# Patient Record
Sex: MALE | Race: WHITE | Employment: OTHER | URBAN - METROPOLITAN AREA
[De-identification: names, ages, dates, MRNs, and addresses within clinical notes are randomized per-mention and may not be internally consistent; named-entity substitution may affect disease eponyms.]

---

## 2019-08-14 ENCOUNTER — APPOINTMENT (OUTPATIENT)
Dept: CT IMAGING | Age: 69
End: 2019-08-14
Payer: MEDICARE

## 2019-08-14 ENCOUNTER — APPOINTMENT (OUTPATIENT)
Dept: GENERAL RADIOLOGY | Age: 69
End: 2019-08-14
Payer: MEDICARE

## 2019-08-14 ENCOUNTER — HOSPITAL ENCOUNTER (EMERGENCY)
Age: 69
Discharge: HOME | End: 2019-08-14
Attending: EMERGENCY MEDICINE
Payer: MEDICARE

## 2019-08-14 VITALS
RESPIRATION RATE: 18 BRPM | TEMPERATURE: 98.1 F | SYSTOLIC BLOOD PRESSURE: 110 MMHG | BODY MASS INDEX: 24.93 KG/M2 | HEIGHT: 68 IN | HEART RATE: 71 BPM | WEIGHT: 164.46 LBS | DIASTOLIC BLOOD PRESSURE: 85 MMHG | OXYGEN SATURATION: 97 %

## 2019-08-14 DIAGNOSIS — C44.319 BASAL CELL CARCINOMA (BCC) OF SKIN OF OTHER PART OF FACE: ICD-10-CM

## 2019-08-14 DIAGNOSIS — R79.89 ELEVATED D-DIMER: ICD-10-CM

## 2019-08-14 DIAGNOSIS — R07.9 ACUTE CHEST PAIN: Primary | ICD-10-CM

## 2019-08-14 DIAGNOSIS — S82.002D FRACTURE OF LEFT PATELLA WITH ROUTINE HEALING: ICD-10-CM

## 2019-08-14 LAB
A/G RATIO: 1.7 (ref 1.1–2.2)
ALBUMIN SERPL-MCNC: 4.7 G/DL (ref 3.4–5)
ALP BLD-CCNC: 92 U/L (ref 40–129)
ALT SERPL-CCNC: 10 U/L (ref 10–40)
ANION GAP SERPL CALCULATED.3IONS-SCNC: 14 MMOL/L (ref 3–16)
AST SERPL-CCNC: 12 U/L (ref 15–37)
BASOPHILS ABSOLUTE: 0.1 K/UL (ref 0–0.2)
BASOPHILS RELATIVE PERCENT: 0.8 %
BILIRUB SERPL-MCNC: 0.3 MG/DL (ref 0–1)
BUN BLDV-MCNC: 8 MG/DL (ref 7–20)
CALCIUM SERPL-MCNC: 9.4 MG/DL (ref 8.3–10.6)
CHLORIDE BLD-SCNC: 98 MMOL/L (ref 99–110)
CO2: 26 MMOL/L (ref 21–32)
CREAT SERPL-MCNC: 0.8 MG/DL (ref 0.8–1.3)
D DIMER: 257 NG/ML DDU (ref 0–229)
EOSINOPHILS ABSOLUTE: 0.1 K/UL (ref 0–0.6)
EOSINOPHILS RELATIVE PERCENT: 1.2 %
GFR AFRICAN AMERICAN: >60
GFR NON-AFRICAN AMERICAN: >60
GLOBULIN: 2.7 G/DL
GLUCOSE BLD-MCNC: 111 MG/DL (ref 70–99)
HCT VFR BLD CALC: 41.1 % (ref 40.5–52.5)
HEMOGLOBIN: 13.9 G/DL (ref 13.5–17.5)
LIPASE: 25 U/L (ref 13–60)
LYMPHOCYTES ABSOLUTE: 2.3 K/UL (ref 1–5.1)
LYMPHOCYTES RELATIVE PERCENT: 23 %
MCH RBC QN AUTO: 31.4 PG (ref 26–34)
MCHC RBC AUTO-ENTMCNC: 33.8 G/DL (ref 31–36)
MCV RBC AUTO: 93 FL (ref 80–100)
MONOCYTES ABSOLUTE: 0.6 K/UL (ref 0–1.3)
MONOCYTES RELATIVE PERCENT: 6.4 %
NEUTROPHILS ABSOLUTE: 6.9 K/UL (ref 1.7–7.7)
NEUTROPHILS RELATIVE PERCENT: 68.6 %
PDW BLD-RTO: 15.2 % (ref 12.4–15.4)
PLATELET # BLD: 390 K/UL (ref 135–450)
PMV BLD AUTO: 6.5 FL (ref 5–10.5)
POTASSIUM REFLEX MAGNESIUM: 4.1 MMOL/L (ref 3.5–5.1)
RBC # BLD: 4.42 M/UL (ref 4.2–5.9)
SODIUM BLD-SCNC: 138 MMOL/L (ref 136–145)
TOTAL PROTEIN: 7.4 G/DL (ref 6.4–8.2)
TROPONIN: <0.01 NG/ML
WBC # BLD: 10 K/UL (ref 4–11)

## 2019-08-14 PROCEDURE — 93005 ELECTROCARDIOGRAM TRACING: CPT | Performed by: EMERGENCY MEDICINE

## 2019-08-14 PROCEDURE — 71046 X-RAY EXAM CHEST 2 VIEWS: CPT

## 2019-08-14 PROCEDURE — 99285 EMERGENCY DEPT VISIT HI MDM: CPT

## 2019-08-14 PROCEDURE — 83690 ASSAY OF LIPASE: CPT

## 2019-08-14 PROCEDURE — 85025 COMPLETE CBC W/AUTO DIFF WBC: CPT

## 2019-08-14 PROCEDURE — 85379 FIBRIN DEGRADATION QUANT: CPT

## 2019-08-14 PROCEDURE — 80053 COMPREHEN METABOLIC PANEL: CPT

## 2019-08-14 PROCEDURE — 73560 X-RAY EXAM OF KNEE 1 OR 2: CPT

## 2019-08-14 PROCEDURE — 73700 CT LOWER EXTREMITY W/O DYE: CPT

## 2019-08-14 PROCEDURE — 84484 ASSAY OF TROPONIN QUANT: CPT

## 2019-08-14 RX ORDER — IBUPROFEN 800 MG/1
800 TABLET ORAL 3 TIMES DAILY PRN
Qty: 15 TABLET | Refills: 0 | Status: ON HOLD | OUTPATIENT
Start: 2019-08-14 | End: 2022-04-24

## 2019-08-14 ASSESSMENT — PAIN DESCRIPTION - PAIN TYPE: TYPE: ACUTE PAIN

## 2019-08-14 ASSESSMENT — PAIN DESCRIPTION - LOCATION: LOCATION: CHEST

## 2019-08-14 ASSESSMENT — PAIN SCALES - GENERAL: PAINLEVEL_OUTOF10: 1

## 2019-08-15 LAB
EKG ATRIAL RATE: 77 BPM
EKG DIAGNOSIS: NORMAL
EKG P AXIS: 70 DEGREES
EKG P-R INTERVAL: 122 MS
EKG Q-T INTERVAL: 366 MS
EKG QRS DURATION: 76 MS
EKG QTC CALCULATION (BAZETT): 414 MS
EKG R AXIS: 80 DEGREES
EKG T AXIS: 51 DEGREES
EKG VENTRICULAR RATE: 77 BPM

## 2019-08-15 PROCEDURE — 93010 ELECTROCARDIOGRAM REPORT: CPT | Performed by: INTERNAL MEDICINE

## 2019-08-15 NOTE — ED PROVIDER NOTES
[address] [facility]      Pt Name: Vannessa Davila  MRN: [id number]  [place] 1950  Date of evaluation: 8/14/2019  Provider[facility] Ruchi Thurston, [facility]  Chief Complaint   Patient presents with    Chest Pain     For 45 minutes earlier today, states the pain is now gone, Pain is on the L side of the chest    Shortness of Breath       HPI  Vannessa Davila is a 71 y.o. male who presents with chest pain with which he woke up at 7:30 AM today. Lasted a few minutes and then went went away. He broke out in a cold sweat. He felt very weak. Has had a decreased appetite today. He has been nauseated. He states he thinks it might be secondary to a cheeseburger he ate yesterday that was left over for several days. However he is done this frequently in the past without problems. He denies any fevers but has had chills. He denies any diarrhea. He denies any previous history. He smokes 1 pack of cigarettes per day. He denies any gastrointestinal or genitourinary bleeding. He denies any family history of medical problems. He had a cough 2 weeks ago but that resolved spontaneously without treatment. He is also complaining of left knee pain. He states that started when he fell couple months ago. The pain is increased. He has problems bearing weight. He states he fell from a standing position onto concrete. He has had increasing pain with weightbearing. He has not seen a doctor for this problem. He denies any paresthesias or weakness. He denies any previous fractures. ? REVIEW OF SYSTEMS    All systems negative except as noted in the HPI. Reviewed Nurses' notes and concur. No LMP for male patient. PAST MEDICAL HISTORY  No past medical history on file. FAMILY HISTORY  No family history on file. SOCIAL HISTORY   reports that he has been smoking. He has been smoking about 1.00 pack per day.  He does not have any Laboratory  [address]  [place] [facility] [address]   Phone [phone]   D-DIMER, QUANTITATIVE - Abnormal; Notable for the following components:    D-Dimer, Quant 257 (*)     All other components within normal limits    Narrative:     Performed at:  [facility]  [address] [place] [address]   Phone [phone]   TROPONIN    Narrative:     Performed at:  [facility]  [address] [facility] [place] [facility] [address]   Phone [phone]   CBC WITH AUTO DIFFERENTIAL    Narrative:     Performed at:  [facility]  [address] [facility] [place] [facility] [facility] [address]   Phone [phone]   LIPASE    Narrative:     Performed at:  [facility]  [address] [facility] [facility] [address]   Phone [phone]       ? EKG  EKG Interpretation    Interpreted by emergency department physician  Time read: 2012    Rhythm: Sinus  Ventricular Rate: 77  QRS Axis: 80  Ectopy: None noted  Conduction: Normal sinus rhythm and I disagree with the computer interpretation of digitalis effect. ST Segments: Consistent with early repolarization  T Waves: Consistent with early repolarization  Q Waves: None noted    Other findings: None    Compared to EKG on: None to compare    Clinical Impression: Normal sinus rhythm, normal EKG, I disagree with computer interpretation of digitalis effect. This is probably early repolarization. Kashif Campos      RADIOLOGY/PROCEDURES  I personally reviewed the images for this case. CT KNEE LEFT WO CONTRAST   Final Result   Old healed fracture through the mid patella. No acute fractures of the left   knee. XR CHEST STANDARD (2 VW)   Final Result   No active cardiopulmonary disease         XR KNEE LEFT (1-2 VIEWS)   Final Result   No definite fracture or joint effusion.   Appearance of the patella suspected   to be due to a bipartite patella or remote trauma. If patient has pain   referable to the patella, then CT would be recommended             [facility]  Pertinent Labs, EKG, & Imaging studies reviewed. (See chart for details)    Vitals:    08/14/19 2010 08/14/19 2025 08/14/19 2041 08/14/19 2043   BP: (!) 131/94 (!) 146/78 127/89    Pulse:  81 87    Resp:  13 20    Temp:       TempSrc:       SpO2:  99%  97%   Weight:       Height:           Medications - No data to display    New Prescriptions    IBUPROFEN (ADVIL;MOTRIN) 800 MG TABLET    Take 1 tablet by mouth 3 times daily as needed for Pain     Patient remained stable in the ED. his laboratory work was returned is normal.  His troponin was normal.  His EKG was normal.  Patient was pain-free in the emergency department. 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 His d-dimer was mildly elevated at 269. I offered the patient CT scanning of his chest to rule out pulmonary embolus and he stated that he was not having any chest pain at this time. He was refusing to have a CT of the chest.  He states my knee is hurting the worst and I do not want to have a CT of the chest.  Therefore, patient was instructed to follow-up with his doctor in 3 to 5 days and return if any problems. He was given a knee immobilizer and prescribed ibuprofen. He was instructed to take Tylenol for pain. At discharge he mentioned a basal cell carcinoma of his face and wanted referral.  He was given referral to plastic surgery. He was also referred to Dr. Luba Negrete for orthopedics. He was instructed to return if any problems. He is instructed to wear his knee immobilizer while he is ambulating. He does not need to wear to bed. He was also instructed to follow-up with Dr. Paola Flood for cardiology for his chest pain. He was instructed to return if he got worse.     The

## 2019-08-30 ENCOUNTER — TELEPHONE (OUTPATIENT)
Dept: ORTHOPEDIC SURGERY | Age: 69
End: 2019-08-30

## 2019-08-30 ENCOUNTER — OFFICE VISIT (OUTPATIENT)
Dept: ORTHOPEDIC SURGERY | Age: 69
End: 2019-08-30
Payer: MEDICARE

## 2019-08-30 VITALS
SYSTOLIC BLOOD PRESSURE: 129 MMHG | WEIGHT: 164 LBS | DIASTOLIC BLOOD PRESSURE: 70 MMHG | RESPIRATION RATE: 16 BRPM | HEART RATE: 67 BPM | BODY MASS INDEX: 24.86 KG/M2 | HEIGHT: 68 IN

## 2019-08-30 DIAGNOSIS — M17.12 ARTHRITIS OF LEFT KNEE: Primary | ICD-10-CM

## 2019-08-30 PROCEDURE — 1123F ACP DISCUSS/DSCN MKR DOCD: CPT | Performed by: ORTHOPAEDIC SURGERY

## 2019-08-30 PROCEDURE — 20610 DRAIN/INJ JOINT/BURSA W/O US: CPT | Performed by: ORTHOPAEDIC SURGERY

## 2019-08-30 PROCEDURE — G8420 CALC BMI NORM PARAMETERS: HCPCS | Performed by: ORTHOPAEDIC SURGERY

## 2019-08-30 PROCEDURE — G8427 DOCREV CUR MEDS BY ELIG CLIN: HCPCS | Performed by: ORTHOPAEDIC SURGERY

## 2019-08-30 PROCEDURE — 3017F COLORECTAL CA SCREEN DOC REV: CPT | Performed by: ORTHOPAEDIC SURGERY

## 2019-08-30 PROCEDURE — 99203 OFFICE O/P NEW LOW 30 MIN: CPT | Performed by: ORTHOPAEDIC SURGERY

## 2019-08-30 PROCEDURE — 4004F PT TOBACCO SCREEN RCVD TLK: CPT | Performed by: ORTHOPAEDIC SURGERY

## 2019-08-30 PROCEDURE — 4040F PNEUMOC VAC/ADMIN/RCVD: CPT | Performed by: ORTHOPAEDIC SURGERY

## 2019-08-31 PROBLEM — M17.12 ARTHRITIS OF LEFT KNEE: Status: ACTIVE | Noted: 2019-08-31

## 2020-07-17 ENCOUNTER — OFFICE VISIT (OUTPATIENT)
Dept: ORTHOPEDIC SURGERY | Age: 70
End: 2020-07-17
Payer: MEDICARE

## 2020-07-17 VITALS — HEIGHT: 68 IN | BODY MASS INDEX: 24.94 KG/M2 | RESPIRATION RATE: 16 BRPM | TEMPERATURE: 97.2 F

## 2020-07-17 PROBLEM — C44.311 BASAL CELL CARCINOMA (BCC) OF SKIN OF NOSE: Status: ACTIVE | Noted: 2020-07-17

## 2020-07-17 PROCEDURE — 4040F PNEUMOC VAC/ADMIN/RCVD: CPT | Performed by: ORTHOPAEDIC SURGERY

## 2020-07-17 PROCEDURE — 20610 DRAIN/INJ JOINT/BURSA W/O US: CPT | Performed by: ORTHOPAEDIC SURGERY

## 2020-07-17 PROCEDURE — G8420 CALC BMI NORM PARAMETERS: HCPCS | Performed by: ORTHOPAEDIC SURGERY

## 2020-07-17 PROCEDURE — 3017F COLORECTAL CA SCREEN DOC REV: CPT | Performed by: ORTHOPAEDIC SURGERY

## 2020-07-17 PROCEDURE — G8427 DOCREV CUR MEDS BY ELIG CLIN: HCPCS | Performed by: ORTHOPAEDIC SURGERY

## 2020-07-17 PROCEDURE — 99214 OFFICE O/P EST MOD 30 MIN: CPT | Performed by: ORTHOPAEDIC SURGERY

## 2020-07-17 PROCEDURE — 4004F PT TOBACCO SCREEN RCVD TLK: CPT | Performed by: ORTHOPAEDIC SURGERY

## 2020-07-17 PROCEDURE — 1123F ACP DISCUSS/DSCN MKR DOCD: CPT | Performed by: ORTHOPAEDIC SURGERY

## 2020-07-17 RX ORDER — LIDOCAINE HYDROCHLORIDE 10 MG/ML
40 INJECTION, SOLUTION INFILTRATION; PERINEURAL ONCE
Status: COMPLETED | OUTPATIENT
Start: 2020-07-17 | End: 2020-07-17

## 2020-07-17 RX ORDER — TRIAMCINOLONE ACETONIDE 40 MG/ML
40 INJECTION, SUSPENSION INTRA-ARTICULAR; INTRAMUSCULAR ONCE
Status: COMPLETED | OUTPATIENT
Start: 2020-07-17 | End: 2020-07-17

## 2020-07-17 RX ADMIN — LIDOCAINE HYDROCHLORIDE 40 MG: 10 INJECTION, SOLUTION INFILTRATION; PERINEURAL at 09:52

## 2020-07-17 RX ADMIN — TRIAMCINOLONE ACETONIDE 40 MG: 40 INJECTION, SUSPENSION INTRA-ARTICULAR; INTRAMUSCULAR at 09:52

## 2020-07-18 NOTE — PROGRESS NOTES
CHIEF COMPLAINT:   1-Left knee pain/osteoarthritis. 2-Left knee injury/ patella fracture-1/2019  3-Skin lesion left side nose/ Basal cell carcinoma of skin of nose. HISTORY:  Mr. Ariana Eli 71 y.o.  male presents today for f/u evaluation of left knee pain which started after he fell and landed on his left knee cap spring 2019. He had left knee cortisone injection on 8/30/2019 with good improvement. His pain is affecting him walking and going up and down steps.  He is complaining of achy pain. Pain is increase with standing and walking and decrease with rest. Pain is sharp early in the morning with first few steps, dull achy pain by the end of the day. Alleviating factors: rest. No radiation and no numbness and tingling sensation. No other complaint. He initially presented [facility]2019 and had x-rays followed by a CT scan which showed a healing patella fracture with patellofemoral arthritis. He also c/o nasal skin lesion that is getting bigger, and started over 10 years ago. History reviewed. No pertinent past medical history. History reviewed. No pertinent surgical history. Social History     Socioeconomic History    Marital status:       Spouse name: Not on file    Number of children: Not on file    Years of education: Not on file    Highest education level: Not on file   Occupational History    Not on file   Social Needs    Financial resource strain: Not on file    Food insecurity     Worry: Not on file     Inability: Not on file    Transportation needs     Medical: Not on file     Non-medical: Not on file   Tobacco Use    Smoking status: Current Every Day Smoker     Packs/day: 1.00    Smokeless tobacco: Never Used   Substance and Sexual Activity    Alcohol use: No    Drug use: Not on file    Sexual activity: Not on file   Lifestyle    Physical activity     Days per week: Not on file     Minutes per session: Not on file    Stress: Not on file Relationships    Social connections     Talks on phone: Not on file     Gets together: Not on file     Attends Hoahaoism service: Not on file     Active member of club or organization: Not on file     Attends meetings of clubs or organizations: Not on file     Relationship status: Not on file    Intimate partner violence     Fear of current or ex partner: Not on file     Emotionally abused: Not on file     Physically abused: Not on file     Forced sexual activity: Not on file   Other Topics Concern    Not on file   Social History Narrative    Not on file       History reviewed. No pertinent family history. Current Outpatient Medications on File Prior to Visit   Medication Sig Dispense Refill    ibuprofen (ADVIL;MOTRIN) 800 MG tablet Take 1 tablet by mouth 3 times daily as needed for Pain 15 tablet 0     No current facility-administered medications on file prior to visit. Pertinent items are noted in HPI  Review of systems reviewed from Patient History Form dated on 8/30/2019 and available in the patient's chart under the Media tab. No change. PHYSICAL EXAMINATION:  Mr. Ariana Eli is a very pleasant 71 y.o.  male who presents today in no acute distress, awake, alert, and oriented. He is well dressed, nourished and  groomed. Patient with normal affect. Height is  5' 8\" (1.727 m), weight is  , Body mass index is 24.94 kg/m². Resting respiratory rate is 16. Examination of the gait, showed that the patient walks heel-toe with a non-antalgic gait and mild limp.  Examination of both knees showing full ROM, left mild crepitus, tenderness on medial joint line, stable to varus and valgus stress. He has intact sensation and good pedal pulses. He has good strength in 2 planes, and has mild tenderness on deep palpation over the medial joint line. Knee reflex 1+ bilaterally. There is a skin lesion left side nose c/w Basal cell carcinoma of skin of nose.               IMAGING:  Xray 3 views of the left knee was obtained 8/14/2019 in the ED and reviewed. These demonstrate moderate degenerative changes with narrowing of the joint space in the medial and patellofemoral joint space compartment, subchondral sclerosis, and marginal osteophytosis. CT scan dated 8/14/2019 from [facility] was reviewed and showed a healing patella fracture with the patellofemoral arthritis. IMPRESSION: Left knee DJD and healing patella fracture. 1-Left knee pain/osteoarthritis. 2-Left knee injury/ patella fracture-1/2019  3-Skin lesion left side nose/ Basal cell carcinoma of skin of nose. PLAN: I discussed with the patient the treatment options including both surgical and non-surgical treatment and discussed with him that he had an old patella fracture that is now healing. We recommended Quad exercises and stretching of the calf and hamstrings which was taught to the patient today. He will take NSAIDS as needed. I discussed with the patient that I think that he would really benefit from a course of physical therapy for further strengthening and stretching. He would like to do it on his own. I believe he will benefit from cortisone injection left knee, and he agreed to have. PROCEDURE:    With the patient's permission, and under sterile condition, the left knee was prepared and draped with alcohol and injected with a mixture of 1 mL Kenalog 40mg and 4 mL of 1% lidocaine through the medial parapatellar port area. The patient tolerated the procedure well. A band-aid was applied and the patient was advised to ice the knee for 15-20 minutes to relieve any injection site related pain. He reported a good improvement immediatly after the injection. F/u in 3 months. He understands that this may need surgery if the pain did not to resolve. Referral to Dermatology for his skin lesion left side nose/ Basal cell carcinoma of skin of nose.       Farhan Bonner MD

## 2021-09-09 ENCOUNTER — HOSPITAL ENCOUNTER (OUTPATIENT)
Dept: CT IMAGING | Age: 71
Discharge: HOME | End: 2021-09-09
Payer: MEDICARE

## 2021-09-09 DIAGNOSIS — F17.210 SMOKING GREATER THAN 10 PACK YEARS: ICD-10-CM

## 2021-09-09 PROCEDURE — 71271 CT THORAX LUNG CANCER SCR C-: CPT

## 2022-02-16 ENCOUNTER — HOSPITAL ENCOUNTER (OUTPATIENT)
Dept: VASCULAR LAB | Age: 72
Discharge: HOME | End: 2022-02-16
Payer: MEDICARE

## 2022-02-16 DIAGNOSIS — I73.9 PVD (PERIPHERAL VASCULAR DISEASE) (HCC): ICD-10-CM

## 2022-02-16 PROCEDURE — 93925 LOWER EXTREMITY STUDY: CPT

## 2022-02-24 ENCOUNTER — INITIAL CONSULT (OUTPATIENT)
Dept: VASCULAR SURGERY | Age: 72
End: 2022-02-24
Payer: MEDICARE

## 2022-02-24 VITALS
HEART RATE: 71 BPM | BODY MASS INDEX: 25.01 KG/M2 | HEIGHT: 68 IN | DIASTOLIC BLOOD PRESSURE: 80 MMHG | SYSTOLIC BLOOD PRESSURE: 129 MMHG | WEIGHT: 165 LBS

## 2022-02-24 DIAGNOSIS — I70.90 ATHEROSCLEROSIS: ICD-10-CM

## 2022-02-24 DIAGNOSIS — I73.9 PAD (PERIPHERAL ARTERY DISEASE) (HCC): ICD-10-CM

## 2022-02-24 DIAGNOSIS — I70.211 ATHEROSCLEROSIS OF NATIVE ARTERIES OF EXTREMITIES WITH INTERMITTENT CLAUDICATION, RIGHT LEG (HCC): ICD-10-CM

## 2022-02-24 DIAGNOSIS — I73.9 PAD (PERIPHERAL ARTERY DISEASE) (HCC): Primary | ICD-10-CM

## 2022-02-24 LAB
ANION GAP SERPL CALCULATED.3IONS-SCNC: 12 MMOL/L (ref 3–16)
BUN BLDV-MCNC: 15 MG/DL (ref 7–20)
CALCIUM SERPL-MCNC: 9 MG/DL (ref 8.3–10.6)
CHLORIDE BLD-SCNC: 102 MMOL/L (ref 99–110)
CO2: 24 MMOL/L (ref 21–32)
CREAT SERPL-MCNC: 1 MG/DL (ref 0.8–1.3)
GFR AFRICAN AMERICAN: >60
GFR NON-AFRICAN AMERICAN: >60
GLUCOSE BLD-MCNC: 102 MG/DL (ref 70–99)
POTASSIUM SERPL-SCNC: 4.3 MMOL/L (ref 3.5–5.1)
SODIUM BLD-SCNC: 138 MMOL/L (ref 136–145)

## 2022-02-24 PROCEDURE — 99203 OFFICE O/P NEW LOW 30 MIN: CPT | Performed by: STUDENT IN AN ORGANIZED HEALTH CARE EDUCATION/TRAINING PROGRAM

## 2022-02-24 PROCEDURE — G8427 DOCREV CUR MEDS BY ELIG CLIN: HCPCS | Performed by: STUDENT IN AN ORGANIZED HEALTH CARE EDUCATION/TRAINING PROGRAM

## 2022-02-24 PROCEDURE — 1123F ACP DISCUSS/DSCN MKR DOCD: CPT | Performed by: STUDENT IN AN ORGANIZED HEALTH CARE EDUCATION/TRAINING PROGRAM

## 2022-02-24 PROCEDURE — 3017F COLORECTAL CA SCREEN DOC REV: CPT | Performed by: STUDENT IN AN ORGANIZED HEALTH CARE EDUCATION/TRAINING PROGRAM

## 2022-02-24 PROCEDURE — G8417 CALC BMI ABV UP PARAM F/U: HCPCS | Performed by: STUDENT IN AN ORGANIZED HEALTH CARE EDUCATION/TRAINING PROGRAM

## 2022-02-24 PROCEDURE — 4004F PT TOBACCO SCREEN RCVD TLK: CPT | Performed by: STUDENT IN AN ORGANIZED HEALTH CARE EDUCATION/TRAINING PROGRAM

## 2022-02-24 PROCEDURE — 4040F PNEUMOC VAC/ADMIN/RCVD: CPT | Performed by: STUDENT IN AN ORGANIZED HEALTH CARE EDUCATION/TRAINING PROGRAM

## 2022-02-24 PROCEDURE — G8484 FLU IMMUNIZE NO ADMIN: HCPCS | Performed by: STUDENT IN AN ORGANIZED HEALTH CARE EDUCATION/TRAINING PROGRAM

## 2022-02-24 ASSESSMENT — ENCOUNTER SYMPTOMS
SHORTNESS OF BREATH: 0
COLOR CHANGE: 1
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
## 2022-03-02 ENCOUNTER — TELEPHONE (OUTPATIENT)
Dept: SURGERY | Age: 72
End: 2022-03-02

## 2022-03-02 NOTE — TELEPHONE ENCOUNTER
PT would like to have a return call regarding the test he is to have done for Dr Gila Crowley. Plus he would like to have some pain medication. Call and advise.

## 2022-03-03 ENCOUNTER — TELEPHONE (OUTPATIENT)
Dept: SURGERY | Age: 72
End: 2022-03-03

## 2022-03-03 ENCOUNTER — TELEPHONE (OUTPATIENT)
Dept: VASCULAR SURGERY | Age: 72
End: 2022-03-03

## 2022-03-03 DIAGNOSIS — I99.8 ISCHEMIC FOOT PAIN AT REST: Primary | ICD-10-CM

## 2022-03-03 DIAGNOSIS — M79.673 ISCHEMIC FOOT PAIN AT REST: Primary | ICD-10-CM

## 2022-03-03 RX ORDER — TRAMADOL HYDROCHLORIDE 50 MG/1
50 TABLET ORAL EVERY 4 HOURS PRN
Qty: 30 TABLET | Refills: 0 | Status: SHIPPED | OUTPATIENT
Start: 2022-03-03 | End: 2022-03-08

## 2022-03-03 NOTE — TELEPHONE ENCOUNTER
Pt is scheduled for CTA on 3-21-22. I recommended he call and try to move the appointment to an earlier date in order to move forward with treatment for his leg pain.

## 2022-03-03 NOTE — TELEPHONE ENCOUNTER
Patient called back says his iPhone 10 screens his call, he is calling about terrific rest pain he has moved his CAT scan up to the Saturday. Told to call tomorrow morning talk to Dr. Aurora Woods  and see if he will leave him a prescription to get through the weekend. I think it would be reasonable.   He is also dealing with a cancer of the nose and the specific CAT scan of his head over at [facility] next week sometime

## 2022-03-03 NOTE — TELEPHONE ENCOUNTER
Please see previous phone encounter for reference. Pt is now scheduled for CTA on 3-5-22. Will you prescribe pain medication?

## 2022-03-03 NOTE — TELEPHONE ENCOUNTER
Apparently he wants pain medicine for his increasing rest pain I agree with the messages in the chart he needs a CTA and he should not wait till the end of March to have this done

## 2022-03-04 NOTE — PROGRESS NOTES
Okay for tramadol for rest pain for now. Imperative that pre-op testing completed for possible surgery to relieve the pain itself.

## 2022-03-07 ENCOUNTER — TELEPHONE (OUTPATIENT)
Dept: SURGERY | Age: 72
End: 2022-03-07

## 2022-03-07 NOTE — TELEPHONE ENCOUNTER
PT would like to have a return call regarding CTA scan on 3/21. PT states that he was told to come in earlier then that. Call and advise on a new date and time.

## 2022-03-10 ENCOUNTER — TELEPHONE (OUTPATIENT)
Dept: VASCULAR SURGERY | Age: 72
End: 2022-03-10

## 2022-03-10 ENCOUNTER — HOSPITAL ENCOUNTER (OUTPATIENT)
Dept: CT IMAGING | Age: 72
Discharge: HOME | End: 2022-03-10
Payer: MEDICARE

## 2022-03-10 DIAGNOSIS — I70.211 ATHEROSCLEROSIS OF NATIVE ARTERIES OF EXTREMITIES WITH INTERMITTENT CLAUDICATION, RIGHT LEG (HCC): ICD-10-CM

## 2022-03-10 DIAGNOSIS — I73.9 PAD (PERIPHERAL ARTERY DISEASE) (HCC): ICD-10-CM

## 2022-03-10 PROCEDURE — 75635 CT ANGIO ABDOMINAL ARTERIES: CPT

## 2022-03-10 PROCEDURE — 6360000004 HC RX CONTRAST MEDICATION: Performed by: SURGERY

## 2022-03-10 RX ADMIN — IOPAMIDOL 75 ML: 755 INJECTION, SOLUTION INTRAVENOUS at 14:30

## 2022-03-10 NOTE — TELEPHONE ENCOUNTER
Dr Fletcher Resides reviewed the patient's CTA and recommends proceeding with a RLE angiogram.      Discussed with patient. Scheduling sheet forwarded [email or web address].

## 2022-03-10 NOTE — LETTER
[facility]  Cardiac Cath Lab    Phone [phone] Fax: [phone]          Patient Scheduling Form:     Date: 2022   Time: 09:30   (Arrival:  8:00)    Procedure: Angiogram of the Aorta and Right Lower Extremity, Possible Revascularization    Anesthesia Required:  LOC/SED    Physician: Sharmila Levy MD  -------------------------------------------------------------------------------------------------------------------  Patients Latanya Montero    YOB: 1950 Sex: Male    Patient Social Security #:     Mailing Address:  [address] [place] [address]      Home Phone # [phone]   Alternate #     Primary Care Physicain: Drea Frost    ICD-10 Code / Diagnosis:  I73.9 Peripheral Artery Disease  ------------------------------------------------------------------------------------------------------------------  INSURANCE INFORMAITON:  Payor/Plan Subscr  Sex Relation Sub. Ins. ID Effective Group Num   1.  [address] [facility] W 1950 Male Self [id number] 7/1/15                                    [address]        Form Completed By:  Lisbeth Dan [address] [place] [address] [place] 3-10-22 15:58             [phone]

## 2022-03-11 ENCOUNTER — TELEPHONE (OUTPATIENT)
Dept: VASCULAR SURGERY | Age: 72
End: 2022-03-11

## 2022-03-11 DIAGNOSIS — I99.8 ISCHEMIC FOOT PAIN AT REST: Primary | ICD-10-CM

## 2022-03-11 DIAGNOSIS — M79.673 ISCHEMIC FOOT PAIN AT REST: Primary | ICD-10-CM

## 2022-03-11 RX ORDER — OXYCODONE HYDROCHLORIDE AND ACETAMINOPHEN 5; 325 MG/1; MG/1
1 TABLET ORAL EVERY 6 HOURS PRN
Qty: 20 TABLET | Refills: 0 | Status: SHIPPED | OUTPATIENT
Start: 2022-03-11 | End: 2022-03-14 | Stop reason: SDUPTHER

## 2022-03-11 NOTE — TELEPHONE ENCOUNTER
Olvin Iyer, DO  You 5 minutes ago (3:09 PM)     Cindy Frias will send.     Message text      Pt informed

## 2022-03-11 NOTE — TELEPHONE ENCOUNTER
Pt was calling about his leg pain. He does not have a headache. The Tramadol does not help with his leg pain and is requesting something stronger.

## 2022-03-11 NOTE — TELEPHONE ENCOUNTER
Pt said his leg pain is severe and he has not been able to sleep for several days. He said that he \"needs something stronger than Tramadol because it isn't good enough to take care of a headache. \" He has not used all of the Tramadol previously prescribed. Pt is scheduled for RLE angiogram on 3-23-22.

## 2022-03-14 ENCOUNTER — TELEPHONE (OUTPATIENT)
Dept: SURGERY | Age: 72
End: 2022-03-14

## 2022-03-14 DIAGNOSIS — I99.8 ISCHEMIC FOOT PAIN AT REST: ICD-10-CM

## 2022-03-14 DIAGNOSIS — M79.673 ISCHEMIC FOOT PAIN AT REST: ICD-10-CM

## 2022-03-14 RX ORDER — OXYCODONE HYDROCHLORIDE AND ACETAMINOPHEN 5; 325 MG/1; MG/1
1 TABLET ORAL EVERY 6 HOURS PRN
Qty: 20 TABLET | Refills: 0 | Status: SHIPPED | OUTPATIENT
Start: 2022-03-14 | End: 2022-03-15 | Stop reason: SDUPTHER

## 2022-03-14 NOTE — TELEPHONE ENCOUNTER
Confirmed with pharmacy they did not receive the repeat Rx sent today. Written prescription ready for . Pt unsure if he can make it. He will call the pharmacy and call back.

## 2022-03-14 NOTE — TELEPHONE ENCOUNTER
Pt will see if his son will be able to  the Rx tomorrow or if he needs it sent to a different pharmacy.

## 2022-03-14 NOTE — TELEPHONE ENCOUNTER
Patient just called stating that he just checked the pharmacy and they did not have his Rx for Percocet from last week. He is also requesting a new Rx for Tramadol. He uses [facility].

## 2022-03-14 NOTE — TELEPHONE ENCOUNTER
PT needs to have a phone call placed to his pharmacy [facility] for his pain medication.

## 2022-03-15 ENCOUNTER — TELEPHONE (OUTPATIENT)
Dept: SURGERY | Age: 72
End: 2022-03-15

## 2022-03-15 DIAGNOSIS — I99.8 ISCHEMIC FOOT PAIN AT REST: ICD-10-CM

## 2022-03-15 DIAGNOSIS — M79.673 ISCHEMIC FOOT PAIN AT REST: ICD-10-CM

## 2022-03-15 RX ORDER — OXYCODONE HYDROCHLORIDE AND ACETAMINOPHEN 5; 325 MG/1; MG/1
1 TABLET ORAL EVERY 6 HOURS PRN
Qty: 20 TABLET | Refills: 0 | Status: SHIPPED | OUTPATIENT
Start: 2022-03-15 | End: 2022-03-20

## 2022-03-15 NOTE — TELEPHONE ENCOUNTER
Spoke to patient. He would like to try to send the Rx to [facility] on [address] Pharmacy updated.

## 2022-03-19 ENCOUNTER — TELEPHONE (OUTPATIENT)
Dept: VASCULAR SURGERY | Age: 72
End: 2022-03-19

## 2022-03-19 NOTE — TELEPHONE ENCOUNTER
Tried to call patient twice--he called Service stating he had foot pain. No answer. Left message x 2.

## 2022-03-19 NOTE — TELEPHONE ENCOUNTER
Pt called [facility] again--again, tried to contact patient and service also tried to contact him while I was holding. No answer and Left message on VM.

## 2022-03-19 NOTE — TELEPHONE ENCOUNTER
Pt called back a 3rd time and AS kept him on hold so that I could speak with him. Reports rest pain in right foot. He was upset about issues with pain Rx. Reports he is almost out of pain meds. I advised him that I would not refill Rx on weekend, and that if pain worsens, he should proceed to ED. If stable, can call and speak with Dr. Kavya Gordon on Monday. Advised him to have a consistent way for us to contact him back if needed.

## 2022-03-20 ENCOUNTER — TELEPHONE (OUTPATIENT)
Dept: VASCULAR SURGERY | Age: 72
End: 2022-03-20

## 2022-03-20 NOTE — TELEPHONE ENCOUNTER
Patient called again to request pain medications. I returned call and no answer--left message that if he is having significant pain, should proceed to the ED.

## 2022-03-21 ENCOUNTER — TELEPHONE (OUTPATIENT)
Dept: VASCULAR SURGERY | Age: 72
End: 2022-03-21

## 2022-03-21 DIAGNOSIS — I99.8 ISCHEMIC FOOT PAIN AT REST: Primary | ICD-10-CM

## 2022-03-21 DIAGNOSIS — M79.673 ISCHEMIC FOOT PAIN AT REST: Primary | ICD-10-CM

## 2022-03-21 RX ORDER — OXYCODONE HYDROCHLORIDE AND ACETAMINOPHEN 5; 325 MG/1; MG/1
1 TABLET ORAL EVERY 6 HOURS PRN
Qty: 12 TABLET | Refills: 0 | Status: SHIPPED | OUTPATIENT
Start: 2022-03-21 | End: 2022-03-21

## 2022-03-21 NOTE — TELEPHONE ENCOUNTER
Pt called back to check on the status of his request.  Informed Dr Tarun Tobias will be in the office this afternoon

## 2022-03-21 NOTE — PROGRESS NOTES
Patient's procedure not until Wednesday  5 day prescription up today  Will provide 3 more days worth until procedure can be completed.

## 2022-03-23 ENCOUNTER — HOSPITAL ENCOUNTER (OUTPATIENT)
Dept: CARDIAC CATH/INVASIVE PROCEDURES | Age: 72
Discharge: HOME | End: 2022-03-23
Payer: MEDICARE

## 2022-03-23 ENCOUNTER — TELEPHONE (OUTPATIENT)
Dept: VASCULAR SURGERY | Age: 72
End: 2022-03-23

## 2022-03-23 VITALS
RESPIRATION RATE: 16 BRPM | HEART RATE: 68 BPM | OXYGEN SATURATION: 97 % | TEMPERATURE: 98 F | SYSTOLIC BLOOD PRESSURE: 103 MMHG | DIASTOLIC BLOOD PRESSURE: 75 MMHG | BODY MASS INDEX: 24.1 KG/M2 | HEIGHT: 68 IN | WEIGHT: 159 LBS

## 2022-03-23 LAB
ANION GAP SERPL CALCULATED.3IONS-SCNC: 11 MMOL/L (ref 3–16)
BUN BLDV-MCNC: 12 MG/DL (ref 7–20)
CALCIUM SERPL-MCNC: 9.2 MG/DL (ref 8.3–10.6)
CHLORIDE BLD-SCNC: 102 MMOL/L (ref 99–110)
CO2: 25 MMOL/L (ref 21–32)
CREAT SERPL-MCNC: 0.9 MG/DL (ref 0.8–1.3)
GFR AFRICAN AMERICAN: >60
GFR NON-AFRICAN AMERICAN: >60
GLUCOSE BLD-MCNC: 101 MG/DL (ref 70–99)
HCT VFR BLD CALC: 40.4 % (ref 40.5–52.5)
HEMOGLOBIN: 13.4 G/DL (ref 13.5–17.5)
MCH RBC QN AUTO: 29.7 PG (ref 26–34)
MCHC RBC AUTO-ENTMCNC: 33.2 G/DL (ref 31–36)
MCV RBC AUTO: 89.5 FL (ref 80–100)
PDW BLD-RTO: 14.4 % (ref 12.4–15.4)
PLATELET # BLD: 381 K/UL (ref 135–450)
PMV BLD AUTO: 7.2 FL (ref 5–10.5)
POTASSIUM SERPL-SCNC: 4.1 MMOL/L (ref 3.5–5.1)
RBC # BLD: 4.52 M/UL (ref 4.2–5.9)
SODIUM BLD-SCNC: 138 MMOL/L (ref 136–145)
WBC # BLD: 7.4 K/UL (ref 4–11)

## 2022-03-23 PROCEDURE — 99152 MOD SED SAME PHYS/QHP 5/>YRS: CPT

## 2022-03-23 PROCEDURE — 75774 ARTERY X-RAY EACH VESSEL: CPT

## 2022-03-23 PROCEDURE — 75716 ARTERY X-RAYS ARMS/LEGS: CPT

## 2022-03-23 PROCEDURE — C1760 CLOSURE DEV, VASC: HCPCS

## 2022-03-23 PROCEDURE — 6360000004 HC RX CONTRAST MEDICATION: Performed by: STUDENT IN AN ORGANIZED HEALTH CARE EDUCATION/TRAINING PROGRAM

## 2022-03-23 PROCEDURE — 37224 PR REVSC OPN/PRG FEM/POP W/ANGIOPLASTY UNI: CPT | Performed by: STUDENT IN AN ORGANIZED HEALTH CARE EDUCATION/TRAINING PROGRAM

## 2022-03-23 PROCEDURE — C1887 CATHETER, GUIDING: HCPCS

## 2022-03-23 PROCEDURE — 6360000002 HC RX W HCPCS

## 2022-03-23 PROCEDURE — 75625 CONTRAST EXAM ABDOMINL AORTA: CPT

## 2022-03-23 PROCEDURE — 99152 MOD SED SAME PHYS/QHP 5/>YRS: CPT | Performed by: STUDENT IN AN ORGANIZED HEALTH CARE EDUCATION/TRAINING PROGRAM

## 2022-03-23 PROCEDURE — 99153 MOD SED SAME PHYS/QHP EA: CPT

## 2022-03-23 PROCEDURE — 85027 COMPLETE CBC AUTOMATED: CPT

## 2022-03-23 PROCEDURE — 80048 BASIC METABOLIC PNL TOTAL CA: CPT

## 2022-03-23 PROCEDURE — C2623 CATH, TRANSLUMIN, DRUG-COAT: HCPCS

## 2022-03-23 PROCEDURE — 2709999900 HC NON-CHARGEABLE SUPPLY

## 2022-03-23 PROCEDURE — 37224 HC FEM POP TERRITORY PLASTY: CPT

## 2022-03-23 PROCEDURE — 6370000000 HC RX 637 (ALT 250 FOR IP)

## 2022-03-23 PROCEDURE — C1894 INTRO/SHEATH, NON-LASER: HCPCS

## 2022-03-23 PROCEDURE — 2500000003 HC RX 250 WO HCPCS

## 2022-03-23 PROCEDURE — C1769 GUIDE WIRE: HCPCS

## 2022-03-23 PROCEDURE — C1725 CATH, TRANSLUMIN NON-LASER: HCPCS

## 2022-03-23 RX ORDER — CLOPIDOGREL BISULFATE 75 MG/1
75 TABLET ORAL DAILY
Qty: 30 TABLET | Refills: 3 | Status: SHIPPED | OUTPATIENT
Start: 2022-03-23 | End: 2022-06-13 | Stop reason: SDUPTHER

## 2022-03-23 RX ORDER — IODIXANOL 320 MG/ML
110 INJECTION, SOLUTION INTRAVASCULAR
Status: COMPLETED | OUTPATIENT
Start: 2022-03-23 | End: 2022-03-23

## 2022-03-23 RX ORDER — SODIUM CHLORIDE 0.9 % (FLUSH) 0.9 %
5-40 SYRINGE (ML) INJECTION PRN
Status: DISCONTINUED | OUTPATIENT
Start: 2022-03-23 | End: 2022-03-24 | Stop reason: HOSPADM

## 2022-03-23 RX ORDER — CLOPIDOGREL BISULFATE 75 MG/1
150 TABLET ORAL ONCE
Status: DISCONTINUED | OUTPATIENT
Start: 2022-03-23 | End: 2022-03-24 | Stop reason: HOSPADM

## 2022-03-23 RX ORDER — SODIUM CHLORIDE 0.9 % (FLUSH) 0.9 %
5-40 SYRINGE (ML) INJECTION EVERY 12 HOURS SCHEDULED
Status: DISCONTINUED | OUTPATIENT
Start: 2022-03-23 | End: 2022-03-24 | Stop reason: HOSPADM

## 2022-03-23 RX ORDER — SODIUM CHLORIDE 9 MG/ML
25 INJECTION, SOLUTION INTRAVENOUS PRN
Status: DISCONTINUED | OUTPATIENT
Start: 2022-03-23 | End: 2022-03-24 | Stop reason: HOSPADM

## 2022-03-23 RX ORDER — SODIUM CHLORIDE 9 MG/ML
INJECTION, SOLUTION INTRAVENOUS CONTINUOUS
Status: DISCONTINUED | OUTPATIENT
Start: 2022-03-23 | End: 2022-03-24 | Stop reason: HOSPADM

## 2022-03-23 RX ORDER — ACETAMINOPHEN 325 MG/1
650 TABLET ORAL EVERY 4 HOURS PRN
Status: DISCONTINUED | OUTPATIENT
Start: 2022-03-23 | End: 2022-03-24 | Stop reason: HOSPADM

## 2022-03-23 RX ADMIN — IODIXANOL 110 ML: 320 INJECTION, SOLUTION INTRAVASCULAR at 10:44

## 2022-03-23 ASSESSMENT — PAIN DESCRIPTION - LOCATION: LOCATION: LEG

## 2022-03-23 ASSESSMENT — PAIN SCALES - GENERAL: PAINLEVEL_OUTOF10: 4

## 2022-03-23 ASSESSMENT — PAIN DESCRIPTION - PAIN TYPE: TYPE: ACUTE PAIN

## 2022-03-23 ASSESSMENT — PAIN DESCRIPTION - ORIENTATION: ORIENTATION: RIGHT

## 2022-03-23 NOTE — TELEPHONE ENCOUNTER
Cath lab called regarding the patients Rx for Plavix. Medication was sent by Dr Isabella Whalen today. I called the pharmacy and they had not yet gotten the transmission.       Verbal order accepted by the pharmacist.

## 2022-03-23 NOTE — H&P
H&P Update     I have reviewed the history and physical and examined the patient and find no relevant changes. I have reviewed with the patient and/or family the risks, benefits, and alternatives to the procedure. I HAVE PRESENTED REASONABLE ALTERNATIVES TO THE PATIENT'S PROPOSED CARE, TREATMENT, AND SERVICES. THE DISCUSSION I HAVE DONE ENCOMPASSED RISKS, BENEFITS, AND SIDE EFFECTS RELATED TO THE ALTERNATIVES AND THE RISKS RELATED TO NOT RECEIVING THE PROPOSED CARE, TREATMENT, SERVICES. Pre-sedation Assessment    Patient:  [place]   :   1950  Intended Procedure: RLE angiogram    Vitals:    22 0845   BP: 103/75   Pulse: 68   Resp: 16   Temp: 98 °F (36.7 °C)   SpO2: 97%       Nursing notes reviewed and agreed. Medications reviewed  Allergies: No Known Allergies      Pre-Procedure Assessment/Plan:  ASA 2 - Patient with mild systemic disease with no functional limitations    Mallampati Airway Assessment:  Mallampati Class II - (soft palate, fauces & uvula are visible)    Level of Sedation Plan: Moderate sedation    Post Procedure plan: Return to same level of care    Adrian Reid DO, [address] [facility] Vascular and Endovascular Surgery

## 2022-03-23 NOTE — OP NOTE
[facility]           [address]                                OPERATIVE REPORT    PATIENT NAME: Gerrianne Carrel                  :        1950  MED REC NO:   [id number]                          ROOM:  ACCOUNT NO:   [de-identified]                           ADMIT DATE: 2022  PROVIDER:     Alexy Lunsford DO    DATE OF PROCEDURE:  2022    PREOPERATIVE DIAGNOSIS:  Peripheral arterial disease with rest pain. POSTOPERATIVE DIAGNOSIS:  Peripheral arterial disease with rest pain. OPERATIONS PERFORMED:  1. Ultrasound-guided access to the left common femoral artery. 2.  Abdominal aortogram.  3.  Right lower extremity angiogram.  4.  Recanalization of occluded mid popliteal artery using a 4 x 80 mm  balloon, followed by 4 x 150 mm drug balloon, and followed by 5 x 120 mm  balloon. SURGEON:  Alexy Lunsford DO    ASSISTANTS:  None. ANESTHESIA:  Local with sedation. ESTIMATED BLOOD LOSS:  Less than 50 mL. COMPLICATIONS:  None apparent. ASA CLASSIFICATION:  II.    MALLAMPATI SCORE:  II.    INDICATIONS:  This is a 55-year-old male patient who presented to my  office with some indolent discomfort. This discomfort has been going on  for a while now and he has endorsed it mainly as rest pain. However,  given his poor overall history, I had some difficulty ascertaining as to  whether or not this was more of an acute or chronic process; however, we  ultimately decided that he might need the surgery. Therefore, I  underwent a CT angiogram.  CT angiogram demonstrated that he essentially  has axial flow; however, he has a mid popliteal artery occlusion. I was  a little bit concerned that this would be the only reason why he would  be having so much discomfort. However, we opted for an angiography  versus open surgical treatment. The risks include pain, infection,  bleeding, embolization, and thrombosis.   We were able to clearly dictate  it to the patient. He gave written informed consent. OPERATIVE PROCEDURE:  The patient was brought into the cath lab and  placed supine on the table. Appropriate monitoring lines including  continuous blood pressure, EKG, and pulse oximetry were placed on the  patient. A qualified nurse observer was in the room to administer  sedation. We used a combination of Versed and fentanyl. I directly  observed this. We used 8 mg of Versed and 300 mcg of fentanyl. Total  sedation time was from 9:56 a.m. to 10:45 a.m. Total time was 49  minutes. I directly observed the sedation during this entire time. Bilateral groins were prepped and draped in the usual sterile fashion. A time-out was called for indication, patient, and laterality. The left common femoral artery was visualized under ultrasound. It was  patent and pulsatile. I then accessed using a micropuncture technique. I directly visualized the needle entering the vessel and upsized over a  DiJiPOPson wire to a 5-Belarusian sheath. Abdominal aortogram demonstrated  that the abdominal aorta was patent. The bilateral renal arteries were  patent. The iliac arteries were patent. The common iliac arteries were  patent. The right hypogastric was severely diseased and stenotic. The  right external iliac artery was patent. I selectively catheterized the right lower extremity and placed the  catheter into the common femoral artery on the right side. The right  common femoral was patent. The profunda was patent. The SFA was small  and somewhat diseased, but still patent. It was patent down to the  level of the adductor hiatus where it turns to the popliteal artery. At  this point in time, the most proximal segment of the popliteal artery  appeared to be patent; however, it immediately occluded. There was  severe opacification of the P2 segment of the popliteal artery behind  the knee.   There were large intervening collaterals that had some  stenosis and even potentially contributing may be to his worsening  symptoms. The popliteal artery bifurcation was via anterior tibial and  peroneal arteries. The posterior tibial artery was completely occluded  at its origin. The dominant runoff was via the AT down to the dorsalis  pedis. However, the peroneal arteries were not far behind. I gave the patient 3000 units of heparin. I brought up and over a 6/45  Terumo sheath. I parked it in the common femoral artery. I then  proceeded to navigate a 0.035 catheter over the wire. I then proceeded  to use a Glidewire Advantage as well as a TrailBlazer catheter and  navigated into the occlusion pretty easily. I shot an arteriogram  confirming that I was luminal.  After this, I then predilated the lesion  using a 4-mm balloon. I was dissatisfied with the result. There seemed  to be a lot of plaque that was disrupted. There was no evidence of  thrombosis, however. I decided that a prolonged inflation was probably  better. He has selected a drug-coated balloon. I selected a 4 x 150 mm  balloon to traverse this entire lesion as well as the proximal segment  of the popliteal artery into the distal SFA. This was a Lutonix  balloon; therefore, I inflated it for 15 minutes. After this, I still had the same evidence of what appeared to be spasm  and reduced flow limiting. Once again, it was behind the knee and it  was not a great place to stent. Therefore, I tried to increase the size  of my balloon to a 5-mm balloon. This was a 5 x 120 mm balloon. Unfortunately, there was still residual  spasm. There was also what appeared to be some plaque in the outer wall  of the artery. It was not flow limiting. I had improved axial flow  down to the foot. I gave the patient 400 mcg of nitroglycerin. I  decided to just go ahead and leave it for now.   We would need to surveil  those and if for some reason it does occlude again, then may be either  consider for above-knee, below-knee popliteal artery bypass or we will  have to consider a stent behind the knee. I exchanged the sheath for a  short 6-Welsh sheath. I used a Mynx closure device with good  hemostatic effect. There were no immediate complications. I was present and performed all  critical aspects of this procedure.         Olivia Vaughn DO    D: 03/23/2022 10:57:42       T: 03/23/2022 12:56:38     [email or web address]  Job#: [id number]     Doc#: [id number]    CC:

## 2022-03-28 ENCOUNTER — TELEPHONE (OUTPATIENT)
Dept: SURGERY | Age: 72
End: 2022-03-28

## 2022-03-31 ENCOUNTER — OFFICE VISIT (OUTPATIENT)
Dept: VASCULAR SURGERY | Age: 72
End: 2022-03-31
Payer: MEDICARE

## 2022-03-31 VITALS
WEIGHT: 158 LBS | HEIGHT: 68 IN | SYSTOLIC BLOOD PRESSURE: 140 MMHG | BODY MASS INDEX: 23.95 KG/M2 | DIASTOLIC BLOOD PRESSURE: 84 MMHG

## 2022-03-31 DIAGNOSIS — I99.8 ISCHEMIC FOOT PAIN AT REST: Primary | ICD-10-CM

## 2022-03-31 DIAGNOSIS — M79.673 ISCHEMIC FOOT PAIN AT REST: Primary | ICD-10-CM

## 2022-03-31 PROCEDURE — 3017F COLORECTAL CA SCREEN DOC REV: CPT | Performed by: STUDENT IN AN ORGANIZED HEALTH CARE EDUCATION/TRAINING PROGRAM

## 2022-03-31 PROCEDURE — G8484 FLU IMMUNIZE NO ADMIN: HCPCS | Performed by: STUDENT IN AN ORGANIZED HEALTH CARE EDUCATION/TRAINING PROGRAM

## 2022-03-31 PROCEDURE — G8427 DOCREV CUR MEDS BY ELIG CLIN: HCPCS | Performed by: STUDENT IN AN ORGANIZED HEALTH CARE EDUCATION/TRAINING PROGRAM

## 2022-03-31 PROCEDURE — 99213 OFFICE O/P EST LOW 20 MIN: CPT | Performed by: STUDENT IN AN ORGANIZED HEALTH CARE EDUCATION/TRAINING PROGRAM

## 2022-03-31 PROCEDURE — 1123F ACP DISCUSS/DSCN MKR DOCD: CPT | Performed by: STUDENT IN AN ORGANIZED HEALTH CARE EDUCATION/TRAINING PROGRAM

## 2022-03-31 PROCEDURE — G8420 CALC BMI NORM PARAMETERS: HCPCS | Performed by: STUDENT IN AN ORGANIZED HEALTH CARE EDUCATION/TRAINING PROGRAM

## 2022-03-31 PROCEDURE — 4004F PT TOBACCO SCREEN RCVD TLK: CPT | Performed by: STUDENT IN AN ORGANIZED HEALTH CARE EDUCATION/TRAINING PROGRAM

## 2022-03-31 PROCEDURE — 4040F PNEUMOC VAC/ADMIN/RCVD: CPT | Performed by: STUDENT IN AN ORGANIZED HEALTH CARE EDUCATION/TRAINING PROGRAM

## 2022-03-31 RX ORDER — HYDROXYZINE PAMOATE 25 MG/1
25 CAPSULE ORAL NIGHTLY PRN
Status: ON HOLD | COMMUNITY
Start: 2022-03-02 | End: 2022-07-12 | Stop reason: HOSPADM

## 2022-03-31 RX ORDER — TRAMADOL HYDROCHLORIDE 50 MG/1
TABLET ORAL
Status: ON HOLD | COMMUNITY
Start: 2022-03-03 | End: 2022-04-24

## 2022-03-31 RX ORDER — OXYCODONE HYDROCHLORIDE AND ACETAMINOPHEN 5; 325 MG/1; MG/1
1 TABLET ORAL EVERY 6 HOURS PRN
Qty: 28 TABLET | Refills: 0 | Status: SHIPPED | OUTPATIENT
Start: 2022-03-31 | End: 2022-04-07

## 2022-03-31 RX ORDER — DIAZEPAM 2 MG/1
2 TABLET ORAL EVERY 6 HOURS PRN
Status: ON HOLD | COMMUNITY
End: 2022-04-24

## 2022-03-31 ASSESSMENT — ENCOUNTER SYMPTOMS
SHORTNESS OF BREATH: 0
COLOR CHANGE: 1

## 2022-03-31 NOTE — PROGRESS NOTES
Kayley Mancilla (:  1950) is a 70 y.o. male,Established patient, here for evaluation of the following chief complaint(s):  Follow-up         ASSESSMENT/PLAN:  1. Ischemic foot pain at rest  -     oxyCODONE-acetaminophen (PERCOCET) 5-325 MG per tablet; Take 1 tablet by mouth every 6 hours as needed for Pain for up to 7 days. Intended supply: 5 days. Take lowest dose possible to manage pain, Disp-28 tablet, R-0Print       This is a 77-year-old male patient presents the office today for foot pain. He has essentially single-vessel occlusive disease with the mid popliteal artery being the main problem. He has strong dopplerable signals. I did a bedside TOMY and compared to his upper extremity blood pressure his TOMY on the right is probably between 0.5 and 0.6. However I would like a formal test of his right lower extremity arterial perfusion status post popliteal artery angioplasty. He wants pain medicine which will prescribe him 1 more prescription. After that he will need to go to pain management. Once again single-level occlusive disease normally does not manifest in such severe pain. Anticipate there is something else going on here. 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 If this is causing any sort of compressive effects and is resulting in severe sensory deficits and potentially even some increased sympathetic output to his right lower extremity that could very much cause some of his symptoms to be worsened. He has really good sounding signals in his feet. Therefore anticipate his circulation to be adequate enough that he should not have such severe discomfort particularly when he is lying down. Therefore I think it is a little bit bizarre at this point time.   I am happy to entertain a femoral-popliteal artery bypass as it appears he has decent vein in his leg but I would hate to do that and his pain still be there in the absence of really identifying what this tumor or mass is in the presacral area. We will refer him to hematology and oncology. Once again I provide him with 1 more pain prescription after reviewing the PDMP. If that after that he still needs more pain medicine these can have to go to a more prolonged situation with pain management. If for some reason the tumor is drinking benign and nonresectable and none likely contributing to his symptoms then we will have to consider for femoropopliteal bypass. I discussed this with him with clear detail. All questions were answered. Subjective   SUBJECTIVE/OBJECTIVE:  This is a 31-year-old male patient presents the office today to once again discuss his right foot pain. He underwent angiography with demonstration of occluded mid popliteal artery that was opened with angioplasty. However it did not respond perfectly. There is still narrowed flow lumen. However is a bad place to put a stent because of the risk of fracture and occlusion. He states that he never got any better. He states that maybe his pain is little worse. He endorses pain only when he is horizontal.  He occasionally has pain also when he walks up steps. Of note his CTA was significant for a presacral mass that is about 6 cm in length. It is described as possibly nerve sheath tumor or teratoma. He wants more pain medicine today. He denies any fever chills. Denies any nonhealing ulcerations. He states that his right foot is more red than his left. Review of Systems   Constitutional: Negative for chills and fever. Respiratory: Negative for shortness of breath. Cardiovascular: Negative for chest pain and leg swelling. Musculoskeletal: Positive for gait problem and myalgias. Right foot and calf pain   Skin: Positive for color change. Neurological: Positive for numbness. Negative for weakness. Hematological: Does not bruise/bleed easily.    Psychiatric/Behavioral: Negative

## 2022-04-01 ENCOUNTER — TELEPHONE (OUTPATIENT)
Dept: VASCULAR SURGERY | Age: 72
End: 2022-04-01

## 2022-04-01 NOTE — TELEPHONE ENCOUNTER
Called pt to confirm he should keep the appointment for his vascular scan on 4-5-22. Confirmed with [facility] the patient has a new pt appointment. Scheduled on 4-14-22.

## 2022-04-05 ENCOUNTER — PROCEDURE VISIT (OUTPATIENT)
Dept: SURGERY | Age: 72
End: 2022-04-05
Payer: MEDICARE

## 2022-04-05 DIAGNOSIS — I73.9 PAD (PERIPHERAL ARTERY DISEASE) (HCC): Primary | ICD-10-CM

## 2022-04-05 PROCEDURE — 93926 LOWER EXTREMITY STUDY: CPT | Performed by: SURGERY

## 2022-04-08 ENCOUNTER — TELEPHONE (OUTPATIENT)
Dept: VASCULAR SURGERY | Age: 72
End: 2022-04-08

## 2022-04-08 DIAGNOSIS — I73.9 PERIPHERAL ARTERIAL DISEASE (HCC): Primary | ICD-10-CM

## 2022-04-08 DIAGNOSIS — Z01.810 ENCOUNTER FOR PREPROCEDURAL CARDIOVASCULAR EXAMINATION: ICD-10-CM

## 2022-04-08 NOTE — TELEPHONE ENCOUNTER
DO Krystle Pena Corporal  Will need to be set up for right femoral-popliteal bypass. Will need stress test prior since he doesn't have PCP. Patient informed of plan for test prior to scheduling surgery. Order placed. Per patient request, he only wants to have appointments once a week due to transportation limitations and cost. (uses Gena Hurtado)  He has an appointment on 4-14-22 with [facility] and requested to have the stress test the week of 4-18-22. I explained to the patient this would delay treatment and if any further medications for pain were requested, Dr Ag Huber would place a referral to pain management. Pt verbalized understanding. [facility] scheduled on 4-22-22. Arrival at 10:45 for 11:30 and 12:30 test.  No solid foods for 2-3 hours before arrival.  It is okay to take your medications. You should not have any food or drink containing caffeine for 12 hours before testing.

## 2022-04-23 ENCOUNTER — HOSPITAL ENCOUNTER (INPATIENT)
Age: 72
LOS: 4 days | Discharge: NURSING, REHAB OR LONG-TERM CARE | DRG: 481 | End: 2022-04-28
Attending: EMERGENCY MEDICINE | Admitting: STUDENT IN AN ORGANIZED HEALTH CARE EDUCATION/TRAINING PROGRAM
Payer: MEDICARE

## 2022-04-23 ENCOUNTER — APPOINTMENT (OUTPATIENT)
Dept: GENERAL RADIOLOGY | Age: 72
DRG: 481 | End: 2022-04-23
Payer: MEDICARE
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
## 2022-04-23 DIAGNOSIS — S72.001A HIP FRACTURE, RIGHT, CLOSED, INITIAL ENCOUNTER (HCC): ICD-10-CM

## 2022-04-23 DIAGNOSIS — S72.011A CLOSED SUBCAPITAL FRACTURE OF FEMUR, RIGHT, INITIAL ENCOUNTER (HCC): Primary | ICD-10-CM

## 2022-04-23 PROBLEM — R20.0 NUMBNESS OF RIGHT FOOT: Status: ACTIVE | Noted: 2022-04-23

## 2022-04-23 LAB
ANION GAP SERPL CALCULATED.3IONS-SCNC: 13 MMOL/L (ref 3–16)
BASOPHILS ABSOLUTE: 0.1 K/UL (ref 0–0.2)
BASOPHILS RELATIVE PERCENT: 0.6 %
BILIRUBIN URINE: NEGATIVE
BLOOD, URINE: NEGATIVE
BUN BLDV-MCNC: 15 MG/DL (ref 7–20)
CALCIUM SERPL-MCNC: 9.4 MG/DL (ref 8.3–10.6)
CHLORIDE BLD-SCNC: 100 MMOL/L (ref 99–110)
CLARITY: CLEAR
CO2: 23 MMOL/L (ref 21–32)
COLOR: YELLOW
CREAT SERPL-MCNC: 1 MG/DL (ref 0.8–1.3)
EOSINOPHILS ABSOLUTE: 0.1 K/UL (ref 0–0.6)
EOSINOPHILS RELATIVE PERCENT: 1.1 %
GFR AFRICAN AMERICAN: >60
GFR NON-AFRICAN AMERICAN: >60
GLUCOSE BLD-MCNC: 132 MG/DL (ref 70–99)
GLUCOSE URINE: NEGATIVE MG/DL
HCT VFR BLD CALC: 40.4 % (ref 40.5–52.5)
HEMOGLOBIN: 13.2 G/DL (ref 13.5–17.5)
INR BLD: 1.01 (ref 0.88–1.12)
KETONES, URINE: ABNORMAL MG/DL
LEUKOCYTE ESTERASE, URINE: NEGATIVE
LYMPHOCYTES ABSOLUTE: 2 K/UL (ref 1–5.1)
LYMPHOCYTES RELATIVE PERCENT: 16.4 %
MCH RBC QN AUTO: 29 PG (ref 26–34)
MCHC RBC AUTO-ENTMCNC: 32.8 G/DL (ref 31–36)
MCV RBC AUTO: 88.4 FL (ref 80–100)
MICROSCOPIC EXAMINATION: ABNORMAL
MONOCYTES ABSOLUTE: 0.6 K/UL (ref 0–1.3)
MONOCYTES RELATIVE PERCENT: 4.9 %
NEUTROPHILS ABSOLUTE: 9.2 K/UL (ref 1.7–7.7)
NEUTROPHILS RELATIVE PERCENT: 77 %
NITRITE, URINE: NEGATIVE
PDW BLD-RTO: 14 % (ref 12.4–15.4)
PH UA: 7 (ref 5–8)
PLATELET # BLD: 394 K/UL (ref 135–450)
PMV BLD AUTO: 6.7 FL (ref 5–10.5)
POTASSIUM SERPL-SCNC: 3.8 MMOL/L (ref 3.5–5.1)
PROTEIN UA: NEGATIVE MG/DL
PROTHROMBIN TIME: 11.4 SEC (ref 9.9–12.7)
RBC # BLD: 4.56 M/UL (ref 4.2–5.9)
SODIUM BLD-SCNC: 136 MMOL/L (ref 136–145)
SPECIFIC GRAVITY UA: 1.01 (ref 1–1.03)
URINE REFLEX TO CULTURE: ABNORMAL
URINE TYPE: ABNORMAL
UROBILINOGEN, URINE: 1 E.U./DL
WBC # BLD: 12 K/UL (ref 4–11)

## 2022-04-23 PROCEDURE — 99285 EMERGENCY DEPT VISIT HI MDM: CPT

## 2022-04-23 PROCEDURE — 71045 X-RAY EXAM CHEST 1 VIEW: CPT

## 2022-04-23 PROCEDURE — 6360000002 HC RX W HCPCS: Performed by: EMERGENCY MEDICINE

## 2022-04-23 PROCEDURE — 96374 THER/PROPH/DIAG INJ IV PUSH: CPT

## 2022-04-23 PROCEDURE — 85610 PROTHROMBIN TIME: CPT

## 2022-04-23 PROCEDURE — 93005 ELECTROCARDIOGRAM TRACING: CPT | Performed by: EMERGENCY MEDICINE

## 2022-04-23 PROCEDURE — 81003 URINALYSIS AUTO W/O SCOPE: CPT

## 2022-04-23 PROCEDURE — 80048 BASIC METABOLIC PNL TOTAL CA: CPT

## 2022-04-23 PROCEDURE — 73502 X-RAY EXAM HIP UNI 2-3 VIEWS: CPT

## 2022-04-23 PROCEDURE — 96375 TX/PRO/DX INJ NEW DRUG ADDON: CPT

## 2022-04-23 PROCEDURE — 85025 COMPLETE CBC W/AUTO DIFF WBC: CPT

## 2022-04-23 RX ORDER — ONDANSETRON 2 MG/ML
4 INJECTION INTRAMUSCULAR; INTRAVENOUS ONCE
Status: COMPLETED | OUTPATIENT
Start: 2022-04-23 | End: 2022-04-23

## 2022-04-23 RX ORDER — MORPHINE SULFATE 4 MG/ML
4 INJECTION, SOLUTION INTRAMUSCULAR; INTRAVENOUS ONCE
Status: COMPLETED | OUTPATIENT
Start: 2022-04-23 | End: 2022-04-23

## 2022-04-23 RX ADMIN — HYDROMORPHONE HYDROCHLORIDE 0.5 MG: 1 INJECTION, SOLUTION INTRAMUSCULAR; INTRAVENOUS; SUBCUTANEOUS at 22:06

## 2022-04-23 RX ADMIN — MORPHINE SULFATE 4 MG: 4 INJECTION, SOLUTION INTRAMUSCULAR; INTRAVENOUS at 20:27

## 2022-04-23 RX ADMIN — ONDANSETRON 4 MG: 2 INJECTION INTRAMUSCULAR; INTRAVENOUS at 20:26

## 2022-04-23 ASSESSMENT — PAIN DESCRIPTION - DESCRIPTORS: DESCRIPTORS: ACHING;SHOOTING

## 2022-04-23 ASSESSMENT — PAIN DESCRIPTION - ORIENTATION: ORIENTATION: RIGHT

## 2022-04-23 ASSESSMENT — PAIN SCALES - GENERAL
PAINLEVEL_OUTOF10: 10

## 2022-04-23 ASSESSMENT — PAIN DESCRIPTION - ONSET: ONSET: ON-GOING

## 2022-04-23 ASSESSMENT — PAIN - FUNCTIONAL ASSESSMENT: PAIN_FUNCTIONAL_ASSESSMENT: INTOLERABLE, UNABLE TO DO ANY ACTIVE OR PASSIVE ACTIVITIES

## 2022-04-23 ASSESSMENT — PAIN DESCRIPTION - PAIN TYPE: TYPE: ACUTE PAIN

## 2022-04-23 ASSESSMENT — PAIN DESCRIPTION - FREQUENCY: FREQUENCY: CONTINUOUS

## 2022-04-23 ASSESSMENT — PAIN DESCRIPTION - LOCATION: LOCATION: HIP

## 2022-04-23 NOTE — ED TRIAGE NOTES
Pt arrived to ED via EMS with c/o R hip pain after a fall. Pt states he had some numbness in his R foot due to his PAD, he attempted to stand and fell directly on his R hip. Pt denies hitting head or LOC. No deformities noted. Rates pain 10/10.  Takes plavix

## 2022-04-24 ENCOUNTER — ANESTHESIA (OUTPATIENT)
Dept: OPERATING ROOM | Age: 72
DRG: 481 | End: 2022-04-24
Payer: MEDICARE

## 2022-04-24 ENCOUNTER — APPOINTMENT (OUTPATIENT)
Dept: GENERAL RADIOLOGY | Age: 72
DRG: 481 | End: 2022-04-24
Payer: MEDICARE

## 2022-04-24 ENCOUNTER — ANESTHESIA EVENT (OUTPATIENT)
Dept: OPERATING ROOM | Age: 72
DRG: 481 | End: 2022-04-24
Payer: MEDICARE

## 2022-04-24 VITALS
RESPIRATION RATE: 32 BRPM | DIASTOLIC BLOOD PRESSURE: 79 MMHG | TEMPERATURE: 98.1 F | OXYGEN SATURATION: 100 % | SYSTOLIC BLOOD PRESSURE: 148 MMHG

## 2022-04-24 PROBLEM — S72.001A HIP FRACTURE, RIGHT, CLOSED, INITIAL ENCOUNTER (HCC): Status: ACTIVE | Noted: 2022-04-24

## 2022-04-24 LAB
ABO/RH: NORMAL
ANION GAP SERPL CALCULATED.3IONS-SCNC: 14 MMOL/L (ref 3–16)
ANTIBODY SCREEN: NORMAL
APTT: 32.2 SEC (ref 26.2–38.6)
BASOPHILS ABSOLUTE: 0 K/UL (ref 0–0.2)
BASOPHILS RELATIVE PERCENT: 0.4 %
BUN BLDV-MCNC: 12 MG/DL (ref 7–20)
CALCIUM SERPL-MCNC: 8.8 MG/DL (ref 8.3–10.6)
CHLORIDE BLD-SCNC: 103 MMOL/L (ref 99–110)
CHOLESTEROL, TOTAL: 197 MG/DL (ref 0–199)
CO2: 22 MMOL/L (ref 21–32)
CREAT SERPL-MCNC: 0.9 MG/DL (ref 0.8–1.3)
EKG ATRIAL RATE: 74 BPM
EKG DIAGNOSIS: NORMAL
EKG P AXIS: 64 DEGREES
EKG P-R INTERVAL: 130 MS
EKG Q-T INTERVAL: 368 MS
EKG QRS DURATION: 76 MS
EKG QTC CALCULATION (BAZETT): 408 MS
EKG R AXIS: 76 DEGREES
EKG T AXIS: 64 DEGREES
EKG VENTRICULAR RATE: 74 BPM
EOSINOPHILS ABSOLUTE: 0 K/UL (ref 0–0.6)
EOSINOPHILS RELATIVE PERCENT: 0.2 %
ESTIMATED AVERAGE GLUCOSE: 119.8 MG/DL
FOLATE: 5.64 NG/ML (ref 4.78–24.2)
GFR AFRICAN AMERICAN: >60
GFR NON-AFRICAN AMERICAN: >60
GLUCOSE BLD-MCNC: 121 MG/DL (ref 70–99)
HBA1C MFR BLD: 5.8 %
HCT VFR BLD CALC: 38.6 % (ref 40.5–52.5)
HDLC SERPL-MCNC: 40 MG/DL (ref 40–60)
HEMOGLOBIN: 12.8 G/DL (ref 13.5–17.5)
LDL CHOLESTEROL CALCULATED: 132 MG/DL
LYMPHOCYTES ABSOLUTE: 1.7 K/UL (ref 1–5.1)
LYMPHOCYTES RELATIVE PERCENT: 19.7 %
MAGNESIUM: 2.2 MG/DL (ref 1.8–2.4)
MCH RBC QN AUTO: 29.3 PG (ref 26–34)
MCHC RBC AUTO-ENTMCNC: 33.2 G/DL (ref 31–36)
MCV RBC AUTO: 88.1 FL (ref 80–100)
MONOCYTES ABSOLUTE: 0.5 K/UL (ref 0–1.3)
MONOCYTES RELATIVE PERCENT: 5.6 %
NEUTROPHILS ABSOLUTE: 6.5 K/UL (ref 1.7–7.7)
NEUTROPHILS RELATIVE PERCENT: 74.1 %
PDW BLD-RTO: 13.9 % (ref 12.4–15.4)
PLATELET # BLD: 327 K/UL (ref 135–450)
PMV BLD AUTO: 7.2 FL (ref 5–10.5)
POTASSIUM SERPL-SCNC: 4.2 MMOL/L (ref 3.5–5.1)
RBC # BLD: 4.39 M/UL (ref 4.2–5.9)
SODIUM BLD-SCNC: 139 MMOL/L (ref 136–145)
TRIGL SERPL-MCNC: 123 MG/DL (ref 0–150)
VITAMIN B-12: 437 PG/ML (ref 211–911)
VLDLC SERPL CALC-MCNC: 25 MG/DL
WBC # BLD: 8.8 K/UL (ref 4–11)

## 2022-04-24 PROCEDURE — 94150 VITAL CAPACITY TEST: CPT

## 2022-04-24 PROCEDURE — 96376 TX/PRO/DX INJ SAME DRUG ADON: CPT

## 2022-04-24 PROCEDURE — 3600000004 HC SURGERY LEVEL 4 BASE: Performed by: ORTHOPAEDIC SURGERY

## 2022-04-24 PROCEDURE — 2060000000 HC ICU INTERMEDIATE R&B

## 2022-04-24 PROCEDURE — 2500000003 HC RX 250 WO HCPCS: Performed by: ORTHOPAEDIC SURGERY

## 2022-04-24 PROCEDURE — 2580000003 HC RX 258: Performed by: ORTHOPAEDIC SURGERY

## 2022-04-24 PROCEDURE — 86901 BLOOD TYPING SEROLOGIC RH(D): CPT

## 2022-04-24 PROCEDURE — 99222 1ST HOSP IP/OBS MODERATE 55: CPT | Performed by: ORTHOPAEDIC SURGERY

## 2022-04-24 PROCEDURE — 82746 ASSAY OF FOLIC ACID SERUM: CPT

## 2022-04-24 PROCEDURE — 83735 ASSAY OF MAGNESIUM: CPT

## 2022-04-24 PROCEDURE — A4217 STERILE WATER/SALINE, 500 ML: HCPCS | Performed by: ORTHOPAEDIC SURGERY

## 2022-04-24 PROCEDURE — 93010 ELECTROCARDIOGRAM REPORT: CPT | Performed by: INTERNAL MEDICINE

## 2022-04-24 PROCEDURE — 2720000010 HC SURG SUPPLY STERILE: Performed by: ORTHOPAEDIC SURGERY

## 2022-04-24 PROCEDURE — 0QS636Z REPOSITION RIGHT UPPER FEMUR WITH INTRAMEDULLARY INTERNAL FIXATION DEVICE, PERCUTANEOUS APPROACH: ICD-10-PCS | Performed by: ORTHOPAEDIC SURGERY

## 2022-04-24 PROCEDURE — 94760 N-INVAS EAR/PLS OXIMETRY 1: CPT

## 2022-04-24 PROCEDURE — 85730 THROMBOPLASTIN TIME PARTIAL: CPT

## 2022-04-24 PROCEDURE — 2700000000 HC OXYGEN THERAPY PER DAY

## 2022-04-24 PROCEDURE — 6360000002 HC RX W HCPCS: Performed by: STUDENT IN AN ORGANIZED HEALTH CARE EDUCATION/TRAINING PROGRAM

## 2022-04-24 PROCEDURE — C1769 GUIDE WIRE: HCPCS | Performed by: ORTHOPAEDIC SURGERY

## 2022-04-24 PROCEDURE — 73502 X-RAY EXAM HIP UNI 2-3 VIEWS: CPT

## 2022-04-24 PROCEDURE — 27235 TREAT THIGH FRACTURE: CPT | Performed by: ORTHOPAEDIC SURGERY

## 2022-04-24 PROCEDURE — 80061 LIPID PANEL: CPT

## 2022-04-24 PROCEDURE — 3209999900 FLUORO FOR SURGICAL PROCEDURES

## 2022-04-24 PROCEDURE — 2500000003 HC RX 250 WO HCPCS: Performed by: ANESTHESIOLOGY

## 2022-04-24 PROCEDURE — 6360000002 HC RX W HCPCS: Performed by: ANESTHESIOLOGY

## 2022-04-24 PROCEDURE — 83036 HEMOGLOBIN GLYCOSYLATED A1C: CPT

## 2022-04-24 PROCEDURE — 80048 BASIC METABOLIC PNL TOTAL CA: CPT

## 2022-04-24 PROCEDURE — 85025 COMPLETE CBC W/AUTO DIFF WBC: CPT

## 2022-04-24 PROCEDURE — 6360000002 HC RX W HCPCS: Performed by: ORTHOPAEDIC SURGERY

## 2022-04-24 PROCEDURE — 2580000003 HC RX 258: Performed by: STUDENT IN AN ORGANIZED HEALTH CARE EDUCATION/TRAINING PROGRAM

## 2022-04-24 PROCEDURE — 86850 RBC ANTIBODY SCREEN: CPT

## 2022-04-24 PROCEDURE — 3700000001 HC ADD 15 MINUTES (ANESTHESIA): Performed by: ORTHOPAEDIC SURGERY

## 2022-04-24 PROCEDURE — C1713 ANCHOR/SCREW BN/BN,TIS/BN: HCPCS | Performed by: ORTHOPAEDIC SURGERY

## 2022-04-24 PROCEDURE — 7100000000 HC PACU RECOVERY - FIRST 15 MIN: Performed by: ORTHOPAEDIC SURGERY

## 2022-04-24 PROCEDURE — 82607 VITAMIN B-12: CPT

## 2022-04-24 PROCEDURE — 3600000014 HC SURGERY LEVEL 4 ADDTL 15MIN: Performed by: ORTHOPAEDIC SURGERY

## 2022-04-24 PROCEDURE — 6370000000 HC RX 637 (ALT 250 FOR IP): Performed by: ORTHOPAEDIC SURGERY

## 2022-04-24 PROCEDURE — 86900 BLOOD TYPING SEROLOGIC ABO: CPT

## 2022-04-24 PROCEDURE — 2709999900 HC NON-CHARGEABLE SUPPLY: Performed by: ORTHOPAEDIC SURGERY

## 2022-04-24 PROCEDURE — 36415 COLL VENOUS BLD VENIPUNCTURE: CPT

## 2022-04-24 PROCEDURE — 7100000001 HC PACU RECOVERY - ADDTL 15 MIN: Performed by: ORTHOPAEDIC SURGERY

## 2022-04-24 PROCEDURE — 99231 SBSQ HOSP IP/OBS SF/LOW 25: CPT | Performed by: STUDENT IN AN ORGANIZED HEALTH CARE EDUCATION/TRAINING PROGRAM

## 2022-04-24 PROCEDURE — 3700000000 HC ANESTHESIA ATTENDED CARE: Performed by: ORTHOPAEDIC SURGERY

## 2022-04-24 DEVICE — CANNULATED SCREW
Type: IMPLANTABLE DEVICE | Site: HIP | Status: FUNCTIONAL
Brand: ASNIS

## 2022-04-24 RX ORDER — ALBUTEROL SULFATE 90 UG/1
1-2 AEROSOL, METERED RESPIRATORY (INHALATION) EVERY 6 HOURS PRN
COMMUNITY
Start: 2022-02-09 | End: 2022-07-01 | Stop reason: ALTCHOICE

## 2022-04-24 RX ORDER — FENTANYL CITRATE 50 UG/ML
50 INJECTION, SOLUTION INTRAMUSCULAR; INTRAVENOUS EVERY 5 MIN PRN
Status: DISCONTINUED | OUTPATIENT
Start: 2022-04-24 | End: 2022-04-24 | Stop reason: HOSPADM

## 2022-04-24 RX ORDER — SODIUM CHLORIDE 0.9 % (FLUSH) 0.9 %
5-40 SYRINGE (ML) INJECTION PRN
Status: DISCONTINUED | OUTPATIENT
Start: 2022-04-24 | End: 2022-04-24

## 2022-04-24 RX ORDER — SODIUM CHLORIDE 0.9 % (FLUSH) 0.9 %
5-40 SYRINGE (ML) INJECTION PRN
Status: DISCONTINUED | OUTPATIENT
Start: 2022-04-24 | End: 2022-04-24 | Stop reason: HOSPADM

## 2022-04-24 RX ORDER — FENTANYL CITRATE 50 UG/ML
25 INJECTION, SOLUTION INTRAMUSCULAR; INTRAVENOUS EVERY 5 MIN PRN
Status: DISCONTINUED | OUTPATIENT
Start: 2022-04-24 | End: 2022-04-24 | Stop reason: HOSPADM

## 2022-04-24 RX ORDER — CLOPIDOGREL BISULFATE 75 MG/1
75 TABLET ORAL DAILY
Status: DISCONTINUED | OUTPATIENT
Start: 2022-04-25 | End: 2022-04-28 | Stop reason: HOSPADM

## 2022-04-24 RX ORDER — ACETAMINOPHEN 325 MG/1
650 TABLET ORAL EVERY 6 HOURS PRN
Status: DISCONTINUED | OUTPATIENT
Start: 2022-04-24 | End: 2022-04-28 | Stop reason: HOSPADM

## 2022-04-24 RX ORDER — SODIUM CHLORIDE 9 MG/ML
INJECTION, SOLUTION INTRAVENOUS PRN
Status: DISCONTINUED | OUTPATIENT
Start: 2022-04-24 | End: 2022-04-28 | Stop reason: HOSPADM

## 2022-04-24 RX ORDER — HEPARIN SODIUM 5000 [USP'U]/ML
5000 INJECTION, SOLUTION INTRAVENOUS; SUBCUTANEOUS EVERY 8 HOURS SCHEDULED
Status: DISCONTINUED | OUTPATIENT
Start: 2022-04-24 | End: 2022-04-28

## 2022-04-24 RX ORDER — SODIUM CHLORIDE 0.9 % (FLUSH) 0.9 %
5-40 SYRINGE (ML) INJECTION PRN
Status: DISCONTINUED | OUTPATIENT
Start: 2022-04-24 | End: 2022-04-28 | Stop reason: HOSPADM

## 2022-04-24 RX ORDER — ACETAMINOPHEN 325 MG/1
650 TABLET ORAL EVERY 4 HOURS PRN
Status: DISCONTINUED | OUTPATIENT
Start: 2022-04-24 | End: 2022-04-28 | Stop reason: HOSPADM

## 2022-04-24 RX ORDER — SODIUM CHLORIDE 9 MG/ML
INJECTION, SOLUTION INTRAVENOUS CONTINUOUS
Status: ACTIVE | OUTPATIENT
Start: 2022-04-24 | End: 2022-04-24

## 2022-04-24 RX ORDER — OXYCODONE HYDROCHLORIDE 5 MG/1
5 TABLET ORAL EVERY 4 HOURS PRN
Status: DISCONTINUED | OUTPATIENT
Start: 2022-04-24 | End: 2022-04-28

## 2022-04-24 RX ORDER — ACETAMINOPHEN 650 MG/1
650 SUPPOSITORY RECTAL EVERY 6 HOURS PRN
Status: DISCONTINUED | OUTPATIENT
Start: 2022-04-24 | End: 2022-04-28 | Stop reason: HOSPADM

## 2022-04-24 RX ORDER — PROPOFOL 10 MG/ML
INJECTION, EMULSION INTRAVENOUS PRN
Status: DISCONTINUED | OUTPATIENT
Start: 2022-04-24 | End: 2022-04-24 | Stop reason: SDUPTHER

## 2022-04-24 RX ORDER — CEFAZOLIN SODIUM 1 G/3ML
INJECTION, POWDER, FOR SOLUTION INTRAMUSCULAR; INTRAVENOUS PRN
Status: DISCONTINUED | OUTPATIENT
Start: 2022-04-24 | End: 2022-04-24 | Stop reason: SDUPTHER

## 2022-04-24 RX ORDER — SODIUM CHLORIDE 0.9 % (FLUSH) 0.9 %
5-40 SYRINGE (ML) INJECTION EVERY 12 HOURS SCHEDULED
Status: DISCONTINUED | OUTPATIENT
Start: 2022-04-24 | End: 2022-04-24 | Stop reason: HOSPADM

## 2022-04-24 RX ORDER — CLOPIDOGREL BISULFATE 75 MG/1
75 TABLET ORAL DAILY
Status: DISCONTINUED | OUTPATIENT
Start: 2022-04-24 | End: 2022-04-24

## 2022-04-24 RX ORDER — MAGNESIUM HYDROXIDE 1200 MG/15ML
LIQUID ORAL CONTINUOUS PRN
Status: DISCONTINUED | OUTPATIENT
Start: 2022-04-24 | End: 2022-04-24 | Stop reason: HOSPADM

## 2022-04-24 RX ORDER — ROCURONIUM BROMIDE 10 MG/ML
INJECTION, SOLUTION INTRAVENOUS PRN
Status: DISCONTINUED | OUTPATIENT
Start: 2022-04-24 | End: 2022-04-24 | Stop reason: SDUPTHER

## 2022-04-24 RX ORDER — OXYCODONE HYDROCHLORIDE 10 MG/1
10 TABLET ORAL EVERY 4 HOURS PRN
Status: DISCONTINUED | OUTPATIENT
Start: 2022-04-24 | End: 2022-04-28

## 2022-04-24 RX ORDER — ONDANSETRON 2 MG/ML
4 INJECTION INTRAMUSCULAR; INTRAVENOUS EVERY 6 HOURS PRN
Status: DISCONTINUED | OUTPATIENT
Start: 2022-04-24 | End: 2022-04-28 | Stop reason: HOSPADM

## 2022-04-24 RX ORDER — FENTANYL CITRATE 50 UG/ML
INJECTION, SOLUTION INTRAMUSCULAR; INTRAVENOUS PRN
Status: DISCONTINUED | OUTPATIENT
Start: 2022-04-24 | End: 2022-04-24 | Stop reason: SDUPTHER

## 2022-04-24 RX ORDER — MEPERIDINE HYDROCHLORIDE 25 MG/ML
12.5 INJECTION INTRAMUSCULAR; INTRAVENOUS; SUBCUTANEOUS
Status: COMPLETED | OUTPATIENT
Start: 2022-04-24 | End: 2022-04-24

## 2022-04-24 RX ORDER — SODIUM CHLORIDE 9 MG/ML
INJECTION, SOLUTION INTRAVENOUS PRN
Status: DISCONTINUED | OUTPATIENT
Start: 2022-04-24 | End: 2022-04-24 | Stop reason: HOSPADM

## 2022-04-24 RX ORDER — POLYETHYLENE GLYCOL 3350 17 G
2 POWDER IN PACKET (EA) ORAL
Status: DISCONTINUED | OUTPATIENT
Start: 2022-04-24 | End: 2022-04-28 | Stop reason: HOSPADM

## 2022-04-24 RX ORDER — SODIUM CHLORIDE 0.9 % (FLUSH) 0.9 %
5-40 SYRINGE (ML) INJECTION EVERY 12 HOURS SCHEDULED
Status: DISCONTINUED | OUTPATIENT
Start: 2022-04-24 | End: 2022-04-28 | Stop reason: HOSPADM

## 2022-04-24 RX ORDER — ONDANSETRON 2 MG/ML
4 INJECTION INTRAMUSCULAR; INTRAVENOUS EVERY 6 HOURS PRN
Status: DISCONTINUED | OUTPATIENT
Start: 2022-04-24 | End: 2022-04-24

## 2022-04-24 RX ORDER — ONDANSETRON 4 MG/1
4 TABLET, ORALLY DISINTEGRATING ORAL EVERY 8 HOURS PRN
Status: DISCONTINUED | OUTPATIENT
Start: 2022-04-24 | End: 2022-04-28 | Stop reason: HOSPADM

## 2022-04-24 RX ORDER — ONDANSETRON 2 MG/ML
4 INJECTION INTRAMUSCULAR; INTRAVENOUS
Status: DISCONTINUED | OUTPATIENT
Start: 2022-04-24 | End: 2022-04-24 | Stop reason: HOSPADM

## 2022-04-24 RX ORDER — ASPIRIN 81 MG/1
81 TABLET ORAL 2 TIMES DAILY
Qty: 60 TABLET | Refills: 0 | Status: ON HOLD | OUTPATIENT
Start: 2022-04-24 | End: 2022-07-12

## 2022-04-24 RX ORDER — OXYCODONE HYDROCHLORIDE 5 MG/1
5-10 TABLET ORAL EVERY 6 HOURS PRN
Qty: 40 TABLET | Refills: 0 | Status: SHIPPED | OUTPATIENT
Start: 2022-04-24 | End: 2022-04-29

## 2022-04-24 RX ORDER — NICOTINE 21 MG/24HR
1 PATCH, TRANSDERMAL 24 HOURS TRANSDERMAL DAILY
Status: DISCONTINUED | OUTPATIENT
Start: 2022-04-24 | End: 2022-04-28 | Stop reason: HOSPADM

## 2022-04-24 RX ORDER — SODIUM CHLORIDE 9 MG/ML
INJECTION, SOLUTION INTRAVENOUS CONTINUOUS
Status: DISCONTINUED | OUTPATIENT
Start: 2022-04-24 | End: 2022-04-24

## 2022-04-24 RX ORDER — HEPARIN SODIUM 5000 [USP'U]/ML
5000 INJECTION, SOLUTION INTRAVENOUS; SUBCUTANEOUS EVERY 8 HOURS SCHEDULED
Status: DISCONTINUED | OUTPATIENT
Start: 2022-04-24 | End: 2022-04-24

## 2022-04-24 RX ORDER — BUPIVACAINE HYDROCHLORIDE 5 MG/ML
INJECTION, SOLUTION EPIDURAL; INTRACAUDAL
Status: COMPLETED | OUTPATIENT
Start: 2022-04-24 | End: 2022-04-24

## 2022-04-24 RX ORDER — ENOXAPARIN SODIUM 100 MG/ML
40 INJECTION SUBCUTANEOUS DAILY
Status: DISCONTINUED | OUTPATIENT
Start: 2022-04-24 | End: 2022-04-24

## 2022-04-24 RX ADMIN — HYDROMORPHONE HYDROCHLORIDE 0.5 MG: 1 INJECTION, SOLUTION INTRAMUSCULAR; INTRAVENOUS; SUBCUTANEOUS at 21:03

## 2022-04-24 RX ADMIN — SODIUM CHLORIDE: 9 INJECTION, SOLUTION INTRAVENOUS at 02:13

## 2022-04-24 RX ADMIN — HYDROMORPHONE HYDROCHLORIDE 1 MG: 1 INJECTION, SOLUTION INTRAMUSCULAR; INTRAVENOUS; SUBCUTANEOUS at 06:22

## 2022-04-24 RX ADMIN — ROCURONIUM BROMIDE 40 MG: 10 SOLUTION INTRAVENOUS at 09:11

## 2022-04-24 RX ADMIN — PROPOFOL 180 MG: 10 INJECTION, EMULSION INTRAVENOUS at 09:11

## 2022-04-24 RX ADMIN — HYDROMORPHONE HYDROCHLORIDE 1 MG: 1 INJECTION, SOLUTION INTRAMUSCULAR; INTRAVENOUS; SUBCUTANEOUS at 02:22

## 2022-04-24 RX ADMIN — HYDROMORPHONE HYDROCHLORIDE 0.5 MG: 1 INJECTION, SOLUTION INTRAMUSCULAR; INTRAVENOUS; SUBCUTANEOUS at 00:42

## 2022-04-24 RX ADMIN — OXYCODONE HYDROCHLORIDE 10 MG: 10 TABLET ORAL at 12:38

## 2022-04-24 RX ADMIN — OXYCODONE HYDROCHLORIDE 10 MG: 10 TABLET ORAL at 23:51

## 2022-04-24 RX ADMIN — FENTANYL CITRATE 100 MCG: 50 INJECTION INTRAMUSCULAR; INTRAVENOUS at 09:11

## 2022-04-24 RX ADMIN — FENTANYL CITRATE 50 MCG: 50 INJECTION, SOLUTION INTRAMUSCULAR; INTRAVENOUS at 10:18

## 2022-04-24 RX ADMIN — SODIUM CHLORIDE, PRESERVATIVE FREE 10 ML: 5 INJECTION INTRAVENOUS at 11:31

## 2022-04-24 RX ADMIN — MEPERIDINE HYDROCHLORIDE 12.5 MG: 25 INJECTION INTRAMUSCULAR; INTRAVENOUS; SUBCUTANEOUS at 10:08

## 2022-04-24 RX ADMIN — SUGAMMADEX 200 MG: 100 INJECTION, SOLUTION INTRAVENOUS at 09:46

## 2022-04-24 RX ADMIN — HYDROMORPHONE HYDROCHLORIDE 0.5 MG: 1 INJECTION, SOLUTION INTRAMUSCULAR; INTRAVENOUS; SUBCUTANEOUS at 15:41

## 2022-04-24 RX ADMIN — HYDROMORPHONE HYDROCHLORIDE 0.5 MG: 1 INJECTION, SOLUTION INTRAMUSCULAR; INTRAVENOUS; SUBCUTANEOUS at 11:27

## 2022-04-24 RX ADMIN — OXYCODONE HYDROCHLORIDE 10 MG: 10 TABLET ORAL at 18:29

## 2022-04-24 RX ADMIN — HEPARIN SODIUM 5000 UNITS: 5000 INJECTION INTRAVENOUS; SUBCUTANEOUS at 21:04

## 2022-04-24 RX ADMIN — CEFAZOLIN SODIUM 2000 MG: 1 INJECTION, POWDER, FOR SOLUTION INTRAMUSCULAR; INTRAVENOUS at 09:17

## 2022-04-24 RX ADMIN — FENTANYL CITRATE 50 MCG: 50 INJECTION, SOLUTION INTRAMUSCULAR; INTRAVENOUS at 10:03

## 2022-04-24 RX ADMIN — CEFAZOLIN 2000 MG: 10 INJECTION, POWDER, FOR SOLUTION INTRAVENOUS at 18:15

## 2022-04-24 ASSESSMENT — PULMONARY FUNCTION TESTS
PIF_VALUE: 1
PIF_VALUE: 10
PIF_VALUE: 1
PIF_VALUE: 9
PIF_VALUE: 16
PIF_VALUE: 14
PIF_VALUE: 15
PIF_VALUE: 15
PIF_VALUE: 5
PIF_VALUE: 14
PIF_VALUE: 4
PIF_VALUE: 14
PIF_VALUE: 1
PIF_VALUE: 14
PIF_VALUE: 14
PIF_VALUE: 13
PIF_VALUE: 1
PIF_VALUE: 14
PIF_VALUE: 0
PIF_VALUE: 18
PIF_VALUE: 15
PIF_VALUE: 1
PIF_VALUE: 14
PIF_VALUE: 16
PIF_VALUE: 15
PIF_VALUE: 14
PIF_VALUE: 1
PIF_VALUE: 14
PIF_VALUE: 2
PIF_VALUE: 14

## 2022-04-24 ASSESSMENT — PAIN DESCRIPTION - ONSET
ONSET: ON-GOING

## 2022-04-24 ASSESSMENT — PAIN - FUNCTIONAL ASSESSMENT
PAIN_FUNCTIONAL_ASSESSMENT: PREVENTS OR INTERFERES SOME ACTIVE ACTIVITIES AND ADLS
PAIN_FUNCTIONAL_ASSESSMENT: PREVENTS OR INTERFERES SOME ACTIVE ACTIVITIES AND ADLS
PAIN_FUNCTIONAL_ASSESSMENT: 0-10
PAIN_FUNCTIONAL_ASSESSMENT: PREVENTS OR INTERFERES WITH MANY ACTIVE NOT PASSIVE ACTIVITIES
PAIN_FUNCTIONAL_ASSESSMENT: PREVENTS OR INTERFERES SOME ACTIVE ACTIVITIES AND ADLS
PAIN_FUNCTIONAL_ASSESSMENT: PREVENTS OR INTERFERES WITH MANY ACTIVE NOT PASSIVE ACTIVITIES
PAIN_FUNCTIONAL_ASSESSMENT: PREVENTS OR INTERFERES WITH MANY ACTIVE NOT PASSIVE ACTIVITIES

## 2022-04-24 ASSESSMENT — PAIN DESCRIPTION - DESCRIPTORS
DESCRIPTORS: ACHING
DESCRIPTORS: SHOOTING;ACHING

## 2022-04-24 ASSESSMENT — PAIN DESCRIPTION - FREQUENCY
FREQUENCY: INTERMITTENT

## 2022-04-24 ASSESSMENT — ENCOUNTER SYMPTOMS
SHORTNESS OF BREATH: 0
COLOR CHANGE: 1
BACK PAIN: 1
ABDOMINAL PAIN: 0

## 2022-04-24 ASSESSMENT — PAIN DESCRIPTION - PAIN TYPE
TYPE: ACUTE PAIN
TYPE: ACUTE PAIN
TYPE: SURGICAL PAIN;ACUTE PAIN
TYPE: ACUTE PAIN;SURGICAL PAIN

## 2022-04-24 ASSESSMENT — PAIN SCALES - GENERAL
PAINLEVEL_OUTOF10: 10
PAINLEVEL_OUTOF10: 7
PAINLEVEL_OUTOF10: 9
PAINLEVEL_OUTOF10: 10
PAINLEVEL_OUTOF10: 7
PAINLEVEL_OUTOF10: 9
PAINLEVEL_OUTOF10: 8
PAINLEVEL_OUTOF10: 8
PAINLEVEL_OUTOF10: 10
PAINLEVEL_OUTOF10: 10
PAINLEVEL_OUTOF10: 7
PAINLEVEL_OUTOF10: 5
PAINLEVEL_OUTOF10: 10

## 2022-04-24 ASSESSMENT — LIFESTYLE VARIABLES: SMOKING_STATUS: 0

## 2022-04-24 ASSESSMENT — PAIN DESCRIPTION - ORIENTATION
ORIENTATION: RIGHT

## 2022-04-24 ASSESSMENT — PAIN DESCRIPTION - LOCATION
LOCATION: HIP
LOCATION: BACK;HIP
LOCATION: HIP
LOCATION: HIP
LOCATION: LEG;HIP

## 2022-04-24 NOTE — CONSULTS
[facility]  Consultation Note    Chief Complaint / Reason for Consultation  PAD with rest pain RLE    History of Present Illness  Patient is a 70 y.o. male presenting with right hip fracture. He states he had been doing well until he fell and experienced the above. His rest pain in his right lower extremity is stable. He underwent attempted endovascular treatment of popliteal occlusion which due to its location re-occurred. He was therefore being worked up for right femoral-BK pop bypass as outpatient when this occurred. No wounds on feet. Pain is being managed conservatively at home. He uses a walker to get around. Review of Systems  Review of Systems   Constitutional: Negative for chills and fever. Eyes: Negative for visual disturbance. Respiratory: Negative for shortness of breath. Cardiovascular: Negative for chest pain and leg swelling. Gastrointestinal: Negative for abdominal pain. Musculoskeletal: Positive for back pain, gait problem and myalgias. Skin: Positive for color change. Negative for wound. Neurological: Positive for weakness. Hematological: Does not bruise/bleed easily. Psychiatric/Behavioral: Negative for agitation and confusion. Past Medical History:   Diagnosis Date    Basal cell carcinoma     L side of nose    Peripheral artery disease (HCC)        PSH: right hip fracture repair    No Known Allergies    Social History     Socioeconomic History    Marital status:       Spouse name: Not on file    Number of children: Not on file    Years of education: Not on file    Highest education level: Not on file   Occupational History    Not on file   Tobacco Use    Smoking status: Current Every Day Smoker     Packs/day: 1.00    Smokeless tobacco: Never Used   Substance and Sexual Activity    Alcohol use: No    Drug use: Not Currently    Sexual activity: Not Currently     Partners: Female   Other Topics Concern    Not on file Social History Narrative    Not on file     Social Determinants of Health     Financial Resource Strain:     Difficulty of Paying Living Expenses: Not on file   Food Insecurity:     Worried About Running Out of Food in the Last Year: Not on file    [place] of Food in the Last Year: Not on file   Transportation Needs:     Lack of Transportation (Medical): Not on file    Lack of Transportation (Non-Medical): Not on file   Physical Activity:     Days of Exercise per Week: Not on file    Minutes of Exercise per Session: Not on file   Stress:     Feeling of Stress : Not on file   Social Connections:     Frequency of Communication with Friends and Family: Not on file    Frequency of Social Gatherings with Friends and Family: Not on file    Attends Yazidi Services: Not on file    Active Member of [address] [place] [address] [employer] or Organizations: Not on file    Attends Club or Organization Meetings: Not on file    Marital Status: Not on file   Intimate Partner Violence:     Fear of Current or Ex-Partner: Not on file    Emotionally Abused: Not on file    Physically Abused: Not on file    Sexually Abused: Not on file   Housing Stability:     Unable to Pay for Housing in the Last Year: Not on file    Number of [place] in the Last Year: Not on file    Unstable Housing in the Last Year: Not on file       History reviewed.  No pertinent family history.  - No history of bleeding or clotting disorders    Vital Signs  Vitals:    04/24/22 0559 04/24/22 0835 04/24/22 0952 04/24/22 1000   BP: (!) 142/85 (!) 154/83 139/87 (!) 155/124   Pulse: 78 78 79 85   Resp: 11 18 15 16   Temp: 98.2 °F (36.8 °C) 98.4 °F (36.9 °C) 97.8 °F (36.6 °C)    TempSrc: Oral Temporal Temporal    SpO2:  95% 98% 98%   Weight:           Physical Examination  General: No acute distress  Psychiatric: affect appropriate  Head/Eyes/Ears/Nose/Throat:  Atraumatic, vision and hearing intact, face symmetric  Neck:  supple  Chest/Lungs: no tachypnea, retractions or cyanosis noted  Cardiac:  Regular rate and rhythm  Abdomen: soft, nontender  Extremities: right leg internally rotated, right foot with good cap refill  - bilateral upper extremity motorsensory intact  - bilateral lower extremity motorsensory intact  Vascular exam:  - R femoral: palp  - L femoral: palp  - R DP: dopp  - L DP: palp  - R PT: dopp  - L PT: palp  - Radial: palp  Skin: no wounds    Labs  Lab Results   Component Value Date    WBC 8.8 04/24/2022    HGB 12.8 04/24/2022    HCT 38.6 04/24/2022    MCV 88.1 04/24/2022     04/24/2022     Lab Results   Component Value Date     04/24/2022    K 4.2 04/24/2022    K 4.1 08/14/2019     04/24/2022    CO2 22 04/24/2022    BUN 12 04/24/2022    CREATININE 0.9 04/24/2022      No results found for: GLU    Imaging: none pertinent    Assessment:   Right leg rest pain      Plan:  Patient is a 70 y.o. male presenting with rest pain with severely reduced TOMY in RLE. Will still be planning for right femoral-BK pop bypass graft placement. Would like to separate this surgery 4-6 weeks to allow recovery before proceeding. Should he develop a nonhealing ulceration in the interim, then that would mean he would need surgery sooner. But for now okay to continue with our outpatient arrangement. He had been planning to undergo stress test perhaps this could be completed prior to discharge given patient's inability to drive. Please call with any questions or concerns. Thank you for the consultation.     Kirk Bryson DO, [facility]  4/24/2022  10:20 AM

## 2022-04-24 NOTE — DISCHARGE INSTR - COC
[facility] ([facility]) Continuity of Care Form    Patient Name:  Adrianna Galicia  : 1950    MRN:  [id number]    Admit date:  2022  Discharge date:  22    Code Status Order: Full Code  Advance Directives: No    Admitting Physician: Trini Mena DO  PCP: Drea Frost    Discharging Nurse: [address] Unit/Room#: E8X-7169/3939-21  Discharging Unit Phone Number: [phone]    Emergency Contact:        Past Surgical History:  History reviewed. No pertinent surgical history. Immunization History:   Immunization History   Administered Date(s) Administered    COVID-19, Pfizer Purple top, DILUTE for use, 12+ yrs, 30mcg/0.3mL dose 2022       Active Problems:  Principal Problem:    Hip fracture, right, closed, initial encounter [facility])  Active Problems:    Closed right hip fracture ([facility] [address]    Fall    Numbness of right foot    Peripheral arterial disease ([facility] [address]  Resolved Problems:    * No resolved hospital problems. *      Isolation/Infection:       Nurse Assessment:  Last Vital Signs:BP (!) 153/85   Pulse 76   Temp 97.9 °F (36.6 °C) (Oral)   Resp 15   Wt 150 lb 5.7 oz (68.2 kg)   SpO2 96%   BMI 22.86 kg/m²   Last documented pain score (0-10 scale): Pain Level: 9  Last Weight:   Wt Readings from Last 1 Encounters:   22 150 lb 5.7 oz (68.2 kg)     Mental Status:  oriented and alert     IV Access:  - None    Nursing Mobility/ADLs:  Walking   Assisted  Transfer  Assisted  Bathing  Assisted  Dressing  Assisted  Toileting  Assisted  Feeding  Independent  Med Admin  Assisted  Med Delivery   whole    Wound Care Documentation and Therapy:  Keep tan Mepilex dressing clean and intact for 7 days after surgery then remove and leave wound to air. Mepilex is waterproof for showering. Elimination:  Urinary Catheter: None   Colostomy/Ileostomy: No  Continence:    Bowel: Yes  Bladder: Yes  Date of Last BM: 22    Intake/Output Summary (Last 24 hours)     Intake/Output Summary (Last 24 hours) at 4/24/2022 1313  Last data filed at 4/24/2022 4969  Gross per 24 hour   Intake 424.8 ml   Output --   Net 424.8 ml     Safety Concerns:     History of Falls (last 30 days)    Impairments/Disabilities:      None    Nutrition Therapy:  Current Nutrition Therapy: ADULT ORAL NUTRITION SUPPLEMENT; AM Snack, PM Snack; Standard High Calorie/High Protein Oral Supplement  ADULT DIET; Regular  Routes of Feeding: Oral  Liquids: Thin Liquids  Daily Fluid Restriction: no  Last Modified Barium Swallow with Video (Video Swallowing Test): not done    Treatments at the Time of Hospital Discharge:     Lab orders for discharge:        Rehab Therapies: Physical Therapy, Occupational Therapy and nursing care  Weight Bearing Status/Restrictions: Touchdown weight bearing (10-25 lbs) only on right leg  Other Medical Equipment (for information only, NOT a DME order): rolling walker   Other Treatments: ASA 81mg twice at day for 30 days for DVT prophylaxis     Patient's personal belongings (please select all that are sent with patient):  Glasses, cell phone, keys, clothes    RN SIGNATURE:  Electronically signed by Paramjit Mcdaniel RN on 4/28/22 at 3:10 PM EDT    PHYSICIAN SECTION    Prognosis: Good    Condition at Discharge: Stable    Rehab Potential (if transferring to Rehab): Good    Physician Certification: I certify the above orders, information, and transfer of Keegan De La O is necessary for the continuing treatment of the diagnosis listed and that he requires [facility] for less 30 days.      Update Admission H&P: No change in H&P    PHYSICIAN SIGNATURE:  Electronically signed by TALITA Mueller CNP on 4/24/22 at 1:13 PM EDT/ Dr Mana Casanova Status Date: 04/24/2022    [place] Readmission Risk Assessment Score:    Discharging to Facility/ Agency   Name: [facility]  Address: [place] [address] [address]  Phone: [phone]  Fax: [phone]    / signature: Electronically signed by Iman Perry RN on 4/28/22 at 11:19 AM EDT          Followup Dr Beck Marcelo in 2 weeks   [place] [address] and Sports Medicine, [address] [place] [address],   [phone]

## 2022-04-24 NOTE — OP NOTE
DATE OF SURGERY:  4/24/22    PREOPERATIVE DIAGNOSIS: Right nondisplaced subcapital femoral neck fracture. POSTOPERATIVE DIAGNOSIS: Right nondisplaced subcapital femoral neck  fracture. PROCEDURE: Right hip pinning. ASSISTANT: Serena Martin    ANESTHESIA: General.    ESTIMATED BLOOD LOSS: <50cc. COMPLICATIONS: None. DISPOSITION: To PACU in good condition. INDICATION FOR PROCEDURE: The patient is 70 y.o. male who sustained a fall onto the right hip. The patient was diagnosed with a nondisplaced subcapital femoral neck fracture. The patient was seen in orthopedic consultation and recommended operative treatment of the hip fracture in the form of hip pinning. The patient and/or family was explained the risks, benefits, complications and alternatives to the procedure. The patient and/or family subsequently provided written informed consent for the procedures. PROCEDURE: The patient was seen in the preoperative holding area. The right hip was marked. The patient was seen by the anesthesia  service. The patient was then brought to the operating room. The patient was then  induced under general anesthesia. The patient was given prophylactic preoperative IV  antibiotics. DVT prophylaxis as administered with sequential compression device on  the nonoperative lower extremity. The patient was then transferred onto the fracture  table in the supine position and the peroneal post was placed. Care to taken  to so that all bony prominences were well padded. The operative leg was placed  in the traction boot, however, no traction was applied. The nonoperative leg was  placed in the well-leg urban. Fluoroscopy was then brought in, which  demonstrated no interval displacement of the subcapital femoral neck  fracture. We then sterilely prepped and draped the righthip in the usual fashion. A time-out was taken where the patient, the operative extremity and  operative procedure were once again verified.  We then created an  approximately 3 to 4 cm incision along the lateral aspect of the thigh. Blunt dissection was carried down to the IT band, the IT band was then  sharply incised. We were then able to palpate down to the lateral aspect of the  femur. We then placed a guide pin along the lateral aspect of the femur and  this was inserted into the femoral neck and head in the inferior aspect of  the neck in the center position. We then placed 2 superior pins anterior  and posterior to create an inverted triangle configuration. We then measured  the length of the screws, which was 90mm for the inferior and 95mm for the  two superior screws. We then drilled the lateral cortex. We then inserted the  screws and confirmed position via fluoroscopy. The wound was then copiously  irrigated with normal saline solution, 0 Vicryl was then used to close the IT  band, 2-0 Vicryl was then used to close subcutaneously, the skin was then  closed with staples. Xeroform gauze, Mepilex were then  applied. The patient was then awoken from anesthesia, transferred to the hospital bed, and transported to PACU in stable condition. The patient tolerated the procedure well and without any complications. PLAN: The patient will be readmitted to the floor. The patient will be touch down weightbearing on the right  lower extremity. The patient will receive DVT prophylaxis and  24 hours of prophylactic IV antibiotics. The patient will have physical  therapy and occupational therapy consults.          Electronically signed by Mina Law MD on 4/24/2022 at 9:46 AM

## 2022-04-24 NOTE — ANESTHESIA POSTPROCEDURE EVALUATION
Department of Anesthesiology  Postprocedure Note    Patient: Anjana Starks  MRN: [id number]  YOB: 1950  Date of evaluation: 4/24/2022  Time:  9:55 AM     Procedure Summary     Date: 04/24/22 Room / Location: [address]    Anesthesia Start: 4916 Anesthesia Stop:     Procedure: HIP PINNING (Right Hip) Diagnosis: (hip fracture)    Surgeons: Pramod Baig MD Responsible Provider: Kelly Merida MD    Anesthesia Type: general ASA Status: 3          Anesthesia Type: No value filed. Griffin Phase I: Griffin Score: 10    Griffni Phase II:      Last vitals: Reviewed and per EMR flowsheets.        Anesthesia Post Evaluation    Patient location during evaluation: PACU  Patient participation: complete - patient participated  Level of consciousness: awake and alert  Pain score: 3  Airway patency: patent  Nausea & Vomiting: no nausea and no vomiting  Complications: no  Cardiovascular status: blood pressure returned to baseline  Respiratory status: acceptable  Hydration status: euvolemic

## 2022-04-24 NOTE — ED PROVIDER NOTES
[facility]  eMERGENCY dEPARTMENT eNCOUnter      Pt Name: Kirti Dhillon  MRN: [id number]  [place] 1950  Date of evaluation: 4/23/2022  Provider: Mandie Flores MD    [address] [place] [address]       Chief Complaint   Patient presents with    Fall     pt states he has some numbness in his R foot due to his PAD; went to go stand and fell on R hip; denies LOC or hitting head; takes plavix         CRITICAL CARE TIME   Total Critical Care time was 0 minutes, excluding separately reportable procedures. There was a high probability of clinically significant/life threatening deterioration in the patient's condition which required my urgent intervention. HISTORY OF PRESENT ILLNESS  (Location/Symptom, Timing/Onset, Context/Setting, Quality, Duration, Modifying Factors, Severity.)   Kirti Dhillon is a 70 y.o. male who presents to the emergency department complaining of right hip pain after a fall at home. This patient's been having some numbness in his right foot and some pain in his right calf for about 2 months. He has been worked up and has been evaluated and treated for peripheral vascular disease. He has been seen evaluated and treated by vascular surgery, Dr. Eduardo Hammond. He states that they are now talking about doing some type of a bypass procedure. Because of the numbness when he stood up he fell and landed on his right hip. He did not hit his head. He has no neck or back pain. He has no other injuries from the fall. Nursing Notes were reviewed and I agree. REVIEW OF SYSTEMS    (2-9 systems for level 4, 10 or more for level 5)     HEENT: No head injury. No facial injury. Cardiovascular: No chest or rib pain. Pulmonary: No shortness of breath. GI: No abdominal pain. Musculoskeletal: Right hip pain. Right calf soreness. Neuro: Numbness in his right foot which has been ongoing for a month and 1/2 to 2 months.     Except as noted above the remainder of the review of systems was reviewed and negative. PAST MEDICAL HISTORY     Past Medical History:   Diagnosis Date    Basal cell carcinoma     L side of nose    Peripheral artery disease (HCC)          SURGICAL HISTORY     History reviewed. No pertinent surgical history. CURRENT MEDICATIONS       Previous Medications    CLOPIDOGREL (PLAVIX) 75 MG TABLET    Take 1 tablet by mouth daily    DIAZEPAM (VALIUM) 2 MG TABLET    Take 2 mg by mouth every 6 hours as needed. HYDROXYZINE (VISTARIL) 25 MG CAPSULE        IBUPROFEN (ADVIL;MOTRIN) 800 MG TABLET    Take 1 tablet by mouth 3 times daily as needed for Pain    TRAMADOL (ULTRAM) 50 MG TABLET    TAKE 1 TABLET BY MOUTH EVERY 4 HOURS FOR UP TO 5 DAYS AS NEEDED FOR PAIN. TAKE LOWEST DOSE POSSIBLE TO MANAGE PAIN       ALLERGIES     Patient has no known allergies. FAMILY HISTORY     History reviewed. No pertinent family history. SOCIAL HISTORY       Social History     Socioeconomic History    Marital status:      Spouse name: None    Number of children: None    Years of education: None    Highest education level: None   Occupational History    None   Tobacco Use    Smoking status: Current Every Day Smoker     Packs/day: 1.00    Smokeless tobacco: Never Used   Substance and Sexual Activity    Alcohol use: No    Drug use: Not Currently    Sexual activity: Not Currently     Partners: Female   Other Topics Concern    None   Social History Narrative    None     Social Determinants of Health     Financial Resource Strain:     Difficulty of Paying Living Expenses: Not on file   Food Insecurity:     Worried About Running Out of Food in the Last Year: Not on file    [place] of Food in the Last Year: Not on file   Transportation Needs:     Lack of Transportation (Medical): Not on file    Lack of Transportation (Non-Medical):  Not on file   Physical Activity:     Days of Exercise per Week: Not on file    Minutes of Exercise per Session: Not on file   Stress:     Feeling of Stress : Not on file   Social Connections:     Frequency of Communication with Friends and Family: Not on file    Frequency of Social Gatherings with Friends and Family: Not on file    Attends Protestant Services: Not on file    Active Member of [address] [place] [address] or Organizations: Not on file    Attends Club or Organization Meetings: Not on file    Marital Status: Not on file   Intimate Partner Violence:     Fear of Current or Ex-Partner: Not on file    Emotionally Abused: Not on file    Physically Abused: Not on file    Sexually Abused: Not on file   Housing Stability:     Unable to Pay for Housing in the Last Year: Not on file    Number of [place] in the Last Year: Not on file    Unstable Housing in the Last Year: Not on file         PHYSICAL EXAM    (up to 7 for level 4, 8 or more for level 5)     ED Triage Vitals [04/23/22 1915]   BP Temp Temp Source Pulse Resp SpO2 Height Weight   (!) 164/78 98.1 °F (36.7 °C) Oral 89 20 95 % -- 158 lb 4.6 oz (71.8 kg)       General: Alert white male no acute distress. Head: Atraumatic and normocephalic. Eyes: No conjunctival injection. Pupils equal round reactive. ENT: Basal cell carcinoma left cheek and left nose. No facial trauma. Oropharynx is negative. Neck: Supple, nontender, no adenopathy. Heart: Regular rate and rhythm. No murmurs or gallops noted. Lungs: Breath sounds equal bilaterally and clear. Abdomen: Soft, nondistended, nontender. Musculoskeletal: Tenderness over the right lateral hip and buttock area. Pelvis is stable on rocking with pain in the right hip on rocking. No shortening or rotation of the right lower extremity. He is able to move his knee, ankle, and foot including his toes normally. Lower extremities are symmetrically warm. They have symmetrical capillary refill. I can palpate a dorsalis pedis and posterior tibial pulse on the left.   I cannot palpate a dorsalis pedis or posterior tibial pulse on the right. I cannot hear Doppler pulses. Neuro: Awake, alert, oriented. Symmetrical reactive pupils. Intact extraocular movements. Symmetrical motor function with the exception of range of motion of the right hip which was not tested due to pain. Symmetrical movement of his ankles and toes. He has decreased sensation to touch in the right foot. DIFFERENTIAL DIAGNOSIS   Differential includes but is not limited to hip contusion, hip fracture, pelvic fracture, other. DIAGNOSTIC RESULTS     EKG: All EKG's are interpreted by Mindy Randall MD in the absence of a cardiologist.    Normal sinus rhythm, rate of 74, nonspecific ST changes. Rhythm strip shows sinus rhythm with a rate of 74, OH interval 130 ms, QRS 76 ms with no other ectopy as interpreted by me. This compared to an EKG dated 8/14/2019, no significant changes noted. RADIOLOGY:   Non-plain film images such as CT, Ultrasound and MRI are read by the radiologist. Plain radiographic images are visualized and preliminarily interpreted Mindy Randall MD with the below findings:      Interpretation per the Radiologist below, if available at the time of this note:    XR CHEST PORTABLE   Final Result   No cardiomegaly, pneumonia or interstitial edema. No significant change has   occurred from 08/14/2019. XR HIP RIGHT (2-3 VIEWS)   Final Result   Acute nondisplaced fracture subcapital region right femoral neck with mild   foreshortening.                ED BEDSIDE ULTRASOUND:   Performed by ED Physician - none    LABS:  Labs Reviewed   CBC WITH AUTO DIFFERENTIAL - Abnormal; Notable for the following components:       Result Value    WBC 12.0 (*)     Hemoglobin 13.2 (*)     Hematocrit 40.4 (*)     Neutrophils Absolute 9.2 (*)     All other components within normal limits   BASIC METABOLIC PANEL - Abnormal; Notable for the following components:    Glucose 132 (*)     All other components within normal limits URINALYSIS WITH REFLEX TO CULTURE - Abnormal; Notable for the following components:    Ketones, Urine TRACE (*)     All other components within normal limits   PROTIME-INR       All other labs were within normal range or not returned as of this dictation. EMERGENCY DEPARTMENT COURSE and DIFFERENTIAL DIAGNOSIS/MDM:   Vitals:    Vitals:    04/23/22 2030 04/23/22 2100 04/23/22 2130 04/23/22 2200   BP: (!) 169/76  133/70 (!) 152/79   Pulse:  74 76 85   Resp:  13 12 8   Temp:       TempSrc:       SpO2: 98% 94% 93% 92%   Weight: This patient's been having some ongoing numbness in his right foot for about 2 months. He has been worked up. He has been seen by vascular. He has total occlusion of the right distal superficial femoral artery and proximal popliteal artery with reconstitution of flow at the mid popliteal artery. Total occlusion of the right posterior tibial artery. He had vascular intervention by Dr. Carlos Enrique Dee. He states his symptoms did not get any better. There is some concern about a presacral mass. He saw Dr. Hugh Bustamante for this. He is currently in process of being worked up. This because of this numbness in his foot that he fell today. He landed on his right hip. He was unable to weight-bear. He denies any other injuries. He has a subcapital fracture of his right hip on x-ray. His H&H is stable. His renal function is normal.  He is in a normal sinus rhythm on his EKG. His right leg does not appear ischemic. He can move his ankle and foot well. He has normal capillary refill. It symmetrically warm. He has no edema. He has some mild calf tenderness. His symptoms in his foot and calf are unchanged since they started about 6 to 8 weeks ago. He will need to be admitted for his hip fracture. I consulted orthopedics and notify Dr. Noe Higginbotham. I consulted the hospitalist to admit. I spoke with Dr. Joaquim Benavides. He will admit.     Test results, diagnosis, and treatment plan were discussed with the patient with his permission his son. They understand the treatment plan and need for admission and are agreeable. He was medicated with morphine and Dilaudid to control his pain. CONSULTS:  IP CONSULT TO HOSPITALIST  IP CONSULT TO ORTHOPEDIC SURGERY    PROCEDURES:  None    FINAL IMPRESSION      1. Closed subcapital fracture of femur, right, initial encounter ([facility] [address]    2. Fall, initial encounter          DISPOSITION/PLAN   DISPOSITION Decision To Admit 04/23/2022 11:02:38 PM      PATIENT REFERRED TO:  No follow-up provider specified.     DISCHARGE MEDICATIONS:  New Prescriptions    No medications on file       (Please note that portions of this note were completed with a voice recognition program.  Efforts were made to edit the dictations but occasionally words are mis-transcribed.)    Hyacinth Mae MD  Attending Emergency Physician        Oriana Everett MD  04/23/22 [id number]

## 2022-04-24 NOTE — ANESTHESIA PRE PROCEDURE
[facility] Department of Anesthesiology  Pre-Anesthesia Evaluation/Consultation       Name:  Jada Chanel  : 1950  Age:  70 y.o. MRN:  [id number]  Date: 2022           Surgeon: Surgeon(s):  Cristal Spring MD    Procedure: Procedure(s):  HIP PINNING     No Known Allergies  Patient Active Problem List   Diagnosis    Arthritis of left knee    Basal cell carcinoma (BCC) of skin of nose    Peripheral arterial disease (HCC)    Closed right hip fracture (HCC)    Fall    Numbness of right foot    Hip fracture, right, closed, initial encounter [facility])     Past Medical History:   Diagnosis Date    Basal cell carcinoma     L side of nose    Peripheral artery disease ([facility] [address]      History reviewed. No pertinent surgical history. Social History     Tobacco Use    Smoking status: Current Every Day Smoker     Packs/day: 1.00    Smokeless tobacco: Never Used   Substance Use Topics    Alcohol use: No    Drug use: Not Currently     Medications  No current facility-administered medications on file prior to encounter. Current Outpatient Medications on File Prior to Encounter   Medication Sig Dispense Refill    diazePAM (VALIUM) 2 MG tablet Take 2 mg by mouth every 6 hours as needed.  hydrOXYzine (VISTARIL) 25 MG capsule       traMADol (ULTRAM) 50 MG tablet TAKE 1 TABLET BY MOUTH EVERY 4 HOURS FOR UP TO 5 DAYS AS NEEDED FOR PAIN.  TAKE LOWEST DOSE POSSIBLE TO MANAGE PAIN (Patient not taking: Reported on 3/31/2022)      clopidogrel (PLAVIX) 75 MG tablet Take 1 tablet by mouth daily 30 tablet 3    ibuprofen (ADVIL;MOTRIN) 800 MG tablet Take 1 tablet by mouth 3 times daily as needed for Pain 15 tablet 0     Current Facility-Administered Medications   Medication Dose Route Frequency Provider Last Rate Last Admin    [Held by provider] clopidogrel (PLAVIX) tablet 75 mg  75 mg Oral Daily Avel Heredia DO        sodium chloride flush 0.9 % injection 5-40 mL  5-40 mL IntraVENous 2 times per day [facility], [facility]        sodium chloride flush 0.9 % injection 5-40 mL  5-40 mL IntraVENous PRN [facility], [facility]        0.9 % sodium chloride infusion   IntraVENous PRN Avel [facility], DO        acetaminophen (TYLENOL) tablet 650 mg  650 mg Oral Q6H PRN Avel [facility], DO        Or    acetaminophen (TYLENOL) suppository 650 mg  650 mg Rectal Q6H PRN Avel [facility], DO        ondansetron (ZOFRAN) injection 4 mg  4 mg IntraVENous Q6H PRN [facility], DO        HYDROmorphone (DILAUDID) injection 0.5 mg  0.5 mg IntraVENous Q4H PRN [facility], DO        Or    HYDROmorphone (DILAUDID) injection 1 mg  1 mg IntraVENous Q4H PRN [facility], DO   1 mg at 22 0622    0.9 % sodium chloride infusion   IntraVENous Continuous [facility],  mL/hr at 22 0213 New Bag at 22 0213    nicotine (NICODERM CQ) 21 MG/24HR 1 patch  1 patch TransDERmal Daily Avel [facility], DO        nicotine polacrilex (COMMIT) lozenge 2 mg  2 mg Oral Q2H PRN Avel [facility], DO         Vital Signs (Current)   Vitals:    22 0130 22 0227 22 0315 22 0559   BP: (!) 133/98  (!) 169/83 (!) 142/85   Pulse: 87  77 78   Resp: 13  18 11   Temp:   98.6 °F (37 °C) 98.2 °F (36.8 °C)   TempSrc:   Oral Oral   SpO2: 93%  94%    Weight:  150 lb 5.7 oz (68.2 kg)                                              Vital Signs Statistics (for past 48 hrs)     Temp  Av.3 °F (36.8 °C)  Min: 98.1 °F (36.7 °C)   Min taken time: 22  Max: 98.6 °F (37 °C)   Max taken time: 22  Pulse  Av.7  Min: 76   Min taken time: 22 2100  Max: 80   Max taken time: 22  Resp  Av.1  Min: 8   Min taken time: 22  Max: 20   Max taken time: 22  BP  Min: 133/98   Min taken time: 220  Max: 173/86   Max taken time: 22  MAP (mmHg)  Av  Min: 112   Min taken time: 22  Max: 112   Max taken time: 22 0315  SpO2  Av.6 %  Min: 90 %   Min taken time: 22 0030  Max: 99 %   Max taken time: 22 1930  BP Readings from Last 3 Encounters:   22 (!) 142/85   22 (!) 140/84   22 103/75       BMI  Body mass index is 22.86 kg/m². Estimated body mass index is 22.86 kg/m² as calculated from the following:    Height as of 3/31/22: 5' 8\" (1.727 m). Weight as of this encounter: 150 lb 5.7 oz (68.2 kg).     CBC   Lab Results   Component Value Date    WBC 8.8 2022    RBC 4.39 2022    HGB 12.8 2022    HCT 38.6 2022    MCV 88.1 2022    RDW 13.9 2022     2022     CMP    Lab Results   Component Value Date     2022    K 4.2 2022    K 4.1 2019     2022    CO2 22 2022    BUN 12 2022    CREATININE 0.9 2022    GFRAA >60 2022    AGRATIO 1.7 2019    LABGLOM >60 2022    GLUCOSE 121 2022    PROT 7.4 2019    CALCIUM 8.8 2022    BILITOT 0.3 2019    ALKPHOS 92 2019    AST 12 2019    ALT 10 2019     BMP    Lab Results   Component Value Date     2022    K 4.2 2022    K 4.1 2019     2022    CO2 22 2022    BUN 12 2022    CREATININE 0.9 2022    CALCIUM 8.8 2022    GFRAA >60 2022    LABGLOM >60 2022    GLUCOSE 121 2022     POCGlucose  Recent Labs     22  0531   GLUCOSE 132* 121*      Coags    Lab Results   Component Value Date    PROTIME 11.4 2022    INR 1.01 2022    APTT 32.2      HCG (If Applicable) No results found for: PREGTESTUR, PREGSERUM, HCG, HCGQUANT   ABGs No results found for: PHART, PO2ART, PDL3TPI, YFQ6TDR, BEART, J6RFFSEU   Type & Screen (If Applicable)  No results found for: LABABO, LABRH                         BMI: Wt Readings from Last 3 Encounters:       NPO Status:  >8h Anesthesia Evaluation  Patient summary reviewed no history of anesthetic complications:   Airway: Mallampati: II  TM distance: >3 FB   Neck ROM: full  Mouth opening: > = 3 FB Dental: normal exam         Pulmonary: breath sounds clear to auscultation      (-) COPD, asthma, sleep apnea and not a current smoker                           Cardiovascular:        (-) hypertension, past MI, CABG/stent,  angina and no hyperlipidemia        Rate: normal                    Neuro/Psych:      (-) seizures, TIA and CVA           GI/Hepatic/Renal:        (-) GERD, liver disease and no renal disease       Endo/Other:    (+) : arthritis:., .    (-) diabetes mellitus               Abdominal:             Vascular:   + PVD, aortic or cerebral, . Other Findings:             Anesthesia Plan      general     ASA 3       Induction: intravenous. MIPS: Postoperative opioids intended and Prophylactic antiemetics administered. Anesthetic plan and risks discussed with patient. Plan discussed with CRNA. This pre-anesthesia assessment may be used as a history and physical.    DOS STAFF ADDENDUM:    Pt seen and examined, chart reviewed (including anesthesia, drug and allergy history). No interval changes to history and physical examination. Anesthetic plan, risks, benefits, alternatives, and personnel involved discussed with patient. Patient verbalized an understanding and agrees to proceed.       Josefina Barnes MD  April 24, 2022  7:51 AM

## 2022-04-24 NOTE — H&P
Hospital Medicine History & Physical      PCP: Drea Frost    Date of Admission: 4/23/2022    Date of Service: Pt seen/examined on 4/24/2022 and admitted to inpatient    Chief Complaint: Fall, right hip pain, right foot numbness      History Of Present Illness: The patient is a 70 y.o. male with past medical history of basal cell carcinoma, continued smoking abuse, peripheral artery disease for which she just had ballooning with Dr. Radha Adams of vascular surgery in the last month but has continued to have right foot numbness and even some left foot numbness from claudication symptoms who presents to [facility] with concern that while he was at home today he apparently was sitting on chair and talking with a friend and unfortunately as he was getting up and trying to walk with a friend in the house he apparently missed the landing while getting up and lift his leg forward from what can be understood and unfortunately fell onto the ground because he could not feel the floor properly due to his numbness and then landed onto his right hip. He felt immediately a lot of pain and felt as though his hip was broken when he told his friend. His friend attempted to call EMS and get him to the ED. He is still currently in pain. He notes continued right foot numbness and possibly some left foot numbness which might be all related to his claudication symptoms from peripheral vascular disease. He does continue to smoke but has been attempting to reduce his smoking addiction. 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 He is wearing nicotine patches at this time to attempt to quit smoking but is difficult.   He has seen vascular surgery for involvement of claudication symptoms causing the right foot numbness and recently had ballooning. He had also recently seen oncology the previous day for a noted sacral mass of uncertain significance noted on his CT aorta with contrast from a month ago. Oncology does not have any plans for the mass at this time but potentially this could be contributing to his right foot numbness. He is currently in a lot of pain to his right hip in addition to the foot numbness but otherwise denies any other recent symptoms of fever, chills, dizziness, syncope, chest pain, shortness of breath, dysuria, blood in urine/stool/sputum, nausea/vomiting/diarrhea/abdominal pain, poor appetite, rashes. Past Medical History:        Diagnosis Date    Basal cell carcinoma     L side of nose    Peripheral artery disease (HCC)        Past Surgical History:    History reviewed. No pertinent surgical history. Medications Prior to Admission:    Prior to Admission medications    Medication Sig Start Date End Date Taking? Authorizing Provider   albuterol sulfate  (90 Base) MCG/ACT inhaler Inhale 1-2 puffs into the lungs every 6 hours as needed for Shortness of Breath 2/9/22  Yes Historical Provider, MD   hydrOXYzine (VISTARIL) 25 MG capsule  3/2/22   Historical Provider, MD   clopidogrel (PLAVIX) 75 MG tablet Take 1 tablet by mouth daily 3/23/22   Loli Boots, DO       Allergies:  Patient has no known allergies. Social History:  The patient currently lives home    TOBACCO:   reports that he has been smoking. He has been smoking about 1.00 pack per day. He has never used smokeless tobacco.  ETOH:   reports no history of alcohol use. Family History:  Reviewed in detail and negative for DM, Early CAD, Cancer, CVA. Positive as follows:    History reviewed. No pertinent family history. REVIEW OF SYSTEMS:   as noted in the HPI. All other systems reviewed and negative.     PHYSICAL EXAM:    BP (!) 142/85   Pulse 78   Temp 98.2 °F (36.8 °C) (Oral)   Resp 11   Wt 150 lb 5.7 oz (68.2 kg)   SpO2 94%   BMI 22.86 kg/m²     General appearance: Still in a large amount of pain to his right hip, still having numbness to his right foot which he notes has been there even since previous vascular ballooning, alert and oriented x4, no acute respiratory distress however  HEENT normocephalic, noted to the left face there is a surgical eddie from a previously removed basal cell carcinoma specimen. The area is healing to the skin. Pupils equal, round, and reactive to light. Extra ocular muscles intact. Conjunctivae/corneas clear. Mildly dry mucous membranes. Neck: Supple, no JVD  Lungs: Clear to auscultation, bilaterally without Rales/Wheezes/Rhonchi with good respiratory effort. Heart: Regular rate and rhythm with Normal S1/S2 without murmurs, rubs or gallops, point of maximum impulse non-displaced  Abdomen: Soft, non-tender or non-distended without rigidity or guarding and positive bowel sounds all four quadrants. Extremities: No edema bilaterally to lower extremities  Musculoskeletal: Patient has right leg and knee bends, noted the patient is a lot of pain to the right hip, there might be deformity but difficult to evaluate at this time due to patient having a lot of pain, left leg does not have any deformity or injury. There is no bleeding of note anywhere to the joints and no effusions noted although difficult to to say due to patient's pain  Skin: No rashes  Neurologic: Grossly intact neurologically moves all extremities appropriately, notes continued numbness to his right foot but still has sensation to bilateral lower extremities and upper extremities. Mental status: Alert, oriented, thought content appropriate.   Capillary Refill: Acceptable  < 3 seconds  Peripheral Pulses: Right foot does have some diminished pulses but able to feel the posterior tibial pulse on the right and not as much on the dorsalis pedis on the right, the rest of the pulses to the left lower extremity and upper extremities are normal and 2+          CBC   Recent Labs     04/23/22 2021 04/24/22  0531   WBC 12.0* 8.8   HGB 13.2* 12.8*   HCT 40.4* 38.6*    327      RENAL  Recent Labs     04/23/22 2021 04/24/22  0531    139   K 3.8 4.2    103   CO2 23 22   BUN 15 12   CREATININE 1.0 0.9     LFT'S  No results for input(s): AST, ALT, ALB, BILIDIR, BILITOT, ALKPHOS in the last 72 hours. COAG  Recent Labs     04/23/22 2021   INR 1.01     CARDIAC ENZYMES  No results for input(s): CKTOTAL, CKMB, CKMBINDEX, TROPONINI in the last 72 hours. U/A:    Lab Results   Component Value Date    COLORU Yellow 04/23/2022    CLARITYU Clear 04/23/2022    SPECGRAV 1.015 04/23/2022    LEUKOCYTESUR Negative 04/23/2022    BLOODU Negative 04/23/2022    GLUCOSEU Negative 04/23/2022       ABG  No results found for: QIJ1CHC, BEART, F5SQPTJS, PHART, THGBART, PDR6BSA, PO2ART, IIN8GWE        Active Hospital Problems    Diagnosis Date Noted    Hip fracture, right, closed, initial encounter ([facility] [address] Pio Hogan 04/24/2022     Priority: Medium    Closed right hip fracture ([facility] [address] [S72.001A] 04/23/2022     Priority: Medium    Fall [W19. XXXA] 04/23/2022     Priority: Medium    Numbness of right foot [R20.0] 04/23/2022     Priority: Medium    Peripheral arterial disease ([facility] [address] [I73.9]    · Sacral mass  · Smoking abuse      PHYSICIANS CERTIFICATION:    I certify that Cony Muñoz is expected to be hospitalized for greater than 2 midnights based on the following assessment and plan:      ASSESSMENT/PLAN:  · Closed right hip fracture  · Fall  · Peripheral artery disease  · Numbness of right foot  · Sacral mass  · Smoking abuse    Plan:   · Patient with new hip fracture, has still persistent numbness to right foot from PAD, possibly some numbness contibuted by sacral mass noted on recent CTA abd aorta 3/10/2022 which he is seeing oncology for but no plan yet for  · Leukocytosis noted, likely stress reaction at this time, monitor for any signs of infection with repeat labs and evaluation  · Holding home plavix  · Neurovascular checks q4  · Dilaudid q4 prn on pain scale  · Orthopedic surgery consult for hip fracture repair  · Vascular surgery consult for PAD  · May need oncology consult later  · Nicotine replacement therapy ordered  · /hr x 20 hrs for IV fluid hydration  · Repeat labs daily    DVT Prophylaxis: heparin  Diet: Diet NPO  Code Status: Full Code  PT/OT Eval Status: ambulatory    Dispo - pending clinical course       Avel Heredia DO    Thank you Drea Frost for the opportunity to be involved in this patient's care. If you have any questions or concerns please feel free to contact me at [phone].

## 2022-04-24 NOTE — PROGRESS NOTES
Pt admitted to unit. Education provided. Prescribed dilaudid administered. Fluids initiated. assessment complete per protocol. ROSEANNE Peters RN MSN     3531: Patient to be transferred to [address] [place] [address]. Report given to bryant.   Pt updated    0540: transfer complete    ROSEANNE Peters RNMSN

## 2022-04-24 NOTE — PLAN OF CARE
Problem: Discharge Planning  Goal: Discharge to home or other facility with appropriate resources  Outcome: Progressing     Problem: Pain  Goal: Verbalizes/displays adequate comfort level or baseline comfort level  Outcome: Progressing     Problem: Skin/Tissue Integrity  Goal: Absence of new skin breakdown  Description: 1. Monitor for areas of redness and/or skin breakdown  2. Assess vascular access sites hourly  3. Every 4-6 hours minimum:  Change oxygen saturation probe site  4. Every 4-6 hours:  If on nasal continuous positive airway pressure, respiratory therapy assess nares and determine need for appliance change or resting period.   Outcome: Progressing     Problem: Safety - Adult  Goal: Free from fall injury  Outcome: Progressing     Problem: ABCDS Injury Assessment  Goal: Absence of physical injury  Outcome: Progressing

## 2022-04-24 NOTE — ED NOTES
Report given to 3W VERONIKA Deng to assume care. All questions answered, RN verbalized understanding.       Altaf Mcgowan RN  04/24/22 1087

## 2022-04-24 NOTE — BRIEF OP NOTE
Brief Postoperative Note      Patient: Jada Chanel  YOB: 1950  MRN: [id number]    Date of Procedure: 4/24/2022    Pre-Op Diagnosis: Right nondisplaced femoral neck fracture    Post-Op Diagnosis: Same       Procedure(s):  HIP PINNING    Surgeon(s):  Cristal Spring MD    Assistant:  Surgical Assistant: Herve Lunsford    Anesthesia: General    Estimated Blood Loss (mL): less than 50     Complications: None    Specimens:   * No specimens in log *    Implants:  Implant Name Type Inv.  Item Serial No.  Lot No. LRB No. Used Action   SCREW BNE L90MM DIA6.5MM THRD L20MM CANC TI ST SELF DRL - [id number]  SCREW BNE L90MM DIA6.5MM THRD L20MM CANC TI ST SELF DRL  [employer] [facility]  Right 1 Implanted   SCREW BNE L95MM DIA6.5MM THRD L20MM STD CANC TI ST SELF DRL - [id number]  SCREW BNE L95MM DIA6.5MM THRD L20MM STD CANC TI ST SELF DRL  [employer] [facility]  Right 2 Implanted         Drains: * No LDAs found *        Electronically signed by Cristal Spring MD on 4/24/2022 at 9:45 AM

## 2022-04-24 NOTE — PLAN OF CARE
Problem: Discharge Planning  Goal: Discharge to home or other facility with appropriate resources  4/24/2022 1635 by Jack Peralta RN  Outcome: Progressing     Problem: Pain  Goal: Verbalizes/displays adequate comfort level or baseline comfort level  4/24/2022 1635 by Jack Peralta RN  Outcome: Progressing     Problem: Skin/Tissue Integrity    Problem: Skin/Tissue Integrity  Goal: Absence of new skin breakdown  4/24/2022 1635 by Jack Peralta RN  Outcome: Progressing     Outcome: Progressing   Patient c/o back and hip pain post operatively; managed with PRN medications, rest, and repositioning. Patient has voiced no unmet needs.

## 2022-04-24 NOTE — PROGRESS NOTES
Patient arrived to the floor from [address] of 10/10 pain in right leg and right hip. Dilaudid can be given in about 20 minutes.

## 2022-04-24 NOTE — CONSULTS
Orthopaedic Surgery Consult Note      Reason for consult:  Right hip fracture    Requesting physician: Ning Stewart MD    CHIEF COMPLAINT: Right hip pain / fracture. HISTORY:  Mr. Winifred Flannery is a 70 y.o. male with history of peripheral vascular disease, who presented to [facility] ER for evaluation of right hip pain after a fall. He has a history of peripheral vascular disease. He had recanalization of right mid popliteal artery by Dr. Joanie Fuentes on 3/23/22. Dr. Joanie Fuentes has recommended further fem-pop bypass. He states he had numbness in the right foot prior to that procedure and it has continued postop. He states he got up from the couch. Not sure if he didn't lift his leg or it got caught since he cannot feel the foot. He complain of right hip pain. He has some numbness in one toe on the left foot. He ha  Movement makes the pain worse. Narcotic pain medications make the pain better. He is continuously asks about medication dosing. Past Medical History:   Diagnosis Date    Basal cell carcinoma     L side of nose    Peripheral artery disease ([facility] [address]        History reviewed. No pertinent surgical history.     Current Facility-Administered Medications   Medication Dose Route Frequency Provider Last Rate Last Admin    [Held by provider] clopidogrel (PLAVIX) tablet 75 mg  75 mg Oral Daily Avel  Giovanna, DO        sodium chloride flush 0.9 % injection 5-40 mL  5-40 mL IntraVENous 2 times per day Avel M Giovanna, DO        sodium chloride flush 0.9 % injection 5-40 mL  5-40 mL IntraVENous PRN Avel  Giovanna, DO        0.9 % sodium chloride infusion   IntraVENous PRN Avel  Giovanna, DO        acetaminophen (TYLENOL) tablet 650 mg  650 mg Oral Q6H PRN Avel  Giovanna, DO        Or    acetaminophen (TYLENOL) suppository 650 mg  650 mg Rectal Q6H PRN Avel  Giovanna, DO        ondansetron (ZOFRAN) injection 4 mg  4 mg IntraVENous Q6H PRN Avel M Giovanna, DO        HYDROmorphone (DILAUDID) injection 0.5 mg  0.5 mg IntraVENous Q4H PRN Avel M Giovanna, DO        Or    HYDROmorphone (DILAUDID) injection 1 mg  1 mg IntraVENous Q4H PRN Avel HARRIET Giovanna, DO   1 mg at 04/24/22 0622    0.9 % sodium chloride infusion   IntraVENous Continuous Avel HARRIET Giovanna,  mL/hr at 04/24/22 0213 New Bag at 04/24/22 0213    nicotine (NICODERM CQ) 21 MG/24HR 1 patch  1 patch TransDERmal Daily Avel HARRIET Giovanna, DO        nicotine polacrilex (COMMIT) lozenge 2 mg  2 mg Oral Q2H PRN Avel Marene Stalker, DO           No Known Allergies    Social History     Socioeconomic History    Marital status:      Spouse name: Not on file    Number of children: Not on file    Years of education: Not on file    Highest education level: Not on file   Occupational History    Not on file   Tobacco Use    Smoking status: Current Every Day Smoker     Packs/day: 1.00    Smokeless tobacco: Never Used   Substance and Sexual Activity    Alcohol use: No    Drug use: Not Currently    Sexual activity: Not Currently     Partners: Female   Other Topics Concern    Not on file   Social History Narrative    Not on file     Social Determinants of Health     Financial Resource Strain:     Difficulty of Paying Living Expenses: Not on file   Food Insecurity:     Worried About Running Out of Food in the Last Year: Not on file    [place] of Food in the Last Year: Not on file   Transportation Needs:     Lack of Transportation (Medical): Not on file    Lack of Transportation (Non-Medical):  Not on file   Physical Activity:     Days of Exercise per Week: Not on file    Minutes of Exercise per Session: Not on file   Stress:     Feeling of Stress : Not on file   Social Connections:     Frequency of Communication with Friends and Family: Not on file    Frequency of Social Gatherings with Friends and Family: Not on file    Attends Orthodox Services: Not on file  Active Member of Clubs or Organizations: Not on file    Attends Club or Organization Meetings: Not on file    Marital Status: Not on file   Intimate Partner Violence:     Fear of Current or Ex-Partner: Not on file    Emotionally Abused: Not on file    Physically Abused: Not on file    Sexually Abused: Not on file   Housing Stability:     Unable to Pay for Housing in the Last Year: Not on file    Number of [place] in the Last Year: Not on file    Unstable Housing in the Last Year: Not on file       History reviewed. No pertinent family history. Review of Systems:   General: negative  Psychological: negative  Ophthalmic: negative  ENT: negative  Hematological and Lymphatic: negative  Endocrine: negative  Respiratory: negative  Cardiovascular: see HPI  Gastrointestinal: negative  Genito-Urinary: negative  Musculoskeletal: negative. See HPI for pertinent positives. Neurological: see HPI  Dermatological: negative         PHYSICAL EXAM:  Mr. Xavi San is a 70 y.o. male who presents today in no acute distress, awake, alert, and oriented. BP (!) 142/85   Pulse 78   Temp 98.2 °F (36.8 °C) (Oral)   Resp 11   Wt 150 lb 5.7 oz (68.2 kg)   SpO2 94%   BMI 22.86 kg/m²      On evaluation of his right lower extremity, there is no obvious deformity. There is no swelling and is no ecchymosis. He is tender to palpation over the right hip, and otherwise nontender over the remainder of the extremity. Range of motion of right hip not assessed secondary to nondisplaced femoral neck fracture. The skin overlying the right hip is intact without evidence of lesion, laceration or abrasion. Distal pulses are 2+ and symmetric bilaterally. Sensation is grossly intact to light touch, but subjectively decreased. Dorsiflexion / plantarflexion intact. Secondary skeletal survey negative. No tenderness to palpation in bilateral arms, left leg.   There is no pain with passive motion of neck, shoulders, elbows, wrists,  ankles. Right hip Xrays 4/23/22: Nondisplaced impacted subcapital femoral neck fracture      CBC:   Lab Results   Component Value Date    WBC 8.8 04/24/2022    RBC 4.39 04/24/2022    HGB 12.8 04/24/2022    HCT 38.6 04/24/2022    MCV 88.1 04/24/2022    MCH 29.3 04/24/2022    MCHC 33.2 04/24/2022    RDW 13.9 04/24/2022     04/24/2022    MPV 7.2 04/24/2022       PT/INR:    Lab Results   Component Value Date    PROTIME 11.4 04/23/2022    INR 1.01 04/23/2022       Chem Basic:   Lab Results   Component Value Date     04/24/2022    K 4.2 04/24/2022    K 4.1 08/14/2019     04/24/2022    CO2 22 04/24/2022    GLUCOSE 121 04/24/2022    BUN 12 04/24/2022    CREATININE 0.9 04/24/2022            IMPRESSION:    Right nondisplaced femoral neck fracture   Peripheral vascular disease      PLAN:  I discussed the nature of the fracture with the patient, and treatment options including both surgical and non-surgical treatment, and that my recommendation would be operative treatment of the fracture, given factors including the location, alignment, amount of displacement and comminution of the fracture. I had an extensive discussion with Mr. Melissa Hightower and/or family regarding the natural history, etiology, and long term consequences of his condition. I have presented reasonable alternatives to the patient's proposed care, treatment, and services. I have outlined a treatment plan with them and, in my opinion, surgical intervention is indicated at this time. Discussion I have had encompassed risks, benefits, and side effects related to the alternatives and the risks related to not receiving the proposed care, treatment, and services.   I have discussed the potential complications (including, but not limited to injury to nerve or blood vessel, infection, bleeding, DVT or PE, stiffness, incomplete pain relief, need for further surgery, and anesthetic complications), limitations, expectations, alternatives, and risks of the surgical procedure. We also discussed the importance of postoperative rehabilitation. He has had full opportunity to ask his questions. I have answered them all to his satisfaction. I feel that Mr. Jodie Vieyra understands our discussion today and he will provide written informed consent for the procedure. Plan for right hip pinning. There will be no manipulation / twisting of his leg (equal to or any greater than normal daily activities), thus no significant risk to his right leg vasculature. Pain control. NPO. Bedrest.    To OR today. [facility] Lab. Noelle Fortune MD  Orthopaedic Surgery and Sports Medicine     Disclaimer: This note was generated with use of a verbal recognition program and an attempt was made to check for errors. It is possible that there are still dictated errors within this office note. If so, please bring any significant errors to my attention for an addendum. All efforts were made to ensure that this office note is accurate.

## 2022-04-25 LAB
ANION GAP SERPL CALCULATED.3IONS-SCNC: 13 MMOL/L (ref 3–16)
BASOPHILS ABSOLUTE: 0 K/UL (ref 0–0.2)
BASOPHILS RELATIVE PERCENT: 0.2 %
BUN BLDV-MCNC: 12 MG/DL (ref 7–20)
CALCIUM SERPL-MCNC: 8.6 MG/DL (ref 8.3–10.6)
CHLORIDE BLD-SCNC: 103 MMOL/L (ref 99–110)
CO2: 23 MMOL/L (ref 21–32)
CREAT SERPL-MCNC: 0.8 MG/DL (ref 0.8–1.3)
EOSINOPHILS ABSOLUTE: 0 K/UL (ref 0–0.6)
EOSINOPHILS RELATIVE PERCENT: 0.2 %
GFR AFRICAN AMERICAN: >60
GFR NON-AFRICAN AMERICAN: >60
GLUCOSE BLD-MCNC: 108 MG/DL (ref 70–99)
HCT VFR BLD CALC: 33.1 % (ref 40.5–52.5)
HEMOGLOBIN: 11.1 G/DL (ref 13.5–17.5)
LYMPHOCYTES ABSOLUTE: 2.6 K/UL (ref 1–5.1)
LYMPHOCYTES RELATIVE PERCENT: 28.7 %
MAGNESIUM: 2 MG/DL (ref 1.8–2.4)
MCH RBC QN AUTO: 29.4 PG (ref 26–34)
MCHC RBC AUTO-ENTMCNC: 33.6 G/DL (ref 31–36)
MCV RBC AUTO: 87.6 FL (ref 80–100)
MONOCYTES ABSOLUTE: 0.8 K/UL (ref 0–1.3)
MONOCYTES RELATIVE PERCENT: 8.3 %
NEUTROPHILS ABSOLUTE: 5.7 K/UL (ref 1.7–7.7)
NEUTROPHILS RELATIVE PERCENT: 62.6 %
PDW BLD-RTO: 14.1 % (ref 12.4–15.4)
PLATELET # BLD: 301 K/UL (ref 135–450)
PMV BLD AUTO: 6.9 FL (ref 5–10.5)
POTASSIUM SERPL-SCNC: 3.8 MMOL/L (ref 3.5–5.1)
RBC # BLD: 3.78 M/UL (ref 4.2–5.9)
SODIUM BLD-SCNC: 139 MMOL/L (ref 136–145)
TROPONIN: <0.01 NG/ML
WBC # BLD: 9 K/UL (ref 4–11)

## 2022-04-25 PROCEDURE — 97530 THERAPEUTIC ACTIVITIES: CPT

## 2022-04-25 PROCEDURE — 6370000000 HC RX 637 (ALT 250 FOR IP): Performed by: ORTHOPAEDIC SURGERY

## 2022-04-25 PROCEDURE — APPNB30 APP NON BILLABLE TIME 0-30 MINS: Performed by: NURSE PRACTITIONER

## 2022-04-25 PROCEDURE — 94761 N-INVAS EAR/PLS OXIMETRY MLT: CPT

## 2022-04-25 PROCEDURE — APPSS30 APP SPLIT SHARED TIME 16-30 MINUTES: Performed by: NURSE PRACTITIONER

## 2022-04-25 PROCEDURE — 2700000000 HC OXYGEN THERAPY PER DAY

## 2022-04-25 PROCEDURE — 97166 OT EVAL MOD COMPLEX 45 MIN: CPT

## 2022-04-25 PROCEDURE — 97162 PT EVAL MOD COMPLEX 30 MIN: CPT | Performed by: PHYSICAL THERAPIST

## 2022-04-25 PROCEDURE — 93971 EXTREMITY STUDY: CPT

## 2022-04-25 PROCEDURE — 80048 BASIC METABOLIC PNL TOTAL CA: CPT

## 2022-04-25 PROCEDURE — 6360000002 HC RX W HCPCS: Performed by: ORTHOPAEDIC SURGERY

## 2022-04-25 PROCEDURE — 84484 ASSAY OF TROPONIN QUANT: CPT

## 2022-04-25 PROCEDURE — 97530 THERAPEUTIC ACTIVITIES: CPT | Performed by: PHYSICAL THERAPIST

## 2022-04-25 PROCEDURE — 85025 COMPLETE CBC W/AUTO DIFF WBC: CPT

## 2022-04-25 PROCEDURE — 2580000003 HC RX 258: Performed by: ORTHOPAEDIC SURGERY

## 2022-04-25 PROCEDURE — 83735 ASSAY OF MAGNESIUM: CPT

## 2022-04-25 PROCEDURE — 36415 COLL VENOUS BLD VENIPUNCTURE: CPT

## 2022-04-25 PROCEDURE — 2060000000 HC ICU INTERMEDIATE R&B

## 2022-04-25 RX ADMIN — SODIUM CHLORIDE, PRESERVATIVE FREE 10 ML: 5 INJECTION INTRAVENOUS at 08:24

## 2022-04-25 RX ADMIN — HYDROMORPHONE HYDROCHLORIDE 0.5 MG: 1 INJECTION, SOLUTION INTRAMUSCULAR; INTRAVENOUS; SUBCUTANEOUS at 15:11

## 2022-04-25 RX ADMIN — OXYCODONE HYDROCHLORIDE 10 MG: 10 TABLET ORAL at 06:03

## 2022-04-25 RX ADMIN — HYDROMORPHONE HYDROCHLORIDE 0.5 MG: 1 INJECTION, SOLUTION INTRAMUSCULAR; INTRAVENOUS; SUBCUTANEOUS at 18:34

## 2022-04-25 RX ADMIN — CEFAZOLIN 2000 MG: 10 INJECTION, POWDER, FOR SOLUTION INTRAVENOUS at 01:23

## 2022-04-25 RX ADMIN — HEPARIN SODIUM 5000 UNITS: 5000 INJECTION INTRAVENOUS; SUBCUTANEOUS at 15:09

## 2022-04-25 RX ADMIN — CLOPIDOGREL BISULFATE 75 MG: 75 TABLET ORAL at 09:18

## 2022-04-25 RX ADMIN — OXYCODONE HYDROCHLORIDE 10 MG: 10 TABLET ORAL at 16:41

## 2022-04-25 RX ADMIN — HYDROMORPHONE HYDROCHLORIDE 0.5 MG: 1 INJECTION, SOLUTION INTRAMUSCULAR; INTRAVENOUS; SUBCUTANEOUS at 11:57

## 2022-04-25 RX ADMIN — HYDROMORPHONE HYDROCHLORIDE 0.5 MG: 1 INJECTION, SOLUTION INTRAMUSCULAR; INTRAVENOUS; SUBCUTANEOUS at 03:48

## 2022-04-25 RX ADMIN — Medication 10 ML: at 11:31

## 2022-04-25 RX ADMIN — OXYCODONE HYDROCHLORIDE 10 MG: 10 TABLET ORAL at 21:13

## 2022-04-25 RX ADMIN — OXYCODONE HYDROCHLORIDE 10 MG: 10 TABLET ORAL at 10:20

## 2022-04-25 RX ADMIN — MAGNESIUM HYDROXIDE 30 ML: 400 SUSPENSION ORAL at 16:41

## 2022-04-25 RX ADMIN — HEPARIN SODIUM 5000 UNITS: 5000 INJECTION INTRAVENOUS; SUBCUTANEOUS at 05:59

## 2022-04-25 RX ADMIN — HYDROMORPHONE HYDROCHLORIDE 0.5 MG: 1 INJECTION, SOLUTION INTRAMUSCULAR; INTRAVENOUS; SUBCUTANEOUS at 08:23

## 2022-04-25 RX ADMIN — SODIUM CHLORIDE 25 ML: 9 INJECTION, SOLUTION INTRAVENOUS at 01:23

## 2022-04-25 RX ADMIN — HEPARIN SODIUM 5000 UNITS: 5000 INJECTION INTRAVENOUS; SUBCUTANEOUS at 21:12

## 2022-04-25 RX ADMIN — SODIUM CHLORIDE, PRESERVATIVE FREE 10 ML: 5 INJECTION INTRAVENOUS at 21:13

## 2022-04-25 ASSESSMENT — PAIN DESCRIPTION - FREQUENCY
FREQUENCY: INTERMITTENT

## 2022-04-25 ASSESSMENT — PAIN DESCRIPTION - PAIN TYPE
TYPE: ACUTE PAIN;SURGICAL PAIN

## 2022-04-25 ASSESSMENT — PAIN SCALES - GENERAL
PAINLEVEL_OUTOF10: 5
PAINLEVEL_OUTOF10: 4
PAINLEVEL_OUTOF10: 4
PAINLEVEL_OUTOF10: 10
PAINLEVEL_OUTOF10: 8
PAINLEVEL_OUTOF10: 10
PAINLEVEL_OUTOF10: 10
PAINLEVEL_OUTOF10: 5
PAINLEVEL_OUTOF10: 4
PAINLEVEL_OUTOF10: 10
PAINLEVEL_OUTOF10: 9
PAINLEVEL_OUTOF10: 9
PAINLEVEL_OUTOF10: 10
PAINLEVEL_OUTOF10: 10
PAINLEVEL_OUTOF10: 5

## 2022-04-25 ASSESSMENT — PAIN - FUNCTIONAL ASSESSMENT
PAIN_FUNCTIONAL_ASSESSMENT: PREVENTS OR INTERFERES WITH MANY ACTIVE NOT PASSIVE ACTIVITIES
PAIN_FUNCTIONAL_ASSESSMENT: PREVENTS OR INTERFERES WITH ALL ACTIVE AND SOME PASSIVE ACTIVITIES
PAIN_FUNCTIONAL_ASSESSMENT: PREVENTS OR INTERFERES WITH MANY ACTIVE NOT PASSIVE ACTIVITIES

## 2022-04-25 ASSESSMENT — PAIN DESCRIPTION - ONSET
ONSET: ON-GOING

## 2022-04-25 ASSESSMENT — PAIN DESCRIPTION - LOCATION
LOCATION: FOOT;HIP;LEG
LOCATION: FOOT;LEG;BACK
LOCATION: FOOT;LEG;HIP
LOCATION: FOOT;HIP
LOCATION: FOOT;LEG;BACK
LOCATION: FOOT;LEG;HIP

## 2022-04-25 ASSESSMENT — PAIN DESCRIPTION - ORIENTATION
ORIENTATION: RIGHT

## 2022-04-25 ASSESSMENT — PAIN DESCRIPTION - DESCRIPTORS
DESCRIPTORS: ACHING
DESCRIPTORS: ACHING;STABBING

## 2022-04-25 NOTE — PLAN OF CARE
Problem: Discharge Planning  Goal: Discharge to home or other facility with appropriate resources  4/25/2022 1627 by Davida Lyman RN  Outcome: Progressing  Note: Prior to admission pt was living at home alone. He told this nurse that he was told he will need to go to a rehab center prior to going home. Problem: Pain  Goal: Verbalizes/displays adequate comfort level or baseline comfort level  4/25/2022 1627 by Davida Lyman RN  Outcome: Progressing  Flowsheets (Taken 4/25/2022 1627)  Verbalizes/displays adequate comfort level or baseline comfort level:   Encourage patient to monitor pain and request assistance   Assess pain using appropriate pain scale   Administer analgesics based on type and severity of pain and evaluate response   Implement non-pharmacological measures as appropriate and evaluate response  Note: Pt has pain medication and has been receiving pain medication as ordered prn. Pt states that he is not \"getting much relief from medication\" however pt is able to rest at intervals with eyes closed. Problem: Skin/Tissue Integrity  Goal: Absence of new skin breakdown  Description: 1. Monitor for areas of redness and/or skin breakdown  2. Assess vascular access sites hourly  3. Every 4-6 hours minimum:  Change oxygen saturation probe site  4. Every 4-6 hours:  If on nasal continuous positive airway pressure, respiratory therapy assess nares and determine need for appliance change or resting period. 4/25/2022 1627 by Davida Lyman RN  Outcome: Progressing  Note: Skin assessment completed every shift. Pt assessed for incontinence, appropriate barrier cream applied prn. Pt encouraged to turn/rotate every 2 hours. Assistance provided if pt unable to do so themselves. Problem: Safety - Adult  Goal: Free from fall injury  4/25/2022 1627 by Davida Lyman RN  Outcome: Progressing  Note: Patient assessed for fall risk; fall precautions initiated.  Patient and family instructed about safety devices. Environment kept free of clutter and adequate lighting provided. Bed locked and in lowest position. Call light within reach. Will continue to monitor. Problem: ABCDS Injury Assessment  Goal: Absence of physical injury  4/25/2022 1627 by Florencio Evans RN  Outcome: Progressing  Note: No signs of physical injury.

## 2022-04-25 NOTE — PROGRESS NOTES
Physical Therapy  Facility/Department: [address] PROGRESSIVE CARE  Physical Therapy Initial Assessment    Name: Cherry Ramos  : 1950  MRN: [id number]  Date of Service: 2022    Discharge Recommendations:  IP Rehab   PT Equipment Recommendations  Other: will continue to assess    Maggy Lacey scored a  on the AM-PAC short mobility form. Current research shows that an AM-PAC score of 17 or less is typically not associated with a discharge to the patient's home setting. Based on the patient's AM-PAC score and their current functional mobility deficits, it is recommended that the patient have 5-7 sessions per week of Physical Therapy at d/c to increase the patient's independence. At this time, this patient demonstrates the endurance, and/or tolerance for 3 hours of therapy each day, with a treatment frequency of 5-7x/wk. Please see assessment section for further patient specific details. If patient discharges prior to next session this note will serve as a discharge summary. Please see below for the latest assessment towards goals. Patient Diagnosis(es): The primary encounter diagnosis was Closed subcapital fracture of femur, right, initial encounter ([facility] [address] Diagnoses of Fall, initial encounter and Hip fracture, right, closed, initial encounter [facility]) were also pertinent to this visit. Past Medical History:  has a past medical history of Basal cell carcinoma and Peripheral artery disease ([facility] [address] Past Surgical History:  has a past surgical history that includes hip surgery (Right, 2022). Assessment   Body Structures, Functions, Activity Limitations Requiring Skilled Therapeutic Intervention: Decreased functional mobility   Assessment: Pt is a 69 yo male who was adm to hosp with hip pain after a fall at home sustaining R femeral neck fx s/p hip pinning.  Pt at baseline is Ind without an AD; pt currently is a high fall risk and now is TTWB and needing assist of 2 for bed mob and transfers with lalita vargas lift. Pt will benefit from continued therapy 5-7x/wk to address deficits to allow pt to return home when able  Therapy Prognosis: Fair;Good  Decision Making: Medium Complexity  Clinical Presentation: evolving  Barriers to Learning: decreased insight into deficits  Requires PT Follow-Up: Yes  Activity Tolerance  Activity Tolerance: Patient limited by pain     Plan   Plan  Plan: 3-5 times per week  Current Treatment Recommendations: Transfer training,Balance training,Functional mobility training,Endurance training,Strengthening  Safety Devices  Type of Devices:  (pt taken to vascular by transport at end of session)     Restrictions  Restrictions/Precautions  Restrictions/Precautions: Weight Bearing  Lower Extremity Weight Bearing Restrictions  Right Lower Extremity Weight Bearing: Toe Touch Weight Bearing     Subjective   General  Chart Reviewed: Yes  Patient assessed for rehabilitation services?: Yes  Additional Pertinent Hx: Per Dr Stephany Wilson note: \"Mr. Yakov Arias is a 70 y.o. male with history of peripheral vascular disease, who presented to [facility] ER for evaluation of right hip pain after a fall. He has a history of peripheral vascular disease. He had recanalization of right mid popliteal artery by Dr. Ephraim Aranda on 3/23/22. Dr. Ephraim Aranda has recommended further fem-pop bypass. He states he had numbness in the right foot prior to that procedure and it has continued postop. He states he got up from the couch. Not sure if he didn't lift his leg or it got caught since he cannot feel the foot. He complain of right hip pain. He has some numbness in one toe on the left foot. He haMovement makes the pain worse. Narcotic pain medications make the pain better. He is continuously asks about medication dosing.  \" Pt s/p R hip pinning TTWB R  Response To Previous Treatment: Not applicable  Family / Caregiver Present: No  Referring Practitioner: Dr Ronny Barba  Referral Date : 04/24/22  Follows Commands: Within Functional Limits  Subjective  Subjective: pt easily distracted; very anxious about pain; agreeable to working with therapy         Social/Functional History  Social/Functional History  Lives With: Son  Type of Home:  ([facility])  Home Layout: Two level,1/2 bath on main level,Bed/Bath upstairs  Home Access: Stairs to enter without rails  Entrance Stairs - Number of Steps: 2 + 1 ECTOR with no rail; flight of stairs upstairs to bedroom/bathroom with rail  Bathroom Shower/Tub: Tub/Shower unit  Bathroom Toilet: Standard  Bathroom Equipment: Grab bars in shower  Bathroom Accessibility: Accessible  Home Equipment: [employer]  ADL Assistance: Independent  Homemaking Assistance: Independent  Ambulation Assistance: Independent (no AD)  Transfer Assistance: Independent  Active : Yes  Additional Comments: Denies any other falls  Vision/Hearing  Vision Exceptions: Wears glasses for distance  Hearing: Within functional limits    Cognition   Orientation  Overall Orientation Status: Within Functional Limits  Cognition  Overall Cognitive Status: Exceptions  Arousal/Alertness: Appropriate responses to stimuli  Following Commands: Follows one step commands consistently  Attention Span: Difficulty dividing attention  Problem Solving: Assistance required to generate solutions;Assistance required to implement solutions  Insights: Decreased awareness of deficits  Initiation: Requires cues for some  Sequencing: Requires cues for some     Objective               AROM RLE (degrees)  RLE General AROM: AAROM limited due to pain  AROM LLE (degrees)  LLE AROM : WFL  Strength RLE  Comment: limitede due to pain  Strength LLE  Strength LLE: WFL           Bed mobility  Supine to Sit: Maximum assistance;2 Person assistance  Sit to Supine: Maximum assistance;2 Person assistance  Transfers  Sit to Stand:  Moderate Assistance;2 Person Assistance (with stedy and verbal cues to maintain WB status)  Stand to sit: Moderate Assistance;2 Person Assistance (from stedy to recliner chair and to stretcher)  Bed to Chair: Dependent/Total (with stedy)        Balance  Comments: min/CGA for sitting balance (leans to L due to trying to keep wt off his R LE); min A for briefly standing on stedy           OutComes Score                                                  AM-PAC Score  AM-PAC Inpatient Mobility Raw Score : 8 (04/25/22 1409)  AM-PAC Inpatient T-Scale Score : 28.52 (04/25/22 1409)  Mobility Inpatient CMS 0-100% Score: 86.62 (04/25/22 1409)  Mobility Inpatient CMS G-Code Modifier : CM (04/25/22 1409)          Goals  Long Term Goals  Time Frame for Long term goals : by discharge  Long term goal 1: bed mob min A  Long term goal 2: transfers min A  Long term goal 3: progress to hop steps with walker when able  Patient Goals   Patient goals : to go home       Therapy Time   Individual Concurrent Group Co-treatment   Time In 1320         Time Out 1410         Minutes 50                 NOHEMI DUNN PT    Electronically signed by NOHEMI DUNN PT on 4/25/2022 at 2:10 PM

## 2022-04-25 NOTE — PLAN OF CARE
Problem: Discharge Planning  Goal: Discharge to home or other facility with appropriate resources  4/25/2022 0528 by Arelis Chen RN  Outcome: Progressing  4/24/2022 1635 by Malvin Giordano RN  Outcome: Progressing     Problem: Pain  Goal: Verbalizes/displays adequate comfort level or baseline comfort level  4/25/2022 0528 by Arelis Chen RN  Outcome: Progressing  4/24/2022 1635 by Malvin Giordano RN  Outcome: Progressing     Problem: Skin/Tissue Integrity  Goal: Absence of new skin breakdown  Description: 1. Monitor for areas of redness and/or skin breakdown  2. Assess vascular access sites hourly  3. Every 4-6 hours minimum:  Change oxygen saturation probe site  4. Every 4-6 hours:  If on nasal continuous positive airway pressure, respiratory therapy assess nares and determine need for appliance change or resting period.   4/25/2022 0528 by Arelis Chen RN  Outcome: Progressing  4/24/2022 1635 by Malvin Giordano RN  Outcome: Progressing     Problem: Safety - Adult  Goal: Free from fall injury  Outcome: Progressing     Problem: ABCDS Injury Assessment  Goal: Absence of physical injury  Outcome: Progressing

## 2022-04-25 NOTE — CARE COORDINATION
Wound consult for basal cell carcinoma to left nares. Pt tells me he has had this for 50 years and finally went to be seen. Pt has been following with ENT and plastics at . States only procedure he's had done was a biopsy. Plan is for more surgery and reconstruction once pt has ceased smoking. He currently treats scabbed areas with antibiotic ointment to try to keep it from getting too dry. Pt has abrasion to right ankle. Has hx of PAD and was being worked up by vascular for bypass. Also being worked up for mass that showed up on CT scan. He is now S/P right hip pinning 4/24/22. Currently rates pain a 10. RN notified. REC: leave nose SMITHA for now.  Christy Mueller, MSN, RN, [facility] [facility]

## 2022-04-25 NOTE — PROGRESS NOTES
Hospitalist Progress Note      PCP: Drea Frost    Date of Admission: 4/23/2022    Subjective: to OR today for hip pining    Medications:  Reviewed    Infusion Medications    sodium chloride      sodium chloride       Scheduled Medications    sodium chloride flush  5-40 mL IntraVENous 2 times per day    nicotine  1 patch TransDERmal Daily    clopidogrel  75 mg Oral Daily    sodium chloride flush  5-40 mL IntraVENous 2 times per day    ceFAZolin (ANCEF) IVPB  2,000 mg IntraVENous Q8H    heparin (porcine)  5,000 Units SubCUTAneous 3 times per day     PRN Meds: sodium chloride, acetaminophen **OR** acetaminophen, nicotine polacrilex, sodium chloride flush, sodium chloride, ondansetron **OR** ondansetron, HYDROmorphone **OR** HYDROmorphone, oxyCODONE **OR** oxyCODONE, acetaminophen, bisacodyl, magnesium hydroxide      Intake/Output Summary (Last 24 hours) at 4/25/2022 0042  Last data filed at 4/24/2022 2223  Gross per 24 hour   Intake 424.8 ml   Output 900 ml   Net -475.2 ml       Physical Exam Performed:    /85   Pulse 83   Temp 99 °F (37.2 °C) (Oral)   Resp 18   Wt 150 lb 5.7 oz (68.2 kg)   SpO2 99%   BMI 22.86 kg/m²     General appearance: NAD  Lungs: Clear to auscultation, bilaterally without Rales/Wheezes/Rhonchi with good respiratory effort. Heart: Regular rate and rhythm with Normal S1/S2 without murmurs, rubs or gallops, point of maximum impulse non-displaced  Abdomen: Soft, non-tender or non-distended without rigidity or guarding and positive bowel sounds all four quadrants. Extremities: No edema bilaterally to lower extremities  Musculoskeletal: no cyanosis/clubbing  Skin: No rashes  Neurologic: Grossly intact neurologically moves all extremities appropriately, notes continued numbness to his right foot but still has sensation to bilateral lower extremities and upper extremities. Mental status: Alert, oriented, thought content appropriate.   Capillary Refill: Acceptable  < 3 seconds  Peripheral Pulses: Right foot does have some diminished pulses but able to feel the posterior tibial pulse on the right and not as much on the dorsalis pedis on the right, the rest of the pulses to the left lower extremity and upper extremities are normal and 2+       Labs:   Recent Labs     04/23/22 2021 04/24/22  0531   WBC 12.0* 8.8   HGB 13.2* 12.8*   HCT 40.4* 38.6*    327     Recent Labs     04/23/22 2021 04/24/22  0531    139   K 3.8 4.2    103   CO2 23 22   BUN 15 12   CREATININE 1.0 0.9   CALCIUM 9.4 8.8     No results for input(s): AST, ALT, BILIDIR, BILITOT, ALKPHOS in the last 72 hours. Recent Labs     04/23/22 2021   INR 1.01     No results for input(s): Quiana Comment in the last 72 hours. Urinalysis:      Lab Results   Component Value Date    NITRU Negative 04/23/2022    BLOODU Negative 04/23/2022    SPECGRAV 1.015 04/23/2022    GLUCOSEU Negative 04/23/2022       Radiology:  XR HIP RIGHT (2-3 VIEWS)   Final Result      FLUORO FOR SURGICAL PROCEDURES   Final Result      XR CHEST PORTABLE   Final Result   No cardiomegaly, pneumonia or interstitial edema. No significant change has   occurred from 08/14/2019. XR HIP RIGHT (2-3 VIEWS)   Final Result   Acute nondisplaced fracture subcapital region right femoral neck with mild   foreshortening. Assessment/Plan:    Active Hospital Problems    Diagnosis     Hip fracture, right, closed, initial encounter ([facility] [address] [S72.001A]      Priority: Medium    Closed right hip fracture ([facility] [address] [S72.001A]      Priority: Medium    Fall [W19. XXXA]      Priority: Medium    Numbness of right foot [R20.0]      Priority: Medium    Peripheral arterial disease ([facility] [address] [I73.9]            · Closed right hip fracture  · Fall  · Peripheral artery disease  · Numbness of right foot  · Sacral mass  · Smoking abuse     Plan:   · Patient with new hip fracture, has still persistent numbness to right foot from PAD, possibly some numbness contibuted by sacral mass noted on recent CTA abd aorta 3/10/2022 which he is seeing oncology for but no plan yet for  · Leukocytosis noted, likely stress reaction at this time, monitor for any signs of infection with repeat labs and evaluation  · Holding home plavix  · Neurovascular checks q4  · Dilaudid q4 prn on pain scale  · Orthopedic surgery consulted for hip fracture repair - s/p hip pining on 4/24/22  · Vascular surgery consult for PAD  · May need oncology consult later  · Nicotine replacement therapy ordered  · /hr x 20 hrs for IV fluid hydration  · Repeat labs daily       DVT Prophylaxis: heparin  Diet: ADULT ORAL NUTRITION SUPPLEMENT; AM Snack, PM Snack; Standard High Calorie/High Protein Oral Supplement  ADULT DIET;  Regular  Code Status: Full Code    PT/OT Eval Status: ordered    Megan Nolen MD

## 2022-04-25 NOTE — PROGRESS NOTES
Patient is resting in bed. He is extremely talkative. He is complaining of constant pain and states he can't move his right leg because of pain. Pt has been given pain medications as ordered. Pt states he is gong to ask the doctor for an increase of pain medication. Offered pt ice and to elevate foot more pt refused. Pt also refused to allow nurse to reposition in bed because he states it hurts to much. Call light within reach.

## 2022-04-25 NOTE — PRE-PROCEDURE INSTRUCTIONS
Patient to have a stress test tomorrow. Please assure the patient has no caffeine or decaffeinated beverages or chocolate after 8 pm the evening before the test for a nuclear medicine stress test. Please hold all beta blockers the evening and morning of the test as well. Nothing to eat 2 hours prior to arrival to the stress lab. The patient may have water as desired or ordered. The patient must have at least 2 Troponin T resulted prior to arriving for the stress test. If the test is ordered for exercise please have the patient arrive with shoes suitable for walking on a treadmill.

## 2022-04-25 NOTE — PROGRESS NOTES
[facility]  Progress Note    4/25/2022 12:19 PM    Chief Complaint/Reason for Consult: PAD with Rest Pain to RLE    Subjective: Pt seen resting in bed, reports pain to Right Leg from Surgery, reports Left Foot remains numb and overall unchanged    Vital Signs:  Vitals:    04/25/22 0418 04/25/22 0603 04/25/22 0814 04/25/22 1155   BP:   (!) 140/102    Pulse:   73    Resp: 20 14 9    Temp:   98.3 °F (36.8 °C) 97.3 °F (36.3 °C)   TempSrc:   Oral Oral   SpO2:   98%    Weight:           I/O:    Intake/Output Summary (Last 24 hours) at 4/25/2022 1219  Last data filed at 4/25/2022 1241  Gross per 24 hour   Intake 60 ml   Output 900 ml   Net -840 ml       Physical Exam:   General: no apparent distress, alert and oriented, afebrile  Chest/Lungs: non labored breathing no tachypnea, retractions or cyanosis  Cardiac: Heart regular rate and rhythm  Abdomen: soft, nondistended and nontender  Extremities: no deformities, no significant edema to BLE  Vascular: extremities warm and well perfused, no signs of cyanosis or ischemia, bilateral upper and lower extremity motorsensory intact, numbness to Right Foot - chronic and unchanged  Pulses:  -R DP: doppler signal present  -L DP: +2/4 palpable    Labs:   Lab Results   Component Value Date     04/25/2022    K 3.8 04/25/2022    K 4.1 08/14/2019     04/25/2022    CO2 23 04/25/2022    BUN 12 04/25/2022    CREATININE 0.8 04/25/2022    GFRAA >60 04/25/2022    LABGLOM >60 04/25/2022    GLUCOSE 108 04/25/2022    MG 2.00 04/25/2022    CALCIUM 8.6 04/25/2022     Lab Results   Component Value Date    WBC 9.0 04/25/2022    RBC 3.78 04/25/2022    HGB 11.1 04/25/2022    HCT 33.1 04/25/2022    MCV 87.6 04/25/2022    RDW 14.1 04/25/2022     04/25/2022     Lab Results   Component Value Date    INR 1.01 04/23/2022    PROTIME 11.4 04/23/2022        Scheduled Meds:    sodium chloride flush  5-40 mL IntraVENous 2 times per day    nicotine  1 patch TransDERmal Daily    clopidogrel  75 mg Oral Daily    sodium chloride flush  5-40 mL IntraVENous 2 times per day    heparin (porcine)  5,000 Units SubCUTAneous 3 times per day     Continuous Infusions:    sodium chloride 25 mL (04/25/22 0123)    sodium chloride         Assessment:   -POD #1 Right Hip Pinning with Dr. Abi Rivera  -PAD  -Right Leg Rest Pain    Plan:  -Vein Mapping ordered  -Stress test with pharm ordered  -Right Fem-BK Pop Bypass in 4-6 weeks after recovery from Right Hip    All pertinent information and plan of care discussed with Dr. Rakesh Ortega. All questions and concerns were addressed with the patient. I have discussed the above stated plan with the patient and the nurse. The patient verbalized understanding and agreed with the plan.     Thank you for allowing to us to participate in the care of Adrianna Frida      Electronically signed by TALITA Hunt CNP on 4/25/2022 at 12:19 PM

## 2022-04-25 NOTE — PROGRESS NOTES
[facility]   Progress Note      S/P :  SUBJECTIVE  In bed. Alert and oriented. Does not appear in distress but states does not feel able to get up with therapy due to severe pain in right hip. Pain is   described in right hip and with the intensity of severe. Pain is described as aching. OBJECTIVE              Physical                      VITALS:  BP (!) 140/102   Pulse 73   Temp 97.3 °F (36.3 °C) (Oral)   Resp 9   Wt 150 lb 5.7 oz (68.2 kg)   SpO2 98%   BMI 22.86 kg/m²                     MUSCULOSKELETAL:  right foot NVI. Wiggles toes to command. Able to plantarflex and dorsiflex ankle Pedal pulses are palpable.                    NEUROLOGIC:                                  Sensory:  Touch:  Right Lower Extremity:  normal                                                 Surgical wound appears with small dark red on Mepilex dressing right hip,     Data       CBC:   Lab Results   Component Value Date    WBC 9.0 04/25/2022    RBC 3.78 04/25/2022    HGB 11.1 04/25/2022    HCT 33.1 04/25/2022    MCV 87.6 04/25/2022    MCH 29.4 04/25/2022    MCHC 33.6 04/25/2022    RDW 14.1 04/25/2022     04/25/2022    MPV 6.9 04/25/2022        WBC:    Lab Results   Component Value Date    WBC 9.0 04/25/2022        Hemoglobin/Hematocrit:    Lab Results   Component Value Date    HGB 11.1 04/25/2022    HCT 33.1 04/25/2022        PT/INR:    Lab Results   Component Value Date    PROTIME 11.4 04/23/2022    INR 1.01 04/23/2022              Current Inpatient Medications             Current Facility-Administered Medications: sodium chloride flush 0.9 % injection 5-40 mL, 5-40 mL, IntraVENous, 2 times per day  0.9 % sodium chloride infusion, , IntraVENous, PRN  acetaminophen (TYLENOL) tablet 650 mg, 650 mg, Oral, Q6H PRN **OR** acetaminophen (TYLENOL) suppository 650 mg, 650 mg, Rectal, Q6H PRN  nicotine (NICODERM CQ) 21 MG/24HR 1 patch, 1 patch, TransDERmal, Daily  nicotine polacrilex (COMMIT) lozenge 2 mg, 2 mg, Oral, Q2H PRN  clopidogrel (PLAVIX) tablet 75 mg, 75 mg, Oral, Daily  sodium chloride flush 0.9 % injection 5-40 mL, 5-40 mL, IntraVENous, 2 times per day  sodium chloride flush 0.9 % injection 5-40 mL, 5-40 mL, IntraVENous, PRN  0.9 % sodium chloride infusion, , IntraVENous, PRN  ondansetron (ZOFRAN-ODT) disintegrating tablet 4 mg, 4 mg, Oral, Q8H PRN **OR** ondansetron (ZOFRAN) injection 4 mg, 4 mg, IntraVENous, Q6H PRN  HYDROmorphone (DILAUDID) injection 0.25 mg, 0.25 mg, IntraVENous, Q3H PRN **OR** HYDROmorphone (DILAUDID) injection 0.5 mg, 0.5 mg, IntraVENous, Q3H PRN  oxyCODONE (ROXICODONE) immediate release tablet 5 mg, 5 mg, Oral, Q4H PRN **OR** oxyCODONE HCl (OXY-IR) immediate release tablet 10 mg, 10 mg, Oral, Q4H PRN  acetaminophen (TYLENOL) tablet 650 mg, 650 mg, Oral, Q4H PRN  bisacodyl (DULCOLAX) EC tablet 5 mg, 5 mg, Oral, Daily PRN  magnesium hydroxide (MILK OF MAGNESIA) 400 MG/5ML suspension 30 mL, 30 mL, Oral, Daily PRN  heparin (porcine) injection 5,000 Units, 5,000 Units, SubCUTAneous, 3 times per day    ASSESSMENT AND PLAN      Post right hip pinning for femoral neck fx yesterday per Dr Corrinne Harman, stable exam  DVT prophylaxis ordered, ASA 81mg twice at day for 30 days for DVT prophylaxis  PT OT for ADL's and ambulation as tolerated, TTWB right leg  SS for DC planning, ECF needed. IV or PO pain med as ordered  Medical mgmt per Hospitalists.      TALITA Nguyen - CNP  4/25/2022  1:23 PM

## 2022-04-25 NOTE — PROGRESS NOTES
Dr. Kunal Sanchez wanted to nurse to reach out to wound care and check with them on the triple antibiotic ointment for his face. Message left on wound care nurses voice mail.

## 2022-04-25 NOTE — PROGRESS NOTES
Perfect serve message sent to Dr. Ena Meneses regarding pt's request for triple antibiotic ointment for his face.

## 2022-04-25 NOTE — PROGRESS NOTES
Occupational Therapy  Facility/Department: [facility] 1E PROGRESSIVE CARE  Occupational Therapy Initial Assessment    Name: Cony Muñoz  : 1950  MRN: [id number]  Date of Service: 2022    Discharge Recommendations:  Patient would benefit from continued therapy after discharge,5-7 sessions per week  OT Equipment Recommendations  Other: defer to  [place] [address] scored a 13 on the AM-PAC ADL Inpatient form. Current research shows that an AM-PAC score of 17 or less is typically not associated with a discharge to the patient's home setting. Based on the patient's AM-PAC score and their current ADL deficits, it is recommended that the patient have 5-7 sessions per week of Occupational Therapy at d/c to increase the patient's independence. At this time, this patient demonstrates the endurance, and/or tolerance for 3 hours of therapy each day, with a treatment frequency of 5-7x/wk. Please see assessment section for further patient specific details. If patient discharges prior to next session this note will serve as a discharge summary. Please see below for the latest assessment towards goals. Patient Diagnosis(es): The primary encounter diagnosis was Closed subcapital fracture [facility] femur, right[facility] Diagnoses of Fall, initial encounter and Hip fracture, right, closed, initial encounter [facility]) were also pertinent to this visit. Past Medical History:  has a past medical history of Basal cell carcinoma and Peripheral artery disease ([facility] Past Surgical History:  has a past surgical history that includes hip surgery (Right, 2022). Assessment   Performance deficits / Impairments: Decreased functional mobility ; Decreased strength;Decreased endurance;Decreased ADL status; Decreased safe awareness;Decreased high-level IADLs;Decreased balance;Decreased sensation  Assessment: 69 y/o male admitted 2022 after a fall at home resulting in R hip fracture.  Pt s/p R hip pinning 4/24/2022 and now TTWB. PTA pt lives in town home with son and was independent with ADLs and functional mobility. Today, pt c/o significant hip pain with all activity. Pt required mod A x 2 to stand from bed and chair to/from stedy. Pt requires cuing to maintain TTWB. Pt very tangential in conversation and easily distracted, requiring frequent redirection. Pt with functional UE ROM for self care and anticipate will require up to max A for ADLs. Pt is functioning below baseline and benefit from skilled therapy. Prognosis: Good  Decision Making: Medium Complexity  REQUIRES OT FOLLOW-UP: Yes  Activity Tolerance  Activity Tolerance: Patient limited by pain        Plan   Plan  Times per Week: 3-5  Current Treatment Recommendations: Strengthening,ROM,Balance training,Functional mobility training,Endurance training,Patient/Caregiver education & training,Self-Care / ADL,Gait training     Restrictions  Restrictions/Precautions  Restrictions/Precautions: Weight Bearing  Lower Extremity Weight Bearing Restrictions  Right Lower Extremity Weight Bearing: Toe Touch Weight Bearing    Subjective   General  Chart Reviewed: Yes  Patient assessed for rehabilitation services?: Yes  Additional Pertinent Hx: Per Dr. Brayden Peace: \"76 y.o. male with history of peripheral vascular disease, who presented to [facility] ER for evaluation of right hip pain after a fall. He has a history of peripheral vascular disease. He had recanalization of right mid popliteal artery by Dr. Mai Burch on 3/23/22. Dr. Mai Burch has recommended further fem-pop bypass. He states he had numbness in the right foot prior to that procedure and it has continued postop. \" Fall resulted in Right nondisplaced femoral neck fracture s/p R pinning 4/24. Family / Caregiver Present: No  Referring Practitioner: Arlys Opitz, MD  Subjective  Subjective: Pt seen bedside and agreeable to therapy.  Pt reporting significant pain with activity but did not rate  General Comment  Comments: Per RN ok for therapy. Pt very tangential in conversation and requires frequent redirection    Social/Functional History  Social/Functional History  Lives With: Son  Type of Home:  ([facility])  Home Layout: Two level,1/2 bath on main level,Bed/Bath upstairs  Home Access: Stairs to enter without rails  Entrance Stairs - Number of Steps: 2 + 1 ECTOR with no rail; flight of stairs upstairs to bedroom/bathroom with rail  Bathroom Shower/Tub: Tub/Shower unit  Bathroom Toilet: Standard  Bathroom Equipment: Grab bars in shower  Bathroom Accessibility: Accessible  Home Equipment: [employer] [employer]  ADL Assistance: Independent  Homemaking Assistance: Independent  Ambulation Assistance: Independent (no AD)  Transfer Assistance: Independent  Active : Yes  Additional Comments: Denies any other falls       Objective   Vision Exceptions: Wears glasses for distance  Hearing: Within functional limits          Safety Devices  Type of Devices:  (pt taken to vascular by transport at end of session)     Balance  Sitting Balance:  (EOB prn in prep for transfer)  Standing Balance: Minimal assistance  Standing Balance  Time: stood prn to transfer to chair  Functional Mobility  Functional Mobility Comments: bed > chair > stretcher via stedy  AROM: Within functional limits  Strength: Within functional limits  Sensation: Intact (numbness/tingling R leg foot to knee)     ADL  Additional Comments: Anticipate pt will require max A for LB bathing/dressing and toileting; min A for UB bathing/dressing and grooming when seated based on balance and pain tolerance observed     Activity Tolerance  Activity Tolerance: Patient limited by pain     Bed mobility  Supine to Sit: Maximum assistance;2 Person assistance  Sit to Supine: Maximum assistance;2 Person assistance     Transfers  Sit to stand: Moderate assistance;2 Person assistance  Stand to sit: 2 Person assistance; Moderate assistance  Transfer Comments: stood from bed and chair to/from stedy with mod A x 2. constant cuing to maintain TTWB     Cognition  Overall Cognitive Status: Exceptions  Arousal/Alertness: Appropriate responses to stimuli  Following Commands: Follows one step commands consistently  Attention Span: Difficulty dividing attention  Memory: Decreased recall of precautions  Safety Judgement: Decreased awareness of need for safety;Decreased awareness of need for assistance  Problem Solving: Assistance required to generate solutions;Assistance required to implement solutions  Insights: Decreased awareness of deficits  Initiation: Requires cues for some  Sequencing: Requires cues for some  Cognition Comment: tangential in conversation and easily distracted                  Education Given To: Patient  Education Provided: Role of Therapy;Plan of Care;Transfer Training;Precautions  Education Method: Demonstration;Verbal  Barriers to Learning:  (easily distracted/tangential in conversation)  Education Outcome: Continued education needed  LUE AROM (degrees)  LUE AROM : WFL  Left Hand AROM (degrees)  Left Hand AROM: WFL  RUE AROM (degrees)  RUE AROM : WFL  Right Hand AROM (degrees)  Right Hand AROM: [facility]    AM-PAC Score  AM-[facility] Inpatient Daily Activity Raw Score: 13 (04/25/22 1411)  AM-PAC Inpatient ADL T-Scale Score : 32.03 (04/25/22 1411)  ADL Inpatient CMS 0-100% Score: 63.03 (04/25/22 1411)  ADL Inpatient CMS G-Code Modifier : CL (04/25/22 1411)    Goals  Short Term Goals  Time Frame for Short term goals: Prior to DC:   Short Term Goal 1: Pt will complete ADL transfers with min A while maintaining TTWB  Short Term Goal 2: Pt will complete functional mobility with min A while maintaining TTWB  Short Term Goal 3: Pt will complete toileting with min A  Short Term Goal 4: Pt will complete UE exercises in all planes to inc strength/endurance for ADLs  Short Term Goal 5: Pt will tolerate standing > 1 min for standing components of ADLs with min A  Patient Goals Patient goals : to get stronger and go home       Therapy Time   Individual Concurrent Group Co-treatment   Time In 1319         Time Out 1412         Minutes 53         Timed Code Treatment Minutes: 45 Minutes     This note to serve as OT d/c summary if pt is d/c-ed prior to next therapy session.     Justa Ko, OTR/L

## 2022-04-26 LAB
ANION GAP SERPL CALCULATED.3IONS-SCNC: 15 MMOL/L (ref 3–16)
BASOPHILS ABSOLUTE: 0 K/UL (ref 0–0.2)
BASOPHILS RELATIVE PERCENT: 0.5 %
BUN BLDV-MCNC: 16 MG/DL (ref 7–20)
CALCIUM SERPL-MCNC: 8.6 MG/DL (ref 8.3–10.6)
CHLORIDE BLD-SCNC: 100 MMOL/L (ref 99–110)
CO2: 22 MMOL/L (ref 21–32)
CREAT SERPL-MCNC: 0.8 MG/DL (ref 0.8–1.3)
EOSINOPHILS ABSOLUTE: 0 K/UL (ref 0–0.6)
EOSINOPHILS RELATIVE PERCENT: 0.5 %
GFR AFRICAN AMERICAN: >60
GFR NON-AFRICAN AMERICAN: >60
GLUCOSE BLD-MCNC: 106 MG/DL (ref 70–99)
HCT VFR BLD CALC: 30.6 % (ref 40.5–52.5)
HEMOGLOBIN: 10.4 G/DL (ref 13.5–17.5)
LYMPHOCYTES ABSOLUTE: 3.3 K/UL (ref 1–5.1)
LYMPHOCYTES RELATIVE PERCENT: 38.3 %
MAGNESIUM: 2.2 MG/DL (ref 1.8–2.4)
MCH RBC QN AUTO: 29.8 PG (ref 26–34)
MCHC RBC AUTO-ENTMCNC: 34.1 G/DL (ref 31–36)
MCV RBC AUTO: 87.4 FL (ref 80–100)
MONOCYTES ABSOLUTE: 0.6 K/UL (ref 0–1.3)
MONOCYTES RELATIVE PERCENT: 7 %
NEUTROPHILS ABSOLUTE: 4.6 K/UL (ref 1.7–7.7)
NEUTROPHILS RELATIVE PERCENT: 53.7 %
PDW BLD-RTO: 14.1 % (ref 12.4–15.4)
PLATELET # BLD: 291 K/UL (ref 135–450)
PMV BLD AUTO: 6.9 FL (ref 5–10.5)
POTASSIUM SERPL-SCNC: 3.7 MMOL/L (ref 3.5–5.1)
RBC # BLD: 3.5 M/UL (ref 4.2–5.9)
SODIUM BLD-SCNC: 137 MMOL/L (ref 136–145)
TROPONIN: <0.01 NG/ML
WBC # BLD: 8.6 K/UL (ref 4–11)

## 2022-04-26 PROCEDURE — 84484 ASSAY OF TROPONIN QUANT: CPT

## 2022-04-26 PROCEDURE — 97530 THERAPEUTIC ACTIVITIES: CPT | Performed by: PHYSICAL THERAPIST

## 2022-04-26 PROCEDURE — 80048 BASIC METABOLIC PNL TOTAL CA: CPT

## 2022-04-26 PROCEDURE — 6370000000 HC RX 637 (ALT 250 FOR IP): Performed by: ORTHOPAEDIC SURGERY

## 2022-04-26 PROCEDURE — 2060000000 HC ICU INTERMEDIATE R&B

## 2022-04-26 PROCEDURE — 85025 COMPLETE CBC W/AUTO DIFF WBC: CPT

## 2022-04-26 PROCEDURE — 6360000002 HC RX W HCPCS: Performed by: ORTHOPAEDIC SURGERY

## 2022-04-26 PROCEDURE — 83735 ASSAY OF MAGNESIUM: CPT

## 2022-04-26 PROCEDURE — 2580000003 HC RX 258: Performed by: ORTHOPAEDIC SURGERY

## 2022-04-26 PROCEDURE — 36415 COLL VENOUS BLD VENIPUNCTURE: CPT

## 2022-04-26 PROCEDURE — 6370000000 HC RX 637 (ALT 250 FOR IP): Performed by: INTERNAL MEDICINE

## 2022-04-26 PROCEDURE — 94761 N-INVAS EAR/PLS OXIMETRY MLT: CPT

## 2022-04-26 RX ORDER — BACITRACIN, NEOMYCIN, POLYMYXIN B 400; 3.5; 5 [USP'U]/G; MG/G; [USP'U]/G
OINTMENT TOPICAL 2 TIMES DAILY
Status: DISCONTINUED | OUTPATIENT
Start: 2022-04-26 | End: 2022-04-28 | Stop reason: HOSPADM

## 2022-04-26 RX ADMIN — HEPARIN SODIUM 5000 UNITS: 5000 INJECTION INTRAVENOUS; SUBCUTANEOUS at 21:40

## 2022-04-26 RX ADMIN — OXYCODONE HYDROCHLORIDE 10 MG: 10 TABLET ORAL at 04:35

## 2022-04-26 RX ADMIN — OXYCODONE HYDROCHLORIDE 10 MG: 10 TABLET ORAL at 15:08

## 2022-04-26 RX ADMIN — POLYMYXIN B SULFATE, BACITRACIN ZINC, NEOMYCIN SULFATE: 5000; 3.5; 4 OINTMENT TOPICAL at 12:17

## 2022-04-26 RX ADMIN — HEPARIN SODIUM 5000 UNITS: 5000 INJECTION INTRAVENOUS; SUBCUTANEOUS at 05:15

## 2022-04-26 RX ADMIN — HYDROMORPHONE HYDROCHLORIDE 0.5 MG: 1 INJECTION, SOLUTION INTRAMUSCULAR; INTRAVENOUS; SUBCUTANEOUS at 13:37

## 2022-04-26 RX ADMIN — OXYCODONE HYDROCHLORIDE 10 MG: 10 TABLET ORAL at 23:25

## 2022-04-26 RX ADMIN — HYDROMORPHONE HYDROCHLORIDE 0.5 MG: 1 INJECTION, SOLUTION INTRAMUSCULAR; INTRAVENOUS; SUBCUTANEOUS at 08:16

## 2022-04-26 RX ADMIN — HEPARIN SODIUM 5000 UNITS: 5000 INJECTION INTRAVENOUS; SUBCUTANEOUS at 13:52

## 2022-04-26 RX ADMIN — Medication 10 ML: at 21:43

## 2022-04-26 RX ADMIN — SODIUM CHLORIDE, PRESERVATIVE FREE 10 ML: 5 INJECTION INTRAVENOUS at 08:17

## 2022-04-26 RX ADMIN — HYDROMORPHONE HYDROCHLORIDE 0.5 MG: 1 INJECTION, SOLUTION INTRAMUSCULAR; INTRAVENOUS; SUBCUTANEOUS at 16:55

## 2022-04-26 RX ADMIN — HYDROMORPHONE HYDROCHLORIDE 0.5 MG: 1 INJECTION, SOLUTION INTRAMUSCULAR; INTRAVENOUS; SUBCUTANEOUS at 01:39

## 2022-04-26 RX ADMIN — SODIUM CHLORIDE, PRESERVATIVE FREE 10 ML: 5 INJECTION INTRAVENOUS at 21:42

## 2022-04-26 RX ADMIN — OXYCODONE HYDROCHLORIDE 10 MG: 10 TABLET ORAL at 19:30

## 2022-04-26 RX ADMIN — BISACODYL 5 MG: 5 TABLET, COATED ORAL at 19:33

## 2022-04-26 RX ADMIN — HYDROMORPHONE HYDROCHLORIDE 0.5 MG: 1 INJECTION, SOLUTION INTRAMUSCULAR; INTRAVENOUS; SUBCUTANEOUS at 21:40

## 2022-04-26 RX ADMIN — CLOPIDOGREL BISULFATE 75 MG: 75 TABLET ORAL at 08:17

## 2022-04-26 RX ADMIN — OXYCODONE HYDROCHLORIDE 10 MG: 10 TABLET ORAL at 10:06

## 2022-04-26 RX ADMIN — POLYMYXIN B SULFATE, BACITRACIN ZINC, NEOMYCIN SULFATE: 5000; 3.5; 4 OINTMENT TOPICAL at 21:43

## 2022-04-26 RX ADMIN — Medication 10 ML: at 10:09

## 2022-04-26 ASSESSMENT — PAIN DESCRIPTION - ORIENTATION
ORIENTATION: RIGHT

## 2022-04-26 ASSESSMENT — PAIN DESCRIPTION - PAIN TYPE
TYPE: ACUTE PAIN;SURGICAL PAIN

## 2022-04-26 ASSESSMENT — PAIN SCALES - GENERAL
PAINLEVEL_OUTOF10: 10
PAINLEVEL_OUTOF10: 3
PAINLEVEL_OUTOF10: 10

## 2022-04-26 ASSESSMENT — PAIN - FUNCTIONAL ASSESSMENT
PAIN_FUNCTIONAL_ASSESSMENT: PREVENTS OR INTERFERES SOME ACTIVE ACTIVITIES AND ADLS

## 2022-04-26 ASSESSMENT — PAIN DESCRIPTION - FREQUENCY
FREQUENCY: INTERMITTENT
FREQUENCY: CONTINUOUS
FREQUENCY: CONTINUOUS

## 2022-04-26 ASSESSMENT — PAIN DESCRIPTION - ONSET
ONSET: ON-GOING

## 2022-04-26 ASSESSMENT — PAIN DESCRIPTION - DESCRIPTORS
DESCRIPTORS: OTHER (COMMENT)
DESCRIPTORS: ACHING;DISCOMFORT
DESCRIPTORS: ACHING;SHOOTING;STABBING

## 2022-04-26 ASSESSMENT — PAIN DESCRIPTION - LOCATION
LOCATION: FOOT;HIP;LEG
LOCATION: HIP;FOOT;LEG
LOCATION: HIP

## 2022-04-26 NOTE — PROGRESS NOTES
Physical Therapy  Facility/Department: [address] PROGRESSIVE CARE  Physical Therapy Treatment Note    Name: Josy Alejandro  : 1950  MRN: [id number]  Date of Service: 2022    Discharge Recommendations:  Subacute/Skilled Nursing Facility   PT Equipment Recommendations  Other: will defer to next level of care    Mariah Lacey scored a  on the AM-PAC short mobility form. Current research shows that an AM-PAC score of 17 or less is typically not associated with a discharge to the patient's home setting. Based on the patient's AM-PAC score and their current functional mobility deficits, it is recommended that the patient have 3-5 sessions per week of Physical Therapy at d/c to increase the patient's independence. Please see assessment section for further patient specific details. If patient discharges prior to next session this note will serve as a discharge summary. Please see below for the latest assessment towards goals. Patient Diagnosis(es): The primary encounter diagnosis was Cl[facility]capital [facility] Diagnoses of Fall, initial encounter and Hip fracture, right, closed, initial encounter [facility]) were also pertinent to this visit. Past Medical History:  has a past medical history of Basal cell carcinoma and Peripheral artery disease ([facility] Past Surgical History:  has a past surgical history that includes hip surgery (Right, 2022). Assessment   Body Structures, Functions, Activity Limitations Requiring Skilled Therapeutic Intervention: Decreased functional mobility   Assessment: Pt is a 71 yo male who was adm to hosp with hip pain after a fall at home sustaining R femeral neck fx s/p hip pinning. Pt at baseline is Ind without an AD; pt currently is a high fall risk and now is TTWB and needing assist of 1- 2 for bed mob and transfers with lalita stedy lift.   Pt having difficulty tolerating increased therapy and reports \"I need to take it slow\" Pt will not tolerate 3 hours of therapy needed for acute IP rehab therefore now recommending a slower pace 3-5x/wk to address deficits  Therapy Prognosis: Fair  Decision Making: Medium Complexity  Clinical Presentation: evolving  Barriers to Learning: decreased insight into deficits  Requires PT Follow-Up: Yes  Activity Tolerance  Activity Tolerance: Patient limited by pain  Activity Tolerance Comments: pt kept saying \"I'm not in [facility], you can't make me do anything I don't want to do\"     Plan   Plan  Plan: 3-5 times per week  Current Treatment Recommendations: Transfer training,Balance training,Functional mobility training,Endurance training,Strengthening  Safety Devices  Type of Devices: Call light within reach,Chair alarm in place,Left in chair,All fall risk precautions in place,Nurse notified     Restrictions  Restrictions/Precautions  Restrictions/Precautions: Weight Bearing  Lower Extremity Weight Bearing Restrictions  Right Lower Extremity Weight Bearing: Toe Touch Weight Bearing     Subjective   General  Chart Reviewed: Yes  Additional Pertinent Hx: Per Dr Girma Canales note: \"Mr. Otilia Melgoza is a 70 y.o. male with history of peripheral vascular disease, who presented to [facility] ER for evaluation of right hip pain after a fall. He has a history of peripheral vascular disease. He had recanalization of right mid popliteal artery by Dr. Kaitlin Garrett on 3/23/22. Dr. Kaitlin Garrett has recommended further fem-pop bypass. He states he had numbness in the right foot prior to that procedure and it has continued postop. He states he got up from the couch. Not sure if he didn't lift his leg or it got caught since he cannot feel the foot. He complain of right hip pain. He has some numbness in one toe on the left foot. He haMovement makes the pain worse. Narcotic pain medications make the pain better. He is continuously asks about medication dosing.  \" Pt s/p R hip pinning TTWB R  Response To Previous Treatment: Patient with no complaints from previous session. Family / Caregiver Present: No  Referring Practitioner: Dr Beni Neil  Referral Date : 04/24/22  Follows Commands: Within Functional Limits  Subjective  Subjective: pt easily distracted; very anxious about pain; agreeable to working with therapy         Social/Functional History  Social/Functional History  Lives With: Son  Type of Home:  ([facility])  Home Layout: Two level,1/2 bath on main level,Bed/Bath upstairs  Home Access: Stairs to enter without rails  Entrance Stairs - Number of Steps: 2 + 1 ECTOR with no rail; flight of stairs upstairs to bedroom/bathroom with rail  Bathroom Shower/Tub: Tub/Shower unit  Bathroom Toilet: Standard  Bathroom Equipment: Grab bars in shower  Bathroom Accessibility: Accessible  Home Equipment: Gisela Addi  ADL Assistance: Independent  Homemaking Assistance: Independent  Ambulation Assistance: Independent (no AD)  Transfer Assistance: Independent  Active : Yes  Additional Comments: Denies any other falls  Vision/Hearing  Vision Exceptions: Wears glasses for distance  Hearing: Within functional limits    Cognition   Orientation  Overall Orientation Status: Within Functional Limits  Cognition  Overall Cognitive Status: Exceptions  Arousal/Alertness: Appropriate responses to stimuli  Following Commands:  Follows one step commands consistently  Attention Span: Difficulty dividing attention  Memory: Decreased recall of precautions  Safety Judgement: Decreased awareness of need for safety;Decreased awareness of need for assistance  Problem Solving: Assistance required to generate solutions;Assistance required to implement solutions  Insights: Decreased awareness of deficits  Initiation: Requires cues for some  Sequencing: Requires cues for some  Cognition Comment: tangential in conversation and easily distracted     Objective                              Bed mobility  Supine to Sit: Moderate assistance;Maximum assistance;2 Person assistance  Transfers  Sit to Stand: Minimal Assistance; Moderate Assistance;2 Person Assistance (with stedy)  Stand to sit: Moderate Assistance;Minimal Assistance (from stedy to chair)  Bed to Chair: Dependent/Total (with stedy)        Balance  Comments: CGA for sitting balance; min A for briefly standing on stedy           OutComes Score                                                  AM-PAC Score  AM-PAC Inpatient Mobility Raw Score : 8 (04/25/22 1409)  AM-PAC Inpatient T-Scale Score : 28.52 (04/25/22 1409)  Mobility Inpatient CMS 0-100% Score: 86.62 (04/25/22 1409)  Mobility Inpatient CMS G-Code Modifier : CM (04/25/22 1409)          Goals  Long Term Goals  Time Frame for Long term goals : by discharge  Long term goal 1: bed mob min A  Long term goal 2: transfers min A  Long term goal 3: progress to hop steps with walker when able  Patient Goals   Patient goals : to go home       Therapy Time   Individual Concurrent Group Co-treatment   Time In 1054         Time Out 1137         Minutes 43                 NOHEMI DUNN PT    Electronically signed by NOHEMI DUNN PT on 4/26/2022 at 11:37 AM

## 2022-04-26 NOTE — CARE COORDINATION
Mr Hank Villanueva refused his stress due to the pain he is haviing from his surgery.  I spoke to his nurse, Kelsi and she is aware he has refused the test.

## 2022-04-26 NOTE — PROGRESS NOTES
Pt c/o of 10 out of 10 pain in his right lower extremity throughout the night. PRN oxy and dilaudid given throughout the night as ordered. Right lower extremity elevated and ice pack given for pain. Pt refused to turn through the night stating it would hurt him even more despite education from this RN on importance of turning.

## 2022-04-26 NOTE — PROGRESS NOTES
[facility] Orthopedic Surgery   Progress Note      S/P :  SUBJECTIVE  Up in chair. Alert and oriented. . Pain is   described in right hip and with the intensity of moderate. Pain is described as aching. OBJECTIVE              Physical                      VITALS:  BP (!) 139/126   Pulse 94   Temp 98 °F (36.7 °C) (Oral)   Resp 19   Wt 157 lb 6.5 oz (71.4 kg)   SpO2 97%   BMI 23.93 kg/m²                     MUSCULOSKELETAL:  right foot NVI. Wiggles toes to command. Able to plantarflex and dorsiflex ankle Pedal pulses are palpable. NEUROLOGIC:                                  Sensory:  Touch:  Right Lower Extremity:  normal                                                 Right hip dressing with scant drainage. Changed dressing. Cleansed would with Hibiclens swab, well approximated with staples.      Data       CBC:   Lab Results   Component Value Date    WBC 8.6 04/26/2022    RBC 3.50 04/26/2022    HGB 10.4 04/26/2022    HCT 30.6 04/26/2022    MCV 87.4 04/26/2022    MCH 29.8 04/26/2022    MCHC 34.1 04/26/2022    RDW 14.1 04/26/2022     04/26/2022    MPV 6.9 04/26/2022        WBC:    Lab Results   Component Value Date    WBC 8.6 04/26/2022        Hemoglobin/Hematocrit:    Lab Results   Component Value Date    HGB 10.4 04/26/2022    HCT 30.6 04/26/2022        PT/INR:    Lab Results   Component Value Date    PROTIME 11.4 04/23/2022    INR 1.01 04/23/2022              Current Inpatient Medications             Current Facility-Administered Medications: neomycin-bacitracin-polymyxin (NEOSPORIN) ointment, , Topical, BID  sodium chloride flush 0.9 % injection 5-40 mL, 5-40 mL, IntraVENous, 2 times per day  0.9 % sodium chloride infusion, , IntraVENous, PRN  acetaminophen (TYLENOL) tablet 650 mg, 650 mg, Oral, Q6H PRN **OR** acetaminophen (TYLENOL) suppository 650 mg, 650 mg, Rectal, Q6H PRN  nicotine (NICODERM CQ) 21 MG/24HR 1 patch, 1 patch, TransDERmal, Daily  nicotine polacrilex (COMMIT) lozenge 2 mg, 2 mg, Oral, Q2H PRN  clopidogrel (PLAVIX) tablet 75 mg, 75 mg, Oral, Daily  sodium chloride flush 0.9 % injection 5-40 mL, 5-40 mL, IntraVENous, 2 times per day  sodium chloride flush 0.9 % injection 5-40 mL, 5-40 mL, IntraVENous, PRN  0.9 % sodium chloride infusion, , IntraVENous, PRN  ondansetron (ZOFRAN-ODT) disintegrating tablet 4 mg, 4 mg, Oral, Q8H PRN **OR** ondansetron (ZOFRAN) injection 4 mg, 4 mg, IntraVENous, Q6H PRN  HYDROmorphone (DILAUDID) injection 0.25 mg, 0.25 mg, IntraVENous, Q3H PRN **OR** HYDROmorphone (DILAUDID) injection 0.5 mg, 0.5 mg, IntraVENous, Q3H PRN  oxyCODONE (ROXICODONE) immediate release tablet 5 mg, 5 mg, Oral, Q4H PRN **OR** oxyCODONE HCl (OXY-IR) immediate release tablet 10 mg, 10 mg, Oral, Q4H PRN  acetaminophen (TYLENOL) tablet 650 mg, 650 mg, Oral, Q4H PRN  bisacodyl (DULCOLAX) EC tablet 5 mg, 5 mg, Oral, Daily PRN  magnesium hydroxide (MILK OF MAGNESIA) 400 MG/5ML suspension 30 mL, 30 mL, Oral, Daily PRN  heparin (porcine) injection 5,000 Units, 5,000 Units, SubCUTAneous, 3 times per day    ASSESSMENT AND PLAN      Post right hip pinning for femoral neck fx  per Dr Melinda Wynne, stable exam  DVT prophylaxis ordered, ASA 81mg twice at day for 30 days for DVT prophylaxis  PT OT for ADL's and ambulation as tolerated, TTWB right leg  SS for DC planning, ECF needed. IV or PO pain med as ordered  Medical mgmt per Hospitalists.    Stable for DC per eula Baker, APRN - CNP  4/26/2022  12:49 PM

## 2022-04-26 NOTE — ACP (ADVANCE CARE PLANNING)
Advance Care Planning     Advance Care Planning Activator (Inpatient)  Conversation Note      Date of ACP Conversation: 4/26/2022     Conversation Conducted with: Patient with Decision Making Capacity    ACP Activator: DANIELLE Ambriz, [facility]    Health Care Decision Maker:     Current Designated Health Care Decision Maker:     Primary Decision Maker: Ming Edwards - [phone]    Today we documented Decision Maker(s) consistent with Legal Next of Kin hierarchy. Care Preferences    Ventilation: \"If you were in your present state of health and suddenly became very ill and were unable to breathe on your own, what would your preference be about the use of a ventilator (breathing machine) if it were available to you? \"      Would the patient desire the use of ventilator (breathing machine)?: yes    \"If your health worsens and it becomes clear that your chance of recovery is unlikely, what would your preference be about the use of a ventilator (breathing machine) if it were available to you? \"     Would the patient desire the use of ventilator (breathing machine)?: Yes      Resuscitation  \"CPR works best to restart the heart when there is a sudden event, like a heart attack, in someone who is otherwise healthy. Unfortunately, CPR does not typically restart the heart for people who have serious health conditions or who are very sick. \"    \"In the event your heart stopped as a result of an underlying serious health condition, would you want attempts to be made to restart your heart (answer \"yes\" for attempt to resuscitate) or would you prefer a natural death (answer \"no\" for do not attempt to resuscitate)? \" yes       [] Yes   [x] No   Educated Patient / [place] Andres regarding differences between Advance Directives and portable DNR orders.     Length of ACP Conversation in minutes:  5    Conversation Outcomes:  [x] ACP discussion completed  [] Existing advance directive reviewed with patient; no changes to patient's previously recorded wishes  [] New Advance Directive completed  [] Portable Do Not Rescitate prepared for Provider review and signature  [] POLST/POST/MOLST/MOST prepared for Provider review and signature      Follow-up plan:    [] Schedule follow-up conversation to continue planning  [] Referred individual to Provider for additional questions/concerns   [] Advised patient/agent/surrogate to review completed ACP document and update if needed with changes in condition, patient preferences or care setting    [x] This note routed to one or more involved healthcare providers     Electronically signed by DANIELLE Arreola, LCW on 4/26/2022 at 2:47 PM

## 2022-04-26 NOTE — CARE COORDINATION
Received message from RN that pt wants ointment for nose. OK to use; area is already dried and scabbed.   Thomas Zhang, MSN, RN, [employer]

## 2022-04-26 NOTE — PLAN OF CARE
Problem: Pain  Goal: Verbalizes/displays adequate comfort level or baseline comfort level  4/26/2022 1539 by Chuck Sandoval RN  Outcome: Not Progressing     Problem: Discharge Planning  Goal: Discharge to home or other facility with appropriate resources  4/26/2022 1539 by Chuck Sandoval RN  Outcome: Progressing     Problem: Skin/Tissue Integrity  Goal: Absence of new skin breakdown  Description: 1. Monitor for areas of redness and/or skin breakdown  2. Assess vascular access sites hourly  3. Every 4-6 hours minimum:  Change oxygen saturation probe site  4. Every 4-6 hours:  If on nasal continuous positive airway pressure, respiratory therapy assess nares and determine need for appliance change or resting period.   4/26/2022 1539 by Chuck Sandoval RN  Outcome: Progressing     Problem: Safety - Adult  Goal: Free from fall injury  4/26/2022 1539 by Chuck Sandoval RN  Outcome: Progressing     Problem: ABCDS Injury Assessment  Goal: Absence of physical injury  4/26/2022 1539 by Chuck Sandoval RN  Outcome: Progressing

## 2022-04-26 NOTE — PROGRESS NOTES
Hospitalist Progress Note      PCP: Drea Frost    Date of Admission: 4/23/2022    Subjective: Pt S/E. No acute events. Pt refused vein mapping and stress test today due to pain. He is feeling better now. Medications:  Reviewed    Infusion Medications    sodium chloride 25 mL (04/25/22 0123)    sodium chloride       Scheduled Medications    neomycin-bacitracin-polymyxin   Topical BID    sodium chloride flush  5-40 mL IntraVENous 2 times per day    nicotine  1 patch TransDERmal Daily    clopidogrel  75 mg Oral Daily    sodium chloride flush  5-40 mL IntraVENous 2 times per day    heparin (porcine)  5,000 Units SubCUTAneous 3 times per day     PRN Meds: sodium chloride, acetaminophen **OR** acetaminophen, nicotine polacrilex, sodium chloride flush, sodium chloride, ondansetron **OR** ondansetron, HYDROmorphone **OR** HYDROmorphone, oxyCODONE **OR** oxyCODONE, acetaminophen, bisacodyl, magnesium hydroxide      Intake/Output Summary (Last 24 hours) at 4/26/2022 1720  Last data filed at 4/25/2022 1726  Gross per 24 hour   Intake --   Output 325 ml   Net -325 ml       Physical Exam Performed:    BP (!) 155/96   Pulse 90   Temp 98 °F (36.7 °C) (Oral)   Resp 12   Wt 157 lb 6.5 oz (71.4 kg)   SpO2 96%   BMI 23.93 kg/m²     General appearance: NAD, sitting up in a chair  Lungs: Clear to auscultation, bilaterally without Rales/Wheezes/Rhonchi with good respiratory effort. Heart: Regular rate and rhythm with Normal S1/S2 without murmurs, rubs or gallops, point of maximum impulse non-displaced  Abdomen: Soft, non-tender or non-distended without rigidity or guarding and positive bowel sounds all four quadrants.   Extremities: No edema bilaterally to lower extremities  Musculoskeletal: no cyanosis/clubbing  Skin: No rashes  Neurologic: Grossly intact neurologically moves all extremities appropriately, notes continued numbness to his right foot  Capillary Refill: Acceptable  < 3 seconds  Peripheral Pulses: Right foot does have some diminished pulses but able to feel the posterior tibial pulse on the right and not as much on the dorsalis pedis on the right, the rest of the pulses to the left lower extremity and upper extremities are normal and 2+       Labs:   Recent Labs     04/24/22  0531 04/25/22  0414 04/26/22  0424   WBC 8.8 9.0 8.6   HGB 12.8* 11.1* 10.4*   HCT 38.6* 33.1* 30.6*    301 291     Recent Labs     04/24/22  0531 04/25/22  0414 04/26/22  0424    139 137   K 4.2 3.8 3.7    103 100   CO2 22 23 22   BUN 12 12 16   CREATININE 0.9 0.8 0.8   CALCIUM 8.8 8.6 8.6     No results for input(s): AST, ALT, BILIDIR, BILITOT, ALKPHOS in the last 72 hours. Recent Labs     04/23/22 2021   INR 1.01     Recent Labs     04/25/22  1536 04/26/22  0718   TROPONINI <0.01 <0.01       Urinalysis:      Lab Results   Component Value Date    NITRU Negative 04/23/2022    BLOODU Negative 04/23/2022    SPECGRAV 1.015 04/23/2022    GLUCOSEU Negative 04/23/2022       Radiology:  VL PRE OP VEIN MAPPING   Final Result      XR HIP RIGHT (2-3 VIEWS)   Final Result      FLUORO FOR SURGICAL PROCEDURES   Final Result      XR CHEST PORTABLE   Final Result   No cardiomegaly, pneumonia or interstitial edema. No significant change has   occurred from 08/14/2019. XR HIP RIGHT (2-3 VIEWS)   Final Result   Acute nondisplaced fracture subcapital region right femoral neck with mild   foreshortening. Assessment/Plan:    Active Hospital Problems    Diagnosis     Hip fracture, right, closed, initial encounter ([facility] [address] [S72.001A]      Priority: Medium    Closed right hip fracture ([facility] [address] [S72.001A]      Priority: Medium    Fall [W19. XXXA]      Priority: Medium    Numbness of right foot [R20.0]      Priority: Medium    Peripheral arterial disease ([facility] [address] [I73.9]            · Closed right hip fracture  · Fall  · Peripheral artery disease  · Numbness of right foot  · Sacral mass       Plan:   · Patient with new hip fracture, has still persistent numbness to right foot from PAD, possibly some numbness contibuted by sacral mass noted on recent CTA abd aorta 3/10/2022 which he is seeing oncology for but no plan yet for  · Leukocytosis noted, likely stress reaction - resolved  · Wean Dilaudid, PO oxycodone prn  · Orthopedic surgery consulted for hip fracture repair - s/p hip pining on 4/24/22  · Vascular surgery consult for PAD        DVT Prophylaxis: heparin  Diet: ADULT ORAL NUTRITION SUPPLEMENT; AM Snack, PM Snack; Standard High Calorie/High Protein Oral Supplement  ADULT DIET;  Regular  Code Status: Full Code    PT/OT Eval Status: ordered    Dispo - ready for dc, needs snf    Corina Gama, DO

## 2022-04-26 NOTE — PROGRESS NOTES
Hospitalist Progress Note      PCP: Drea Frost    Date of Admission: 4/23/2022    Subjective: pain not well controlled per pt    Medications:  Reviewed    Infusion Medications    sodium chloride 25 mL (04/25/22 0123)    sodium chloride       Scheduled Medications    sodium chloride flush  5-40 mL IntraVENous 2 times per day    nicotine  1 patch TransDERmal Daily    clopidogrel  75 mg Oral Daily    sodium chloride flush  5-40 mL IntraVENous 2 times per day    heparin (porcine)  5,000 Units SubCUTAneous 3 times per day     PRN Meds: sodium chloride, acetaminophen **OR** acetaminophen, nicotine polacrilex, sodium chloride flush, sodium chloride, ondansetron **OR** ondansetron, HYDROmorphone **OR** HYDROmorphone, oxyCODONE **OR** oxyCODONE, acetaminophen, bisacodyl, magnesium hydroxide      Intake/Output Summary (Last 24 hours) at 4/26/2022 0011  Last data filed at 4/25/2022 1726  Gross per 24 hour   Intake 60 ml   Output 1025 ml   Net -965 ml       Physical Exam Performed:    BP (!) 116/101   Pulse 109   Temp 98.8 °F (37.1 °C) (Oral)   Resp 18   Wt 150 lb 5.7 oz (68.2 kg)   SpO2 98%   BMI 22.86 kg/m²     General appearance: NAD  Lungs: Clear to auscultation, bilaterally without Rales/Wheezes/Rhonchi with good respiratory effort. Heart: Regular rate and rhythm with Normal S1/S2 without murmurs, rubs or gallops, point of maximum impulse non-displaced  Abdomen: Soft, non-tender or non-distended without rigidity or guarding and positive bowel sounds all four quadrants.   Extremities: No edema bilaterally to lower extremities  Musculoskeletal: no cyanosis/clubbing  Skin: No rashes  Neurologic: Grossly intact neurologically moves all extremities appropriately, notes continued numbness to his right foot  Capillary Refill: Acceptable  < 3 seconds  Peripheral Pulses: Right foot does have some diminished pulses but able to feel the posterior tibial pulse on the right and not as much on the dorsalis pedis on the right, the rest of the pulses to the left lower extremity and upper extremities are normal and 2+       Labs:   Recent Labs     04/23/22 2021 04/24/22  0531 04/25/22  0414   WBC 12.0* 8.8 9.0   HGB 13.2* 12.8* 11.1*   HCT 40.4* 38.6* 33.1*    327 301     Recent Labs     04/23/22 2021 04/24/22  0531 04/25/22  0414    139 139   K 3.8 4.2 3.8    103 103   CO2 23 22 23   BUN 15 12 12   CREATININE 1.0 0.9 0.8   CALCIUM 9.4 8.8 8.6     No results for input(s): AST, ALT, BILIDIR, BILITOT, ALKPHOS in the last 72 hours. Recent Labs     04/23/22 2021   INR 1.01     Recent Labs     04/25/22  1536   TROPONINI <0.01       Urinalysis:      Lab Results   Component Value Date    NITRU Negative 04/23/2022    BLOODU Negative 04/23/2022    SPECGRAV 1.015 04/23/2022    GLUCOSEU Negative 04/23/2022       Radiology:  VL PRE OP VEIN MAPPING   Final Result      XR HIP RIGHT (2-3 VIEWS)   Final Result      FLUORO FOR SURGICAL PROCEDURES   Final Result      XR CHEST PORTABLE   Final Result   No cardiomegaly, pneumonia or interstitial edema. No significant change has   occurred from 08/14/2019. XR HIP RIGHT (2-3 VIEWS)   Final Result   Acute nondisplaced fracture subcapital region right femoral neck with mild   foreshortening. NM MYOCARDIAL SPECT REST EXERCISE OR RX    (Results Pending)           Assessment/Plan:    Active Hospital Problems    Diagnosis     Hip fracture, right, closed, initial encounter ([facility] [address] [S72.001A]      Priority: Medium    Closed right hip fracture ([facility] [address] [S72.001A]      Priority: Medium    Fall [W19. XXXA]      Priority: Medium    Numbness of right foot [R20.0]      Priority: Medium    Peripheral arterial disease (HCC) [I73.9]            · Closed right hip fracture  · Fall  · Peripheral artery disease  · Numbness of right foot  · Sacral mass  · Smoking abuse     Plan:   · Patient with new hip fracture, has still persistent numbness to right foot from PAD, possibly some numbness contibuted by sacral mass noted on recent CTA abd aorta 3/10/2022 which he is seeing oncology for but no plan yet for  · Leukocytosis noted, likely stress reaction at this time, monitor for any signs of infection with repeat labs and evaluation  · Resumed home meds  · Dilaudid q3 prn on pain scale, on PO oxycodone prn  · Orthopedic surgery consulted for hip fracture repair - s/p hip pining on 4/24/22  · Vascular surgery consult for PAD  · May need oncology consult later  · Nicotine replacement therapy ordered        DVT Prophylaxis: heparin  Diet: ADULT ORAL NUTRITION SUPPLEMENT; AM Snack, PM Snack; Standard High Calorie/High Protein Oral Supplement  ADULT DIET;  Regular  Code Status: Full Code    PT/OT Eval Status: ordered    Marie Frazier MD

## 2022-04-26 NOTE — PLAN OF CARE
Problem: Discharge Planning  Goal: Discharge to home or other facility with appropriate resources  4/26/2022 0242 by Yomaira Joe RN  Outcome: Progressing  4/25/2022 1627 by Olive Flood RN  Outcome: Progressing  Note: Prior to admission pt was living at home alone. He told this nurse that he was told he will need to go to a rehab center prior to going home. Problem: Pain  Goal: Verbalizes/displays adequate comfort level or baseline comfort level  4/26/2022 0242 by Yomaira Joe RN  Outcome: Progressing  4/25/2022 1627 by Olive Flood RN  Outcome: Progressing  Flowsheets (Taken 4/25/2022 1627)  Verbalizes/displays adequate comfort level or baseline comfort level:   Encourage patient to monitor pain and request assistance   Assess pain using appropriate pain scale   Administer analgesics based on type and severity of pain and evaluate response   Implement non-pharmacological measures as appropriate and evaluate response  Note: Pt has pain medication and has been receiving pain medication as ordered prn. Pt states that he is not \"getting much relief from medication\" however pt is able to rest at intervals with eyes closed. Problem: Skin/Tissue Integrity  Goal: Absence of new skin breakdown  Description: 1. Monitor for areas of redness and/or skin breakdown  2. Assess vascular access sites hourly  3. Every 4-6 hours minimum:  Change oxygen saturation probe site  4. Every 4-6 hours:  If on nasal continuous positive airway pressure, respiratory therapy assess nares and determine need for appliance change or resting period. 4/26/2022 0242 by Yomaira Joe RN  Outcome: Progressing  4/25/2022 1627 by Olive Flood RN  Outcome: Progressing  Note: Skin assessment completed every shift. Pt assessed for incontinence, appropriate barrier cream applied prn. Pt encouraged to turn/rotate every 2 hours. Assistance provided if pt unable to do so themselves.         Problem: Safety - Adult  Goal: Free from fall injury  4/26/2022 0242 by Luis Soto RN  Outcome: Progressing  4/25/2022 1627 by Jerica Hearn RN  Outcome: Progressing  Note: Patient assessed for fall risk; fall precautions initiated. Patient and family instructed about safety devices. Environment kept free of clutter and adequate lighting provided. Bed locked and in lowest position. Call light within reach. Will continue to monitor. Problem: ABCDS Injury Assessment  Goal: Absence of physical injury  4/26/2022 0242 by Luis Soto RN  Outcome: Progressing  4/25/2022 1627 by Jerica Hearn RN  Outcome: Progressing  Note: No signs of physical injury.

## 2022-04-26 NOTE — CARE COORDINATION
INITIAL CASE MANAGEMENT ASSESSMENT     Reviewed chart, spoke with patient to assess possible discharge needs. Explained Case Management role/services. Living Situation: Verified demographics. Patient resides in a townhouse; patient's son lives with patient. Patient reports no concerns regarding mobility in the home. ADLs: Independent      DME: walker     PT/OT Recs: Skilled nursing facility. Discussed with patient. Patient agreeable to SNF placement. List provided to patient. Patient to review with son and identify choices. The Plan for Transition of Care is related to the following treatment goals: skilled nursing facility     The Patient was provided with a choice of provider and agrees   with the discharge plan. [x] Yes [] No    Freedom of choice list was provided with basic dialogue that supports the patient's individualized plan of care/goals, treatment preferences and shares the quality data associated with the providers. [x] Yes [] No    Active Services: None  Assistance as needed from son. Transportation: will need medical transport at discharge      Medications: at facility     PCP: Drea Frost  Oncologist: ELIOT      HD/PD: n/a     PLAN/COMMENTS: skilled nursing facility. Patient to review list with son and identify choices. ACP completed. 1) Need to follow up with patient for SNF choices. SW/CM provided contact information for patient or family to call with any questions. SW/CM will follow and assist as needed.     DANIELLE Cooper, SARAH, Social Work/Case Management   [phone]  Electronically signed by DANIELLE Cooper LSW on 4/26/2022 at 2:55 PM

## 2022-04-27 ENCOUNTER — APPOINTMENT (OUTPATIENT)
Dept: GENERAL RADIOLOGY | Age: 72
DRG: 481 | End: 2022-04-27
Payer: MEDICARE

## 2022-04-27 ENCOUNTER — APPOINTMENT (OUTPATIENT)
Dept: CT IMAGING | Age: 72
DRG: 481 | End: 2022-04-27
Payer: MEDICARE

## 2022-04-27 LAB
ANION GAP SERPL CALCULATED.3IONS-SCNC: 12 MMOL/L (ref 3–16)
BASOPHILS ABSOLUTE: 0.1 K/UL (ref 0–0.2)
BASOPHILS RELATIVE PERCENT: 0.7 %
BUN BLDV-MCNC: 18 MG/DL (ref 7–20)
CALCIUM SERPL-MCNC: 8.8 MG/DL (ref 8.3–10.6)
CHLORIDE BLD-SCNC: 103 MMOL/L (ref 99–110)
CO2: 22 MMOL/L (ref 21–32)
CREAT SERPL-MCNC: 0.8 MG/DL (ref 0.8–1.3)
EOSINOPHILS ABSOLUTE: 0.1 K/UL (ref 0–0.6)
EOSINOPHILS RELATIVE PERCENT: 1.6 %
GFR AFRICAN AMERICAN: >60
GFR NON-AFRICAN AMERICAN: >60
GLUCOSE BLD-MCNC: 116 MG/DL (ref 70–99)
HCT VFR BLD CALC: 30.9 % (ref 40.5–52.5)
HEMOGLOBIN: 10.4 G/DL (ref 13.5–17.5)
LYMPHOCYTES ABSOLUTE: 2.5 K/UL (ref 1–5.1)
LYMPHOCYTES RELATIVE PERCENT: 34.7 %
MAGNESIUM: 2.1 MG/DL (ref 1.8–2.4)
MCH RBC QN AUTO: 29.2 PG (ref 26–34)
MCHC RBC AUTO-ENTMCNC: 33.7 G/DL (ref 31–36)
MCV RBC AUTO: 86.8 FL (ref 80–100)
MONOCYTES ABSOLUTE: 0.6 K/UL (ref 0–1.3)
MONOCYTES RELATIVE PERCENT: 8.8 %
NEUTROPHILS ABSOLUTE: 4 K/UL (ref 1.7–7.7)
NEUTROPHILS RELATIVE PERCENT: 54.2 %
PDW BLD-RTO: 13.7 % (ref 12.4–15.4)
PLATELET # BLD: 319 K/UL (ref 135–450)
PMV BLD AUTO: 7 FL (ref 5–10.5)
POTASSIUM SERPL-SCNC: 3.5 MMOL/L (ref 3.5–5.1)
RBC # BLD: 3.56 M/UL (ref 4.2–5.9)
SODIUM BLD-SCNC: 137 MMOL/L (ref 136–145)
WBC # BLD: 7.3 K/UL (ref 4–11)

## 2022-04-27 PROCEDURE — 73502 X-RAY EXAM HIP UNI 2-3 VIEWS: CPT

## 2022-04-27 PROCEDURE — 71260 CT THORAX DX C+: CPT

## 2022-04-27 PROCEDURE — 6370000000 HC RX 637 (ALT 250 FOR IP): Performed by: ORTHOPAEDIC SURGERY

## 2022-04-27 PROCEDURE — 99233 SBSQ HOSP IP/OBS HIGH 50: CPT | Performed by: SURGERY

## 2022-04-27 PROCEDURE — 6360000002 HC RX W HCPCS: Performed by: ORTHOPAEDIC SURGERY

## 2022-04-27 PROCEDURE — 94761 N-INVAS EAR/PLS OXIMETRY MLT: CPT

## 2022-04-27 PROCEDURE — 2060000000 HC ICU INTERMEDIATE R&B

## 2022-04-27 PROCEDURE — 97530 THERAPEUTIC ACTIVITIES: CPT

## 2022-04-27 PROCEDURE — 83735 ASSAY OF MAGNESIUM: CPT

## 2022-04-27 PROCEDURE — APPNB30 APP NON BILLABLE TIME 0-30 MINS: Performed by: NURSE PRACTITIONER

## 2022-04-27 PROCEDURE — 36415 COLL VENOUS BLD VENIPUNCTURE: CPT

## 2022-04-27 PROCEDURE — 80048 BASIC METABOLIC PNL TOTAL CA: CPT

## 2022-04-27 PROCEDURE — 97116 GAIT TRAINING THERAPY: CPT | Performed by: PHYSICAL THERAPIST

## 2022-04-27 PROCEDURE — APPSS15 APP SPLIT SHARED TIME 0-15 MINUTES: Performed by: NURSE PRACTITIONER

## 2022-04-27 PROCEDURE — 85025 COMPLETE CBC W/AUTO DIFF WBC: CPT

## 2022-04-27 PROCEDURE — 6360000004 HC RX CONTRAST MEDICATION: Performed by: FAMILY MEDICINE

## 2022-04-27 PROCEDURE — 6360000004 HC RX CONTRAST MEDICATION

## 2022-04-27 PROCEDURE — 2580000003 HC RX 258: Performed by: ORTHOPAEDIC SURGERY

## 2022-04-27 PROCEDURE — 6370000000 HC RX 637 (ALT 250 FOR IP): Performed by: NURSE PRACTITIONER

## 2022-04-27 PROCEDURE — 97530 THERAPEUTIC ACTIVITIES: CPT | Performed by: PHYSICAL THERAPIST

## 2022-04-27 RX ORDER — CYCLOBENZAPRINE HCL 10 MG
5 TABLET ORAL 3 TIMES DAILY PRN
Status: DISCONTINUED | OUTPATIENT
Start: 2022-04-27 | End: 2022-04-28

## 2022-04-27 RX ADMIN — CYCLOBENZAPRINE HYDROCHLORIDE 5 MG: 10 TABLET, FILM COATED ORAL at 11:02

## 2022-04-27 RX ADMIN — OXYCODONE HYDROCHLORIDE 10 MG: 10 TABLET ORAL at 20:27

## 2022-04-27 RX ADMIN — OXYCODONE HYDROCHLORIDE 10 MG: 10 TABLET ORAL at 16:29

## 2022-04-27 RX ADMIN — HEPARIN SODIUM 5000 UNITS: 5000 INJECTION INTRAVENOUS; SUBCUTANEOUS at 20:27

## 2022-04-27 RX ADMIN — HEPARIN SODIUM 5000 UNITS: 5000 INJECTION INTRAVENOUS; SUBCUTANEOUS at 07:31

## 2022-04-27 RX ADMIN — HYDROMORPHONE HYDROCHLORIDE 0.5 MG: 1 INJECTION, SOLUTION INTRAMUSCULAR; INTRAVENOUS; SUBCUTANEOUS at 03:21

## 2022-04-27 RX ADMIN — SODIUM CHLORIDE, PRESERVATIVE FREE 10 ML: 5 INJECTION INTRAVENOUS at 08:26

## 2022-04-27 RX ADMIN — HEPARIN SODIUM 5000 UNITS: 5000 INJECTION INTRAVENOUS; SUBCUTANEOUS at 15:23

## 2022-04-27 RX ADMIN — IOHEXOL 50 ML: 240 INJECTION, SOLUTION INTRATHECAL; INTRAVASCULAR; INTRAVENOUS; ORAL at 13:06

## 2022-04-27 RX ADMIN — CLOPIDOGREL BISULFATE 75 MG: 75 TABLET ORAL at 08:22

## 2022-04-27 RX ADMIN — OXYCODONE HYDROCHLORIDE 10 MG: 10 TABLET ORAL at 04:23

## 2022-04-27 RX ADMIN — SODIUM CHLORIDE, PRESERVATIVE FREE 10 ML: 5 INJECTION INTRAVENOUS at 20:28

## 2022-04-27 RX ADMIN — OXYCODONE HYDROCHLORIDE 10 MG: 10 TABLET ORAL at 08:22

## 2022-04-27 RX ADMIN — HYDROMORPHONE HYDROCHLORIDE 0.5 MG: 1 INJECTION, SOLUTION INTRAMUSCULAR; INTRAVENOUS; SUBCUTANEOUS at 00:24

## 2022-04-27 RX ADMIN — OXYCODONE HYDROCHLORIDE 10 MG: 10 TABLET ORAL at 12:31

## 2022-04-27 RX ADMIN — CYCLOBENZAPRINE HYDROCHLORIDE 5 MG: 10 TABLET, FILM COATED ORAL at 20:26

## 2022-04-27 RX ADMIN — POLYMYXIN B SULFATE, BACITRACIN ZINC, NEOMYCIN SULFATE: 5000; 3.5; 4 OINTMENT TOPICAL at 20:28

## 2022-04-27 RX ADMIN — Medication 10 ML: at 08:26

## 2022-04-27 RX ADMIN — POLYMYXIN B SULFATE, BACITRACIN ZINC, NEOMYCIN SULFATE: 5000; 3.5; 4 OINTMENT TOPICAL at 08:23

## 2022-04-27 RX ADMIN — IOPAMIDOL 75 ML: 755 INJECTION, SOLUTION INTRAVENOUS at 14:51

## 2022-04-27 ASSESSMENT — PAIN DESCRIPTION - ONSET
ONSET: ON-GOING

## 2022-04-27 ASSESSMENT — PAIN SCALES - GENERAL
PAINLEVEL_OUTOF10: 10
PAINLEVEL_OUTOF10: 8
PAINLEVEL_OUTOF10: 10
PAINLEVEL_OUTOF10: 8
PAINLEVEL_OUTOF10: 10
PAINLEVEL_OUTOF10: 8
PAINLEVEL_OUTOF10: 10
PAINLEVEL_OUTOF10: 8

## 2022-04-27 ASSESSMENT — PAIN DESCRIPTION - DESCRIPTORS
DESCRIPTORS: STABBING
DESCRIPTORS: ACHING
DESCRIPTORS: ACHING;SHOOTING;STABBING
DESCRIPTORS: ACHING
DESCRIPTORS: ACHING;STABBING
DESCRIPTORS: ACHING

## 2022-04-27 ASSESSMENT — PAIN DESCRIPTION - LOCATION
LOCATION: HIP
LOCATION: HIP;LEG;FOOT
LOCATION: HIP

## 2022-04-27 ASSESSMENT — PAIN DESCRIPTION - ORIENTATION
ORIENTATION: RIGHT

## 2022-04-27 ASSESSMENT — PAIN DESCRIPTION - PAIN TYPE
TYPE: SURGICAL PAIN
TYPE: ACUTE PAIN;SURGICAL PAIN

## 2022-04-27 ASSESSMENT — PAIN DESCRIPTION - FREQUENCY
FREQUENCY: CONTINUOUS

## 2022-04-27 NOTE — PROGRESS NOTES
Pt has been very restless throughout the night. Pt complaining of intense pain at right hip. Pt educated on nonpharmaceutical pain interventions. PRN pain medication given as ordered, see eMAR. This morning pt stated the pain medications do not help relieve his pain enough. Will pass along to the day shift RN. Pt currently resting in bed with eyes closed at this time. VSS. Will continue to monitor.     Electronically signed by Rose Rowe RN on 4/27/2022 at 6:37 AM

## 2022-04-27 NOTE — PROGRESS NOTES
[facility]  Progress Note    4/27/2022 12:15 PM    Chief Complaint/Reason for Consult: PAD with Rest Pain to RLE    Subjective: Pt seen resting in bed, reports pain to Right Leg from Surgery, c/o poor pain control to Right Leg     Vital Signs:  Vitals:    04/27/22 0441 04/27/22 0453 04/27/22 0809 04/27/22 1110   BP:   122/79 (!) 149/68   Pulse:   73 78   Resp:  20 15 12   Temp:   97.8 °F (36.6 °C) 97.8 °F (36.6 °C)   TempSrc:   Oral Oral   SpO2:   98% 98%   Weight: 147 lb 0.8 oz (66.7 kg)          I/O:    Intake/Output Summary (Last 24 hours) at 4/27/2022 1215  Last data filed at 4/26/2022 2326  Gross per 24 hour   Intake 240 ml   Output 100 ml   Net 140 ml       Physical Exam:   General: no apparent distress, alert and oriented, afebrile  Chest/Lungs: non labored breathing no tachypnea, retractions or cyanosis  Cardiac: Heart regular rate and rhythm  Abdomen: soft, nondistended and nontender  Extremities: no deformities, no significant edema to BLE  Vascular: extremities warm and well perfused, no signs of cyanosis or ischemia, bilateral upper and lower extremity motorsensory intact, numbness to Right Foot - chronic and unchanged  Pulses:  -R DP: doppler signal present  -L DP: +2/4 palpable    Labs:   Lab Results   Component Value Date     04/27/2022    K 3.5 04/27/2022    K 4.1 08/14/2019     04/27/2022    CO2 22 04/27/2022    BUN 18 04/27/2022    CREATININE 0.8 04/27/2022    GFRAA >60 04/27/2022    LABGLOM >60 04/27/2022    GLUCOSE 116 04/27/2022    MG 2.10 04/27/2022    CALCIUM 8.8 04/27/2022     Lab Results   Component Value Date    WBC 7.3 04/27/2022    RBC 3.56 04/27/2022    HGB 10.4 04/27/2022    HCT 30.9 04/27/2022    MCV 86.8 04/27/2022    RDW 13.7 04/27/2022     04/27/2022     Lab Results   Component Value Date    INR 1.01 04/23/2022    PROTIME 11.4 04/23/2022        Scheduled Meds:    neomycin-bacitracin-polymyxin   Topical BID    sodium chloride flush 5-40 mL IntraVENous 2 times per day    nicotine  1 patch TransDERmal Daily    clopidogrel  75 mg Oral Daily    sodium chloride flush  5-40 mL IntraVENous 2 times per day    heparin (porcine)  5,000 Units SubCUTAneous 3 times per day     Continuous Infusions:    sodium chloride 25 mL (04/25/22 0123)    sodium chloride         Assessment:   -S/P Right Hip Pinning with Dr. Micaela Beavers  -PAD  -Right Leg Rest Pain    Plan:  -Vein Mapping completed  -Stress test with pharm ordered - if pt agreeable, can complete while in hospital, otherwise will need to complete as outpatient prior to Vascular Bypass surgery with Dr. Lorenza Goss  -Right Fem-BK Pop Bypass in 4-6 weeks after recovery from Right Hip with Dr. Lorenza Goss  -Modesta Calvillo for D/C from Vascular Surgery standpoint whenever otherwise medically stable to do so    All pertinent information and plan of care discussed with Dr. Chucho Walter. All questions and concerns were addressed with the patient. I have discussed the above stated plan with the patient and the nurse. The patient verbalized understanding and agreed with the plan. Thank you for allowing to us to participate in the care of Cherry Ramos      Electronically signed by TALITA Camp - CNP on 4/27/2022 at 12:15 PM     Pt seen and examined. for DIAGNOSIS OF ischemia right leg complicated by recent hip fracture agree with TALITA Camp's note. Labs reviewed. Vitals   Vitals:    04/27/22 0453 04/27/22 0809 04/27/22 1110 04/27/22 1515   BP:  122/79 (!) 149/68 129/76   Pulse:  73 78 115   Resp: 20 15 12 18   Temp:  97.8 °F (36.6 °C) 97.8 °F (36.6 °C) 98.1 °F (36.7 °C)   TempSrc:  Oral Oral Oral   SpO2:  98% 98% 92%   Weight:       Patient had a myriad of complaints mostly regarding pain in his hip and pain in his foot has a stenosis in his groin that was unable to be treated because it would require stent in a flexible area across the groin, so balloon was done but the patient does not think it helped

## 2022-04-27 NOTE — PROGRESS NOTES
Occupational Therapy  Facility/Department: [facility] 5W PROGRESSIVE CARE  Daily Treatment Note  Should patient be discharged prior to another treatment session, this note shall serve as the discharge summary. NAME: Nury Jeff  : 1950  MRN: [id number]    Date of Service: 2022    Discharge Recommendations:  Patient would benefit from continued therapy after discharge,3-5 sessions per week  OT Equipment Recommendations  Other: defer to HI facility      Patient Diagnosis(es): The primary encounter diagnosis was Closed subcapital fracture of femur, right, initial encounter ([facility] [address] Diagnoses of Fall, initial encounter and Hip fracture, right, closed, initial encounter [facility]) were also pertinent to this visit. Assessment    Activity Tolerance: Patient limited by pain  Discharge Recommendations: Patient would benefit from continued therapy after discharge;3-5 sessions per week  Other: defer to [address] [place] [address] per Week: 3-5  Times per Day: Daily  Current Treatment Recommendations: Strengthening;ROM;Balance training;Functional mobility training; Endurance training;Patient/Caregiver education & training;Self-Care / ADL;Gait training; Safety education & training;Equipment evaluation, education, & procurement     Subjective   Subjective  Subjective: Pt seen in room, agreed to OT/PT but requested that he be given increased time to move himself. Pt complained of pain but did not rate  Cognition  Overall Cognitive Status: Exceptions  Arousal/Alertness: Appropriate responses to stimuli  Following Commands:  Follows one step commands consistently  Attention Span: Difficulty dividing attention  Memory: Decreased recall of precautions  Safety Judgement: Decreased awareness of need for safety  Problem Solving: Assistance required to generate solutions;Assistance required to implement solutions  Insights: Decreased awareness of deficits  Initiation: Requires cues for some  Sequencing: Requires cues for some  Cognition Comment: tangential in conversation and easily distracted        Objective    Vitals     Bed Mobility Training  Bed Mobility Training: Yes  Overall Level of Assistance: Stand-by assistance (used gait belt as a leg  and was able to move to EOB with increased time and SBA with HOB elevated and bed rails utilized)  Interventions: Demonstration; Safety awareness training;Verbal cues  Supine to Sit: Stand-by assistance; Additional time (bed rail, HOB elevated)  Transfer Training  Transfer Training: Yes  Overall Level of Assistance: Minimum assistance  Interventions: Demonstration; Safety awareness training;Verbal cues  Sit to Stand: Minimum assistance (lalita stedy used for safety with transfer due to TTWB on RLE.)  Stand to Sit: Minimum assistance     ADL  Toileting Skilled Clinical Factors: Pt reports that he was able to use urinal per self but then states he thinks it increased his pain in his hip  Additional Comments: Dressing skills not addressed this date due to time needed for bed mobility and transfer. Anticipate pt will require max A for LB bathing/dressing based on observed pain, balance with TTWB on RLE and initiation. Goals  Short Term Goals  Time Frame for Short term goals: Prior to DC:   Short Term Goal 1: Pt will complete ADL transfers with min A while maintaining TTWB  Short Term Goal 2: Pt will complete functional mobility with min A while maintaining TTWB  Short Term Goal 3: Pt will complete toileting with min A  Short Term Goal 4: Pt will complete UE exercises in all planes to inc strength/endurance for ADLs  Short Term Goal 5: Pt will tolerate standing > 1 min for standing components of ADLs with min A  Patient Goals   Patient goals : to get stronger and go home       Therapy Time   Individual Concurrent Group Co-treatment   Time In 1120         Time Out 1150         Minutes 30         Timed Code Treatment Minutes: Tracy 91, OT

## 2022-04-27 NOTE — PROGRESS NOTES
Physical Therapy  Facility/Department: [address] PROGRESSIVE CARE  Physical Therapy Treatment Note    Name: Franklin Joe  : 1950  MRN: [id number]  Date of Service: 2022    Discharge Recommendations:  Patient would benefit from continued therapy after discharge   PT Equipment Recommendations  Other: will defer to next level of care    Chichi Lacey scored a 10/24 on the AM-PAC short mobility form. Current research shows that an AM-PAC score of 17 or less is typically not associated with a discharge to the patient's home setting. Based on the patient's AM-PAC score and their current functional mobility deficits, it is recommended that the patient have 3-5 sessions per week of Physical Therapy at d/c to increase the patient's independence. Please see assessment section for further patient specific details. If patient discharges prior to next session this note will serve as a discharge summary. Please see below for the latest assessment towards goals. Patient Diagnosis(es): The primary encounter diagnosis was Closed subcapital fracture of femur, right, initial encounter ([facility] Diagnoses of Fall, initial encounter and Hip fracture, right, closed, initial encounter [facility]) were also pertinent to this visit. Past Medical History:  has a past medical history of Basal cell carcinoma and Peripheral artery disease ([facility] [address] [facility] Past Surgical History:  has a past surgical history that includes hip surgery (Right, 2022). Assessment   Body Structures, Functions, Activity Limitations Requiring Skilled Therapeutic Intervention: Decreased functional mobility   Assessment: Pt is a 71 yo male who was adm to hosp with hip pain after a fall at home sustaining R femeral neck fx s/p hip pinning. Pt at baseline is Ind without an AD; pt currently is a high fall risk and now is TTWB and needing assist of 1 for bed mob using gait belt around his foot to move his R LE to EOB.  He needs min A for transfers with lalita chinyeredy lift. Pt having difficulty tolerating increased therapy and reports \"I need to take it slow\" recommend a slower pace 3-5x/wk to address deficits  Therapy Prognosis: Fair  Decision Making: Medium Complexity  Clinical Presentation: evolving  Barriers to Learning: self limiting  Requires PT Follow-Up: Yes  Activity Tolerance  Activity Tolerance: Patient limited by pain     Plan   Plan  Plan: 3-5 times per week  Current Treatment Recommendations: Transfer training,Balance training,Functional mobility training,Endurance training,Strengthening  Safety Devices  Type of Devices: Call light within reach,Chair alarm in place,Left in chair,All fall risk precautions in place,Nurse notified     Restrictions  Restrictions/Precautions  Restrictions/Precautions: Weight Bearing  Lower Extremity Weight Bearing Restrictions  Right Lower Extremity Weight Bearing: Toe Touch Weight Bearing     Subjective   General  Chart Reviewed: Yes  Additional Pertinent Hx: Per Dr Saundra Conteh note: \"Mr. Garry Hastings is a 70 y.o. male with history of peripheral vascular disease, who presented to [facility] ER for evaluation of right hip pain after a fall. He has a history of peripheral vascular disease. He had recanalization of right mid popliteal artery by Dr. Ag Huber on 3/23/22. Dr. Ag Huber has recommended further fem-pop bypass. He states he had numbness in the right foot prior to that procedure and it has continued postop. He states he got up from the couch. Not sure if he didn't lift his leg or it got caught since he cannot feel the foot. He complain of right hip pain. He has some numbness in one toe on the left foot. He haMovement makes the pain worse. Narcotic pain medications make the pain better. He is continuously asks about medication dosing. \" Pt s/p R hip pinning TTWB R  Response To Previous Treatment: Patient with no complaints from previous session.   Family / Caregiver Present: No  Referring Practitioner: Dr Holly Ann  Referral Date : 04/24/22  Follows Commands: Within Functional Limits  Subjective  Subjective: pt easily distracted; very anxious about pain; agreeable to working with therapy         Social/Functional History  Social/Functional History  Lives With: Son  Type of Home:  ([facility])  Home Layout: Two level,1/2 bath on main level,Bed/Bath upstairs  Home Access: Stairs to enter without rails  Entrance Stairs - Number of Steps: 2 + 1 ECTOR with no rail; flight of stairs upstairs to bedroom/bathroom with rail  Bathroom Shower/Tub: Tub/Shower unit  Bathroom Toilet: Standard  Bathroom Equipment: Grab bars in shower  Bathroom Accessibility: Accessible  Home Equipment: [employer]  ADL Assistance: Independent  Homemaking Assistance: Independent  Ambulation Assistance: Independent (no AD)  Transfer Assistance: Independent  Active : Yes  Additional Comments: Denies any other falls  Vision/Hearing  Vision Exceptions: Wears glasses for distance  Hearing: Within functional limits    Cognition   Orientation  Overall Orientation Status: Within Functional Limits  Cognition  Overall Cognitive Status: Exceptions  Arousal/Alertness: Appropriate responses to stimuli  Following Commands: Follows one step commands consistently  Attention Span: Difficulty dividing attention  Safety Judgement: Decreased awareness of need for safety  Problem Solving: Assistance required to generate solutions;Assistance required to implement solutions  Insights: Decreased awareness of deficits  Cognition Comment: tangential in conversation and easily distracted     Objective                           Bed Mobility Training  Bed Mobility Training: Yes  Overall Level of Assistance: Stand-by assistance (used gait belt as a leg  and was able to move to EOB with increased time and SBA with HOB elevated and bed rails utilized)  Interventions: Demonstration; Safety awareness training;Verbal cues  Supine to Sit: Stand-by assistance; Additional time (bed rail, HOB elevated)  Transfer Training  Transfer Training: Yes  Overall Level of Assistance: Minimum assistance  Interventions: Demonstration; Safety awareness training;Verbal cues  Sit to Stand: Minimum assistance (lalita stedy used for safety with transfer due to TTWB on RLE.)  Stand to Sit: Minimum assistance  Bed mobility  Supine to Sit: Contact guard assistance;Stand by assistance (HOB elevated and pt using a gait belt around his foot to move his R LE)  Transfers  Sit to Stand: Minimal Assistance (on stedy with verbal cues to maintain WB)  Stand to sit: Minimal Assistance (from stedy to recliner chair)  Bed to Chair: Dependent/Total (with stedy)        Balance  Comments: SBA for sitting balance; leans L to avoid sitting on R hip area; min A for briefly standing on stedy while maintaining TTWB RLE           OutComes Score                                                  AM-PAC Score  AM-PAC Inpatient Mobility Raw Score : 10 (04/27/22 1151)  AM-PAC Inpatient T-Scale Score : 32.29 (04/27/22 1151)  Mobility Inpatient CMS 0-100% Score: 76.75 (04/27/22 1151)  Mobility Inpatient CMS G-Code Modifier : CL (04/27/22 1151)          Goals  Long Term Goals  Time Frame for Long term goals : by discharge  Long term goal 1: bed mob min A - met; new goal SBA from flat bed  Long term goal 2: transfers min A to walker  Long term goal 3: progress to hop steps with walker when able  Patient Goals   Patient goals : to go home       Therapy Time   Individual Concurrent Group Co-treatment   Time In 1125         Time Out 1151         Minutes 26                 NOHEMI DUNN PT    Electronically signed by NOHEMI DUNN PT on 4/27/2022 at 11:52 AM

## 2022-04-27 NOTE — PROGRESS NOTES
Physician Progress Note      PATIENT:               Rohini Mensah  CSN #:                  [id number]  :                       1950  ADMIT DATE:       2022 7:17 PM  DISCH DATE:  RESPONDING  PROVIDER #:        NOHEMI ROMAN DO          QUERY TEXT:    Pt admitted with right hip fracture. Pt noted to have drop in H/H following   hip pinning. If possible, please document in the progress notes and discharge   summary if you are evaluating and/or treating any of the following: The medical record reflects the following:  Risk Factors: hip fracture, post-op hip pinning  Clinical Indicators: drop in H/H from 13.2/40.4 on arrival down to 10.4/30.6   currently  Treatment: daily CBC    Thank you,  Elvia Mejía RN, BSN, CCDS  [email or web address]  Options provided:  -- Acute blood loss anemia  -- Postoperative acute blood loss anemia  -- Other - I will add my own diagnosis  -- Disagree - Not applicable / Not valid  -- Disagree - Clinically unable to determine / Unknown  -- Refer to Clinical Documentation Reviewer    PROVIDER RESPONSE TEXT:    This patient has postoperative acute blood loss anemia.     Query created by: Doni Denton on 2022 9:36 AM      Electronically signed by:  NOHEMI ROMAN DO 2022 10:24 AM

## 2022-04-27 NOTE — PROGRESS NOTES
Physical Therapy  Jessica Castillo  [id number]  [id number]    Nursing called and pt needs to use [facility] (nurse off floor to MRI and asked for assistance). Entered room and pt reported he no longer needed to go but he had a laxative and didn't want to have a BM in his adult brief. Offered to assist pt to [facility], pt agreed but when alicia brought into room, pt stated \"just give me a walker and I will show you how I can do it\". Discussed that this therapist was concerned that since he had so much pain with getting to EOB today and was rating his pain 10/10 that taking hop steps would increase his discomfort from the vibration. Pt then stated \"I'm not going to be hopping\". Had long discussion with pt that he would need to take a hop step as he is TTWB and therefore can not fully WB through his R LE. After several minutes of discussion and demonstration it was determined that pt just meant that he would be able to use his UEs and gently be able to lower his L foot onto the floor with stepping; Pt needed min/mod A for sit<->stand from recliner chair and from [facility]; amb 5' x2 with hop step with RW and min A. Pt able to cleanse self after having a BM when cloth wipes provided. Assisted with repositioning pt in chair with LEs elevated and pillows placed for support.   All needs in reach  Total time spent with pt: 29 min  Electronically signed by NOHEMI DUNN PT on 4/27/2022 at 1:34 PM

## 2022-04-27 NOTE — PLAN OF CARE
Problem: Discharge Planning  Goal: Discharge to home or other facility with appropriate resources  4/27/2022 0409 by Phong Ernandez RN  Outcome: Progressing     Problem: Pain  Goal: Verbalizes/displays adequate comfort level or baseline comfort level  4/27/2022 0409 by Phong Ernandez RN  Outcome: Progressing     Problem: Skin/Tissue Integrity  Goal: Absence of new skin breakdown  Description: 1. Monitor for areas of redness and/or skin breakdown  2. Assess vascular access sites hourly  3. Every 4-6 hours minimum:  Change oxygen saturation probe site  4. Every 4-6 hours:  If on nasal continuous positive airway pressure, respiratory therapy assess nares and determine need for appliance change or resting period.   4/27/2022 0409 by Phong Ernandez RN  Outcome: Progressing     Problem: Safety - Adult  Goal: Free from fall injury  4/27/2022 0409 by Phong Ernandez RN  Outcome: Progressing     Problem: ABCDS Injury Assessment  Goal: Absence of physical injury  4/27/2022 0409 by Phong Ernandez RN  Outcome: Progressing

## 2022-04-27 NOTE — PLAN OF CARE
Problem: Discharge Planning  Goal: Discharge to home or other facility with appropriate resources  4/27/2022 1700 by Bea Gutierrez RN  Outcome: Progressing     Patent and son given list of SNF's. Son hoping to find placement at [facility] in [place]. Son given the number to case management. Problem: Pain  Goal: Verbalizes/displays adequate comfort level or baseline comfort level  4/27/2022 1700 by Bea Gutierrez RN  Outcome: Progressing     Patient still with poor pain control. XR hip and CT chest/abd/pelvis taken. Problem: Skin/Tissue Integrity  Goal: Absence of new skin breakdown  Description: 1. Monitor for areas of redness and/or skin breakdown  2. Assess vascular access sites hourly  3. Every 4-6 hours minimum:  Change oxygen saturation probe site  4. Every 4-6 hours:  If on nasal continuous positive airway pressure, respiratory therapy assess nares and determine need for appliance change or resting period. 4/27/2022 1700 by Bea Gutierrez RN  Outcome: Progressing     Patient able to turn self. No skin breakdown noted on assesment. Problem: Safety - Adult  Goal: Free from fall injury  4/27/2022 1700 by Bea Gutierrez RN  Outcome: Progressing     Patient calls appropriately. Bed/chair locked and alarm on. Moves according to PT recommendations.        Problem: ABCDS Injury Assessment  Goal: Absence of physical injury  4/27/2022 1700 by Bea Gutierrez RN  Outcome: Progressing

## 2022-04-27 NOTE — PROGRESS NOTES
[id number] [facility] Orthopedic Surgery   Progress Note      S/P :  SUBJECTIVE  In bed. Does not appear in distress but states right hip pain 10/10. Moves well in bed when asked. OBJECTIVE              Physical                      VITALS:  BP (!) 149/68   Pulse 78   Temp 97.8 °F (36.6 °C) (Oral)   Resp 12   Wt 147 lb 0.8 oz (66.7 kg)   SpO2 98%   BMI 22.36 kg/m²                     MUSCULOSKELETAL:  right foot NVI. Wiggles toes to command. Able to plantarflex and dorsiflex ankle Pedal pulses are palpable. NEUROLOGIC:                                  Sensory:  Touch:  Right Lower Extremity:  normal                                                 Surgical wound appears clean and dry right hip with Mepilex dressing Area is soft and pink, minimal bruising noted. NO deformity.      Data       CBC:   Lab Results   Component Value Date    WBC 7.3 04/27/2022    RBC 3.56 04/27/2022    HGB 10.4 04/27/2022    HCT 30.9 04/27/2022    MCV 86.8 04/27/2022    MCH 29.2 04/27/2022    MCHC 33.7 04/27/2022    RDW 13.7 04/27/2022     04/27/2022    MPV 7.0 04/27/2022        WBC:    Lab Results   Component Value Date    WBC 7.3 04/27/2022        Hemoglobin/Hematocrit:    Lab Results   Component Value Date    HGB 10.4 04/27/2022    HCT 30.9 04/27/2022        PT/INR:    Lab Results   Component Value Date    PROTIME 11.4 04/23/2022    INR 1.01 04/23/2022              Current Inpatient Medications             Current Facility-Administered Medications: cyclobenzaprine (FLEXERIL) tablet 5 mg, 5 mg, Oral, TID PRN  neomycin-bacitracin-polymyxin (NEOSPORIN) ointment, , Topical, BID  sodium chloride flush 0.9 % injection 5-40 mL, 5-40 mL, IntraVENous, 2 times per day  0.9 % sodium chloride infusion, , IntraVENous, PRN  acetaminophen (TYLENOL) tablet 650 mg, 650 mg, Oral, Q6H PRN **OR** acetaminophen (TYLENOL) suppository 650 mg, 650 mg, Rectal, Q6H PRN  nicotine (NICODERM CQ) 21 MG/24HR 1 patch, 1 patch, TransDERmal, Daily  nicotine polacrilex (COMMIT) lozenge 2 mg, 2 mg, Oral, Q2H PRN  clopidogrel (PLAVIX) tablet 75 mg, 75 mg, Oral, Daily  sodium chloride flush 0.9 % injection 5-40 mL, 5-40 mL, IntraVENous, 2 times per day  sodium chloride flush 0.9 % injection 5-40 mL, 5-40 mL, IntraVENous, PRN  0.9 % sodium chloride infusion, , IntraVENous, PRN  ondansetron (ZOFRAN-ODT) disintegrating tablet 4 mg, 4 mg, Oral, Q8H PRN **OR** ondansetron (ZOFRAN) injection 4 mg, 4 mg, IntraVENous, Q6H PRN  oxyCODONE (ROXICODONE) immediate release tablet 5 mg, 5 mg, Oral, Q4H PRN **OR** oxyCODONE HCl (OXY-IR) immediate release tablet 10 mg, 10 mg, Oral, Q4H PRN  acetaminophen (TYLENOL) tablet 650 mg, 650 mg, Oral, Q4H PRN  bisacodyl (DULCOLAX) EC tablet 5 mg, 5 mg, Oral, Daily PRN  magnesium hydroxide (MILK OF MAGNESIA) 400 MG/5ML suspension 30 mL, 30 mL, Oral, Daily PRN  heparin (porcine) injection 5,000 Units, 5,000 Units, SubCUTAneous, 3 times per day    ASSESSMENT AND PLAN      Post right hip pinning for femoral neck fx  per Dr Juan White, stable exam  DVT prophylaxis ordered, ASA 81mg twice at day for 30 days for DVT prophylaxis  PT OT for ADL's and ambulation as tolerated, TTWB right leg  SS for DC planning, ECF needed.   IV or PO pain med as ordered.  Added Flexeril prn for pain  REporting poor pain control Will check hip xray  Medical mgmt per Hospitalists.   Stable for DC per eula Church, TALITA - CNP  4/27/2022  11:12 AM

## 2022-04-28 VITALS
RESPIRATION RATE: 24 BRPM | DIASTOLIC BLOOD PRESSURE: 83 MMHG | HEART RATE: 83 BPM | TEMPERATURE: 98.3 F | OXYGEN SATURATION: 95 % | SYSTOLIC BLOOD PRESSURE: 124 MMHG | BODY MASS INDEX: 22.12 KG/M2 | WEIGHT: 145.5 LBS

## 2022-04-28 LAB
ANION GAP SERPL CALCULATED.3IONS-SCNC: 15 MMOL/L (ref 3–16)
BASOPHILS ABSOLUTE: 0.1 K/UL (ref 0–0.2)
BASOPHILS RELATIVE PERCENT: 1 %
BUN BLDV-MCNC: 19 MG/DL (ref 7–20)
CALCIUM SERPL-MCNC: 8.8 MG/DL (ref 8.3–10.6)
CHLORIDE BLD-SCNC: 102 MMOL/L (ref 99–110)
CO2: 20 MMOL/L (ref 21–32)
CREAT SERPL-MCNC: 0.8 MG/DL (ref 0.8–1.3)
EOSINOPHILS ABSOLUTE: 0.3 K/UL (ref 0–0.6)
EOSINOPHILS RELATIVE PERCENT: 2.7 %
GFR AFRICAN AMERICAN: >60
GFR NON-AFRICAN AMERICAN: >60
GLUCOSE BLD-MCNC: 98 MG/DL (ref 70–99)
HCT VFR BLD CALC: 33.1 % (ref 40.5–52.5)
HEMOGLOBIN: 10.9 G/DL (ref 13.5–17.5)
LYMPHOCYTES ABSOLUTE: 4 K/UL (ref 1–5.1)
LYMPHOCYTES RELATIVE PERCENT: 41.4 %
MAGNESIUM: 2.2 MG/DL (ref 1.8–2.4)
MCH RBC QN AUTO: 29.2 PG (ref 26–34)
MCHC RBC AUTO-ENTMCNC: 33 G/DL (ref 31–36)
MCV RBC AUTO: 88.4 FL (ref 80–100)
MONOCYTES ABSOLUTE: 0.7 K/UL (ref 0–1.3)
MONOCYTES RELATIVE PERCENT: 7.4 %
NEUTROPHILS ABSOLUTE: 4.6 K/UL (ref 1.7–7.7)
NEUTROPHILS RELATIVE PERCENT: 47.5 %
PDW BLD-RTO: 13.7 % (ref 12.4–15.4)
PLATELET # BLD: 345 K/UL (ref 135–450)
PLATELET SLIDE REVIEW: ADEQUATE
PMV BLD AUTO: 6.9 FL (ref 5–10.5)
POTASSIUM SERPL-SCNC: 3.7 MMOL/L (ref 3.5–5.1)
RBC # BLD: 3.75 M/UL (ref 4.2–5.9)
RBC # BLD: NORMAL 10*6/UL
SLIDE REVIEW: ABNORMAL
SODIUM BLD-SCNC: 137 MMOL/L (ref 136–145)
WBC # BLD: 9.7 K/UL (ref 4–11)

## 2022-04-28 PROCEDURE — 6370000000 HC RX 637 (ALT 250 FOR IP): Performed by: FAMILY MEDICINE

## 2022-04-28 PROCEDURE — 9990000010 HC NO CHARGE VISIT: Performed by: PHYSICAL THERAPIST

## 2022-04-28 PROCEDURE — 0052A: CPT | Performed by: FAMILY MEDICINE

## 2022-04-28 PROCEDURE — 6360000002 HC RX W HCPCS: Performed by: ORTHOPAEDIC SURGERY

## 2022-04-28 PROCEDURE — 83735 ASSAY OF MAGNESIUM: CPT

## 2022-04-28 PROCEDURE — 97535 SELF CARE MNGMENT TRAINING: CPT

## 2022-04-28 PROCEDURE — 97530 THERAPEUTIC ACTIVITIES: CPT

## 2022-04-28 PROCEDURE — 91305 HC RX W HCPCS: CPT | Performed by: FAMILY MEDICINE

## 2022-04-28 PROCEDURE — 2580000003 HC RX 258: Performed by: ORTHOPAEDIC SURGERY

## 2022-04-28 PROCEDURE — 36415 COLL VENOUS BLD VENIPUNCTURE: CPT

## 2022-04-28 PROCEDURE — 0051A: CPT

## 2022-04-28 PROCEDURE — 6370000000 HC RX 637 (ALT 250 FOR IP): Performed by: ORTHOPAEDIC SURGERY

## 2022-04-28 PROCEDURE — 97110 THERAPEUTIC EXERCISES: CPT

## 2022-04-28 PROCEDURE — 80048 BASIC METABOLIC PNL TOTAL CA: CPT

## 2022-04-28 PROCEDURE — 6360000002 HC RX W HCPCS: Performed by: FAMILY MEDICINE

## 2022-04-28 PROCEDURE — 97530 THERAPEUTIC ACTIVITIES: CPT | Performed by: PHYSICAL THERAPIST

## 2022-04-28 PROCEDURE — 6370000000 HC RX 637 (ALT 250 FOR IP): Performed by: NURSE PRACTITIONER

## 2022-04-28 PROCEDURE — 85025 COMPLETE CBC W/AUTO DIFF WBC: CPT

## 2022-04-28 PROCEDURE — 91305: CPT | Performed by: FAMILY MEDICINE

## 2022-04-28 PROCEDURE — 94760 N-INVAS EAR/PLS OXIMETRY 1: CPT

## 2022-04-28 RX ORDER — ENOXAPARIN SODIUM 100 MG/ML
30 INJECTION SUBCUTANEOUS DAILY
Status: DISCONTINUED | OUTPATIENT
Start: 2022-04-28 | End: 2022-04-28 | Stop reason: HOSPADM

## 2022-04-28 RX ORDER — METHOCARBAMOL 750 MG/1
750 TABLET, FILM COATED ORAL 4 TIMES DAILY PRN
Qty: 60 TABLET | Refills: 1 | Status: SHIPPED | OUTPATIENT
Start: 2022-04-28 | End: 2022-05-08

## 2022-04-28 RX ORDER — METHOCARBAMOL 750 MG/1
750 TABLET, FILM COATED ORAL 4 TIMES DAILY PRN
Status: DISCONTINUED | OUTPATIENT
Start: 2022-04-28 | End: 2022-04-28 | Stop reason: HOSPADM

## 2022-04-28 RX ORDER — OXYCODONE HYDROCHLORIDE 10 MG/1
10 TABLET ORAL EVERY 4 HOURS PRN
Status: DISCONTINUED | OUTPATIENT
Start: 2022-04-28 | End: 2022-04-28 | Stop reason: HOSPADM

## 2022-04-28 RX ORDER — SENNA AND DOCUSATE SODIUM 50; 8.6 MG/1; MG/1
1 TABLET, FILM COATED ORAL DAILY PRN
Qty: 30 TABLET | Refills: 0 | Status: SHIPPED | OUTPATIENT
Start: 2022-04-28 | End: 2022-07-01 | Stop reason: ALTCHOICE

## 2022-04-28 RX ADMIN — DICLOFENAC SODIUM 2 G: 10 GEL TOPICAL at 11:05

## 2022-04-28 RX ADMIN — MAGNESIUM HYDROXIDE 30 ML: 400 SUSPENSION ORAL at 11:36

## 2022-04-28 RX ADMIN — OXYCODONE HYDROCHLORIDE 10 MG: 10 TABLET ORAL at 04:26

## 2022-04-28 RX ADMIN — BNT162B2 0.3 ML: 0.23 INJECTION, SUSPENSION INTRAMUSCULAR at 15:49

## 2022-04-28 RX ADMIN — POLYMYXIN B SULFATE, BACITRACIN ZINC, NEOMYCIN SULFATE: 5000; 3.5; 4 OINTMENT TOPICAL at 11:20

## 2022-04-28 RX ADMIN — CYCLOBENZAPRINE HYDROCHLORIDE 5 MG: 10 TABLET, FILM COATED ORAL at 04:26

## 2022-04-28 RX ADMIN — NALOXEGOL OXALATE 12.5 MG: 12.5 TABLET, FILM COATED ORAL at 11:05

## 2022-04-28 RX ADMIN — ENOXAPARIN SODIUM 30 MG: 100 INJECTION SUBCUTANEOUS at 11:03

## 2022-04-28 RX ADMIN — HEPARIN SODIUM 5000 UNITS: 5000 INJECTION INTRAVENOUS; SUBCUTANEOUS at 04:26

## 2022-04-28 RX ADMIN — OXYCODONE HYDROCHLORIDE 15 MG: 10 TABLET ORAL at 14:24

## 2022-04-28 RX ADMIN — OXYCODONE HYDROCHLORIDE 10 MG: 10 TABLET ORAL at 00:27

## 2022-04-28 RX ADMIN — CLOPIDOGREL BISULFATE 75 MG: 75 TABLET ORAL at 11:04

## 2022-04-28 RX ADMIN — SODIUM CHLORIDE, PRESERVATIVE FREE 5 ML: 5 INJECTION INTRAVENOUS at 11:24

## 2022-04-28 ASSESSMENT — PAIN SCALES - GENERAL
PAINLEVEL_OUTOF10: 10

## 2022-04-28 ASSESSMENT — PAIN DESCRIPTION - DESCRIPTORS
DESCRIPTORS: ACHING

## 2022-04-28 ASSESSMENT — PAIN DESCRIPTION - FREQUENCY: FREQUENCY: CONTINUOUS

## 2022-04-28 ASSESSMENT — PAIN DESCRIPTION - LOCATION
LOCATION: HIP;FOOT
LOCATION: HIP
LOCATION: HIP
LOCATION: LEG;FOOT

## 2022-04-28 ASSESSMENT — PAIN DESCRIPTION - ORIENTATION
ORIENTATION: RIGHT

## 2022-04-28 ASSESSMENT — PAIN DESCRIPTION - ONSET: ONSET: ON-GOING

## 2022-04-28 ASSESSMENT — PAIN - FUNCTIONAL ASSESSMENT: PAIN_FUNCTIONAL_ASSESSMENT: PREVENTS OR INTERFERES SOME ACTIVE ACTIVITIES AND ADLS

## 2022-04-28 ASSESSMENT — PAIN DESCRIPTION - PAIN TYPE: TYPE: SURGICAL PAIN

## 2022-04-28 NOTE — PROGRESS NOTES
Preparing pt for d/c. IV removed. Called report to [place] at [facility]. Gathered pt belongings. Awaiting transport at 1730.

## 2022-04-28 NOTE — DISCHARGE SUMMARY
Hospital Medicine Discharge Summary    Patient: Alray Collet     Gender: male  : 1950   Age: 70 y.o. MRN: [id number]    Code Status: Full Code     Primary Care Provider: Drea Frost    Admit Date: 2022   Discharge Date:   2022    Admitting Physician: Dustin Rudolph, DO  Discharge Physician: Wil Grover,      Discharge Diagnoses: Active Hospital Problems    Diagnosis Date Noted    Closed subcapital fracture of femur, right, initial encounter ([facility] [address] [S72.011A]      Priority: Medium    Hip fracture, right, closed, initial encounter ([address] Rikki Miguel 2022     Priority: Medium    Closed right hip fracture ([address] [S72.001A] 2022     Priority: Medium    Fall [W19. XXXA] 2022     Priority: Medium    Numbness of right foot [R20.0] 2022     Priority: Medium    Peripheral arterial disease ([facility] [address] [I73.9]        Hospital Course:   A 71 yo male with pad, admitted with a right hip fracture. Work up completed, and improved with treatment as below. was discharged today in stable condition. Closed right hip fracture  - Orthopedic surgery consulted for hip fracture repair - s/p hip pining on 22  - repeat hip xray yesterday was stable. Ct with a intramuscular hematoma - may be the cause of his pain. hgb has been stable so this has likely clotted off  - cont PO oxycodone prn     Peripheral artery disease  - Vascular surgery consult for PAD  - vein mapping completed, will need a stress test but can do as an out pt. Will need a right fem-pop bypass in 4-6 weeks after recovery from hip surgery     Sacral mass  - noted on recent CTA abd aorta 3/10/2022 which he is seeing oncology for but no plan yet for  - pan ct with Stable presacral mass well-circumscribed and demonstrating minimal   enhancement of its wall with peripheral calcification and septation. Findings again are indeterminate; however, stable when compared to the prior examination.    - will need to follow up with Dr Alvin Lovell after dc    Disposition: To a non-[facility] facility    Exam:     BP (!) 132/100   Pulse 88   Temp 97.8 °F (36.6 °C) (Oral)   Resp 15   Wt 145 lb 8.1 oz (66 kg)   SpO2 95%   BMI 22.12 kg/m²     General appearance:  No apparent distress, appears stated age and cooperative. HEENT:  Normal cephalic, atraumatic without obvious deformity. Pupils equal, round, and reactive to light. Extra ocular muscles intact. Conjunctivae/corneas clear. Neck: Supple, with full range of motion. No jugular venous distention. Trachea midline. Respiratory:  Normal respiratory effort. Clear to auscultation, bilaterally without Rales/Wheezes/Rhonchi. Cardiovascular:  Regular rate and rhythm with normal S1/S2 without murmurs, rubs or gallops. Abdomen: Soft, non-tender, non-distended with normal bowel sounds. Musculoskeletal:  No clubbing, cyanosis or edema bilaterally. Right hip without erythema or edema  Skin: Skin color, texture, turgor normal.  No rashes or lesions. Neurologic:  Neurovascularly intact without any focal sensory/motor deficits. Cranial nerves: II-XII intact, grossly non-focal.  Psychiatric:  Alert and oriented, thought content appropriate, normal insight    Consults:     IP CONSULT TO HOSPITALIST  IP CONSULT TO VASCULAR SURGERY  IP CONSULT TO ORTHOPEDIC SURGERY  IP CONSULT TO SOCIAL WORK  IP CONSULT TO SPIRITUAL SERVICES    Labs:  For convenience and continuity at follow-up the following most recent labs are provided:    Lab Results   Component Value Date    WBC 9.7 04/28/2022    HGB 10.9 04/28/2022    HCT 33.1 04/28/2022    MCV 88.4 04/28/2022     04/28/2022     04/28/2022    K 3.7 04/28/2022    K 4.1 08/14/2019     04/28/2022    CO2 20 04/28/2022    BUN 19 04/28/2022    CREATININE 0.8 04/28/2022    CALCIUM 8.8 04/28/2022    ALKPHOS 92 08/14/2019    ALT 10 08/14/2019    AST 12 08/14/2019    BILITOT 0.3 08/14/2019    LABALBU 4.7 08/14/2019    LDLCALC 132 04/24/2022    TRIG 123 04/24/2022     Lab Results   Component Value Date    INR 1.01 04/23/2022       Radiology:  CT CHEST ABDOMEN PELVIS W CONTRAST   Final Result   Stable presacral mass well-circumscribed and demonstrating minimal   enhancement of its wall with peripheral calcification and septation. Findings again are indeterminate; however, stable when compared to the prior   examination. Postsurgical changes identified in the right anterior thigh from fixation of   a right femoral neck fracture. Intramuscular hematoma with stranding seen in   the soft tissues. XR HIP RIGHT (2-3 VIEWS)   Final Result   Anatomic alignment status post ORIF right femoral neck         VL PRE OP VEIN MAPPING   Final Result      XR HIP RIGHT (2-3 VIEWS)   Final Result      FLUORO FOR SURGICAL PROCEDURES   Final Result      XR CHEST PORTABLE   Final Result   No cardiomegaly, pneumonia or interstitial edema. No significant change has   occurred from 08/14/2019. XR HIP RIGHT (2-3 VIEWS)   Final Result   Acute nondisplaced fracture subcapital region right femoral neck with mild   foreshortening. Discharge Medications:   Current Discharge Medication List      START taking these medications    Details   diclofenac sodium (VOLTAREN) 1 % GEL Apply 2 g topically 2 times daily  Qty: 100 g, Refills: 1      methocarbamol (ROBAXIN) 750 MG tablet Take 1 tablet by mouth 4 times daily as needed (muscle spasms)  Qty: 60 tablet, Refills: 1      sennosides-docusate sodium (SENOKOT-S) 8.6-50 MG tablet Take 1 tablet by mouth daily as needed for Constipation  Qty: 30 tablet, Refills: 0      oxyCODONE (ROXICODONE) 5 MG immediate release tablet Take 1-2 tablets by mouth every 6 hours as needed for Pain for up to 5 days.   Qty: 40 tablet, Refills: 0    Comments: Reduce doses taken as pain becomes manageable  Associated Diagnoses: Hip fracture, right, closed, initial encounter ([facility] [address]      aspirin EC 81 MG EC tablet Take 1 tablet by mouth 2 times daily Take for DVT blood clot prophylaxis. Please avoid missing doses. Qty: 60 tablet, Refills: 0           Current Discharge Medication List        Current Discharge Medication List      CONTINUE these medications which have NOT CHANGED    Details   albuterol sulfate  (90 Base) MCG/ACT inhaler Inhale 1-2 puffs into the lungs every 6 hours as needed for Shortness of Breath      hydrOXYzine (VISTARIL) 25 MG capsule       clopidogrel (PLAVIX) 75 MG tablet Take 1 tablet by mouth daily  Qty: 30 tablet, Refills: 3           Current Discharge Medication List      STOP taking these medications       diazePAM (VALIUM) 2 MG tablet Comments:   Reason for Stopping:         traMADol (ULTRAM) 50 MG tablet Comments:   Reason for Stopping:         ibuprofen (ADVIL;MOTRIN) 800 MG tablet Comments:   Reason for Stopping: Follow-up appointments:  one week    Provider Follow-up:    pcp    Condition at Discharge:  Stable    The patient was seen and examined on day of discharge and this discharge summary is in conjunction with any daily progress note from day of discharge. Time Spent on discharge is 45 minutes  in the examination, evaluation, counseling and review of medications and discharge plan. Signed:    Naren Cline DO   4/28/2022      Thank you Drea Frost for the opportunity to be involved in this patient's care. If you have any questions or concerns please feel free to contact me at [phone].

## 2022-04-28 NOTE — FLOWSHEET NOTE
04/28/22 1737   Encounter Summary   Encounter Overview/Reason  Initial Encounter   Service Provided For: Patient   Referral/Consult From: Nurse   Support System Children   Last Encounter  04/28/22  (visit and prayer CL)   Complexity of Encounter Low   Begin Time 1625   End Time  1735   Total Time Calculated 70 min   Encounter    Type Initial Screen/Assessment   Spiritual/Emotional needs   Type Spiritual Support   Assessment/Intervention/Outcome   Assessment Calm;Coping; Hopeful;Tearful   Intervention Active listening;Discussed illness injury and its impact; Discussed relationship with God;Prayer (assurance of)/[place]   Outcome Coping;Encouraged;Engaged in conversation

## 2022-04-28 NOTE — PLAN OF CARE
Problem: Discharge Planning  Goal: Discharge to home or other facility with appropriate resources  4/28/2022 0032 by Malini Bennett RN  Outcome: Progressing  4/27/2022 1700 by Alvarado Johnson RN  Outcome: Progressing   Alert and oriented Resp. Easy and even Sats. WNL on RA LCTA Medicated frequently for c/o right foot and hip pain with fair short term effect. Dressing to right hip c/d/I Cont.  Per urinal Up to chair and returned to bed with use of walker Frequently turns and repositions self Free from fall/injury

## 2022-04-28 NOTE — CARE COORDINATION
CASE MANAGEMENT DISCHARGE SUMMARY: Discharge order noted. Patient to discharge to skilled nursing rehab.     DISCHARGE DATE: 4/28/22    DISCHARGED TO: [address] [address] [facility]                REPORT NUMBER: [phone]               FAX NUMBER: [phone]    TRANSPORTATION: [address] [facility]             TIME: 062   Yes Form completed and on chart      INSURANCE PRECERT OBTAINED: N/A    HENS/PASAAR COMPLETED: Yes     Yes ARTUR Updated   Yes Case Management   Yes Physician   Yes Nurse    Yes Destination updated (SNF/HHC)    Yes Whiteboard Note Updated with above    Alison VENTURAN RN  Case Management  [phone]    Electronically signed by Alison Harris RN on 4/28/2022 at 2:19 PM

## 2022-04-28 NOTE — PROGRESS NOTES
Physical Therapy  Facility/Department: [address] PROGRESSIVE CARE  Physical Therapy Treatment Note    Name: Ion Gracia  : 1950  MRN: [id number]  Date of Service: 2022    Discharge Recommendations:  Patient would benefit from continued therapy after discharge   PT Equipment Recommendations  Other: will defer to next level of care    Helen Lacey scored a 10/24 on the AM-PAC short mobility form. Current research shows that an AM-PAC score of 17 or less is typically not associated with a discharge to the patient's home setting. Based on the patient's AM-PAC score and their current functional mobility deficits, it is recommended that the patient have 3-5 sessions per week of Physical Therapy at d/c to increase the patient's independence. Please see assessment section for further patient specific details. If patient discharges prior to next session this note will serve as a discharge summary. Please see below for the latest assessment towards goals. Patient Diagnosis(es): The primary encounter diagnosis was Closed subcapital fracture of femur, right, [facility] Diagnoses of Fall, initial encounter and Hip fracture, right, closed, initial encounter [facility]) were also pertinent to this visit. Past Medical History:  has a past medical history of Basal cell carcinoma and Peripheral artery disease ([facility] Past Surgical History:  has a past surgical history that includes hip surgery (Right, 2022). Assessment   Body Structures, Functions, Activity Limitations Requiring Skilled Therapeutic Intervention: Decreased functional mobility   Assessment: Pt is a 71 yo male who was adm to hosp with hip pain after a fall at home sustaining R femeral neck fx s/p hip pinning. Pt at baseline is Ind without an AD; pt currently is a high fall risk and now is TTWB and needing assist of 1 for bed mob using gait belt around his foot to move his R LE to EOB.  He needs min A for transfers with lalita chinyeredy lift. Pt reports being in significant pain however also appeared very sleepy this session. Pt will benefit from a slower progression of therapy to assist pt in regaining his Ind with mobility tasks and lessening his fall risk  Therapy Prognosis: Fair  Decision Making: Medium Complexity  Clinical Presentation: evolving  Barriers to Learning: self limiting at times; needs encouragement  Requires PT Follow-Up: Yes  Activity Tolerance  Activity Tolerance: Patient limited by pain  Activity Tolerance Comments: pt needs to complete all movements very slow; needed frequent rest breaks between tasks and often was closing his eyes, pt awake but appeared very sleepy     Plan   Plan  Plan: 3-5 times per week  Current Treatment Recommendations: Transfer training,Balance training,Functional mobility training,Endurance training,Strengthening  Safety Devices  Type of Devices: Call light within reach,Chair alarm in place,Left in chair,All fall risk precautions in place,Nurse notified     Restrictions  Restrictions/Precautions  Restrictions/Precautions: Weight Bearing,Fall Risk  Lower Extremity Weight Bearing Restrictions  Right Lower Extremity Weight Bearing: Toe Touch Weight Bearing     Subjective   General  Chart Reviewed: Yes  Additional Pertinent Hx: Per Dr Zenobia Shah note: \"Mr. Amber Cain is a 70 y.o. male with history of peripheral vascular disease, who presented to [facility] ER for evaluation of right hip pain after a fall. He has a history of peripheral vascular disease. He had recanalization of right mid popliteal artery by Dr. Chata Vazquez on 3/23/22. Dr. Chata Vazquez has recommended further fem-pop bypass. He states he had numbness in the right foot prior to that procedure and it has continued postop. He states he got up from the couch. Not sure if he didn't lift his leg or it got caught since he cannot feel the foot. He complain of right hip pain. He has some numbness in one toe on the left foot.  He haMovement makes the pain worse. Narcotic pain medications make the pain better. He is continuously asks about medication dosing. \" Pt s/p R hip pinning TTWB R  Response To Previous Treatment: Patient with no complaints from previous session. Family / Caregiver Present: No  Referring Practitioner: Dr Shelby Gonzalez  Referral Date : 04/24/22  Follows Commands: Within Functional Limits  Subjective  Subjective: pt reports a lot of pain however with encouragement agreeable to getting up with therapy; pt reports he wants to use the stedy as he thinks the RW may have been a little too much for him yesterday         Social/Functional History  Social/Functional History  Lives With: Son  Type of Home:  ([facility])  Home Layout: Two level,1/2 bath on main level,Bed/Bath upstairs  Home Access: Stairs to enter without rails  Entrance Stairs - Number of Steps: 2 + 1 ECTOR with no rail; flight of stairs upstairs to bedroom/bathroom with rail  Bathroom Shower/Tub: Tub/Shower unit  Bathroom Toilet: Standard  Bathroom Equipment: Grab bars in shower  Bathroom Accessibility: Accessible  Home Equipment: AB [employer]  ADL Assistance: Independent  Homemaking Assistance: Independent  Ambulation Assistance: Independent (no AD)  Transfer Assistance: Independent  Active : Yes  Additional Comments: Denies any other falls  Vision/Hearing  Vision Exceptions: Wears glasses for distance  Hearing: Within functional limits    Cognition   Orientation  Overall Orientation Status: Within Functional Limits  Cognition  Overall Cognitive Status: Exceptions  Arousal/Alertness: Appropriate responses to stimuli  Following Commands:  Follows one step commands consistently  Attention Span: Difficulty dividing attention  Memory: Decreased recall of precautions  Safety Judgement: Decreased awareness of need for safety  Problem Solving: Assistance required to generate solutions;Assistance required to implement solutions  Insights: Decreased awareness of deficits  Initiation: Requires cues for some  Sequencing: Requires cues for some  Cognition Comment: tangential in conversation and easily distracted     Objective   Pulse: 83  Heart Rate Source: Monitor  BP: 124/83  BP Location: Right upper arm  Patient Position: Semi fowlers  MAP (Calculated): 96.67  Resp: 24  SpO2: 95 %  O2 Device: None (Room air)          Bed mobility  Supine to Sit: Minimal assistance (with HOB fully elevated)  Scooting: Minimal assistance; Moderate assistance  Transfers  Sit to Stand: Minimal Assistance; Moderate Assistance (with stedy from EOB)  Stand to sit: Minimal Assistance (from stedy to [facility] chair)  Bed to Chair: Dependent/Total (with stedy)        Balance  Comments: SBA for sitting balance; CGA for briefly standing on stedy; pt reported maintaining TTWB on R however intermittently appeared to be slightly more wt; frequent verbal cues given to maintain TTWB       Pt positioned in chair for comfort with LEs elevated and all needs in reach; Pillows placed for support; re-heated his breakfast tray as he wanted some of the items off his breakfast tray.  His lunch tray also arrived during session so set up with both so pt had all needs in reach; pt reported being comfortable in chair; nursing will use the lalita stedy for transfers also    OutComes Score                                                  AM-PAC Score  AM-PAC Inpatient Mobility Raw Score : 10 (04/28/22 1254)  AM-PAC Inpatient T-Scale Score : 32.29 (04/28/22 1254)  Mobility Inpatient CMS 0-100% Score: 76.75 (04/28/22 1254)  Mobility Inpatient CMS G-Code Modifier : CL (04/28/22 1254)          Goals  Long Term Goals  Time Frame for Long term goals : by discharge  Long term goal 1: bed mob min A - met; new goal SBA from flat bed  Long term goal 2: transfers min A to walker  Long term goal 3: progress to hop steps with walker when able  Patient Goals   Patient goals : to go home       Therapy Time   Individual Concurrent Group Co-treatment   Time In 1140         Time Out 1253         Minutes 68                 NOHEMI DUNN PT    Electronically signed by NOHEMI DUNN PT on 4/28/2022 at 12:59 PM

## 2022-04-28 NOTE — PROGRESS NOTES
Physical Therapy  Scar Lawosn  [id number]  [id number]    Attempted to see for PT session however nursing requests to defer therapy at this time as pt is still sleeping and nursing reports pt has had difficulty with pain control therefore are allowing him to sleep as long as he is comfortable.   Will follow and attempt later as schedule permits  Electronically signed by NOHEMI DUNN, PT on 4/28/2022 at 10:20 AM

## 2022-04-28 NOTE — PROGRESS NOTES
Hospitalist Progress Note      PCP: Drea Frost    Date of Admission: 4/23/2022    Subjective: Pt S/E. No acute events. still c/o right hip pain, above normal pain level s/p doreen. He denies n/v, no bm. Medications:  Reviewed    Infusion Medications    sodium chloride 25 mL (04/25/22 0123)    sodium chloride       Scheduled Medications    naloxegol  12.5 mg Oral QAM    magnesium hydroxide  30 mL Oral Daily    diclofenac sodium  2 g Topical BID    enoxaparin  30 mg SubCUTAneous Daily    neomycin-bacitracin-polymyxin   Topical BID    sodium chloride flush  5-40 mL IntraVENous 2 times per day    nicotine  1 patch TransDERmal Daily    clopidogrel  75 mg Oral Daily    sodium chloride flush  5-40 mL IntraVENous 2 times per day     PRN Meds: methocarbamol, oxyCODONE **OR** oxyCODONE, sodium chloride, acetaminophen **OR** acetaminophen, nicotine polacrilex, sodium chloride flush, sodium chloride, ondansetron **OR** ondansetron, acetaminophen, bisacodyl    No intake or output data in the 24 hours ending 04/28/22 1044    Physical Exam Performed:    BP (!) 132/100   Pulse 88   Temp 97.8 °F (36.6 °C) (Oral)   Resp 15   Wt 145 lb 8.1 oz (66 kg)   SpO2 95%   BMI 22.12 kg/m²     General appearance: NAD, laying in bed  Lungs: Clear to auscultation, bilaterally without Rales/Wheezes/Rhonchi with good respiratory effort. Heart: Regular rate and rhythm with Normal S1/S2 without murmurs, rubs or gallops  Abdomen: Soft, non-tender or non-distended without rigidity or guarding and positive bowel sounds all four quadrants. Extremities: No edema bilaterally to lower extremities. Right hip without erythema or edema.  tenderness+  Musculoskeletal: no cyanosis/clubbing  Skin: No rashes  Neurologic: Grossly intact neurologically moves all extremities appropriately, notes continued numbness to his right foot  Capillary Refill: Acceptable  < 3 seconds  Peripheral Pulses: Right foot does have some diminished pulses but able to feel the posterior tibial pulse on the right and not as much on the dorsalis pedis on the right, the rest of the pulses to the left lower extremity and upper extremities are normal and 2+       Labs:   Recent Labs     04/26/22  0424 04/27/22 0416 04/28/22  0429   WBC 8.6 7.3 9.7   HGB 10.4* 10.4* 10.9*   HCT 30.6* 30.9* 33.1*    319 345     Recent Labs     04/26/22  0424 04/27/22  0415 04/28/22 0429    137 137   K 3.7 3.5 3.7    103 102   CO2 22 22 20*   BUN 16 18 19   CREATININE 0.8 0.8 0.8   CALCIUM 8.6 8.8 8.8     No results for input(s): AST, ALT, BILIDIR, BILITOT, ALKPHOS in the last 72 hours. No results for input(s): INR in the last 72 hours. Recent Labs     04/25/22  1536 04/26/22  0718   TROPONINI <0.01 <0.01       Urinalysis:      Lab Results   Component Value Date    NITRU Negative 04/23/2022    BLOODU Negative 04/23/2022    SPECGRAV 1.015 04/23/2022    GLUCOSEU Negative 04/23/2022       Radiology:  CT CHEST ABDOMEN PELVIS W CONTRAST   Final Result   Stable presacral mass well-circumscribed and demonstrating minimal   enhancement of its wall with peripheral calcification and septation. Findings again are indeterminate; however, stable when compared to the prior   examination. Postsurgical changes identified in the right anterior thigh from fixation of   a right femoral neck fracture. Intramuscular hematoma with stranding seen in   the soft tissues. XR HIP RIGHT (2-3 VIEWS)   Final Result   Anatomic alignment status post ORIF right femoral neck         VL PRE OP VEIN MAPPING   Final Result      XR HIP RIGHT (2-3 VIEWS)   Final Result      FLUORO FOR SURGICAL PROCEDURES   Final Result      XR CHEST PORTABLE   Final Result   No cardiomegaly, pneumonia or interstitial edema. No significant change has   occurred from 08/14/2019. XR HIP RIGHT (2-3 VIEWS)   Final Result   Acute nondisplaced fracture subcapital region right femoral neck with mild   foreshortening. Assessment/Plan:    Active Hospital Problems    Diagnosis     Closed subcapital fracture of femur, right, initial encounter ([facility] [S72.011A]      Priority: Medium    Hip fracture, right, closed, initial encounter ([facility] [S72.001A]      Priority: Medium    Closed right hip fracture ([facility] [S72.001A]      Priority: Medium    Fall [W19. XXXA]      Priority: Medium    Numbness of right foot [R20.0]      Priority: Medium    Peripheral arterial disease ([facility] [I73.9]        Assessment/Plan:     Closed right hip fracture  - Orthopedic surgery consulted for hip fracture repair - s/p hip pining on 4/24/22  - repeat hip xray yesterday was stable. Ct with a intramuscular hematoma - may be the cause of his pain. hgb has been stable so this has likely clotted off  - Wean Dilaudid, PO oxycodone prn    Peripheral artery disease  - Vascular surgery consult for PAD  - vein mapping completed, will need a stress test but can do as an out pt. Will need a right fem-pop bypass in 4-6 weeks after recovery from hip surgery    Sacral mass  - noted on recent CTA abd aorta 3/10/2022 which he is seeing oncology for but no plan yet for  - pan ct with Stable presacral mass well-circumscribed and demonstrating minimal   enhancement of its wall with peripheral calcification and septation. Findings again are indeterminate; however, stable when compared to the prior examination. - will need to follow up with Dr Miguel Pedroza after dc      DVT Prophylaxis: heparin  Diet: ADULT ORAL NUTRITION SUPPLEMENT; AM Snack, PM Snack; Standard High Calorie/High Protein Oral Supplement  ADULT DIET;  Regular  Code Status: Full Code    PT/OT Eval Status: ordered    Dispo - ready for dc, needs snf    Corina Gama, DO

## 2022-04-28 NOTE — PROGRESS NOTES
Occupational Therapy  Facility/Department: [address] PROGRESSIVE CARE  Occupational Therapy   Daily Note    Name: Keegan De La O  : 1950  MRN: [id number]  Date of Service: 2022    Discharge Recommendations:  Patient would benefit from continued therapy after discharge,3-5 sessions per week    Surinder Lacey scored a 14 on the AM-PAC ADL Inpatient form. Current research shows that an AM-PAC score of 17 or less is typically not associated with a discharge to the patient's home setting. Based on the patient's AM-PAC score and their current ADL deficits, it is recommended that the patient have 3-5 sessions per week of Occupational Therapy at d/c to increase the patient's independence. Please see assessment section for further patient specific details. If patient discharges prior to next session this note will serve as a discharge summary. Please see below for the latest assessment towards goals. Patient Diagnosis(es): The primary encounter diagnosis was Closed subcapital fracture of femur, right, [facility] Diagnoses of Fall, initial encounter and Hip fracture, right, closed, initial encounter [facility]) were also pertinent to this visit. Past Medical History:  has a past medical history of Basal cell carcinoma and Peripheral artery disease ([facility] Past Surgical History:  has a past surgical history that includes hip surgery (Right, 2022). Assessment   Performance deficits / Impairments: Decreased functional mobility ; Decreased strength;Decreased endurance;Decreased ADL status; Decreased safe awareness;Decreased high-level IADLs;Decreased balance;Decreased sensation  Assessment: Discussed with OTR: pt tolerated session fairly well, limited by pain, RLE, decreased balance, ROM. Pt required up to Min/Mod a x1 for sit><stands at recliner, commode and to bed, and dependent for mob via lalita stedy lift.  Pt participating in ADL grooming tasks, seated with up to [address] needed. Pt requested to toilet, however did not void and anticipate would require up to max/total A for all ADL's. Pt easily distracted during treatment, needing frequent redirection to tasks. Pt to be d/c today to SNF to continue with therapy to maximize function for return home. Prognosis: Good  REQUIRES OT FOLLOW-UP: Yes  Activity Tolerance  Activity Tolerance: Patient limited by pain        Plan   Plan  Times per Week: 3-5  Times per Day: Daily  Current Treatment Recommendations: Strengthening,ROM,Balance training,Functional mobility training,Endurance training,Patient/Caregiver education & training,Self-Care / ADL,Gait training,Safety education & training,Equipment evaluation, education, & procurement     Restrictions  Restrictions/Precautions  Restrictions/Precautions: Weight Bearing,Fall Risk  Lower Extremity Weight Bearing Restrictions  Right Lower Extremity Weight Bearing: Toe Touch Weight Bearing    Subjective   General  Chart Reviewed: Yes,Progress Notes,Orders,Labs  Patient assessed for rehabilitation services?: Yes  Additional Pertinent Hx: Per Dr. Lorre Rubinstein: \"76 y.o. male with history of peripheral vascular disease, who presented to [facility] ER for evaluation of right hip pain after a fall. He has a history of peripheral vascular disease. He had recanalization of right mid popliteal artery by Dr. Claritza Silverman on 3/23/22. Dr. Claritza Silverman has recommended further fem-pop bypass. He states he had numbness in the right foot prior to that procedure and it has continued postop. \" Fall resulted in Right nondisplaced femoral neck fracture s/p R pinning 4/24. Family / Caregiver Present: No  Referring Practitioner: Nataliya Pinto MD  Subjective  Subjective: Pt met BS, in recliner and agreeable to OT. Pt reporting 10/10 RLE pain and requesting pain meds. General Comment  Comments: Per RN ok for therapy.  Pt very tangential in conversation and requires frequent redirection     Social/Functional History  Social/Functional History  Lives With: Son  Type of Home:  ([facility])  Home Layout: Two level,1/2 bath on main level,Bed/Bath upstairs  Home Access: Stairs to enter without rails  Entrance Stairs - Number of Steps: 2 + 1 ECTOR with no rail; flight of stairs upstairs to bedroom/bathroom with rail  Bathroom Shower/Tub: Tub/Shower unit  Bathroom Toilet: Standard  Bathroom Equipment: Grab bars in shower  Bathroom Accessibility: Accessible  Home Equipment: Gisela Addi  ADL Assistance: Independent  Homemaking Assistance: Independent  Ambulation Assistance: Independent (no AD)  Transfer Assistance: Independent  Active : Yes  Additional Comments: Denies any other falls       Toilet Transfers  Toilet Transfers Comments: dependent via lalita stedy lift  Wheelchair Bed Transfers  Equipment Used: Bed (recliner)  Wheelchair Transfers Comments: dependent via lalita stedy lift     ADL  Grooming: Setup;Minimal assistance  Grooming Skilled Clinical Factors: hiar washed in shampoocap. Pt washed face and combed hair out  Toileting: Unable to assess(comment)  Toileting Skilled Clinical Factors: had requested to use commode for BM, yet did not void. Gown only on and lalita stedy for sit><stands. Additional Comments: Anticipate pt will require max A for LB bathing/dressing based on observed pain, balance with TTWB on RLE and initiation. Activity Tolerance  Activity Tolerance: Patient limited by pain  Activity Tolerance Comments: pt needs to complete all movements very slow; needed frequent rest breaks between tasks and often was closing his eyes, pt awake but appeared very sleepy  Bed mobility  Supine to Sit: Unable to assess  Sit to Supine: Moderate assistance;Maximum assistance (x1)  Comment: Increased c/o pain with movement  Transfers  Sit to stand: Minimal assistance; Moderate assistance  Stand to sit: Moderate assistance;Minimal assistance  Transfer Comments: x1 assist: recliner to stand onto stedy.  Assist to sit onto commode and to stand off commode, all with lalita stedy. Pt cued minimally to keep WBS. Cognition  Overall Cognitive Status: Exceptions  Arousal/Alertness: Appropriate responses to stimuli  Following Commands: Follows one step commands consistently  Attention Span: Difficulty dividing attention  Memory: Decreased recall of precautions  Safety Judgement: Decreased awareness of need for safety  Problem Solving: Assistance required to generate solutions;Assistance required to implement solutions  Insights: Decreased awareness of deficits  Initiation: Requires cues for some  Sequencing: Requires cues for some  Cognition Comment: tangential in conversation and easily distracted          Exercise Treatment: resistive BUE ex's- chair push ups x10 reps (keeping RLE off floor); Red theraband ex's completed, 10 reps for 6 ex's and no difficulty following written handout. Dynamic Sitting Balance Exercises: brief standing on lalita stedy for stedy seat pads placed/removed,  with CGA, cues for WBS, RLE  Education Given To: Patient  Education Provided: Role of Therapy;Plan of Care;Transfer Training;Precautions  Education Method: Demonstration;Verbal  Education Outcome: Continued education needed             Safety Devices  Type of Devices: Call light within reach; All fall risk precautions in place;Nurse notified; Left in bed;Bed alarm in place;Gait belt         AM-PAC Score        AM-[facility] Inpatient Daily Activity Raw Score: 14 (04/28/22 1505)  AM-PAC Inpatient ADL T-Scale Score : 33.39 (04/28/22 1505)  ADL Inpatient CMS 0-100% Score: 59.67 (04/28/22 1505)  ADL Inpatient CMS G-Code Modifier : CK (04/28/22 1505)    Goals  Short Term Goals  Time Frame for Short term goals: Status: goals ongoing  Short Term Goal 1: Pt will complete ADL transfers with min A while maintaining TTWB  Short Term Goal 2: Pt will complete functional mobility with min A while maintaining TTWB  Short Term Goal 3: Pt will complete toileting with min A  Short Term Goal 4: Pt will complete UE exercises in all planes to inc strength/endurance for ADLs  Short Term Goal 5: Pt will tolerate standing > 1 min for standing components of ADLs with min A  Patient Goals   Patient goals : to get stronger and go home       Therapy Time   Individual Concurrent Group Co-treatment   Time In 1420         Time Out 1514         Minutes [address] [place] SLOAN/L,515

## 2022-04-28 NOTE — PLAN OF CARE
Problem: Discharge Planning  Goal: Discharge to home or other facility with appropriate resources  4/28/2022 1324 by Josiane Pascual RN  Outcome: Progressing     Problem: Pain  Goal: Verbalizes/displays adequate comfort level or baseline comfort level  4/28/2022 1324 by Josiane Pascual RN  Outcome: Progressing     Problem: Skin/Tissue Integrity  Goal: Absence of new skin breakdown  Description: 1. Monitor for areas of redness and/or skin breakdown  2. Assess vascular access sites hourly  3. Every 4-6 hours minimum:  Change oxygen saturation probe site  4. Every 4-6 hours:  If on nasal continuous positive airway pressure, respiratory therapy assess nares and determine need for appliance change or resting period.   4/28/2022 1324 by Josiane Pascual RN  Outcome: Progressing     Problem: Safety - Adult  Goal: Free from fall injury  4/28/2022 1324 by Josiane Pascual RN  Outcome: Progressing

## 2022-04-28 NOTE — PROGRESS NOTES
Pt is requesting to \"rest\"    Xray right hip yesterday show no gross abnormality. Stable fx with pins. Reviewed with DR Tina Caldera    Continue current pain medication.      Narrative   EXAMINATION:   TWO XRAY VIEWS OF THE RIGHT HIP       4/27/2022 2:25 pm       COMPARISON:   04/23/2022       HISTORY:   ORDERING SYSTEM PROVIDED HISTORY: postop pain after pinning   TECHNOLOGIST PROVIDED HISTORY:   Reason for exam:->postop pain after pinning   Reason for Exam: postop pain after pinning       FINDINGS:   Status post internal fixation right femoral neck.  Anatomic alignment.  No   hardware complications.  No other fractures.           Impression   Anatomic alignment status post ORIF right femoral neck

## 2022-04-29 ENCOUNTER — APPOINTMENT (OUTPATIENT)
Dept: CT IMAGING | Age: 72
DRG: 481 | End: 2022-04-29
Payer: MEDICARE

## 2022-05-12 ENCOUNTER — OFFICE VISIT (OUTPATIENT)
Dept: ORTHOPEDIC SURGERY | Age: 72
End: 2022-05-12
Payer: MEDICARE

## 2022-05-12 VITALS — WEIGHT: 145 LBS | HEIGHT: 68 IN | BODY MASS INDEX: 21.98 KG/M2

## 2022-05-12 DIAGNOSIS — S72.011D CLOSED SUBCAPITAL FRACTURE OF RIGHT FEMUR WITH ROUTINE HEALING, SUBSEQUENT ENCOUNTER: Primary | ICD-10-CM

## 2022-05-12 PROCEDURE — 99024 POSTOP FOLLOW-UP VISIT: CPT | Performed by: STUDENT IN AN ORGANIZED HEALTH CARE EDUCATION/TRAINING PROGRAM

## 2022-05-12 NOTE — PROGRESS NOTES
History:  Enedelia Wiseman is here for follow up after right hip pinning for nondisplaced subcapital femoral neck fracture. Surgery date was 4/24/22. Pain is controlled with current analgesics. Medication(s) being used: oxycodone from the nursing home. The patient's pain is rated at 8/10. Post op problems reported:  none. He is at [facility]. His son works for a home physical therapy company and can set the patient up with therapy at their home. Physical Examination:  Patient is awake, alert, and in no acute distress. The incision is completely healed and all the staples were removed and Steri-Strips were applied today. No signs of any erythema or drainage. She has no pain with the active or passive range of motion of the right hip. She has intact sensation, distally to light touch, and she is neurovascularly intact bilaterally. 5/5 right hip flexion. 3 views of the right hip taken today in the office show: Status post internal fixation right femoral neck.  Anatomic alignment.  No hardware complications. Inna Hogan is interval healing.        Assessment:   2.5 weeks status post right hip pinning         Plan:   The patient will be touch down weight bearing for 6 weeks total after surgery. Will have the patient continue PT and work on strengthening exercises. Referral placed to transition from nursing home to in home therapy. His son states a therapist and an occupational therapist can come to his home daily. The patient lives with his son. His bedroom and bathroom are on the first floor.     The patient will come back for a follow up in 3.5 weeks with repeat X-rays. Aida Mendieta PA-C  Board Certified by the [employer] on Certification of [address] and [address]: This note was generated with use of a verbal recognition program (DRAGON) and an attempt was made to check for errors.   It is possible that there are still dictated errors within this office note. If so, please bring any significant errors to my attention for an addendum. All efforts were made to ensure that this office note is accurate.
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
## 2022-05-24 ENCOUNTER — TELEPHONE (OUTPATIENT)
Dept: SURGERY | Age: 72
End: 2022-05-24

## 2022-05-24 NOTE — TELEPHONE ENCOUNTER
Called pt. He said his son was very busy and hard to contact. He states they were not yet able to choose a date and were unavailable 5-25-22 due to his son's work schedule. I informed the patient we will be happy to schedule him on any Wednesday, but will await a call back when they have had an opportunity to speak.

## 2022-05-24 NOTE — TELEPHONE ENCOUNTER
PT would like to have a call regarding his PAD and what his plan of care will be next. He would like to have a call this morning regarding this issue.

## 2022-05-24 NOTE — TELEPHONE ENCOUNTER
Tiffany Ferreira called and would like to see if his father would be able to have an angiogram and stent tomorrow. Margie Eastman called and 9:30 am was approved. Instructions reviewed with pt's son and verbalized understanding.

## 2022-05-25 ENCOUNTER — HOSPITAL ENCOUNTER (OUTPATIENT)
Dept: CARDIAC CATH/INVASIVE PROCEDURES | Age: 72
Discharge: HOME | End: 2022-05-25
Payer: MEDICARE

## 2022-05-25 VITALS
BODY MASS INDEX: 23.49 KG/M2 | WEIGHT: 155 LBS | RESPIRATION RATE: 16 BRPM | OXYGEN SATURATION: 100 % | TEMPERATURE: 98.8 F | DIASTOLIC BLOOD PRESSURE: 96 MMHG | HEIGHT: 68 IN | HEART RATE: 64 BPM | SYSTOLIC BLOOD PRESSURE: 159 MMHG

## 2022-05-25 LAB
ANION GAP SERPL CALCULATED.3IONS-SCNC: 14 MMOL/L (ref 3–16)
BUN BLDV-MCNC: 10 MG/DL (ref 7–20)
CALCIUM SERPL-MCNC: 9.3 MG/DL (ref 8.3–10.6)
CHLORIDE BLD-SCNC: 105 MMOL/L (ref 99–110)
CO2: 23 MMOL/L (ref 21–32)
CREAT SERPL-MCNC: 1 MG/DL (ref 0.8–1.3)
GFR AFRICAN AMERICAN: >60
GFR NON-AFRICAN AMERICAN: >60
GLUCOSE BLD-MCNC: 97 MG/DL (ref 70–99)
HCT VFR BLD CALC: 34.6 % (ref 40.5–52.5)
HEMOGLOBIN: 11.4 G/DL (ref 13.5–17.5)
MCH RBC QN AUTO: 29.4 PG (ref 26–34)
MCHC RBC AUTO-ENTMCNC: 33 G/DL (ref 31–36)
MCV RBC AUTO: 89.3 FL (ref 80–100)
PDW BLD-RTO: 15.6 % (ref 12.4–15.4)
PLATELET # BLD: 332 K/UL (ref 135–450)
PMV BLD AUTO: 6.3 FL (ref 5–10.5)
POTASSIUM SERPL-SCNC: 3.7 MMOL/L (ref 3.5–5.1)
RBC # BLD: 3.88 M/UL (ref 4.2–5.9)
SODIUM BLD-SCNC: 142 MMOL/L (ref 136–145)
WBC # BLD: 6.5 K/UL (ref 4–11)

## 2022-05-25 PROCEDURE — C1725 CATH, TRANSLUMIN NON-LASER: HCPCS

## 2022-05-25 PROCEDURE — C1769 GUIDE WIRE: HCPCS

## 2022-05-25 PROCEDURE — C1894 INTRO/SHEATH, NON-LASER: HCPCS

## 2022-05-25 PROCEDURE — 75625 CONTRAST EXAM ABDOMINL AORTA: CPT | Performed by: STUDENT IN AN ORGANIZED HEALTH CARE EDUCATION/TRAINING PROGRAM

## 2022-05-25 PROCEDURE — 6360000004 HC RX CONTRAST MEDICATION: Performed by: STUDENT IN AN ORGANIZED HEALTH CARE EDUCATION/TRAINING PROGRAM

## 2022-05-25 PROCEDURE — 2580000003 HC RX 258

## 2022-05-25 PROCEDURE — 2709999900 HC NON-CHARGEABLE SUPPLY

## 2022-05-25 PROCEDURE — 99152 MOD SED SAME PHYS/QHP 5/>YRS: CPT | Performed by: STUDENT IN AN ORGANIZED HEALTH CARE EDUCATION/TRAINING PROGRAM

## 2022-05-25 PROCEDURE — 85027 COMPLETE CBC AUTOMATED: CPT

## 2022-05-25 PROCEDURE — 37226 HC FEM POP TERR PLASTY AND STENT: CPT

## 2022-05-25 PROCEDURE — 75710 ARTERY X-RAYS ARM/LEG: CPT

## 2022-05-25 PROCEDURE — 99153 MOD SED SAME PHYS/QHP EA: CPT

## 2022-05-25 PROCEDURE — C1874 STENT, COATED/COV W/DEL SYS: HCPCS

## 2022-05-25 PROCEDURE — 37226 PR REVSC OPN/PRQ FEM/POP W/STNT/ANGIOP SM VSL: CPT | Performed by: STUDENT IN AN ORGANIZED HEALTH CARE EDUCATION/TRAINING PROGRAM

## 2022-05-25 PROCEDURE — 99152 MOD SED SAME PHYS/QHP 5/>YRS: CPT

## 2022-05-25 PROCEDURE — 80048 BASIC METABOLIC PNL TOTAL CA: CPT

## 2022-05-25 PROCEDURE — C1887 CATHETER, GUIDING: HCPCS

## 2022-05-25 PROCEDURE — 75774 ARTERY X-RAY EACH VESSEL: CPT

## 2022-05-25 PROCEDURE — 75710 ARTERY X-RAYS ARM/LEG: CPT | Performed by: STUDENT IN AN ORGANIZED HEALTH CARE EDUCATION/TRAINING PROGRAM

## 2022-05-25 PROCEDURE — 75625 CONTRAST EXAM ABDOMINL AORTA: CPT

## 2022-05-25 PROCEDURE — C1760 CLOSURE DEV, VASC: HCPCS

## 2022-05-25 RX ORDER — IODIXANOL 320 MG/ML
120 INJECTION, SOLUTION INTRAVASCULAR
Status: COMPLETED | OUTPATIENT
Start: 2022-05-25 | End: 2022-05-25

## 2022-05-25 RX ORDER — SODIUM CHLORIDE 0.9 % (FLUSH) 0.9 %
5-40 SYRINGE (ML) INJECTION PRN
Status: DISCONTINUED | OUTPATIENT
Start: 2022-05-25 | End: 2022-05-26 | Stop reason: HOSPADM

## 2022-05-25 RX ORDER — SODIUM CHLORIDE 0.9 % (FLUSH) 0.9 %
5-40 SYRINGE (ML) INJECTION EVERY 12 HOURS SCHEDULED
Status: DISCONTINUED | OUTPATIENT
Start: 2022-05-25 | End: 2022-05-26 | Stop reason: HOSPADM

## 2022-05-25 RX ORDER — SODIUM CHLORIDE 9 MG/ML
INJECTION, SOLUTION INTRAVENOUS PRN
Status: DISCONTINUED | OUTPATIENT
Start: 2022-05-25 | End: 2022-05-26 | Stop reason: HOSPADM

## 2022-05-25 RX ORDER — ACETAMINOPHEN 325 MG/1
650 TABLET ORAL EVERY 4 HOURS PRN
Status: DISCONTINUED | OUTPATIENT
Start: 2022-05-25 | End: 2022-05-26 | Stop reason: HOSPADM

## 2022-05-25 RX ADMIN — IODIXANOL 120 ML: 320 INJECTION, SOLUTION INTRAVASCULAR at 10:16

## 2022-05-25 ASSESSMENT — ENCOUNTER SYMPTOMS
SHORTNESS OF BREATH: 0
COLOR CHANGE: 1

## 2022-05-25 ASSESSMENT — PAIN DESCRIPTION - DESCRIPTORS: DESCRIPTORS: ACHING

## 2022-05-25 ASSESSMENT — PAIN DESCRIPTION - LOCATION: LOCATION: LEG

## 2022-05-25 ASSESSMENT — PAIN SCALES - GENERAL: PAINLEVEL_OUTOF10: 6

## 2022-05-25 ASSESSMENT — PAIN DESCRIPTION - ORIENTATION: ORIENTATION: RIGHT

## 2022-05-25 NOTE — H&P
[facility]  H/P Note    Chief Complaint  PAD with rest pain    History of Present Illness  Patient is a 70 y.o. male presenting with right leg rest pain. He needs a femoral-popliteal bypass for mid popliteal artery occlusion. However he recently fractured his hip and now he presents with discussion about the need for endovascular temporizing procedure. He is not ready to undergo bypass surgery. Despite knowing the likelihood of failure he wants to undergo popliteal artery stent placement. He knows that the likelihood of occlusion is also high and this is not recommended for long term treatment. Review of Systems  Review of Systems   Constitutional: Negative for chills and fever. Respiratory: Negative for shortness of breath. Cardiovascular: Negative for chest pain and leg swelling. Musculoskeletal:        Rest pain right foot   Skin: Positive for color change. Negative for wound. Neurological: Positive for numbness. Negative for weakness. Hematological: Does not bruise/bleed easily. Psychiatric/Behavioral: Negative for agitation. Past Medical History:   Diagnosis Date    Basal cell carcinoma     L side of nose    Peripheral artery disease [facility])        Past Surgical History:   Procedure Laterality Date    HIP SURGERY Right 2022    HIP PINNING performed by Yarelis Baird MD at [facility] [place] [address]       No Known Allergies    Social History     Socioeconomic History    Marital status:       Spouse name: Not on file    Number of children: Not on file    Years of education: Not on file    Highest education level: Not on file   Occupational History    Not on file   Tobacco Use    Smoking status: Former Smoker     Packs/day: 1.00     Years: 50.00     Pack years: 50.00     Quit date: 2022     Years since quittin.0    Smokeless tobacco: Never Used   Substance and Sexual Activity    Alcohol use: No    Drug use: Not Currently    Sexual activity: Not Currently     Partners: Female   Other Topics Concern    Not on file   Social History Narrative    Not on file     Social Determinants of Health     Financial Resource Strain:     Difficulty of Paying Living Expenses: Not on file   Food Insecurity:     Worried About [address] [employer] in the Last Year: Not on file    [place] of Food in the Last Year: Not on file   Transportation Needs:     Lack of Transportation (Medical): Not on file    Lack of Transportation (Non-Medical): Not on file   Physical Activity:     Days of Exercise per Week: Not on file    Minutes of Exercise per Session: Not on file   Stress:     Feeling of Stress : Not on file   Social Connections:     Frequency of Communication with Friends and Family: Not on file    Frequency of Social Gatherings with Friends and Family: Not on file    Attends Islam Services: Not on file    Active Member of [address] [place] [address] or Organizations: Not on file    Attends Club or Organization Meetings: Not on file    Marital Status: Not on file   Intimate Partner Violence:     Fear of Current or Ex-Partner: Not on file    Emotionally Abused: Not on file    Physically Abused: Not on file    Sexually Abused: Not on file   Housing Stability:     Unable to Pay for Housing in the Last Year: Not on file    Number of [place] in the Last Year: Not on file    Unstable Housing in the Last Year: Not on file       History reviewed.  No pertinent family history.  - No history of bleeding or clotting disorders    Vital Signs  Vitals:    05/25/22 0830   BP: (!) 159/96   Pulse: 64   Resp: 16   Temp: 98.8 °F (37.1 °C)   TempSrc: Infrared   SpO2: 100%   Weight: 155 lb (70.3 kg)   Height: 5' 8\" (1.727 m)       Physical Examination  General: No acute distress  Psychiatric: affect appropriate  Head/Eyes/Ears/Nose/Throat:  Atraumatic, vision and hearing intact, face symmetric  Neck:  supple  Chest/Lungs: no tachypnea, retractions or cyanosis noted  Cardiac:  Regular rate and rhythm  Abdomen: soft, nontender  Extremities: warm and well perfused  - bilateral upper extremity motorsensory intact  - bilateral lower extremity motorsensory intact  Vascular exam:  - R femoral: palp  - L femoral: palp  - R DP: non palp  - L DP: palp  - R PT: non palp  - L PT: palp  - Radial: palp  Skin: no wounds    Labs  Lab Results   Component Value Date    WBC 6.5 05/25/2022    HGB 11.4 05/25/2022    HCT 34.6 05/25/2022    MCV 89.3 05/25/2022     05/25/2022     Lab Results   Component Value Date     05/25/2022    K 3.7 05/25/2022    K 4.1 08/14/2019     05/25/2022    CO2 23 05/25/2022    BUN 10 05/25/2022    CREATININE 1.0 05/25/2022      No results found for: GLU    Imaging: CTA  Mild inflow disease.       Severe outflow disease on the right with occlusion of the proximal and mid   popliteal artery.  Reconstituted distal popliteal artery is diminutive.       Runoff disease bilaterally with diminutive posterior tibial arteries which   become occluded the upper calf.  There is 2 vessel runoff to each foot. Assessment:   Right leg critical limb ischemia      Plan:  Patient is a 70 y.o. male presenting with right leg ischemia. The patient now presents with recurrent rest pain. Angioplasty alone did not treat the patient's lesion. He needs a popliteal bypass but refusing at this time due to still recovering from recent hip fracture surgery. We discussed popliteal stent placement as a temporizing procedure and he wants to consider this despite not formally recommended. I have reviewed the history and physical and examined the patient and find no relevant changes. I have reviewed with the patient and/or family the risks, benefits, and alternatives to the procedure. I HAVE PRESENTED REASONABLE ALTERNATIVES TO THE PATIENT'S PROPOSED CARE, TREATMENT, AND SERVICES.  THE DISCUSSION I HAVE DONE ENCOMPASSED RISKS, BENEFITS, AND SIDE EFFECTS RELATED TO THE ALTERNATIVES AND THE RISKS RELATED TO NOT RECEIVING THE PROPOSED CARE, TREATMENT, SERVICES. Pre-sedation Assessment    Patient:  Helen Travis   :   1950  Intended Procedure: RLE angiogram    Vitals:    22 0830   BP: (!) 159/96   Pulse: 64   Resp: 16   Temp: 98.8 °F (37.1 °C)   SpO2: 100%       Nursing notes reviewed and agreed. Medications reviewed  Allergies: No Known Allergies      Pre-Procedure Assessment/Plan:  ASA 2 - Patient with mild systemic disease with no functional limitations    Mallampati Airway Assessment:  Mallampati Class II - (soft palate, fauces & uvula are visible)    Level of Sedation Plan: Moderate sedation    Post Procedure plan: Return to same level of care    Gerardo Morgan DO, [address] [place] [address] Vascular and Endovascular Surgery       Please call with any questions or concerns. Thank you for the consultation.     Gerardo Morgan DO, [facility]  2022  9:26 AM

## 2022-05-25 NOTE — OP NOTE
[address] [address]                                OPERATIVE REPORT    PATIENT NAME: Meaghan Lima                  :        1950  MED REC NO:   [id number]                          ROOM:  ACCOUNT NO:   [de-identified]                           ADMIT DATE: 2022  PROVIDER:     Serafin Mac DO    DATE OF PROCEDURE:  2022    PREOPERATIVE DIAGNOSIS:  Peripheral arterial disease with rest pain,  right lower extremity. POSTOPERATIVE DIAGNOSIS:  Peripheral arterial disease with rest pain,  right lower extremity. OPERATIONS PERFORMED:  1. Ultrasound-guided access to the left common femoral artery. 2.  Abdominal aortogram.  3.  Right lower extremity angiogram.  4.  Balloon angioplasty and stenting of the P1 popliteal artery  occlusion with extension into the distal SFA using a 4-mm balloon,  followed by two 6 x 120 mm Selena drug-coated stents. 5.  Balloon angioplasty of the P2 segment of the popliteal artery using  a 4-mm balloon. SURGEON:  Serafin Mac DO    ASSISTANT:  None. ESTIMATED BLOOD LOSS:  Less than 50 mL. COMPLICATIONS:  None apparent. INDICATIONS:  This is a 49-year-old male patient who presented initially  for evaluation of rest pain. He underwent an arteriography. His TOMY is  actually 0.3. He underwent treatment of mid popliteal artery lesion. He has specific ectatic lesions in the iliac segment and his hypogastric  is stenotic, but his common femoral, profunda femoris, and SFA are  relatively patent. It had minimal disease. The external iliac also has  minimal disease. Therefore, he underwent treatment of the mid popliteal  artery occlusion with angioplasty alone. This unfortunately failed. He  was, therefore, set up for a bypass. Bypass to be a femoral to  popliteal artery bypass using vein. During the interim, he developed a  hip fracture.   He underwent surgery of his hip. He is still  convalescencing. However, unfortunately, his rest pain is becoming  worse again. We, therefore, discussed possibility of his bypass graft. He is unwilling to undergo surgery at this time understandably. We,  therefore, discussed all the possibilities. The only option at this  point in time to stent this lesion in the mid popliteal segment and to  keep it open. This might ultimately fail and it might fail quickly as  well. I discussed with him all this as well. He understands that the  viability of this is very short term. There is a high risk of it  shutting down pretty quickly. This may also take out some collaterals  and push him into a more urgent bypass situation. He understands this  as well. All the risks, benefits, and alternatives to his endovascular  intervention including pain, infection, bleeding, embolization,  thrombosis, and pseudoaneurysm were clearly reviewed with the patient. He gave written informed consent. OPERATIVE PROCEDURE:  The patient was brought into the cath lab and  placed supine on the table. Appropriate monitoring lines including  continuous blood pressure, EKG, and pulse oximetry were placed on the  patient. A qualified nurse observer was in the room to administer  sedation. We used a combination of Versed and fentanyl. Unfortunately,  the patient required a lot of sedation. We used 8 mg of Versed and 400  mcg of fentanyl. Total sedation time was from 9:33 a.m. to 10:15 a.m.,  totally 42 minutes. I was directly in the room and observing this  throughout this entire time. Bilateral groins were prepped and draped  in the usual sterile fashion. A time-out was called for indication,  patient, and laterality. The left common femoral artery was visualized under ultrasound. I  punctured the left common femoral artery. I checked my location based  on the radiograph and it was right over the femoral head.   I then  proceeded to place a micro-introducer over a Bentson wire and then  placed a 6-Montenegrin sheath. I placed a flush catheter into the abdominal  aorta. The abdominal aortogram demonstrated that the aorta was widely  patent. The common iliac arteries were widely patent. The renal  arteries were widely patent. The common iliac arteries were ectatic and  had some calcification, but there was still good lumen. On the right side, there was stenotic near its origin hypogastric, but  the external iliac was patent. The hypogastric was once again stenotic  on the right side. The hypogastric was patent on the left side. I then  proceeded to pull the catheter down to the terminal aorta. I shot an  arteriogram in oblique projection to make sure that there was not a  stenosis in the external iliac that potentially escaped me earlier in  the prior examination. However, once again, while I can tell there was  actually calcification on the rim of the vessel, there was still a  decent flow lumen. The patient also had good palpable femoral pulse on  the right side. I, therefore, navigated the wire and catheter down to the common femoral  artery. I then proceeded to shoot the remainder of the arteriogram.   The common femoral was patent. The profunda was patent. The SFA was  patent. There was some mild disease. Down to the adductor hiatus, the  SFA as it turns into the popliteal artery became stenotic but greater  than 50%. There was still flow down beyond this for 2 cm. After this,  the P1 segment of the popliteal artery occludes. Re-opacifies in the  mid portion of the P2 segment. Distally, there was some tapering down  and narrowing, but there was not really any significant stenosis in the  remainder of the popliteal artery. The anterior tibial artery and  peroneal artery were the main runoff. The posterior tibial artery was  occluded. The anterior tibial artery was the dominant runoff to the  foot.     I gave the patient 3000 units of heparin. I brought up and over a 6/45  sheath. I placed this in the common femoral artery. I then used a  0.035 catheter with a Glidewire Advantage. The catheter was a  TrailBlazer. I navigated through the actual occlusion and confirmed  this with an arteriogram.  I then predilated the lesion using a 4-mm  balloon. I then proceeded to place stents. I considered placing  Viabahn stents because of their flexibility, but this would also take a  lot of collaterals. Therefore, I decided to use 6-mm Selena stents. I  placed this at the mid portion of the patella and extended proximally. I used two overlapping 6 x 120 mm stents. We then followed this up with  a postdilatation angioplasty and molding the stents using a 6-mm  balloon. After this, an arteriogram demonstrated that he had improved flow into  the popliteal segment. There was some tapering and narrowing in the mid  portion of the P3 segment of the popliteal artery. I think this was  mostly spasm. The peroneal artery was also in spasm due to wire  manipulation. We pulled the wire back and gave the patient  nitroglycerin. This improved greatly the axial flow to the peroneal  artery and this was spasm. I did not change a whole lot in the  popliteal artery. Therefore, we brought down a 4-mm balloon just to  make sure this was not an actual lesion that needs to be treated. I inflated a 4-mm balloon and there was no waste. Repeat angiography  demonstrated that this area of tapering was probably just spasm, but  also it was just the way artery recoils quite significantly. Therefore,  in order to avoid dissection, I decided not to treat this anymore. There was brisk flow down to the foot. There was improved axial flow in  general.  I, therefore, removed the wire and catheter and exchanged it  for a 6-Syriac sheath on the left side. I used a Mynx closure device  with good hemostatic effect.     There were no immediate complications. I was present and performed all  critical aspects of this procedure. The patient was taken to the  recovery area in stable condition.         Rodríguze Iraheta DO    D: 05/25/2022 10:28:11       T: 05/25/2022 13:31:44     [email or web address]  Job#: [id number]     Doc#: [id number]    CC:

## 2022-05-26 ENCOUNTER — TELEPHONE (OUTPATIENT)
Dept: VASCULAR SURGERY | Age: 72
End: 2022-05-26

## 2022-05-26 DIAGNOSIS — G89.18 POST-OP PAIN: Primary | ICD-10-CM

## 2022-05-26 RX ORDER — OXYCODONE HYDROCHLORIDE AND ACETAMINOPHEN 5; 325 MG/1; MG/1
1 TABLET ORAL EVERY 6 HOURS PRN
Qty: 28 TABLET | Refills: 0 | Status: SHIPPED | OUTPATIENT
Start: 2022-05-26 | End: 2022-06-02 | Stop reason: SDUPTHER

## 2022-05-26 NOTE — TELEPHONE ENCOUNTER
Discussed patient's pain over phone. Has pain likely from immobility in right thigh from hip surgery and being recumbent yesterday for the procedure. Will give the patient one more prescription for narcotic to help assist with his PT daily to avoid any regression. Patient understands no further scripts unless further surgical procedures undertaken.

## 2022-05-27 DIAGNOSIS — G89.18 POST-OP PAIN: ICD-10-CM

## 2022-05-31 RX ORDER — OXYCODONE HYDROCHLORIDE AND ACETAMINOPHEN 5; 325 MG/1; MG/1
1 TABLET ORAL EVERY 6 HOURS PRN
Qty: 28 TABLET | Refills: 0 | OUTPATIENT
Start: 2022-05-31 | End: 2022-06-07

## 2022-05-31 NOTE — TELEPHONE ENCOUNTER
I called the pharmacy and was told the patient did  the refill on 5-26-22. Per the patient, this was a request for a refill. He states \"They were only 5 mg and I take 2 every 4 hours. I take 8 per day. \"  He then said that he only \"takes them when I need them and they are nice to have for when I need to sleep\"

## 2022-06-02 ENCOUNTER — OFFICE VISIT (OUTPATIENT)
Dept: VASCULAR SURGERY | Age: 72
End: 2022-06-02
Payer: MEDICARE

## 2022-06-02 VITALS — DIASTOLIC BLOOD PRESSURE: 82 MMHG | HEIGHT: 68 IN | BODY MASS INDEX: 23.57 KG/M2 | SYSTOLIC BLOOD PRESSURE: 124 MMHG

## 2022-06-02 DIAGNOSIS — M79.673 ISCHEMIC FOOT PAIN AT REST: Primary | ICD-10-CM

## 2022-06-02 DIAGNOSIS — I99.8 ISCHEMIC FOOT PAIN AT REST: Primary | ICD-10-CM

## 2022-06-02 DIAGNOSIS — G89.18 POST-OP PAIN: ICD-10-CM

## 2022-06-02 PROCEDURE — 1036F TOBACCO NON-USER: CPT | Performed by: STUDENT IN AN ORGANIZED HEALTH CARE EDUCATION/TRAINING PROGRAM

## 2022-06-02 PROCEDURE — 99214 OFFICE O/P EST MOD 30 MIN: CPT | Performed by: STUDENT IN AN ORGANIZED HEALTH CARE EDUCATION/TRAINING PROGRAM

## 2022-06-02 PROCEDURE — G8427 DOCREV CUR MEDS BY ELIG CLIN: HCPCS | Performed by: STUDENT IN AN ORGANIZED HEALTH CARE EDUCATION/TRAINING PROGRAM

## 2022-06-02 PROCEDURE — 3017F COLORECTAL CA SCREEN DOC REV: CPT | Performed by: STUDENT IN AN ORGANIZED HEALTH CARE EDUCATION/TRAINING PROGRAM

## 2022-06-02 PROCEDURE — G8420 CALC BMI NORM PARAMETERS: HCPCS | Performed by: STUDENT IN AN ORGANIZED HEALTH CARE EDUCATION/TRAINING PROGRAM

## 2022-06-02 PROCEDURE — 1123F ACP DISCUSS/DSCN MKR DOCD: CPT | Performed by: STUDENT IN AN ORGANIZED HEALTH CARE EDUCATION/TRAINING PROGRAM

## 2022-06-02 RX ORDER — OXYCODONE HYDROCHLORIDE AND ACETAMINOPHEN 5; 325 MG/1; MG/1
1 TABLET ORAL EVERY 6 HOURS PRN
Qty: 28 TABLET | Refills: 0 | Status: SHIPPED | OUTPATIENT
Start: 2022-06-02 | End: 2022-06-09

## 2022-06-02 ASSESSMENT — ENCOUNTER SYMPTOMS
COLOR CHANGE: 1
SHORTNESS OF BREATH: 0

## 2022-06-02 NOTE — PROGRESS NOTES
Leonarda Lewis (:  1950) is a 70 y.o. male,Established patient, here for evaluation of the following chief complaint(s):  Follow-up         ASSESSMENT/PLAN:  1. Ischemic foot pain at rest       This is a 70year old male patient who presents to the office once again to discuss ongoing pain in his right leg. As I predicted the popliteal stent did not last very long. The patient really needs a right femoral to below knee popliteal bypass to treat this rest pain as we have tried to arrange beforehand. His TOMY is 0.3 and might even be worse now with a stent possibly affecting collaterals. The troubling thing about this is how severe his pain is with essentially one level of occlusive disease. He does have an occluded PT as well but this is quite troubling. I am worried that this presacral mass could be contributing to hypersympathetic response if pressing on a sympathetic ganglion in the pre-sacral space. This is of course conjecture but any intervention might be set up for failure including bypass surgery. However his pain is so severe that he needs to undergo intervention. Non compliance and wanting to use only pain medication will not help his outcome. Reviewed the risks/benefits/alternatives to surgery clearly. He does not have a PCP and given the severity of his symptoms would not wait very long. Will arrange a stress test and surgery to follow within 1-2 weeks. Will forgo vein mapping and evaluate in the operating room. Still need to maintain appointments with other specialists going forward. Will provide another pain prescription today but encouraged him to minimize use. Encouraged limb elevation to minimize edema. Subjective   SUBJECTIVE/OBJECTIVE:  This is a 70year old male patient who presents for follow up after right popliteal stent. The stent is clearly down based on his symptoms. This was discussed clearly with the patient beforehand that stents in this area do not do well.  The patient has severe rest pain and swelling. He has dependent rubor. He denies chest pain or shortness of breath. He is to see Dr. Raji Sanchez next week to discuss weight bearing status. He does not have a follow up appointment with VIJAYA CLARK regarding this presacral mass. Review of Systems   Constitutional: Negative for chills and fever. Respiratory: Negative for shortness of breath. Cardiovascular: Positive for leg swelling. Negative for chest pain. Musculoskeletal: Positive for gait problem and myalgias. Skin: Positive for color change. Neurological: Positive for weakness and numbness. Hematological: Does not bruise/bleed easily. Psychiatric/Behavioral: Negative for agitation. Objective   Physical Exam  Cardiovascular:      Rate and Rhythm: Normal rate and regular rhythm. Comments: Very weak monophasic DP signal  Pulmonary:      Effort: Pulmonary effort is normal. No respiratory distress. Musculoskeletal:      Right lower leg: Edema present. Skin:     General: Skin is warm and dry. Capillary Refill: Capillary refill takes 2 to 3 seconds. Comments: Right foot swollen, pitting edema, color present, 3rd toe with blueish hue   Neurological:      General: No focal deficit present. Mental Status: He is alert. Sensory: No sensory deficit. On this date 6/2/2022 I have spent 35 minutes reviewing previous notes, test results and face to face with the patient discussing the diagnosis and importance of compliance with the treatment plan as well as documenting on the day of the visit.     Shayy Thomas DO, FSVS, [address] [facility]

## 2022-06-03 ENCOUNTER — TELEPHONE (OUTPATIENT)
Dept: ORTHOPEDIC SURGERY | Age: 72
End: 2022-06-03

## 2022-06-03 NOTE — TELEPHONE ENCOUNTER
Patient's son, Marj Hardy, called needing assistance with cancelling appts with [place] [facility]/Rebekah on 6/9, and also scheduling a stress test. I reached out to that department and spoke to Ivet and she cancelled the 2 appts. Patient's son should call [phone] to schedule stress test, per Ivet. Also needed assistance reaching Dr Kaitlin Abbott, and I provided Marj Hardy with that office number as well. Marj Hardy states patient will keep appt with Dr Beck Marcelo on 6/7.

## 2022-06-06 DIAGNOSIS — Z01.810 ENCOUNTER FOR PREPROCEDURAL CARDIOVASCULAR EXAMINATION: Primary | ICD-10-CM

## 2022-06-07 ENCOUNTER — OFFICE VISIT (OUTPATIENT)
Dept: ORTHOPEDIC SURGERY | Age: 72
End: 2022-06-07

## 2022-06-07 VITALS — BODY MASS INDEX: 23.28 KG/M2 | HEIGHT: 68 IN | WEIGHT: 153.6 LBS

## 2022-06-07 DIAGNOSIS — S72.001D CLOSED FRACTURE OF RIGHT HIP WITH ROUTINE HEALING, SUBSEQUENT ENCOUNTER: Primary | ICD-10-CM

## 2022-06-07 PROCEDURE — 99024 POSTOP FOLLOW-UP VISIT: CPT | Performed by: ORTHOPAEDIC SURGERY

## 2022-06-07 NOTE — PROGRESS NOTES
History:  Marichuy Snow is here for follow up after right hip pinning for nondisplaced subcapital femoral neck fracture. Surgery date was 4/24/22. He is at home now. He states he is doing home physical therapy. He rates pain 1/10. Physical Examination:  Ht 5' 8\" (1.727 m)   Wt 153 lb 9.6 oz (69.7 kg)   BMI 23.35 kg/m²    Patient is awake, alert, and in no acute distress. No pain with passive ER/IR of right hip.      3 views of the right hip taken today in the office show: Status post internal fixation right femoral neck.  Anatomic alignment.  No hardware complications.  There is interval healing.        Assessment:   6 weeks status post right hip pinning         Plan:   He can advance weightbearing as tolerated. Continue PT and work on strengthening exercises. Follow-up in 2 months with repeat X-rays. Denise Delarosa. Lorre Rubinstein, MD  Orthopaedic Surgery and Sports Medicine     Disclaimer: This note was generated with use of a verbal recognition program (DRAGON) and an attempt was made to check for errors. It is possible that there are still dictated errors within this office note. If so, please bring any significant errors to my attention for an addendum. All efforts were made to ensure that this office note is accurate.

## 2022-06-11 ENCOUNTER — HOSPITAL ENCOUNTER (OUTPATIENT)
Dept: NON INVASIVE DIAGNOSTICS | Age: 72
Discharge: HOME | End: 2022-06-11
Payer: MEDICARE

## 2022-06-11 DIAGNOSIS — Z01.810 ENCOUNTER FOR PREPROCEDURAL CARDIOVASCULAR EXAMINATION: ICD-10-CM

## 2022-06-11 LAB
LV EF: 84 %
LVEF MODALITY: NORMAL

## 2022-06-11 PROCEDURE — A9502 TC99M TETROFOSMIN: HCPCS | Performed by: STUDENT IN AN ORGANIZED HEALTH CARE EDUCATION/TRAINING PROGRAM

## 2022-06-11 PROCEDURE — 93017 CV STRESS TEST TRACING ONLY: CPT

## 2022-06-11 PROCEDURE — 3430000000 HC RX DIAGNOSTIC RADIOPHARMACEUTICAL: Performed by: STUDENT IN AN ORGANIZED HEALTH CARE EDUCATION/TRAINING PROGRAM

## 2022-06-11 PROCEDURE — 78452 HT MUSCLE IMAGE SPECT MULT: CPT

## 2022-06-11 PROCEDURE — 6360000002 HC RX W HCPCS: Performed by: STUDENT IN AN ORGANIZED HEALTH CARE EDUCATION/TRAINING PROGRAM

## 2022-06-11 RX ADMIN — REGADENOSON 0.4 MG: 0.08 INJECTION, SOLUTION INTRAVENOUS at 10:08

## 2022-06-11 RX ADMIN — TETROFOSMIN 30 MILLICURIE: 1.38 INJECTION, POWDER, LYOPHILIZED, FOR SOLUTION INTRAVENOUS at 10:08

## 2022-06-11 RX ADMIN — TETROFOSMIN 10 MILLICURIE: 1.38 INJECTION, POWDER, LYOPHILIZED, FOR SOLUTION INTRAVENOUS at 09:10

## 2022-06-13 ENCOUNTER — OFFICE VISIT (OUTPATIENT)
Dept: CARDIOLOGY CLINIC | Age: 72
End: 2022-06-13
Payer: MEDICARE

## 2022-06-13 ENCOUNTER — TELEPHONE (OUTPATIENT)
Dept: CARDIOLOGY CLINIC | Age: 72
End: 2022-06-13

## 2022-06-13 ENCOUNTER — HOSPITAL ENCOUNTER (OUTPATIENT)
Dept: CT IMAGING | Age: 72
Discharge: HOME | End: 2022-06-13
Payer: MEDICARE

## 2022-06-13 ENCOUNTER — TELEPHONE (OUTPATIENT)
Dept: SURGERY | Age: 72
End: 2022-06-13

## 2022-06-13 VITALS
OXYGEN SATURATION: 96 % | WEIGHT: 155 LBS | HEIGHT: 68 IN | SYSTOLIC BLOOD PRESSURE: 128 MMHG | HEART RATE: 60 BPM | BODY MASS INDEX: 23.49 KG/M2 | DIASTOLIC BLOOD PRESSURE: 84 MMHG

## 2022-06-13 DIAGNOSIS — R19.09 OTHER INTRA-ABDOMINAL AND PELVIC SWELLING, MASS AND LUMP: ICD-10-CM

## 2022-06-13 DIAGNOSIS — R94.39 ABNORMAL CARDIOVASCULAR STRESS TEST: Primary | ICD-10-CM

## 2022-06-13 PROCEDURE — 99204 OFFICE O/P NEW MOD 45 MIN: CPT | Performed by: INTERNAL MEDICINE

## 2022-06-13 PROCEDURE — 1036F TOBACCO NON-USER: CPT | Performed by: INTERNAL MEDICINE

## 2022-06-13 PROCEDURE — 6360000004 HC RX CONTRAST MEDICATION: Performed by: INTERNAL MEDICINE

## 2022-06-13 PROCEDURE — G8427 DOCREV CUR MEDS BY ELIG CLIN: HCPCS | Performed by: INTERNAL MEDICINE

## 2022-06-13 PROCEDURE — G8420 CALC BMI NORM PARAMETERS: HCPCS | Performed by: INTERNAL MEDICINE

## 2022-06-13 PROCEDURE — 3017F COLORECTAL CA SCREEN DOC REV: CPT | Performed by: INTERNAL MEDICINE

## 2022-06-13 PROCEDURE — 1123F ACP DISCUSS/DSCN MKR DOCD: CPT | Performed by: INTERNAL MEDICINE

## 2022-06-13 PROCEDURE — 74177 CT ABD & PELVIS W/CONTRAST: CPT

## 2022-06-13 PROCEDURE — 93000 ELECTROCARDIOGRAM COMPLETE: CPT | Performed by: INTERNAL MEDICINE

## 2022-06-13 RX ORDER — CLOPIDOGREL BISULFATE 75 MG/1
75 TABLET ORAL DAILY
Qty: 30 TABLET | Refills: 3 | Status: SHIPPED | OUTPATIENT
Start: 2022-06-13 | End: 2022-06-15 | Stop reason: HOSPADM

## 2022-06-13 RX ADMIN — IOPAMIDOL 75 ML: 755 INJECTION, SOLUTION INTRAVENOUS at 11:56

## 2022-06-13 RX ADMIN — IOPAMIDOL 50 ML: 612 INJECTION, SOLUTION INTRAVENOUS at 11:56

## 2022-06-13 NOTE — PROGRESS NOTES
Cardiology Consultation     Ld Escalante  1950    PCP: Nitesh Willingham  Referring Physician: Dr. Kris Puga   Reason for Referral: Abnormal stress test   Chief Complaint: No chief complaint on file. Subjective:   History of Present Illness: The patient is 70 y.o. male with a past medical history significant for PAD. He presents as referral from Dr. Kris Puga regarding abnormal stress test results. Past Medical History:   Diagnosis Date    Basal cell carcinoma     L side of nose    Peripheral artery disease [facility])      Past Surgical History:   Procedure Laterality Date    HIP SURGERY Right 2022    HIP PINNING performed by Dane [facility] MD at [facility]      No family history on file. Social History     Tobacco Use    Smoking status: Former Smoker     Packs/day: 1.00     Years: 50.00     Pack years: 50.00     Quit date: 2022     Years since quittin.1    Smokeless tobacco: Never Used   Substance Use Topics    Alcohol use: No    Drug use: Not Currently       No Known Allergies  Current Outpatient Medications   Medication Sig Dispense Refill    diclofenac sodium (VOLTAREN) 1 % GEL Apply 2 g topically 2 times daily 100 g 1    sennosides-docusate sodium (SENOKOT-S) 8.6-50 MG tablet Take 1 tablet by mouth daily as needed for Constipation 30 tablet 0    albuterol sulfate  (90 Base) MCG/ACT inhaler Inhale 1-2 puffs into the lungs every 6 hours as needed for Shortness of Breath (Patient not taking: Reported on 2022)      aspirin EC 81 MG EC tablet Take 1 tablet by mouth 2 times daily Take for DVT blood clot prophylaxis. Please avoid missing doses. 60 tablet 0    hydrOXYzine (VISTARIL) 25 MG capsule       clopidogrel (PLAVIX) 75 MG tablet Take 1 tablet by mouth daily 30 tablet 3     No current facility-administered medications for this visit. Review of Systems:  · Constitutional: No unanticipated weight loss.  There's been no change in energy level, sleep pattern, or activity level. No fevers, chills. · Eyes: No visual changes or diplopia. No scleral icterus. · ENT: No Headaches, hearing loss or vertigo. No mouth sores or sore throat. · Cardiovascular: as reviewed in HPI  · Respiratory: No cough or wheezing, no sputum production. No hemoptysis. · Gastrointestinal: No abdominal pain, appetite loss, blood in stools. No change in bowel or bladder habits. · Genitourinary: No dysuria, trouble voiding, or hematuria. · Musculoskeletal:  No gait disturbance, no joint complaints. · Integumentary: No rash or pruritis. · Neurological: No headache, diplopia, change in muscle strength, numbness or tingling. · Psychiatric: No anxiety or depression. · Endocrine: No temperature intolerance. No excessive thirst, fluid intake, or urination. No tremor. · Hematologic/Lymphatic: No abnormal bruising or bleeding, blood clots or swollen lymph nodes. · Allergic/Immunologic: No nasal congestion or hives. Physical Exam:   There were no vitals taken for this visit. Wt Readings from Last 3 Encounters:   06/07/22 153 lb 9.6 oz (69.7 kg)   05/25/22 155 lb (70.3 kg)   05/12/22 145 lb (65.8 kg)     Constitutional: He is oriented to person, place, and time. He appears well-developed and well-nourished. In no acute distress. Head: Normocephalic and atraumatic. Pupils equal and round. Neck: Neck supple. No JVP or carotid bruit appreciated. No mass and no thyromegaly present. No lymphadenopathy present. Cardiovascular: Normal rate. Normal heart sounds. Exam reveals no gallop and no friction rub. No murmur heard. Pulmonary/Chest: Effort normal and breath sounds normal. No respiratory distress. He has no wheezes, rhonchi or rales. Abdominal: Soft, non-tender. Bowel sounds are normal. He exhibits no organomegaly, mass or bruit. Extremities: No edema. No cyanosis or clubbing. Pulses are 2+ radial/carotid bilaterally. Neurological: No gross cranial nerve deficit. Coordination normal.   Skin: Skin is warm and dry. There is no rash or diaphoresis. Psychiatric: He has a normal mood and affect. His speech is normal and behavior is normal.     Lab Review:   FLP:    Lab Results   Component Value Date    TRIG 123 04/24/2022    HDL 40 04/24/2022    LDLCALC 132 04/24/2022    LABVLDL 25 04/24/2022     BUN/Creatinine:    Lab Results   Component Value Date    BUN 10 05/25/2022    CREATININE 1.0 05/25/2022     PT/INR, TNI, HGB A1C:   Lab Results   Component Value Date/Time    TROPONINI <0.01 04/26/2022 07:18 AM    LABA1C 5.8 04/24/2022 05:31 AM      No results found for: CBCAUTODIF    EKG Interpretation: ***    Stress perfusion 6/11/22  Abnormal study. There is a small sized, mild intensity, partially reversible    defect of the mid to apical inferior wall. There may be diaphragm    attenuation artifact but the rest images appear worse. Cannot rule out    inferior ischemia.    Normal LV size and systolic function.    Overall findings represent a low to intermediate risk study. All above diagnostic testing and laboratory data was independently visualized and reviewed by me (not simply review of report)       Assessment and Plan   1)           Thank you very much for allowing me to participate in the care of your patient. Please do not hesitate to contact me if you have any questions.       Hansel Shirley MD [address] [place] [address], Interventional Cardiology, and Peripheral Vascular Disease   [address]   Ph: [phone]  Fax: [phone]

## 2022-06-13 NOTE — PROGRESS NOTES
Cardiology Consultation     Baldo Gibson  1950    PCP: Christa Montalvo  Referring Physician: Dr. Nancy Moore   Reason for Referral: Abnormal stress test   Chief Complaint:   Chief Complaint   Patient presents with    New Patient     no /cc       Subjective:   History of Present Illness: The patient is 70 y.o. male with a past medical history significant for PAD. He presents as referral from Dr. Nancy Moore regarding abnormal stress test results. He presents in a wheelchair. States he has been well with the exception of right foot/leg pain. He was to have peripheral bypass surgery with Dr. Nancy Moore and scheduled for stress prior. Stress test came back abnormal. Patient denies any exertional symptoms. He states compliance with all medication. His only complaint today is difficulty sleeping. Past Medical History:   Diagnosis Date    Basal cell carcinoma     L side of nose    Peripheral artery disease [facility])      Past Surgical History:   Procedure Laterality Date    HIP SURGERY Right 2022    HIP PINNING performed by Mamadou Vargas MD at [facility] [place] [address]     History reviewed. No pertinent family history.   Social History     Tobacco Use    Smoking status: Former Smoker     Packs/day: 1.00     Years: 50.00     Pack years: 50.00     Quit date: 2022     Years since quittin.1    Smokeless tobacco: Never Used   Substance Use Topics    Alcohol use: No    Drug use: Not Currently       No Known Allergies  Current Outpatient Medications   Medication Sig Dispense Refill    diclofenac sodium (VOLTAREN) 1 % GEL Apply 2 g topically 2 times daily 100 g 1    sennosides-docusate sodium (SENOKOT-S) 8.6-50 MG tablet Take 1 tablet by mouth daily as needed for Constipation 30 tablet 0    albuterol sulfate  (90 Base) MCG/ACT inhaler Inhale 1-2 puffs into the lungs every 6 hours as needed for Shortness of Breath       aspirin EC 81 MG EC tablet Take 1 tablet by mouth 2 times daily Take for DVT blood clot prophylaxis. Please avoid missing doses. 60 tablet 0    hydrOXYzine (VISTARIL) 25 MG capsule       clopidogrel (PLAVIX) 75 MG tablet Take 1 tablet by mouth daily 30 tablet 3     No current facility-administered medications for this visit. Review of Systems:  · Constitutional: No unanticipated weight loss. There's been no change in energy level, sleep pattern, or activity level. No fevers, chills. · Eyes: No visual changes or diplopia. No scleral icterus. · ENT: No Headaches, hearing loss or vertigo. No mouth sores or sore throat. · Cardiovascular: as reviewed in HPI  · Respiratory: No cough or wheezing, no sputum production. No hemoptysis. · Gastrointestinal: No abdominal pain, appetite loss, blood in stools. No change in bowel or bladder habits. · Genitourinary: No dysuria, trouble voiding, or hematuria. · Musculoskeletal:  No gait disturbance, no joint complaints. · Integumentary: No rash or pruritis. · Neurological: No headache, diplopia, change in muscle strength, numbness or tingling. · Psychiatric: No anxiety or depression. · Endocrine: No temperature intolerance. No excessive thirst, fluid intake, or urination. No tremor. · Hematologic/Lymphatic: No abnormal bruising or bleeding, blood clots or swollen lymph nodes. · Allergic/Immunologic: No nasal congestion or hives. Physical Exam:   /84   Pulse 60   Ht 5' 8\" (1.727 m)   Wt 155 lb (70.3 kg)   SpO2 96%   BMI 23.57 kg/m²   Wt Readings from Last 3 Encounters:   06/13/22 155 lb (70.3 kg)   06/07/22 153 lb 9.6 oz (69.7 kg)   05/25/22 155 lb (70.3 kg)     Constitutional: He is oriented to person, place, and time. He appears well-developed and well-nourished. In no acute distress. Head: Normocephalic and atraumatic. Pupils equal and round. Neck: Neck supple. No JVP or carotid bruit appreciated. No mass and no thyromegaly present. No lymphadenopathy present. Cardiovascular: Normal rate. Normal heart sounds.  Exam reveals no gallop and no friction rub. No murmur heard. Pulmonary/Chest: Effort normal and breath sounds normal. No respiratory distress. He has no wheezes, rhonchi or rales. Abdominal: Soft, non-tender. Bowel sounds are normal. He exhibits no organomegaly, mass or bruit. Extremities: No edema. No cyanosis or clubbing. Pulses are 2+ radial/carotid bilaterally. Neurological: No gross cranial nerve deficit. Coordination normal.   Skin: Skin is warm and dry. There is no rash or diaphoresis. Psychiatric: He has a normal mood and affect. His speech is normal and behavior is normal.     Lab Review:   FLP:    Lab Results   Component Value Date    TRIG 123 04/24/2022    HDL 40 04/24/2022    LDLCALC 132 04/24/2022    LABVLDL 25 04/24/2022     BUN/Creatinine:    Lab Results   Component Value Date    BUN 10 05/25/2022    CREATININE 1.0 05/25/2022     PT/INR, TNI, HGB A1C:   Lab Results   Component Value Date/Time    TROPONINI <0.01 04/26/2022 07:18 AM    LABA1C 5.8 04/24/2022 05:31 AM      No results found for: CBCAUTODIF    EKG Interpretation: NSR, normal ECG     Stress perfusion 6/11/22  Abnormal study. There is a small sized, mild intensity, partially reversible    defect of the mid to apical inferior wall. There may be diaphragm    attenuation artifact but the rest images appear worse. Cannot rule out    inferior ischemia.    Normal LV size and systolic function.    Overall findings represent a low to intermediate risk study. CT Chest ABD 6/13/22      All above diagnostic testing and laboratory data was independently visualized and reviewed by me (not simply review of report)       Assessment and Plan   1) abnormal stress test  Denies any exertional symptoms  Stress abnormal and suggestive of CAD  I recommend the patient pursue a left heart catheterization with possible percutaneous coronary intervention. The risks, benefits and alternatives of the procedure were discussed with the patient.  The risks include but are not limited to: vascular injury, bleeding, infection, injury to surrounding structures, stroke, myocardial infarction and death. The patient is amenable to undergoing the procedure. We will have this scheduled. Follow up post procedure     Thank you very much for allowing me to participate in the care of your patient. Please do not hesitate to contact me if you have any questions. Daniel Raygoza MD [email or web address], Interventional Cardiology, and Peripheral Vascular Disease   [facility]   Ph: [phone]  Fax: [phone]    This note was scribed in the presence of Dr. Daniel Raygoza MD by Pema Landaverde RN    Physician Attestation:  The scribes documentation has been prepared under my direction and personally reviewed by me in its entirety. I confirm the note above accurately reflects all work, treatment, procedures, and medical decision making performed by me.     Electronically signed by Mariella Babin MD on 7/13/2022 at 1:53 PM

## 2022-06-13 NOTE — TELEPHONE ENCOUNTER
Pt was seen in office today and was scheduled for a [facility] on 6/15/22 at 10:30. Arrival time is 9 am. Pt is agreeable to date/time. Went over pre-procedure instructions. Pt v/u. Published on [employer] and e-mail to [facility].

## 2022-06-13 NOTE — TELEPHONE ENCOUNTER
Procedure needs to be rescheduled to 6/17/22 at 7:30 am. Arrival time is 6:30 am.     Attempted to call pt and son.

## 2022-06-13 NOTE — TELEPHONE ENCOUNTER
Pt is scheduled for left heart cath on 6-16-22 with Dr Sheridan Luis. Pt ran out of Plavix and did not request a refill. Per Dr Shira Dave, RF sent to [facility]. Prerna Jauregui would like to move forward with bypass surgery on Friday 6-17-22. Miriam informed. Surgery will be contingent upon results of heart cath. Prerna Jauregui informed.

## 2022-06-13 NOTE — PATIENT INSTRUCTIONS
Patient Education        Learning About Coronary Artery Disease (CAD)  What is coronary artery disease? Coronary artery disease is a condition that occurs when plaque builds up in the arteries that bring oxygen-rich blood to your heart. Plaque is a fatty substance made of cholesterol, calcium, and other substances in the blood. Thisprocess is called hardening of the arteries, or atherosclerosis. What happens when you have coronary artery disease?  Plaque may narrow the coronary arteries. Narrowed arteries cause poor blood flow. This can lead to angina symptoms such as chest pain or discomfort. If blood flow is completely blocked, you could have a heart attack.  You can slow and reduce the risk of future problems by making changes in your lifestyle. These include quitting smoking and eating heart-healthy foods.  Treatment, along with changes in your lifestyle, can help you live a longer and healthier life. How can you prevent coronary artery disease?  Do not smoke. It may be the best thing you can do to prevent coronary artery disease. If you need help quitting, talk to your doctor about stop-smoking programs and medicines. These can increase your chances of quitting for good.  Be active. Try to do moderate activity at least 2½ hours a week. Or try to do vigorous activity at least 1¼ hours a week. You may want to walk or try other activities, such as running, swimming, cycling, or playing tennis or team sports.  Eat heart-healthy foods. Eat more fruits and vegetables and less food that contains saturated and trans fats. Limit alcohol, sodium, and sweets.  Stay at a healthy weight. Lose weight if you need to.  Manage other health problems such as diabetes, high blood pressure, and high cholesterol. How is coronary artery disease treated?  Your doctor will suggest that you make lifestyle changes.  For example, your doctor may ask you to eat healthy foods, quit smoking, lose extra weight, and be more active.  You will take medicines that help prevent a heart attack.  Your doctor may suggest a procedure to open narrowed or blocked arteries. This is called angioplasty. Or your doctor may suggest using healthy blood vessels to create detours around narrowed or blocked arteries. This is called bypass surgery. Follow-up care is a key part of your treatment and safety. Be sure to make and go to all appointments, and call your doctor if you are having problems. It's also a good idea to know your test results and keep alist of the medicines you take. Where can you learn more? Go to https://[employer]. org and sign in to your [employer] account. Enter [phone] in the "[employer] box to learn more about \"Learning About Coronary Artery Disease (CAD). \"     If you do not have an account, please click on the \"Sign Up Now\" link. Current as of: January 10, 2022               Content Version: 13.2  © 2006-2022 [employer]. Care instructions adapted under license by [facility] ([facility]). If you have questions about a medical condition or this instruction, always ask your healthcare professional. [place] [address] any warranty or liability for your use of this information. Scheduled for left heart catheterization with Dr. Jeovany Alas. Procedure is scheduled for 6/15/22 at 10:30. Arrival time is 9:00 am.     The morning of your procedure you will park in the hospital parking lot and report directly to the cath lab to check in.     Pre-Procedure Instructions   1. You will need to fast for at least 8 hours prior to procedure. No caffeine, gum or mints the morning of procedure. 2. Hold all diabetic medications including, Metformin. If you take Lantus/Levemir only take ½ your normal dose the evening before. All other medications can be taken in the morning with sips of water. 3. You will need to take 325 mg aspirin the morning of.   If you are currently taking 81 mg please take 4 tablets that morning. 4. Do not use any lotions, creams or perfume the morning of procedure. 5. Pre-procedure lab work will need to be completed 5-7 days prior to procedure. 6. Please have a responsible adult to drive you home after procedure. We advise you have someone stay with you for the first 24 hours for precautionary measures. Depending on your procedure you may require an overnight stay. 7. Cath lab will provide you with all post procedure instructions. If you have any questions regarding the procedure itself or medications, please call [phone] and ask to speak with a nurse. Case published to [place] and form emailed to Loco bell in the cath lab.

## 2022-06-14 NOTE — TELEPHONE ENCOUNTER
Received call from cath lab that procedure can still be on for tomorrow. Offered pt both spots of 6/15 at 9:30 or 6/17 at 730. Pt states that he is going to do tomorrow 6/15 at 9:30 am. Arrival time is 8 am, but he is going to speak with his son and call back to confirm.

## 2022-06-14 NOTE — TELEPHONE ENCOUNTER
Pt called back to confirm date/time of 6/15 at 9:30. Arrival time is 8 am. Pt states that he is going to have an uber bring him. Advised pt that he has to have a ride home and he states that his son is going to pick him up. Katharina Brunner was updated and cath lab was notified.

## 2022-06-15 ENCOUNTER — HOSPITAL ENCOUNTER (OUTPATIENT)
Dept: CARDIAC CATH/INVASIVE PROCEDURES | Age: 72
Discharge: HOME | End: 2022-06-15
Payer: MEDICARE

## 2022-06-15 VITALS
HEIGHT: 68 IN | HEART RATE: 59 BPM | TEMPERATURE: 98.2 F | RESPIRATION RATE: 16 BRPM | BODY MASS INDEX: 23.49 KG/M2 | DIASTOLIC BLOOD PRESSURE: 91 MMHG | SYSTOLIC BLOOD PRESSURE: 196 MMHG | OXYGEN SATURATION: 97 % | WEIGHT: 155 LBS

## 2022-06-15 PROCEDURE — 6360000002 HC RX W HCPCS

## 2022-06-15 PROCEDURE — 85347 COAGULATION TIME ACTIVATED: CPT

## 2022-06-15 PROCEDURE — 99152 MOD SED SAME PHYS/QHP 5/>YRS: CPT

## 2022-06-15 PROCEDURE — 2580000003 HC RX 258

## 2022-06-15 PROCEDURE — 92928 PRQ TCAT PLMT NTRAC ST 1 LES: CPT | Performed by: INTERNAL MEDICINE

## 2022-06-15 PROCEDURE — 2709999900 HC NON-CHARGEABLE SUPPLY

## 2022-06-15 PROCEDURE — 6370000000 HC RX 637 (ALT 250 FOR IP)

## 2022-06-15 PROCEDURE — 2500000003 HC RX 250 WO HCPCS

## 2022-06-15 PROCEDURE — C1753 CATH, INTRAVAS ULTRASOUND: HCPCS

## 2022-06-15 PROCEDURE — 93454 CORONARY ARTERY ANGIO S&I: CPT | Performed by: INTERNAL MEDICINE

## 2022-06-15 PROCEDURE — 92978 ENDOLUMINL IVUS OCT C 1ST: CPT

## 2022-06-15 PROCEDURE — C1887 CATHETER, GUIDING: HCPCS

## 2022-06-15 PROCEDURE — 93454 CORONARY ARTERY ANGIO S&I: CPT

## 2022-06-15 PROCEDURE — 99152 MOD SED SAME PHYS/QHP 5/>YRS: CPT | Performed by: INTERNAL MEDICINE

## 2022-06-15 PROCEDURE — 92978 ENDOLUMINL IVUS OCT C 1ST: CPT | Performed by: INTERNAL MEDICINE

## 2022-06-15 PROCEDURE — C1874 STENT, COATED/COV W/DEL SYS: HCPCS

## 2022-06-15 PROCEDURE — C1769 GUIDE WIRE: HCPCS

## 2022-06-15 PROCEDURE — C1725 CATH, TRANSLUMIN NON-LASER: HCPCS

## 2022-06-15 PROCEDURE — 99153 MOD SED SAME PHYS/QHP EA: CPT

## 2022-06-15 PROCEDURE — C1894 INTRO/SHEATH, NON-LASER: HCPCS

## 2022-06-15 PROCEDURE — C9600 PERC DRUG-EL COR STENT SING: HCPCS

## 2022-06-15 PROCEDURE — 6360000004 HC RX CONTRAST MEDICATION: Performed by: INTERNAL MEDICINE

## 2022-06-15 RX ORDER — SODIUM CHLORIDE 0.9 % (FLUSH) 0.9 %
5-40 SYRINGE (ML) INJECTION PRN
Status: DISCONTINUED | OUTPATIENT
Start: 2022-06-15 | End: 2022-06-16 | Stop reason: HOSPADM

## 2022-06-15 RX ORDER — ACETAMINOPHEN 325 MG/1
650 TABLET ORAL EVERY 4 HOURS PRN
Status: DISCONTINUED | OUTPATIENT
Start: 2022-06-15 | End: 2022-06-16 | Stop reason: HOSPADM

## 2022-06-15 RX ORDER — SODIUM CHLORIDE 0.9 % (FLUSH) 0.9 %
5-40 SYRINGE (ML) INJECTION EVERY 12 HOURS SCHEDULED
Status: DISCONTINUED | OUTPATIENT
Start: 2022-06-15 | End: 2022-06-16 | Stop reason: HOSPADM

## 2022-06-15 RX ORDER — SODIUM CHLORIDE 9 MG/ML
INJECTION, SOLUTION INTRAVENOUS PRN
Status: DISCONTINUED | OUTPATIENT
Start: 2022-06-15 | End: 2022-06-16 | Stop reason: HOSPADM

## 2022-06-15 RX ORDER — ASPIRIN 325 MG
325 TABLET ORAL ONCE
Status: DISCONTINUED | OUTPATIENT
Start: 2022-06-15 | End: 2022-06-16 | Stop reason: HOSPADM

## 2022-06-15 RX ADMIN — IOPAMIDOL 90 ML: 755 INJECTION, SOLUTION INTRAVENOUS at 10:29

## 2022-06-15 NOTE — OP NOTE
[address]   Procedure Note    CLINICAL HISTORY:       Elton Jacob is a 70 y.o. male with a history of peripheral arterial disease, with an abnormal nuclear stress test       Patient Active Problem List   Diagnosis    Arthritis of left knee    Basal cell carcinoma (BCC) of skin of nose    Peripheral arterial disease (HCC)    Closed right hip fracture (HCC)    Numbness of right foot    [facility] fracture, right, closed, initial encounter ([facility] [address]    Closed subcapital fracture of femur, right, initial encounter ([facility] [address]       Prior to Admission medications    Medication Sig Start Date End Date Taking? Authorizing Provider   clopidogrel (PLAVIX) 75 MG tablet Take 1 tablet by mouth daily 6/13/22   Lulu Panda, DO   diclofenac sodium (VOLTAREN) 1 % GEL Apply 2 g topically 2 times daily 4/28/22   Corina Gama, DO   sennosides-docusate sodium (SENOKOT-S) 8.6-50 MG tablet Take 1 tablet by mouth daily as needed for Constipation 4/28/22   Corina Gama,    albuterol sulfate  (90 Base) MCG/ACT inhaler Inhale 1-2 puffs into the lungs every 6 hours as needed for Shortness of Breath  2/9/22   Historical Provider, MD   aspirin EC 81 MG EC tablet Take 1 tablet by mouth 2 times daily Take for DVT blood clot prophylaxis. Please avoid missing doses. 4/24/22 6/13/22  TALITA Elise - CNP   hydrOXYzine (VISTARIL) 25 MG capsule  3/2/22   Historical Provider, MD          The risks, benefits, and details of the procedure were explained to the patient. The patient verbalized understanding and wanted to proceed. Informed written consent was obtained. INDICATION:  Abnormal nuclear stress test     PROCEDURES PERFORMED:   Coronary angiogram   IVUS   PCI     PROCEDURE TECHNIQUE:  The patient was approached from the right radial artery using a 5-6  French slender sheath. Left coronary angiography was done using a Sonia L3.5 diagnostic catheter.   Right coronary angiography was done using a Sonia R4 guide catheter. CONTRAST:  Total of 90 cc. COMPLICATIONS:  None. At the end of the procedure a TR device was used for hemostasis. EBL: 5 cc    Moderate Sedation:  Start time: 944  Stop time: 1025  8 mg versed   400 mcg fentanyl   An independent trained observer pushed medications at my direction. We monitored the patient's level of consciousness and vital signs/physiologic status throughout the procedure duration (see start and start times above). ANGIOGRAM/CORONARY ARTERIOGRAM:       The left main coronary artery is normal .    The left anterior descending artery has a mid 95% lesino . The left circumflex artery is normal .    The right coronary artery is normal    RPDA ~ 60%. .      INTERVENTION  1. Guide catheter: XB3.5   2. Runthrough wire used to cross LAD Lesion   3. Predilation with 3.0 balloon   4. IVUS passed to distal LAD (reference diameter 3 mm)   5. Vanduser 3.5 X 18 mm TRISHA deployed to mid LAD  6. Post dilation with 4.0 NC balloon to 18 TAMEKA     SUMMARY:   Single vessel obstructive CAD   S/p successful IVUS guided PCI mid LAD X 1 TRISHA     RECOMMENDATION:      1. Recommend beta blockade, high potency statin, aspirin and brilinta for 12 months  2. Referral to cardiac rehab placed  3. Patient has been advised on the importance of regular exercise of at least 20-30 minutes daily. 4. D/c home today   5. Follow up in 1-2 weeks with cardiology    Discussed with dr. Katy Bartholomew; cleared for vascular sx and may proceed without further testing.  DAPT needs to continued for a minimum of 4 weeks and can then his second antiplatelet can be held    Isa Love MD [address] [facility], Interventional Cardiology, and Peripheral Vascular [address] [facility]   (C): [phone]  (O): [phone]

## 2022-06-15 NOTE — H&P
H&P     Chief Complaint  PREOP [place] for fem-pop bypass   PAD with rest pain     History of Present Illness  Patient is a 70 y.o. male with lifestyle limiting claudication requiring a femoropopliteal bypass a preoperative nuclear stress test was abnormal thus cardiology is consulted     Review of Systems  Review of Systems   Constitutional: Negative for chills and fever. Respiratory: Negative for shortness of breath. Cardiovascular: Negative for chest pain and leg swelling. Musculoskeletal:        Rest pain right foot   Skin: Positive for color change. Negative for wound. Neurological: Positive for numbness. Negative for weakness. Hematological: Does not bruise/bleed easily. Psychiatric/Behavioral: Negative for agitation. Past Medical History        Past Medical History:   Diagnosis Date    Basal cell carcinoma       L side of nose    Peripheral artery disease [facility])              Past Surgical History         Past Surgical History:   Procedure Laterality Date    HIP SURGERY Right 2022     HIP PINNING performed by Isabella Corona MD at [facility] [place] [address]            No Known Allergies     Social History               Socioeconomic History    Marital status:         Spouse name: Not on file    Number of children: Not on file    Years of education: Not on file    Highest education level: Not on file   Occupational History    Not on file   Tobacco Use    Smoking status: Former Smoker       Packs/day: 1.00       Years: 50.00       Pack years: 50.00       Quit date: 2022       Years since quittin.0    Smokeless tobacco: Never Used   Substance and Sexual Activity    Alcohol use: No    Drug use: Not Currently    Sexual activity: Not Currently       Partners: Female   Other Topics Concern    Not on file   Social History Narrative    Not on file      Social Determinants of Health          Financial Resource Strain:     Difficulty of Paying Living Expenses: Not on file   Food Insecurity: Worried About [facility] in the Last Year: Not on file    [place] of Food in the Last Year: Not on file   Transportation Needs:     Lack of Transportation (Medical): Not on file    Lack of Transportation (Non-Medical): Not on file   Physical Activity:     Days of Exercise per Week: Not on file    Minutes of Exercise per Session: Not on file   Stress:     Feeling of Stress : Not on file   Social Connections:     Frequency of Communication with Friends and Family: Not on file    Frequency of Social Gatherings with Friends and Family: Not on file    Attends Anabaptism Services: Not on file    Active Member of Clubs or Organizations: Not on file    Attends Club or Organization Meetings: Not on file    Marital Status: Not on file   Intimate Partner Violence:     Fear of Current or Ex-Partner: Not on file    Emotionally Abused: Not on file    Physically Abused: Not on file    Sexually Abused: Not on file   Housing Stability:     Unable to Pay for Housing in the Last Year: Not on file    Number of [place] in the Last Year: Not on file    Unstable Housing in the Last Year: Not on file            Family History   History reviewed.  No pertinent family history.     - No history of bleeding or clotting disorders     Vital Signs  Vitals       Vitals:     05/25/22 0830   BP: (!) 159/96   Pulse: 64   Resp: 16   Temp: 98.8 °F (37.1 °C)   TempSrc: Infrared   SpO2: 100%   Weight: 155 lb (70.3 kg)   Height: 5' 8\" (1.727 m)            Physical Examination  General: No acute distress  Psychiatric: affect appropriate  Head/Eyes/Ears/Nose/Throat:  Atraumatic, vision and hearing intact, face symmetric  Neck:  supple  Chest/Lungs: no tachypnea, retractions or cyanosis noted  Cardiac:  Regular rate and rhythm  Abdomen: soft, nontender  Extremities: warm and well perfused  - bilateral upper extremity motorsensory intact  - bilateral lower extremity motorsensory intact  Vascular exam:  - R femoral: palp  - L femoral: palp  - R DP: non palp  - L DP: palp  - R PT: non palp  - L PT: palp  - Radial: palp  Skin: no wounds     Labs        Lab Results   Component Value Date     WBC 6.5 2022     HGB 11.4 2022     HCT 34.6 2022     MCV 89.3 2022      2022            Lab Results   Component Value Date      2022     K 3.7 2022     K 4.1 2019      2022     CO2 23 2022     BUN 10 2022     CREATININE 1.0 2022      No results found for: GLU     Imaging: CTA  Mild inflow disease. Severe outflow disease on the right with occlusion of the proximal and mid   popliteal artery. Reconstituted distal popliteal artery is diminutive. Runoff disease bilaterally with diminutive posterior tibial arteries which   become occluded the upper calf. There is 2 vessel runoff to each foot. Assessment:   Right leg critical limb ischemia  Abnormal nuclear stress test    I have reviewed the history and physical and examined the patient and find no relevant changes. I have reviewed with the patient and/or family the risks, benefits, and alternatives to the procedure. Pre-sedation Assessment    Patient:  Renetta Crowe   :   1950  Intended Procedure: Coronary angiogram    Vitals:    06/15/22 0815   BP: (!) 196/91   Pulse: 59   Resp: 16   Temp: 98.2 °F (36.8 °C)   SpO2: 97%       Nursing notes reviewed and agreed.   Medications reviewed  Allergies: No Known Allergies      Pre-Procedure Assessment/Plan:  ASA 2 - Patient with mild systemic disease with no functional limitations    Mallampati Airway Assessment:  Mallampati Class I - (soft palate, fauces, uvula & anterior/posterior tonsillar pillars are visible)    Level of Sedation Plan:Mild sedation    Post Procedure plan: Return to same level of care    Shreyas Palomares MD [address] [facility], Interventional Cardiology, and Peripheral Vascular [address] [facility]   (C): [phone]  (O): [phone]

## 2022-06-16 ENCOUNTER — TELEPHONE (OUTPATIENT)
Dept: VASCULAR SURGERY | Age: 72
End: 2022-06-16

## 2022-06-16 ENCOUNTER — TELEPHONE (OUTPATIENT)
Dept: CARDIOLOGY CLINIC | Age: 72
End: 2022-06-16

## 2022-06-16 LAB — POC ACT LR: 300 SEC

## 2022-06-16 NOTE — TELEPHONE ENCOUNTER
Called and spoke to pt. He began [address] today. Surgery will be cancelled tomorrow. He will keep the appointment with Dr Samia Dempsey on 6-30 to discuss future treatment plan/rescheduling surgery. Pt verbalized understanding and will discuss with Navdeep West.

## 2022-06-16 NOTE — PROGRESS NOTES
Called patient regarding status post PCI. States feeling well, denies chest pain, states right radial site is good, no bleeding or swelling. Instructed to removed right wrist dressing and then OK to shower. Put bandaid on stick site for next 3 days. Patient stated he is going to delay his peripheral bypass surgery for a couple weeks.

## 2022-06-16 NOTE — TELEPHONE ENCOUNTER
Message on [facility] to call back. Dr Liza Gomez cleared the pt for surgery. Dr Kathy Espinoza wants to know if he is taking Plavix or Brillnta.

## 2022-06-16 NOTE — TELEPHONE ENCOUNTER
Ad's son Helene Gross called in this afternoon, he would like a call concerning his dad he states Dr. Carlene De Souza said his dad should wait and not get procedure done it is  scheduled on 6/17 with Dr. Carlos Enrique Dee. He can be reached at [phone].

## 2022-06-16 NOTE — TELEPHONE ENCOUNTER
Called and spoke with Danelle Patel, he questions if his father will have surgery tomorrow. Reviewed op note that patient is cleared for the vascular surgery by Dr. Deepthi Vaca but reviewed encounter from Dr. Sierra Adame office and encouraged to further discuss surgery timing with their office. Informed Dr. Deepthi Vaca spoke with Dr. Beverley Morales in regards to his \"clearance. \" He v/u and states he will follow up with their office.

## 2022-06-29 ENCOUNTER — TELEPHONE (OUTPATIENT)
Dept: CARDIOLOGY CLINIC | Age: 72
End: 2022-06-29

## 2022-06-29 NOTE — TELEPHONE ENCOUNTER
Called and spoke to [employer] Yesica and let her know that per Dr Tatum Ryan op note   Discussed with dr. Lorenza Goss; cleared for vascular sx and may proceed without further testing. DAPT needs to continued for a minimum of 4 weeks and can then his second antiplatelet can be held. Encouraged her to call with any additional questions or concerns.

## 2022-06-29 NOTE — TELEPHONE ENCOUNTER
Francheska from Dr. Anastacio Stuart office is asking if  is okay to change form patient taking  Brilinta 90 mg - 1 tablet by mouth 2 times daily to taking Plavix instead letting  get dose amount. Francheska from Dr. Cheryl Escalante states patient needs bypass and they do not want to do it while patient is taking Brilliants.     Petrona Dahl can be reached at [phone]

## 2022-06-30 ENCOUNTER — OFFICE VISIT (OUTPATIENT)
Dept: VASCULAR SURGERY | Age: 72
End: 2022-06-30
Payer: MEDICARE

## 2022-06-30 ENCOUNTER — TELEPHONE (OUTPATIENT)
Dept: SURGERY | Age: 72
End: 2022-06-30

## 2022-06-30 VITALS — SYSTOLIC BLOOD PRESSURE: 160 MMHG | DIASTOLIC BLOOD PRESSURE: 92 MMHG

## 2022-06-30 DIAGNOSIS — I70.229 CRITICAL LOWER LIMB ISCHEMIA (HCC): Primary | ICD-10-CM

## 2022-06-30 PROCEDURE — G8427 DOCREV CUR MEDS BY ELIG CLIN: HCPCS | Performed by: STUDENT IN AN ORGANIZED HEALTH CARE EDUCATION/TRAINING PROGRAM

## 2022-06-30 PROCEDURE — G8420 CALC BMI NORM PARAMETERS: HCPCS | Performed by: STUDENT IN AN ORGANIZED HEALTH CARE EDUCATION/TRAINING PROGRAM

## 2022-06-30 PROCEDURE — 3017F COLORECTAL CA SCREEN DOC REV: CPT | Performed by: STUDENT IN AN ORGANIZED HEALTH CARE EDUCATION/TRAINING PROGRAM

## 2022-06-30 PROCEDURE — 1123F ACP DISCUSS/DSCN MKR DOCD: CPT | Performed by: STUDENT IN AN ORGANIZED HEALTH CARE EDUCATION/TRAINING PROGRAM

## 2022-06-30 PROCEDURE — 1036F TOBACCO NON-USER: CPT | Performed by: STUDENT IN AN ORGANIZED HEALTH CARE EDUCATION/TRAINING PROGRAM

## 2022-06-30 PROCEDURE — 99214 OFFICE O/P EST MOD 30 MIN: CPT | Performed by: STUDENT IN AN ORGANIZED HEALTH CARE EDUCATION/TRAINING PROGRAM

## 2022-06-30 RX ORDER — CLOPIDOGREL BISULFATE 75 MG/1
75 TABLET ORAL DAILY
Qty: 8 TABLET | Refills: 0 | Status: ON HOLD | OUTPATIENT
Start: 2022-06-30 | End: 2022-07-12 | Stop reason: SDUPTHER

## 2022-06-30 RX ORDER — DIAZEPAM 10 MG/1
10 TABLET ORAL NIGHTLY PRN
COMMUNITY
End: 2022-09-29

## 2022-06-30 RX ORDER — OXYCODONE HYDROCHLORIDE AND ACETAMINOPHEN 5; 325 MG/1; MG/1
1 TABLET ORAL EVERY 6 HOURS PRN
Qty: 20 TABLET | Refills: 0 | Status: ON HOLD | OUTPATIENT
Start: 2022-06-30 | End: 2022-07-07 | Stop reason: HOSPADM

## 2022-06-30 RX ORDER — NAPROXEN 500 MG/1
TABLET ORAL
Status: ON HOLD | COMMUNITY
Start: 2022-06-13 | End: 2022-07-07 | Stop reason: HOSPADM

## 2022-06-30 ASSESSMENT — ENCOUNTER SYMPTOMS
COLOR CHANGE: 1
SHORTNESS OF BREATH: 0

## 2022-06-30 NOTE — PROGRESS NOTES
Nury Jeff (:  1950) is a 70 y.o. male,Established patient, here for evaluation of the following chief complaint(s):  Claudication (Discuss surgery)         ASSESSMENT/PLAN:  1. Critical lower limb ischemia [facility])       This is a 70year old male patient who presents for known CLTI. The patient is having worsening pain daily. He is otherwise staple s/p heart catheterization. Discussed with Dr. Elen Estrada, will proceed with right leg fem-pop bypass on aspirin and plavix next Tuesday. Patient understands increased risk of bleeding. He understands the other associated risks of infection, pain, MI, stroke, death, respiratory failure. Will schedule for next week. All questions answered. Okay for another prescription for pain medicine given severity of pain. Subjective   SUBJECTIVE/OBJECTIVE:  This is a 70year old male patient who presents for follow up. He is now two weeks s/p LAD stent placement. He is 15 days out from that procedure. He is having still persistent rest pain in his right lower extremity. He now has a small medial ankle ulcer. He is on aspirin and Brilinta. He is having trouble sleeping and wants a sleeping pill. His history is also significant for right sided hip pinning. Review of Systems   Constitutional: Negative for chills and fever. Respiratory: Negative for shortness of breath. Cardiovascular: Negative for chest pain and leg swelling. Skin: Positive for color change. Dependent rubor   Neurological: Positive for numbness. Negative for weakness. Hematological: Does not bruise/bleed easily. Objective   Physical Exam  Constitutional:       Appearance: Normal appearance. Cardiovascular:      Rate and Rhythm: Normal rate and regular rhythm. Pulmonary:      Effort: Pulmonary effort is normal. No respiratory distress. Musculoskeletal:      Right lower leg: No edema. Skin:     Capillary Refill: Capillary refill takes less than 2 seconds. Comments: Dependent rubor right foot, small less than dime size medial ankle dry ulceration   Neurological:      General: No focal deficit present. Mental Status: He is alert. Psychiatric:         Mood and Affect: Mood normal.         Behavior: Behavior normal.         Thought Content:  Thought content normal.         Judgment: Judgment normal.            Nicolle Zambrano DO, FSVS, [address] [facility] Vascular and Endovascular Surgery

## 2022-06-30 NOTE — TELEPHONE ENCOUNTER
Mr. Mayur Steve said he was confused- he wanted to make sure he was to stop the [address]. We went over the medication again. He will start Plavix tomorrow, 300 mg on Friday, then 75 mg Sat through Tues. Fem-pop bypass being done on Tuesday am at 7:30 with Dr. Josseline Rankin.

## 2022-07-01 ENCOUNTER — ANESTHESIA EVENT (OUTPATIENT)
Dept: OPERATING ROOM | Age: 72
DRG: 253 | End: 2022-07-01
Payer: MEDICARE

## 2022-07-01 RX ORDER — IBUPROFEN 200 MG
400 TABLET ORAL EVERY 6 HOURS PRN
Status: ON HOLD | COMMUNITY
End: 2022-07-12 | Stop reason: HOSPADM

## 2022-07-01 NOTE — PROGRESS NOTES
[address] [facility] [place] time_____0600_______        Surgery time_____0730_______    Take the following medications with a sip of water: Follow your MD/Surgeons pre-procedure instructions regarding your medications     Do not eat or drink anything after 12:00 midnight prior to your surgery. This includes water chewing gum, mints and ice chips. You may brush your teeth and gargle the morning of your surgery, but do not swallow the water     Please see your family doctor/pediatrician for a history and physical and/or concerning medications. Bring any test results/reports from your physicians office. If you are under the care of a heart doctor or specialist doctor, please be aware that you may be asked to them for clearance    You may be asked to stop blood thinners such as Coumadin, Plavix, Fragmin, Lovenox, etc., or any anti-inflammatories such as:  Aspirin, Ibuprofen, Advil, Naproxen prior to your surgery. We also ask that you stop any OTC medications such as fish oil, vitamin E, glucosamine, garlic, Multivitamins, COQ 10, etc.    We ask that you do not smoke 24 hours prior to surgery  We ask that you do not  drink any alcoholic beverages 24 hours prior to surgery     You must make arrangements for a responsible adult to take you home after your surgery. For your safety you will not be allowed to leave alone or drive yourself home. Your surgery will be cancelled if you do not have a ride home. Also for your safety, it is strongly suggested that someone stay with you the first 24 hours after your surgery. A parent or legal guardian must accompany a child scheduled for surgery and plan to stay at the hospital until the child is discharged. Please do not bring other children with you. For your comfort, please wear simple loose fitting clothing to the hospital.  Please do not bring valuables.     Do not wear any make-up or nail polish on your fingers or toes      For your safety, please do not wear any jewelry or body piercing's on the day of surgery. All jewelry must be removed. If you have dentures, they will be removed before going to operating room. For your convenience, we will provide you with a container. If you wear contact lenses or glasses, they will be removed, please bring a case for them. If you have a living will and a durable power of  for healthcare, please bring in a copy. As part of our patient safety program to minimize surgical site infections, we ask you to do the following:    · Please notify your surgeon if you develop any illness between         now and the  day of your surgery. · This includes a cough, cold, fever, sore throat, nausea,         or vomiting, and diarrhea, etc.  ·  Please notify your surgeon if you experience dizziness, shortness         of breath or blurred vision between now and the time of your surgery. Do not shave your operative site 96 hours prior to surgery. For face and neck surgery, men may use an electric razor 48 hours   prior to surgery. You may shower the night before surgery or the morning of   your surgery with an antibacterial soap. You will need to bring a photo ID and insurance card    [facility] has an onsite pharmacy, would you like to utilize our pharmacy     If you will be staying overnight and use a C-pap machine, please bring   your C-pap to hospital     Our goal is to provide you with excellent care, therefore, visitors will be limited to two(2) in the room at a time so that we may focus on providing this care for you. Please contact pre-admission testing if you have any further questions. [facility] phone number:  [phone] PAT fax number:  [phone]  Please note these are generalized instructions for all surgical cases, you may be provided with more specific instructions according to your surgery.     C-Difficile admission screening and protocol:       * Admitted with diarrhea? [] YES    [x]  NO     *Prior history of C-Diff. In last 3 months? [] YES    [x]  NO     *Antibiotic use in the past 6-8 weeks? [x]  NO    []  YES                 If yes, which ANTIBIOTIC AND REASON______     *Prior hospitalization or nursing home in the last month? []  YES    [x]  NO        SAFETY FIRST. .call before you fall

## 2022-07-05 ENCOUNTER — HOSPITAL ENCOUNTER (INPATIENT)
Age: 72
LOS: 2 days | Discharge: NURSING, REHAB OR LONG-TERM CARE | DRG: 253 | End: 2022-07-07
Attending: STUDENT IN AN ORGANIZED HEALTH CARE EDUCATION/TRAINING PROGRAM | Admitting: STUDENT IN AN ORGANIZED HEALTH CARE EDUCATION/TRAINING PROGRAM
Payer: MEDICARE

## 2022-07-05 ENCOUNTER — ANESTHESIA (OUTPATIENT)
Dept: OPERATING ROOM | Age: 72
DRG: 253 | End: 2022-07-05
Payer: MEDICARE

## 2022-07-05 LAB
ABO/RH: NORMAL
ANION GAP SERPL CALCULATED.3IONS-SCNC: 15 MMOL/L (ref 3–16)
ANTIBODY SCREEN: NORMAL
APTT: 25.5 SEC (ref 23–34.3)
BUN BLDV-MCNC: 15 MG/DL (ref 7–20)
CALCIUM SERPL-MCNC: 9.5 MG/DL (ref 8.3–10.6)
CHLORIDE BLD-SCNC: 103 MMOL/L (ref 99–110)
CO2: 23 MMOL/L (ref 21–32)
CREAT SERPL-MCNC: 0.8 MG/DL (ref 0.8–1.3)
GFR AFRICAN AMERICAN: >60
GFR NON-AFRICAN AMERICAN: >60
GLUCOSE BLD-MCNC: 111 MG/DL (ref 70–99)
HCT VFR BLD CALC: 38.9 % (ref 40.5–52.5)
HEMOGLOBIN: 13 G/DL (ref 13.5–17.5)
INR BLD: 0.99 (ref 0.87–1.14)
MCH RBC QN AUTO: 28.5 PG (ref 26–34)
MCHC RBC AUTO-ENTMCNC: 33.4 G/DL (ref 31–36)
MCV RBC AUTO: 85.3 FL (ref 80–100)
PDW BLD-RTO: 16.1 % (ref 12.4–15.4)
PLATELET # BLD: 453 K/UL (ref 135–450)
PMV BLD AUTO: 6.8 FL (ref 5–10.5)
POTASSIUM REFLEX MAGNESIUM: 4.6 MMOL/L (ref 3.5–5.1)
PROTHROMBIN TIME: 13 SEC (ref 11.7–14.5)
RBC # BLD: 4.55 M/UL (ref 4.2–5.9)
SODIUM BLD-SCNC: 141 MMOL/L (ref 136–145)
WBC # BLD: 8.5 K/UL (ref 4–11)

## 2022-07-05 PROCEDURE — 3600000005 HC SURGERY LEVEL 5 BASE: Performed by: STUDENT IN AN ORGANIZED HEALTH CARE EDUCATION/TRAINING PROGRAM

## 2022-07-05 PROCEDURE — 86850 RBC ANTIBODY SCREEN: CPT

## 2022-07-05 PROCEDURE — 6360000002 HC RX W HCPCS: Performed by: ANESTHESIOLOGY

## 2022-07-05 PROCEDURE — 3600000015 HC SURGERY LEVEL 5 ADDTL 15MIN: Performed by: STUDENT IN AN ORGANIZED HEALTH CARE EDUCATION/TRAINING PROGRAM

## 2022-07-05 PROCEDURE — 2100000000 HC CCU R&B

## 2022-07-05 PROCEDURE — 2580000003 HC RX 258: Performed by: STUDENT IN AN ORGANIZED HEALTH CARE EDUCATION/TRAINING PROGRAM

## 2022-07-05 PROCEDURE — 3700000000 HC ANESTHESIA ATTENDED CARE: Performed by: STUDENT IN AN ORGANIZED HEALTH CARE EDUCATION/TRAINING PROGRAM

## 2022-07-05 PROCEDURE — 6370000000 HC RX 637 (ALT 250 FOR IP): Performed by: ANESTHESIOLOGY

## 2022-07-05 PROCEDURE — 86900 BLOOD TYPING SEROLOGIC ABO: CPT

## 2022-07-05 PROCEDURE — 2500000003 HC RX 250 WO HCPCS: Performed by: NURSE ANESTHETIST, CERTIFIED REGISTERED

## 2022-07-05 PROCEDURE — 6360000002 HC RX W HCPCS: Performed by: STUDENT IN AN ORGANIZED HEALTH CARE EDUCATION/TRAINING PROGRAM

## 2022-07-05 PROCEDURE — A4217 STERILE WATER/SALINE, 500 ML: HCPCS | Performed by: STUDENT IN AN ORGANIZED HEALTH CARE EDUCATION/TRAINING PROGRAM

## 2022-07-05 PROCEDURE — 86901 BLOOD TYPING SEROLOGIC RH(D): CPT

## 2022-07-05 PROCEDURE — 6370000000 HC RX 637 (ALT 250 FOR IP): Performed by: STUDENT IN AN ORGANIZED HEALTH CARE EDUCATION/TRAINING PROGRAM

## 2022-07-05 PROCEDURE — 7100000001 HC PACU RECOVERY - ADDTL 15 MIN

## 2022-07-05 PROCEDURE — 80048 BASIC METABOLIC PNL TOTAL CA: CPT

## 2022-07-05 PROCEDURE — 7100000000 HC PACU RECOVERY - FIRST 15 MIN

## 2022-07-05 PROCEDURE — 85027 COMPLETE CBC AUTOMATED: CPT

## 2022-07-05 PROCEDURE — 041K09L BYPASS RIGHT FEMORAL ARTERY TO POPLITEAL ARTERY WITH AUTOLOGOUS VENOUS TISSUE, OPEN APPROACH: ICD-10-PCS | Performed by: STUDENT IN AN ORGANIZED HEALTH CARE EDUCATION/TRAINING PROGRAM

## 2022-07-05 PROCEDURE — 2709999900 HC NON-CHARGEABLE SUPPLY: Performed by: STUDENT IN AN ORGANIZED HEALTH CARE EDUCATION/TRAINING PROGRAM

## 2022-07-05 PROCEDURE — 2580000003 HC RX 258: Performed by: NURSE ANESTHETIST, CERTIFIED REGISTERED

## 2022-07-05 PROCEDURE — 2580000003 HC RX 258: Performed by: ANESTHESIOLOGY

## 2022-07-05 PROCEDURE — 6360000002 HC RX W HCPCS: Performed by: NURSE ANESTHETIST, CERTIFIED REGISTERED

## 2022-07-05 PROCEDURE — C1726 CATH, BAL DIL, NON-VASCULAR: HCPCS | Performed by: STUDENT IN AN ORGANIZED HEALTH CARE EDUCATION/TRAINING PROGRAM

## 2022-07-05 PROCEDURE — 85730 THROMBOPLASTIN TIME PARTIAL: CPT

## 2022-07-05 PROCEDURE — 35556 ART BYP GRFT FEM-POPLITEAL: CPT | Performed by: STUDENT IN AN ORGANIZED HEALTH CARE EDUCATION/TRAINING PROGRAM

## 2022-07-05 PROCEDURE — 85610 PROTHROMBIN TIME: CPT

## 2022-07-05 PROCEDURE — 3700000001 HC ADD 15 MINUTES (ANESTHESIA): Performed by: STUDENT IN AN ORGANIZED HEALTH CARE EDUCATION/TRAINING PROGRAM

## 2022-07-05 RX ORDER — CLOPIDOGREL BISULFATE 75 MG/1
75 TABLET ORAL DAILY
Status: DISCONTINUED | OUTPATIENT
Start: 2022-07-05 | End: 2022-07-07 | Stop reason: HOSPADM

## 2022-07-05 RX ORDER — DIAZEPAM 5 MG/1
10 TABLET ORAL NIGHTLY PRN
Status: DISCONTINUED | OUTPATIENT
Start: 2022-07-05 | End: 2022-07-07 | Stop reason: HOSPADM

## 2022-07-05 RX ORDER — OXYCODONE HYDROCHLORIDE 5 MG/1
5 TABLET ORAL
Status: COMPLETED | OUTPATIENT
Start: 2022-07-05 | End: 2022-07-05

## 2022-07-05 RX ORDER — HEPARIN SODIUM 1000 [USP'U]/ML
INJECTION, SOLUTION INTRAVENOUS; SUBCUTANEOUS PRN
Status: DISCONTINUED | OUTPATIENT
Start: 2022-07-05 | End: 2022-07-05 | Stop reason: SDUPTHER

## 2022-07-05 RX ORDER — SODIUM CHLORIDE 0.9 % (FLUSH) 0.9 %
5-40 SYRINGE (ML) INJECTION PRN
Status: DISCONTINUED | OUTPATIENT
Start: 2022-07-05 | End: 2022-07-05 | Stop reason: HOSPADM

## 2022-07-05 RX ORDER — SODIUM CHLORIDE 9 MG/ML
INJECTION, SOLUTION INTRAVENOUS CONTINUOUS
Status: DISCONTINUED | OUTPATIENT
Start: 2022-07-05 | End: 2022-07-06

## 2022-07-05 RX ORDER — SODIUM CHLORIDE 9 MG/ML
INJECTION, SOLUTION INTRAVENOUS PRN
Status: DISCONTINUED | OUTPATIENT
Start: 2022-07-05 | End: 2022-07-05 | Stop reason: HOSPADM

## 2022-07-05 RX ORDER — SODIUM CHLORIDE 0.9 % (FLUSH) 0.9 %
5-40 SYRINGE (ML) INJECTION EVERY 12 HOURS SCHEDULED
Status: DISCONTINUED | OUTPATIENT
Start: 2022-07-05 | End: 2022-07-05 | Stop reason: HOSPADM

## 2022-07-05 RX ORDER — SODIUM CHLORIDE 9 MG/ML
INJECTION, SOLUTION INTRAVENOUS PRN
Status: DISCONTINUED | OUTPATIENT
Start: 2022-07-05 | End: 2022-07-07 | Stop reason: HOSPADM

## 2022-07-05 RX ORDER — SODIUM CHLORIDE 9 MG/ML
INJECTION, SOLUTION INTRAVENOUS CONTINUOUS
Status: DISCONTINUED | OUTPATIENT
Start: 2022-07-05 | End: 2022-07-05

## 2022-07-05 RX ORDER — PROTAMINE SULFATE 10 MG/ML
INJECTION, SOLUTION INTRAVENOUS PRN
Status: DISCONTINUED | OUTPATIENT
Start: 2022-07-05 | End: 2022-07-05 | Stop reason: SDUPTHER

## 2022-07-05 RX ORDER — SODIUM CHLORIDE 0.9 % (FLUSH) 0.9 %
5-40 SYRINGE (ML) INJECTION PRN
Status: DISCONTINUED | OUTPATIENT
Start: 2022-07-05 | End: 2022-07-05 | Stop reason: SDUPTHER

## 2022-07-05 RX ORDER — MAGNESIUM HYDROXIDE 1200 MG/15ML
LIQUID ORAL CONTINUOUS PRN
Status: COMPLETED | OUTPATIENT
Start: 2022-07-05 | End: 2022-07-05

## 2022-07-05 RX ORDER — ONDANSETRON 2 MG/ML
INJECTION INTRAMUSCULAR; INTRAVENOUS PRN
Status: DISCONTINUED | OUTPATIENT
Start: 2022-07-05 | End: 2022-07-05 | Stop reason: SDUPTHER

## 2022-07-05 RX ORDER — ENOXAPARIN SODIUM 100 MG/ML
40 INJECTION SUBCUTANEOUS DAILY
Status: DISCONTINUED | OUTPATIENT
Start: 2022-07-06 | End: 2022-07-07 | Stop reason: HOSPADM

## 2022-07-05 RX ORDER — ONDANSETRON 2 MG/ML
4 INJECTION INTRAMUSCULAR; INTRAVENOUS EVERY 6 HOURS PRN
Status: DISCONTINUED | OUTPATIENT
Start: 2022-07-05 | End: 2022-07-07 | Stop reason: HOSPADM

## 2022-07-05 RX ORDER — SODIUM CHLORIDE 9 MG/ML
INJECTION, SOLUTION INTRAVENOUS PRN
Status: DISCONTINUED | OUTPATIENT
Start: 2022-07-05 | End: 2022-07-05 | Stop reason: SDUPTHER

## 2022-07-05 RX ORDER — SODIUM CHLORIDE 9 MG/ML
INJECTION, SOLUTION INTRAVENOUS PRN
Status: DISCONTINUED | OUTPATIENT
Start: 2022-07-05 | End: 2022-07-05

## 2022-07-05 RX ORDER — GLYCOPYRROLATE 0.2 MG/ML
INJECTION INTRAMUSCULAR; INTRAVENOUS PRN
Status: DISCONTINUED | OUTPATIENT
Start: 2022-07-05 | End: 2022-07-05 | Stop reason: SDUPTHER

## 2022-07-05 RX ORDER — SODIUM CHLORIDE 0.9 % (FLUSH) 0.9 %
5-40 SYRINGE (ML) INJECTION EVERY 12 HOURS SCHEDULED
Status: DISCONTINUED | OUTPATIENT
Start: 2022-07-05 | End: 2022-07-07 | Stop reason: HOSPADM

## 2022-07-05 RX ORDER — FENTANYL CITRATE 50 UG/ML
INJECTION, SOLUTION INTRAMUSCULAR; INTRAVENOUS PRN
Status: DISCONTINUED | OUTPATIENT
Start: 2022-07-05 | End: 2022-07-05 | Stop reason: SDUPTHER

## 2022-07-05 RX ORDER — ROCURONIUM BROMIDE 10 MG/ML
INJECTION, SOLUTION INTRAVENOUS PRN
Status: DISCONTINUED | OUTPATIENT
Start: 2022-07-05 | End: 2022-07-05 | Stop reason: SDUPTHER

## 2022-07-05 RX ORDER — DEXMEDETOMIDINE HYDROCHLORIDE 100 UG/ML
INJECTION, SOLUTION INTRAVENOUS PRN
Status: DISCONTINUED | OUTPATIENT
Start: 2022-07-05 | End: 2022-07-05 | Stop reason: SDUPTHER

## 2022-07-05 RX ORDER — LIDOCAINE HYDROCHLORIDE 20 MG/ML
INJECTION, SOLUTION EPIDURAL; INFILTRATION; INTRACAUDAL; PERINEURAL PRN
Status: DISCONTINUED | OUTPATIENT
Start: 2022-07-05 | End: 2022-07-05 | Stop reason: SDUPTHER

## 2022-07-05 RX ORDER — ONDANSETRON 4 MG/1
4 TABLET, ORALLY DISINTEGRATING ORAL EVERY 8 HOURS PRN
Status: DISCONTINUED | OUTPATIENT
Start: 2022-07-05 | End: 2022-07-07 | Stop reason: HOSPADM

## 2022-07-05 RX ORDER — SODIUM CHLORIDE 0.9 % (FLUSH) 0.9 %
5-40 SYRINGE (ML) INJECTION PRN
Status: DISCONTINUED | OUTPATIENT
Start: 2022-07-05 | End: 2022-07-05

## 2022-07-05 RX ORDER — OXYCODONE HYDROCHLORIDE 10 MG/1
10 TABLET ORAL EVERY 4 HOURS PRN
Status: DISCONTINUED | OUTPATIENT
Start: 2022-07-05 | End: 2022-07-07 | Stop reason: HOSPADM

## 2022-07-05 RX ORDER — ONDANSETRON 2 MG/ML
4 INJECTION INTRAMUSCULAR; INTRAVENOUS
Status: DISCONTINUED | OUTPATIENT
Start: 2022-07-05 | End: 2022-07-05 | Stop reason: HOSPADM

## 2022-07-05 RX ORDER — PROPOFOL 10 MG/ML
INJECTION, EMULSION INTRAVENOUS PRN
Status: DISCONTINUED | OUTPATIENT
Start: 2022-07-05 | End: 2022-07-05 | Stop reason: SDUPTHER

## 2022-07-05 RX ORDER — ASPIRIN 81 MG/1
81 TABLET ORAL DAILY
Status: DISCONTINUED | OUTPATIENT
Start: 2022-07-06 | End: 2022-07-07 | Stop reason: HOSPADM

## 2022-07-05 RX ORDER — SUCCINYLCHOLINE/SOD CL,ISO/PF 200MG/10ML
SYRINGE (ML) INTRAVENOUS PRN
Status: DISCONTINUED | OUTPATIENT
Start: 2022-07-05 | End: 2022-07-05 | Stop reason: SDUPTHER

## 2022-07-05 RX ORDER — OXYCODONE HYDROCHLORIDE 5 MG/1
5 TABLET ORAL EVERY 4 HOURS PRN
Status: DISCONTINUED | OUTPATIENT
Start: 2022-07-05 | End: 2022-07-07 | Stop reason: HOSPADM

## 2022-07-05 RX ORDER — DEXAMETHASONE SODIUM PHOSPHATE 4 MG/ML
INJECTION, SOLUTION INTRA-ARTICULAR; INTRALESIONAL; INTRAMUSCULAR; INTRAVENOUS; SOFT TISSUE PRN
Status: DISCONTINUED | OUTPATIENT
Start: 2022-07-05 | End: 2022-07-05 | Stop reason: SDUPTHER

## 2022-07-05 RX ORDER — SODIUM CHLORIDE 0.9 % (FLUSH) 0.9 %
5-40 SYRINGE (ML) INJECTION EVERY 12 HOURS SCHEDULED
Status: DISCONTINUED | OUTPATIENT
Start: 2022-07-05 | End: 2022-07-05

## 2022-07-05 RX ORDER — FENTANYL CITRATE 50 UG/ML
25 INJECTION, SOLUTION INTRAMUSCULAR; INTRAVENOUS EVERY 5 MIN PRN
Status: DISCONTINUED | OUTPATIENT
Start: 2022-07-05 | End: 2022-07-05 | Stop reason: HOSPADM

## 2022-07-05 RX ORDER — SODIUM CHLORIDE 0.9 % (FLUSH) 0.9 %
5-40 SYRINGE (ML) INJECTION EVERY 12 HOURS SCHEDULED
Status: DISCONTINUED | OUTPATIENT
Start: 2022-07-05 | End: 2022-07-05 | Stop reason: SDUPTHER

## 2022-07-05 RX ORDER — SODIUM CHLORIDE 0.9 % (FLUSH) 0.9 %
5-40 SYRINGE (ML) INJECTION PRN
Status: DISCONTINUED | OUTPATIENT
Start: 2022-07-05 | End: 2022-07-07 | Stop reason: HOSPADM

## 2022-07-05 RX ADMIN — OXYCODONE HYDROCHLORIDE 10 MG: 10 TABLET ORAL at 13:49

## 2022-07-05 RX ADMIN — LIDOCAINE HYDROCHLORIDE 60 MG: 20 INJECTION, SOLUTION EPIDURAL; INFILTRATION; INTRACAUDAL; PERINEURAL at 07:35

## 2022-07-05 RX ADMIN — HYDROMORPHONE HYDROCHLORIDE 0.5 MG: 1 INJECTION, SOLUTION INTRAMUSCULAR; INTRAVENOUS; SUBCUTANEOUS at 15:41

## 2022-07-05 RX ADMIN — ROCURONIUM BROMIDE 50 MG: 10 SOLUTION INTRAVENOUS at 09:00

## 2022-07-05 RX ADMIN — DEXMEDETOMIDINE HYDROCHLORIDE 4 MCG: 100 INJECTION, SOLUTION INTRAVENOUS at 11:01

## 2022-07-05 RX ADMIN — PROPOFOL 30 MG: 10 INJECTION, EMULSION INTRAVENOUS at 07:42

## 2022-07-05 RX ADMIN — SODIUM CHLORIDE: 9 INJECTION, SOLUTION INTRAVENOUS at 13:48

## 2022-07-05 RX ADMIN — CLOPIDOGREL BISULFATE 75 MG: 75 TABLET ORAL at 13:48

## 2022-07-05 RX ADMIN — PROPOFOL 120 MG: 10 INJECTION, EMULSION INTRAVENOUS at 07:35

## 2022-07-05 RX ADMIN — HYDROMORPHONE HYDROCHLORIDE 0.5 MG: 1 INJECTION, SOLUTION INTRAMUSCULAR; INTRAVENOUS; SUBCUTANEOUS at 10:51

## 2022-07-05 RX ADMIN — ROCURONIUM BROMIDE 5 MG: 10 SOLUTION INTRAVENOUS at 07:35

## 2022-07-05 RX ADMIN — DEXMEDETOMIDINE HYDROCHLORIDE 4 MCG: 100 INJECTION, SOLUTION INTRAVENOUS at 10:56

## 2022-07-05 RX ADMIN — OXYCODONE HYDROCHLORIDE 10 MG: 10 TABLET ORAL at 16:44

## 2022-07-05 RX ADMIN — PROPOFOL 50 MG: 10 INJECTION, EMULSION INTRAVENOUS at 11:00

## 2022-07-05 RX ADMIN — GLYCOPYRROLATE 0.2 MG: 0.2 INJECTION, SOLUTION INTRAMUSCULAR; INTRAVENOUS at 11:08

## 2022-07-05 RX ADMIN — OXYCODONE 5 MG: 5 TABLET ORAL at 12:39

## 2022-07-05 RX ADMIN — HYDROMORPHONE HYDROCHLORIDE 0.5 MG: 1 INJECTION, SOLUTION INTRAMUSCULAR; INTRAVENOUS; SUBCUTANEOUS at 12:27

## 2022-07-05 RX ADMIN — PROTAMINE SULFATE 20 MG: 10 INJECTION, SOLUTION INTRAVENOUS at 10:34

## 2022-07-05 RX ADMIN — FENTANYL CITRATE 25 MCG: 50 INJECTION INTRAMUSCULAR; INTRAVENOUS at 08:14

## 2022-07-05 RX ADMIN — SODIUM CHLORIDE: 9 INJECTION, SOLUTION INTRAVENOUS at 07:10

## 2022-07-05 RX ADMIN — DEXMEDETOMIDINE HYDROCHLORIDE 4 MCG: 100 INJECTION, SOLUTION INTRAVENOUS at 11:08

## 2022-07-05 RX ADMIN — DEXMEDETOMIDINE HYDROCHLORIDE 4 MCG: 100 INJECTION, SOLUTION INTRAVENOUS at 10:58

## 2022-07-05 RX ADMIN — HYDROMORPHONE HYDROCHLORIDE 1 MG: 1 INJECTION, SOLUTION INTRAMUSCULAR; INTRAVENOUS; SUBCUTANEOUS at 09:00

## 2022-07-05 RX ADMIN — ONDANSETRON 4 MG: 2 INJECTION INTRAMUSCULAR; INTRAVENOUS at 10:49

## 2022-07-05 RX ADMIN — FENTANYL CITRATE 75 MCG: 50 INJECTION INTRAMUSCULAR; INTRAVENOUS at 08:26

## 2022-07-05 RX ADMIN — DEXAMETHASONE SODIUM PHOSPHATE 10 MG: 4 INJECTION, SOLUTION INTRAMUSCULAR; INTRAVENOUS at 08:07

## 2022-07-05 RX ADMIN — HYDROMORPHONE HYDROCHLORIDE 1 MG: 1 INJECTION, SOLUTION INTRAMUSCULAR; INTRAVENOUS; SUBCUTANEOUS at 11:37

## 2022-07-05 RX ADMIN — FENTANYL CITRATE 100 MCG: 50 INJECTION INTRAMUSCULAR; INTRAVENOUS at 07:35

## 2022-07-05 RX ADMIN — PHENYLEPHRINE HYDROCHLORIDE 100 MCG: 10 INJECTION INTRAVENOUS at 07:51

## 2022-07-05 RX ADMIN — HEPARIN SODIUM 6000 UNITS: 1000 INJECTION INTRAVENOUS; SUBCUTANEOUS at 09:14

## 2022-07-05 RX ADMIN — ONDANSETRON 4 MG: 2 INJECTION INTRAMUSCULAR; INTRAVENOUS at 15:01

## 2022-07-05 RX ADMIN — PHENYLEPHRINE HYDROCHLORIDE 100 MCG: 10 INJECTION INTRAVENOUS at 11:13

## 2022-07-05 RX ADMIN — OXYCODONE HYDROCHLORIDE 10 MG: 10 TABLET ORAL at 22:34

## 2022-07-05 RX ADMIN — ROCURONIUM BROMIDE 45 MG: 10 SOLUTION INTRAVENOUS at 07:42

## 2022-07-05 RX ADMIN — PHENYLEPHRINE HYDROCHLORIDE 100 MCG: 10 INJECTION INTRAVENOUS at 10:36

## 2022-07-05 RX ADMIN — Medication 120 MG: at 07:36

## 2022-07-05 RX ADMIN — HYDROMORPHONE HYDROCHLORIDE 0.5 MG: 1 INJECTION, SOLUTION INTRAMUSCULAR; INTRAVENOUS; SUBCUTANEOUS at 20:19

## 2022-07-05 RX ADMIN — SODIUM CHLORIDE: 9 INJECTION, SOLUTION INTRAVENOUS at 08:59

## 2022-07-05 RX ADMIN — SUGAMMADEX 200 MG: 100 INJECTION, SOLUTION INTRAVENOUS at 10:57

## 2022-07-05 RX ADMIN — DEXMEDETOMIDINE HYDROCHLORIDE 4 MCG: 100 INJECTION, SOLUTION INTRAVENOUS at 10:53

## 2022-07-05 RX ADMIN — CEFAZOLIN 2000 MG: 2 INJECTION, POWDER, FOR SOLUTION INTRAMUSCULAR; INTRAVENOUS at 07:39

## 2022-07-05 RX ADMIN — CEFAZOLIN SODIUM 1000 MG: 1 INJECTION, POWDER, FOR SOLUTION INTRAMUSCULAR; INTRAVENOUS at 15:43

## 2022-07-05 RX ADMIN — HYDROMORPHONE HYDROCHLORIDE 0.5 MG: 1 INJECTION, SOLUTION INTRAMUSCULAR; INTRAVENOUS; SUBCUTANEOUS at 10:41

## 2022-07-05 ASSESSMENT — PAIN DESCRIPTION - LOCATION
LOCATION: INCISION
LOCATION: INCISION;LEG
LOCATION: INCISION

## 2022-07-05 ASSESSMENT — PAIN DESCRIPTION - FREQUENCY
FREQUENCY: CONTINUOUS
FREQUENCY: CONTINUOUS

## 2022-07-05 ASSESSMENT — PAIN SCALES - GENERAL
PAINLEVEL_OUTOF10: 10
PAINLEVEL_OUTOF10: 9
PAINLEVEL_OUTOF10: 8
PAINLEVEL_OUTOF10: 10

## 2022-07-05 ASSESSMENT — PAIN DESCRIPTION - ORIENTATION
ORIENTATION: RIGHT
ORIENTATION: RIGHT

## 2022-07-05 ASSESSMENT — PAIN DESCRIPTION - ONSET
ONSET: ON-GOING
ONSET: ON-GOING

## 2022-07-05 ASSESSMENT — PAIN DESCRIPTION - DESCRIPTORS
DESCRIPTORS: ACHING

## 2022-07-05 ASSESSMENT — PAIN - FUNCTIONAL ASSESSMENT
PAIN_FUNCTIONAL_ASSESSMENT: PREVENTS OR INTERFERES SOME ACTIVE ACTIVITIES AND ADLS
PAIN_FUNCTIONAL_ASSESSMENT: 0-10
PAIN_FUNCTIONAL_ASSESSMENT: PREVENTS OR INTERFERES SOME ACTIVE ACTIVITIES AND ADLS
PAIN_FUNCTIONAL_ASSESSMENT: PREVENTS OR INTERFERES SOME ACTIVE ACTIVITIES AND ADLS

## 2022-07-05 ASSESSMENT — PAIN DESCRIPTION - PAIN TYPE
TYPE: CHRONIC PAIN;ACUTE PAIN
TYPE: CHRONIC PAIN;SURGICAL PAIN

## 2022-07-05 NOTE — H&P
H&P Update     I have reviewed the history and physical and examined the patient and find no relevant changes. I have reviewed with the patient and/or family the risks, benefits, and alternatives to the procedure. I HAVE PRESENTED REASONABLE ALTERNATIVES TO THE PATIENT'S PROPOSED CARE, TREATMENT, AND SERVICES. THE DISCUSSION I HAVE DONE ENCOMPASSED RISKS, BENEFITS, AND SIDE EFFECTS RELATED TO THE ALTERNATIVES AND THE RISKS RELATED TO NOT RECEIVING THE PROPOSED CARE, TREATMENT, SERVICES. Patient:  Marika Russo   :   1950    Intended Procedure: right femoral popliteal bypass, possible left leg vein harvest    Vitals:    22 0639   BP: (!) 174/91   Pulse: 62   Resp: 18   Temp: 97.4 °F (36.3 °C)   SpO2: 98%       Nursing notes reviewed and agreed.   Medications reviewed  Allergies: No Known Allergies      Post Procedure plan: CVU  Cleared by Cardiology, refer to Dr. Dillon Quesada note    Levi Madrid DO, [address] [facility] Vascular and Endovascular Surgery

## 2022-07-05 NOTE — FLOWSHEET NOTE
07/05/22 1300   Vitals   Heart Rate 100   Resp 13   BP (!) 125/94   MAP (mmHg) 105       Pt arrived to room 1312  from PACU recovery with this RN. Patient arrived on [place] oxygen at 3L. Pt alert and talkative but forgetful at the beginning of recovery, forgetfulness resolved. RR WNL. Pt alert. Escalante catheter intact, 30mls urine drained during recovery time. Orientation questions asked to patient, alert and oriented x4. Patient able to move all extremities freely. Doppler pulses present in bilat pedals, patient complains of numbness in right foot and says that it is unchanged since preop. Patient is complaining of Left groin pain as well. Patient reporting pain 10/10, see MAR; PRN medications given. Griffin 9. Bedside handoff complete with Minnie Campbell RN to assume care.     Electronically signed by Dorene Nuno RN on 7/5/2022 at 1:16 PM

## 2022-07-05 NOTE — OP NOTE
[address] [facility]                                OPERATIVE REPORT    PATIENT NAME: Isa Garcias                  :        1950  MED REC NO:   [id number]                          ROOM:  ACCOUNT NO:   [de-identified]                           ADMIT DATE: 2022  PROVIDER:     Levi Madrid DO    DATE OF PROCEDURE:  2022    PREOPERATIVE DIAGNOSIS:  Critical limb ischemia of the right lower  extremity. POSTOPERATIVE DIAGNOSIS:  Critical limb ischemia of the right lower  extremity. SURGEON:  Levi Madrid DO    ASSISTANT:  Vinh Toth    ANESTHESIA:  General.    ESTIMATED BLOOD LOSS:  300 mL. COMPLICATIONS:  None apparent. INDICATIONS:  This is a 79-year-old male patient who presented to my  office several months ago with ischemic rest pain essentially of his  right lower extremity. He underwent a CT arteriogram, which  demonstrated that he had significant outflow disease more than inflow  disease. He has significant P2 segment popliteal artery lesion. He  underwent attempted angiography. Angioplasty alone, it was difficult to  him and did not improve his symptoms very much. He, therefore, opted  for a stent placement, which I told him was a poor-quality procedure. However, this was done as well and then only got him relief for a couple  of days. Unfortunately, in the interval time, he also fractured his hip  on the right side as well. However, his rest pain has continued to  progress. He underwent a stress test in preoperative preparation, which  was found to be positive for abnormal study. He underwent a cardiac  catheterization, which demonstrated he had an LAD lesion. This was  stented. He was placed on aspirin and Brilinta.   He was planning to  wait a full month before he could actually stop his Brilinta and then  hold it for a couple of days; however, unfortunately presented to my  office with a worsening rest pain again. He also has another wound on  his medial malleolus. We, therefore, decided to go ahead and proceed  with surgery. We discussed with Dr. Erika Trammell. He cleared him for  surgery. We also discussed about trading off from [place] to Plavix  during the actual perioperative period. He was okay with this. All the  risks, benefits, and alternatives were clearly reviewed with the  patient. The risks include pain, infection, bleeding, embolization, and  thrombosis. The alternative complications and risks associated with  anesthesia include MI, stroke, death, and respiratory failure. He  understood the risks and was willing to proceed. OPERATIVE PROCEDURE:  The patient was brought into the operating room  and placed supine on the table. He underwent general anesthesia with  intubation, which was uncomplicated. He received preoperative  antibiotics. The bilateral legs were examined for good venous conduit. The patient did not have preoperative vein mapping; therefore, this was  done. The left saphenous vein was deemed to be better for his right  leg. Therefore, this was selected. The vein was marked out in its  entirety. Then, both legs were prepped and draped in the usual sterile  fashion in their entirety. A time-out was called for indication,  patient, and laterality. We began by making an oblique incision in the right groin. We dissected  down through the skin and subcutaneous tissue. We then identified the  common femoral artery and distal external iliac artery. We identified  the inferior epigastric and lateral circumflex iliac vessels. We also  identified the profunda femoris and superficial femoral artery. There  was a little bit of calcium in the posterior wall, but it was really  relatively healthy and soft and free of disease. After this, I then  proceeded to make an incision in the below-knee popliteal artery area.    Once again, this was his main outflow vessel. I had made the incision  about a fingerbreadth behind the tibia. The incision was carried down  through the skin and subcutaneous tissue. We released the fascia  overlying the gastrocnemius. I reflected the gastrocnemius posteriorly. I identified the popliteal space. I identified the popliteal vein. I  retracted it posteriorly. I identified the popliteal artery. I used Doppler waveform to confirm that the vessel was patent and it was  still patent. I then gained proximal and distal control for about 3 cm. I proceeded to harvest the greater saphenous vein. This was done on the  left side. Skip incisions were created all the way down the leg to  identify the greater saphenous vein and it was in its native bed. All  side branches were then ligated and clipped using either 2-0 or 4-0 silk  sutures as well as clips. The proximal segment was then oversewn with  two silk ties. After this, the vein was passed off as a specimen and  this was ligated distally. We irrigated it. We then distended it using  heparinized saline. Two small segments had to undergo repair with 7-0  Prolene suture. After this, we distended where we were reasonably happy  with this result. After I heparinized the patient before I removed the vein from the  actual body, I then proceeded to turn my attention to the right groin. I once again re-exposed the femoral artery. I clamped the vessel using  vessel loops. I then proceeded to make an arteriotomy. Once again, the  posterior wall had evidence of plaque, but it was relatively free of  disease and there was good backbleeding from the profunda. Therefore, I  went ahead and decided to just onlay the vein itself. After I brought  up the vein and used the more proximal end, I manually lysed the first  valve after everting and invaginating the actual vein itself.   Once this  was done, I then performed the proximal anastomosis using a running 5-0  Prolene suture. I de-aired it before tying it down. After this, we  went and ran the valvotome to the actual vein itself and ran it twice. We got good pulsatile bleeding. After this, any small bleeding areas  were controlled with manual hemostasis. After this, I then marked the vein on its anterior location. I ran a  tunneler in the anatomic space just lateral to the distal femoral  condyle. I then passed it up to the level of the groin. I then passed  the vein carefully after tying it using umbilical tapes through this  actual tunneler down to the below-knee popliteal space. It appeared to  be oriented correctly and it still had a good pulse. I then re-exposed  the popliteal artery and clamped the vein, as I measured the vein  distance in order to allow for some laxity for when the knee is straight. I then  performed a distal anastomosis and after spatulating the vein, I  performed an arteriotomy of the artery using a 5-0 Prolene suture. I  appropriately de-aired it before it tying down. This did require one  repair suture. There was good signal now in the actual popliteal artery  with good augmentation of flow with compression and release of the  actual vein graft itself. We then irrigated each wound bed and  evaluated each of them manually and inspected for hemostasis. There was  no evidence of bleeding at this time. Two 19-[place] Gabriel drains were placed in both groins. The one on the  left was passed down through the entire saphenous vein harvest incision. The one on the right, which was cut, was tapped into the right groin  incision. I did this because mainly he is on dual antiplatelet therapy  for his coronary stent. The patient was awakened in the operating room and taken to the ICU in  stable condition. We will also continue to monitor both legs for signs  of ischemia. All the sponge and needle counts were correct.   I was  present and performed all critical aspects of this procedure. There  were no immediate complications.         Ricardo Elizabeth DO    D: 07/05/2022 11:37:49       T: 07/05/2022 14:51:59     [email or web address]  Job#: [id number]     Doc#: [id number]    CC:

## 2022-07-05 NOTE — ANESTHESIA PRE PROCEDURE
[facility] Department of Anesthesiology  Pre-Anesthesia Evaluation/Consultation       Name:  Radha Amador  : 1950  Age:  70 y.o. MRN:  [id number]  Date: 2022           Surgeon: Surgeon(s):  Camron Adasm, DO    Procedure: Procedure(s):  RIGHT FEMORAL POPLITEAL BYPASS     No Known Allergies  Patient Active Problem List   Diagnosis    Arthritis of left knee    Basal cell carcinoma (BCC) of skin of nose    Peripheral arterial disease (HCC)    Closed right hip fracture (HCC)    Numbness of right foot    Hip fracture, right, [facility], [facility] ([facility]    Closed subcapital fracture of femur, right, initial encounter [facility])     Past Medical History:   Diagnosis Date    Anxiety     Basal cell carcinoma     L side of nose    CAD (coronary artery disease)     Peripheral artery disease ([facility]     Presacral mass 2022     Past Surgical History:   Procedure Laterality Date    CARDIAC CATHETERIZATION      HIP SURGERY Right 2022    HIP PINNING performed by Radha Camacho MD at [facility] History     Tobacco Use    Smoking status: Former Smoker     Packs/day: 1.00     Years: 50.00     Pack years: 50.00     Types: Cigarettes     Quit date: 2022     Years since quittin.1    Smokeless tobacco: Never Used   Vaping Use    Vaping Use: Some days    Substances: Nicotine, Flavoring    Devices: Refillable tank   Substance Use Topics    Alcohol use: No    Drug use: Not Currently     Types: Marijuana (Weed)     Comment: in early years     Medications  No current facility-administered medications on file prior to encounter. Current Outpatient Medications on File Prior to Encounter   Medication Sig Dispense Refill    ibuprofen (ADVIL;MOTRIN) 200 MG tablet Take 400 mg by mouth every 6 hours as needed for Pain      diazePAM (VALIUM) 10 MG tablet Take 10 mg by mouth nightly as needed.        naproxen (NAPROSYN) 500 MG tablet TAKE 1 TABLET BY MOUTH TWICE DAILY WITH FOOD      clopidogrel (PLAVIX) 75 MG tablet Take 1 tablet by mouth daily Take 4 tablets tomorrow (300 mg as loading dose). Take 75 mg daily until surgery. 8 tablet 0    oxyCODONE-acetaminophen (PERCOCET) 5-325 MG per tablet Take 1 tablet by mouth every 6 hours as needed for Pain for up to 5 days. Intended supply: 5 days. Take lowest dose possible to manage pain 20 tablet 0    ticagrelor (BRILINTA) 90 MG TABS tablet Take 1 tablet by mouth 2 times daily 60 tablet 3    aspirin EC 81 MG EC tablet Take 1 tablet by mouth 2 times daily Take for DVT blood clot prophylaxis. Please avoid missing doses.  (Patient taking differently: Take 81 mg by mouth daily .) 60 tablet 0    hydrOXYzine (VISTARIL) 25 MG capsule Take 25 mg by mouth nightly as needed        Current Facility-Administered Medications   Medication Dose Route Frequency Provider Last Rate Last Admin    0.9 % sodium chloride infusion   IntraVENous Continuous Sherman Elliott MD        sodium chloride flush 0.9 % injection 5-40 mL  5-40 mL IntraVENous 2 times per day Sherman Elliott MD        sodium chloride flush 0.9 % injection 5-40 mL  5-40 mL IntraVENous PRN Sherman Elliott MD        0.9 % sodium chloride infusion   IntraVENous PRN Sherman Elliott MD        ceFAZolin (ANCEF) 2,000 mg in dextrose 5 % 50 mL IVPB (mini-bag)  2,000 mg IntraVENous On Call to [facility]         Vital Signs (Current)   Vitals:    22   BP: (!) 174/91   Pulse: 62   Resp: 18   Temp: 97.4 °F (36.3 °C)   SpO2: 98%     Vital Signs Statistics (for past 48 hrs)     Temp  Av.4 °F (36.3 °C)  Min: 97.4 °F (36.3 °C)   Min taken time: 22  Max: 97.4 °F (36.3 °C)   Max taken time: 22  Pulse  Av  Min: 58   Min taken time: 22  Max: 58   Max taken time: 22  Resp  Av  Min: 25   Min taken time: 22  Max: 18   Max taken time: 22  BP  Min: 174/91 Min taken time: 22  Max: 174/91   Max taken time: 22 0639  SpO2  Av %  Min: 98 %   Min taken time: 22  Max: 98 %   Max taken time: 2239    BP Readings from Last 3 Encounters:   22 (!) 174/91   22 (!) 160/92   06/15/22 (!) 196/91     BMI  Body mass index is 22.48 kg/m². Estimated body mass index is 22.48 kg/m² as calculated from the following:    Height as of this encounter: 5' 8.5\" (1.74 m). Weight as of this encounter: 150 lb (68 kg). CBC   Lab Results   Component Value Date/Time    WBC 6.5 2022 08:41 AM    RBC 3.88 2022 08:41 AM    HGB 11.4 2022 08:41 AM    HCT 34.6 2022 08:41 AM    MCV 89.3 2022 08:41 AM    RDW 15.6 2022 08:41 AM     2022 08:41 AM     CMP    Lab Results   Component Value Date/Time     2022 08:41 AM    K 3.7 2022 08:41 AM    K 4.1 2019 08:19 PM     2022 08:41 AM    CO2 23 2022 08:41 AM    BUN 10 2022 08:41 AM    CREATININE 1.0 2022 08:41 AM    GFRAA >60 2022 08:41 AM    AGRATIO 1.7 2019 08:19 PM    LABGLOM >60 2022 08:41 AM    GLUCOSE 97 2022 08:41 AM    PROT 7.4 2019 08:19 PM    CALCIUM 9.3 2022 08:41 AM    BILITOT 0.3 2019 08:19 PM    ALKPHOS 92 2019 08:19 PM    AST 12 2019 08:19 PM    ALT 10 2019 08:19 PM     BMP    Lab Results   Component Value Date/Time     2022 08:41 AM    K 3.7 2022 08:41 AM    K 4.1 2019 08:19 PM     2022 08:41 AM    CO2 23 2022 08:41 AM    BUN 10 2022 08:41 AM    CREATININE 1.0 2022 08:41 AM    CALCIUM 9.3 2022 08:41 AM    GFRAA >60 2022 08:41 AM    LABGLOM >60 2022 08:41 AM    GLUCOSE 97 2022 08:41 AM     POCGlucose  No results for input(s): GLUCOSE in the last 72 hours.    Coags    Lab Results   Component Value Date/Time    PROTIME 11.4 2022 08:21 PM    INR 1.01 04/23/2022 08:21 PM    APTT 32.2 04/24/2022 05:31 AM     HCG (If Applicable) No results found for: PREGTESTUR, PREGSERUM, HCG, HCGQUANT   ABGs No results found for: PHART, PO2ART, WCZ6NEX, ZQL2QGJ, BEART, B7BIJTRI   Type & Screen (If Applicable)  No results found for: LABABO, LABRH                         BMI: Wt Readings from Last 3 Encounters:       NPO Status:   Date of last liquid consumption: 07/04/22   Time of last liquid consumption: 2100   Date of last solid food consumption: 07/04/22      Time of last solid consumption: 2100       Anesthesia Evaluation  Patient summary reviewed no history of anesthetic complications:   Airway: Mallampati: III  TM distance: >3 FB   Neck ROM: full  Mouth opening: > = 3 FB   Dental:    (+) upper dentures      Pulmonary:Negative Pulmonary ROS and normal exam                               Cardiovascular:  Exercise tolerance: good (>4 METS),   (+) CAD:,       ECG reviewed  Rhythm: regular  Rate: normal           Beta Blocker:  Not on Beta Blocker         Neuro/Psych:   (+) depression/anxiety             GI/Hepatic/Renal: Neg GI/Hepatic/Renal ROS       (-) GERD       Endo/Other:    (+) blood dyscrasia (Stopped brilinta June 29; plavix last dose yesterday): anticoagulation therapy:., .                 Abdominal:             Vascular:   + PVD, aortic or cerebral (PAD), . Other Findings:           Anesthesia Plan      general     ASA 3       Induction: intravenous. MIPS: Postoperative opioids intended and Prophylactic antiemetics administered. Anesthetic plan and risks discussed with patient. Use of blood products discussed with patient whom consented to blood products. Plan discussed with CRNA. This pre-anesthesia assessment may be used as a history and physical.    DOS STAFF ADDENDUM:    Pt seen and examined, chart reviewed (including anesthesia, drug and allergy history). No interval changes to history and physical examination.   Anesthetic plan, risks, benefits, alternatives, and personnel involved discussed with patient. Questions and concerns addressed. Patient(family) verbalized an understanding and agrees to proceed.       Angela Mathis MD  July 5, 2022  7:02 AM

## 2022-07-05 NOTE — ANESTHESIA POSTPROCEDURE EVALUATION
Department of Anesthesiology  Postprocedure Note    Patient: Ruperto Sauceda  MRN: [id number]  YOB: 1950  Date of evaluation: 7/5/2022      Procedure Summary     Date: 07/05/22 Room / Location: [address] [place] [address]    Anesthesia Start: 0730 Anesthesia Stop: 1138    Procedure: RIGHT FEMORAL POPLITEAL BYPASS (Right Leg Upper) Diagnosis:       Critical lower limb ischemia ([address]      (Critical lower limb ischemia)    Surgeons: Robert Diego DO Responsible Provider: Naina Almendarez MD    Anesthesia Type: general ASA Status: 3          Anesthesia Type: No value filed.     Griffin Phase I: Griffin Score: 10    Griffin Phase II:        Anesthesia Post Evaluation    Patient location during evaluation: bedside  Patient participation: complete - patient participated  Level of consciousness: awake and alert  Pain score: 4  Nausea & Vomiting: no nausea  Complications: no  Cardiovascular status: hemodynamically stable  Respiratory status: acceptable  Hydration status: stable

## 2022-07-05 NOTE — BRIEF OP NOTE
Brief Postoperative Note      Patient: Ancelmo Young  YOB: 1950  MRN: [id number]    Date of Procedure: 7/5/2022    Pre-Op Diagnosis: Critical lower limb ischemia    Post-Op Diagnosis: Same       Procedure(s):  RIGHT FEMORAL POPLITEAL BYPASS    Surgeon(s):  Francesca Gage DO    Assistant:  Surgical Assistant: Susy Toth    Anesthesia: General    Estimated Blood Loss (mL): 973     Complications: None    Specimens:   * No specimens in log *    Implants:  * No implants in log *      Drains:   Urinary Catheter Escalante (Active)           Electronically signed by Francesca Gage DO on 7/5/2022 at 11:09 AM

## 2022-07-06 LAB
ANION GAP SERPL CALCULATED.3IONS-SCNC: 11 MMOL/L (ref 3–16)
ANION GAP SERPL CALCULATED.3IONS-SCNC: 8 MMOL/L (ref 3–16)
BUN BLDV-MCNC: 14 MG/DL (ref 7–20)
BUN BLDV-MCNC: 14 MG/DL (ref 7–20)
CALCIUM SERPL-MCNC: 8.3 MG/DL (ref 8.3–10.6)
CALCIUM SERPL-MCNC: 8.8 MG/DL (ref 8.3–10.6)
CHLORIDE BLD-SCNC: 103 MMOL/L (ref 99–110)
CHLORIDE BLD-SCNC: 108 MMOL/L (ref 99–110)
CO2: 23 MMOL/L (ref 21–32)
CO2: 25 MMOL/L (ref 21–32)
CREAT SERPL-MCNC: 0.9 MG/DL (ref 0.8–1.3)
CREAT SERPL-MCNC: 0.9 MG/DL (ref 0.8–1.3)
GFR AFRICAN AMERICAN: >60
GFR AFRICAN AMERICAN: >60
GFR NON-AFRICAN AMERICAN: >60
GFR NON-AFRICAN AMERICAN: >60
GLUCOSE BLD-MCNC: 112 MG/DL (ref 70–99)
GLUCOSE BLD-MCNC: 116 MG/DL (ref 70–99)
HCT VFR BLD CALC: 24.2 % (ref 40.5–52.5)
HEMOGLOBIN: 8.4 G/DL (ref 13.5–17.5)
MCH RBC QN AUTO: 29.5 PG (ref 26–34)
MCHC RBC AUTO-ENTMCNC: 34.5 G/DL (ref 31–36)
MCV RBC AUTO: 85.4 FL (ref 80–100)
PDW BLD-RTO: 15.8 % (ref 12.4–15.4)
PLATELET # BLD: 411 K/UL (ref 135–450)
PMV BLD AUTO: 6.8 FL (ref 5–10.5)
POTASSIUM SERPL-SCNC: 3.5 MMOL/L (ref 3.5–5.1)
POTASSIUM SERPL-SCNC: 3.6 MMOL/L (ref 3.5–5.1)
RBC # BLD: 2.84 M/UL (ref 4.2–5.9)
REASON FOR REJECTION: NORMAL
REJECTED TEST: NORMAL
SODIUM BLD-SCNC: 139 MMOL/L (ref 136–145)
SODIUM BLD-SCNC: 139 MMOL/L (ref 136–145)
WBC # BLD: 8.4 K/UL (ref 4–11)

## 2022-07-06 PROCEDURE — 97162 PT EVAL MOD COMPLEX 30 MIN: CPT

## 2022-07-06 PROCEDURE — 6370000000 HC RX 637 (ALT 250 FOR IP): Performed by: STUDENT IN AN ORGANIZED HEALTH CARE EDUCATION/TRAINING PROGRAM

## 2022-07-06 PROCEDURE — APPSS30 APP SPLIT SHARED TIME 16-30 MINUTES: Performed by: NURSE PRACTITIONER

## 2022-07-06 PROCEDURE — 6360000002 HC RX W HCPCS: Performed by: NURSE PRACTITIONER

## 2022-07-06 PROCEDURE — 6360000002 HC RX W HCPCS: Performed by: STUDENT IN AN ORGANIZED HEALTH CARE EDUCATION/TRAINING PROGRAM

## 2022-07-06 PROCEDURE — APPNB30 APP NON BILLABLE TIME 0-30 MINS: Performed by: NURSE PRACTITIONER

## 2022-07-06 PROCEDURE — 97110 THERAPEUTIC EXERCISES: CPT

## 2022-07-06 PROCEDURE — 94760 N-INVAS EAR/PLS OXIMETRY 1: CPT

## 2022-07-06 PROCEDURE — 80048 BASIC METABOLIC PNL TOTAL CA: CPT

## 2022-07-06 PROCEDURE — 2100000000 HC CCU R&B

## 2022-07-06 PROCEDURE — 97530 THERAPEUTIC ACTIVITIES: CPT

## 2022-07-06 PROCEDURE — 36415 COLL VENOUS BLD VENIPUNCTURE: CPT

## 2022-07-06 PROCEDURE — 2580000003 HC RX 258: Performed by: STUDENT IN AN ORGANIZED HEALTH CARE EDUCATION/TRAINING PROGRAM

## 2022-07-06 PROCEDURE — 85027 COMPLETE CBC AUTOMATED: CPT

## 2022-07-06 RX ORDER — FUROSEMIDE 10 MG/ML
20 INJECTION INTRAMUSCULAR; INTRAVENOUS ONCE
Status: COMPLETED | OUTPATIENT
Start: 2022-07-06 | End: 2022-07-06

## 2022-07-06 RX ADMIN — OXYCODONE HYDROCHLORIDE 10 MG: 10 TABLET ORAL at 14:54

## 2022-07-06 RX ADMIN — HYDROMORPHONE HYDROCHLORIDE 0.5 MG: 1 INJECTION, SOLUTION INTRAMUSCULAR; INTRAVENOUS; SUBCUTANEOUS at 21:09

## 2022-07-06 RX ADMIN — HYDROMORPHONE HYDROCHLORIDE 0.5 MG: 1 INJECTION, SOLUTION INTRAMUSCULAR; INTRAVENOUS; SUBCUTANEOUS at 00:05

## 2022-07-06 RX ADMIN — HYDROMORPHONE HYDROCHLORIDE 0.5 MG: 1 INJECTION, SOLUTION INTRAMUSCULAR; INTRAVENOUS; SUBCUTANEOUS at 05:06

## 2022-07-06 RX ADMIN — SODIUM CHLORIDE: 9 INJECTION, SOLUTION INTRAVENOUS at 03:39

## 2022-07-06 RX ADMIN — OXYCODONE HYDROCHLORIDE 10 MG: 10 TABLET ORAL at 19:37

## 2022-07-06 RX ADMIN — DIAZEPAM 10 MG: 5 TABLET ORAL at 01:28

## 2022-07-06 RX ADMIN — DIAZEPAM 10 MG: 5 TABLET ORAL at 22:22

## 2022-07-06 RX ADMIN — HYDROMORPHONE HYDROCHLORIDE 0.5 MG: 1 INJECTION, SOLUTION INTRAMUSCULAR; INTRAVENOUS; SUBCUTANEOUS at 09:10

## 2022-07-06 RX ADMIN — HYDROMORPHONE HYDROCHLORIDE 0.5 MG: 1 INJECTION, SOLUTION INTRAMUSCULAR; INTRAVENOUS; SUBCUTANEOUS at 16:24

## 2022-07-06 RX ADMIN — OXYCODONE HYDROCHLORIDE 10 MG: 10 TABLET ORAL at 23:35

## 2022-07-06 RX ADMIN — HYDROMORPHONE HYDROCHLORIDE 0.5 MG: 1 INJECTION, SOLUTION INTRAMUSCULAR; INTRAVENOUS; SUBCUTANEOUS at 12:55

## 2022-07-06 RX ADMIN — CEFAZOLIN SODIUM 1000 MG: 1 INJECTION, POWDER, FOR SOLUTION INTRAMUSCULAR; INTRAVENOUS at 00:06

## 2022-07-06 RX ADMIN — FUROSEMIDE 20 MG: 10 INJECTION, SOLUTION INTRAMUSCULAR; INTRAVENOUS at 09:00

## 2022-07-06 RX ADMIN — OXYCODONE HYDROCHLORIDE 10 MG: 10 TABLET ORAL at 07:42

## 2022-07-06 RX ADMIN — ASPIRIN 81 MG: 81 TABLET, COATED ORAL at 07:42

## 2022-07-06 RX ADMIN — OXYCODONE HYDROCHLORIDE 10 MG: 10 TABLET ORAL at 03:23

## 2022-07-06 RX ADMIN — OXYCODONE HYDROCHLORIDE 10 MG: 10 TABLET ORAL at 11:41

## 2022-07-06 RX ADMIN — CLOPIDOGREL BISULFATE 75 MG: 75 TABLET ORAL at 07:42

## 2022-07-06 RX ADMIN — ENOXAPARIN SODIUM 40 MG: 100 INJECTION SUBCUTANEOUS at 07:43

## 2022-07-06 RX ADMIN — Medication 10 ML: at 19:37

## 2022-07-06 ASSESSMENT — PAIN DESCRIPTION - DESCRIPTORS
DESCRIPTORS: ACHING
DESCRIPTORS: ACHING;SORE
DESCRIPTORS: ACHING
DESCRIPTORS: ACHING;SORE
DESCRIPTORS: ACHING;SHOOTING
DESCRIPTORS: DISCOMFORT
DESCRIPTORS: SHOOTING;ACHING
DESCRIPTORS: DISCOMFORT
DESCRIPTORS: ACHING;SHOOTING
DESCRIPTORS: DISCOMFORT
DESCRIPTORS: ACHING

## 2022-07-06 ASSESSMENT — PAIN - FUNCTIONAL ASSESSMENT

## 2022-07-06 ASSESSMENT — PAIN DESCRIPTION - PAIN TYPE
TYPE: SURGICAL PAIN
TYPE: CHRONIC PAIN;SURGICAL PAIN
TYPE: SURGICAL PAIN
TYPE: CHRONIC PAIN;SURGICAL PAIN
TYPE: CHRONIC PAIN;SURGICAL PAIN

## 2022-07-06 ASSESSMENT — PAIN DESCRIPTION - ORIENTATION
ORIENTATION: RIGHT
ORIENTATION: RIGHT
ORIENTATION: RIGHT;LEFT
ORIENTATION: RIGHT
ORIENTATION: RIGHT;LEFT
ORIENTATION: RIGHT;LOWER;UPPER
ORIENTATION: RIGHT;LEFT
ORIENTATION: RIGHT;LEFT

## 2022-07-06 ASSESSMENT — PAIN SCALES - GENERAL
PAINLEVEL_OUTOF10: 10
PAINLEVEL_OUTOF10: 9
PAINLEVEL_OUTOF10: 2
PAINLEVEL_OUTOF10: 8
PAINLEVEL_OUTOF10: 10
PAINLEVEL_OUTOF10: 8
PAINLEVEL_OUTOF10: 8
PAINLEVEL_OUTOF10: 10
PAINLEVEL_OUTOF10: 10
PAINLEVEL_OUTOF10: 6
PAINLEVEL_OUTOF10: 10

## 2022-07-06 ASSESSMENT — PAIN DESCRIPTION - ONSET
ONSET: ON-GOING

## 2022-07-06 ASSESSMENT — ENCOUNTER SYMPTOMS
SHORTNESS OF BREATH: 0
GASTROINTESTINAL NEGATIVE: 1
CHEST TIGHTNESS: 0
ROS SKIN COMMENTS: SURGICAL INCISIONS

## 2022-07-06 ASSESSMENT — PAIN DESCRIPTION - FREQUENCY
FREQUENCY: CONTINUOUS

## 2022-07-06 ASSESSMENT — PAIN DESCRIPTION - LOCATION
LOCATION: LEG
LOCATION: LEG;INCISION
LOCATION: LEG;KNEE
LOCATION: LEG
LOCATION: LEG
LOCATION: LEG;KNEE
LOCATION: LEG
LOCATION: LEG;KNEE
LOCATION: LEG;KNEE
LOCATION: LEG;INCISION
LOCATION: LEG;INCISION
LOCATION: LEG;KNEE
LOCATION: LEG

## 2022-07-06 NOTE — PROGRESS NOTES
Physical Therapy  Facility/Department: [address]  Physical Therapy Initial Assessment    Name: Soo Diego  : 1950  MRN: [id number]  Date of Service: 2022    Discharge Recommendations:  Continue to assess pending progress (Possible (5-7).)   PT Equipment Recommendations  Other: Will monitor for potential equipt needs. Assessment   Body Structures, Functions, Activity Limitations Requiring Skilled Therapeutic Intervention: Decreased functional mobility ; Decreased strength;Decreased endurance; Increased pain  Assessment: 71 y/o male admit 2022 with Critical Limb Ischemia R LE.  2022 S/P R Fem-Pop Bypass. PMH as noted including R Femur Fx/Pinning. PTA pt living in 2 story townhouse with few steps to enter and 2nd floor bed/bath; independent daily care and functional mobility. Currently, Pt anxious/distracted very easily. Requiring ongoing gentle encouragement for eob/oob and exs. Pt avelino oob via Stedy relatively well although unable to avelino attempt use of Walker. At this time, pt is hopeful for d/c home; however, need to monitor progress. May need to consider cont PT (5-7) upon d/c. Therapy Prognosis: Good  Decision Making: Medium Complexity  History: 71 y/o male admit 2022 with Critical Limb Ischemia R LE.  2022 S/P R Fem-Pop Bypass. PMH as noted including R Femur Fx/Pinning. Exam: See above. Clinical Presentation: See above. Barriers to Learning: Anxiety. Requires PT Follow-Up: Yes  Activity Tolerance  Activity Tolerance Comments: Pt anxious/distracted very easily. Requiring ongoing gentle encouragement for eob/oob and exs. Pt avelino oob via Stedy relatively well although unable to avelino attempt use of Walker.      Plan   Plan  Plan: 3-5 times per week  Current Treatment Recommendations: Strengthening,Functional mobility training,Transfer training,Gait training,Stair training,Safety education & training,Patient/Caregiver education & training  Safety Devices  Type of Devices: Call light within reach,Chair alarm in place,Left in chair,Nurse notified     Restrictions  Restrictions/Precautions  Restrictions/Precautions: Fall Risk  Position Activity Restriction  Other position/activity restrictions: B Groin DARLY Drains. Subjective   General  Chart Reviewed: Yes  Patient assessed for rehabilitation services?: Yes  Additional Pertinent Hx: 71 y/o male admit 7/5/2022 with Critical Limb Ischemia R LE.  7/5/2022 S/P R Fem-Pop Bypass. PMH as noted including R Femur Fx/Pinning. Family / Caregiver Present: No  Referring Practitioner: SHAMIR Ewing NP  Follows Commands: Within Functional Limits  Other (Comment): Pt follows simple commands; easily distracted/anxious. Subjective  Subjective: Pt agreeable to PT Eval/Rx. Needs initial encouragement. Social/Functional History  Social/Functional History  Lives With: Son  Type of Home:  ([facility])  Home Layout: Two level  Home Access: Stairs to enter without rails (2+1 steps to enter.)  Bathroom Shower/Tub: Tub/Shower unit  Bathroom Toilet: Standard  Bathroom Equipment: Grab bars in shower  Bathroom Accessibility: Accessible  Home Equipment: Cane,Walker, rolling  ADL Assistance: [address]:  (Shared with son.)  Ambulation Assistance: Independent  Transfer Assistance: Independent  Active : Yes  Occupation: Retired  Type of Occupation: Retired : [employer]; Played in a band. Vision/Hearing  Vision  Vision: Within Functional Limits  Hearing  Hearing: Within functional limits    Cognition   Orientation  Overall Orientation Status: Within Functional Limits  Cognition  Overall Cognitive Status: Exceptions  Arousal/Alertness: Appropriate responses to stimuli  Following Commands:  Follows one step commands with increased time  Attention Span: Attends with cues to redirect  Memory: Appears intact  Safety Judgement: Decreased awareness of need for safety  Insights: Decreased awareness of deficits  Initiation: Requires cues for some  Sequencing: Requires cues for some  Cognition Comment: Anxious easily; occass tearful. Objective        Observation/Palpation  Observation: DARYL Drain B Groins. Dressing/AceWrap B LEs. Gross Assessment  AROM: Generally decreased, functional  Strength: Generally decreased, functional      Bed mobility  Supine to Sit: Moderate assistance (HOB elevated. Needs some assist LEs to eob and upright at eob.)  Transfers  Sit to Stand: Minimal Assistance;2 Person Assistance ([address]  Stand to sit: Minimal Assistance;2 Person Assistance ([address]  Bed to Chair: Dependent/Total ([address]  Comment: Additional Transfers from chair via Stedy : Min assist.           A/AROM Exercises: Supine : Glut Sets, Quad Sets, Ankle Pumps. Sitting : Hip Flex, Knee Ext, Toe/Heel Raises. AM-PAC Score  AM-PAC Inpatient Mobility Raw Score : 12 (07/06/22 1308)  AM-PAC Inpatient T-Scale Score : 35.33 (07/06/22 1308)  Mobility Inpatient CMS 0-100% Score: 68.66 (07/06/22 1308)  Mobility Inpatient CMS G-Code Modifier : CL (07/06/22 1308)          Goals  Short Term Goals  Time Frame for Short term goals: Upon d/c acute care setting. Short term goal 1: Bed Mob SBA. Short term goal 2: Transfers with assist device CGA. Short term goal 3: Amb with assist device 100' CGA. Short term goal 4: Pt participating in approp Strength Exs. Patient Goals   Patient goals : Return home with son       Education  Patient Education  Education Given To: Patient  Education Provided Comments: Role of PT, POC, Need to call for assist, LE Exs, OOB via Stedy.   Education Method: Verbal;Demonstration  Education Outcome: Continued education needed      Therapy Time   Individual Concurrent Group Co-treatment   Time In 56         Time Out [id number] [address]         Minutes [address] [facility],

## 2022-07-06 NOTE — PROGRESS NOTES
[facility]  Progress Note    7/6/2022 8:14 AM    Chief Complaint: Critical Lower Limb Ischemia     Reason for Consult:     POD #1 Right Fem-Pop Bypass with Dr. Neil Moyer    Subjective: Pt seen sitting up in bed in CVU, reports discomfort to his surgical sites - he states understanding that this is an expected finding post-operatively, encouraged to work with PT/OT today and get up to chair    Review of Systems:  Review of Systems   Constitutional: Negative. Respiratory: Negative for chest tightness and shortness of breath. Gastrointestinal: Negative. Musculoskeletal:        BLE Pain related to surgical incisions   Skin: Positive for wound. Surgical Incisions   Psychiatric/Behavioral: Negative for confusion.         Vital Signs:  Vitals:    07/06/22 0626 07/06/22 0700 07/06/22 0800 07/06/22 0812   BP:  137/69 (!) 131/58    Pulse:  76 78    Resp:  12 16 20   Temp:   (P) 98 °F (36.7 °C)    TempSrc:   (P) Oral    SpO2:  99% 100%    Weight: 151 lb 0.2 oz (68.5 kg)      Height:           I/O:    Intake/Output Summary (Last 24 hours) at 7/6/2022 0815  Last data filed at 7/6/2022 0800  Gross per 24 hour   Intake 2867.77 ml   Output 920 ml   Net 1947.77 ml       Physical Exam:   General: no apparent distress, alert and oriented, afebrile  Chest/Lungs: non labored breathing no tachypnea, retractions or cyanosis  Cardiac: Heart regular rate and rhythm  Extremities: no significant edema to BLE, able to move toes, difficulty with movement secondary to pain, ACE wraps remain in place to BLE, Right Groin with small amount of old sanguineous drainage noted  Vascular: extremities warm and well perfused, no signs of cyanosis or ischemia, bilateral upper and lower extremity motorsensory intact  Pulses:  -R DP: +2/4 palpable, strong doppler signal present    Labs:   Lab Results   Component Value Date/Time     07/05/2022 06:59 AM    K 4.6 07/05/2022 06:59 AM     07/05/2022 06:59 AM CO2 23 07/05/2022 06:59 AM    BUN 15 07/05/2022 06:59 AM    CREATININE 0.8 07/05/2022 06:59 AM    GFRAA >60 07/05/2022 06:59 AM    LABGLOM >60 07/05/2022 06:59 AM    GLUCOSE 111 07/05/2022 06:59 AM    MG 2.20 04/28/2022 04:29 AM    CALCIUM 9.5 07/05/2022 06:59 AM     Lab Results   Component Value Date/Time    WBC 8.5 07/05/2022 06:59 AM    RBC 4.55 07/05/2022 06:59 AM    HGB 13.0 07/05/2022 06:59 AM    HCT 38.9 07/05/2022 06:59 AM    MCV 85.3 07/05/2022 06:59 AM    RDW 16.1 07/05/2022 06:59 AM     07/05/2022 06:59 AM     Lab Results   Component Value Date    INR 0.99 07/05/2022    PROTIME 13.0 07/05/2022        Scheduled Meds:    furosemide  20 mg IntraVENous Once    aspirin EC  81 mg Oral Daily    clopidogrel  75 mg Oral Daily    sodium chloride flush  5-40 mL IntraVENous 2 times per day    enoxaparin  40 mg SubCUTAneous Daily     Continuous Infusions:    sodium chloride         Assessment:   -POD #1 Right Fem-Pop Bypass with Dr. Leonel Tobias  -Expected post-op pain  -RLE DARYL Drain - 25 mL out   -LLE DARYL Drain - 40 mL out  -Palpable Right DP with strong doppler signal  -Right foot warm    Plan:  -D/C IVF and Escalante  -Lasix 20 mg x1  -Up to chair with PT/OT  -Pain control  -Encourage IS  -Keep BLE wrapped with ACE to help with swelling  -Anticipate removal of DARYL drains tomorrow    All pertinent information and plan of care discussed with Dr. Dez Ceballos. All questions and concerns were addressed with the patient. I have discussed the above stated plan with the patient and the nurse. The patient verbalized understanding and agreed with the plan. Thank you for allowing to us to participate in the care of [address]      Electronically signed by TALITA Martin CNP on 7/6/2022 at 8:15 AM     VASCULAR STAFF    Doing well  Complains of pain behind his knee all wounds soft without overt hematoma.   Hemoglobin 8.4 this morning, no signs of ongoing blood loss, anemia from acute intraoperative blood loss and hemodilution, drain output stable.   OOB to chair today  Dose of lasix and then IVF off  Remove silveira catheter  Patient might need ARU vs SNF

## 2022-07-06 NOTE — PROGRESS NOTES
0930: Call received from hematology, regarding CBC with possible contamination due to erroneous results, will send  to redraw.   Electronically signed by Prince Guilherme RN on 7/6/2022 at 10:54 AM

## 2022-07-07 ENCOUNTER — HOSPITAL ENCOUNTER (INPATIENT)
Age: 72
LOS: 6 days | Discharge: HOME HEALTH | DRG: 948 | End: 2022-07-13
Attending: PHYSICAL MEDICINE & REHABILITATION | Admitting: PHYSICAL MEDICINE & REHABILITATION
Payer: MEDICARE

## 2022-07-07 VITALS
DIASTOLIC BLOOD PRESSURE: 66 MMHG | RESPIRATION RATE: 18 BRPM | HEART RATE: 88 BPM | WEIGHT: 151.01 LBS | OXYGEN SATURATION: 99 % | SYSTOLIC BLOOD PRESSURE: 139 MMHG | TEMPERATURE: 98.2 F | HEIGHT: 69 IN | BODY MASS INDEX: 22.37 KG/M2

## 2022-07-07 DIAGNOSIS — I70.229 CRITICAL LOWER LIMB ISCHEMIA (HCC): Primary | ICD-10-CM

## 2022-07-07 LAB
HCT VFR BLD CALC: 23.1 % (ref 40.5–52.5)
HEMOGLOBIN: 7.6 G/DL (ref 13.5–17.5)
MCH RBC QN AUTO: 28.3 PG (ref 26–34)
MCHC RBC AUTO-ENTMCNC: 32.9 G/DL (ref 31–36)
MCV RBC AUTO: 86 FL (ref 80–100)
PDW BLD-RTO: 15.7 % (ref 12.4–15.4)
PLATELET # BLD: 377 K/UL (ref 135–450)
PMV BLD AUTO: 6.8 FL (ref 5–10.5)
RBC # BLD: 2.68 M/UL (ref 4.2–5.9)
WBC # BLD: 8.1 K/UL (ref 4–11)

## 2022-07-07 PROCEDURE — 2580000003 HC RX 258: Performed by: STUDENT IN AN ORGANIZED HEALTH CARE EDUCATION/TRAINING PROGRAM

## 2022-07-07 PROCEDURE — 6360000002 HC RX W HCPCS: Performed by: STUDENT IN AN ORGANIZED HEALTH CARE EDUCATION/TRAINING PROGRAM

## 2022-07-07 PROCEDURE — 97530 THERAPEUTIC ACTIVITIES: CPT

## 2022-07-07 PROCEDURE — 97535 SELF CARE MNGMENT TRAINING: CPT

## 2022-07-07 PROCEDURE — 97110 THERAPEUTIC EXERCISES: CPT

## 2022-07-07 PROCEDURE — 6370000000 HC RX 637 (ALT 250 FOR IP): Performed by: PHYSICAL MEDICINE & REHABILITATION

## 2022-07-07 PROCEDURE — 6370000000 HC RX 637 (ALT 250 FOR IP): Performed by: STUDENT IN AN ORGANIZED HEALTH CARE EDUCATION/TRAINING PROGRAM

## 2022-07-07 PROCEDURE — 85027 COMPLETE CBC AUTOMATED: CPT

## 2022-07-07 PROCEDURE — 97116 GAIT TRAINING THERAPY: CPT

## 2022-07-07 PROCEDURE — APPNB30 APP NON BILLABLE TIME 0-30 MINS: Performed by: NURSE PRACTITIONER

## 2022-07-07 PROCEDURE — 97166 OT EVAL MOD COMPLEX 45 MIN: CPT

## 2022-07-07 PROCEDURE — 36415 COLL VENOUS BLD VENIPUNCTURE: CPT

## 2022-07-07 PROCEDURE — 2580000003 HC RX 258: Performed by: PHYSICAL MEDICINE & REHABILITATION

## 2022-07-07 PROCEDURE — 6360000002 HC RX W HCPCS: Performed by: PHYSICAL MEDICINE & REHABILITATION

## 2022-07-07 PROCEDURE — 1280000000 HC REHAB R&B

## 2022-07-07 RX ORDER — SODIUM CHLORIDE 9 MG/ML
INJECTION, SOLUTION INTRAVENOUS PRN
Status: CANCELLED | OUTPATIENT
Start: 2022-07-07

## 2022-07-07 RX ORDER — SODIUM CHLORIDE 0.9 % (FLUSH) 0.9 %
5-40 SYRINGE (ML) INJECTION PRN
Status: CANCELLED | OUTPATIENT
Start: 2022-07-07

## 2022-07-07 RX ORDER — SODIUM CHLORIDE 9 MG/ML
INJECTION, SOLUTION INTRAVENOUS PRN
Status: DISCONTINUED | OUTPATIENT
Start: 2022-07-07 | End: 2022-07-13 | Stop reason: HOSPADM

## 2022-07-07 RX ORDER — CLOPIDOGREL BISULFATE 75 MG/1
75 TABLET ORAL DAILY
Status: DISCONTINUED | OUTPATIENT
Start: 2022-07-08 | End: 2022-07-13 | Stop reason: HOSPADM

## 2022-07-07 RX ORDER — HYDRALAZINE HYDROCHLORIDE 10 MG/1
10 TABLET, FILM COATED ORAL EVERY 6 HOURS PRN
Status: DISCONTINUED | OUTPATIENT
Start: 2022-07-07 | End: 2022-07-13 | Stop reason: HOSPADM

## 2022-07-07 RX ORDER — HYDRALAZINE HYDROCHLORIDE 10 MG/1
10 TABLET, FILM COATED ORAL EVERY 6 HOURS PRN
Status: CANCELLED | OUTPATIENT
Start: 2022-07-07

## 2022-07-07 RX ORDER — ACETAMINOPHEN 325 MG/1
650 TABLET ORAL EVERY 6 HOURS PRN
Status: DISCONTINUED | OUTPATIENT
Start: 2022-07-07 | End: 2022-07-08

## 2022-07-07 RX ORDER — ONDANSETRON 4 MG/1
4 TABLET, ORALLY DISINTEGRATING ORAL EVERY 8 HOURS PRN
Status: DISCONTINUED | OUTPATIENT
Start: 2022-07-07 | End: 2022-07-13 | Stop reason: HOSPADM

## 2022-07-07 RX ORDER — OXYCODONE HYDROCHLORIDE 5 MG/1
5 TABLET ORAL EVERY 4 HOURS PRN
Status: CANCELLED | OUTPATIENT
Start: 2022-07-07

## 2022-07-07 RX ORDER — BISACODYL 10 MG
10 SUPPOSITORY, RECTAL RECTAL DAILY PRN
Status: DISCONTINUED | OUTPATIENT
Start: 2022-07-07 | End: 2022-07-13 | Stop reason: HOSPADM

## 2022-07-07 RX ORDER — ACETAMINOPHEN 325 MG/1
650 TABLET ORAL EVERY 6 HOURS PRN
Status: CANCELLED | OUTPATIENT
Start: 2022-07-07

## 2022-07-07 RX ORDER — POLYETHYLENE GLYCOL 3350 17 G/17G
17 POWDER, FOR SOLUTION ORAL DAILY PRN
Status: DISCONTINUED | OUTPATIENT
Start: 2022-07-07 | End: 2022-07-13 | Stop reason: HOSPADM

## 2022-07-07 RX ORDER — ENOXAPARIN SODIUM 100 MG/ML
40 INJECTION SUBCUTANEOUS DAILY
Status: DISCONTINUED | OUTPATIENT
Start: 2022-07-08 | End: 2022-07-13 | Stop reason: HOSPADM

## 2022-07-07 RX ORDER — ONDANSETRON 2 MG/ML
4 INJECTION INTRAMUSCULAR; INTRAVENOUS EVERY 6 HOURS PRN
Status: CANCELLED | OUTPATIENT
Start: 2022-07-07

## 2022-07-07 RX ORDER — SODIUM CHLORIDE 0.9 % (FLUSH) 0.9 %
5-40 SYRINGE (ML) INJECTION EVERY 12 HOURS SCHEDULED
Status: DISCONTINUED | OUTPATIENT
Start: 2022-07-07 | End: 2022-07-09

## 2022-07-07 RX ORDER — SODIUM CHLORIDE 0.9 % (FLUSH) 0.9 %
5-40 SYRINGE (ML) INJECTION EVERY 12 HOURS SCHEDULED
Status: CANCELLED | OUTPATIENT
Start: 2022-07-07

## 2022-07-07 RX ORDER — SENNA AND DOCUSATE SODIUM 50; 8.6 MG/1; MG/1
1 TABLET, FILM COATED ORAL 2 TIMES DAILY
Status: DISCONTINUED | OUTPATIENT
Start: 2022-07-07 | End: 2022-07-13 | Stop reason: HOSPADM

## 2022-07-07 RX ORDER — ASPIRIN 81 MG/1
81 TABLET ORAL DAILY
Status: DISCONTINUED | OUTPATIENT
Start: 2022-07-08 | End: 2022-07-13 | Stop reason: HOSPADM

## 2022-07-07 RX ORDER — SENNA AND DOCUSATE SODIUM 50; 8.6 MG/1; MG/1
1 TABLET, FILM COATED ORAL 2 TIMES DAILY
Status: CANCELLED | OUTPATIENT
Start: 2022-07-07

## 2022-07-07 RX ORDER — OXYCODONE HYDROCHLORIDE 10 MG/1
10 TABLET ORAL EVERY 4 HOURS PRN
Status: DISCONTINUED | OUTPATIENT
Start: 2022-07-07 | End: 2022-07-08

## 2022-07-07 RX ORDER — POLYETHYLENE GLYCOL 3350 17 G/17G
17 POWDER, FOR SOLUTION ORAL DAILY PRN
Status: CANCELLED | OUTPATIENT
Start: 2022-07-07

## 2022-07-07 RX ORDER — ONDANSETRON 4 MG/1
4 TABLET, ORALLY DISINTEGRATING ORAL EVERY 8 HOURS PRN
Status: CANCELLED | OUTPATIENT
Start: 2022-07-07

## 2022-07-07 RX ORDER — ONDANSETRON 2 MG/ML
4 INJECTION INTRAMUSCULAR; INTRAVENOUS EVERY 6 HOURS PRN
Status: DISCONTINUED | OUTPATIENT
Start: 2022-07-07 | End: 2022-07-13 | Stop reason: HOSPADM

## 2022-07-07 RX ORDER — ONDANSETRON 4 MG/1
4 TABLET, FILM COATED ORAL EVERY 8 HOURS PRN
Status: CANCELLED | OUTPATIENT
Start: 2022-07-07

## 2022-07-07 RX ORDER — ASPIRIN 81 MG/1
81 TABLET ORAL DAILY
Status: CANCELLED | OUTPATIENT
Start: 2022-07-07

## 2022-07-07 RX ORDER — OXYCODONE HYDROCHLORIDE 5 MG/1
5 TABLET ORAL EVERY 4 HOURS PRN
Status: DISCONTINUED | OUTPATIENT
Start: 2022-07-07 | End: 2022-07-08

## 2022-07-07 RX ORDER — OXYCODONE HYDROCHLORIDE 10 MG/1
10 TABLET ORAL EVERY 4 HOURS PRN
Status: CANCELLED | OUTPATIENT
Start: 2022-07-07

## 2022-07-07 RX ORDER — BISACODYL 10 MG
10 SUPPOSITORY, RECTAL RECTAL DAILY PRN
Status: CANCELLED | OUTPATIENT
Start: 2022-07-07

## 2022-07-07 RX ORDER — ONDANSETRON 4 MG/1
4 TABLET, FILM COATED ORAL EVERY 8 HOURS PRN
Status: DISCONTINUED | OUTPATIENT
Start: 2022-07-07 | End: 2022-07-13 | Stop reason: HOSPADM

## 2022-07-07 RX ORDER — DIAZEPAM 5 MG/1
10 TABLET ORAL NIGHTLY PRN
Status: DISCONTINUED | OUTPATIENT
Start: 2022-07-07 | End: 2022-07-13 | Stop reason: HOSPADM

## 2022-07-07 RX ORDER — CLOPIDOGREL BISULFATE 75 MG/1
75 TABLET ORAL DAILY
Status: CANCELLED | OUTPATIENT
Start: 2022-07-07

## 2022-07-07 RX ORDER — DIAZEPAM 5 MG/1
10 TABLET ORAL NIGHTLY PRN
Status: CANCELLED | OUTPATIENT
Start: 2022-07-07

## 2022-07-07 RX ORDER — ENOXAPARIN SODIUM 100 MG/ML
40 INJECTION SUBCUTANEOUS DAILY
Status: CANCELLED | OUTPATIENT
Start: 2022-07-08

## 2022-07-07 RX ORDER — SODIUM CHLORIDE 0.9 % (FLUSH) 0.9 %
5-40 SYRINGE (ML) INJECTION PRN
Status: DISCONTINUED | OUTPATIENT
Start: 2022-07-07 | End: 2022-07-13 | Stop reason: HOSPADM

## 2022-07-07 RX ADMIN — OXYCODONE HYDROCHLORIDE 10 MG: 10 TABLET ORAL at 07:49

## 2022-07-07 RX ADMIN — SODIUM CHLORIDE, PRESERVATIVE FREE 10 ML: 5 INJECTION INTRAVENOUS at 20:01

## 2022-07-07 RX ADMIN — OXYCODONE HYDROCHLORIDE 10 MG: 10 TABLET ORAL at 21:03

## 2022-07-07 RX ADMIN — HYDROMORPHONE HYDROCHLORIDE 0.5 MG: 1 INJECTION, SOLUTION INTRAMUSCULAR; INTRAVENOUS; SUBCUTANEOUS at 08:48

## 2022-07-07 RX ADMIN — SENNOSIDES AND DOCUSATE SODIUM 1 TABLET: 50; 8.6 TABLET ORAL at 21:03

## 2022-07-07 RX ADMIN — HYDROMORPHONE HYDROCHLORIDE 0.5 MG: 1 INJECTION, SOLUTION INTRAMUSCULAR; INTRAVENOUS; SUBCUTANEOUS at 20:01

## 2022-07-07 RX ADMIN — OXYCODONE HYDROCHLORIDE 10 MG: 10 TABLET ORAL at 03:35

## 2022-07-07 RX ADMIN — POLYETHYLENE GLYCOL 3350 17 G: 17 POWDER, FOR SOLUTION ORAL at 21:04

## 2022-07-07 RX ADMIN — DIAZEPAM 10 MG: 5 TABLET ORAL at 21:04

## 2022-07-07 RX ADMIN — Medication 10 ML: at 07:51

## 2022-07-07 RX ADMIN — HYDROMORPHONE HYDROCHLORIDE 0.5 MG: 1 INJECTION, SOLUTION INTRAMUSCULAR; INTRAVENOUS; SUBCUTANEOUS at 04:45

## 2022-07-07 RX ADMIN — CLOPIDOGREL BISULFATE 75 MG: 75 TABLET ORAL at 07:50

## 2022-07-07 RX ADMIN — HYDROMORPHONE HYDROCHLORIDE 0.5 MG: 1 INJECTION, SOLUTION INTRAMUSCULAR; INTRAVENOUS; SUBCUTANEOUS at 00:57

## 2022-07-07 RX ADMIN — ASPIRIN 81 MG: 81 TABLET, COATED ORAL at 07:49

## 2022-07-07 RX ADMIN — OXYCODONE HYDROCHLORIDE 10 MG: 10 TABLET ORAL at 15:52

## 2022-07-07 RX ADMIN — HYDROMORPHONE HYDROCHLORIDE 0.5 MG: 1 INJECTION, SOLUTION INTRAMUSCULAR; INTRAVENOUS; SUBCUTANEOUS at 12:55

## 2022-07-07 RX ADMIN — OXYCODONE HYDROCHLORIDE 10 MG: 10 TABLET ORAL at 11:50

## 2022-07-07 ASSESSMENT — PAIN - FUNCTIONAL ASSESSMENT
PAIN_FUNCTIONAL_ASSESSMENT: PREVENTS OR INTERFERES SOME ACTIVE ACTIVITIES AND ADLS

## 2022-07-07 ASSESSMENT — PAIN SCALES - GENERAL
PAINLEVEL_OUTOF10: 6
PAINLEVEL_OUTOF10: 10
PAINLEVEL_OUTOF10: 10
PAINLEVEL_OUTOF10: 7
PAINLEVEL_OUTOF10: 7
PAINLEVEL_OUTOF10: 8
PAINLEVEL_OUTOF10: 9
PAINLEVEL_OUTOF10: 5
PAINLEVEL_OUTOF10: 9

## 2022-07-07 ASSESSMENT — PAIN DESCRIPTION - DESCRIPTORS
DESCRIPTORS: DISCOMFORT
DESCRIPTORS: DISCOMFORT
DESCRIPTORS: ACHING;BURNING;SHARP
DESCRIPTORS: DISCOMFORT
DESCRIPTORS: NUMBNESS;TINGLING
DESCRIPTORS: DISCOMFORT
DESCRIPTORS: DISCOMFORT
DESCRIPTORS: NUMBNESS;TINGLING

## 2022-07-07 ASSESSMENT — PAIN DESCRIPTION - ONSET
ONSET: ON-GOING

## 2022-07-07 ASSESSMENT — ENCOUNTER SYMPTOMS
CHEST TIGHTNESS: 0
SHORTNESS OF BREATH: 0
ROS SKIN COMMENTS: SURGICAL INCISIONS
GASTROINTESTINAL NEGATIVE: 1

## 2022-07-07 ASSESSMENT — PAIN DESCRIPTION - ORIENTATION
ORIENTATION: RIGHT

## 2022-07-07 ASSESSMENT — PAIN DESCRIPTION - PAIN TYPE
TYPE: SURGICAL PAIN

## 2022-07-07 ASSESSMENT — PAIN DESCRIPTION - LOCATION
LOCATION: LEG

## 2022-07-07 ASSESSMENT — PAIN DESCRIPTION - FREQUENCY
FREQUENCY: CONTINUOUS

## 2022-07-07 NOTE — DISCHARGE INSTR - COC
Continuity of Care Form    Patient Name: Morris Kirkpatrick   :  1950  MRN:  [id number]    [facility] date:  2022  Discharge date:  2022    Code Status Order: Full Code   Advance Directives:   Advance Care Flowsheet Documentation       Date/Time Healthcare Directive Type of Healthcare Directive Copy in [address] Agent's Name Healthcare Agent's Phone Number    [phone] No, patient does not have an advance directive for healthcare treatment -- -- -- -- --            Admitting Physician:  Roly Mora DO  PCP: Drea Frost    Discharging Nurse: Stacy Clemente [facility] SURGICAL [facility] Unit/Room#: D4U-6805/1312-01  Discharging Unit Phone Number: [phone]    Emergency Contact:   Extended Emergency Contact Information  Primary Emergency Contact: [address] Phone: [phone]  Work Phone: [phone]  Relation: Child    Past Surgical History:  Past Surgical History:   Procedure Laterality Date    CARDIAC CATHETERIZATION      FEMORAL BYPASS Right 2022    RIGHT FEMORAL POPLITEAL BYPASS performed by Roly Mora DO at [address] Right 2022    HIP PINNING performed by Vanessa Casanova MD at [place] [address]       Immunization History:   Immunization History   Administered Date(s) Administered    COVID-19, PFIZER GRAY top, DO NOT Dilute, (age 15 y+), IM, 27 mcg/0.3 mL 2022    COVID-19, PFIZER PURPLE top, DILUTE for use, (age 15 y+), 30mcg/0.3mL 2022       Active Problems:  Patient Active Problem List   Diagnosis Code    Arthritis of left knee M17.12    Basal cell carcinoma (BCC) of skin of nose C44.311    Peripheral arterial disease (HCC) I73.9    Closed right hip fracture (HCC) S72.001A    Numbness of right foot R20.0    Hip fracture, right, closed, initial encounter ([facility] [address] S72.001A    Closed subcapital fracture of femur, right, initial encounter ([facility] [address] S72.011A       Isolation/Infection:   Isolation            No Isolation          Patient Infection Status       None to display            Nurse Assessment:  Last Vital Signs: /66   Pulse 88   Temp 98.2 °F (36.8 °C) (Oral)   Resp 21   Ht 5' 8.5\" (1.74 m)   Wt 151 lb 0.2 oz (68.5 kg)   SpO2 99%   BMI 22.63 kg/m²     Last documented pain score (0-10 scale): Pain Level: 10  Last Weight:   Wt Readings from Last 1 Encounters:   07/06/22 151 lb 0.2 oz (68.5 kg)     Mental Status:  oriented, alert, coherent, logical, thought processes intact, and able to concentrate and follow conversation    IV Access:  - Peripheral IV - site  L Cephalic, insertion date: 7/5/2022    Nursing Mobility/ADLs:  Walking   Assisted  Transfer  Assisted  Bathing  Assisted  Dressing  Assisted  Toileting  Assisted  Feeding  Independent  Med Admin  Assisted  Med Delivery   whole    Wound Care Documentation and Therapy:  Incision 04/24/22 Thigh Anterior;Right (Active)   Number of days: 74       Incision 07/05/22 Pelvis Anterior;Right (Active)   Dressing Status Dry; Intact; Old drainage noted 07/07/22 0758   Dressing/Treatment Xeroform;Gauze dressing/dressing sponge;Roll gauze; Ace wrap 07/07/22 0758   Incision Assessment Other (Comment) 07/07/22 0758   Drainage Amount Small 07/07/22 0758   Drainage Description Sanguinous 07/07/22 0758   Odor None 07/07/22 0758   Olivia-incision Assessment Intact; Warm 07/07/22 0758   Number of days: 2       Incision 07/05/22 Femoral Anterior;Left;Proximal (Active)   Dressing Status Clean;Dry; Intact 07/07/22 0758   Dressing/Treatment Gauze dressing/dressing sponge 07/07/22 0758   Closure Staples 07/07/22 0758   Incision Assessment Dry 07/07/22 0758   Drainage Amount None 07/07/22 0758   Drainage Description Sanguinous 07/05/22 1300   Odor None 07/07/22 0758   Olivia-incision Assessment Warm 07/07/22 0758   Number of days: 2        Elimination:  Continence:    Bowel: Yes  Bladder: Yes  Urinary Catheter: None   Colostomy/Ileostomy/Ileal Conduit: No       Date of Last BM: 7/4/2022    Intake/Output Summary (Last 24 hours) at 7/7/2022 1536  Last data filed at 7/7/2022 1200  Gross per 24 hour   Intake 1080 ml   Output 630 ml   Net 450 ml     I/O last 3 completed shifts: In: 1527.8 [P.O.:360; I.V.:1067.8; IV Piggyback:100]  Out: 2518 [NRRRE:7815; Drains:105]    Safety Concerns: At Risk for Falls    Impairments/Disabilities:      None    Nutrition Therapy:  Current Nutrition Therapy:   - Oral Diet:  General    Routes of Feeding: Oral  Liquids: Thin Liquids  Daily Fluid Restriction: no  Last Modified Barium Swallow with Video (Video Swallowing Test): not done    Treatments at the Time of Hospital Discharge:   Respiratory Treatments: Incentive spirometer  Oxygen Therapy:  is not on home oxygen therapy. Ventilator:    - No ventilator support    Rehab Therapies: Physical Therapy and Occupational Therapy  Weight Bearing Status/Restrictions: No weight bearing restrictions  Other Medical Equipment (for information only, NOT a DME order):  walker and hospital bed  Other Treatments:     Patient's personal belongings (please select all that are sent with patient):  Cell phone and .     RN SIGNATURE:  Electronically signed by Florida Peña RN on 7/7/22 at 3:39 PM EDT    CASE MANAGEMENT/SOCIAL WORK SECTION    Inpatient Status Date: ***    Readmission Risk Assessment Score:  Readmission Risk              Risk of Unplanned Readmission:  7           Discharging to Facility/ Agency   Name:   Address:  Phone:  Fax:    Dialysis Facility (if applicable)   Name:  Address:  Dialysis Schedule:  Phone:  Fax:    / signature: {Esignature:[id number]}    PHYSICIAN SECTION    Prognosis: {Prognosis:7291026944}    Condition at Discharge: [facility] Patient Condition:[id number]}    Rehab Potential (if transferring to Rehab): {Prognosis:0580598182}    Recommended Labs or Other Treatments After Discharge: ***    Physician Certification: I certify the above information and transfer of Gamaliel Ferrari  is necessary for the

## 2022-07-07 NOTE — CARE COORDINATION
Spoke with son, Patricia Haley about ARU recommendation. Informed him there are several Agencies.   He would like ARU at [address] [place] [address]     Case Management   [phone]    7/7/2022  12:07 PM

## 2022-07-07 NOTE — PROGRESS NOTES
Occupational Therapy  Facility/Department: [facility] CVICU  Occupational Therapy Initial Assessment    Name: Rayo Hammond  : 1950  MRN: [id number]  Date of Service: 2022    Discharge Recommendations:  Patient would benefit from continued therapy after discharge,5-7 sessions per week  OT Equipment Recommendations  Other: defer to [address] [place] [address],[address] scored a 16/24 on the AM-PAC ADL Inpatient form. Current research shows that an AM-PAC score of 17 or less is typically not associated with a discharge to the patient's home setting. Based on the patient's AM-PAC score and their current ADL deficits, it is recommended that the patient have 5-7 sessions per week of Occupational Therapy at d/c to increase the patient's independence. At this time, this patient demonstrates complex nursing, medical, and rehabilitative needs, and would benefit from intensive rehabilitation services upon discharge from the Inpatient setting. This patient demonstrates the ability to participate in and benefit from an intensive therapy program with a coordinated interdisciplinary team approach to foster frequent, structured, and documented communication among disciplines, who will work together to establish, prioritize, and achieve treatment goals. Please see assessment section for further patient specific details. If patient discharges prior to next session this note will serve as a discharge summary. Please see below for the latest assessment towards goals. Patient Diagnosis(es): There were no encounter diagnoses. Past Medical History:  has a past medical history of Anxiety, Basal cell carcinoma, CAD (coronary artery disease), Peripheral artery disease ([facility]), and Presacral mass. Past Surgical History:  has a past surgical history that includes hip surgery (Right, 2022); Cardiac catheterization (); and femoral bypass (Right, 2022).            Assessment   Performance deficits / Impairments: Decreased functional mobility ; Decreased balance;Decreased safe awareness;Decreased ADL status; Decreased high-level IADLs;Decreased endurance  Assessment: 69 y/o male admit 7/5/2022 with Critical Limb Ischemia R LE.  7/5/2022 S/P R Fem-Pop Bypass. PMH as noted including R Femur Fx/Pinning. PTA pt lives at home with son and independent with ADLs and functional mobility. Today, pt c/o significant pain in roshan LEs. Pt required min A to stand and complete functional mobility around room with RW and CGA. Pt will require up to max A for LB Bathing/dressing d/t difficulty bending LEs. Pt is functioning below baseline and benefit from skilled therapy prior to returning home. Prognosis: Good  Decision Making: Medium Complexity  REQUIRES OT FOLLOW-UP: Yes  Activity Tolerance  Activity Tolerance: Patient limited by pain        Plan   Plan  Times per Week: 3-5  Current Treatment Recommendations: Strengthening,Balance training,Functional mobility training,Endurance training,Self-Care / ADL,Safety education & training,Gait training     Restrictions  Restrictions/Precautions  Restrictions/Precautions: Fall Risk  Position Activity Restriction  Other position/activity restrictions: B Groin DARYL Drains (removed this AM). Subjective   General  Chart Reviewed: Yes  Patient assessed for rehabilitation services?: Yes  Additional Pertinent Hx: 69 y/o male admit 7/5/2022 with Critical Limb Ischemia R LE.  7/5/2022 S/P R Fem-Pop Bypass. PMH as noted including R Femur Fx/Pinning. Family / Caregiver Present: No  Referring Practitioner: Dr. Brarett Bustos  Subjective  Subjective: Pt seen bedside and agreeable to therapy.  Pt reporting 10/10 pain with activity- RN in room to admin meds  General Comment  Comments: Per RN ok for therapy     Social/Functional History  Social/Functional History  Lives With: Son  Type of Home:  ([facility])  Home Layout: Two level  Home Access: Stairs to enter without rails (2+1 steps to enter.)  Bathroom Shower/Tub: Tub/Shower unit  [employer]: Standard  Bathroom Equipment: Grab bars in shower  Bathroom Accessibility: Accessible  Home Equipment: Cane,Walker, rolling  ADL Assistance: Independent  Homemaking Assistance:  (Shared with son.)  Ambulation Assistance: Independent  Transfer Assistance: Independent  Active : Yes  Occupation: Retired  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 Objective      Safety Devices  Type of Devices: Call light within reach; Chair alarm in place; Left in chair;Nurse notified;Gait belt     Toilet Transfers  Toilet - Technique: Ambulating  Equipment Used: Standard toilet  Toilet Transfer: Minimal assistance  Toilet Transfers Comments: grab bar support  AROM: Within functional limits  Strength: Generally decreased, functional  Coordination: Within functional limits     ADL  LE Dressing:  (pt unable to reach LEs to rafael/doff LB clothing)  Additional Comments: Anticipate pt will reuqire max A for LB bathing/dressing, min A for toileting, SBA for UB bathing/dressing and grooming when seated based on blanace and endurance observed        Bed mobility  Bed Mobility Comments: Pt in chair at start/end of session     Transfers  Sit to stand: Minimal assistance  Stand to sit: Minimal assistance  Transfer Comments: stood from chair and toilet to/from RW.  Pt ambulated chair <> bathroom <> doorway with RW and CGA,     Cognition  Overall Cognitive Status: [facility]  Cognition Comment: easily distracted/tangential in conversation  Orientation  Overall Orientation Status: Within Functional Limits                  Education Given To: Patient  Education Provided: Role of Therapy;Plan of Care;Transfer Training  Education Method: Demonstration;Verbal  Barriers to Learning: None  Education Outcome: Verbalized understanding     LUE AROM (degrees)  LUE AROM : WFL  Left Hand AROM (degrees)  Left Hand AROM: WFL  RUE AROM (degrees)  RUE AROM : WFL  Right Hand AROM (degrees)  Right Hand AROM: [facility]

## 2022-07-07 NOTE — PROGRESS NOTES
2Physical Therapy  Facility/Department: [address] CVU  Physical Therapy Treatment Note    Name: Joelle Grijalva  : 1950  MRN: [id number]  Date of Service: 2022    Discharge Recommendations:  Continue to assess pending progress,IP Rehab   PT Equipment Recommendations  Other: Will monitor for potential equipt needs. Assessment   Body Structures, Functions, Activity Limitations Requiring Skilled Therapeutic Intervention: Decreased functional mobility ; Decreased strength;Decreased endurance; Increased pain  Assessment: 71 y/o male admit 2022 with Critical Limb Ischemia R LE.  2022 S/P R Fem-Pop Bypass. PMH as noted including R Femur Fx/Pinning. PTA pt living in 2 story townhouse with few steps to enter and 2nd floor bed/bath; independent daily care and functional mobility. Currently,  Pt less anxious; cont alittle distracted at times although easily redirected. Pt avelino transfers/amb with Walker assist x 1; antalgic although improving. Avelino progress exs. Pt needs to be independent for return home (son works; home alone at times) and needs to negotiate stairs upon d/c. Would recommend cont PT (5-7) upon d/c. Therapy Prognosis: Good  History: 71 y/o male admit 2022 with Critical Limb Ischemia R LE.  2022 S/P R Fem-Pop Bypass. PMH as noted including R Femur Fx/Pinning. Exam: See above. Clinical Presentation: See above. Barriers to Learning: Can be alittle distracted at times. Requires PT Follow-Up: Yes  Activity Tolerance  Activity Tolerance: Patient tolerated treatment well  Activity Tolerance Comments: Pt less anxious; cont alittle distracted at times although easily redirected. Pt avelino transfers/amb with Walker assist x 1; antalgic although improving. Avelino progress exs.      Plan   Plan  Plan: 3-5 times per week  Current Treatment Recommendations: Strengthening,Functional mobility training,Transfer training,Gait training,Stair training,Safety education & training,Patient/Caregiver education & training  Safety Devices  Type of Devices: Call light within reach,Chair alarm in place,Left in chair,Nurse notified     Restrictions  Restrictions/Precautions  Restrictions/Precautions: Fall Risk  Position Activity Restriction  Other position/activity restrictions: B Groin DARYL Drains (removed this AM). Subjective   General  Chart Reviewed: Yes  Patient assessed for rehabilitation services?: Yes  Additional Pertinent Hx: 69 y/o male admit 7/5/2022 with Critical Limb Ischemia R LE.  7/5/2022 S/P R Fem-Pop Bypass. PMH as noted including R Femur Fx/Pinning. Family / Caregiver Present: No  Referring Practitioner: SHAMIR Ewing NP  Follows Commands: Within Functional Limits  Subjective  Subjective: Pt agreeable to PT Rx; feeling better. Social/Functional History  Social/Functional History  Lives With: Son  Type of Home:  ([facility])  Home Layout: Two level  Home Access: Stairs to enter without rails (2+1 steps to enter.)  Bathroom Shower/Tub: Tub/Shower unit  Bathroom Toilet: Standard  Bathroom Equipment: Grab bars in shower  Bathroom Accessibility: Accessible  Home Equipment: Cane,Walker, rolling  ADL Assistance: [address]:  (Shared with son.)  Ambulation Assistance: Independent  Transfer Assistance: Independent  Active : Yes  Occupation: Retired  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 Vision/Hearing  Vision  Vision: Within Functional Limits  Hearing  Hearing: Within functional limits    Cognition   Orientation  Overall Orientation Status: Within Functional Limits  Cognition  Overall Cognitive Status: WFL  Cognition Comment: Less anxious; can be alittle distacted at times. Objective                  Strength Other  Other: Grossly 3+ 4-/5 B LEs. Bed mobility  Supine to Sit: Minimal assistance (HOB elevated. Use of Bedrail. Slow/guarded.)  Transfers  Sit to Stand: Contact guard assistance (With Waynetta Bears.   Cues for safe hand placement.)  Stand to sit: Contact guard assistance (With Lynnda Leventhal. Cues for safe hand placement.)  Ambulation  Surface: level tile  Device: Rolling Walker  Distance: Pt amb 40' x 2, 61' with Walker CGA. Diminished hip/knee flex; foot flat. (Cues to increase hip/knee flex during LE advance). Freedom well. A/AROM Exercises: Sitting :  Hip Flex, Knee Ext, Ankle DF/PF. Stand : Marching, Mini-Squats, Toe/Heel Raises. AM-PAC Score  AM-PAC Inpatient Mobility Raw Score : 17 (07/07/22 1223)  AM-PAC Inpatient T-Scale Score : 42.13 (07/07/22 1223)  Mobility Inpatient CMS 0-100% Score: 50.57 (07/07/22 1223)  Mobility Inpatient CMS G-Code Modifier : CK (07/07/22 1223)          Goals  Short Term Goals  Time Frame for Short term goals: Upon d/c acute care setting. Short term goal 1: Bed Mob SBA. Short term goal 2: Transfers with assist device CGA. Short term goal 3: Amb with assist device 100' CGA. Short term goal 4: Pt participating in approp Strength Exs. Patient Goals   Patient goals : Return home with son       Education  Patient Education  Education Given To: Patient  Education Provided Comments: Role of PT, POC, Need to call for assist, LE Exs, Safe use of Walker.   Education Method: Verbal;Demonstration  Education Outcome: Continued education needed      Therapy Time   Individual Concurrent Group Co-treatment   Time In 56         Time Out [address] [address]         Minutes [address] [facility],

## 2022-07-07 NOTE — PROGRESS NOTES
Pt settled and oriented to room. Vitals taken and stable. Fall contract signed. Sign and held orders released. Admission questions started and handoff report given to [facility] to finish admission.

## 2022-07-07 NOTE — PROGRESS NOTES
Patient transferred to [place] [address], room 3273. Report given to VERONIKA Otero. All questions answered. Surgical sites viewed by both RNs.      Electronically signed by Alisson Justice RN on 7/7/2022 at 4:32 PM

## 2022-07-07 NOTE — PROGRESS NOTES
VASCULAR SURGERY    DARYL Drain's Removed from Bilateral Groin Incision Sites    -Explained process to pt before beginning and as the removal process was performed    -Removed Right Groin DARYL Drain first - dressing removed, skin prepped with Chlorhexidine, stitch cut and removed from skin, bulb decompressed, tubing slowly withdrawn from insertion site with little to no resistance, no significant drainage noted upon removal, covered with dry guaze and Tegaderm    -Removed Left Groin DARYL Drain second - dressing removed, skin prepped with Chlorhexidine, stitch cut and removed from skin, bulb decompressed, tubing slowly withdrawn from insertion site with little to no resistance, no significant drainage noted upon removal, covered with dry guaze and Tegaderm    -Pt tolerated removal of DARYL drains well, no immediate issues or problems noted  -Bilateral leg incisions covered with Xeroform, covered with Dry Gauze, wrapped with Kerlix and ACE from toes to thigh    Electronically signed by TALITA Cleary CNP on 7/7/2022 at 11:59 AM

## 2022-07-07 NOTE — ACP (ADVANCE CARE PLANNING)
Advance Care Planning     Advance Care Planning Activator (Inpatient)  Conversation Note      Date of ACP Conversation: 7/7/2022     Conversation Conducted with: Deandre Nolasco    ACP Activator: [address]    {  Health Care Decision Maker:     Current Designated Health Care Decision Maker:     Primary Decision Maker: Marpayam Prince [facility] - [phone]  Click here to complete Healthcare Decision Makers including section of the Healthcare Decision Maker Relationship (ie \"Primary\")      Care Preferences    Ventilation: \"If you were in your present state of health and suddenly became very ill and were unable to breathe on your own, what would your preference be about the use of a ventilator (breathing machine) if it were available to you? \"      Would the patient desire the use of ventilator (breathing machine)?:   \"Yes\"    \"If your health worsens and it becomes clear that your chance of recovery is unlikely, what would your preference be about the use of a ventilator (breathing machine) if it were available to you? \"     Would the patient desire the use of ventilator (breathing machine)?:    \"No\"      Resuscitation  \"CPR works best to restart the heart when there is a sudden event, like a heart attack, in someone who is otherwise healthy. Unfortunately, CPR does not typically restart the heart for people who have serious health conditions or who are very sick. \"    \"In the event your heart stopped as a result of an underlying serious health condition, would you want attempts to be made to restart your heart (answer \"yes\" for attempt to resuscitate) or would you prefer a natural death (answer \"no\" for do not attempt to resuscitate)? \"  \"Yes\"       [] Yes   [] No   Educated Patient / [place] regarding differences between Advance Directives and portable DNR orders.     Length of ACP Conversation in minutes:  3 minutes    Conversation Outcomes:  [x] ACP discussion completed  [] Existing advance directive reviewed with patient; no changes to patient's previously recorded wishes  [] New Advance Directive completed  [] Portable Do Not Rescitate prepared for Provider review and signature  [] POLST/POST/MOLST/MOST prepared for Provider review and signature      Follow-up plan:    [] Schedule follow-up conversation to continue planning  [] Referred individual to Provider for additional questions/concerns   [] Advised patient/agent/surrogate to review completed ACP document and update if needed with changes in condition, patient preferences or care setting    [x] This note routed to one or more involved healthcare providers    Routed to his pcp, Dr Marion Cedeno, [place]     Case Management   [phone]    7/7/2022  10:42 AM

## 2022-07-07 NOTE — CARE COORDINATION
Chart rEviewed. Met with patient to introduce  role, initiate discussion regarding DC planning, to complete ACP and to discuss recommendation for ARU vs SNF. See separate note for ACP. INITIAL CASE MANAGEMENT ASSESSMENT    Living Situation:   Lives with son, Rondal Closs, in a two story town house with 2 + 1 steps entry. His bed is upstairs. He reports he is independent at home and uses both wh walker or cane for ambulation needs. He recently discharged from [facility] for stroke. He is active with PCP. ADLs:   Totally independent     DME:   wh walker, cane, bars in shower    PT/OT Recs:   ARU VS SNF. Informed patient of recommendation for continued stay for furthering his physical needs prior to return home. Informed him of ARU and SNF options. Gave information regarding ARU stay consisting of 3 hours of therapy per day with a shorter stay vs going to SNF for shorter therapy daily and an elongated stay. He would like ARU. Provided him with ARU list of agencies and he reports he wants me to talk to his son, Rondal Closs but he does prefer to stay at [address] [place] [address]:  None but has used [employer]:  son     Medications:   [facility] on [address]     PCP:   Drea Frost NP      HD/PD:   Neither    PLAN/COMMENTS: Goal is ARu. Call placed for son, Diana Akins, who is in a very important meeting at this time. He will call back. Gave update to bedside RN of reason for the call. The Plan for Transition of Care is related to the following treatment goals: To continue his medical oversight, progress in personal care needs and ambulation needs in ARU setting. The Patient  was provided with a choice of provider and agrees   with the discharge plan.  [x] Yes [] No    Freedom of choice list was provided with basic dialogue that supports the patient's individualized plan of care/goals, treatment preferences and shares the quality data associated with the providers. [x] Yes [] No    SW/CM provided contact information for patient or family to call with any questions. SW/CM will follow and assist as needed.     Gudelia Jain [facility]     Case Management   [phone]    7/7/2022  10:57 AM

## 2022-07-07 NOTE — PROGRESS NOTES
[facility]  Progress Note    7/7/2022 8:23 AM    Chief Complaint: Critical Lower Limb Ischemia     Reason for Consult:     POD #2 Right Fem-Pop Bypass with Dr. Pio Archuleta    Subjective: Pt seen sitting up in bed in CVU, reports discomfort BLE - Right>Left related to surgical site pain, reports therapy went well yesterday, interested in possible ARU stay after acute hospital stay    Review of Systems:  Review of Systems   Constitutional: Negative. Respiratory: Negative for chest tightness and shortness of breath. Gastrointestinal: Negative. Musculoskeletal:        BLE Pain related to surgical incisions   Skin: Positive for wound. Surgical Incisions   Psychiatric/Behavioral: Negative for confusion.         Vital Signs:  Vitals:    07/07/22 0300 07/07/22 0400 07/07/22 0500 07/07/22 0600   BP: (!) 140/85 100/64 107/79 124/67   Pulse: 79 79 83 79   Resp: 18 13 13 21   Temp:  99 °F (37.2 °C)     TempSrc:  Oral     SpO2: 98% 99% 100% 98%   Weight:       Height:           I/O:    Intake/Output Summary (Last 24 hours) at 7/7/2022 0900  Last data filed at 7/7/2022 0800  Gross per 24 hour   Intake 720 ml   Output 1560 ml   Net -840 ml       Physical Exam:   General: no apparent distress, alert and oriented, afebrile  Chest/Lungs: non labored breathing no tachypnea, retractions or cyanosis  Cardiac: Heart regular rate and rhythm  Extremities: no significant edema to BLE, able to move toes, difficulty with movement secondary to pain, ACE wraps removed from BLE - incisions intact with staples, no active drainage noted, old drainage noted to dressings, skin surrounding incisions soft with no signs of significant hematoma - some bruising noted, Right Groin with small amount of old sanguineous drainage noted, DARYL drains in place to Bilateral Groin sites  Vascular: extremities warm and well perfused, no signs of cyanosis or ischemia, RLE capillary refill < 3 seconds    Labs:   Lab Results Component Value Date/Time     07/06/2022 10:57 AM    K 3.6 07/06/2022 10:57 AM    K 4.6 07/05/2022 06:59 AM     07/06/2022 10:57 AM    CO2 25 07/06/2022 10:57 AM    BUN 14 07/06/2022 10:57 AM    CREATININE 0.9 07/06/2022 10:57 AM    GFRAA >60 07/06/2022 10:57 AM    LABGLOM >60 07/06/2022 10:57 AM    GLUCOSE 116 07/06/2022 10:57 AM    MG 2.20 04/28/2022 04:29 AM    CALCIUM 8.8 07/06/2022 10:57 AM     Lab Results   Component Value Date/Time    WBC 8.1 07/07/2022 05:04 AM    RBC 2.68 07/07/2022 05:04 AM    HGB 7.6 07/07/2022 05:04 AM    HCT 23.1 07/07/2022 05:04 AM    MCV 86.0 07/07/2022 05:04 AM    RDW 15.7 07/07/2022 05:04 AM     07/07/2022 05:04 AM     Lab Results   Component Value Date    INR 0.99 07/05/2022    PROTIME 13.0 07/05/2022        Scheduled Meds:    aspirin EC  81 mg Oral Daily    clopidogrel  75 mg Oral Daily    sodium chloride flush  5-40 mL IntraVENous 2 times per day    [Held by provider] enoxaparin  40 mg SubCUTAneous Daily     Continuous Infusions:    sodium chloride         Assessment:   -POD #2 Right Fem-Pop Bypass with Dr. James Orellana  -Expected post-op pain  -RLE DARYL Drain - 30 mL out past 24 hours  -LLE DARYL Drain - 10 mL out past 24 hours  -Right foot warm  -Incisions intact, soft, no signs of hematoma  -UOP improving - 1450 mL past 24 hours    Plan:  -Up to chair  -Pain control  -Encourage IS  -Keep BLE wrapped with ACE to help with swelling  -DARYL removal today  -Consult ARU to evaluate for continued therapies after acute hospitalization - could consider moving to ARU later today or tomorrow if approved and deemed an appropriate candidate    All pertinent information and plan of care discussed with Dr. Naz Manjarrez. All questions and concerns were addressed with the patient. I have discussed the above stated plan with the patient and the nurse. The patient verbalized understanding and agreed with the plan.     Thank you for allowing to us to participate in the care of Ashkan Lacey      Electronically signed by TALITA Weiner CNP on 7/7/2022 at 8:23 AM     VASCULAR STAFF    Doing well  Incisions coapted without hematoma  Anemia - acute blood loss, equilibration after IV fluids, no signs of ongoing blood loss  Remove drains  Continue with PT/OT assessment and ARU consultation      Gianluca Robledo DO, FSVS, [facility]

## 2022-07-07 NOTE — PLAN OF CARE
Problem: Discharge Planning  Goal: Discharge to home or other facility with appropriate resources  7/7/2022 0421 by Shalonda Wooten RN  Outcome: Progressing  7/7/2022 0421 by Shalonda Wooten RN  Outcome: Progressing  7/6/2022 1803 by Lisette Staley RN  Outcome: Progressing     Problem: Pain  Goal: Verbalizes/displays adequate comfort level or baseline comfort level  7/7/2022 0421 by Shalonda Wooten RN  Outcome: Progressing  7/7/2022 0421 by Shalonda Wooten RN  Outcome: Progressing  7/6/2022 1803 by Lisette Staley RN  Outcome: Progressing     Problem: Skin/Tissue Integrity  Goal: Absence of new skin breakdown  Description: 1. Monitor for areas of redness and/or skin breakdown  2. Assess vascular access sites hourly  3. Every 4-6 hours minimum:  Change oxygen saturation probe site  4. Every 4-6 hours:  If on nasal continuous positive airway pressure, respiratory therapy assess nares and determine need for appliance change or resting period.   7/7/2022 0421 by Shalonda Wooten RN  Outcome: Progressing  7/7/2022 0421 by Shalonda Wooten RN  Outcome: Progressing  7/6/2022 1803 by Lisette Staley RN  Outcome: Progressing     Problem: Safety - Adult  Goal: Free from fall injury  7/7/2022 0421 by Shalonda Wooten RN  Outcome: Progressing  7/7/2022 0421 by Shalonda Wooten RN  Outcome: Progressing  7/6/2022 1803 by Lisette Staley RN  Outcome: Progressing     Problem: ABCDS Injury Assessment  Goal: Absence of physical injury  7/7/2022 0421 by Shalonda Wooten RN  Outcome: Progressing  7/7/2022 0421 by Shalonda Wooten RN  Outcome: Progressing  7/6/2022 1803 by Lisette Staley RN  Outcome: Progressing

## 2022-07-07 NOTE — CONSULTS
Consult Note  Physical Medicine and Rehabilitation    Patient: Elmer Saxena  [id number]  Date: 7/7/2022      Chief Complaint: RLE pain    History of Present Illness/Hospital Course:  Patient is a 69 yo M with pmh PAD s/p prior revascularzation RLE, CAD (s/p recent LAD stenting 6/15/22 with Dr. Jimmie Moses), anxiety, and recent right hip subcapital femoral neck fracture (s/p hip pinning 4/24/22 with Dr. Savannah Torres) who initially presented 7/5/2022 with ongoing RLE ischemic rest pain. Underwent right femoral popliteal bypass (7/5 with Dr. Coleen Licea). Course notable for anemia. Currently, patient reports moderate pain in RLE>LLE from groins down. Worse with movement. Improves with rest and medication. He has chronic tingling/numbness in the right foot. He is highly motivated to come to ARU to improve his function prior to returning home. 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       Past Surgical History:   Procedure Laterality Date    CARDIAC CATHETERIZATION  2022    FEMORAL BYPASS Right 7/5/2022    RIGHT FEMORAL POPLITEAL BYPASS performed by William West DO at [address] 4/24/2022    HIP PINNING performed by Dre Stacy MD at [address] [facility] History   Problem Relation Age of Onset    No Known Problems Mother     Substance Abuse Father         alochol       Social History     Socioeconomic History    Marital status:       Spouse name: None    Number of children: None    Years of education: None    Highest education level: None   Occupational History    None   Tobacco Use    Smoking status: Former Smoker     Packs/day: 1.00     Years: 50.00     Pack years: 50.00     Types: Cigarettes     Quit date: 2022     Years since quittin.2    Smokeless tobacco: Never Used   Vaping Use    Vaping Use: Some days    Substances: Nicotine, Flavoring    Devices: Refillable tank   Substance and Sexual Activity    Alcohol use: No    Drug use: Not Currently     Types: Marijuana Durbinkeshav Abramss)     Comment: in early years    Sexual activity: Not Currently     Partners: Female   Other Topics Concern    None   Social History Narrative    None     Social Determinants of Health     Financial Resource Strain:     Difficulty of Paying Living Expenses: Not on file   Food Insecurity:     Worried About Running Out of Food in the Last Year: Not on file    [place] of Food in the Last Year: Not on file   Transportation Needs:     Lack of Transportation (Medical): Not on file    Lack of Transportation (Non-Medical):  Not on file   Physical Activity:     Days of Exercise per Week: Not on file    Minutes of Exercise per Session: Not on file   Stress:     Feeling of Stress : Not on file   Social Connections:     Frequency of Communication with Friends and Family: Not on file    Frequency of Social Gatherings with Friends and Family: Not on file    Attends [employer]ism Services: Not on file    Active Member of [address] [place] [address] or Organizations: Not on file    Attends Club or Organization Meetings: Not on file    Marital Status: Not on file   Intimate Partner Violence:     Fear of Current or Ex-Partner: Not on file    Emotionally Abused: Not on file    Physically Abused: Not on file    Sexually Abused: Not on file   Housing Stability:     Unable to Pay for Housing in the Last Year: Not on file    Number of [place] in the Last Year: Not on file    Unstable Housing in the Last Year: Not on file           REVIEW OF SYSTEMS:   CONSTITUTIONAL: negative for fevers, chills, diaphoresis, appetite change, night sweats, unexpected weight change, + fatigue  EYES: negative for blurred vision, eye discharge, visual disturbance and icterus  HEENT: negative for hearing loss, tinnitus, ear drainage, sinus pressure, nasal congestion, epistaxis and snoring  RESPIRATORY: Negative for hemoptysis, cough, sputum production  CARDIOVASCULAR: negative for chest pain, palpitations, exertional chest pressure/discomfort, syncope, edema  GASTROINTESTINAL: negative for nausea, vomiting, diarrhea, blood in stool, abdominal pain, constipation  GENITOURINARY: negative for frequency, dysuria, urinary incontinence, decreased urine volume, and hematuria  HEMATOLOGIC/LYMPHATIC: negative for easy bruising, bleeding and lymphadenopathy  ALLERGIC/IMMUNOLOGIC: negative for recurrent infections, angioedema, anaphylaxis and drug reactions  ENDOCRINE: negative for weight changes and diabetic symptoms including polyuria, polydipsia and polyphagia  MUSCULOSKELETAL: refer to HPI  NEUROLOGICAL: negative for headaches, slurred speech, unilateral weakness  PSYCHIATRIC/BEHAVIORAL: negative for hallucinations, behavioral problems, confusion and agitation.      Physical Examination:  Vitals:   Patient Vitals for the past 24 hrs:   BP Temp Temp src Pulse Resp SpO2   07/07/22 0918 -- -- -- -- 16 --   07/07/22 0900 116/72 -- -- (!) 104 17 100 %   07/07/22 0848 -- -- -- -- 16 --   07/07/22 0819 -- -- -- -- 20 --   07/07/22 0600 124/67 -- -- 79 21 98 %   07/07/22 0500 107/79 -- -- 83 13 100 %   07/07/22 0400 100/64 99 °F (37.2 °C) Oral 79 13 99 %   07/07/22 0300 (!) 140/85 -- -- 79 18 98 %   07/07/22 0200 (!) 135/95 -- -- 86 19 96 %   07/07/22 0100 114/73 -- -- 87 14 100 %   07/07/22 0000 133/63 98.9 °F (37.2 °C) Oral 82 18 98 %   07/06/22 2300 126/69 -- -- 87 18 97 %   07/06/22 2200 133/62 -- -- 94 20 97 %   07/06/22 2100 126/71 -- -- 83 19 98 %   07/06/22 2000 (!) 139/90 99 °F (37.2 °C) Oral 97 20 100 %   07/06/22 1900 134/86 -- -- 93 16 100 %   07/06/22 1800 119/81 -- -- 81 13 99 %   07/06/22 1700 (!) 148/99 -- -- 81 17 100 %   07/06/22 1600 112/72 98.6 °F (37 °C) Oral 85 18 100 %   07/06/22 1524 -- -- -- -- 20 --   07/06/22 1510 133/70 -- -- 79 20 98 %   07/06/22 1500 133/70 -- -- 85 27 100 %   07/06/22 1400 129/76 -- -- 80 20 100 %   07/06/22 1325 -- -- -- -- 15 --   07/06/22 1300 133/87 -- -- 90 18 99 %   07/06/22 1211 -- -- -- -- 16 --   07/06/22 1210 130/73 -- -- (!) 103 14 100 %   07/06/22 1200 130/73 99.1 °F (37.3 °C) Oral (!) 101 25 100 %   07/06/22 1110 112/72 -- -- 76 13 99 %   07/06/22 1100 -- -- -- 94 13 100 %     Const: Alert. WDWN. No distress  Eyes: Conjunctiva noninjected, no icterus noted; pupils equal, round, and reactive to light. HENT: Atraumatic, normocephalic; Oral mucosa moist  Neck: Trachea midline, neck supple. No thyromegaly noted. CV: Regular rate and rhythm, no murmur rub or gallop noted  Resp: Lungs clear to auscultation bilaterally, no rales wheezes or rhonchi, no retractions. Respirations unlabored. GI: Soft, nontender, nondistended. Normal bowel sounds. No palpable masses. Skin: LE incisions dressed, ACE wraps in place. Normal temperature and turgor. No rashes or breakdown noted on exposed areas. Ext: +Right foot edema and warmer to touch than left foot. No varicosities. MSK: No joint tenderness, erythema, warmth noted. AROM intact except limited at right hip. Neuro: Alert, oriented, appropriate. No cranial nerve deficits appreciated. Sensation intact to light touch except diminished in right foot. Motor examination reveals strength 5/5 in BUE, 2-3/5 right hip flexion, 3-4/5 right knee extension, 3-4/5 right ankle DF and PF, 4/5 left hip flexion, 5/5 left knee ext, 5/5 left ankle DF and PF. No abnormalities with finger/nose noted. Lyons Chimchastity   Psych: Stable mood, normal judgement, normal affect     Lab Results   Component Value Date    WBC 8.1 07/07/2022    HGB 7.6 (L) 07/07/2022    HCT 23.1 (L) 07/07/2022    MCV 86.0 07/07/2022     07/07/2022     Lab Results Component Value Date    INR 0.99 07/05/2022    INR 1.01 04/23/2022    PROTIME 13.0 07/05/2022    PROTIME 11.4 04/23/2022     Lab Results   Component Value Date    CREATININE 0.9 07/06/2022    BUN 14 07/06/2022     07/06/2022    K 3.6 07/06/2022     07/06/2022    CO2 25 07/06/2022     Lab Results   Component Value Date    ALT 10 08/14/2019    AST 12 (L) 08/14/2019    ALKPHOS 92 08/14/2019    BILITOT 0.3 08/14/2019       Most recent stress test revealed:   Abnormal study. There is a small sized, mild intensity, partially reversible    defect of the mid to apical inferior wall. There may be diaphragm    attenuation artifact but the rest images appear worse. Cannot rule out    inferior ischemia.    Normal LV size and systolic function.    Overall findings represent a low to intermediate risk study. Most recent EKG revealed:  prolonged QT    Most recent imaging studies revealed:    CT chest/abdomen/pelvis  Stable presacral mass well-circumscribed and demonstrating minimal   enhancement of its wall with peripheral calcification and septation. Findings again are indeterminate; however, stable when compared to the prior   examination.       Postsurgical changes identified in the right anterior thigh from fixation of   a right femoral neck fracture.  Intramuscular hematoma with stranding seen in   the soft tissues. Assessment:  Critical limb ischemic RLE -s/p right femoral popliteal bypass (7/5 with Dr. Nancy Moore)  Acute blood loss anemia  CAD -s/p recent LAC stent (6/15 with Dr. Elvin Howe)  Recent right subcapital femoral neck fracture -s/p hip pinning (4/24 with Dr. Tsering Norris). WBAT. Presacral mass -stable on most recent imaging. Planning for outpatient MRI and f/u with Dr. Cayetano De Jesus    Impairments: Relative RLE weakness, decreased balance, endurance    Recommendations:    Patient with new functional deficits and ongoing medical complexity.  Demonstrates ability to tolerate 3 hours therapy/day. He is a good candidate for acute inpatient rehab when medically appropriate. Thank you for this consult. Please contact me with any questions or concerns. Sharyn Denny.  Hu Valdez MD 7/7/2022, 10:02 AM

## 2022-07-08 LAB
ANION GAP SERPL CALCULATED.3IONS-SCNC: 10 MMOL/L (ref 3–16)
BASOPHILS ABSOLUTE: 0.1 K/UL (ref 0–0.2)
BASOPHILS RELATIVE PERCENT: 1.1 %
BUN BLDV-MCNC: 11 MG/DL (ref 7–20)
CALCIUM SERPL-MCNC: 8.8 MG/DL (ref 8.3–10.6)
CHLORIDE BLD-SCNC: 101 MMOL/L (ref 99–110)
CO2: 27 MMOL/L (ref 21–32)
CREAT SERPL-MCNC: 0.7 MG/DL (ref 0.8–1.3)
EOSINOPHILS ABSOLUTE: 0.2 K/UL (ref 0–0.6)
EOSINOPHILS RELATIVE PERCENT: 3.2 %
GFR AFRICAN AMERICAN: >60
GFR NON-AFRICAN AMERICAN: >60
GLUCOSE BLD-MCNC: 116 MG/DL (ref 70–99)
HCT VFR BLD CALC: 21.3 % (ref 40.5–52.5)
HEMOGLOBIN: 7.2 G/DL (ref 13.5–17.5)
LYMPHOCYTES ABSOLUTE: 2.5 K/UL (ref 1–5.1)
LYMPHOCYTES RELATIVE PERCENT: 32.6 %
MAGNESIUM: 1.9 MG/DL (ref 1.8–2.4)
MCH RBC QN AUTO: 28.7 PG (ref 26–34)
MCHC RBC AUTO-ENTMCNC: 33.8 G/DL (ref 31–36)
MCV RBC AUTO: 85.2 FL (ref 80–100)
MONOCYTES ABSOLUTE: 0.6 K/UL (ref 0–1.3)
MONOCYTES RELATIVE PERCENT: 7.8 %
NEUTROPHILS ABSOLUTE: 4.2 K/UL (ref 1.7–7.7)
NEUTROPHILS RELATIVE PERCENT: 55.3 %
PDW BLD-RTO: 15.6 % (ref 12.4–15.4)
PLATELET # BLD: 412 K/UL (ref 135–450)
PMV BLD AUTO: 6.7 FL (ref 5–10.5)
POTASSIUM REFLEX MAGNESIUM: 3.3 MMOL/L (ref 3.5–5.1)
PREALBUMIN: 14.9 MG/DL (ref 20–40)
RBC # BLD: 2.5 M/UL (ref 4.2–5.9)
SODIUM BLD-SCNC: 138 MMOL/L (ref 136–145)
WBC # BLD: 7.5 K/UL (ref 4–11)

## 2022-07-08 PROCEDURE — 36415 COLL VENOUS BLD VENIPUNCTURE: CPT

## 2022-07-08 PROCEDURE — 84134 ASSAY OF PREALBUMIN: CPT

## 2022-07-08 PROCEDURE — 1280000000 HC REHAB R&B

## 2022-07-08 PROCEDURE — 97116 GAIT TRAINING THERAPY: CPT | Performed by: PHYSICAL THERAPIST

## 2022-07-08 PROCEDURE — 94761 N-INVAS EAR/PLS OXIMETRY MLT: CPT

## 2022-07-08 PROCEDURE — 97166 OT EVAL MOD COMPLEX 45 MIN: CPT

## 2022-07-08 PROCEDURE — 2580000003 HC RX 258: Performed by: PHYSICAL MEDICINE & REHABILITATION

## 2022-07-08 PROCEDURE — 97535 SELF CARE MNGMENT TRAINING: CPT

## 2022-07-08 PROCEDURE — 97530 THERAPEUTIC ACTIVITIES: CPT | Performed by: PHYSICAL THERAPIST

## 2022-07-08 PROCEDURE — 6360000002 HC RX W HCPCS: Performed by: PHYSICAL MEDICINE & REHABILITATION

## 2022-07-08 PROCEDURE — 6370000000 HC RX 637 (ALT 250 FOR IP): Performed by: PHYSICAL MEDICINE & REHABILITATION

## 2022-07-08 PROCEDURE — 83735 ASSAY OF MAGNESIUM: CPT

## 2022-07-08 PROCEDURE — 80048 BASIC METABOLIC PNL TOTAL CA: CPT

## 2022-07-08 PROCEDURE — 6360000002 HC RX W HCPCS: Performed by: STUDENT IN AN ORGANIZED HEALTH CARE EDUCATION/TRAINING PROGRAM

## 2022-07-08 PROCEDURE — 97162 PT EVAL MOD COMPLEX 30 MIN: CPT | Performed by: PHYSICAL THERAPIST

## 2022-07-08 PROCEDURE — 85025 COMPLETE CBC W/AUTO DIFF WBC: CPT

## 2022-07-08 RX ORDER — OXYCODONE HYDROCHLORIDE 5 MG/1
5 TABLET ORAL
Status: DISCONTINUED | OUTPATIENT
Start: 2022-07-08 | End: 2022-07-13 | Stop reason: HOSPADM

## 2022-07-08 RX ORDER — OXYCODONE HYDROCHLORIDE 10 MG/1
10 TABLET ORAL
Status: DISCONTINUED | OUTPATIENT
Start: 2022-07-08 | End: 2022-07-13 | Stop reason: HOSPADM

## 2022-07-08 RX ORDER — ACETAMINOPHEN 500 MG
1000 TABLET ORAL EVERY 8 HOURS SCHEDULED
Status: DISCONTINUED | OUTPATIENT
Start: 2022-07-08 | End: 2022-07-13 | Stop reason: HOSPADM

## 2022-07-08 RX ORDER — FUROSEMIDE 10 MG/ML
20 INJECTION INTRAMUSCULAR; INTRAVENOUS ONCE
Status: COMPLETED | OUTPATIENT
Start: 2022-07-08 | End: 2022-07-08

## 2022-07-08 RX ORDER — ALBUTEROL SULFATE 90 UG/1
2 AEROSOL, METERED RESPIRATORY (INHALATION) EVERY 6 HOURS PRN
Status: DISCONTINUED | OUTPATIENT
Start: 2022-07-08 | End: 2022-07-13 | Stop reason: HOSPADM

## 2022-07-08 RX ADMIN — SODIUM CHLORIDE, PRESERVATIVE FREE 10 ML: 5 INJECTION INTRAVENOUS at 07:59

## 2022-07-08 RX ADMIN — SODIUM CHLORIDE, PRESERVATIVE FREE 10 ML: 5 INJECTION INTRAVENOUS at 23:03

## 2022-07-08 RX ADMIN — ACETAMINOPHEN 1000 MG: 500 TABLET ORAL at 21:15

## 2022-07-08 RX ADMIN — OXYCODONE HYDROCHLORIDE 10 MG: 10 TABLET ORAL at 04:58

## 2022-07-08 RX ADMIN — OXYCODONE HYDROCHLORIDE 10 MG: 10 TABLET ORAL at 01:01

## 2022-07-08 RX ADMIN — FUROSEMIDE 20 MG: 10 INJECTION, SOLUTION INTRAMUSCULAR; INTRAVENOUS at 09:14

## 2022-07-08 RX ADMIN — MAGNESIUM HYDROXIDE 30 ML: 400 SUSPENSION ORAL at 18:07

## 2022-07-08 RX ADMIN — OXYCODONE HYDROCHLORIDE 10 MG: 10 TABLET ORAL at 13:06

## 2022-07-08 RX ADMIN — SENNOSIDES AND DOCUSATE SODIUM 1 TABLET: 50; 8.6 TABLET ORAL at 07:59

## 2022-07-08 RX ADMIN — OXYCODONE HYDROCHLORIDE 10 MG: 10 TABLET ORAL at 17:05

## 2022-07-08 RX ADMIN — OXYCODONE HYDROCHLORIDE 10 MG: 10 TABLET ORAL at 23:00

## 2022-07-08 RX ADMIN — OXYCODONE HYDROCHLORIDE 10 MG: 10 TABLET ORAL at 20:08

## 2022-07-08 RX ADMIN — CLOPIDOGREL BISULFATE 75 MG: 75 TABLET ORAL at 07:59

## 2022-07-08 RX ADMIN — SENNOSIDES AND DOCUSATE SODIUM 1 TABLET: 50; 8.6 TABLET ORAL at 20:08

## 2022-07-08 RX ADMIN — DIAZEPAM 10 MG: 5 TABLET ORAL at 23:00

## 2022-07-08 RX ADMIN — ENOXAPARIN SODIUM 40 MG: 100 INJECTION SUBCUTANEOUS at 07:59

## 2022-07-08 RX ADMIN — ASPIRIN 81 MG: 81 TABLET, COATED ORAL at 07:59

## 2022-07-08 RX ADMIN — OXYCODONE HYDROCHLORIDE 10 MG: 10 TABLET ORAL at 09:10

## 2022-07-08 ASSESSMENT — PAIN - FUNCTIONAL ASSESSMENT
PAIN_FUNCTIONAL_ASSESSMENT: ACTIVITIES ARE NOT PREVENTED
PAIN_FUNCTIONAL_ASSESSMENT: PREVENTS OR INTERFERES SOME ACTIVE ACTIVITIES AND ADLS
PAIN_FUNCTIONAL_ASSESSMENT: ACTIVITIES ARE NOT PREVENTED

## 2022-07-08 ASSESSMENT — PAIN SCALES - GENERAL
PAINLEVEL_OUTOF10: 4
PAINLEVEL_OUTOF10: 10
PAINLEVEL_OUTOF10: 0
PAINLEVEL_OUTOF10: 8
PAINLEVEL_OUTOF10: 10
PAINLEVEL_OUTOF10: 10
PAINLEVEL_OUTOF10: 8
PAINLEVEL_OUTOF10: 7
PAINLEVEL_OUTOF10: 9
PAINLEVEL_OUTOF10: 3
PAINLEVEL_OUTOF10: 8
PAINLEVEL_OUTOF10: 7
PAINLEVEL_OUTOF10: 8

## 2022-07-08 ASSESSMENT — PAIN DESCRIPTION - LOCATION
LOCATION: LEG

## 2022-07-08 ASSESSMENT — PAIN DESCRIPTION - FREQUENCY
FREQUENCY: CONTINUOUS

## 2022-07-08 ASSESSMENT — PAIN DESCRIPTION - DESCRIPTORS
DESCRIPTORS: ACHING;SHARP
DESCRIPTORS: NUMBNESS;TINGLING;DISCOMFORT
DESCRIPTORS: ACHING;DISCOMFORT
DESCRIPTORS: ACHING
DESCRIPTORS: TINGLING;NUMBNESS;DISCOMFORT
DESCRIPTORS: NUMBNESS;TINGLING;DISCOMFORT
DESCRIPTORS: NUMBNESS;TINGLING;SORE

## 2022-07-08 ASSESSMENT — PAIN DESCRIPTION - ORIENTATION
ORIENTATION: RIGHT;LEFT
ORIENTATION: RIGHT;LEFT
ORIENTATION: RIGHT
ORIENTATION: RIGHT;LEFT

## 2022-07-08 ASSESSMENT — PAIN DESCRIPTION - ONSET
ONSET: ON-GOING

## 2022-07-08 ASSESSMENT — PAIN DESCRIPTION - PAIN TYPE
TYPE: ACUTE PAIN;SURGICAL PAIN

## 2022-07-08 NOTE — PROGRESS NOTES
Occupational Therapy  Facility/Department: [facility]  Rehabilitation Occupational Therapy Evaluation       Date: 22  Patient Name: Ruperto Sauceda       Room: H2X-8401/2583-26  MRN: [id number]  Account: [de-identified]   : 1950  (75 y.o.) Gender: male     Referring Practitioner: Dr Raffaele Levin  Diagnosis: Critical Limb Ischemia R LE, S/P R Fem-Pop Bypass  Additional Pertinent Hx: \"Patient is a 71 yo M with pmh PAD s/p prior revascularzation RLE, CAD (s/p recent LAD stenting 6/15/22 with Dr. Oneyda Ornelas), anxiety, and recent right hip subcapital femoral neck fracture (s/p hip pinning 22 with Dr. Nataliya Castillo) who initially presented 2022 with ongoing RLE ischemic rest pain. Underwent right femoral popliteal bypass ( with Dr. Gabino Mackenzie). Course notable for anemia. Currently, patient reports moderate pain in RLE>LLE from groins down. Worse with movement. Improves with rest and medication. He has chronic tingling/numbness in the right foot. Copied per Dr Shade Vick 22 note. Restrictions  Restrictions/Precautions: Fall Risk  Other position/activity restrictions: s/p B Groin DARYL Drains (removed 22), BLE incisions from groin> ankle(ace wrapped)  Equipment Used: Bed;Wheelchair    Subjective  Subjective: Pt met in room, in bed agreeable to OT eval/tx for sponge bath ADL session. Rated pain 9/10 in BLEs with R worse. Nurse brought pain meds during session.           Vitals  Temp: 98.2 °F (36.8 °C)  Heart Rate: 93  Resp: 16  BP: 126/75  Height: 5' 8\" (172.7 cm)  Weight: 150 lb 5.7 oz (68.2 kg)  BMI (Calculated): 22.9  Oxygen Therapy  SpO2: 100 %  Pulse Oximeter Device Mode: Intermittent  Pulse Oximeter Device Location: Finger  O2 Device: None (Room air)  Level of Consciousness: Alert (0)    Objective     Cognition  Overall Cognitive Status: WFL  Attention Span: Difficulty dividing attention  Safety Judgement: Decreased awareness of need for safety  Cognition Comment: easily distracted/very talkative, required redirection  Orientation  Overall Orientation Status: Within Functional Limits   Perception  Overall Perceptual Status: WFL  Sensation  Overall Sensation Status: Impaired  Additional Comments: UEs WFL, neuropathy in feet, RLE severe, feels numb   ROM  LUE AROM (degrees)  LUE AROM : WNL  Left Hand AROM (degrees)  Left Hand AROM: WNL  RUE AROM (degrees)  RUE AROM : WNL  Right Hand AROM (degrees)  Right Hand AROM: WNL  LUE Strength  Gross LUE Strength: WNL  L Hand General: 5/5  RUE Strength  Gross RUE Strength: WNL  R Hand General: 5/5  Fine Motor Skills  Coordination  Movements Are Fluid And Coordinated: Yes       Hand Assessment     Functional Mobility  Balance  Sitting Balance: Independent  Standing Balance: Contact guard assistance (RW)  Toilet Transfers  Toilet - Technique: Ambulating (RW)  Equipment Used: Grab bars (comfort ht)  Toilet Transfer: Contact guard assistance  Toilet Transfers Comments: RW >< comfort ht toilet w/ grab bars for support  Tub Transfers  Tub Transfers Comments: TBE in future  Wheelchair Bed Transfers  Wheelchair/Bed - Technique: Ambulating (RW)  Equipment Used: Bed;Wheelchair  Level of Asssistance: Contact guard assistance  Wheelchair Transfers Comments: stood from bed > RW, >< w/c CGA  Social/Functional History  Lives With: Son (son Princess Ochoa works full time)  Type of Home: Cond ([facility])  Home Layout: Two level  Home Access: Stairs to enter without rails (2+1 steps to enter.)  Entrance Stairs - Number of Steps: 2 ECTOR + 1 no rails  Bathroom Shower/Tub: Tub/Shower unit; Shower chair with back;Curtain  [employer]: Standard (comfort ht toilet, sink counter on R)  Bathroom Equipment: Grab bars in shower; Shower chair;Hand-held shower  Bathroom Accessibility: Walker accessible  Home Equipment: Cane;Walker, rolling  Has the patient had two or more falls in the past year or any fall with injury in the past year?: Yes (1x broke his hip)  ADL Assistance: [address] [employer] [facility] Assistance: Independent (Shared with son, pt did meal prep, laundry, cleaning etc.)  Homemaking Responsibilities:  (yes, did majority but son does grocery shopping)  Ambulation Assistance: Independent (walker since early May 2022)  Transfer Assistance: Independent (walker)  Active : Yes  Mode of Transportation: Car ([employer])  Education: has not driven since hip fx. Occupation: Retired  Type of Occupation: Retired : [employer]; Played in a band. Leisure & Hobbies: Plays cards, pool, gambles, magic tricks, plays organ. Likes to keep a clean house. ADL  Feeding: Independent; Beverage management  Grooming: Setup  UE Bathing: Setup  UE Bathing Skilled Clinical Factors: seated at sink sponge bathed after set-up, assist for back  LE Bathing: Minimal assistance  LE Bathing Skilled Clinical Factors: bathed pati area/groin/abd seated on toilet, CGA in stance for buttocks w/ grab bar, did not bathe LEs D/T wraps, cannot do below knees, cannot reach toes  UE Dressing: Setup  UE Dressing Skilled Clinical Factors: no clothes here, OT gave dontated shirt  LE Dressing: Maximum assistance  LE Dressing Skilled Clinical Factors: donned pull ups A for RLE, pants A for BLEs into pants, able to pull both up CGA at RW; Dep to don footies(no shoes here)  Toileting: Contact guard assistance  Toileting Skilled Clinical Factors: voided seated , managed clothes CGA w/ grab bar    Goals  Patient Goals   Patient goals : Pt stated his goal is to be able to do steps, get his pain level half of what it is now, be able to get back to cleaning the house indep, and walk w/o a walker eventualy. Above goals will address pt's goal.  Short Term Goals  Time Frame for Short term goals: 1-1.5 weeks from eval 7/8/22  Short Term Goal 1: Pt will bathe modified indep. with AE/DME as needed. Short Term Goal 2: Pt will dress modified indep. w/ AE as needed. Short Term Goal 3: Pt will toilet modified indep. w/ grab bar/TSF.   Short Term Goal 4: Pt will perform functional ADL transfers modified indep. w/ RW. Short Term Goal 5: Pt will perform ther ex/HEP to increase activity tolerance to stand for 10 minutes, & be ed in walker safety to perform ADL/simple IADL tasks modified indep with RW. Long Term Goals  Time Frame for Long term goals : same as LTG    Assessment  Performance deficits / Impairments: Decreased functional mobility ; Decreased balance;Decreased safe awareness;Decreased ADL status; Decreased high-level IADLs;Decreased endurance  Assessment: Pt is a 71 y/o male admited to rehab 7/7/2022 with Critical Limb Ischemia R LE. Underwent R Fem-Pop Bypass on 7/5/2022. Pt has PMH of  R Femur Fx/Pinning. Prior to onset pt lived at home with son was independent with ADL/IADL function and functional mobility w/walker. Today pt required min A for sponge bathing, set-up for UB dressing, max A for LB dressing, CGA for toileting and set-up for grooming seated. Pt performed ADL transfers/functional mob in room CGA with RW. Pt continues to c/o significant pain in BLEs. Pt is functioning below baseline and will benefit from skilled therapy on ARU prior to returning home to faciliate return to independence. Treatment Diagnosis: Impaired: ADL/IADL function, mobility, functional transfers, balance, activity tolerance  Prognosis: Good  Decision Making: Medium Complexity  History: 71 y/o male admit 7/5/2022 with Critical Limb Ischemia R LE.  7/5/2022 S/P R Fem-Pop Bypass. PMH as noted including R Femur Fx/Pinning. Exam: ADL/IADL function, mobility, functional transfers, balance, activity tolerance, UE ROM/strength/coord  Assistance / Modification: Today pt required min A for sponge bathing, set-up for UB dressing, max A for LB dressing, CGA for toileting and set-up for grooming seated. Pt performed ADL transfers/functional mob in room CGA with RW.   Treatment Initiated : 7/8/2022  Discharge Recommendations: Home with Home health OT;S Level 1;Home with assist PRN  Plan  Times per Week: 5-6x  Times per Day: Twice a day  Plan Weeks: 1-1.5 weeks frrom eval 7/8/22  Current Treatment Recommendations: Strengthening;Balance training;Functional mobility training; Endurance training;Self-Care / ADL; Safety education & training;Gait training;Patient/Caregiver education & training;Equipment evaluation, education, & procurement;Home management training  Plan Comment: Tues conference       Therapy Time   Individual Concurrent Group Co-treatment   Time In 0830         Time Out 1005         Minutes 95         Timed Code Treatment Minutes: 80 Minutes (+15 eval)       Queenie Garcia OT   Electronically signed by Chucho Tidwell OTR/L  License # [id number]

## 2022-07-08 NOTE — PROGRESS NOTES
A complete drug regimen review was completed for this patient.      [x]  No clinically significant medication issue was identified    []   Yes, a clinically significant medication issue was identified     []  Adverse Drug Event:      []  Allergy:      []  Side Effect:      []  Ineffective Therapy:      []  Drug Interaction:     []  Duplicated Therapy:     []  Untreated Indication:      []  Non-adherence:     []  Other:      Nursing/Pharmacy contacted the physician:   Date:   Time:      Actions recommended by physician were completed:  Date :  Time:    Electronically signed by Dylon Scott RN on 7/8/22 at 6:07 AM EDT

## 2022-07-08 NOTE — PROGRESS NOTES
Patient admitted to rehab with debility. A/Ox 4. Transfers with walker x 1. Mobility restrictions: none. On regarlar diet, tolerating well. Medications taken whole. On Lovenox, ASA, and Plavix for DVT prophylaxis. Skin: has surgical incisions in BLE; old macario drain sites in bilateral groin; area on left side of nose which is skin cancer; skin tag left upper mid back. Oxygen: none. LDA: lfa. Has been continent of bowel and continent of bladder. LBM 7/5. Chair/bed alarms in use and call light in reach. Will monitor for safety.

## 2022-07-08 NOTE — CARE COORDINATION
Chart Reviewed. Met with patient to introduce  role on Rehab, inform him of weekly Team Conferences. I am familiar with this patient from acute floor. His goal is to return home from here. SOCIAL WORK ASSESSMENT      GOAL:   To return home. HOME SITUATION:  Pt and son, Sommer Wolfe, live in a two story [address] [facility],[address] with 2 + 1 steps to enter town house. Bed is on the second floor. He is independent with his personal care needs; shares in household chores at home. Son Sommer Wolfe works full time. Pt's spouse  when their son Sommer Wolfe was only 4. He is retired. PRIOR LEVEL OF FUNCTIONING:       PERSONAL CARE:    independent                                                                       DRIVES:   no                                                                     FINANCES:  shares                                                                   MEALS:    Mostly son                             GROCERY SHOPS:  son      DME CURRENTLY AT HOME:   wh walker, cane, bars in shower      CURRENT HOME CARE/SERVICES:   None. Informed him of possible post acute services such as home care or out patient services to continue his progress. He is agreeable to what Md orders for him. He reports his son manages [facility]. Provided him with CMS star rated list of agencies for his review. PREFERRED HOME CARE:  To be determined. TEAM CONFERENCE DAY:  Informed him of weekly Team Conferences on  where Team will review his progress, DME recommendations and DC date. This worker will return to give him this update and plan for DC needs. He gives permission to speak with his son Sommer Wolfe. LSW informed patient of preferred  time on date of discharge which is between 10 - 12 noon. LSW informed patient of recommendation for PCP visit within 7 days post discharge.     Isaak [place], [place]     Case Management   [phone]    2022  6:54 PM

## 2022-07-08 NOTE — PLAN OF CARE
ARU PATIENT TREATMENT PLAN  [facility]   [id number]  [phone]    Osbaldo Jennifer    : 1950  Acct #: [de-identified]  MRN: [id number]   PHYSICIAN:  Prosper Pino MD  Primary Problem    Patient Active Problem List   Diagnosis    Arthritis of left knee    Basal cell carcinoma (BCC) of skin of nose    Peripheral arterial disease ([address]    Closed right hip fracture (HCC)    Numbness of right foot    Hip fracture, right, closed, initial encounter ([address]    Closed subcapital fracture of femur, right, initial encounter ([address]    Critical lower limb ischemia ([facility]       Rehabilitation Diagnosis:     Critical lower limb ischemia ([facility] [address] [I70.229]       ADMIT DATE:2022    Patient Goals: Pt stated his goal is to be able to do steps, get his pain level half of what it is now, be able to get back to cleaning the house indep, and walk w/o a walker eventualy    Admitting Impairments: Relative RLE weakness, decreased balance, endurance     Critical limb ischemic RLE   -s/p right femoral popliteal bypass ( with Dr. Sacha Vides)  -Wound care per Vascular  -ASA, Plavix  -PT/OT     Acute blood loss anemia   -Post-surgical.   -Monitor Hgb, transfuse prn <7.      CAD   -s/p recent LAC stent (6/15 with Dr. Aislinn Salomon)  -ASA, plavix     Recent right subcapital femoral neck fracture   -s/p hip pinning ( with Dr. Adry Ewing). -WBAT.   -PT/OT     Presacral mass   -stable on most recent imaging.  Planning for outpatient MRI and f/u with Dr. Charlotte Willams     Anxiety  -Diazepam prn     Barriers:Relative RLE weakness, decreased balance, endurance, medical comorbidities  Participation: good     CARE PLAN     NURSING:  Anastacio Lacey while on this unit will:     [x] Be continent of bowel and bladder     [x] Have an adequate number of bowel movements  [x] Urinate with no urinary retention >300ml in bladder  [] Complete bladder protocol with silveira removal  [x] Maintain O2 SATs at 92%  [x] Have pain managed while on ARU       [] Be pain free by discharge   [x] Have no skin breakdown while on ARU  [] Have improved skin integrity via wound measurements  [x] Have no signs/symptoms of infection at the incision, DARYL sites  [x] Be free from injury during hospitalization   [x] Complete education with patient/family with understanding demonstrated for:  [x] Adjustment   [x] Other: safe pain control, relaxation techniques, education for constipation, continue education benefits of smoking cessation. Nursing interventions may include bowel/bladder training, education for medical assistive devices, medication education, O2 saturation management, energy conservation, stress management techniques, fall prevention, alarms protocol, seating and positioning, skin/wound care, pressure relief instruction,dressing changes,  infection protection, DVT prophylaxis, and/or assistance with in room safety with transfers to bed, toilet, wheelchair, shower as well as bathroom activities and hygiene. Patient/caregiver education for:   [x] Disease/sustained injury/management      [x] Medication Use   [x] Surgical intervention   [x] Safety   [x] Body mechanics and or joint protection   [x] Health maintenance         PHYSICAL THERAPY:  Goals:                  Short Term Goals  Time Frame for Short term goals: 7-10days  Short term goal 1: Bed Mob independent  Short term goal 2: Transfers with assist device MI  Short term goal 3: Amb with assist device 150' MI  Short term goal 4: Perform  12 steps with MI  Short term goal 5: perform curb step with MI with wheeled walker            Long Term Goals  Time Frame for Long term goals : STG= LTG  These goals were reviewed with this patient at the time of assessment and Whitley Burdick is in agreement. Plan of Care: Pt to be seen 90   mins per day for 5-6 day/week 1 week to 10 days.                    Current Treatment Recommendations: Strengthening,Functional mobility training,Transfer training,Gait training,Stair training,Safety education & training,Patient/Caregiver education & training,Balance training,Endurance training,Neuromuscular re-education,Pain management,Home exercise program,Equipment evaluation, education, & procurement,ADL/Self-care training,ROM      OCCUPATIONAL THERAPY:  Goals:             Short Term Goals  Time Frame for Short term goals: 1-1.5 weeks from eval 7/8/22  Short Term Goal 1: Pt will bathe modified indep. with AE/DME as needed. Short Term Goal 2: Pt will dress modified indep. w/ AE as needed. Short Term Goal 3: Pt will toilet modified indep. w/ grab bar/TSF. Short Term Goal 4: Pt will perform functional ADL transfers modified indep. w/ RW. Short Term Goal 5: Pt will perform ther ex/HEP to increase activity tolerance to stand for 10 minutes, & be ed in walker safety to perform ADL/simple IADL tasks modified indep with RW. :  Long Term Goals  Time Frame for Long term goals : same as LTG :    These goals were reviewed with this patient at the time of assessment and Pardeep Bradshaw is in agreement    Plan of Care:  Pt to be seen  mins per day for 5 day/week 1-1.5 weeks. SPEECH THERAPY: Goals will be left blank if speech is not following this patient. Goals: These goals were reviewed with this patient at the time of assessment and Pardeep Bradshaw is in agreement    Plan of Care: Pt to be seen    mins per day for  day/week  weeks. CASE MANAGEMENT:  Goals:   Assist patient/family with discharge planning, patient/family counseling,   and coordination with insurance during ARU stay.       Admission Period/Goal QIM CODES   QIM  Admit/Goal Score    Eating     Oral Hygiene     Toileting Hygiene     Shower/Bathe Self     Upper Body Dressing     Lower Body Dressing     Putting on/Taking off Footwear     Roll Left and Right     Sit to Lying     Lying to Sitting on Side of Bed     Sit to Stand     Chair/Bed to Chair Transfer     Toilet Transfer     Car Transfer     Walk 10 feet     Walk 50 feet with 2 Turns     Walk 150 feet with 2 turns     Walk 10 feet on Uneven Surfaces     1 Step     4 Steps     12 Steps     Picking up Object     Wheel 50 feet with 2 Turns   Type? Wheel 150 feet with 2 Turns   Type? Jada Grijalva will be seen a minimum of 3 hours of therapy per day, a minimum of 5 out of 7 days per week. [] In this rare instance due to the nature of this patient's medical involvement, this patient will be seen 15 hours per week (900 minutes within a 7 day period). Treatments may include therapeutic exercises, gait training, neuromuscular re-ed, transfer training, community reintegration, bed mobility, self care, home mgmt, cognitive training, energy conservation,dysphagia tx, speech/language/communication therapy, group therapy, and patient/family education. In addition, dietician/nutritionist may monitor calorie count as well as intake and collaboratively work with SLP on dietary upgrades. Neuropsychology/Psychology may evaluate and provide necessary support.     Medical issues being managed closely and that require 24 hour availability of a physician:   [] Swallowing Precautions  [x] Bowel/Bladder Fx  [] Weight bearing precautions   [x] Wound Care    [x] Pain Mgmt   [x] Infection Protection   [x] DVT Prophylaxis   [x] Fall Precautions  [x] Fluid/Electrolyte/Nutrition Balance   [] Voice Protection   [] Respiratory  [] Other:    Medical Prognosis: [x] Good  [] Fair    [] Guarded   Total expected IRF days 7-10 days  Anticipated discharge destination:    [] Home Independently   [x] Home Modified Independent  [] Home with supervision    []SNF     [] Other                                           Physician anticipated functional outcomes:   Prema for mobility and ADLs  IPOC brief synthesis: Patient is a 71 yo M with pmh PAD s/p prior revascularzation RLE, CAD (s/p recent LAD stenting 6/15/22 with Dr. Luis Ashley), anxiety, and recent right hip subcapital femoral neck fracture (s/p hip pinning 4/24/22 with Dr. Liza Mooney) who initially presented 7/5/2022 with ongoing RLE ischemic rest pain. Underwent right femoral popliteal bypass (7/5 with Dr. Calvin Pierre). Course notable for anemia. Now presents to ARU with impaired mobility and self-care below his baseline. He requires comprehensive inpatient rehab program in order to return to community setting. I have reviewed this initial plan of care and agree with its contents:    Title   Name    Date    Time    Physician: Shaquille Mullins. Vik Saldivar MD 7/8/2022, 7:45 PM      Case Mgmt:  Nani Mccarthy [place]     Case Management   [phone]    7/8/2022  6:47 PM      OT: Electronically signed by Edna Burch OT on 7/8/22 at 1:18 PM EDT    PT:Electronically signed by Christa Spurling  [phone] on 7/8/2022 at 1:17 PM      RN: Lane Iraheta RN, CRRN    ST:    :     Other: Brandt Callejas RN

## 2022-07-08 NOTE — PROGRESS NOTES
[facility] ([facility]) Vascular Surgery Progress Note      Chief Complaint: PAD     :     Radha Amador is a 70 y.o. male patient. Subjective  S/p right fem pop with vein  No new complaints      No diagnosis found. Past Medical History:   Diagnosis Date    Anxiety     Basal cell carcinoma     L side of nose    CAD (coronary artery disease)     Peripheral artery disease (HCC)     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     No past surgical history pertinent negatives on file. Scheduled Meds:   sodium chloride flush  5-40 mL IntraVENous 2 times per day    enoxaparin  40 mg SubCUTAneous Daily    sennosides-docusate sodium  1 tablet Oral BID    aspirin EC  81 mg Oral Daily    clopidogrel  75 mg Oral Daily     Continuous Infusions:   sodium chloride       PRN Meds:sodium chloride, ondansetron **OR** ondansetron, oxyCODONE **OR** oxyCODONE, sodium chloride flush, acetaminophen, magnesium hydroxide, polyethylene glycol, bisacodyl, diazePAM, hydrALAZINE, ondansetron    Principal Problem:    Critical lower limb ischemia (HCC)  Resolved Problems:    * No resolved hospital problems. *    Blood pressure 126/75, pulse 93, temperature 98.2 °F (36.8 °C), temperature source Oral, resp. rate 16, height 5' 8\" (1.727 m), weight 150 lb 5.7 oz (68.2 kg), SpO2 99 %. Objective:  Vital signs (most recent): Blood pressure 126/75, pulse 93, temperature 98.2 °F (36.8 °C), temperature source Oral, resp. rate 16, height 5' 8\" (1.727 m), weight 150 lb 5.7 oz (68.2 kg), SpO2 99 %. General appearance: Comfortable and in no acute distress. Lungs:  Normal effort. Heart: Normal rate. Wound:  Clean. There is no dehiscence. There is no drainage. Extremities: There is normal range of motion. Neurological: The patient is alert.       Pulse exam: palp right DP    Assessment & Plan    On rehab floor  Labs stable  Would give another 20 IV lasix to help with edema  Will follow up on Monday if no issues arise over weekend  Please call with questions or concerns      Carolina Mullen DO, [address] [facility] Vascular and Endovascular Surgery

## 2022-07-08 NOTE — PROGRESS NOTES
Physical Therapy  Facility/Department: [address] [facility]  Rehabilitation Physical Therapy Initial Assessment and PM session    NAME: Almeta Ahumada  : 1950 (70 y.o.)  MRN: [id number]  CODE STATUS: Full Code    Date of Service: 22      Past Medical History:   Diagnosis Date    Anxiety     Basal cell carcinoma     L side of nose    CAD (coronary artery disease)     Peripheral artery disease ([facility] [address]     Presacral mass 2022     Past Surgical History:   Procedure Laterality Date    CARDIAC CATHETERIZATION      FEMORAL BYPASS Right 2022    RIGHT FEMORAL POPLITEAL BYPASS performed by Jodee Burch DO at [address] Right 2022    HIP PINNING performed by Selma Mcgee MD at [facility] [place] [id number]       Chart Reviewed: Yes  Additional Pertinent Hx: per Dr. Tram Cannon, H&P, \"Patient is a 69 yo M with pmh PAD s/p prior revascularzation RLE, CAD (s/p recent LAD stenting 6/15/22 with Dr. Mendel Ding), anxiety, and recent right hip subcapital femoral neck fracture (s/p hip pinning 22 with Dr. Jenise Harris) who initially presented 2022 with ongoing RLE ischemic rest pain. Underwent right femoral popliteal bypass ( with Dr. Pio Archuleta). Course notable for anemia. Currently, patient reports moderate pain in RLE>LLE from groins down. Worse with movement. Improves with rest and medication. He has chronic tingling/numbness in the right foot.  He is highly motivated to come to ARU to improve his function prior to returning home\"  Family / Caregiver Present: No  Referring Practitioner: Dr. Génesis Cornell  Referral Date : 22  Diagnosis: critical limb ischemia  Other (Comment): Patient very talkative  General Comment  Comments: Baseline  bpm, O2 sats 99%    Restrictions:  Restrictions/Precautions: Fall Risk  Position Activity Restriction  Other position/activity restrictions: s/p B Groin DARYL Drains (removed 22), BLE incisions from groin> ankle(ace wrapped)     SUBJECTIVE  Subjective: Patient agreeable to therapy, pain level 10/10 especially R LE groin to distal R LE. Prior Level of Function:  Social/Functional History  Lives With: Son (son Stu Vallejo works full time)  Type of Home: Cond ([facility])  Home Layout: Two level  Home Access: Stairs to enter without rails (2+1 steps to enter.)  Entrance Stairs - Number of Steps: 2 ECTOR + 1 no rails  Bathroom Shower/Tub: Tub/Shower unit,Shower chair with back,Curtain  Bathroom Toilet: Standard (comfort ht toilet, sink counter on R)  Bathroom Equipment: Grab bars in shower,Shower chair,Hand-held shower  Bathroom Accessibility: Walker accessible  Home Equipment: Cane,Walker, rolling  ADL Assistance: Independent  Homemaking Assistance: Independent (Shared with son, pt did meal prep, laundry, cleaning etc.)  Homemaking Responsibilities:  (yes, did majority but son does grocery shopping)  Ambulation Assistance: Independent (walker since early May 2022)  Transfer Assistance: Independent (walker)  Active : Yes  Mode of Transportation: Car ([employer])  Education: has not driven since hip fx. Occupation: Retired  Type of Occupation: Retired : [employer]; Played in a band. Leisure & Hobbies: Plays cards, pool, gambles, magic tricks, plays organ. Likes to keep a clean house. OBJECTIVE  Vision  Vision: Within Functional Limits    Hearing  Hearing: Within functional limits    Cognition  Overall Cognitive Status: WFL  Arousal/Alertness: Appropriate responses to stimuli  Following Commands:  Follows one step commands with increased time  Attention Span: Difficulty dividing attention  Safety Judgement: Decreased awareness of need for safety  Cognition Comment: easily distracted/very talkative, required redirection    ROM  AROM RLE (degrees)  RLE AROM: Exceptions  RLE General AROM: hip flex 90 degrees with pain, knee flexion 90,knee extension lacks about 15, R ankle DF 5 degrees  AROM LLE (degrees)  LLE AROM : WFL (pain with hip movements)    Strength  Strength RLE  Strength RLE: Exception  Comment: hip 3+/5 with pain, knee 4-/5, 4/5  Strength LLE  Strength LLE: WFL      Sensation  Overall Sensation Status: Impaired  Additional Comments: UEs WFL, neuropathy in feet, RLE severe, feels numb, limited sensation B LEs distally to touch    Functional Mobility  Bed mobility  Bridging: Stand by assistance  Rolling to Left: Stand by assistance  Rolling to Right: Stand by assistance  Supine to Sit: Unable to assess (will test this PM if able)  Sit to Supine: Contact guard assistance;Stand by assistance (slow and difficult R LE)  Scooting: Stand by assistance;Contact guard assistance  Bed Mobility Comments: Patient with B feet elevated in bed on 2 pillows  Transfers  Sit to Stand: Contact guard assistance (With Tram[place] Else. Cues for safe hand placement.)  Stand to sit: Contact guard assistance (With [place] Else. Cues for safe hand placement.)  Bed to Chair: Contact guard assistance  Car Transfer: Contact guard assistance (no assist for LEs cues for hand placement)  Comment: with wheeled walker , slow due to pain, toilet transfer with CGA, sat to void with wheeled walker CGA- managed clothing and pericare  Balance  Posture: Good  Sitting - Static: Good  Sitting - Dynamic: Good  Standing - Static: Fair  Standing - Dynamic: Fair  Comments: with wheeled walker SBA/CGA no LOB- pain, decreased sensation and weakness affects balance    Environmental Mobility  Ambulation  Surface: level tile  Device: Rolling Walker  Assistance: Contact guard assistance;Stand by assistance  Quality of Gait: Patient with antalgic gait decreased stride and height B LE but mor e pronounced R LE  Gait Deviations: Slow Rosario;Decreased step length;Decreased step height  Distance: Pt amb 23', 52', 50' with several turns with Walker CGA.   Comments: Patient reports SOB  bpm after ambulation with O2 sats 98%  Stairs/Curb  Stairs?: Yes  Stairs  # Steps : 4  Stairs Height: 6\"  Rails: Bilateral  Curbs: 6\"  Device: Rolling walker  Assistance: Minimal assistance;Contact guard assistance  Comment: Patient with cues to square up, placed wheeled walker impulsively prior to set up, slight LOB but able to maintain    PT Exercises  A/AROM Exercises: marchs x 10, LAQ x 10, AP x 20    ASSESSMENT       Activity Tolerance  Activity Tolerance: Patient limited by endurance; Patient limited by pain  Activity Tolerance Comments: very talkative, little distracted at times although easily redirected. SOB after ambulation  bpm - valeria min nurse aware- baseline 114 bpm    Assessment  Assessment: Patient  is a 71 y/o male admit 7/5/2022 with Critical Limb Ischemia R LE.  7/5/2022 S/P R Fem-Pop Bypass. PMH as noted including PAD, CAD, R Femur Fx/Pinning, stent placement on 6/15  PTA pt living in 2 story townhouse with few steps to enter and 2nd floor bed/bath; independent daily care and functional mobility. presently , patient able to perform short distance functional mobility with wheeled walker with CGA, with SOB. Patient's HR noted to be 141 bpm after ambulation with baseline 114 bpm. Patient does need cues for safety with transfers/ambultion. Patient needed frequent rest breaks due to pain and SOB. Pt limited by pain, weakness , decreased sensation limited endurance and balance. Patient would benefit from inpatient ARU to improve overall functional mobility  to independent so can return home with PRN assist of son. Patient's son does work full time. Treatment Diagnosis: decreased functional mobility  Therapy Prognosis: Good  Decision Making: Medium Complexity  History: 71 y/o male admit 7/5/2022 with Critical Limb Ischemia R LE.  7/5/2022 S/P R Fem-Pop Bypass. PMH as noted including R Femur Fx/Pinning. PAD, CAD, stent 6/15  Exam: See above. Clinical Presentation: See above. Barriers to Learning: Can be alittle distracted at times.   Discharge Recommendations: Continue to assess pending progress;Home with assist PRN;Home with Home health PT;Patient would benefit from continued therapy after discharge  PT D/C Equipment  Other: Will monitor for potential equipt needs. PT Equipment Recommendations  Other: Will monitor for potential equipt needs. CLINICAL IMPRESSION   Patient  is a 71 y/o male admit 7/5/2022 with Critical Limb Ischemia R LE.  7/5/2022 S/P R Fem-Pop Bypass. PMH as noted including PAD, CAD, R Femur Fx/Pinning, stent placement on 6/15  PTA pt living in 2 story townhouse with few steps to enter and 2nd floor bed/bath; independent daily care and functional mobility. presently , patient able to perform short distance functional mobility with wheeled walker with CGA, with SOB. Patient's HR noted to be 141 bpm after ambulation with baseline 114 bpm. Patient does need cues for safety with transfers/ambultion. Patient needed frequent rest breaks due to pain and SOB. Pt limited by pain, weakness , decreased sensation limited endurance and balance. Patient would benefit from inpatient ARU to improve overall functional mobility  to independent so can return home with PRN assist of son. Patient's son does work full time. GOALS  Patient Goals   Patient goals : Return home with son with or without walker  Short Term Goals  Time Frame for Short term goals: 7-10days  Short term goal 1: Bed Mob independent  Short term goal 2: Transfers with assist device MI  Short term goal 3: Amb with assist device 150' MI  Short term goal 4: Perform  12 steps with MI  Short term goal 5: perform curb step with MI with wheeled walker  Long Term Goals  Time Frame for Long term goals : STG= LTG    PLAN OF CARE  Frequency: 1-2 treatment sessions per day, 5-7 days per week  Plan  Plan:  minutes of therapy at least 5 out of 7 days a week  Plan weeks: 1 week to 10 days  Specific Instructions for Next Treatment: decreased functional mobility  Current Treatment Recommendations: Strengthening; Functional mobility training;Transfer training;Gait training;Stair training; Safety education & training;Patient/Caregiver education & training;Balance training; Endurance training;Neuromuscular re-education;Pain management;Home exercise program;Equipment evaluation, education, & procurement;ADL/Self-care training;ROM  Safety Devices  Type of Devices: Call light within reach; Left in chair;Nurse notified;Gait belt;Patient at risk for falls (going to PT shortly so no chair alarm, OT set-up alarm on recliner)    EDUCATION  Education  Education Given To: Patient  Education Provided: Role of Therapy;Plan of Care;IADL Function;ADL Function;Mobility Training;Transfer Training;Precautions; Safety; Energy Conservation;Home Exercise Program;Equipment;DME/Home Modifications  Education Method: Demonstration;Verbal  Barriers to Learning:  (pain)  Education Outcome: Verbalized understanding;Continued education needed    ELOS:   Plan weeks: 1 week to 10 days    Second session  S/ Patient reports pain level continues 10/10 B LEs. 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 Patient reports pain level decreases with mobility. O/Patient's baseline O2 sats 99% and  bpm   Patient performed car transfers and steps as above  Patient sit to stand with SBA, ambulated with wheeled walker 126' with SBA/CGA  Patient's HR and oxygen level difficult to obtain after ambulation eventually obtained  bpm O2 sats 98%  Patient sat in recliner with LEs elevated with covers due to being cold. Patient with call light and chair alarm.   Second session  Patient less SOB this PM and able to ambulate further prior to rest.   Therapy Time   Individual Concurrent Group Co-treatment   Time In 1030         Time Out 1130         Minutes 60           Timed Code Treatment Minutes: [address] [facility] [place]   Time In [address] [place] [address], , 07/08/22 at 4:08 PM

## 2022-07-08 NOTE — CONSULTS
Nutrition Assessment     Type and Reason for Visit: Initial,Consult    Nutrition Recommendations/Plan:   1. Regular diet   2. Will monitor nutritional adequacy, nutrition-related labs, weights, BMs, and clinical progress      Malnutrition Assessment:  Malnutrition Status: No malnutrition    Nutrition Assessment:  Consult for new ARU admission. Hx includes PAD s/p prior revascularzation RLE, CAD, anxiety, and recent right hip subcapital femoral neck fracture. Admitted wth RLE ischemic rest pain, s/p right femoral popliteal bypass. Pt currently on Regular diet, eating more than 50% of meals currently. Denied any nutrition-related issues PTA per nsg screen. Surgical incision x 2, otherwise skin intact. Nutrition Related Findings:   Reviewed labs Wound Type: Surgical Incision    Current Nutrition Therapies:    ADULT DIET;  Regular    Anthropometric Measures:  · Height: 5' 8\" (172.7 cm)  · Current Body Wt: 150 lb (68 kg)   · BMI: 22.8    Nutrition Diagnosis:   No nutrition diagnosis at this time    Nutrition Interventions:   Food and/or Nutrient Delivery: Continue Current Diet  Nutrition Education/Counseling: No recommendation at this time  Coordination of Nutrition Care: Continue to monitor while inpatient       Goals:     Goals: Meet at least 75% of estimated needs       Nutrition Monitoring and Evaluation:   Behavioral-Environmental Outcomes: None Identified  Food/Nutrient Intake Outcomes: Food and Nutrient Intake  Physical Signs/Symptoms Outcomes: Biochemical Data,Nutrition Focused Physical Findings,Skin,Weight    Discharge Planning:    No discharge needs at this time     Isak Shahid, [address], [place]  Contact: [phone]

## 2022-07-08 NOTE — H&P
Department of Physical Medicine & Rehabilitation  History & Physical      Patient Identification:  Morris Kirkpatrick  : 1950  Admit date: 2022   Attending provider: Marycarmen Orellana MD        Primary care provider: Drea Frost     Chief Complaint: RLE pain     History of Present Illness/Hospital Course:  Patient is a 71 yo M with pmh PAD s/p prior revascularzation RLE, CAD (s/p recent LAD stenting 6/15/22 with Dr. Liza Gomez), anxiety, and recent right hip subcapital femoral neck fracture (s/p hip pinning 22 with Dr. Susy Self) who initially presented 2022 with ongoing RLE ischemic rest pain. Underwent right femoral popliteal bypass ( with Dr. Kathy Espinoza). Course notable for anemia. Now presents to ARU with impaired mobility and self-care below his baseline. Currently, patient reports moderate pain in RLE>LLE from groins down. Worse with movement. Improves with rest and medication. He has chronic tingling/numbness in the right foot. He is highly motivated to start inpatient rehab program.      Prior Level of Function:  Independent for mobility, ADLs     Current Level of Function:  Dale x 2 person for mobility     Pertinent Social History:  Support: Lives with son  Home set-up: 2 level town house with 2+1 steps to enter    Past Medical History:   Diagnosis Date    Anxiety     Basal cell carcinoma     L side of nose    CAD (coronary artery disease)     Peripheral artery disease ([facility] [address]     Presacral mass 2022       Past Surgical History:   Procedure Laterality Date    CARDIAC CATHETERIZATION      FEMORAL BYPASS Right 2022    RIGHT FEMORAL POPLITEAL BYPASS performed by Roly Mora DO at [address] 2022    HIP PINNING performed by Vanessa Casanova MD at [facility] History   Problem Relation Age of Onset    No Known Problems Mother     Substance Abuse Father         alochol       Social History     Socioeconomic History    Marital status:   Spouse name: None    Number of children: None    Years of education: None    Highest education level: None   Occupational History    None   Tobacco Use    Smoking status: Former Smoker     Packs/day: 1.00     Years: 50.00     Pack years: 50.00     Types: Cigarettes     Quit date: 2022     Years since quittin.2    Smokeless tobacco: Never Used   Vaping Use    Vaping Use: Some days    Substances: Nicotine, Flavoring    Devices: Refillable tank   Substance and Sexual Activity    Alcohol use: No    Drug use: Not Currently     Types: Marijuana (Weed)     Comment: in early years    Sexual activity: Not Currently     Partners: Female   Other Topics Concern    None   Social History Narrative    None     Social Determinants of Health     Financial Resource Strain:     Difficulty of Paying Living Expenses: Not on file   Food Insecurity:     Worried About Running Out of Food in the Last Year: Not on file    [place] of Food in the Last Year: Not on file   Transportation Needs:     Lack of Transportation (Medical): Not on file    Lack of Transportation (Non-Medical):  Not on file   Physical Activity:     Days of Exercise per Week: Not on file    Minutes of Exercise per Session: Not on file   Stress:     Feeling of Stress : Not on file   Social Connections:     Frequency of Communication with Friends and Family: Not on file    Frequency of Social Gatherings with Friends and Family: Not on file    Attends [employer] Services: Not on file    Active Member of Clubs or Organizations: Not on file    Attends Club or Organization Meetings: Not on file    Marital Status: Not on file   Intimate Partner Violence:     Fear of Current or Ex-Partner: Not on file    Emotionally Abused: Not on file    Physically Abused: Not on file    Sexually Abused: Not on file   Housing Stability:     Unable to Pay for Housing in the Last Year: Not on file    Number of [place] in the Last Year: Not on file    Unstable Housing in the Last Year: Not on file       No Known Allergies      Current Facility-Administered Medications   Medication Dose Route Frequency Provider Last Rate Last Admin    0.9 % sodium chloride infusion   IntraVENous PRN Kelly Prasad MD        ondansetron (ZOFRAN-ODT) disintegrating tablet 4 mg  4 mg Oral Q8H PRN Kelly Prasad MD        Or    ondansetron [facility]) injection 4 mg  4 mg IntraVENous Q6H PRN Kelly Prasad MD        oxyCODONE (ROXICODONE) immediate release tablet 5 mg  5 mg Oral Q4H PRN Kelly Prasad MD        Or    oxyCODONE HCl (OXY-IR) immediate release tablet 10 mg  10 mg Oral Q4H PRN Kelly Prasad MD   10 mg at 07/08/22 1705    sodium chloride flush 0.9 % injection 5-40 mL  5-40 mL IntraVENous 2 times per day Kelly Prasad MD   10 mL at 07/08/22 0759    sodium chloride flush 0.9 % injection 5-40 mL  5-40 mL IntraVENous PRN Kelly Prasad MD        acetaminophen (TYLENOL) tablet 650 mg  650 mg Oral Q6H PRN Kelly Prasad MD        enoxaparin (LOVENOX) injection 40 mg  40 mg SubCUTAneous Daily Kelly Prasad MD   40 mg at 07/08/22 0759    magnesium hydroxide (MILK OF MAGNESIA) 400 MG/5ML suspension 30 mL  30 mL Oral Daily PRN Kelly Prasad MD        polyethylene glycol [facility]) packet 17 g  17 g Oral Daily PRN Kelly Prasad MD   17 g at 07/07/22 2104    sennosides-docusate sodium (SENOKOT-S) 8.6-50 MG tablet 1 tablet  1 tablet Oral BID Kelly Prasad MD   1 tablet at 07/08/22 0759    aspirin EC tablet 81 mg  81 mg Oral Daily Kelly Prasad MD   81 mg at 07/08/22 0759    bisacodyl (DULCOLAX) suppository 10 mg  10 mg Rectal Daily PRN Kelly Prasad MD        clopidogrel (PLAVIX) tablet 75 mg  75 mg Oral Daily Kelly Prasad MD   75 mg at 07/08/22 0759    diazePAM (VALIUM) tablet 10 mg  10 mg Oral Nightly PRN Kelly Prasad MD   10 mg at 07/07/22 2104    hydrALAZINE (APRESOLINE) tablet 10 mg  10 mg Oral Q6H PRN Raya Evans MD        ondansetron [facility] tablet 4 mg  4 mg Oral Q8H PRN Raya Evans MD             REVIEW OF SYSTEMS:   CONSTITUTIONAL: negative for fevers, chills, diaphoresis, appetite change, night sweats, unexpected weight change, + fatigue  EYES: negative for blurred vision, eye discharge, visual disturbance and icterus  HEENT: negative for hearing loss, tinnitus, ear drainage, sinus pressure, nasal congestion, epistaxis and snoring  RESPIRATORY: Negative for hemoptysis, cough, sputum production  CARDIOVASCULAR: negative for chest pain, palpitations, exertional chest pressure/discomfort, syncope, edema  GASTROINTESTINAL: negative for nausea, vomiting, diarrhea, blood in stool, abdominal pain, constipation  GENITOURINARY: negative for frequency, dysuria, urinary incontinence, decreased urine volume, and hematuria  HEMATOLOGIC/LYMPHATIC: negative for easy bruising, bleeding and lymphadenopathy  ALLERGIC/IMMUNOLOGIC: negative for recurrent infections, angioedema, anaphylaxis and drug reactions  ENDOCRINE: negative for weight changes and diabetic symptoms including polyuria, polydipsia and polyphagia  MUSCULOSKELETAL: refer to HPI  NEUROLOGICAL: negative for headaches, slurred speech, unilateral weakness  PSYCHIATRIC/BEHAVIORAL: negative for hallucinations, behavioral problems, confusion and agitation. All pertinent positives are noted in the HPI.     Physical Examination:  Vitals:   Patient Vitals for the past 24 hrs:   BP Temp Temp src Pulse Resp SpO2 Height Weight   07/08/22 1705 -- -- -- -- 16 -- -- --   07/08/22 1336 -- -- -- -- 16 -- -- --   07/08/22 1306 -- -- -- -- 16 -- -- --   07/08/22 1008 -- -- -- -- -- -- 5' 8\" (1.727 m) --   07/08/22 0940 -- -- -- -- 16 -- -- --   07/08/22 0912 -- -- -- -- -- 100 % -- --   07/08/22 0910 -- -- -- -- 16 -- -- --   07/08/22 0800 126/75 -- -- 93 -- 99 % -- --   07/08/22 0515 -- -- -- -- -- -- 5' 8\" (1.727 m) 150 lb 5.7 oz (68.2 kg) 07/08/22 0507 (!) 142/75 98.2 °F (36.8 °C) Oral 76 16 98 % -- --   07/08/22 0458 -- -- -- -- 16 -- -- --   07/07/22 2045 137/63 97.9 °F (36.6 °C) Oral 86 16 98 % -- --       Const: Alert. WDWN. No distress  Eyes: Conjunctiva noninjected, no icterus noted; pupils equal, round, and reactive to light. HENT: Atraumatic, normocephalic; Left nasal bridge and adjacent cheek with skin changes due to basal cell carcinoma. Oral mucosa moist  Neck: Trachea midline, neck supple. No thyromegaly noted. CV: Regular rate and rhythm, no murmur rub or gallop noted  Resp: Lungs clear to auscultation bilaterally, no rales wheezes or rhonchi, no retractions. Respirations unlabored. GI: Soft, nontender, nondistended. Normal bowel sounds. No palpable masses. Skin: LE incisions dressed, ACE wraps in place. Normal temperature and turgor. No rashes or breakdown noted on exposed areas. Ext: +Right foot edema and warmer to touch than left foot. No varicosities. MSK: No joint tenderness, erythema, warmth noted. AROM intact except limited at right hip. Neuro: Alert, oriented, appropriate. No cranial nerve deficits appreciated. Sensation intact to light touch except diminished in right foot. Motor examination reveals strength 5/5 in BUE, 2-3/5 right hip flexion, 3-4/5 right knee extension, 4/5 right ankle DF and PF, 4/5 left hip flexion, 5/5 left knee ext, 5/5 left ankle DF and PF. No abnormalities with finger/nose noted. Lucía Price   Psych: Stable mood, normal judgement, normal affect     Lab Results   Component Value Date    WBC 7.5 07/08/2022    HGB 7.2 (L) 07/08/2022    HCT 21.3 (L) 07/08/2022    MCV 85.2 07/08/2022     07/08/2022     Lab Results   Component Value Date    INR 0.99 07/05/2022    INR 1.01 04/23/2022    PROTIME 13.0 07/05/2022    PROTIME 11.4 04/23/2022     Lab Results   Component Value Date    CREATININE 0.7 (L) 07/08/2022    BUN 11 07/08/2022     07/08/2022    K 3.3 (L) 07/08/2022     07/08/2022    CO2 27 07/08/2022     Lab Results   Component Value Date    ALT 10 08/14/2019    AST 12 (L) 08/14/2019    ALKPHOS 92 08/14/2019    BILITOT 0.3 08/14/2019       Most recent stress test revealed:   Abnormal study. There is a small sized, mild intensity, partially reversible    defect of the mid to apical inferior wall. There may be diaphragm    attenuation artifact but the rest images appear worse. Cannot rule out    inferior ischemia.    Normal LV size and systolic function.    Overall findings represent a low to intermediate risk study.            Most recent EKG revealed:  prolonged QT     Most recent imaging studies revealed:     CT chest/abdomen/pelvis  Stable presacral mass well-circumscribed and demonstrating minimal   enhancement of its wall with peripheral calcification and septation. Findings again are indeterminate; however, stable when compared to the prior   examination.       Postsurgical changes identified in the right anterior thigh from fixation of   a right femoral neck fracture.  Intramuscular hematoma with stranding seen in   the soft tissues.        The above laboratory data have been reviewed. The above imaging data have been reviewed. The above medical testing have been reviewed. Body mass index is 22.86 kg/m². POST ADMISSION PHYSICIAN EVALUATION  The patient has agreed to being admitted to our comprehensive inpatient rehabilitation facility and can tolerate the intensity of service consisting of at least:  --180 minutes of therapy a day, 5 out of 7 days a week. OR  --15 hours of intensive therapy within a 7 consecutive day period.      The patient/family has a good understanding of our discharge process and will benefit from an interdisciplinary inpatient rehabilitation program. The patient has potential to make improvement and is in need of at least two of the following multidisciplinary therapies including but not limited to physical, occupational, respiratory, and speech, nutritional services, wound care, and prosthetics and orthotics. Given the patients complex condition and risk of further medical complications, rehabilitation services cannot be safely provided at a lower level of care such as a skilled nursing facility. All of the goals listed below were reviewed with the patient and he/she is in agreement. I have compared the patients medical and functional status at the time of the preadmission screening and the same on this date, and there are no significant changes. By signing this document, I acknowledge that I have personally performed a full physical examination on this patient within 24 hours of admission to this inpatient rehabilitation facility and have determined the patient to be able to tolerate the above course of treatment at an intensive level for a reasonable period of time. I will be completing a detailed individualized  Plan of Care for this patient by day four of the patients stay based upon the Preadmission Screen, this Post-Admission Evaluation, and the therapy evaluations. Barriers: Relative RLE weakness, decreased balance, endurance, medical comorbidities  Services Required: PT, OT  Goals: Prema for mobility and ADLs  Prognosis: Good  Anticipated Dispo: home with son  RACHEL: 7-10 days    Rehabilitation Diagnosis:   16.0, Debility (non-cardiac, non-pulmonary      Assessment and Plan:    Impairments: Relative RLE weakness, decreased balance, endurance    Critical limb ischemic RLE   -s/p right femoral popliteal bypass (7/5 with Dr. James Orellana)  -Wound care per Vascular  -ASA, Plavix  -PT/OT    Acute blood loss anemia   -Post-surgical.   -Monitor Hgb, transfuse prn <7. CAD   -s/p recent LAC stent (6/15 with Dr. Ruperto Malik)  -ASA, plavix    Recent right subcapital femoral neck fracture   -s/p hip pinning (4/24 with Dr. Minh Sharp). -WBAT.   -PT/OT    Presacral mass   -stable on most recent imaging.  Planning for outpatient MRI and f/u with  Benoit    Anxiety  -Diazepam prn    Bladder   -High risk retention   -Monitor PVRs, SC prn >300cc    Bowel   -High risk constipation   -senna+colace BID, PRN miralax, MoM, and bisacodyl supp. Safety   -fall precautions    Pain control  -PRN oxycodone    DVT ppx  -Lovenox    FULL CODE    Toya Padilla MD 7/8/2022, 5:28 PM

## 2022-07-08 NOTE — PLAN OF CARE
Problem: Skin/Tissue Integrity  Goal: Absence of new skin breakdown  Description: 1. Monitor for areas of redness and/or skin breakdown  2. Assess vascular access sites hourly  3. Every 4-6 hours minimum:  Change oxygen saturation probe site  4. Every 4-6 hours:  If on nasal continuous positive airway pressure, respiratory therapy assess nares and determine need for appliance change or resting period. Outcome: Progressing  Note: Assessment completed. No sign or symptoms of infection noted. Will continue to monitor.       Problem: Safety - Adult  Goal: Free from fall injury  Outcome: Progressing  Flowsheets (Taken 7/8/2022 1730)  Free From Fall Injury: Instruct family/caregiver on patient safety     Problem: Pain  Goal: Verbalizes/displays adequate comfort level or baseline comfort level  Outcome: Progressing  Flowsheets (Taken 7/8/2022 1730)  Verbalizes/displays adequate comfort level or baseline comfort level:   Encourage patient to monitor pain and request assistance   Assess pain using appropriate pain scale   Administer analgesics based on type and severity of pain and evaluate response   Implement non-pharmacological measures as appropriate and evaluate response   Consider cultural and social influences on pain and pain management   Notify Licensed Independent Practitioner if interventions unsuccessful or patient reports new pain

## 2022-07-09 PROCEDURE — 97110 THERAPEUTIC EXERCISES: CPT | Performed by: PHYSICAL THERAPIST

## 2022-07-09 PROCEDURE — 1280000000 HC REHAB R&B

## 2022-07-09 PROCEDURE — 6360000002 HC RX W HCPCS: Performed by: PHYSICAL MEDICINE & REHABILITATION

## 2022-07-09 PROCEDURE — 6370000000 HC RX 637 (ALT 250 FOR IP): Performed by: PHYSICAL MEDICINE & REHABILITATION

## 2022-07-09 PROCEDURE — 94760 N-INVAS EAR/PLS OXIMETRY 1: CPT

## 2022-07-09 PROCEDURE — 97116 GAIT TRAINING THERAPY: CPT | Performed by: PHYSICAL THERAPIST

## 2022-07-09 PROCEDURE — 97530 THERAPEUTIC ACTIVITIES: CPT

## 2022-07-09 PROCEDURE — 97530 THERAPEUTIC ACTIVITIES: CPT | Performed by: PHYSICAL THERAPIST

## 2022-07-09 PROCEDURE — 97535 SELF CARE MNGMENT TRAINING: CPT

## 2022-07-09 RX ADMIN — POLYETHYLENE GLYCOL 3350 17 G: 17 POWDER, FOR SOLUTION ORAL at 09:31

## 2022-07-09 RX ADMIN — OXYCODONE HYDROCHLORIDE 10 MG: 10 TABLET ORAL at 17:23

## 2022-07-09 RX ADMIN — SENNOSIDES AND DOCUSATE SODIUM 1 TABLET: 50; 8.6 TABLET ORAL at 20:18

## 2022-07-09 RX ADMIN — ASPIRIN 81 MG: 81 TABLET, COATED ORAL at 09:22

## 2022-07-09 RX ADMIN — OXYCODONE HYDROCHLORIDE 10 MG: 10 TABLET ORAL at 23:12

## 2022-07-09 RX ADMIN — SENNOSIDES AND DOCUSATE SODIUM 1 TABLET: 50; 8.6 TABLET ORAL at 09:22

## 2022-07-09 RX ADMIN — OXYCODONE HYDROCHLORIDE 10 MG: 10 TABLET ORAL at 20:17

## 2022-07-09 RX ADMIN — OXYCODONE HYDROCHLORIDE 10 MG: 10 TABLET ORAL at 11:21

## 2022-07-09 RX ADMIN — DIAZEPAM 10 MG: 5 TABLET ORAL at 20:18

## 2022-07-09 RX ADMIN — ENOXAPARIN SODIUM 40 MG: 100 INJECTION SUBCUTANEOUS at 09:20

## 2022-07-09 RX ADMIN — ACETAMINOPHEN 1000 MG: 500 TABLET ORAL at 13:37

## 2022-07-09 RX ADMIN — OXYCODONE HYDROCHLORIDE 10 MG: 10 TABLET ORAL at 14:24

## 2022-07-09 RX ADMIN — CLOPIDOGREL BISULFATE 75 MG: 75 TABLET ORAL at 09:22

## 2022-07-09 RX ADMIN — ACETAMINOPHEN 1000 MG: 500 TABLET ORAL at 22:02

## 2022-07-09 RX ADMIN — OXYCODONE HYDROCHLORIDE 10 MG: 10 TABLET ORAL at 08:17

## 2022-07-09 RX ADMIN — OXYCODONE HYDROCHLORIDE 10 MG: 10 TABLET ORAL at 05:11

## 2022-07-09 RX ADMIN — MAGNESIUM HYDROXIDE 30 ML: 400 SUSPENSION ORAL at 16:40

## 2022-07-09 RX ADMIN — OXYCODONE HYDROCHLORIDE 10 MG: 10 TABLET ORAL at 02:00

## 2022-07-09 RX ADMIN — ACETAMINOPHEN 1000 MG: 500 TABLET ORAL at 05:11

## 2022-07-09 ASSESSMENT — PAIN DESCRIPTION - PAIN TYPE
TYPE: SURGICAL PAIN
TYPE: ACUTE PAIN;SURGICAL PAIN;CHRONIC PAIN
TYPE: ACUTE PAIN;CHRONIC PAIN;SURGICAL PAIN
TYPE: SURGICAL PAIN

## 2022-07-09 ASSESSMENT — PAIN DESCRIPTION - DESCRIPTORS
DESCRIPTORS: DISCOMFORT;HEAVINESS
DESCRIPTORS: ACHING
DESCRIPTORS: ACHING;DISCOMFORT;NUMBNESS;TINGLING
DESCRIPTORS: ACHING;NUMBNESS
DESCRIPTORS: ACHING

## 2022-07-09 ASSESSMENT — PAIN SCALES - GENERAL
PAINLEVEL_OUTOF10: 9
PAINLEVEL_OUTOF10: 8
PAINLEVEL_OUTOF10: 6
PAINLEVEL_OUTOF10: 9
PAINLEVEL_OUTOF10: 10
PAINLEVEL_OUTOF10: 8
PAINLEVEL_OUTOF10: 8
PAINLEVEL_OUTOF10: 9
PAINLEVEL_OUTOF10: 9
PAINLEVEL_OUTOF10: 5
PAINLEVEL_OUTOF10: 8
PAINLEVEL_OUTOF10: 8
PAINLEVEL_OUTOF10: 9
PAINLEVEL_OUTOF10: 8
PAINLEVEL_OUTOF10: 7
PAINLEVEL_OUTOF10: 7
PAINLEVEL_OUTOF10: 1
PAINLEVEL_OUTOF10: 5

## 2022-07-09 ASSESSMENT — PAIN DESCRIPTION - LOCATION
LOCATION: LEG
LOCATION: HIP;FOOT
LOCATION: LEG
LOCATION: FOOT;HIP
LOCATION: HIP;FOOT

## 2022-07-09 ASSESSMENT — PAIN DESCRIPTION - FREQUENCY
FREQUENCY: CONTINUOUS

## 2022-07-09 ASSESSMENT — PAIN - FUNCTIONAL ASSESSMENT
PAIN_FUNCTIONAL_ASSESSMENT: ACTIVITIES ARE NOT PREVENTED

## 2022-07-09 ASSESSMENT — PAIN DESCRIPTION - ORIENTATION
ORIENTATION: RIGHT

## 2022-07-09 ASSESSMENT — PAIN DESCRIPTION - ONSET
ONSET: ON-GOING
ONSET: ON-GOING

## 2022-07-09 NOTE — PROGRESS NOTES
Patient admitted to rehab with debility. A/Ox4 can be forgetful and repeat self, very talkative. Transfers with walker. On regular diet, tolerating fair. Medications taken whole. On Lovenox for DVT prophylaxis. Skin: dressing and ace wraps in place, states Dr. Cruz Salts aware of drainage left groin. Has been of bladder. LBM small 07/08/ 22. States last week, \"good bm\", information varies told nurse did not take anything for bowels, then states takes x lax every other day, declines supp at this time. Chair/bed alarms in use and call light in reach. Will monitor for safety. Will call for pain medication every 3 hours.

## 2022-07-09 NOTE — PLAN OF CARE
Problem: Discharge Planning  Goal: Discharge to home or other facility with appropriate resources  7/9/2022 1052 by Kalyn Gonzalez RN  Outcome: Progressing  Flowsheets (Taken 7/9/2022 1052)  Discharge to home or other facility with appropriate resources: Identify barriers to discharge with patient and caregiver  Note: States ready for discharge, reviewed rehab aware will have team meeting Johanna Smith. 7/9/2022 0053 by Yannick Redman RN  Outcome: Progressing  Flowsheets (Taken 7/8/2022 2008)  Discharge to home or other facility with appropriate resources: Identify barriers to discharge with patient and caregiver     Problem: Skin/Tissue Integrity  Goal: Absence of new skin breakdown  Description: 1. Monitor for areas of redness and/or skin breakdown  2. Assess vascular access sites hourly  3. Every 4-6 hours minimum:  Change oxygen saturation probe site  4. Every 4-6 hours:  If on nasal continuous positive airway pressure, respiratory therapy assess nares and determine need for appliance change or resting period. 7/9/2022 1052 by Kalyn Gonzalez RN  Outcome: Progressing  Note: Has ace wraps and dressing to legs, MD changing. 7/9/2022 0053 by Yannick Redman RN  Outcome: Progressing  Note: Skin assessment performed each shift per protocol. Patient turned and repositioned every two hours and prn with pillow support. Patient checked for incontence every two hours. Problem: Safety - Adult  Goal: Free from fall injury  7/9/2022 1052 by Kalyn Gonzalez RN  Outcome: Progressing  Flowsheets  Taken 7/9/2022 1052  Free From Fall Injury: Instruct family/caregiver on patient safety  Taken 7/9/2022 0942  Free From Fall Injury: Instruct family/caregiver on patient safety  Note: Calls for needs, using walker. 7/9/2022 0053 by Yannick Redmna RN  Outcome: Progressing  Note: Patient free from falls this shift. Fall precautions in place at all times.  Bed in lowest position with two side rails up and wheels locked. Call light within reach. Patient able and agreeable to contact for safety appropriately. Problem: Pain  Goal: Verbalizes/displays adequate comfort level or baseline comfort level  7/9/2022 1052 by Chloe Cam RN  Outcome: Progressing  Flowsheets (Taken 7/9/2022 1052)  Verbalizes/displays adequate comfort level or baseline comfort level:   Administer analgesics based on type and severity of pain and evaluate response   Implement non-pharmacological measures as appropriate and evaluate response  Note: Very focused on pain, very talkative, recall can vary. 7/9/2022 0053 by Filippo Morrissey RN  Outcome: Progressing  Note: Pt able to express presence/absence of pain and rate pain appropriately using numerical scale. Pain/discomfort being managed with PRN analgesics per MD orders (see MAR). Pain assessed every shift and after interventions. Problem: ABCDS Injury Assessment  Goal: Absence of physical injury  7/9/2022 1052 by Chloe Cam RN  Outcome: Progressing  Flowsheets (Taken 7/9/2022 9840)  Absence of Physical Injury: Implement safety measures based on patient assessment  Note: No issues.   7/9/2022 0053 by Filippo Morrissey RN  Outcome: Progressing

## 2022-07-09 NOTE — PROGRESS NOTES
Occupational Therapy  Facility/Department: [facility]  Rehabilitation Occupational Therapy Daily Treatment Note    Date: 22  Patient Name: Soo Diego       Room: B2M-1204/5953-14  MRN: [id number]  Account: [de-identified]   : 1950  (75 y.o.) Gender: male           Past Medical History:  has a past medical history of Anxiety, Basal cell carcinoma, CAD (coronary artery disease), Peripheral artery disease ([address] and Presacral mass. Past Surgical History:   has a past surgical history that includes hip surgery (Right, 2022); Cardiac catheterization (); and femoral bypass (Right, 2022). Restrictions  Restrictions/Precautions: Fall Risk  Other position/activity restrictions: s/p B Groin DARYL Drains (removed 22), BLE incisions from groin> ankle(ace wrapped)    Subjective  Subjective: Pt met in room, supine in bed. Agreeable to OT session. Pt complained of pain 8/10 at beginning of session. Pt c/o pain 10/10 half way through session- nusing notified and present briefly for meds. Restrictions/Precautions: Fall Risk       Objective     Cognition  Overall Cognitive Status: WFL  Arousal/Alertness: Appropriate responses to stimuli  Following Commands: Follows one step commands with increased time  Attention Span: Difficulty dividing attention  Safety Judgement: Decreased awareness of need for safety  Cognition Comment: easily distracted/very talkative, required redirection several times throughout session  Orientation  Overall Orientation Status: Within Functional Limits (14/15)         ADL  Feeding  Assistance Level: Independent  Grooming/Oral Hygiene  Assistance Level: Stand by assist  Skilled Clinical Factors: Pt performed standing oral care and face hygiene at sink with SBA. Pt tolerated upright position for 5.5 mins with no LOB or signs of fatigue.   Putting On/Taking Off Footwear  Assistance Level: Minimal assistance  Skilled Clinical Factors: Pt able to rafael left sock with setup; attempted to don right however unable d/t pain requiring min A- will benefit from AE training (to be addressed in later session)  Toileting  Skilled Clinical Factors: Pt declined toileting needs this date  Tub/Shower Transfers  Type: Tub (DRY tub)  Transfer To: Shower chair with back  Additional Factors: Cues for hand placement; Increased time to complete  Assistance Level: Minimal assistance  Skilled Clinical Factors: Pt performed DRY tub shower transfer to/from shower chair with back with min A for managing RLE in/out of tub d/t pain. Cues for hand placement on grab bar and slowing down for safety. Pt reports son is always present and available to assist with tub transfers at home. Anticipate pt to continue to increase independence however may benefit from TTB vs shower chair with back. (TBD pending progress)    Instrumental ADL's  Instrumental ADLs: Yes  Meal Prep  Meal Prep Level: Walker  Meal Prep Level of Assistance: Stand by assistance  Meal Preparation: Pt prepared piece of toast in stance with RW and overall SBA. Pt retrieved toast and butter from refrigerater with SBA - cues for safe technique. Transported to counter- education provided for use of walker tray or basket especially during meal prep/transport to reduce risk of falls. Pt stated he preferred walker tray. Pt buttered toast in stance with SBA. Pt tolerated upright position for 5.5 mins with no LOB however pt required seated rest break while toast was in toaster. Functional Mobility  Device: Rolling walker  Activity: Retrieve items;Transport items; To/From bathroom  Assistance Level: Stand by assist  Skilled Clinical Factors: Pt demo'd safe technique of RW overall however slightly impulsive- 2 instances where pt lifted BLEs off ground to demonstrate BUE strength. Pt required cues and education for reducing risk of falls. Pt performed all fxl mob with RW SBA/CGA.   Bed Mobility  Overall Assistance Level: Stand By Assist  Additional Factors: Increased time to complete  Bridging  Assistance Level: Stand by assist  Transfers  Surface: From chair with arms; Wheelchair  Additional Factors: Hand placement cues  Device: Walker (RW)  Sit to Stand  Assistance Level: Stand by assist  Stand to Sit  Assistance Level: Stand by assist         Assessment  Assessment  Assessment: Pt tolerated session well this date requiring overall SBA for fxl mob/transfers with RW. Pt continues to be limited by pain requiring min A for LB dressing. Pt performed standing grooming tasks with SBA. Pt demo'd improved activity tolerance and balance and tolerated upright position for 5.5 mins x2. IADL of simple meal prep with SBA with cues for safety. Pt left in recliner with all needs met and call light within reach. Activity Tolerance: Patient limited by endurance; Patient limited by pain  OT Equipment Recommendations  Equipment Needed: Yes  Other: May need RTS/TSF, LB AE, walker tray or basket, continue to assess/address  Safety Devices  Safety Devices in place: Yes  Type of devices: Call light within reach; Chair alarm in place; Left in chair;Nurse notified    Patient Education  Education  Education Given To: Patient  Education Provided: Transfer Training;ADL Function;DME/Home Modifications; Energy Conservation; Safety; Mobility Training  Skilled Clinical Factors: Educated pt to elevate legs to reduce swelling while sitting in chair    Plan  Plan  Times per Week: 5-6x  Times per Day: Twice a day  Plan Weeks: 1-1.5 weeks ariel randall 7/8/22  Current Treatment Recommendations: Strengthening;Balance training;Functional mobility training; Endurance training;Self-Care / ADL; Safety education & training;Gait training;Patient/Caregiver education & training;Equipment evaluation, education, & procurement;Home management training  Plan Comment: Tues conference    Goals  Patient Goals   Patient goals : Pt stated his goal is to be able to do steps, get his pain level half of what it is now, be able to get back to cleaning the house indep, and walk w/o a walker eventualy. Above goals will address pt's goal.  Short Term Goals  Time Frame for Short term goals: 1-1.5 weeks from eval 7/8/22  Short Term Goal 1: Pt will bathe modified indep. with AE/DME as needed. Short Term Goal 2: Pt will dress modified indep. w/ AE as needed. Short Term Goal 3: Pt will toilet modified indep. w/ grab bar/TSF. Short Term Goal 4: Pt will perform functional ADL transfers modified indep. w/ RW. Short Term Goal 5: Pt will perform ther ex/HEP to increase activity tolerance to stand for 10 minutes, & be ed in walker safety to perform ADL/simple IADL tasks modified indep with RW.   Long Term Goals  Time Frame for Long term goals : same as LTG    Therapy Time   Individual Concurrent Group Co-treatment   Time In 0710         Time Out 0840         Minutes 90         Timed Code Treatment Minutes: 90 Minutes       Luis Fernando Muñoz OT   Electronically signed by ALONZO Mead/L  License # [id number]

## 2022-07-09 NOTE — PROGRESS NOTES
Physical Therapy  Facility/Department: [facility]  Rehabilitation Physical Therapy Treatment Note    NAME: Lisa Cristina  : 1950 (70 y.o.)  MRN: [id number]  CODE STATUS: Full Code    Date of Service: 22       Restrictions:  Restrictions/Precautions: Fall Risk  Position Activity Restriction  Other position/activity restrictions: s/p B Groin DARYL Drains (removed 22), BLE incisions from groin> ankle(ace wrapped) noted hemoglobin () -7.2     SUBJECTIVE  Subjective  Subjective: Patient feels better today and feels pain more controlled since can have pain medication more often. Patient's pain level 5/10. Patient agreeable to therapy. OBJECTIVE  Cognition  Overall Cognitive Status: WFL  Arousal/Alertness: Appropriate responses to stimuli  Following Commands: Follows one step commands with increased time  Attention Span: Difficulty dividing attention  Safety Judgement: Decreased awareness of need for safety  Cognition Comment: easily distracted/very talkative, required redirection several times throughout session  Orientation  Overall Orientation Status: Within Functional Limits (1415)    Functional Mobility  Sit to Stand  Assistance Level: Stand by assist  Skilled Clinical Factors: patient able to stand with SBA  Stand to Sit  Assistance Level: Stand by assist  Skilled Clinical Factors: cues to square up to chair prior to sit  Bed To/From Chair  Assistance Level: Stand by assist      Environmental Mobility  Ambulation  Surface: Level surface; Carpet  Device: Rolling walker  Distance: 150', 220' with several turns, 120'  Activity: Within Unit  Activity Comments: Baseline- 99% O2 sats with HR 89, after longest distance  bpm and O2 sats 98%  Additional Factors: Verbal cues; Increased time to complete  Assistance Level: Stand by assist  Gait Deviations: Decreased step length right;Slow carly  Skilled Clinical Factors: took 6 minute, 20 sec to ambulate 220', occasional cues for safety to keep inside frame of wheeled walker- foot flat R LE  Stairs  Stair Height: 6''  Device:  (no device)  Number of Stairs: 8  Additional Factors: Non-reciprocal going up;Non-reciprocal going down  Assistance Level: Stand by assist  Skilled Clinical Factors: lead with L LE, descend with R LE  Skilled Clinical Factors - Comments:  bpm after steps slight SOB  Curb  Curb Height: 6''  Device: Rolling walker  Additional Factors: Set-up; Verbal cues  Assistance Level: Stand by assist      PT Exercises  A/AROM Exercises: Patient performed sitting HEP- given written HEP- marches with assist R LE, AP x 20, LAQ x 15 B LE, hip abduction with yellow theraband x 10, addductor sets x 10, GS x 10  Static Standing Balance Exercises: performed marches x 20 with UE support within pain tolerance      ASSESSMENT/PROGRESS TOWARDS GOALS       Assessment  Assessment: Patient  is a 69 y/o male admit 7/5/2022 with Critical Limb Ischemia R LE.  7/5/2022 S/P R Fem-Pop Bypass. PMH as noted including PAD, CAD, R Femur Fx/Pinning, stent placement on 6/15  PTA pt living in 2 story [facility] with few steps to enter and 2nd floor bed/bath; independent daily care and functional mobility. Today, 7/9, , patient able to perform community distances  with wheeled walker with SBA with less pain. Patient's HR noted to be 139 bpm after ambulation with baseline 89 bpm with slight dyspnea with exertion. . Patient does need cues for safety with transfers/ambulation primarily to stay in frame of wheeled walker. . Patient needed frequent rest breaks in between activities but improved compered to yesterday. . Pt limited by pain, weakness , decreased sensation limited endurance and balance. Patient would benefit from inpatient ARU to improve overall functional mobility  to independent so can return home with PRN assist of son. Patient's son does work full time  Activity Tolerance: Patient tolerated treatment well;Patient limited by endurance; Patient limited by pain  Discharge Recommendations: Continue to assess pending progress;Home with assist PRN;Home with Home health PT;Patient would benefit from continued therapy after discharge  PT Equipment Recommendations  Equipment Needed: No    Goals  Patient Goals   Patient goals : Return home with son with or without walker  Short Term Goals  Time Frame for Short term goals: 7-10days  Short term goal 1: Bed Mob independent  Short term goal 2: Transfers with assist device MI  Short term goal 3: Amb with assist device 150' MI  Short term goal 4: Perform  12 steps with MI  Short term goal 5: perform curb step with MI with wheeled walker  Long Term Goals  Time Frame for Long term goals : STG= LTG    PLAN OF CARE/SAFETY  Plan  Plan:  minutes of therapy at least 5 out of 7 days a week  Plan weeks: 1 week to 10 days, most likely 1 week  Specific Instructions for Next Treatment: decreased functional mobility  Current Treatment Recommendations: Strengthening; Functional mobility training;Transfer training;Gait training;Stair training; Safety education & training;Patient/Caregiver education & training;Balance training; Endurance training;Neuromuscular re-education;Pain management;Home exercise program;Equipment evaluation, education, & procurement;ADL/Self-care training;ROM  Safety Devices  Type of Devices: Call light within reach; Left in chair;Nurse notified;Gait belt;Patient at risk for falls (going to PT shortly so no chair alarm, OT set-up alarm on recliner)    EDUCATION  Education  Education Given To: Patient  Education Provided: Role of Therapy; Mobility Training;Transfer Training;Precautions; Safety; Energy Conservation;Home Exercise Program;Equipment;DME/Home Modifications  Education Method: Demonstration;Verbal  Barriers to Learning:  (pain)  Education Outcome: Verbalized understanding;Continued education needed        Therapy Time   Individual Concurrent Group Co-treatment   Time In 0950         Time Out 1120         Minutes 90 Timed Code Treatment Minutes: [address] [facility], PT, 07/09/22 at 2:34 PM

## 2022-07-10 PROCEDURE — 1280000000 HC REHAB R&B

## 2022-07-10 PROCEDURE — 6360000002 HC RX W HCPCS: Performed by: PHYSICAL MEDICINE & REHABILITATION

## 2022-07-10 PROCEDURE — 94760 N-INVAS EAR/PLS OXIMETRY 1: CPT

## 2022-07-10 PROCEDURE — 6370000000 HC RX 637 (ALT 250 FOR IP): Performed by: PHYSICAL MEDICINE & REHABILITATION

## 2022-07-10 RX ADMIN — OXYCODONE HYDROCHLORIDE 10 MG: 10 TABLET ORAL at 14:24

## 2022-07-10 RX ADMIN — OXYCODONE HYDROCHLORIDE 10 MG: 10 TABLET ORAL at 11:26

## 2022-07-10 RX ADMIN — ACETAMINOPHEN 1000 MG: 500 TABLET ORAL at 05:24

## 2022-07-10 RX ADMIN — OXYCODONE HYDROCHLORIDE 10 MG: 10 TABLET ORAL at 05:23

## 2022-07-10 RX ADMIN — OXYCODONE HYDROCHLORIDE 10 MG: 10 TABLET ORAL at 08:22

## 2022-07-10 RX ADMIN — ACETAMINOPHEN 1000 MG: 500 TABLET ORAL at 20:38

## 2022-07-10 RX ADMIN — ASPIRIN 81 MG: 81 TABLET, COATED ORAL at 08:23

## 2022-07-10 RX ADMIN — DIAZEPAM 10 MG: 5 TABLET ORAL at 20:39

## 2022-07-10 RX ADMIN — ACETAMINOPHEN 1000 MG: 500 TABLET ORAL at 13:48

## 2022-07-10 RX ADMIN — CLOPIDOGREL BISULFATE 75 MG: 75 TABLET ORAL at 08:23

## 2022-07-10 RX ADMIN — OXYCODONE HYDROCHLORIDE 10 MG: 10 TABLET ORAL at 17:28

## 2022-07-10 RX ADMIN — ENOXAPARIN SODIUM 40 MG: 100 INJECTION SUBCUTANEOUS at 08:23

## 2022-07-10 RX ADMIN — OXYCODONE HYDROCHLORIDE 10 MG: 10 TABLET ORAL at 23:24

## 2022-07-10 RX ADMIN — OXYCODONE HYDROCHLORIDE 10 MG: 10 TABLET ORAL at 02:18

## 2022-07-10 RX ADMIN — OXYCODONE HYDROCHLORIDE 10 MG: 10 TABLET ORAL at 20:38

## 2022-07-10 ASSESSMENT — PAIN DESCRIPTION - DESCRIPTORS
DESCRIPTORS: ACHING
DESCRIPTORS: ACHING;NUMBNESS
DESCRIPTORS: ACHING;NUMBNESS
DESCRIPTORS: DISCOMFORT;NUMBNESS;TINGLING
DESCRIPTORS: ACHING;NUMBNESS
DESCRIPTORS: NUMBNESS
DESCRIPTORS: ACHING;NUMBNESS
DESCRIPTORS: ACHING
DESCRIPTORS: ACHING;NUMBNESS
DESCRIPTORS: ACHING;NUMBNESS

## 2022-07-10 ASSESSMENT — PAIN DESCRIPTION - PAIN TYPE
TYPE: ACUTE PAIN;CHRONIC PAIN;SURGICAL PAIN
TYPE: ACUTE PAIN;CHRONIC PAIN;SURGICAL PAIN
TYPE: SURGICAL PAIN
TYPE: ACUTE PAIN;CHRONIC PAIN;SURGICAL PAIN
TYPE: ACUTE PAIN;CHRONIC PAIN;SURGICAL PAIN
TYPE: ACUTE PAIN;SURGICAL PAIN
TYPE: ACUTE PAIN;CHRONIC PAIN;SURGICAL PAIN
TYPE: ACUTE PAIN
TYPE: ACUTE PAIN;CHRONIC PAIN;SURGICAL PAIN

## 2022-07-10 ASSESSMENT — PAIN DESCRIPTION - ORIENTATION
ORIENTATION: RIGHT

## 2022-07-10 ASSESSMENT — PAIN DESCRIPTION - FREQUENCY
FREQUENCY: CONTINUOUS

## 2022-07-10 ASSESSMENT — PAIN DESCRIPTION - LOCATION
LOCATION: HIP;FOOT
LOCATION: HIP;FOOT
LOCATION: LEG
LOCATION: HIP;FOOT
LOCATION: HIP
LOCATION: HIP;FOOT
LOCATION: HIP;LEG;FOOT
LOCATION: HIP;FOOT
LOCATION: HIP;FOOT
LOCATION: LEG;HIP
LOCATION: LEG

## 2022-07-10 ASSESSMENT — PAIN SCALES - GENERAL
PAINLEVEL_OUTOF10: 7
PAINLEVEL_OUTOF10: 8
PAINLEVEL_OUTOF10: 10
PAINLEVEL_OUTOF10: 10
PAINLEVEL_OUTOF10: 7
PAINLEVEL_OUTOF10: 9
PAINLEVEL_OUTOF10: 9
PAINLEVEL_OUTOF10: 8
PAINLEVEL_OUTOF10: 8
PAINLEVEL_OUTOF10: 10
PAINLEVEL_OUTOF10: 8
PAINLEVEL_OUTOF10: 6
PAINLEVEL_OUTOF10: 7
PAINLEVEL_OUTOF10: 10
PAINLEVEL_OUTOF10: 8
PAINLEVEL_OUTOF10: 6
PAINLEVEL_OUTOF10: 7
PAINLEVEL_OUTOF10: 9
PAINLEVEL_OUTOF10: 10

## 2022-07-10 ASSESSMENT — PAIN DESCRIPTION - ONSET
ONSET: ON-GOING
ONSET: ON-GOING

## 2022-07-10 ASSESSMENT — PAIN - FUNCTIONAL ASSESSMENT
PAIN_FUNCTIONAL_ASSESSMENT: ACTIVITIES ARE NOT PREVENTED
PAIN_FUNCTIONAL_ASSESSMENT: ACTIVITIES ARE NOT PREVENTED

## 2022-07-10 NOTE — PROGRESS NOTES
Patient admitted to rehab with debility  A/Ox4, very talkative and cues to stay on task. Transfers with walker. On regualr diet, tolerating fair. Medications taken whole. On Lovenox for DVT prophylaxis. Skin: did change left groin dressing, ace in place to legs. Has been continent of bladder. LBM today. Chair/bed alarms in use and call light in reach. Will monitor for safety. Continues to call for pain medication every 3 hours, will remind nurse an hour prior when due, did review pain scale.

## 2022-07-10 NOTE — PLAN OF CARE
Problem: Discharge Planning  Goal: Discharge to home or other facility with appropriate resources  7/10/2022 0954 by Reid Canales RN  Outcome: Progressing  Flowsheets (Taken 7/10/2022 9247)  Discharge to home or other facility with appropriate resources: Identify barriers to discharge with patient and caregiver  Note: Will have team meeting Tuesday. 7/10/2022 0401 by Brianna Simental RN  Outcome: Progressing  Flowsheets (Taken 7/9/2022 2018)  Discharge to home or other facility with appropriate resources: Identify discharge learning needs (meds, wound care, etc)     Problem: Skin/Tissue Integrity  Goal: Absence of new skin breakdown  Description: 1. Monitor for areas of redness and/or skin breakdown  2. Assess vascular access sites hourly  3. Every 4-6 hours minimum:  Change oxygen saturation probe site  4. Every 4-6 hours:  If on nasal continuous positive airway pressure, respiratory therapy assess nares and determine need for appliance change or resting period. 7/10/2022 0954 by Reid Canales RN  Outcome: Progressing  Note: Monitor. 7/10/2022 0401 by Brianna Simental RN  Outcome: Progressing     Problem: Safety - Adult  Goal: Free from fall injury  7/10/2022 0954 by Reid Canales RN  Outcome: Progressing  Flowsheets (Taken 7/10/2022 0908)  Free From Fall Injury: Instruct family/caregiver on patient safety  Note: Using walker, will call for needs. 7/10/2022 0401 by Brianna Simental RN  Outcome: Progressing     Problem: Pain  Goal: Verbalizes/displays adequate comfort level or baseline comfort level  7/10/2022 0954 by Reid Canales RN  Outcome: Progressing  Note: Calls every 3 hours for pain medication.   7/10/2022 0401 by Brianna Simental RN  Outcome: Progressing     Problem: ABCDS Injury Assessment  Goal: Absence of physical injury  7/10/2022 0954 by Reid Canales RN  Outcome: Progressing  Flowsheets (Taken 7/10/2022 0908)  Absence of Physical Injury: Implement safety measures based on patient assessment  7/10/2022 0401 by Andre Chandler RN  Outcome: Progressing

## 2022-07-10 NOTE — PROGRESS NOTES
Did call security and CVU regarding patient concern on missing walker, shoes and clothes, not found Remains very focused on pain medication, already telling nurse when pain medication is due.

## 2022-07-10 NOTE — PLAN OF CARE
Problem: Skin/Tissue Integrity  Goal: Absence of new skin breakdown  Description: 1. Monitor for areas of redness and/or skin breakdown  2. Assess vascular access sites hourly  3. Every 4-6 hours minimum:  Change oxygen saturation probe site  4. Every 4-6 hours:  If on nasal continuous positive airway pressure, respiratory therapy assess nares and determine need for appliance change or resting period. 7/9/2022 0053 by Sravan Vega RN  Outcome: Progressing  Note: Skin assessment performed each shift per protocol. Patient able to reposition self slightly on own while in bed. Patient is continent of bowel and bladder. Problem: Safety - Adult  Goal: Free from fall injury  7/9/2022 0053 by Sravan Vega RN  Outcome: Progressing  Note: Patient free from falls this shift. Fall precautions in place at all times. Bed in lowest position with two side rails up and wheels locked. Call light within reach. Patient able and agreeable to contact for safety appropriately. Patient up with walker, gait belt, and standby assist of one, tolerates well.

## 2022-07-10 NOTE — PROGRESS NOTES
Patient admitted to ARU 7/7 s/p fem/pop bypass. Patient is A&O x 4, and very chatty. Lungs clear to auscultation, patient with intermittent episodes of SOB. Bowel sounds are hyperactive to all quadrants. LBM 7/10. Patient up to bathroom with walker, gait-belt, and stand by assist of 1; tolerates well with cautious gait. Patient calls frequently for pain medications ([id number]) and for repositioning. Bilateral legs have surgical incisions that have intact dressings and ace wraps which were changed on Thursday by Dr. Afua Tuttle. Pedal pulses obtainable with doppler to bilateral lower ext. Right foot red and warm to touch, patient can move toes to bilateral feet as well. Call light and personal belongings are within reach. Fall precautions are in place. Will continue to monitor and assist as needed.

## 2022-07-11 LAB
ANION GAP SERPL CALCULATED.3IONS-SCNC: 10 MMOL/L (ref 3–16)
BASOPHILS ABSOLUTE: 0.1 K/UL (ref 0–0.2)
BASOPHILS RELATIVE PERCENT: 1.2 %
BUN BLDV-MCNC: 14 MG/DL (ref 7–20)
CALCIUM SERPL-MCNC: 8.7 MG/DL (ref 8.3–10.6)
CHLORIDE BLD-SCNC: 100 MMOL/L (ref 99–110)
CO2: 29 MMOL/L (ref 21–32)
CREAT SERPL-MCNC: 0.9 MG/DL (ref 0.8–1.3)
EOSINOPHILS ABSOLUTE: 0.4 K/UL (ref 0–0.6)
EOSINOPHILS RELATIVE PERCENT: 6.5 %
GFR AFRICAN AMERICAN: >60
GFR NON-AFRICAN AMERICAN: >60
GLUCOSE BLD-MCNC: 101 MG/DL (ref 70–99)
HCT VFR BLD CALC: 21.2 % (ref 40.5–52.5)
HEMOGLOBIN: 7.4 G/DL (ref 13.5–17.5)
LYMPHOCYTES ABSOLUTE: 2 K/UL (ref 1–5.1)
LYMPHOCYTES RELATIVE PERCENT: 32.9 %
MAGNESIUM: 2.5 MG/DL (ref 1.8–2.4)
MCH RBC QN AUTO: 29.7 PG (ref 26–34)
MCHC RBC AUTO-ENTMCNC: 34.8 G/DL (ref 31–36)
MCV RBC AUTO: 85.1 FL (ref 80–100)
MONOCYTES ABSOLUTE: 0.5 K/UL (ref 0–1.3)
MONOCYTES RELATIVE PERCENT: 8 %
NEUTROPHILS ABSOLUTE: 3 K/UL (ref 1.7–7.7)
NEUTROPHILS RELATIVE PERCENT: 51.4 %
PDW BLD-RTO: 15.4 % (ref 12.4–15.4)
PLATELET # BLD: 568 K/UL (ref 135–450)
PMV BLD AUTO: 6.1 FL (ref 5–10.5)
POTASSIUM REFLEX MAGNESIUM: 3.4 MMOL/L (ref 3.5–5.1)
RBC # BLD: 2.49 M/UL (ref 4.2–5.9)
SODIUM BLD-SCNC: 139 MMOL/L (ref 136–145)
WBC # BLD: 5.9 K/UL (ref 4–11)

## 2022-07-11 PROCEDURE — 85025 COMPLETE CBC W/AUTO DIFF WBC: CPT

## 2022-07-11 PROCEDURE — 97116 GAIT TRAINING THERAPY: CPT | Performed by: PHYSICAL THERAPIST

## 2022-07-11 PROCEDURE — APPNB30 APP NON BILLABLE TIME 0-30 MINS: Performed by: NURSE PRACTITIONER

## 2022-07-11 PROCEDURE — 36415 COLL VENOUS BLD VENIPUNCTURE: CPT

## 2022-07-11 PROCEDURE — 1280000000 HC REHAB R&B

## 2022-07-11 PROCEDURE — 97535 SELF CARE MNGMENT TRAINING: CPT

## 2022-07-11 PROCEDURE — 6370000000 HC RX 637 (ALT 250 FOR IP): Performed by: PHYSICAL MEDICINE & REHABILITATION

## 2022-07-11 PROCEDURE — 83735 ASSAY OF MAGNESIUM: CPT

## 2022-07-11 PROCEDURE — 97530 THERAPEUTIC ACTIVITIES: CPT | Performed by: PHYSICAL THERAPIST

## 2022-07-11 PROCEDURE — 97110 THERAPEUTIC EXERCISES: CPT

## 2022-07-11 PROCEDURE — 97530 THERAPEUTIC ACTIVITIES: CPT

## 2022-07-11 PROCEDURE — 97110 THERAPEUTIC EXERCISES: CPT | Performed by: PHYSICAL THERAPIST

## 2022-07-11 PROCEDURE — 94760 N-INVAS EAR/PLS OXIMETRY 1: CPT

## 2022-07-11 PROCEDURE — 6360000002 HC RX W HCPCS: Performed by: PHYSICAL MEDICINE & REHABILITATION

## 2022-07-11 PROCEDURE — 80048 BASIC METABOLIC PNL TOTAL CA: CPT

## 2022-07-11 RX ORDER — POTASSIUM CHLORIDE 20 MEQ/1
40 TABLET, EXTENDED RELEASE ORAL ONCE
Status: COMPLETED | OUTPATIENT
Start: 2022-07-11 | End: 2022-07-11

## 2022-07-11 RX ADMIN — OXYCODONE HYDROCHLORIDE 10 MG: 10 TABLET ORAL at 20:56

## 2022-07-11 RX ADMIN — OXYCODONE HYDROCHLORIDE 10 MG: 10 TABLET ORAL at 12:01

## 2022-07-11 RX ADMIN — ACETAMINOPHEN 1000 MG: 500 TABLET ORAL at 13:33

## 2022-07-11 RX ADMIN — OXYCODONE HYDROCHLORIDE 10 MG: 10 TABLET ORAL at 15:01

## 2022-07-11 RX ADMIN — DIAZEPAM 10 MG: 5 TABLET ORAL at 20:57

## 2022-07-11 RX ADMIN — ACETAMINOPHEN 1000 MG: 500 TABLET ORAL at 20:57

## 2022-07-11 RX ADMIN — SENNOSIDES AND DOCUSATE SODIUM 1 TABLET: 50; 8.6 TABLET ORAL at 08:48

## 2022-07-11 RX ADMIN — OXYCODONE HYDROCHLORIDE 10 MG: 10 TABLET ORAL at 05:31

## 2022-07-11 RX ADMIN — ASPIRIN 81 MG: 81 TABLET, COATED ORAL at 08:48

## 2022-07-11 RX ADMIN — OXYCODONE HYDROCHLORIDE 10 MG: 10 TABLET ORAL at 17:56

## 2022-07-11 RX ADMIN — OXYCODONE HYDROCHLORIDE 10 MG: 10 TABLET ORAL at 08:49

## 2022-07-11 RX ADMIN — POTASSIUM CHLORIDE 40 MEQ: 20 TABLET, EXTENDED RELEASE ORAL at 16:51

## 2022-07-11 RX ADMIN — CLOPIDOGREL BISULFATE 75 MG: 75 TABLET ORAL at 08:48

## 2022-07-11 RX ADMIN — OXYCODONE HYDROCHLORIDE 10 MG: 10 TABLET ORAL at 02:26

## 2022-07-11 RX ADMIN — ACETAMINOPHEN 1000 MG: 500 TABLET ORAL at 05:31

## 2022-07-11 RX ADMIN — ENOXAPARIN SODIUM 40 MG: 100 INJECTION SUBCUTANEOUS at 08:47

## 2022-07-11 RX ADMIN — POLYETHYLENE GLYCOL 3350 17 G: 17 POWDER, FOR SOLUTION ORAL at 08:49

## 2022-07-11 ASSESSMENT — PAIN SCALES - GENERAL
PAINLEVEL_OUTOF10: 10
PAINLEVEL_OUTOF10: 10
PAINLEVEL_OUTOF10: 9
PAINLEVEL_OUTOF10: 10
PAINLEVEL_OUTOF10: 6
PAINLEVEL_OUTOF10: 5
PAINLEVEL_OUTOF10: 5
PAINLEVEL_OUTOF10: 10
PAINLEVEL_OUTOF10: 8
PAINLEVEL_OUTOF10: 10
PAINLEVEL_OUTOF10: 8
PAINLEVEL_OUTOF10: 10
PAINLEVEL_OUTOF10: 10
PAINLEVEL_OUTOF10: 8
PAINLEVEL_OUTOF10: 0
PAINLEVEL_OUTOF10: 10
PAINLEVEL_OUTOF10: 10

## 2022-07-11 ASSESSMENT — PAIN DESCRIPTION - FREQUENCY
FREQUENCY: CONTINUOUS

## 2022-07-11 ASSESSMENT — PAIN DESCRIPTION - LOCATION
LOCATION: LEG
LOCATION: LEG;INCISION
LOCATION: LEG

## 2022-07-11 ASSESSMENT — PAIN DESCRIPTION - ONSET
ONSET: ON-GOING
ONSET: PROGRESSIVE
ONSET: ON-GOING

## 2022-07-11 ASSESSMENT — PAIN DESCRIPTION - DESCRIPTORS
DESCRIPTORS: DISCOMFORT;ACHING
DESCRIPTORS: ACHING;NUMBNESS;DISCOMFORT
DESCRIPTORS: NUMBNESS;ACHING
DESCRIPTORS: ACHING;DISCOMFORT
DESCRIPTORS: ACHING;NUMBNESS
DESCRIPTORS: DISCOMFORT;ACHING
DESCRIPTORS: ACHING;DISCOMFORT
DESCRIPTORS: ACHING;NUMBNESS

## 2022-07-11 ASSESSMENT — PAIN DESCRIPTION - ORIENTATION
ORIENTATION: RIGHT;LEFT
ORIENTATION: RIGHT
ORIENTATION: RIGHT;LEFT
ORIENTATION: RIGHT
ORIENTATION: RIGHT;LEFT
ORIENTATION: RIGHT;LEFT

## 2022-07-11 ASSESSMENT — PAIN - FUNCTIONAL ASSESSMENT
PAIN_FUNCTIONAL_ASSESSMENT: ACTIVITIES ARE NOT PREVENTED

## 2022-07-11 ASSESSMENT — PAIN DESCRIPTION - PAIN TYPE
TYPE: ACUTE PAIN;SURGICAL PAIN

## 2022-07-11 NOTE — PROGRESS NOTES
Patient admitted to rehab with debility, s/p fem/pop bypass. A/Ox4. Transfers with walker x1. Mobility restrictions: WBAT. On reg diet, tolerating well. Medications taken whole with thins. On ASA, Lovenox, and plavix for DVT prophylaxis. Skin: surgical incision BLE covered with petroleum gauze, 4x8 gauze, Kerlix, old DARYL sites bilateral groin open to air. Stan Battles from vascular surgery changed dressing this am. Oxygen: RA. LDA: none. Has been continent of bowel and continent of bladder. LBM 7/10/22. Chair/bed alarms in use and call light in reach. Will monitor for safety.

## 2022-07-11 NOTE — PROGRESS NOTES
Department of Physical Medicine & Rehabilitation  Progress Note    Patient Identification:  Teofilo Aguilera  [id number]  : 1950  Admit date: 2022    Chief Complaint: Critical lower limb ischemia ([address]    Subjective:   No acute events overnight. Patient seen this afternoon sitting up in gym. Reports steady improvement with therapies. Pain control improved. Labs reviewed. ROS: No f/c, n/v, cp     Objective:  Patient Vitals for the past 24 hrs:   BP Temp Temp src Pulse Resp SpO2 Weight   22 0910 -- -- -- -- 18 -- --   22 0842 -- -- -- -- -- 97 % --   22 0830 -- -- -- -- 18 -- --   22 0531 -- -- -- -- -- -- 144 lb 13.5 oz (65.7 kg)   22 0527 (!) 156/78 97.8 °F (36.6 °C) Oral 74 16 -- --   07/10/22 2027 136/77 98.2 °F (36.8 °C) Oral 69 16 98 % --   07/10/22 1346 123/77 98 °F (36.7 °C) Oral 80 16 99 % --     Const: Alert. No distress, pleasant. HEENT: Normocephalic, atraumatic. Normal sclera/conjunctiva. MMM. CV: Regular rate and rhythm. Resp: No respiratory distress. Lungs CTAB. Abd: Soft, nontender, nondistended, NABS+   Ext: BLE in ace wraps, ROM improving. Right foot edema and relative warmth stable. Neuro: Alert, oriented, appropriately interactive. Psych: Cooperative, appropriate mood and affect    Laboratory data: Available via EMR.    Last 24 hour lab  Recent Results (from the past 24 hour(s))   Basic Metabolic Panel w/ Reflex to MG    Collection Time: 22  6:06 AM   Result Value Ref Range    Sodium 139 136 - 145 mmol/L    Potassium reflex Magnesium 3.4 (L) 3.5 - 5.1 mmol/L    Chloride 100 99 - 110 mmol/L    CO2 29 21 - 32 mmol/L    Anion Gap 10 3 - 16    Glucose 101 (H) 70 - 99 mg/dL    BUN 14 7 - 20 mg/dL    CREATININE 0.9 0.8 - 1.3 mg/dL    GFR Non-African American >60 >60    GFR African American >60 >60    Calcium 8.7 8.3 - 10.6 mg/dL   CBC with Auto Differential    Collection Time: 22  6:06 AM   Result Value Ref Range    WBC 5.9 4.0 - 11.0 K/uL    RBC 2.49 (L) 4.20 - 5.90 M/uL    Hemoglobin 7.4 (L) 13.5 - 17.5 g/dL    Hematocrit 21.2 (L) 40.5 - 52.5 %    MCV 85.1 80.0 - 100.0 fL    MCH 29.7 26.0 - 34.0 pg    MCHC 34.8 31.0 - 36.0 g/dL    RDW 15.4 12.4 - 15.4 %    Platelets 652 (H) 731 - 450 K/uL    MPV 6.1 5.0 - 10.5 fL    Neutrophils % 51.4 %    Lymphocytes % 32.9 %    Monocytes % 8.0 %    Eosinophils % 6.5 %    Basophils % 1.2 %    Neutrophils Absolute 3.0 1.7 - 7.7 K/uL    Lymphocytes Absolute 2.0 1.0 - 5.1 K/uL    Monocytes Absolute 0.5 0.0 - 1.3 K/uL    Eosinophils Absolute 0.4 0.0 - 0.6 K/uL    Basophils Absolute 0.1 0.0 - 0.2 K/uL   Magnesium    Collection Time: 07/11/22  6:06 AM   Result Value Ref Range    Magnesium 2.50 (H) 1.80 - 2.40 mg/dL       Therapy progress:  Physical therapy:  Bed Mobility:     Sit>supine:     Supine>sit:     Transfers:     Sit>stand:  Assistance Level: Stand by assist,Supervision  Skilled Clinical Factors: patient able to stand with SBA  Stand>sit:  Assistance Level: Stand by [employer]  Skilled Clinical Factors: cues to square up to chair prior to sit  Bed<>chair  Assistance Level: Stand by [employer]:     Lateral transfer:     Car transfer:     Ambulation:  Surface: Level surface,Carpet  Device: Rolling walker  Distance: 220' x 2 with several turns, 120'  Activity: Within Unit  Activity Comments: Baseline- 99% O2 sats with HR 89, after longest distance  bpm and O2 sats 99%  Additional Factors: Verbal cues,Increased time to complete  Assistance Level: Stand by assist,Supervision  Gait Deviations: Decreased step length right,Slow carly  Skilled Clinical Factors: took 6 minute, 20 sec to ambulate 220', occasional cues for safety to keep inside frame of wheeled walker- foot flat R LE, likes to gesture with UE, cues to keep hands on wheeled walker  Stairs:  Stair Height: 6''  Device: Bilateral handrails (no device)  Number of Stairs: 12  Additional Factors: Non-reciprocal going up,Non-reciprocal going down  Assistance Level: Stand by assist,Supervision  Skilled Clinical Factors: lead with L LE, descend with R LE  Skilled Clinical Factors - Comments:  bpm after steps slight SOB  Curb:  Curb Height: 6''  Device: Rolling walker  Additional Factors: Set-up,Verbal cues  Assistance Level: Stand by assist,Supervision  Skilled Clinical Factors: able to sequence properly  Wheelchair:     Assessment:  Assessment: Patient  is a 71 y/o male admit 7/5/2022 with Critical Limb Ischemia R LE.  7/5/2022 S/P R Fem-Pop Bypass. PMH as noted including PAD, CAD, R Femur Fx/Pinning, stent placement on 6/15  PTA pt living in 2 story townhouse with few steps to enter and 2nd floor bed/bath; independent daily care and functional mobility. Today, 7/11 , patient able to perform community distances  with wheeled walker with supervision/SBA with less pain. Patient's HR noted to be 113 bpm after ambulation with baseline 96 bpm, much improved from last week. Patient does need cues for safety with transfers/ambulation primarily to stay in frame of wheeled walker. . Patient needed less rest breaks in between activities. . Pt limited by pain, weakness , decreased sensation limited endurance and balance. Patient would benefit from inpatient ARU to improve overall functional mobility  to independent so can return home with PRN assist of son. Patient's son does work full time. Anticipate D/C Wednesday/Thursday of this week depending on medical status. Activity Tolerance: Patient tolerated treatment well,Patient limited by endurance,Patient limited by pain  Discharge Recommendations: Continue to assess pending progress,Home with assist PRN,Home with Home health PT,Patient would benefit from continued therapy after discharge      Occupational therapy:   Feeding  Assistance Level:  Independent  Grooming/Oral Hygiene  Assistance Level: Stand by assist  Skilled Clinical Factors: Pt performed standing oral care and face with Dr. Awilda Metz)  -ASA, plavix     Recent right subcapital femoral neck fracture   -s/p hip pinning (4/24 with Dr. Joannie Olszewski). -WBAT.   -PT/OT     Presacral mass   -stable on most recent imaging. Planning for outpatient MRI and f/u with Dr. Adrianne Ahumada     Anxiety  -Diazepam prn    Hypokalemia  -Replace     Bladder   -High risk retention   -Monitor PVRs, SC prn >300cc     Bowel   -High risk constipation   -senna+colace BID, PRN miralax, MoM, and bisacodyl supp.     Safety   -fall precautions     Pain control  -PRN oxycodone     DVT ppx  -Lovenox       Rehab Progress: Making progress. Working on functional mobility, strength, endurance, compensatory strategies. Anticipated Dispo: home with son  Services/DME: TBD  ELOS: 7-10 days      [address] Av[address] MD 7/11/2022, 12:26 PM

## 2022-07-11 NOTE — DISCHARGE SUMMARY
DISCHARGE SUMMARY      Patient ID:  Rayo Hammond  [id number] 70 y.o. 1950    Admission Date: 7/5/2022  6:16 AM  Discharge Date: 7/7/2022    Principle Diagnosis: Peripheral arterial disease ([address]  Secondary Diagnosis: Principal Problem:    Peripheral arterial disease ([address]  Resolved Problems:    * No resolved hospital problems. *    Patient Active Problem List   Diagnosis    Arthritis of left knee    Basal cell carcinoma (BCC) of skin of nose    Peripheral arterial disease (HCC)    Closed right hip fracture (HCC)    Numbness of right foot    [facility] fracture, right, closed[facility]    Closed subcapital fracture of femur, right, initial encounter ([facility]    Critical lower limb ischemia ([address]        Consults: IP CONSULT TO PHYSICAL MEDICINE REHAB    Procedure: Procedure(s):  RIGHT FEMORAL POPLITEAL BYPASS with Dr. Gianluca Robledo on 7/5/2022    History: The patient is a 70 y.o. male with past medical and surgical history of:  Past Medical History:   Diagnosis Date    Anxiety     Basal cell carcinoma     L side of nose    CAD (coronary artery disease)     Peripheral artery disease ([facility]     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      and   Past Surgical History:   Procedure Laterality Date    CARDIAC CATHETERIZATION  2022    FEMORAL BYPASS Right 7/5/2022    RIGHT FEMORAL POPLITEAL BYPASS performed by Gianluca Robledo DO at [facility] Right 4/24/2022    HIP PINNING performed by Page Garrett MD at [facility] Course: The patient underwent a Right Fem-Pop Bypass with Dr. Shakir Dean on 7/5/2022  6:16 AM. His post-operative course progressed as anticipated without any immediate or obvious post-operative complications. There were no signs of hematoma, bleeding or infection associated with his surgical incision sites. His pain was controlled with combination of oral and IV medications. He was able to ambulate with PT/OT and was recommended for ARU upon discharge.  ARU was consulted and pt was felt to be an appropriate candidate, his insurance approved him and he was transferred to ARU at discharge. He tolerated a regular diet. The patient was discharged on 7/7/2022. [place] [facility] [place]    Patient Instructions:     Medication List      CHANGE how you take these medications    aspirin EC 81 MG EC tablet  Take 1 tablet by mouth 2 times daily Take for DVT blood clot prophylaxis. Please avoid missing doses. What changed:   · when to take this  · additional instructions        CONTINUE taking these medications    clopidogrel 75 MG tablet  Commonly known as: Plavix  Take 1 tablet by mouth daily Take 4 tablets tomorrow (300 mg as loading dose). Take 75 mg daily until surgery. diazePAM 10 MG tablet  Commonly known as: VALIUM     hydrOXYzine pamoate 25 MG capsule  Commonly known as: VISTARIL     ibuprofen 200 MG tablet  Commonly known as: ADVIL;MOTRIN        STOP taking these medications    naproxen 500 MG tablet  Commonly known as: NAPROSYN     oxyCODONE-acetaminophen 5-325 MG per tablet  Commonly known as: Percocet     ticagrelor 90 MG Tabs tablet  Commonly known as: BRILINTA            Activity: No lifting, driving, or strenuous exercise until released by Dr. Camron Adams to do so. Diet: Resume diet prior to admission  Wound Care: Keep wound clean and dry. Use soap and water to clean around your wound, keep open to air. Follow up with Dr. Camron Adams after discharge from ARU. Please call Dr. Chari Castaneda office at [phone] to make an appointment    Will continue to follow pt intermittently and be available as needed while he is in ARU.     Signed:  Electronically signed by TALITA Louis CNP on 7/11/2022 at 7:33 AM

## 2022-07-11 NOTE — PROGRESS NOTES
[facility]  Progress Note    7/11/2022 12:23 PM    Chief Complaint: Critical Lower Limb Ischemia     Reason for Consult:     S/P Right Fem-Pop Bypass with Dr. Kathy Espinoza on 7/5/2022    Subjective: Pt seen resting in bed in ARU, reports some Left Leg pain but overall improving     Vital Signs:  Vitals:    07/11/22 0531 07/11/22 0830 07/11/22 0842 07/11/22 0910   BP:       Pulse:       Resp:  18  18   Temp:       TempSrc:       SpO2:   97%    Weight: 144 lb 13.5 oz (65.7 kg)      Height:           I/O:    Intake/Output Summary (Last 24 hours) at 7/11/2022 1223  Last data filed at 7/11/2022 1037  Gross per 24 hour   Intake 690 ml   Output 900 ml   Net -210 ml       Physical Exam:   General: no apparent distress, alert and oriented, afebrile  Chest/Lungs: non labored breathing no tachypnea, retractions or cyanosis  Cardiac: Heart regular rate and rhythm  Extremities:   -LLE - dressings removed, incisions intact, staples intact, wrapped with Kerlix and ACE wrap from Toes to thigh, groin dressings removed - no active drainage noted  -RLE - dressings removed, incisions intact, staples in place, wrapped with Kerlix and ACE wrap from Toes to Thigh, groin dressings removed, no active drainage seen from incisions    Labs:   Lab Results   Component Value Date/Time     07/11/2022 06:06 AM    K 3.4 07/11/2022 06:06 AM     07/11/2022 06:06 AM    CO2 29 07/11/2022 06:06 AM    BUN 14 07/11/2022 06:06 AM    CREATININE 0.9 07/11/2022 06:06 AM    GFRAA >60 07/11/2022 06:06 AM    LABGLOM >60 07/11/2022 06:06 AM    GLUCOSE 101 07/11/2022 06:06 AM    MG 2.50 07/11/2022 06:06 AM    CALCIUM 8.7 07/11/2022 06:06 AM     Lab Results   Component Value Date/Time    WBC 5.9 07/11/2022 06:06 AM    RBC 2.49 07/11/2022 06:06 AM    HGB 7.4 07/11/2022 06:06 AM    HCT 21.2 07/11/2022 06:06 AM    MCV 85.1 07/11/2022 06:06 AM    RDW 15.4 07/11/2022 06:06 AM     07/11/2022 06:06 AM     Lab Results   Component Value Date    INR 0.99 07/05/2022    PROTIME 13.0 07/05/2022        Scheduled Meds:    acetaminophen  1,000 mg Oral 3 times per day    enoxaparin  40 mg SubCUTAneous Daily    sennosides-docusate sodium  1 tablet Oral BID    aspirin EC  81 mg Oral Daily    clopidogrel  75 mg Oral Daily     Continuous Infusions:    sodium chloride         Assessment:   -S/P Right Fem-Pop Bypass with Dr. Gentry Stinson on 7/5/2022  -In ARU  -Labs Stable  -Right foot warm  -Incisions intact, soft, no signs of hematoma    Plan:  -Therapies per ARU  -Dressings to be changed every other day  -Once discharged, pt will need to follow up with Dr. Gentry Stinson in 2 weeks    All pertinent information and plan of care discussed with Dr. Louis Blankenship. All questions and concerns were addressed with the patient. I have discussed the above stated plan with the patient and the nurse. The patient verbalized understanding and agreed with the plan.     Thank you for allowing to us to participate in the care of [address]      Electronically signed by TALITA Cleary CNP on 7/11/2022 at 12:23 PM       VASCULAR STAFF    Palp DP pulse, foot perfused  Incisions coapted and dry with some subcutaneous hematoma as expected  H/H stable  Continue with ARU and follow up 2 weeks post discharge      Louis Blankenship, , FSVS, [address] [facility]

## 2022-07-11 NOTE — PROGRESS NOTES
Physical Therapy  Facility/Department: [address] REHAB  Rehabilitation Physical Therapy Treatment Note AM and PM    NAME: Jada Grijalva  : 1950 (70 y.o.)  MRN: [id number]  CODE STATUS: Full Code    Date of Service: 22       Restrictions:  Restrictions/Precautions: Fall Risk  Position Activity Restriction  Other position/activity restrictions: s/p B Groin DARYL Drains (removed 22), BLE incisions from groin> ankle(ace wrapped) SUBJECTIVE  Subjective  Subjective: Patient reports numbness in R foot, agreeable to therapy. Pain: Pain level 8/10, just had pain medications         OBJECTIVE  Cognition  Overall Cognitive Status: Exceptions  Arousal/Alertness: Appropriate responses to stimuli  Following Commands: Follows one step commands with increased time  Attention Span: Difficulty dividing attention  Safety Judgement: Decreased awareness of need for safety  Insights: Decreased awareness of deficits  Cognition Comment: easily distracted/very talkative; cues to redirect back to task  Orientation  Overall Orientation Status: Within Functional Limits (14/15)  Orientation Level: Oriented X4    Functional Mobility  Bed Mobility  Overall Assistance Level: Independent  Bridging  Assistance Level: Independent  Roll Left  Assistance Level: Independent  Roll Right  Assistance Level: Independent  Sit to Supine  Assistance Level: Independent  Supine to Sit  Assistance Level: Independent  Scooting  Assistance Level: Independent  Balance  Sitting Balance: Independent  Standing Balance: Supervision (with wheeled walker)  Transfers  Additional Factors: Verbal cues; Hand placement cues  Device: Walker  Sit to Stand  Assistance Level: Stand by assist;Supervision  Skilled Clinical Factors: patient able to stand with SBA  Stand to Sit  Assistance Level: Stand by assist;Supervision  Skilled Clinical Factors: cues to square up to chair prior to sit  Bed To/From Chair  Assistance Level: Stand by assist;Supervision      Environmental Mobility  Ambulation  Surface: Level surface; Carpet  Device: Rolling walker  Distance: 220' x 2 with several turns, 120'  Activity: Within Unit  Activity Comments: Baseline- 99% O2 sats with HR 89, after longest distance  bpm and O2 sats 99%  Additional Factors: Verbal cues; Increased time to complete  Assistance Level: Stand by assist;Supervision  Gait Deviations: Decreased step length right;Slow carly  Skilled Clinical Factors: took 6 minute, 20 sec to ambulate 220', occasional cues for safety to keep inside frame of wheeled walker- foot flat R LE, likes to gesture with UE, cues to keep hands on wheeled walker  Stairs  Stair Height: 6''  Device: Bilateral handrails (no device)  Number of Stairs: 12  Additional Factors: Non-reciprocal going up;Non-reciprocal going down  Assistance Level: Stand by assist;Supervision  Skilled Clinical Factors: lead with L LE, descend with R LE  Skilled Clinical Factors - Comments:  bpm after steps slight SOB  Curb  Curb Height: 6''  Device: Rolling walker  Additional Factors: Set-up; Verbal cues  Assistance Level: Stand by assist;Supervision  Skilled Clinical Factors: able to sequence properly      PT Exercises  A/AROM Exercises: Patient performed sitting HEP- given written HEP- marches with assist R LE, AP x 20, LAQ x 15 B LE,      ASSESSMENT/PROGRESS TOWARDS GOALS       Assessment  Assessment: Patient  is a 71 y/o male admit 7/5/2022 with Critical Limb Ischemia R LE.  7/5/2022 S/P R Fem-Pop Bypass. PMH as noted including PAD, CAD, R Femur Fx/Pinning, stent placement on 6/15  PTA pt living in 2 story townhouse with few steps to enter and 2nd floor bed/bath; independent daily care and functional mobility. Today, 7/11 , patient able to perform community distances  with wheeled walker with supervision/SBA with less pain. Patient's HR noted to be 113 bpm after ambulation with baseline 96 bpm, much improved from last week.  Patient does need cues for safety with transfers/ambulation primarily to stay in frame of wheeled walker. . Patient needed less rest breaks in between activities. . Pt limited by pain, weakness , decreased sensation limited endurance and balance. Patient would benefit from inpatient ARU to improve overall functional mobility  to independent so can return home with PRN assist of son. Patient's son does work full time. Anticipate D/C Wednesday/Thursday of this week depending on medical status. Activity Tolerance: Patient tolerated treatment well;Patient limited by endurance; Patient limited by pain  Discharge Recommendations: Continue to assess pending progress;Home with assist PRN;Home with Home health PT;Patient would benefit from continued therapy after discharge  PT Equipment Recommendations  Equipment Needed: No  Other: Patient unable to find wheeled walker , may need wheeled walker - need to check with son. Goals  Patient Goals   Patient goals : Return home with son with or without walker  Short Term Goals  Time Frame for Short term goals: 7-10days  Short term goal 1: Bed Mob independent  Short term goal 2: Transfers with assist device MI  Short term goal 3: Amb with assist device 150' MI  Short term goal 4: Perform  12 steps with MI  Short term goal 5: perform curb step with MI with wheeled walker  Long Term Goals  Time Frame for Long term goals : STG= LTG    PLAN OF CARE/SAFETY  Plan  Plan:  minutes of therapy at least 5 out of 7 days a week  Plan weeks: 1 week to 10 days, most likely 1 week  Specific Instructions for Next Treatment: decreased functional mobility  Current Treatment Recommendations: Strengthening; Functional mobility training;Transfer training;Gait training;Stair training; Safety education & training;Patient/Caregiver education & training;Balance training; Endurance training;Neuromuscular re-education;Pain management;Home exercise program;Equipment evaluation, education, & procurement;ADL/Self-care training;ROM  Plan Comment: Wednesday 7/13 is patient's fifth day of therapy  Safety Devices  Type of Devices: Call light within reach;Gait belt;Patient at risk for falls (transport to room with patient)    EDUCATION  Education  Education Given To: Patient  Education Provided: Role of Therapy; Mobility Training;Transfer Training;Precautions; Safety; Energy Conservation;Home Exercise Program;Equipment;DME/Home Modifications  Education Provided Comments: steps  Education Method: Demonstration;Verbal  Barriers to Learning:  (pain)  Education Outcome: Verbalized understanding;Demonstrated understanding;Continued education needed    Second Session  S/ Patient reports pain level 7/10, just had pain medication. Patient agreeable to therapy. O/ Patient sit to stand SBA, ambulated with wheeled walker 100', 150', 220' with SBA/supervision cues to stay close to wheeled walker, Attempts to ambulate without wheeled walker but very antalgic. Performed bed mobility with MI- supine in bed, GS 15, QS x 15, hip and knee flex/ext x 10 B LE, hip abduction x 10   Performed nu step with WL4 x 10 min slow carly, 383 steps- no increase oain but actually improved   Patient to w/c with SBA  Patient to room via transport  Second assessment- Patient is very talkative and loses focus with exercises with cues to stay on task. Patient motivated to improve and to return home.     Therapy Time   Individual Concurrent Group Co-treatment   Time In 900         Time Out 945         Minutes 45           Timed Code Treatment Minutes: 39 Minutes     Second Session Therapy Time     Individual Co-treatment   Time In 1739     Time Out 1600     Minutes 39        ANTHONY BIRCH, PT, 07/11/22 at 4:06 PM

## 2022-07-11 NOTE — PATIENT CARE CONFERENCE
[facility]  Inpatient Rehabilitation  Weekly Team Conference Note      Date: 2022  Patient Name:  Rayo Hammond    MRN: [id number]  : 1950  Gender:   Physician:   Diagnosis: Critical lower limb [facility] ([facility] [I70.229]    CASE MANAGEMENT  Assessment: Return home      PHYSICAL THERAPY      Bed Mobility:  Overall Assistance Level: Independent  Sit>supine:  Assistance Level: Independent  Supine>sit:  Assistance Level: Independent    Transfers:  Surface:  To chair with arms,From chair with arms  Additional Factors: Verbal cues,Hand placement cues  Device: Walker  Sit>stand:  Assistance Level: Modified independent  Skilled Clinical Factors: patient able to stand with SBA  Stand>sit:  Assistance Level: Modified independent  Skilled Clinical Factors: cues to square up to chair prior to sit  Bed<>chair  Assistance Level: Stand by [employer]:     Lateral transfer:     Car transfer:       Ambulation:  Surface: Level surface,Carpet  Device: Rolling walker  Distance: 220' x 2 with multiple turns; short distances in therapy gym  Activity: Within Unit  Activity Comments: Baseline- 99% O2 sats with HR 89, after longest distance  bpm and O2 sats 99%  Additional Factors: Increased time to complete  Assistance Level: Supervision  Gait Deviations: Decreased step length right,Slow carly  Skilled Clinical Factors: continues with slow pace but no LOB, decreased heel strike RLE, often takes hands off the walker to gesture    Stairs:  Stair Height: 6''  Device: Bilateral handrails  Number of Stairs: 12  Additional Factors: Non-reciprocal going up,Non-reciprocal going down  Assistance Level: Supervision  Skilled Clinical Factors: lead with L LE, descend with R LE  Skilled Clinical Factors - Comments:  bpm after steps slight SOB    Curb:  Curb Height: 6''  Device: Rolling walker  Additional Factors: Set-up,Verbal cues  Assistance Level: Supervision  Skilled Clinical Factors: able to sequence properly      Assessment:  Assessment: Patient  is a 71 y/o male admit 7/5/2022 with Critical Limb Ischemia R LE.  7/5/2022 S/P R Fem-Pop Bypass. PMH as noted including PAD, CAD, R Femur Fx/Pinning, stent placement on 6/15  PTA pt living in 2 story townhouse with few steps to enter and 2nd floor bed/bath; independent daily care and functional mobility. Today, pt mod I for transfers and supervision ambulating community distances with RW and required extra time. Patient would benefit from inpatient ARU to improve overall functional mobility  to independent so can return home with PRN assist of son. Patient's son does work full time. Anticipate D/C Wednesday/Thursday of this week depending on medical status. Pt is eager for d/c home and hopeful for discharge tomorrow. Activity Tolerance: Patient tolerated treatment well  Discharge Recommendations: Home with assist PRN,Home with Home health PT,Patient would benefit from continued therapy after discharge      OCCUPATIONAL THERAPY  ADLs:  Feeding  Assistance Level: Independent  Grooming/Oral Hygiene  Assistance Level: Independent  Skilled Clinical Factors: Pt stood at sink to brush teeth indep. Seated to use shampoo seated then combed. UE Bathing  Assistance Level:  Independent  Skilled Clinical Factors: sponge bathed UB seated at sinkset up for sponge bathing  LE Bathing  Assistance Level: Modified independent  Skilled Clinical Factors: Pt ed in use of long handled sponge to bathe toes(only part exposed on LEs D/T ace wraps), stood at sink to bathe per-anal area modified indepCGA in standing to clean pati areas, did not bathe B LEs d/t ace wraps  UE Dressing  Assistance Level: Modified independent  Skilled Clinical Factors: own  set-up from RW to retrieve in closet/carried over RW, doffed/donned indepset up  LE Dressing  Equipment Provided: Reachers  Assistance Level: Modified independent  Skilled Clinical Factors: Pt doffed/donned pull ups/hospital pants w/ reacher modified indep. OT ed pt to start w/ RLE 1st.up to CG A to manage pants over R foot  Putting On/Taking Off Footwear  Equipment Provided: Sock aid,Reachers  Assistance Level: Set-up,Verbal cues,Close SB/CG assist  Skilled Clinical Factors: shown how to use reacher to doff socks, is also able to bend over briefly to reach R foot, c/o pain in R groin; used SA to don non skid socks (gets caught on ace wraps)  Toileting w/ SBA      Transfers: Toilet Transfers  Technique:  (amb w/ RW)  Equipment: Grab bars  Additional Factors: With handrails  Assistance Level: Modified independent  Skilled Clinical Factors: RW >< toilet with bilateral grab bars  Tub/Shower Transfers  Type: Tub (DRY tub)  Transfer To: Shower chair with back  Additional Factors: Cues for hand placement,Increased time to complete  Assistance Level: Minimal assistance  Skilled Clinical Factors: Pt performed DRY tub shower transfer to/from shower chair with back with min A for managing RLE in/out of tub d/t pain. Cues for hand placement on grab bar and slowing down for safety. Pt reports son is always present and available to assist with tub transfers at home. Anticipate pt to continue to increase independence however may benefit from TTB vs shower chair with back. (TBD pending progress)    IADLs:  Meal Prep  Meal Prep Level: Walker  Meal Prep Level of Assistance: Stand by assistance  Meal Preparation: Pt prepared piece of toast in stance with RW and overall SBA. Pt retrieved toast and butter from refrigerater with SBA - cues for safe technique. Transported to counter- education provided for use of walker tray or basket especially during meal prep/transport to reduce risk of falls. Pt stated he preferred walker tray. Pt buttered toast in stance with SBA. Pt tolerated upright position for 5.5 mins with no LOB however pt required seated rest break while toast was in toaster.   Money Management     Light Housekeeping     Dependent [employer]       UE function:  WFLs    Assessment:  Assessment: pt making good gains w/ OT intervention. He was shown how to use A/E to manage doff/donning non skid socks, he is gaining flexibility to bend over to reach R foot however reported groin pain. Has \"constant pain\" R LE from groin<>foot & is upset w/ current pain medication regimen. Encouraged pt to commuincate w/ RN & MD. Pt talkative & easily distracted during activities but he is cooperative. OT highly encouraged him to use RW for fxl mobility due to mild limp thru R foot--shown how to use basket + reacher to grab items off floor. [facility] & [facility] care. May benefit from walker basket, reacher, SA and TSF  Activity Tolerance: Patient tolerated treatment well,Patient limited by endurance,Patient limited by pain  Discharge Recommendations: Home with Home health OT,S Level 1,Home with assist PRN      NUTRITION  Most recent weightWeight: 143 lb 1.3 oz (64.9 kg)  BSA (Calculated - sq m): 1.81 sq meters  BMI (Calculated): 21.8   Pt at low nutritional risk. Pt is eating >50% the majority of his meals on a regular diet. Please see nutrition note for details. NURSING  Pt is continent of bowel and bladder. Monitor and maintain skin integrity, incision care. Family Education: Patient [facility] including surgical wounds and prevention, pain control PRN, safety and fall prevention, medications.     MEDICAL  Monitoring Hgb in setting of post op anemia  Pain regimen adjusted for improved control    TEAM SUMMARY AND DISCHARGE PLAN  Estimated Length of Stay:7/13/2022  Destination: home  · Anticipated Services at Discharge:    [x] OT  [x] PT   [] SLP    [x] RN   [x] Home Health aide []   Community Resources: _______________________________  Equipment recommendations:  [] Hospital bed [] Tub bench  [] Shower chair [] Hand held shower  [] Raised toilet seat [x] Toilet safety frame [] Bedside commode   [] W/C: _____  [x] Rolling Walker [] Standard walker [] Gait belt [] cane: _________  [] Sliding board [] Alternate seating/furniture [] O2 [] Hip Kit: _reacher, leander cloth SA, walker basket______  [] Life Line [] Other: _sock aid, reacher.______  Factors facilitating achievement of predicted outcomes: motivated to get home  Barriers to the achievement of predicted outcomes/Interventions:   Talkative, distracted; poor pain tolerance      Interdisciplinary Individualized Plan of Care Review:    · Continue Current Plan of Care: Yes    · Modifications:_____________________________    Special Needs in the Upcoming Week :    [] Family/Caregiver Education  [] Home visit  []Therapeutic Pass   [] Consults:_______    [] Other;_______    Patient Rehab Team Goals for the Upcoming Week:  1. MI with transfers and ambulate community distances with wheeled walker MI  2. MI w/ LB ADL & toilet tasks  3. Team Members Present at Conference:  Physician:Dr. Luh Murray MD  : Suzanne Morgan LSW,MSW    Occupational Therapist: Vannessa Larry, OTR/L #[id number]  Physical Therapist: Emile Carter PT, DPT [id number]   Speech Therapist:   Lindsay Archuleta RN  Dietician: Radha Way RD,LD  Psychologist:  Other:      I led this team conference and I approve the established interdisciplinary plan of care as documented within the medical record of [address] [place] [address]. MD: Julio Patel.  Luh Murray MD 7/12/2022, 4:09 PM

## 2022-07-11 NOTE — PROGRESS NOTES
7/5/2022). Restrictions  Restrictions/Precautions: Fall Risk  Other position/activity restrictions: BLE incisions from groin> ankle(ace wrapped) noted hemoglobin (7/9) -7.2    Subjective  Subjective: met in therapy gym, he is very talkative  Restrictions/Precautions: Fall Risk             Objective     Cognition  Overall Cognitive Status: Exceptions  Following Commands: Follows one step commands with increased time  Attention Span: Difficulty dividing attention  Safety Judgement: Decreased awareness of need for safety  Insights: Decreased awareness of deficits  Cognition Comment: easily distracted/very talkative; cues to redirect back to task  Orientation  Overall Orientation Status: Within Functional Limits (14/15)  Orientation Level: Oriented X4         ADL  Putting On/Taking Off Footwear  Equipment Provided: Sock aid;Reachers  Assistance Level: Set-up; Verbal cues;Contact guard assist  Skilled Clinical Factors: shown how to use reacher to doff socks, is also able to bend over briefly to reach R foot, c/o pain in R groin; used SA to don non skid socks (gets caught on ace wraps)          Functional Mobility  Device: Rolling walker  Activity: Retrieve items;Transport items  Assistance Level: Stand by assist  Skilled Clinical Factors: used RW for short distances in therapy gym w/c<>reg chair & to retrieve items off floor using reacher + basket. Overall SBA no LOB  Transfers  Surface: From chair with arms; Wheelchair  Additional Factors: Hand placement cues  Device: Walker  Sit to Stand  Assistance Level: Stand by assist  Stand to Sit  Assistance Level: Stand by assist   OT Exercises  Exercise Treatment: completed 1 set of 15 using 3 lb wts for strengthening to be IND w/ sit<>stand transitions  Exercise Equipment: 2 sets of 20 using 3 lb gripper for increased circulation     Assessment  Assessment  Assessment: pt making good gains w/ OT intervention.  He was shown how to use A/E to manage doff/donning non skid socks, he is gaining flexibility to bend over to reach R foot however reported groin pain. Has \"constant pain\" R LE from groin<>foot & is upset w/ current pain medication regimen. Encouraged pt to commuincate w/ RN & MD. Pt talkative & easily distracted during activities but he is cooperative. OT highly encouraged him to use RW for fxl mobility due to mild limp thru R foot--shown how to use basket + reacher to grab items off floor. He is eager to return home on Wed w/ [facility] services including OT & wound care  Activity Tolerance: Patient tolerated treatment well;Patient limited by endurance; Patient limited by pain  Discharge Recommendations: Home with Home health OT;S Level 1;Home with assist PRN  OT Equipment Recommendations  Equipment Needed: Yes  Other: May need TSF, reacher, SA, walker basket  Safety Devices  Safety Devices in place: Yes  Type of devices: Gait belt    Patient Education  Education  Education Given To: Patient  Education Provided: ADL Function;Equipment  Education Provided Comments: educated on purpose of using RW, basket + reacher to grab items off floor to reduce fall risk; shown how to use A/E for doff/donning socks  Education Method: Demonstration;Verbal;Teach Back  Barriers to Learning: None  Education Outcome: Verbalized understanding;Continued education needed    Plan  Plan  Times per Week: 5-6x  Times per Day: Twice a day  Plan Weeks: 1-1.5 weeks ariel randall 7/8/22  Current Treatment Recommendations: Strengthening;Balance training;Functional mobility training; Endurance training;Self-Care / ADL; Safety education & training;Gait training;Patient/Caregiver education & training;Equipment evaluation, education, & procurement;Home management training  Plan Comment: Tues conference    Goals  Patient Goals   Patient goals : Pt stated his goal is to be able to do steps, get his pain level half of what it is now, be able to get back to cleaning the house indep, and walk w/o a walker eventualy.  Above goals will address pt's goal.  Short Term Goals  Time Frame for Short term goals: 1-1.5 weeks from eval 7/8/22  Short Term Goal 1: Pt will bathe modified indep. with AE/DME as needed. Short Term Goal 2: Pt will dress modified indep. w/ AE as needed. Short Term Goal 3: Pt will toilet modified indep. w/ grab bar/TSF. Short Term Goal 4: Pt will perform functional ADL transfers modified indep. w/ RW. Short Term Goal 5: Pt will perform ther ex/HEP to increase activity tolerance to stand for 10 minutes, & be ed in walker safety to perform ADL/simple IADL tasks modified indep with RW.   Long Term Goals  Time Frame for Long term goals : same as LTG        Therapy Time   Individual Concurrent Group PM-treatment   Time In 1115      1430   Time Out 1200      1515   Minutes 45      45   Timed Code Treatment Minutes: [address], [place] #6149

## 2022-07-11 NOTE — PROGRESS NOTES
Furnas patients phone alarm going off in room and then pt called out for his pain medication. Pt reports pain 8/10 in RLE. 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 Showing this writer his song on his phone. Pt making sure this writer medicates him every 3 hours ATC. Pt sets phone alarm for prn pain medication. Pt requesting Roxicodone 10 mg and Valium. Medicated per prn orders. Lungs clear. No sob or cough. On RA. Belly round and soft with active BS. LBM today. Voids urine without difficulty. Legs with 1+ edema noted pedal. Doppler pulses. Dressings to bilat legs CDI. Call light in reach. Bed alarm on.

## 2022-07-11 NOTE — PLAN OF CARE
Problem: Discharge Planning  Goal: Discharge to home or other facility with appropriate resources  Outcome: Progressing  Flowsheets (Taken 7/11/2022 1327)  Discharge to home or other facility with appropriate resources: Identify barriers to discharge with patient and caregiver     Problem: Skin/Tissue Integrity  Goal: Absence of new skin breakdown  Description: 1. Monitor for areas of redness and/or skin breakdown  2. Assess vascular access sites hourly  3. Every 4-6 hours minimum:  Change oxygen saturation probe site  4. Every 4-6 hours:  If on nasal continuous positive airway pressure, respiratory therapy assess nares and determine need for appliance change or resting period.   Outcome: Progressing     Problem: Safety - Adult  Goal: Free from fall injury  Outcome: Progressing  Flowsheets (Taken 7/11/2022 1531)  Free From Fall Injury: Instruct family/caregiver on patient safety     Problem: Pain  Goal: Verbalizes/displays adequate comfort level or baseline comfort level  Outcome: Progressing     Problem: ABCDS Injury Assessment  Goal: Absence of physical injury  Outcome: Progressing  Flowsheets (Taken 7/11/2022 1531)  Absence of Physical Injury: Implement safety measures based on patient assessment

## 2022-07-12 PROCEDURE — 97110 THERAPEUTIC EXERCISES: CPT

## 2022-07-12 PROCEDURE — 97530 THERAPEUTIC ACTIVITIES: CPT

## 2022-07-12 PROCEDURE — 97535 SELF CARE MNGMENT TRAINING: CPT

## 2022-07-12 PROCEDURE — 97116 GAIT TRAINING THERAPY: CPT

## 2022-07-12 PROCEDURE — 6370000000 HC RX 637 (ALT 250 FOR IP): Performed by: PHYSICAL MEDICINE & REHABILITATION

## 2022-07-12 PROCEDURE — 1280000000 HC REHAB R&B

## 2022-07-12 PROCEDURE — 94760 N-INVAS EAR/PLS OXIMETRY 1: CPT

## 2022-07-12 PROCEDURE — 6360000002 HC RX W HCPCS: Performed by: PHYSICAL MEDICINE & REHABILITATION

## 2022-07-12 RX ORDER — OXYCODONE HYDROCHLORIDE 5 MG/1
5-10 TABLET ORAL EVERY 4 HOURS PRN
Qty: 60 TABLET | Refills: 0 | Status: SHIPPED | OUTPATIENT
Start: 2022-07-12 | End: 2022-07-16 | Stop reason: ALTCHOICE

## 2022-07-12 RX ORDER — ASPIRIN 81 MG/1
81 TABLET ORAL DAILY
Qty: 30 TABLET | Refills: 0 | Status: SHIPPED | OUTPATIENT
Start: 2022-07-12 | End: 2022-09-29

## 2022-07-12 RX ORDER — CLOPIDOGREL BISULFATE 75 MG/1
75 TABLET ORAL DAILY
Qty: 30 TABLET | Refills: 0 | Status: SHIPPED | OUTPATIENT
Start: 2022-07-12 | End: 2022-08-08 | Stop reason: SDUPTHER

## 2022-07-12 RX ADMIN — OXYCODONE HYDROCHLORIDE 10 MG: 10 TABLET ORAL at 09:27

## 2022-07-12 RX ADMIN — OXYCODONE HYDROCHLORIDE 10 MG: 10 TABLET ORAL at 00:36

## 2022-07-12 RX ADMIN — OXYCODONE HYDROCHLORIDE 10 MG: 10 TABLET ORAL at 12:32

## 2022-07-12 RX ADMIN — SENNOSIDES AND DOCUSATE SODIUM 1 TABLET: 50; 8.6 TABLET ORAL at 21:25

## 2022-07-12 RX ADMIN — ENOXAPARIN SODIUM 40 MG: 100 INJECTION SUBCUTANEOUS at 07:45

## 2022-07-12 RX ADMIN — CLOPIDOGREL BISULFATE 75 MG: 75 TABLET ORAL at 07:45

## 2022-07-12 RX ADMIN — OXYCODONE HYDROCHLORIDE 10 MG: 10 TABLET ORAL at 03:34

## 2022-07-12 RX ADMIN — OXYCODONE HYDROCHLORIDE 10 MG: 10 TABLET ORAL at 15:34

## 2022-07-12 RX ADMIN — ACETAMINOPHEN 1000 MG: 500 TABLET ORAL at 06:35

## 2022-07-12 RX ADMIN — OXYCODONE HYDROCHLORIDE 10 MG: 10 TABLET ORAL at 18:27

## 2022-07-12 RX ADMIN — ACETAMINOPHEN 1000 MG: 500 TABLET ORAL at 14:43

## 2022-07-12 RX ADMIN — OXYCODONE HYDROCHLORIDE 10 MG: 10 TABLET ORAL at 21:25

## 2022-07-12 RX ADMIN — ACETAMINOPHEN 1000 MG: 500 TABLET ORAL at 21:25

## 2022-07-12 RX ADMIN — OXYCODONE HYDROCHLORIDE 10 MG: 10 TABLET ORAL at 06:34

## 2022-07-12 RX ADMIN — ASPIRIN 81 MG: 81 TABLET, COATED ORAL at 07:45

## 2022-07-12 RX ADMIN — DIAZEPAM 10 MG: 5 TABLET ORAL at 19:51

## 2022-07-12 ASSESSMENT — PAIN DESCRIPTION - ORIENTATION
ORIENTATION: RIGHT
ORIENTATION: RIGHT
ORIENTATION: RIGHT;LEFT
ORIENTATION: RIGHT

## 2022-07-12 ASSESSMENT — PAIN DESCRIPTION - ONSET
ONSET: ON-GOING

## 2022-07-12 ASSESSMENT — PAIN SCALES - GENERAL
PAINLEVEL_OUTOF10: 9
PAINLEVEL_OUTOF10: 7
PAINLEVEL_OUTOF10: 7
PAINLEVEL_OUTOF10: 10
PAINLEVEL_OUTOF10: 9
PAINLEVEL_OUTOF10: 8
PAINLEVEL_OUTOF10: 10
PAINLEVEL_OUTOF10: 5
PAINLEVEL_OUTOF10: 8
PAINLEVEL_OUTOF10: 8
PAINLEVEL_OUTOF10: 0
PAINLEVEL_OUTOF10: 0
PAINLEVEL_OUTOF10: 9

## 2022-07-12 ASSESSMENT — PAIN DESCRIPTION - LOCATION
LOCATION: INCISION;LEG
LOCATION: LEG;INCISION
LOCATION: INCISION;LEG
LOCATION: LEG
LOCATION: INCISION;LEG
LOCATION: GROIN;HIP
LOCATION: LEG
LOCATION: HIP
LOCATION: HIP

## 2022-07-12 ASSESSMENT — PAIN - FUNCTIONAL ASSESSMENT
PAIN_FUNCTIONAL_ASSESSMENT: ACTIVITIES ARE NOT PREVENTED
PAIN_FUNCTIONAL_ASSESSMENT: PREVENTS OR INTERFERES SOME ACTIVE ACTIVITIES AND ADLS

## 2022-07-12 ASSESSMENT — PAIN DESCRIPTION - PAIN TYPE
TYPE: SURGICAL PAIN
TYPE: ACUTE PAIN;SURGICAL PAIN

## 2022-07-12 ASSESSMENT — PAIN DESCRIPTION - DESCRIPTORS
DESCRIPTORS: SORE
DESCRIPTORS: SORE
DESCRIPTORS: ACHING;DISCOMFORT
DESCRIPTORS: ACHING
DESCRIPTORS: SHARP
DESCRIPTORS: ACHING
DESCRIPTORS: ACHING
DESCRIPTORS: ACHING;NUMBNESS
DESCRIPTORS: ACHING;NUMBNESS

## 2022-07-12 ASSESSMENT — PAIN DESCRIPTION - FREQUENCY
FREQUENCY: CONTINUOUS

## 2022-07-12 NOTE — PROGRESS NOTES
Balance: Supervision  Transfers  Surface: To chair with arms;From chair with arms  Device: Walker  Sit to Stand  Assistance Level: Modified independent  Stand to Sit  Assistance Level: Modified independent      Environmental Mobility  Ambulation  Surface: Level surface; Carpet  Device: Rolling walker  Distance: 220' x 2 with multiple turns; short distances in therapy gym  Activity: Within Unit  Additional Factors: Increased time to complete  Assistance Level: Supervision  Gait Deviations: Decreased step length right;Slow carly  Skilled Clinical Factors: continues with slow pace but no LOB, decreased heel strike RLE, often takes hands off the walker to gesture  Stairs  Stair Height: 6''  Device: Bilateral handrails  Number of Stairs: 12  Additional Factors: Non-reciprocal going up;Non-reciprocal going down  Assistance Level: Supervision  Skilled Clinical Factors: lead with L LE, descend with R LE  Curb  Curb Height: 6''  Device: Rolling walker  Assistance Level: Supervision  Skilled Clinical Factors: able to sequence properly             PT Exercises  Exercise Treatment: seated exercises: x20 LAQs alternating, x30 heel raises/toe raises, x20 hip ABD roshan, x20 alternating marches (initially AAROM RLE but progressd to AROM), x20 glute sets       PM session:  Pt pleasant and agreeable to PT treatment. In recliner chair upon arrival.  Reports he had pain med but reports soreness in BLE with mobility. Pt ambulated short distance recliner chair to w/c with RW and mod I.  Pt ambulated 220'x2 with RW and mod I.  NuStep BUEs and BLEs, resistance level 5 for 11 min. Dr. Imelda Florian in during session to speak to pt. Pt ambulated short distance in therapy gym with RW and mod I. Transfers throughout the session mod I.   Care transitioned to OT at end of session. Pt tolerated treatment session well and is now mod I for mobility. Anticipate d/c home tomorrow after therapy with assist PRN and home health PT.     Safety Device - Type of devices:  [x]  All fall risk precautions in place [] Bed alarm in place  [] Call light within reach [] Chair alarm in place [] Positioning belt [x] Gait belt [] Patient at risk for falls [] Left in bed [] Left in chair [] Telesitter in use [] Sitter present [] Nurse notified []  None    Electronically signed by Gurinder Rasmussen, PT [id number] on 7/12/2022 at 1:44 PM      ASSESSMENT/PROGRESS TOWARDS GOALS       Assessment  Assessment: Patient  is a 71 y/o male admit 7/5/2022 with Critical Limb Ischemia R LE.  7/5/2022 S/P R Fem-Pop Bypass. PMH as noted including PAD, CAD, R Femur Fx/Pinning, stent placement on 6/15  PTA pt living in 2 story townhouse with few steps to enter and 2nd floor bed/bath; independent daily care and functional mobility. Today, pt mod I for transfers and supervision ambulating community distances with RW and required extra time. Patient would benefit from inpatient ARU to improve overall functional mobility  to independent so can return home with PRN assist of son. Patient's son does work full time. Anticipate D/C Wednesday/Thursday of this week depending on medical status. Pt is eager for d/c home and hopeful for discharge tomorrow.   Activity Tolerance: Patient tolerated treatment well  Discharge Recommendations: Home with assist PRN;Home with Home health PT;Patient would benefit from continued therapy after discharge  PT Equipment Recommendations  Equipment Needed: Yes  Mobility Devices: Lacretia Sheen: Rolling    Goals  Patient Goals   Patient goals : Return home with son with or without walker  Short Term Goals  Time Frame for Short term goals: 7-10days  Short term goal 1: Bed Mob independent  Short term goal 2: Transfers with assist device MI  Short term goal 3: Amb with assist device 150' MI  Short term goal 4: Perform  12 steps with MI  Short term goal 5: perform curb step with MI with wheeled walker  Long Term Goals  Time Frame for Long term goals : STG= LTG    PLAN OF CARE/SAFETY  Plan  Plan:  minutes of therapy at least 5 out of 7 days a week  Plan weeks: 1 week to 10 days, most likely 1 week  Specific Instructions for Next Treatment: decreased functional mobility  Current Treatment Recommendations: Strengthening; Functional mobility training;Transfer training;Gait training;Stair training; Safety education & training;Patient/Caregiver education & training;Balance training; Endurance training;Neuromuscular re-education;Pain management;Home exercise program;Equipment evaluation, education, & procurement;ADL/Self-care training;ROM  Plan Comment: Wednesday 7/13 is patient's fifth day of therapy  Safety Devices  Type of Devices: Gait belt; All fall risk precautions in place  Restraints  Restraints Initially in Place: No    EDUCATION  Education  Education Given To: Patient  Education Provided: Role of Therapy; Mobility Training;Transfer Training;Precautions; Safety; Energy Conservation;Home Exercise Program;Equipment;DME/Home Modifications  Education Method: Verbal  Barriers to Learning: None  Education Outcome: Verbalized understanding;Demonstrated understanding;Continued education needed        Therapy Time   Individual Concurrent Group Co-treatment   Time In 0945         Time Out 1030         Minutes 45           Timed Code Treatment Minutes: [facility]   Time In 1300     Time Out 1345     Minutes 45             Electronically signed by Myron Melo on 7/12/2022 at 11:01 AM

## 2022-07-12 NOTE — PROGRESS NOTES
Pt awake and c/o 9/10 RLE pain. Pt also requesting Valium. Medicated per prn ordedrs. Pt c/o uncomfortable in the bed and offered to move him to chair and he declined. Repositioned in bed for comfort. Call light in reach. Bed alarm on.

## 2022-07-12 NOTE — PROGRESS NOTES
Department of Physical Medicine & Rehabilitation  Progress Note    Patient Identification:  Duane Mcgill  [id number]  : 1950  Admit date: 2022    Chief Complaint: Critical lower limb ischemia ([address]    Subjective:   No acute events overnight. Patient seen this afternoon working with PT. He reports feeling well and looking forward to discharge home tomorrow. We discussed plans for home care, medications, and follow-ups. Labs reviewed. ROS: No f/c, n/v, cp     Objective:  Patient Vitals for the past 24 hrs:   BP Temp Temp src Pulse Resp SpO2 Weight   22 0957 -- -- -- -- 17 -- --   22 0927 -- -- -- -- 18 -- --   22 0805 -- -- -- 70 16 95 % --   22 0704 -- -- -- -- 16 -- --   22 0625 122/72 98.4 °F (36.9 °C) Oral 78 16 97 % 143 lb 1.3 oz (64.9 kg)   22 (!) 151/79 98.6 °F (37 °C) Oral 77 16 97 % --   22 1826 -- -- -- -- 16 -- --   22 1756 -- -- -- -- 16 -- --   22 1700 -- -- -- -- 16 -- --   22 1646 106/70 98 °F (36.7 °C) Oral 85 16 98 % --   22 1645 -- -- Oral -- 16 -- --   22 1600 -- -- -- -- 16 -- --   22 1532 -- -- -- -- 16 -- --   22 1501 -- -- -- -- 16 -- --   22 1231 -- -- -- -- 16 -- --     Const: Alert. No distress, pleasant. HEENT: Normocephalic, atraumatic. Normal sclera/conjunctiva. MMM. CV: Regular rate and rhythm. Resp: No respiratory distress. Lungs CTAB. Abd: Soft, nontender, nondistended, NABS+   Ext: BLE in ace wraps, ROM improving. Right foot edema and relative warmth stable. Neuro: Alert, oriented, appropriately interactive. Psych: Cooperative, appropriate mood and affect    Laboratory data: Available via EMR. Last 24 hour lab  No results found for this or any previous visit (from the past 24 hour(s)). Therapy progress:  Physical therapy:  Bed Mobility:  Overall Assistance Level: Independent  Sit>supine:  Assistance Level:  Independent  Supine>sit:  Assistance Level: Independent  Transfers:  Surface: To chair with arms,From chair with arms  Additional Factors: Verbal cues,Hand placement cues  Device: Walker  Sit>stand:  Assistance Level: Modified independent  Skilled Clinical Factors: patient able to stand with SBA  Stand>sit:  Assistance Level: Modified independent  Skilled Clinical Factors: cues to square up to chair prior to sit  Bed<>chair  Assistance Level: Stand by [employer]:     Lateral transfer:     Car transfer:     Ambulation:  Surface: Level surface,Carpet  Device: Rolling walker  Distance: 220' x 2 with multiple turns; short distances in therapy gym  Activity: Within Unit  Activity Comments: Baseline- 99% O2 sats with HR 89, after longest distance  bpm and O2 sats 99%  Additional Factors: Increased time to complete  Assistance Level: Supervision  Gait Deviations: Decreased step length right,Slow carly  Skilled Clinical Factors: continues with slow pace but no LOB, decreased heel strike RLE, often takes hands off the walker to gesture  Stairs:  Stair Height: 6''  Device: Bilateral handrails  Number of Stairs: 12  Additional Factors: Non-reciprocal going up,Non-reciprocal going down  Assistance Level: Supervision  Skilled Clinical Factors: lead with L LE, descend with R LE  Skilled Clinical Factors - Comments:  bpm after steps slight SOB  Curb:  Curb Height: 6''  Device: Rolling walker  Additional Factors: Set-up,Verbal cues  Assistance Level: Supervision  Skilled Clinical Factors: able to sequence properly  Wheelchair:     Assessment:  Assessment: Patient  is a 71 y/o male admit 7/5/2022 with Critical Limb Ischemia R LE.  7/5/2022 S/P R Fem-Pop Bypass. PMH as noted including PAD, CAD, R Femur Fx/Pinning, stent placement on 6/15  PTA pt living in 2 story [facility] with few steps to enter and 2nd floor bed/bath; independent daily care and functional mobility.   Today, pt mod I for transfers and supervision ambulating community distances with RW and required extra time. Patient would benefit from inpatient ARU to improve overall functional mobility  to independent so can return home with PRN assist of son. Patient's son does work full time. Anticipate D/C Wednesday/Thursday of this week depending on medical status. Pt is eager for d/c home and hopeful for discharge tomorrow. Activity Tolerance: Patient tolerated treatment well  Discharge Recommendations: Home with assist PRN,Home with [address] would benefit from continued therapy after discharge      Occupational therapy:   Feeding  Assistance Level: Independent  Skilled Clinical Factors: OT reheated lunch tray @ end of session, he set-up. OT supplied ice for pitcher. Grooming/Oral Hygiene  Assistance Level: Independent  Skilled Clinical Factors: Pt stood at sink to brush teeth indep. Seated to use shampoo seated then combed. UE Bathing  Assistance Level: Independent  Skilled Clinical Factors: sponge bathed UB seated at sink  LE Bathing  Assistance Level: Modified independent  Skilled Clinical Factors: Pt ed in use of long handled sponge to bathe toes(only part exposed on LEs D/T ace wraps), stood at sink to bathe per-anal area modified indep  UE Dressing  Assistance Level: Modified independent  Skilled Clinical Factors: own  set-up from RW to retrieve in closet/carried over RW, doffed/donned indep  LE Dressing  Equipment Provided: Reachers  Assistance Level: Modified independent  Skilled Clinical Factors: Pt doffed/donned pull ups/hospital pants w/ reacher modified indep. OT ed pt to start w/ RLE 1st.  Putting On/Taking Off Footwear  Equipment Provided: Sock aid  Assistance Level: Set-up,Verbal cues,Contact guard assist  Skilled Clinical Factors: Pt doffed footies w/ reacher and donned R footie w/ sock aid, did not need sock aid for L footie, performed modified independent.   Toileting  Assistance Level: Modified independent  Skilled Clinical Factors: seated on toilet supp.     Safety   -fall precautions     Pain control  -PRN oxycodone     DVT ppx  -Lovenox       Rehab Progress: Interdisciplinary team conference was held today with entire rehab treatment team including PT, OT, Dietician, RN, and SW. Discussion focused on progress toward rehab goals and discharge planning. Making progress. Working on functional mobility, strength, endurance, compensatory strategies. Separate conference then held with patient/family (if available), questions answered and concerns addressed. Total treatment time >35 min with greater than 50% spent in care coordination. Anticipated Dispo: home with son  Services:  PT, OT, RN  DME: Rolling walker, walker basket, TSF, reacher, sock aide  ELOS: 7/13 after therapies      Toya Toro MD 7/12/2022, 12:05 PM

## 2022-07-12 NOTE — PROGRESS NOTES
Call made to Dr. Shakir Dean office to notify him patient is discharging tomorrow and to obtain wound care orders and follow up appointment instructions. Patient still has staples to groin area remain intact, which will be removed in Dr. Rivera Sheridan office at two week follow up appointment.  Electronically signed by Alex Warren RN, [address] [place] [address] on 7/12/22 at 3:14 PM EDT

## 2022-07-12 NOTE — PROGRESS NOTES
Pt awake and AAO lying in bed watching TV. Pt very focused on pain medication and making sure this writer knows that he wants it every 3 hours ATC and whether he needs to call for it, etc. Pt does set his alarm on his phone every 3 hours for pain medication. Pt s/p fem/pop bypass. Dressings to bilat legs CDI. Legs elevated and heels floated. Lungs clear. No sob or cough. On RA. Belly round and soft with active BS. LBM this evening. Voids per urinal. Pulses palp. Call light in reach. Bed alarm on.

## 2022-07-12 NOTE — PROGRESS NOTES
Patient admitted to rehab with critical limb ischemia. A/Ox4. Transfers with RW. Mobility restrictions: WBAT. On regular diet, tolerating well. Medications taken whole. On Lovenox for DVT prophylaxis. Skin: Surgical incisions remain intanct. Oxygen: room air. LDA: none. Has been continent  of  bladder. LBM 7/10/2022. Chair/bed alarms in use and call light in reach. Will monitor for safety.  Electronically signed by Alex Warren RN, [address] [place] [address] on 7/12/22 at 9:50 AM EDT

## 2022-07-12 NOTE — PROGRESS NOTES
Occupational Therapy  Facility/Department: [facility]  REH[facility]  Rehabilitation Occupational Therapy Daily Treatment Note  AM and PM Sessions:     Date: 22  Patient Name: Mohit Kelly       Room: T7N-8002/1233-37  MRN: [id number]  Account: [de-identified]   : 1950  (75 y.o.) Gender: male                Treatment Diagnosis: Impaired: ADL/IADL function, mobility, functional transfers, balance, activity tolerance   Past Medical History:  has a past medical history of Anxiety, Basal cell carcinoma, CAD (coronary artery disease), Peripheral artery disease ([facility] [address] and Presacral mass. Past Surgical History:   has a past surgical history that includes hip surgery (Right, 2022); Cardiac catheterization (); and femoral bypass (Right, 2022). Restrictions  Restrictions/Precautions: Fall Risk  Other position/activity restrictions: BLE incisions from groin> ankle(ace wrapped) noted hemoglobin () -7.2    Subjective  Subjective: Pt met in room, seated in recliner agreeable to spone bath ADL session. Pt rated his leg pain 8/10. Restrictions/Precautions: Fall Risk             Objective     Cognition  Overall Cognitive Status: WFL (WFL for ADL tasks,  decreased divided attn D/T distractable)  Attention Span: Difficulty dividing attention  Memory:  (slight decreased memory, did not recall working with this therapist)  Safety Judgement: Decreased awareness of need for safety  Cognition Comment: easily distracted/very talkative; cues to redirect back to task  Orientation  Overall Orientation Status: Within Functional Limits (14/15)  Orientation Level: Oriented X4         ADL  Feeding  Assistance Level: Independent  Skilled Clinical Factors: OT reheated lunch tray @ end of session, he set-up. OT supplied ice for pitcher. Grooming/Oral Hygiene  Assistance Level: Independent  Skilled Clinical Factors: Pt stood at sink to brush teeth indep. Seated to use shampoo seated then combed.   Upper Extremity Bathing  Assistance Level: Independent  Skilled Clinical Factors: sponge bathed UB seated at sink  Lower Extremity Bathing  Assistance Level: Modified independent  Skilled Clinical Factors: Pt ed in use of long handled sponge to bathe toes(only part exposed on LEs D/T ace wraps), stood at sink to bathe per-anal area modified indep  Upper Extremity Dressing  Assistance Level: Modified independent  Skilled Clinical Factors: own  set-up from RW to retrieve in closet/carried over RW, doffed/donned indep  Lower Extremity Dressing  Equipment Provided: Reachers  Assistance Level: Modified independent  Skilled Clinical Factors: Pt doffed/donned pull ups/hospital pants w/ reacher modified indep. OT ed pt to start w/ RLE 1st.  Putting On/Taking Off Footwear  Equipment Provided: Sock aid  Skilled Clinical Factors: Pt doffed footies w/ reacher and donned R footie w/ sock aid, did not need sock aid for L footie, performed modified independent. Toileting  Assistance Level: Modified independent  Skilled Clinical Factors: seated on toilet voided, (no hygiene)managed clothes modified indep. (He reported able to perform BM hygiene indep.)  Toilet Transfers  Technique:  (amb w/ RW)  Equipment: Grab bars  Additional Factors: With handrails  Assistance Level: Modified independent  Skilled Clinical Factors: RW >< toilet with bilateral grab bars          Functional Mobility  Device: Rolling walker  Activity: Retrieve items;Transport items  Assistance Level: Modified independent;Supervision  Skilled Clinical Factors: RW from recliner >< closet to retrieve shirt, >< chair at sink, >< toilet >< straight chair at sink all supervisoin to modified indep. Occ safety cues but steady. Scooting  Assistance Level: Independent  Transfers  Surface: From chair with arms; Wheelchair; To chair without arms; To chair with arms;From chair without arms;Standard toilet  Additional Factors:  With handrails (grab bars by toilet)  Device: Walker (RW)  Bed To/From Chair  Technique:  (RW)  Assistance Level: Modified independent  Skilled Clinical Factors: >< recliner, >< straight chair at sink w/ RW modified indep         Assessment  Today pt completed sponge bath ADL session & hair washing w/ shampoo cap mod indep(per notes not allowed to shower untill follow up visit to surgeon). Pt dressed modified indep using reacher and sock aid. Pt toileted indep to void seated. He performed ADL transfers modified indep w/ RW and ADL mobility supervision to modified indep with RW. Some impulsivity increases fall risk. Plan to cont tx per POC. Likely D/C home tomorrow with son & home OT. Team conference for D/C planning being held currently. Activity Tolerance: Patient tolerated treatment well  Discharge Recommendations: Home with Home health OT;S Level 1;Home with assist PRN  Factors Affecting Discharge: lives with son whom plans to assist with [facility]  OT Equipment Recommendations  Equipment Needed: Yes  Other: Recommend TSF, reacher, Sock aid, walker basket vs tray(has shower chair, grab bars in tub-shower & hand held shower)  Safety Devices  Safety Devices in place: Yes  Type of devices: Call light within reach; Chair alarm in place; Left in chair;Gait belt;Left in bed    Patient Education  Education  Education Given To: Patient  Education Provided: ADL Function;Equipment;Plan of Care;IADL Function;Transfer Training; Safety; Mobility Training; Fall Prevention Strategies  Education Provided Comments: Home AE/DME discussed, progress made  Education Method: Demonstration;Verbal;Teach Back  Barriers to Learning: None  Education Outcome: Verbalized understanding;Continued education needed     Second Session:     Pt met in dept agreeable to tx. Rated his pain 8/10 in LEs. He reported due for meds at 3:00. He state he is fatigued today, wants a nap. IADL: Pt  Ed in walker safety techniques at start of task.  Pt amb w/RW w/  fold away tray for carrying around kitchen to retrieve items from upper/lower cupboard, drawer, refrigerator standing for 12.5 minutes for simple cooking task modified indep. Pt served bowl to table, then sat in straight chair to rest.  Pt carried dishes to sink on tray, washed dishes from RW standing another 5 minutes. Pt transferred with RW >< w/c, &  kitchen chair at table supervision/mod indep. UBE arm bike x 4.5 minutes seated at table to increase UE strength/activity tolerance for ADL/IADL mob tasks. PM Assessment: Pt tolerated session well but continues to be limited by pain, and did get fatigued with standing cooking tasks. Pt continues to be below his baseline of indep. He reported he does not want another ADL session tomorrow, so did not schedule that way. Plan to cont x per POC. Safety Device - Type of devices: Pt left with volunteer transporter to go back to room. []  All fall risk precautions in place [] Bed alarm in place  [] Call light within reach [] Chair alarm in place [] Positioning belt [x] Gait belt [] Patient at risk for falls [] Left in bed [x] Left in chair [] Telesitter in use [] Sitter present [] Nurse notified []  None        Plan  Plan  Times per Week: 5-6x  Times per Day: Twice a day  Plan Weeks: 1-1.5 weeks [facility] hospital 7/8[facility]22  Current Treatment Recommendations: Strengthening;Balance training;Functional mobility training; Endurance training;Self-Care / ADL; Safety education & training;Gait training;Patient/Caregiver education & training;Equipment evaluation, education, & procurement;Home management training    Goals  Patient Goals   Patient goals : Pt stated his goal is to be able to do steps, get his pain level half of what it is now, be able to get back to cleaning the house indep, and walk w/o a walker eventualy. Above goals will address pt's goal.  Short Term Goals  Time Frame for Short term goals: 1-1.5 weeks from [facility] 7/8/22  Short Term Goal 1: Pt will bathe modified indep. with AE/DME as needed.   MET for sponge bath, not allowed to shower at this time  Short Term Goal 2: Pt will dress modified indep. w/ AE as needed. MET  Short Term Goal 3: Pt will toilet modified indep. w/ grab bar/TSF. MET  Short Term Goal 4: Pt will perform functional ADL transfers modified indep. w/ RW. MET  Short Term Goal 5: Pt will perform ther ex/HEP to increase activity tolerance to stand for 10 minutes, & be ed in walker safety to perform ADL/simple IADL tasks modified indep with RW.   Long Term Goals  Time Frame for Long term goals : same as LTG                 Therapy Time   Individual Concurrent Group Co-treatment   Time In 1045         Time Out 1145         Minutes 60         Timed Code Treatment Minutes: 60 Minutes     Therapy Time     Individual Co-treatment   Time In 229 [facility]     Time Out 1435     Minutes 600 [facility] Micky Puga OT   Electronically signed by ALONZO Ram/L  License # [id number]

## 2022-07-12 NOTE — DISCHARGE INSTR - COC
Continuity of Care Form    Patient Name: Celina Haro   :  1950  MRN:  [id number]    [facility] date:  2022  Discharge date:      Code Status Order: Full Code   Advance Directives:      Admitting Physician:  Kelly Prasad MD  PCP: Fritz Zavala    Discharging Nurse: [facility] Unit/Room#: M6B-9622/8667-66  Discharging Unit Phone Number: [phone]    Emergency Contact:   Extended Emergency Contact Information  Primary Emergency Contact: [address] Phone: [phone]  Work Phone: [phone]  Relation: Child    Past Surgical History:  Past Surgical History:   Procedure Laterality Date    CARDIAC CATHETERIZATION      FEMORAL BYPASS Right 2022    RIGHT FEMORAL POPLITEAL BYPASS performed by Nicolas Navarrete DO at [facility] [address] Right 2022    HIP PINNING performed by Ruby Aguayo MD at [facility] [place] [address]       Immunization History:   Immunization History   Administered Date(s) Administered    COVID-19, PFIZER GRAY top, DO NOT Dilute, (age 15 y+), IM, 30 mcg/0.3 mL 2022    COVID-19, PFIZER PURPLE top, DILUTE for use, (age 15 y+), 30mcg/0.3mL 2022       Active Problems:  Patient Active Problem List   Diagnosis Code    Arthritis of left knee M17.12    Basal cell carcinoma (BCC) of skin of nose C44.311    Peripheral arterial disease (HCC) I73.9    Closed right hip fracture (HCC) S72.001A    Numbness of right foot R20.0    Hip fracture, right, closed, initial encounter ([facility] [address] S72.001A    Closed subcapital fracture of femur, right, initial encounter ([facility] [address] S72.011A    Critical lower limb ischemia ([facility] [address] I70.229       Isolation/Infection:   Isolation            No Isolation          Patient Infection Status       None to display            Nurse Assessment:  Last Vital Signs: /72   Pulse 78   Temp 98.4 °F (36.9 °C) (Oral)   Resp 16   Ht 5' 8\" (1.727 m)   Wt 143 lb 1.3 oz (64.9 kg)   SpO2 97%   BMI 21.76 kg/m²     Last documented pain score (0-10 scale): Pain Level: 8  Last Weight:   Wt Readings from Last 1 Encounters:   07/12/22 143 lb 1.3 oz (64.9 kg)     Mental Status:  oriented and alert    IV Access:  - None    Nursing Mobility/ADLs:  Walking   Independent  Transfer  Independent  Bathing  Independent  Dressing  Independent  Toileting  Independent  Feeding  Independent  Med Admin  Independent  Med Delivery   whole    Wound Care Documentation and Therapy:  Incision 04/24/22 Thigh Anterior;Right (Active)   Number of days: 78       Incision 07/05/22 Pelvis Anterior;Right (Active)   Dressing Status Clean;Dry; Intact 07/11/22 2014   Dressing/Treatment Xeroform;Gauze dressing/dressing sponge;Roll gauze; Ace wrap 07/09/22 2018   Incision Assessment Other (Comment) 07/09/22 2018   Drainage Amount None 07/11/22 2014   Drainage Description Sanguinous 07/07/22 0758   Odor None 07/09/22 2018   Olivia-incision Assessment Intact; Warm 07/07/22 0758   Number of days: 6       Incision 07/05/22 Femoral Anterior;Left;Proximal (Active)   Dressing Status Clean;Dry; Intact 07/11/22 2014   Incision Cleansed Cleansed with saline 07/10/22 0933   Dressing/Treatment Gauze dressing/dressing sponge 07/10/22 0933   Closure Other (Comment) 07/10/22 0933   Incision Assessment Dry 07/07/22 0758   Drainage Amount Other (Comment) 07/10/22 0933   Drainage Description Sanguinous 07/10/22 0933   Odor None 07/11/22 2014   Olivia-incision Assessment Other (Comment) 07/09/22 2018   Number of days: 6        Elimination:  Continence: Bowel: Yes  Bladder: Yes  Urinary Catheter: None   Colostomy/Ileostomy/Ileal Conduit: No       Date of Last BM: 7/11    Intake/Output Summary (Last 24 hours) at 7/12/2022 0732  Last data filed at 7/12/2022 0631  Gross per 24 hour   Intake 580 ml   Output 850 ml   Net -270 ml     I/O last 3 completed shifts: In: 680 [P.O.:680]  Out: 1100 [Urine:1100]    Safety Concerns:      At Risk for Falls    Impairments/Disabilities:      None    Nutrition Therapy:  Current Nutrition Therapy:   - Oral Diet:  General    Routes of Feeding: Oral  Liquids: Thin Liquids  Daily Fluid Restriction: no  Last Modified Barium Swallow with Video (Video Swallowing Test): not done    Treatments at the Time of Hospital Discharge:   Respiratory Treatments:   Oxygen Therapy:  is not on home oxygen therapy.   Ventilator:    - No ventilator support    Rehab Therapies: Physical Therapy and Occupational Therapy  Weight Bearing Status/Restrictions: No weight bearing restrictions  Other Medical Equipment (for information only, NOT a DME order):  walker  Other Treatments: ***    Patient's personal belongings (please select all that are sent with patient):  {[facility] DME Belongings:[id number]}    RN SIGNATURE:  Electronically signed by Chase Bloch, RN on 7/13/22 at 7:06 PM EDT    CASE MANAGEMENT/SOCIAL WORK SECTION    Inpatient Status Date: 7-7-2022    Readmission Risk Assessment Score:  Readmission Risk              Risk of Unplanned Readmission:  9       Discharging to Facility/ Agency   Name: [facility]  Address:   [address] [place] [address]  Phone:  [phone]  Fax:  [phone]      / signature: Issac Espana     Case Management   [phone]    7/12/2022  3:45 PM      PHYSICIAN SECTION    Prognosis: Good    Condition at Discharge: Stable    Rehab Potential (if transferring to Rehab): Good    Recommended Labs or Other Treatments After Discharge:   Home care PT, OT, RN  Wound Care:  -Dressings to be changed 3 times per week - MWF  -Remove old dressings  -Wrap bilateral legs with Kerlix over incisions  -Wrap bilateral legs from toes to thighs with ACE wraps  -Pt to follow up with Dr. Cassandra Gaines the week of July 25th for staple removal and further wound care instructions    Physician Certification: I certify the above information and transfer of Mercedes Singh  is necessary for the continuing treatment of the diagnosis listed and that he requires Skilled Nursing Facility for less 30 days.      Update Admission H&P: No change in H&P    PHYSICIAN SIGNATURE:  Electronically signed by Meri Mattson MD on 7/12/22 at 12:10 PM EDT

## 2022-07-12 NOTE — PLAN OF CARE
Problem: Discharge Planning  Goal: Discharge to home or other facility with appropriate resources  Outcome: Progressing  Flowsheets (Taken 7/12/2022 0856)  Discharge to home or other facility with appropriate resources:   Identify barriers to discharge with patient and caregiver   Arrange for needed discharge resources and transportation as appropriate     Problem: Skin/Tissue Integrity  Goal: Absence of new skin breakdown  Description: 1. Monitor for areas of redness and/or skin breakdown  2. Assess vascular access sites hourly  3. Every 4-6 hours minimum:  Change oxygen saturation probe site  4. Every 4-6 hours:  If on nasal continuous positive airway pressure, respiratory therapy assess nares and determine need for appliance change or resting period.   Outcome: Progressing     Problem: Safety - Adult  Goal: Free from fall injury  Outcome: Progressing  Flowsheets (Taken 7/12/2022 0906)  Free From Fall Injury:   Instruct family/caregiver on patient safety   Based on caregiver fall risk screen, instruct family/caregiver to ask for assistance with transferring infant if caregiver noted to have fall risk factors     Problem: Pain  Goal: Verbalizes/displays adequate comfort level or baseline comfort level  Outcome: Progressing     Problem: ABCDS Injury Assessment  Goal: Absence of physical injury  Outcome: Progressing  Flowsheets (Taken 7/12/2022 0906)  Absence of Physical Injury: Implement safety measures based on patient assessment

## 2022-07-12 NOTE — CARE COORDINATION
SOCIAL WORK DISCHARGE SUMMARY        DATE OF DISCHARGE:    Wed 7-      LOCATION:    Home      Discharging to Facility/ Agency   · Name: [facility]  · Address:   [address] [place] [address]. [address]  · Phone:  [phone]  · Fax:  [phone]         TIME:        5-6pm        PHARMACY:            [facility]        DME:                [facility]     Case Management   [phone]    7/12/2022  3:54 PM

## 2022-07-12 NOTE — CARE COORDINATION
07/12/22 1545   IMM Letter   IMM Letter given to Patient/Family/Significant other/Guardian/POA/by: Second IMM letter presented to pateint by SARAH Saenz   IMM Letter date given: 07/12/22   IMM Letter time given: 4610

## 2022-07-13 VITALS
DIASTOLIC BLOOD PRESSURE: 70 MMHG | RESPIRATION RATE: 16 BRPM | WEIGHT: 143.74 LBS | SYSTOLIC BLOOD PRESSURE: 135 MMHG | HEIGHT: 68 IN | HEART RATE: 84 BPM | BODY MASS INDEX: 21.78 KG/M2 | OXYGEN SATURATION: 100 % | TEMPERATURE: 98 F

## 2022-07-13 PROCEDURE — 97535 SELF CARE MNGMENT TRAINING: CPT

## 2022-07-13 PROCEDURE — 97110 THERAPEUTIC EXERCISES: CPT | Performed by: PHYSICAL THERAPIST

## 2022-07-13 PROCEDURE — 6360000002 HC RX W HCPCS: Performed by: PHYSICAL MEDICINE & REHABILITATION

## 2022-07-13 PROCEDURE — 97116 GAIT TRAINING THERAPY: CPT | Performed by: PHYSICAL THERAPIST

## 2022-07-13 PROCEDURE — 97530 THERAPEUTIC ACTIVITIES: CPT

## 2022-07-13 PROCEDURE — 6370000000 HC RX 637 (ALT 250 FOR IP): Performed by: PHYSICAL MEDICINE & REHABILITATION

## 2022-07-13 PROCEDURE — 97530 THERAPEUTIC ACTIVITIES: CPT | Performed by: PHYSICAL THERAPIST

## 2022-07-13 PROCEDURE — 97110 THERAPEUTIC EXERCISES: CPT

## 2022-07-13 PROCEDURE — 94760 N-INVAS EAR/PLS OXIMETRY 1: CPT

## 2022-07-13 RX ADMIN — OXYCODONE HYDROCHLORIDE 10 MG: 10 TABLET ORAL at 16:12

## 2022-07-13 RX ADMIN — OXYCODONE HYDROCHLORIDE 10 MG: 10 TABLET ORAL at 10:16

## 2022-07-13 RX ADMIN — OXYCODONE HYDROCHLORIDE 10 MG: 10 TABLET ORAL at 00:30

## 2022-07-13 RX ADMIN — CLOPIDOGREL BISULFATE 75 MG: 75 TABLET ORAL at 07:34

## 2022-07-13 RX ADMIN — OXYCODONE HYDROCHLORIDE 10 MG: 10 TABLET ORAL at 07:13

## 2022-07-13 RX ADMIN — ACETAMINOPHEN 1000 MG: 500 TABLET ORAL at 06:07

## 2022-07-13 RX ADMIN — ACETAMINOPHEN 1000 MG: 500 TABLET ORAL at 13:13

## 2022-07-13 RX ADMIN — OXYCODONE HYDROCHLORIDE 10 MG: 10 TABLET ORAL at 13:13

## 2022-07-13 RX ADMIN — ASPIRIN 81 MG: 81 TABLET, COATED ORAL at 07:34

## 2022-07-13 RX ADMIN — SENNOSIDES AND DOCUSATE SODIUM 1 TABLET: 50; 8.6 TABLET ORAL at 07:37

## 2022-07-13 RX ADMIN — DIAZEPAM 10 MG: 5 TABLET ORAL at 19:26

## 2022-07-13 RX ADMIN — OXYCODONE HYDROCHLORIDE 10 MG: 10 TABLET ORAL at 19:26

## 2022-07-13 RX ADMIN — OXYCODONE HYDROCHLORIDE 10 MG: 10 TABLET ORAL at 03:57

## 2022-07-13 RX ADMIN — ENOXAPARIN SODIUM 40 MG: 100 INJECTION SUBCUTANEOUS at 07:34

## 2022-07-13 ASSESSMENT — PAIN DESCRIPTION - DESCRIPTORS
DESCRIPTORS: ACHING
DESCRIPTORS: SORE
DESCRIPTORS: STABBING;SHARP
DESCRIPTORS: DISCOMFORT
DESCRIPTORS: DISCOMFORT
DESCRIPTORS: ACHING;SHARP

## 2022-07-13 ASSESSMENT — PAIN DESCRIPTION - ORIENTATION
ORIENTATION: RIGHT
ORIENTATION: RIGHT;LEFT
ORIENTATION: RIGHT;LEFT
ORIENTATION: RIGHT

## 2022-07-13 ASSESSMENT — PAIN SCALES - GENERAL
PAINLEVEL_OUTOF10: 0
PAINLEVEL_OUTOF10: 0
PAINLEVEL_OUTOF10: 10
PAINLEVEL_OUTOF10: 9
PAINLEVEL_OUTOF10: 10
PAINLEVEL_OUTOF10: 10
PAINLEVEL_OUTOF10: 0
PAINLEVEL_OUTOF10: 9
PAINLEVEL_OUTOF10: 10
PAINLEVEL_OUTOF10: 9
PAINLEVEL_OUTOF10: 10
PAINLEVEL_OUTOF10: 9

## 2022-07-13 ASSESSMENT — PAIN - FUNCTIONAL ASSESSMENT
PAIN_FUNCTIONAL_ASSESSMENT: ACTIVITIES ARE NOT PREVENTED

## 2022-07-13 ASSESSMENT — PAIN DESCRIPTION - ONSET
ONSET: ON-GOING
ONSET: GRADUAL
ONSET: ON-GOING

## 2022-07-13 ASSESSMENT — PAIN DESCRIPTION - LOCATION
LOCATION: LEG;HIP;FOOT
LOCATION: LEG
LOCATION: GROIN;LEG
LOCATION: LEG
LOCATION: LEG

## 2022-07-13 ASSESSMENT — PAIN DESCRIPTION - FREQUENCY
FREQUENCY: CONTINUOUS

## 2022-07-13 ASSESSMENT — PAIN DESCRIPTION - PAIN TYPE
TYPE: SURGICAL PAIN

## 2022-07-13 NOTE — PLAN OF CARE
Problem: Discharge Planning  Goal: Discharge to home or other facility with appropriate resources  7/13/2022 1244 by Abhay Mirza RN  Outcome: Progressing  Flowsheets (Taken 7/13/2022 0958)  Discharge to home or other facility with appropriate resources: Identify barriers to discharge with patient and caregiver  7/13/2022 0231 by Yanira Felder RN  Outcome: Progressing     Problem: Skin/Tissue Integrity  Goal: Absence of new skin breakdown  Description: 1. Monitor for areas of redness and/or skin breakdown  2. Assess vascular access sites hourly  3. Every 4-6 hours minimum:  Change oxygen saturation probe site  4. Every 4-6 hours:  If on nasal continuous positive airway pressure, respiratory therapy assess nares and determine need for appliance change or resting period. 7/13/2022 1244 by Abhay Mirza RN  Outcome: Progressing  7/13/2022 0231 by Yanira Felder RN  Outcome: Progressing  Note: Able to change positions in bed without assist, no evidence of skin breakdown noted. Waffle cushion in place to chair. Problem: Safety - Adult  Goal: Free from fall injury  7/13/2022 1244 by Abhay Mirza RN  Outcome: Progressing  7/13/2022 0231 by Yanira Felder RN  Outcome: Progressing  Flowsheets (Taken 7/13/2022 0231)  Free From Fall Injury:   Instruct family/caregiver on patient safety   Based on caregiver fall risk screen, instruct family/caregiver to ask for assistance with transferring infant if caregiver noted to have fall risk factors  Note: Pt educated on falls precautions and safety. Call light and personal belongings within reach at all times. Non skid socks in use when up. Hourly rounding and alarms active.        Problem: Pain  Goal: Verbalizes/displays adequate comfort level or baseline comfort level  7/13/2022 0231 by Yanira Felder RN  Outcome: Progressing  Flowsheets (Taken 7/13/2022 0231)  Verbalizes/displays adequate comfort level or baseline comfort level:   Assess pain using appropriate pain scale   Encourage patient to monitor pain and request assistance   Administer analgesics based on type and severity of pain and evaluate response   Implement non-pharmacological measures as appropriate and evaluate response   Consider cultural and social influences on pain and pain management  Note: Able to rate pain using a 1-10 scale, medicated per prn orders, see MAR.  Able to verbalize a reduction in pain and/or able to fall asleep and remain asleep without any s/s of pain       Problem: ABCDS Injury Assessment  Goal: Absence of physical injury  7/13/2022 0231 by Welton Burkitt, RN  Outcome: Progressing

## 2022-07-13 NOTE — PROGRESS NOTES
Patient has been discharged per MD orders. Patient verbalizes understanding of all instructions, medications, and follow up. IV has been removed, catheter intact, patient tolerated well. All belongings have been gathered and packed. Family in route to transport patient from hospital. Transport has been notified of discharge.

## 2022-07-13 NOTE — CARE COORDINATION
Spoke with bedside RN about dc for today. Referral made to [facility] for wh walker.   [place], [place]     Case Management   [phone]    7/13/2022  11:50 AM

## 2022-07-13 NOTE — PLAN OF CARE
Problem: Skin/Tissue Integrity  Goal: Absence of new skin breakdown  Description: 1. Monitor for areas of redness and/or skin breakdown  2. Assess vascular access sites hourly  3. Every 4-6 hours minimum:  Change oxygen saturation probe site  4. Every 4-6 hours:  If on nasal continuous positive airway pressure, respiratory therapy assess nares and determine need for appliance change or resting period. 7/13/2022 0231 by Abril Arambula RN  Outcome: Progressing  Note: Able to change positions in bed without assist, no evidence of skin breakdown noted. Waffle cushion in place to chair. Problem: Safety - Adult  Goal: Free from fall injury  7/13/2022 0231 by Abril Arambula RN  Outcome: Progressing  Flowsheets (Taken 7/13/2022 0231)  Free From Fall Injury:   Instruct family/caregiver on patient safety   Based on caregiver fall risk screen, instruct family/caregiver to ask for assistance with transferring infant if caregiver noted to have fall risk factors  Note: Pt educated on falls precautions and safety. Call light and personal belongings within reach at all times. Non skid socks in use when up. Hourly rounding and alarms active. Problem: Pain  Goal: Verbalizes/displays adequate comfort level or baseline comfort level  7/13/2022 0231 by Abril Arambula RN  Outcome: Progressing  Flowsheets (Taken 7/13/2022 0231)  Verbalizes/displays adequate comfort level or baseline comfort level:   Assess pain using appropriate pain scale   Encourage patient to monitor pain and request assistance   Administer analgesics based on type and severity of pain and evaluate response   Implement non-pharmacological measures as appropriate and evaluate response   Consider cultural and social influences on pain and pain management  Note: Able to rate pain using a 1-10 scale, medicated per prn orders, see MAR.  Able to verbalize a reduction in pain and/or able to fall asleep and remain asleep without any s/s of pain

## 2022-07-13 NOTE — PROGRESS NOTES
Physical Therapy  Facility/Department: [address] [facility]AB  Rehabilitation Physical Therapy Treatment Note AM and PM  Discharge Summary    NAME: Ki Meyer  : 1950 (70 y.o.)  MRN: [id number]  CODE STATUS: Full Code    Date of Service: 22       Restrictions:  Restrictions/Precautions: Fall Risk  Position Activity Restriction  Other position/activity restrictions: BLE incisions from groin> ankle(ace wrapped). SUBJECTIVE  Subjective  Subjective: Pt pleasant and agreeable to PT treatment. Reports he just had pain med. Patient continues to c/o about clothes and wheeled walker lost.  Pain: Reports 10/10 pain in RLE and R hip        OBJECTIVE  Cognition  Overall Cognitive Status: WFL (WFL for ADL tasks,  decreased divided attn D/T distractable)  Attention Span: Difficulty dividing attention  Memory:  (slight decreased memory, did not recall working with this therapist)  Safety Judgement: Decreased awareness of need for safety  Cognition Comment: easily distracted/very talkative  Orientation  Overall Orientation Status: Within Functional Limits (1415)  Orientation Level: Oriented X4    Functional Mobility  Bed Mobility  Overall Assistance Level: Independent  Bridging  Assistance Level: Independent  Roll Left  Assistance Level: Independent  Roll Right  Assistance Level: Independent  Sit to Supine  Assistance Level: Independent  Supine to Sit  Assistance Level: Independent  Scooting  Assistance Level: Independent  Balance  Sitting Balance: Independent  Standing Balance: Modified independent   Transfers  Surface: To chair with arms;From chair with arms  Additional Factors: Verbal cues; Hand placement cues  Device: Walker  Sit to Stand  Assistance Level: Modified independent  Stand to Sit  Assistance Level: Modified independent  Skilled Clinical Factors: cues to square up to chair prior to sit  Bed To/From Chair  Assistance Level: Modified independent  Skilled Clinical Factors: safety cues , occasionally leaves wheeled walker  Car Transfer  Skilled Clinical Factors: OT to perform during session      Environmental Mobility  Ambulation  Surface: Level surface; Carpet  Device: Rolling walker  Distance: 220' x 2 with multiple turns; short distances in therapy gym  Activity: Within Unit  Activity Comments: distance took 4 min 40 sec  Additional Factors: Increased time to complete  Assistance Level: Modified independent  Gait Deviations: Decreased step length right;Slow carly  Skilled Clinical Factors: continues with slow pace but no LOB, decreased heel strike RLE, often takes hands off the walker to gesture  Stairs  Stair Height: 6''  Device: Bilateral handrails  Number of Stairs: 12  Additional Factors: Non-reciprocal going up;Non-reciprocal going down  Assistance Level: Modified independent  Skilled Clinical Factors: lead with L LE, descend with R LE  Curb  Curb Height: 6''  Device: Rolling walker  Assistance Level: Modified independent  Skilled Clinical Factors: able to sequence properly    PT Exercises  A/AROM Exercises: Patient performed sitting HEP- given written HEP- marches with assist R LE, AP x 20, LAQ x 15 B LE, Supine exercises QS x 15, hip and knee flexion , AP x 20      ASSESSMENT/PROGRESS TOWARDS GOALS       Assessment  Assessment: Patient  is a 71 y/o male admit 7/5/2022 with Critical Limb Ischemia R LE.  7/5/2022 S/P R Fem-Pop Bypass. PMH as noted including PAD, CAD, R Femur Fx/Pinning, stent placement on 6/15  [place] and 2nd floor bed/bath; independent daily care and functional mobility. Today, 7/13, patient has met all LTG. Pt mod I for transfers and MI ambulating community distances with RW and required extra time. Patient with improved speed but slow due to pain. Patient with independent with bed mobility  Patient would benefit from inpatient ARU to improve overall functional mobility  to independent so can return home with PRN assist of son. Patient's son does work full time. Anticipate D/C, today after therapy with home PT. Activity Tolerance: Patient tolerated treatment well  Discharge Recommendations: Home with assist PRN;Home with Home health PT;Patient would benefit from continued therapy after discharge  PT Equipment Recommendations  Equipment Needed: Yes  Walker: Rolling    Goals  Patient Goals   Patient goals : Return home with son with or without walker  Short Term Goals  Time Frame for Short term goals: 8-7days  Short term goal 1: Bed Mob independent- met 7/13  Short term goal 2: Transfers with assist device MI- met 7/13  Short term goal 3: Amb with assist device 150' MI- met 7/13  Short term goal 4: Perform  12 steps with MI- met 7/13  Short term goal 5: perform curb step with MI with wheeled walker- met 7/13  Long Term Goals  Time Frame for Long term goals : STG= LTG    PLAN OF CARE/SAFETY  Plan  Plan:  minutes of therapy at least 5 out of 7 days a week  Plan weeks: 1 week to 10 days, most likely 1 week  Specific Instructions for Next Treatment: decreased functional mobility  Current Treatment Recommendations: Strengthening; Functional mobility training;Transfer training;Gait training;Stair training; Safety education & training;Patient/Caregiver education & training;Balance training; Endurance training;Neuromuscular re-education;Pain management;Home exercise program;Equipment evaluation, education, & procurement;ADL/Self-care training;ROM  Plan Comment: D/C Wednesday 7/13 is patient's fifth day of therapy  Safety Devices  Type of Devices: Gait belt; All fall risk precautions in place; Left in chair;Bed alarm in place;Call light within reach; Left in bed  Restraints  Restraints Initially in Place: No    EDUCATION  Education  Education Given To: Patient  Education Provided: Role of Therapy; Mobility Training;Transfer Training;Precautions; Safety; Energy Conservation;Home Exercise Program;Equipment;DME/Home Modifications  Education Provided Comments: importance of mobility supine HEP  Education Method: Verbal  Barriers to Learning: None  Education Outcome: Verbalized understanding;Demonstrated understanding;Continued education needed  Second Session  S/ Patient awaiting pain medication, reports 11/10, nurse came in during exercises to administer pain medication. \" will take more pain medication at home because what they give me does not help. \"  O/ Patient reviewed and performed HEP- QS, GS, adductor sets x 15, AP x 20, hip and knee flex/ext x 10 , hip abduction x 10, SAQ x 20, SLR x 3 with assist R LE  Semi reclined to sit with MI, assured fit of wheeled walker and placed gliders on wheeled walker for patient instead of rubber stoppers. Patient ambulated from room with wheeled walker 140' with several turns with MI  [facility] , 366 steps x 10 min  Patient [facility] with MI with wheeled walker  Patient to room via transport. Second assessment - Patient able to perform mobility with MI despite 11/10 pain level but slow. Patient given written HEP and did well with exercises and is aware to perform within pain tolerance.   Therapy Time   Individual Concurrent Group Co-treatment   Time In 0945         Time Out 1030         Minutes 45           Timed Code Treatment Minutes: [address]   Time In 1300     Time Out Bartolome 46, PT, 07/13/22 at 11:58 AM

## 2022-07-13 NOTE — PROGRESS NOTES
Occupational Therapy  Facility/Department: [facility]  Rehabilitation Occupational Therapy Daily Treatment Note  1st & 2nd Sessions  Discharge Summary    Date: 22  Patient Name: Celina Haro       Room: T8W-9450/5756-20  MRN: [id number]  Account: [de-identified]   : 1950  (75 y.o.) Gender: male                    Past Medical History:  has a past medical history of Anxiety, Basal cell carcinoma, CAD (coronary artery disease), Peripheral artery disease ([facility] [address] and Presacral mass. Past Surgical History:   has a past surgical history that includes hip surgery (Right, 2022); Cardiac catheterization (); and femoral bypass (Right, 2022). Restrictions  Restrictions/Precautions: Fall Risk  Other position/activity restrictions: BLE incisions from groin> ankle(ace wrapped). Subjective  Subjective: Pt met in room, in bed agreeable to sponge bath ADL session. Pt rated his leg pain 9/10-10/10. Had pain meds already. Restrictions/Precautions: Fall Risk             Objective     Cognition  Overall Cognitive Status: WFL (WFL for ADL tasks,  decreased divided attn D/T distractable)  Attention Span: Difficulty dividing attention  Memory:  (slight decreased memory, did not recall working with this therapist)  Safety Judgement: Decreased awareness of need for safety  Cognition Comment: easily distracted/very talkative  Orientation  Overall Orientation Status: Within Functional Limits (1415)  Orientation Level: Oriented X4         ADL  Grooming/Oral Hygiene  Assistance Level: Independent  Skilled Clinical Factors: Pt stood at sink to brush teeth indep. Seated to use shampoo seated then combed hair. Upper Extremity Bathing  Assistance Level:  Independent  Skilled Clinical Factors: sponge bathed UB seated at sink  Lower Extremity Bathing  Assistance Level: Modified independent  Skilled Clinical Factors: Pt ed in use of long handled sponge to bathe toes(only part exposed on LEs D/T ace wraps), stood at sink to bathe per-anal area modified indep  Upper Extremity Dressing  Assistance Level: Modified independent  Skilled Clinical Factors: own  set-up from RW to retrieve/carried over RW, doffed/donned indep  Lower Extremity Dressing  Equipment Provided: Reachers  Assistance Level: Modified independent  Skilled Clinical Factors: Pt doffed/donned pull ups/pants w/ reacher modified indep. OT ed pt to start w/ RLE 1st.  Putting On/Taking Off Footwear  Equipment Provided: Sock aid  Skilled Clinical Factors: Pt doffed footies w/ reacher and donned R footie w/ sock aid, did not need sock aid for L footie, performed modified independent. Toileting  Assistance Level:  (declined need, modified indep yesterday)  Toilet Transfers  Equipment: Grab bars  Additional Factors: With handrails  Assistance Level: Modified independent  Skilled Clinical Factors: RW >< toilet with bilateral grab bars modified indep  Tub/Shower Transfers  Type: Shower (simulated tub in DRY shower stall D/T has grab bars & able to simulate his set-up better)  Transfer From: Rolling walker  Transfer To: Shower chair with back  Additional Factors: With handrails  Assistance Level: Supervision;Modified independent  Skilled Clinical Factors: Pt performed DRY tub shower transfer to/from shower chair with back in stall. Pt able to pick feet up high enough to clear tub w/ use of 2 grab bars as has at home(per self report). Pt reports son is always present and available to assist with tub transfers at home. Functional Mobility  Device: Rolling walker  Activity: Retrieve items;Transport items  Assistance Level: Modified independent  Skilled Clinical Factors: RW from bed to retrieve shirt >< straight chair at sink>w/c all modified indep. with safety concerns. Scooting  Assistance Level: Independent  Transfers  Surface: Wheelchair; To chair without arms;From chair without arms  Device: Walker (RW)  Sit to Stand  Assistance Level:  Independent  Skilled Clinical Factors: >< chair at sink for ADL tasks  Stand to Sit  Assistance Level: Independent  Skilled Clinical Factors: >< chair at sink for ADL tasks         Assessment  Assessment  Assessment: Today pt completed sponge bath ADL session & hair washing w/ shampoo cap modified indep(per notes not allowed to shower untill follow up visit to surgeon). Pt dressed modified indep using reacher and sock aid. He performed ADL transfers modified indep w/ RW and ADL mobility modified indep. with RW, some safety concerns; impulsivity increases fall risk. D/C home with son today after therapy & home OT. Activity Tolerance: Patient tolerated treatment well  Discharge Recommendations: Home with Home health OT;S Level 1;Home with assist PRN  Factors Affecting Discharge: lives with son whom plans to assist with IADLtasks, but pt will be home alone during the day while son working  OT Equipment Recommendations  Other: Recommend TSF, reacher, sock aid, fold away walker tray(has shower chair, grab bars in tub-shower & hand held shower)  Son addressing DME/AE needs. Safety Devices  Safety Devices in place: Yes  Type of devices: Gait belt;Left in chair;Nurse notified;Call light within reach (to PT for next tx at end)    Patient Education  Education  Education Given To: Patient  Education Provided: ADL Function;Equipment;Plan of Care;IADL Function;Transfer Training; Safety; Mobility Training; Fall Prevention Strategies; Home Exercise Program  Education Provided Comments: progress made/goals MET, UE HEP w/ T-band, IADL safety  Education Method: Demonstration;Verbal;Teach Back  Barriers to Learning: None  Education Outcome: Verbalized understanding;Continued education needed     Second Session:     Pt met in room, in bed; rated his LE pain 9/10 but agreeable to tx. Pt reported he is tired but willing to work. Objective: Functional mob/transfers/IADL: Pt amb w/RW in ortho gym for car transfer, in day room, and his room all modified indep. eventualy. Above goals will address pt's goal.  Short Term Goals  Time Frame for Short term goals: 1-1.5 weeks from eval 7/8/22  Short Term Goal 1: Pt will bathe modified indep. with AE/DME as needed. MET for sponge bath, not allowed to shower at this time  Short Term Goal 2: Pt will dress modified indep. w/ AE as needed. MET  Short Term Goal 3: Pt will toilet modified indep. w/ grab bar/TSF. MET  Short Term Goal 4: Pt will perform functional ADL transfers modified indep. w/ RW. MET  Short Term Goal 5: Pt will perform ther ex/HEP to increase activity tolerance to stand for 10 minutes, & be ed in walker safety to perform ADL/simple IADL tasks modified indep with RW.  MET  Long Term Goals  Time Frame for Long term goals : same as LTG                 Therapy Time   Individual Concurrent Group Co-treatment   Time In 3680         Time Out 1120         Minutes 45         Timed Code Treatment Minutes: 45 Minutes   Therapy Time     Individual Co-treatment   Time In [address]     Time Out 1120     Minutes 1077 Madhu An OT   Electronically signed by Sobia Reeder OTR/L  License # [id number]

## 2022-07-13 NOTE — PROGRESS NOTES
Patient admitted to rehab with Critical lower limb ischemia ([facility] [address] . A/Ox4. Transfers with walker x 1. Mobility restrictions: WBAT. On regular diet, tolerating well. Medications taken whole with thins. On ASA, Lovenox, Plavix for DVT prophylaxis. Skin: surgical incisions BLE covered with petroleum gauze, 4x8 gauze, kerlix,ace wrap. Oxygen: RA. LDA:none . Has been continent of bowel and continent of bladder. LBM 7/12. Chair/bed alarms in use and call light in reach. Will monitor for safety.  Electronically signed by Jerzy Laura RN on 7/13/2022 at 3:16 PM

## 2022-07-13 NOTE — PROGRESS NOTES
Resting well in bed. Pt admitted s/p fem/pop bypass. Surgical incisions to bilateral legs with staples in place, ace wraps on. Pt is oriented x4, transfers with a walker x1. Lungs clear but diminished, HR regular. Abdomen non tender with active bowel sounds. Has been continent of urine. Pt c/o 9/10 BLE pain. Sets his alarm over night every 3 hours to awaken for pain medicine. Pt was sleeping quietly before his alarm went off. Encouraged pt to turn off alarms and only call for pain medication when needed. Pt with discharge planned for today, no questions or concerns voiced. All needs assessed with Q2H rounding, alarms active. Call light in reach at all times.  Nikki Heimlich, RN

## 2022-07-13 NOTE — DISCHARGE SUMMARY
Physical Medicine & Rehabilitation  Discharge Summary     Patient Identification:  Jada Grijalva  : 1950  Admit date: 2022  Discharge date:  2022  Attending provider: Vandana De Dios MD        Primary care provider: Drea Frost     Discharge Diagnoses:   Patient Active Problem List   Diagnosis    Arthritis of left knee    Basal cell carcinoma (BCC) of skin of nose    [facility]ipheral arterial disease [facility]    Closed right hip fracture (HCC)    Numbness of right foot    Hip fracture, right, closed, initial encounter ([facility]    Closed subcapital fracture of femur, right, initial encounter ([facility]    Critical lower limb ischemia ([address]       History of Present Illness/Acute Hospital Course:  Patient is a 71 yo M with pmh PAD s/p prior revascularzation RLE, CAD (s/p recent LAD stenting 6/15/22 with Dr. Vinita Lin), anxiety, and recent right hip subcapital femoral neck fracture (s/p hip pinning 22 with Dr. Gertrude Lewis) who initially presented 2022 with ongoing RLE ischemic rest pain. Underwent right femoral popliteal bypass ( with Dr. Norma Ace). Course notable for anemia. Now presents to ARU with impaired mobility and self-care below his baseline. Inpatient Rehabilitation Course:   Jada Grijalva is a 70 y.o. male admitted to inpatient rehabilitation on 2022 with Critical lower limb ischemia ([address] The patient participated in an aggressive multidisciplinary inpatient rehabilitation program involving 3 hours of therapy per day, at least 5 days per week. He made good progress with regard to functional mobility, balance, strength, endurance, compensatory strategies. Now overall  Modified independent. Discharging home with son. Home care services and DME ordered as below. Patient will follow-up with PCP, Vascular Surgery, Ortho.      Medical Management:    Critical limb ischemic RLE   -s/p right femoral popliteal bypass ( with Dr. Norma Ace)  -Wound care per Vascular, healing well with no evidence of infection. -ASA, Plavix  -PT/OT     Acute blood loss anemia   -Post-surgical.   -Hgb now stable >7     CAD   -s/p recent LAD stent (6/15 with Dr. Ruperto Malik)  -ASA, plavix     Recent right subcapital femoral neck fracture   -s/p hip pinning (4/24 with Dr. Logan Rout  -WBAT.   -PT/OT     Presacral mass   -stable on most recent imaging. Planning for outpatient MRI and f/u with Dr. Katey Tamayo     Anxiety  -Diazepam prn     Hypokalemia  -Replaced     Pain control  -PRN oxycodone     DVT ppx  -Lovenox    Discharge Exam:  Const: Alert. No distress, pleasant. HEENT: Normocephalic, atraumatic. Normal sclera/conjunctiva. MMM. CV: Regular rate and rhythm. Resp: No respiratory distress. Lungs CTAB. Abd: Soft, nontender, nondistended, NABS+   Ext: BLE in ace wraps, ROM improving. Right foot edema and relative warmth stable. Neuro: Alert, oriented, appropriately interactive. Psych: Cooperative, appropriate mood and affect    Discharge Functional Status:    Physical therapy:  Bed Mobility:  Overall Assistance Level: Independent  Sit>supine:  Assistance Level: Independent  Supine>sit:  Assistance Level: Independent  Transfers:  Surface:  To chair with arms,From chair with arms  Additional Factors: Verbal cues,Hand placement cues  Device: Walker  Sit>stand:  Assistance Level: Modified independent  Skilled Clinical Factors: patient able to stand with SBA  Stand>sit:  Assistance Level: Modified independent  Skilled Clinical Factors: cues to square up to chair prior to sit  Bed<>chair  Assistance Level: Stand by [employer]:     Lateral transfer:     Car transfer:     Ambulation:  Surface: Level surface,Carpet  Device: Rolling walker  Distance: 220' x 2 with multiple turns; short distances in therapy gym  Activity: Within Unit  Activity Comments: Baseline- 99% O2 sats with HR 89, after longest distance  bpm and O2 sats 99%  Additional Factors: Increased time to complete  Assistance Level: Supervision  Gait Deviations: Decreased step length right,Slow carly  Skilled Clinical Factors: continues with slow pace but no LOB, decreased heel strike RLE, often takes hands off the walker to gesture  Stairs:  Stair Height: 6''  Device: Bilateral handrails  Number of Stairs: 12  Additional Factors: Non-reciprocal going up,Non-reciprocal going down  Assistance Level: Supervision  Skilled Clinical Factors: lead with L LE, descend with R LE  Skilled Clinical Factors - Comments:  bpm after steps slight SOB  Curb:  Curb Height: 6''  Device: Rolling walker  Additional Factors: Set-up,Verbal cues  Assistance Level: Supervision  Skilled Clinical Factors: able to sequence properly  Wheelchair:     Assessment:  Assessment: Patient  is a 69 y/o male admit 7/5/2022 with Critical Limb Ischemia R LE.  7/5/2022 S/P R Fem-Pop Bypass. PMH as noted including PAD, CAD, R Femur Fx/Pinning, stent placement on 6/15  PTA pt living in 2 story townhouse with few steps to enter and 2nd floor bed/bath; independent daily care and functional mobility. Today, pt mod I for transfers and supervision ambulating community distances with RW and required extra time. Patient would benefit from inpatient ARU to improve overall functional mobility  to independent so can return home with PRN assist of son. Patient's son does work full time. Anticipate D/C Wednesday/Thursday of this week depending on medical status. Pt is eager for d/c home and hopeful for discharge tomorrow. Activity Tolerance: Patient tolerated treatment well  Discharge Recommendations: Home with assist PRN,Home with [address] would benefit from continued therapy after discharge      Occupational therapy:   Feeding  Assistance Level: Independent  Skilled Clinical Factors: OT reheated lunch tray @ end of session, he set-up. OT supplied ice for pitcher. Grooming/Oral Hygiene  Assistance Level:  Independent  Skilled Clinical Factors: Pt stood at sink to brush teeth indep. Seated to use shampoo seated then combed. UE Bathing  Assistance Level: Independent  Skilled Clinical Factors: sponge bathed UB seated at sink  LE Bathing  Assistance Level: Modified independent  Skilled Clinical Factors: Pt ed in use of long handled sponge to bathe toes(only part exposed on LEs D/T ace wraps), stood at sink to bathe per-anal area modified indep  UE Dressing  Assistance Level: Modified independent  Skilled Clinical Factors: own  set-up from RW to retrieve in closet/carried over RW, doffed/donned indep  LE Dressing  Equipment Provided: Reachers  Assistance Level: Modified independent  Skilled Clinical Factors: Pt doffed/donned pull ups/hospital pants w/ reacher modified indep. OT ed pt to start w/ RLE 1st.  Putting On/Taking Off Footwear  Equipment Provided: Sock aid  Assistance Level: Set-up,Verbal cues,Contact guard assist  Skilled Clinical Factors: Pt doffed footies w/ reacher and donned R footie w/ sock aid, did not need sock aid for L footie, performed modified independent. Toileting  Assistance Level: Modified independent  Skilled Clinical Factors: seated on toilet voided, (no hygiene)managed clothes modified indep. (He reported able to perform BM hygiene indep.)  Transfers: Toilet Transfers  Technique:  (amb w/ RW)  Equipment: Grab bars  Additional Factors: With handrails  Assistance Level: Modified independent  Skilled Clinical Factors: RW >< toilet with bilateral grab bars  Tub/Shower Transfers  Type: Tub (DRY tub)  Transfer To: Shower chair with back  Additional Factors: Cues for hand placement,Increased time to complete  Assistance Level: Minimal assistance  Skilled Clinical Factors: Pt performed DRY tub shower transfer to/from shower chair with back with min A for managing RLE in/out of tub d/t pain. Cues for hand placement on grab bar and slowing down for safety. Pt reports son is always present and available to assist with tub transfers at home.  Anticipate pt to continue to increase independence however may benefit from TTB vs shower chair with back. (TBD pending progress)  IADLs:  Meal Prep  Meal Prep Level: Walker  Meal Prep Level of Assistance: Stand by assistance  Meal Preparation: Pt prepared piece of toast in stance with RW and overall SBA. Pt retrieved toast and butter from refrigerater with SBA - cues for safe technique. Transported to [facility]- education provided for use of walker tray or basket especially during meal prep/transport to reduce risk of falls. Pt stated he preferred walker tray. Pt buttered toast in stance with SBA. Pt tolerated upright position for 5.5 mins with no LOB however pt required seated rest break while toast was in toaster. Money Management     [facility]     Assessment:  Assessment: pt making good gains w/ OT intervention. He was shown how to use A/E to manage doff/donning non skid socks, he is gaining flexibility to bend over to reach R foot however reported groin pain. Has \"constant pain\" R LE from groin<>foot & is upset w/ current pain medication regimen. Encouraged pt to commuincate w/ RN & MD. Pt talkative & easily distracted during activities but he is cooperative. OT highly encouraged him to use RW for fxl mobility due to mild limp thru R foot--shown how to use basket + reacher to grab items off floor.  He is eager to return home on Wed w/ [facility] services including OT & wound care  Activity Tolerance: Patient tolerated treatment well  Discharge Recommendations: Home with Home health OT,S Level 1,Home with assist PRN  Factors Affecting Discharge: lives with son whom plans to assist with IADLtasks    Speech therapy:              Significant Diagnostics:   Lab Results   Component Value Date    CREATININE 0.9 07/11/2022    BUN 14 07/11/2022     07/11/2022    K 3.4 (L) 07/11/2022     07/11/2022    CO2 29 07/11/2022       Lab Results   Component Value Date WBC 5.9 07/11/2022    HGB 7.4 (L) 07/11/2022    HCT 21.2 (L) 07/11/2022    MCV 85.1 07/11/2022     (H) 07/11/2022       Disposition:  Home with son  Services: HH PT, OT, RN  DME: Rolling walker, walker basket, TSF, reacher, sock aide    Discharge Condition: Stable    Follow-up:  See after visit summary from hospitalization    Discharge Medications:     Medication List      START taking these medications    oxyCODONE 5 MG immediate release tablet  Commonly known as: ROXICODONE  Take 1-2 tablets by mouth every 4 hours as needed for Pain for up to 7 days. CHANGE how you take these medications    clopidogrel 75 MG tablet  Commonly known as: Plavix  Take 1 tablet by mouth daily  What changed: additional instructions        CONTINUE taking these medications    aspirin EC 81 MG EC tablet  Take 1 tablet by mouth daily . diazePAM 10 MG tablet  Commonly known as: VALIUM        STOP taking these medications    hydrOXYzine pamoate 25 MG capsule  Commonly known as: VISTARIL     ibuprofen 200 MG tablet  Commonly known as: ADVIL;MOTRIN           Where to Get Your Medications      These medications were sent to [address] [place] [address] & [place] [address], [address] [place] [address]    Phone: [phone]   · aspirin EC 81 MG EC tablet  · clopidogrel 75 MG tablet  · oxyCODONE 5 MG immediate release tablet           I spent over 35 minutes on this discharge encounter between counseling, coordination of care, and medication reconciliation. To comply with [address] [facility] bylaw R.II.[id number]:   Discharge order placed in advance to facilitate patients discharge needs.       Vivek Gant MD

## 2022-07-14 ENCOUNTER — TELEPHONE (OUTPATIENT)
Dept: VASCULAR SURGERY | Age: 72
End: 2022-07-14

## 2022-07-14 NOTE — PROGRESS NOTES
Patient's son arrived. Discharge summary discussed. No questions. Patient discharged via wheelchair with walker.

## 2022-07-14 NOTE — TELEPHONE ENCOUNTER
Olvin Iyer, DO  You 14 minutes ago (10:13 AM)     SM    Aspirin and Brilinta and will not prescribe valium.

## 2022-07-14 NOTE — TELEPHONE ENCOUNTER
Pt contacted to confirm he stopped the Plavix and resumed Brilinta. He took Brilinta this morning and was given samples of 81 mg ASA while in rehab. Pt has difficulty sleeping and would like to know if you will prescribe 10 mg of Valium.     Shabana Rinconoc (corner of [employer] )

## 2022-07-15 ENCOUNTER — TELEPHONE (OUTPATIENT)
Dept: SURGERY | Age: 72
End: 2022-07-15

## 2022-07-15 NOTE — TELEPHONE ENCOUNTER
Returned pt's call. States his therapist had just left- Dr. Kim Fleming. Pt provided me with the number [phone]. When I called I was told I had the wrong number. Dr Jyoti Gamez will not prescribe Valium per previous message on 7-14-22.

## 2022-07-15 NOTE — TELEPHONE ENCOUNTER
PLEASE VERIFY THIS IS THE CORRECT MEDICATION FOR THIS PATIENT PT states that his home care nurse is there and would like to discuss with Teri Cortes why the PT was taken off of Valium. PT would like to have a return call as soon as possible.

## 2022-07-16 DIAGNOSIS — G89.18 POST-OP PAIN: Primary | ICD-10-CM

## 2022-07-16 RX ORDER — HYDROCODONE BITARTRATE AND ACETAMINOPHEN 7.5; 325 MG/1; MG/1
1 TABLET ORAL EVERY 6 HOURS PRN
Qty: 28 TABLET | Refills: 0 | Status: SHIPPED | OUTPATIENT
Start: 2022-07-17 | End: 2022-07-21 | Stop reason: ALTCHOICE

## 2022-07-16 NOTE — PROGRESS NOTES
Patient called complaining of sleep issues and pain, pain ill described but still appears to be incisional. Refill for narcotic cannot be filled until tomorrow.

## 2022-07-21 ENCOUNTER — OFFICE VISIT (OUTPATIENT)
Dept: VASCULAR SURGERY | Age: 72
End: 2022-07-21

## 2022-07-21 DIAGNOSIS — G89.18 POST-OP PAIN: ICD-10-CM

## 2022-07-21 DIAGNOSIS — Z09 POSTOP CHECK: Primary | ICD-10-CM

## 2022-07-21 PROCEDURE — 99024 POSTOP FOLLOW-UP VISIT: CPT | Performed by: STUDENT IN AN ORGANIZED HEALTH CARE EDUCATION/TRAINING PROGRAM

## 2022-07-21 RX ORDER — OXYCODONE HYDROCHLORIDE 5 MG/1
5 TABLET ORAL EVERY 6 HOURS PRN
Qty: 28 TABLET | Refills: 0 | Status: SHIPPED | OUTPATIENT
Start: 2022-07-24 | End: 2022-07-31

## 2022-07-21 ASSESSMENT — ENCOUNTER SYMPTOMS
SHORTNESS OF BREATH: 0
COLOR CHANGE: 0

## 2022-07-21 NOTE — PROGRESS NOTES
Almeta Ahumada (:  1950) is a 70 y.o. male,Established patient, here for evaluation of the following chief complaint(s):  Post-Op Check         ASSESSMENT/PLAN:  1. Postop check       This is a 70year old male patient who presents for follow up after bypass surgery. His foot is warm and well perfused. Will remove staples today and proceed with first surveillance duplex evaluation. Continue with DAPT as prescribed for his coronary stent. Okay for one more prescription for pain medication but cannot be filled until 22 given his current active prescription was picked up. After that he should either consider non-opiate alternatives or pursue pain management. Duplex ordered. Follow up in 1 month. All questions answered. Subjective   SUBJECTIVE/OBJECTIVE:  This is a 70year old male patient who presents for follow up after right femoral to BK popliteal artery bypass with contralateral vein. From that perspective he is now perfused. He still endorses pain in his right leg and foot that wakes him up at night. He endorses pain in his groin as well. He denies any drainage from his wounds. He denies fever or chills. He is back on aspirin/brilinta. Review of Systems   Constitutional:  Negative for chills and fever. Respiratory:  Negative for shortness of breath. Cardiovascular:  Positive for leg swelling. Negative for chest pain. Musculoskeletal:  Positive for gait problem and myalgias. Skin:  Negative for color change and wound. Hematological:  Does not bruise/bleed easily. Psychiatric/Behavioral:  Negative for agitation. Objective   Physical Exam  Cardiovascular:      Rate and Rhythm: Normal rate and regular rhythm. Comments: Palp right DP  Pulmonary:      Effort: Pulmonary effort is normal. No respiratory distress. Musculoskeletal:         General: Normal range of motion. Right lower leg: Edema present. Skin:     General: Skin is warm and dry.       Capillary Refill: Capillary refill takes less than 2 seconds. Neurological:      General: No focal deficit present. Mental Status: He is alert.               Yulissa Lopes DO, FSVS, [address] [facility]

## 2022-07-22 ENCOUNTER — TELEPHONE (OUTPATIENT)
Dept: SURGERY | Age: 72
End: 2022-07-22

## 2022-07-22 NOTE — TELEPHONE ENCOUNTER
Olvin Iyer, DO  You 12 minutes ago (11:03 AM)     SM    Cannot be filled until 7/24 due to his last pickup.

## 2022-07-22 NOTE — TELEPHONE ENCOUNTER
Pt called regarding his pain medication. He said that Dr Radha Castellon called in an RX last night and the pharmacy wouldn't feel it. The pharmacy needs a call stating its okay to fill early. Its [facility] on the [address] of [address].  Thanks

## 2022-07-27 ENCOUNTER — TELEPHONE (OUTPATIENT)
Dept: SURGERY | Age: 72
End: 2022-07-27

## 2022-07-27 NOTE — TELEPHONE ENCOUNTER
PT would like to have more pain medication. He is taking 2 oxycodone every six hours as opposed to 1 every six hours.

## 2022-07-28 NOTE — TELEPHONE ENCOUNTER
Olvin Iyer, DO  You 14 hours ago (4:18 PM)         Needs to see pain management if this is still an issue.

## 2022-07-30 ENCOUNTER — HOSPITAL ENCOUNTER (OUTPATIENT)
Dept: MRI IMAGING | Age: 72
Discharge: HOME | End: 2022-07-30
Payer: MEDICARE

## 2022-07-30 DIAGNOSIS — R19.09 OTHER INTRA-ABDOMINAL AND PELVIC SWELLING, MASS AND LUMP: ICD-10-CM

## 2022-07-30 PROCEDURE — 6360000004 HC RX CONTRAST MEDICATION: Performed by: INTERNAL MEDICINE

## 2022-07-30 PROCEDURE — 72197 MRI PELVIS W/O & W/DYE: CPT

## 2022-07-30 PROCEDURE — A9577 INJ MULTIHANCE: HCPCS | Performed by: INTERNAL MEDICINE

## 2022-07-30 RX ADMIN — GADOBENATE DIMEGLUMINE 13 ML: 529 INJECTION, SOLUTION INTRAVENOUS at 14:54

## 2022-08-04 ENCOUNTER — PROCEDURE VISIT (OUTPATIENT)
Dept: SURGERY | Age: 72
End: 2022-08-04
Payer: MEDICARE

## 2022-08-04 DIAGNOSIS — I73.9 PAD (PERIPHERAL ARTERY DISEASE) (HCC): Primary | ICD-10-CM

## 2022-08-05 PROCEDURE — 93926 LOWER EXTREMITY STUDY: CPT | Performed by: SURGERY

## 2022-08-08 ENCOUNTER — TELEPHONE (OUTPATIENT)
Dept: CARDIOLOGY CLINIC | Age: 72
End: 2022-08-08

## 2022-08-08 RX ORDER — CLOPIDOGREL BISULFATE 75 MG/1
75 TABLET ORAL DAILY
Qty: 90 TABLET | Refills: 3 | Status: SHIPPED | OUTPATIENT
Start: 2022-08-08

## 2022-08-08 NOTE — TELEPHONE ENCOUNTER
Reviewed notes and patient should be taking Plavix 75 mg daily. 30 day Rx sent to [facility] on 7/12/22. Refill for Plavix 75 mg tablet sent to local [facility]. Patient v/u.

## 2022-08-08 NOTE — TELEPHONE ENCOUNTER
Pt called said he is out of his BRILINTA. Took hi last pill today. He wants to cover over his meds. We shoe him on PLAVIX not BRILINTA.     Herb # [phone]

## 2022-08-16 ENCOUNTER — TELEPHONE (OUTPATIENT)
Dept: SURGERY | Age: 72
End: 2022-08-16

## 2022-08-16 NOTE — TELEPHONE ENCOUNTER
Spoke with pt. He has minimal swelling, but still complains of numbness and pain. He has attempted to leave several messages with pain management to no avail. Per my request, he called Dr Verito Green regarding MRI results. He was informed it was a stable cystic lesion. Jarett provided compression stockings which helped with the pain and he was able to get a good night's rest.  Informed I will discuss with Dr Katharine Bai on Thursday afternoon for further instruction. Pt agreed to continue compression use.

## 2022-09-21 ENCOUNTER — TELEPHONE (OUTPATIENT)
Dept: CARDIOLOGY CLINIC | Age: 72
End: 2022-09-21

## 2022-09-21 DIAGNOSIS — I25.10 CORONARY ARTERY DISEASE INVOLVING NATIVE CORONARY ARTERY OF NATIVE HEART WITHOUT ANGINA PECTORIS: Primary | ICD-10-CM

## 2022-09-21 NOTE — TELEPHONE ENCOUNTER
Called patient and let him know he should not stop the medication since he had a PCI in June. I asked him to get some labs completed to check his Hgb. He is agreeable to go when he can go. He has a lot going on and will discuss with his son to get to the lab.

## 2022-09-21 NOTE — TELEPHONE ENCOUNTER
Laurence Xiong called in today he was recently put on plavix and now has several bruises on his arms that he is concerned with        Laurence Xiong can be reached at [phone]

## 2022-09-29 ENCOUNTER — OFFICE VISIT (OUTPATIENT)
Dept: PAIN MANAGEMENT | Age: 72
End: 2022-09-29
Payer: MEDICARE

## 2022-09-29 VITALS
OXYGEN SATURATION: 99 % | SYSTOLIC BLOOD PRESSURE: 185 MMHG | BODY MASS INDEX: 23.25 KG/M2 | DIASTOLIC BLOOD PRESSURE: 94 MMHG | HEART RATE: 64 BPM | HEIGHT: 69 IN | WEIGHT: 157 LBS

## 2022-09-29 DIAGNOSIS — F51.01 PRIMARY INSOMNIA: ICD-10-CM

## 2022-09-29 DIAGNOSIS — R20.2 NUMBNESS AND TINGLING OF RIGHT LOWER EXTREMITY: Primary | ICD-10-CM

## 2022-09-29 DIAGNOSIS — G89.4 CHRONIC PAIN SYNDROME: ICD-10-CM

## 2022-09-29 DIAGNOSIS — M79.2 NEUROPATHIC PAIN: ICD-10-CM

## 2022-09-29 DIAGNOSIS — I73.9 PERIPHERAL ARTERIAL DISEASE (HCC): ICD-10-CM

## 2022-09-29 DIAGNOSIS — R20.0 NUMBNESS AND TINGLING OF RIGHT LOWER EXTREMITY: Primary | ICD-10-CM

## 2022-09-29 PROCEDURE — 1123F ACP DISCUSS/DSCN MKR DOCD: CPT | Performed by: INTERNAL MEDICINE

## 2022-09-29 PROCEDURE — 99204 OFFICE O/P NEW MOD 45 MIN: CPT | Performed by: INTERNAL MEDICINE

## 2022-09-29 PROCEDURE — G8427 DOCREV CUR MEDS BY ELIG CLIN: HCPCS | Performed by: INTERNAL MEDICINE

## 2022-09-29 PROCEDURE — G8420 CALC BMI NORM PARAMETERS: HCPCS | Performed by: INTERNAL MEDICINE

## 2022-09-29 PROCEDURE — 3017F COLORECTAL CA SCREEN DOC REV: CPT | Performed by: INTERNAL MEDICINE

## 2022-09-29 PROCEDURE — 1036F TOBACCO NON-USER: CPT | Performed by: INTERNAL MEDICINE

## 2022-09-29 RX ORDER — PREGABALIN 75 MG/1
CAPSULE ORAL
Qty: 90 CAPSULE | Refills: 0 | Status: SHIPPED | OUTPATIENT
Start: 2022-09-29 | End: 2022-10-27 | Stop reason: SDUPTHER

## 2022-09-29 RX ORDER — ATORVASTATIN CALCIUM 20 MG/1
20 TABLET, FILM COATED ORAL DAILY
COMMUNITY
Start: 2022-08-01

## 2022-09-29 RX ORDER — NAPROXEN 500 MG/1
500 TABLET ORAL 2 TIMES DAILY WITH MEALS
COMMUNITY
End: 2022-09-29

## 2022-09-29 RX ORDER — DULOXETIN HYDROCHLORIDE 30 MG/1
30 CAPSULE, DELAYED RELEASE ORAL DAILY
Qty: 30 CAPSULE | Refills: 0 | Status: SHIPPED | OUTPATIENT
Start: 2022-09-29 | End: 2022-10-27 | Stop reason: SDUPTHER

## 2022-09-29 RX ORDER — HYDROXYZINE PAMOATE 25 MG/1
25 CAPSULE ORAL DAILY
COMMUNITY
Start: 2022-09-21

## 2022-09-29 RX ORDER — AMITRIPTYLINE HYDROCHLORIDE 25 MG/1
25-50 TABLET, FILM COATED ORAL NIGHTLY
Qty: 60 TABLET | Refills: 0 | Status: SHIPPED | OUTPATIENT
Start: 2022-09-29 | End: 2022-10-27

## 2022-09-29 RX ORDER — OXYCODONE HYDROCHLORIDE AND ACETAMINOPHEN 5; 325 MG/1; MG/1
1 TABLET ORAL 3 TIMES DAILY PRN
Qty: 63 TABLET | Refills: 0 | Status: SHIPPED | OUTPATIENT
Start: 2022-09-29 | End: 2022-10-27 | Stop reason: SDUPTHER

## 2022-10-05 ENCOUNTER — TELEPHONE (OUTPATIENT)
Dept: CARDIOLOGY CLINIC | Age: 72
End: 2022-10-05

## 2022-10-05 NOTE — LETTER
[address] [place] [address]  Phone: [phone]  Fax: [phone]    Emi Trimble MD        October 7, 2022     Patient: Tamika Vanegas   YOB: 1950           To Whom It May Concern: It is my medical opinion that Clfif may proceed with reconstruction of basal cell carcinoma of nose and may hold Plavix 5 days prior, however, need to continue asa 81 throughout the operative period. Please start Norvasc 10 mg QD. If you have any questions or concerns, please don't hesitate to call.     Sincerely,        Emi Trimble MD / KATHRYN Lyman

## 2022-10-05 NOTE — TELEPHONE ENCOUNTER
Called and spoke to patient. He has not went to get labs completed that were placed on 9/21. I let him know that he should complete those and we would call him with the results. He will try to go this week.

## 2022-10-05 NOTE — TELEPHONE ENCOUNTER
Kole Chatterjee called in today he is concerned about the bruises he has and wants to know if he may need his medication readjusted     Herb can be reached at [phone]

## 2022-10-06 NOTE — TELEPHONE ENCOUNTER
Per Dr Alejandro Weaver. Okay to hold plavix, need to continue asa 81 throughout the operative period. Start norvasc 10mg po qd. Please make patient aware.

## 2022-10-06 NOTE — TELEPHONE ENCOUNTER
Dr. Sofya Cantrell,     I received call from Amarilis Medrano at [address] [place] [address] stating the patient is scheduled for reconstruction of basal cell carcinoma of nose on 10/18/22. Surgeon requesting Plavix be held for 5 days prior to surgery. They would also like BP optimization prior to surgery. Today's BP with surgeon and anesthesia:   159/198 (manual)  190/116  168/104    Clearance letter can be faxed to [phone]. Patient last seen in office 6/13/22 for abnormal stress test and is s/p cath 6/15/22 revealing single vessel obstructive CAD s/p successful IVUS guided PCI to mid LAD with TRISHA on Plavix and aspirin.

## 2022-10-07 RX ORDER — AMLODIPINE BESYLATE 10 MG/1
10 TABLET ORAL DAILY
Qty: 90 TABLET | Refills: 1 | Status: SHIPPED | OUTPATIENT
Start: 2022-10-07

## 2022-10-07 NOTE — TELEPHONE ENCOUNTER
Patient returned call. Made aware of recommendations. He verbalized understanding. Medication list updated.

## 2022-10-11 ENCOUNTER — TELEPHONE (OUTPATIENT)
Dept: CARDIOLOGY CLINIC | Age: 72
End: 2022-10-11

## 2022-10-11 NOTE — TELEPHONE ENCOUNTER
CARDIAC CLEARANCE     What type of procedure are you having? TRANSFER ADJACENT TISSUE HEAD    Which physician is performing your procedure? DR. Cassandra Davis    When is your procedure scheduled for?  10/18/22    Where are you having this procedure? [facility]    Are you taking Blood Thinners? If so what? (Name/dose/frequesncy)  PLAVIX, BABY ASPIRIN     Does the surgeon want you to stop your blood thinner? If so for how long?   YES, 3 DAYS PRIOR    Phone Number and Contact Name for Physicians office:  [phone]    Fax number to send information:    [phone]

## 2022-10-11 NOTE — TELEPHONE ENCOUNTER
This was previously addressed in a phone encounter 10/5/22. Please make sure Dr. Gideon quintanilla is aware.

## 2022-10-26 NOTE — PROGRESS NOTES
Mr. Lorena Alexander is a 67 y.o. male who is seen in consultation at the request of Dr. Dot Cannon for pain management. Patient states that his back has been hurting for the last 30 years states pain is in the right leg and foot from 8 to last 8 to 10-month symptoms gotten worse has a history of peripheral arterial disease status post bypass surgery states he was diagnosed nasal skin cancer states he fell and broke his right hip status post surgery the legs hurt more than the back states she has a mass in the upper groin abdominal seeing oncology for that patient is a poor historian. Patient denies any back surgery no injections is done some physical therapy. Describes the pain as a burning stabbing pain pins and needle at times. Sleep is been poor denies any headaches does complain of morning stiffness complains of fatigue states he is retired worked as a [employer] for 42 years states is single denies history of diabetes.   Symptoms started suddenly has a prior episodes which were treated medication anti-inflammatories different other modalities home exercises physical therapy exercises most the pain is in the groin right foot and activities such as standing walking lifting bending going up and down stairs putting shoes socks on all caused him to have increased pain self with medications pain is constant does wax and wane does wake him up at night denies logical bowel or bladder grades of pain 8/10 states he was on oxycodone 5 mg pills for day and also has tried Vicodin 7.54-day takes medication for cholesterol and blood thinners and also takes naproxen and hydroxyzine for sleeping patient denies any other medical issues other than as mentioned above patient denies using marijuana states he did 40 years ago denies any alcohol abuse or any other drug abuse states he is retired worked as a  in [place] does complain of weakness of the lower extremity numbness and tingling leg and foot no instability baseline gait problems no history of falls ongoing pain symptoms have restricted social recreational life. The patient's social history, past medical history, family history, medications, allergies and review of systems have all been reviewed and verified from  the patient questionnaire form which has been filled by the patient. The  allergies, medication list  and past medical and surgical history and other information recorded by the MA was again reviewed today  These forms have been scanned into the \"media\" tab of patients electronic medical record. PHYSICAL EXAM:  Please see the physical exam form for a detailed examination on this visit. This form has been completed and scanned into the  Media section of the chart  This is a elderly male well-built no apparent acute distress alert oriented x3 mood and affect is normal gait is slow gross motor coordination is normal using a walker for ambulation. Positive Cezar signs. Back and trunk exam shows tenderness paralumbar region SI joint region range of motion is within normal notes motor strength 4/5 sensory exam shows decreased sensation in the right S1 distribution reflexes +1 knees absent at the ankle straight leg raising unable to lie down on the table could not do femoral stretch haresh' test cause ipsilateral pain decreased range of motion on the right side. Examination of the lower extremity shows scars of the lower extremity left lower extremity there is purplish hue slightly cool to touch neurovascularly intact  BP (!) 185/94   Pulse 64   Ht 5' 9\" (1.753 m)   Wt 157 lb (71.2 kg)   SpO2 99%   BMI 23.18 kg/m²   Skin: Warm and dry. Good turgor. No rashes. No lesions or marks noted  Eyes:  PERRLA, cornea/ conjunctiva normal, no nystagmus  HENT:  Atraumatic, external ears normal, nose normal, oropharynx moist, no pharyngeal exudates. Neck- normal range of motion, no tenderness, supple. No bruit, no JVD, No lymph nodes appreciated.  Thyroid is not enlarged  Respiratory: Lungs CTAP, No respiratory distress, normal breath sounds, no rales, no wheezing   Cardiovascular:  Normal S1,S2, Normal rate, normal rhythm, 1/6 systolic no gallops, no rubs   GI:  Soft, nondistended, normal bowel sounds, nontender, no organomegaly, no mass, no rebound, no guarding  :  No costovertebral angle tenderness   Musculoskeletal:  No clubbing, no edema, no tenderness, no deformities. There are no varicosities  Lymphatic:  No lymphadenopathy noted   Neurologic:  Alert & oriented x 3, CN 2-12 normal, normal motor function, normal sensory function, no focal deficits noted   Psychiatric:  Speech and behavior appropriate, judgement and thought content normal.    Patient's old records reviewed in detail records from primary care physician reviewed imaging studies reviewed Doppler scans were reviewed showing PAD    IMPRESSION    CHRONIC PAIN SYNDROME  NEUROPATHIC PAIN  PAD  INSOMNIA    Chronic opiate treatment protocol was discussed with the patient. Informed verbal consent was obtained. Treatment guidelines were established. Risks and benefits of the medications including narcotics were discussed with the patient. SOAPP questionnaire and opioid risk tool were assessed. Long-term and short-term goals of pain management were also addressed including pain relief about 30% from baseline, improving mood, sleep, psychosocial, and physical functioning were addressed. Will get EMG right lower extremity also get x-ray lumbar spine AP lateral view start Lyrica 75 titrate up to 225 mg a day amitriptyline 25 mg 1-2 at bedtime Cymbalta 20 mg and Percocet 5 mg pills 3 times a day blood test reviewed shows anemia will do urinedrug screen with GCMS opiates and follow-up on the results Patient profile on OARRS website was reviewed. All treatment options were discussed with patient. Goals of current treatment regimen include improvement in pain, restoration of functioning- with focus on improvement in physical performance, general activity, work or disability,emotional distress, health care utilization and  decreased medication consumption. Will continue to monitor progress towards achieving/maintaining therapeutic goals with special emphasis on  1. Improvement in perceived interfernce  of pain with ADL's. Ability to do home exercises independently. Ability to do household chores indoor and/or outdoor work and social and leisure activities. To increase flexibility/ROM, strength and endurance. Improve psychosocial and physical functioning. 2. Improving sleep to 6-7 hours a night. Improve mood/ anxiety and depression symptoms such as crying spells, low energy, problems with concentration, motivation. 3. Reduction of reliance on opioid analgesia/more appropriate opioid use. Risks and benefits of the medications and other alternative treatments have been/were  discussed with the patient. Any questions on the  common side effects of these medications were also answered. Informed verbal consent was obtained. The current treatment regimen is needed to decrease the patient's pain  symptoms, improve the quality of life and ability to function and improve the  sleep and mood symptoms. Patient was advised against drinking alcohol with the narcotic pain medicines, advised against driving or handling machinery when  starting or adjusting the dose of medicines, feeling groggy or drowsy, or if having any cognitive issues related to the current medications. Patient is fully aware of the risk of overdose and death, if medicines are misused and not taken as prescribed. If Patient develops new symptoms or if the symptoms worsen,  was told to call the office. Thank you for allowing me to participate in the care of this patient. Lawyer Jelani Ray MD    Dragon disclaimer: This note was dictated utilizing voice recognition software. Minor errors in transcription may be present.     CC:  Drea Frost

## 2022-10-27 ENCOUNTER — OFFICE VISIT (OUTPATIENT)
Dept: PAIN MANAGEMENT | Age: 72
End: 2022-10-27
Payer: MEDICARE

## 2022-10-27 VITALS
DIASTOLIC BLOOD PRESSURE: 90 MMHG | HEART RATE: 112 BPM | BODY MASS INDEX: 23.18 KG/M2 | OXYGEN SATURATION: 98 % | WEIGHT: 157 LBS | SYSTOLIC BLOOD PRESSURE: 162 MMHG

## 2022-10-27 DIAGNOSIS — I73.9 PERIPHERAL ARTERIAL DISEASE (HCC): ICD-10-CM

## 2022-10-27 DIAGNOSIS — G89.4 CHRONIC PAIN SYNDROME: ICD-10-CM

## 2022-10-27 DIAGNOSIS — F51.01 PRIMARY INSOMNIA: ICD-10-CM

## 2022-10-27 DIAGNOSIS — M79.2 NEUROPATHIC PAIN: ICD-10-CM

## 2022-10-27 DIAGNOSIS — R20.2 NUMBNESS AND TINGLING OF RIGHT LOWER EXTREMITY: ICD-10-CM

## 2022-10-27 DIAGNOSIS — R20.0 NUMBNESS AND TINGLING OF RIGHT LOWER EXTREMITY: ICD-10-CM

## 2022-10-27 PROCEDURE — G8427 DOCREV CUR MEDS BY ELIG CLIN: HCPCS | Performed by: INTERNAL MEDICINE

## 2022-10-27 PROCEDURE — 1036F TOBACCO NON-USER: CPT | Performed by: INTERNAL MEDICINE

## 2022-10-27 PROCEDURE — G8420 CALC BMI NORM PARAMETERS: HCPCS | Performed by: INTERNAL MEDICINE

## 2022-10-27 PROCEDURE — 3017F COLORECTAL CA SCREEN DOC REV: CPT | Performed by: INTERNAL MEDICINE

## 2022-10-27 PROCEDURE — 1123F ACP DISCUSS/DSCN MKR DOCD: CPT | Performed by: INTERNAL MEDICINE

## 2022-10-27 PROCEDURE — G8484 FLU IMMUNIZE NO ADMIN: HCPCS | Performed by: INTERNAL MEDICINE

## 2022-10-27 PROCEDURE — 99213 OFFICE O/P EST LOW 20 MIN: CPT | Performed by: INTERNAL MEDICINE

## 2022-10-27 RX ORDER — DULOXETIN HYDROCHLORIDE 30 MG/1
30 CAPSULE, DELAYED RELEASE ORAL DAILY
Qty: 30 CAPSULE | Refills: 0 | Status: SHIPPED | OUTPATIENT
Start: 2022-10-27 | End: 2022-12-01 | Stop reason: SDUPTHER

## 2022-10-27 RX ORDER — OXYCODONE HYDROCHLORIDE AND ACETAMINOPHEN 5; 325 MG/1; MG/1
1 TABLET ORAL 3 TIMES DAILY PRN
Qty: 105 TABLET | Refills: 0 | Status: SHIPPED | OUTPATIENT
Start: 2022-10-27 | End: 2022-12-01 | Stop reason: SDUPTHER

## 2022-10-27 RX ORDER — PREGABALIN 75 MG/1
CAPSULE ORAL
Qty: 90 CAPSULE | Refills: 0 | Status: SHIPPED | OUTPATIENT
Start: 2022-10-27 | End: 2022-12-01 | Stop reason: SDUPTHER

## 2022-10-27 NOTE — PROGRESS NOTES
Jessi Kitchen  1950  [id number]      HISTORY OF PRESENT ILLNESS:  Mr. Howard Barrera is a 67 y.o. male returns for a follow up visit for pain management  He has a diagnosis of   1. Chronic pain syndrome    2. Neuropathic pain    3. Primary insomnia    4. Peripheral arterial disease ([address]    5. Numbness and tingling of right lower extremity    . He complains of pain in the head. Face, low back, and right leg   He rates the pain 5/10 and describes it as sharp, aching, burning. Current treatment regimen has helped relieve about 30% of the pain. He denies any side effects from the current pain regimen. Patient reports that since the last follow up visit the physical functioning is unchanged, family/social relationships are unchanged, mood is unchanged sleep patterns are unchanged, and that the overall functioning is unchanged. Patient denies misusing/abusing his narcotic pain medications or using any illegal drugs. There are No indicators for possible drug abuse, addiction or diversion problems. Patient reports he has surgery on the nose recently for a basal cell cancer. He says she is using Cymbalta along with Lyrica. He mentions he had side effects with the Elavil and is off it. He states he is out of pain medications. He says he is has been in agony since being off it. He complains his face and legs hurts the most.     ALLERGIES: Patients list of allergies were reviewed     MEDICATIONS: Mr. Howard Barrera list of medications were reviewed. His current medications are   Outpatient Medications Prior to Visit   Medication Sig Dispense Refill    amLODIPine (NORVASC) 10 MG tablet Take 1 tablet by mouth daily 90 tablet 1    hydrOXYzine pamoate (VISTARIL) 25 MG capsule Take 25 mg by mouth daily 1-2 capsules evening      atorvastatin (LIPITOR) 20 MG tablet Take 20 mg by mouth daily      pregabalin (LYRICA) 75 MG capsule One tab hs 1 week, 2 tabs hs 1 week, 1 tab am 2 tabs pm 90 capsule 0    DULoxetine (CYMBALTA) 30 MG extended release capsule Take 1 capsule by mouth daily 30 capsule 0    amitriptyline (ELAVIL) 25 MG tablet Take 1-2 tablets by mouth nightly 60 tablet 0    clopidogrel (PLAVIX) 75 MG tablet Take 1 tablet by mouth in the morning. 90 tablet 3    aspirin EC 81 MG EC tablet Take 1 tablet by mouth daily . 30 tablet 0     No facility-administered medications prior to visit. REVIEW OF SYSTEMS:    Respiratory: Negative for apnea, chest tightness and shortness of breath or change in baseline breathing. PHYSICAL EXAM:   Nursing note and vitals reviewed. BP (!) 162/90 (Site: Left Upper Arm, Position: Sitting)   Pulse (!) 112   Wt 157 lb (71.2 kg)   SpO2 98%   BMI 23.18 kg/m²   Constitutional: He appears well-developed and well-nourished. No acute distress. + surgical procedure nose and face   Cardiovascular: Normal rate, regular rhythm, normal heart sounds, and does not have murmur. Pulmonary/Chest: Effort normal. No respiratory distress. He does not have wheezes in the lung fields. He has no rales. Neurological/Psychiatric:He is alert and oriented to person, place, and time. Coordination is  normal.  His mood isAppropriate and affect is Neutral/Euthymic(normal) . Other: using a walker     IMPRESSION:   1. Chronic pain syndrome    2. Neuropathic pain    3. Primary insomnia    4. Peripheral arterial disease ([facility] [address]    5.  Numbness and tingling of right lower extremity        PLAN:  Informed verbal consent was obtained  -ROM/Stretching exercises as advised   -He was advised to increase fluids ( 5-7  glasses of fluid daily), limit caffeine, avoid cheese products, increase dietary fiber, increase activity and exercise as tolerated and relax regularly and enjoy meals   -continue with Lyrica along with Cymbalta   -Discontinue Elavil   -Continue with Percocet 5 mg 2-3 per day    Current Outpatient Medications   Medication Sig Dispense Refill    amLODIPine (NORVASC) 10 MG tablet Take 1 tablet by mouth daily 90 advised against drinking alcohol with the narcotic pain medicines, advised against driving or handling machinery while adjusting the dose of medicines or if having cognitive  issues related to the current medications. Risk of overdose and death, if medicines not taken as prescribed, were also discussed. If the patient develops new symptoms or if the symptoms worsen, the patient should call the office. While transcribing every attempt was made to maintain the accuracy of the note in terms of it's contents,there may have been some errors made inadvertently. Thank you for allowing me to participate in the care of this patient.     Katy Jason MD.    Cc: Verónica Patino

## 2022-11-03 ENCOUNTER — TELEPHONE (OUTPATIENT)
Dept: CARDIOLOGY CLINIC | Age: 72
End: 2022-11-03

## 2022-11-04 DIAGNOSIS — I73.9 PERIPHERAL ARTERIAL DISEASE (HCC): Primary | ICD-10-CM

## 2022-11-07 NOTE — TELEPHONE ENCOUNTER
Chart reviewed on 11/3. On 6/15/22 patient had   Single vessel obstructive CAD   S/p successful IVUS guided PCI mid LAD X 1 TRISHA    Surgeon wanting to hold Aspirin and Plavix for 3 days prior,   Please advise    Per Dr. Elvin Howe, patient can hold the Aspirin and Plavix. Please prepare letter if needed and send to surgeon. Hb of 8.8 with normal MCV   Likely anemia in chronic dz  Would get anemia panel   No signs of active bleeding   Occult with next BM  Refused occult on admission  Monitor cbc daily

## 2022-11-08 NOTE — TELEPHONE ENCOUNTER
I called MD office and made them aware. Also called and spoke to patient. Please prepare letter and fax to [facility] at [phone].

## 2022-12-01 ENCOUNTER — OFFICE VISIT (OUTPATIENT)
Dept: PAIN MANAGEMENT | Age: 72
End: 2022-12-01
Payer: MEDICARE

## 2022-12-01 VITALS
OXYGEN SATURATION: 99 % | BODY MASS INDEX: 22.15 KG/M2 | HEART RATE: 109 BPM | WEIGHT: 150 LBS | SYSTOLIC BLOOD PRESSURE: 154 MMHG | RESPIRATION RATE: 16 BRPM | DIASTOLIC BLOOD PRESSURE: 100 MMHG

## 2022-12-01 DIAGNOSIS — M79.2 NEUROPATHIC PAIN: ICD-10-CM

## 2022-12-01 DIAGNOSIS — I73.9 PERIPHERAL ARTERIAL DISEASE (HCC): ICD-10-CM

## 2022-12-01 DIAGNOSIS — G89.4 CHRONIC PAIN SYNDROME: ICD-10-CM

## 2022-12-01 PROCEDURE — G8427 DOCREV CUR MEDS BY ELIG CLIN: HCPCS | Performed by: INTERNAL MEDICINE

## 2022-12-01 PROCEDURE — 1123F ACP DISCUSS/DSCN MKR DOCD: CPT | Performed by: INTERNAL MEDICINE

## 2022-12-01 PROCEDURE — 99213 OFFICE O/P EST LOW 20 MIN: CPT | Performed by: INTERNAL MEDICINE

## 2022-12-01 PROCEDURE — G8484 FLU IMMUNIZE NO ADMIN: HCPCS | Performed by: INTERNAL MEDICINE

## 2022-12-01 PROCEDURE — 3017F COLORECTAL CA SCREEN DOC REV: CPT | Performed by: INTERNAL MEDICINE

## 2022-12-01 PROCEDURE — 1036F TOBACCO NON-USER: CPT | Performed by: INTERNAL MEDICINE

## 2022-12-01 PROCEDURE — G8420 CALC BMI NORM PARAMETERS: HCPCS | Performed by: INTERNAL MEDICINE

## 2022-12-01 RX ORDER — PREGABALIN 75 MG/1
CAPSULE ORAL
Qty: 90 CAPSULE | Refills: 0 | Status: SHIPPED | OUTPATIENT
Start: 2022-12-01 | End: 2022-12-31

## 2022-12-01 RX ORDER — DULOXETIN HYDROCHLORIDE 30 MG/1
30 CAPSULE, DELAYED RELEASE ORAL DAILY
Qty: 30 CAPSULE | Refills: 0 | Status: SHIPPED | OUTPATIENT
Start: 2022-12-01

## 2022-12-01 RX ORDER — OXYCODONE HYDROCHLORIDE AND ACETAMINOPHEN 5; 325 MG/1; MG/1
1 TABLET ORAL 3 TIMES DAILY PRN
Qty: 105 TABLET | Refills: 0 | Status: SHIPPED | OUTPATIENT
Start: 2022-12-01 | End: 2023-01-05

## 2022-12-02 NOTE — PROGRESS NOTES
Cedrick Matias  1950  [id number]      HISTORY OF PRESENT ILLNESS:  Mr. Nilda Lopez is a 67 y.o. male returns for a follow up visit for pain management  He has a diagnosis of   1. Chronic pain syndrome    2. Neuropathic pain    3. Peripheral arterial disease ([address]    . He complains of pain in the  face, lower back, right leg  He rates the pain 5/10 and describes it as sharp, aching, burning. Current treatment regimen has helped relieve about 40% of the pain. He denies any side effects from the current pain regimen. Patient reports that since the last follow up visit the physical functioning is unchanged, family/social relationships are unchanged, mood is unchanged sleep patterns are unchanged, and that the overall functioning is unchanged. Patient denies misusing/abusing his narcotic pain medications or using any illegal drugs. There are No indicators for possible drug abuse, addiction or diversion problems, patient states his pain has been manageable, he states he is still hurting a lot. Mr. Nilda Lopez states he is going for another procedure on the face and nose. He states he is using Lyrica along with the other adjuvants and Percocet 3 per day. Patient states he has had 8 surgeries in the last year. ALLERGIES: Patients list of allergies were reviewed     MEDICATIONS: Mr. Nilda Lopez list of medications were reviewed. His current medications are   Outpatient Medications Prior to Visit   Medication Sig Dispense Refill    amLODIPine (NORVASC) 10 MG tablet Take 1 tablet by mouth daily 90 tablet 1    hydrOXYzine pamoate (VISTARIL) 25 MG capsule Take 25 mg by mouth daily 1-2 capsules evening      atorvastatin (LIPITOR) 20 MG tablet Take 20 mg by mouth daily      clopidogrel (PLAVIX) 75 MG tablet Take 1 tablet by mouth in the morning.  90 tablet 3    pregabalin (LYRICA) 75 MG capsule One tab hs 1 week, 2 tabs hs 1 week, 1 tab am 2 tabs pm 90 capsule 0    DULoxetine (CYMBALTA) 30 MG extended release capsule Take 1 capsule by mouth daily 30 capsule 0    oxyCODONE-acetaminophen (PERCOCET) 5-325 MG per tablet Take 1 tablet by mouth 3 times daily as needed for Pain for up to 35 days. 105 tablet 0    aspirin EC 81 MG EC tablet Take 1 tablet by mouth daily . 30 tablet 0     No facility-administered medications prior to visit. REVIEW OF SYSTEMS:    Respiratory: Negative for apnea, chest tightness and shortness of breath or change in baseline breathing. PHYSICAL EXAM:   Nursing note and vitals reviewed. BP (!) 154/100   Pulse (!) 109   Resp 16   Wt 150 lb (68 kg)   SpO2 99%   BMI 22.15 kg/m²   Constitutional: He appears well-developed and well-nourished. No acute distress. Cardiovascular: Normal rate, regular rhythm, normal heart sounds, and does not have murmur. Pulmonary/Chest: Effort normal. No respiratory distress. He does not have wheezes in the lung fields. He has no rales. Neurological/Psychiatric:He is alert and oriented to person, place, and time. Coordination is  normal.  His mood isAppropriate and affect is Neutral/Euthymic(normal) . His    IMPRESSION:   1. Chronic pain syndrome    2. Neuropathic pain    3. Peripheral arterial disease ([facility] [address]        PLAN:  Informed verbal consent was obtained  -OARRS record was obtained and reviewed  for the last one year and no indicators of drug misuse  were found. Any other controlled substance prescriptions  seen on the record have been accounted for, I am aware of the patient receiving these medications. Laury Jesus OARRS record will be rechecked as part of office protocol.     -ROM/Stretching exercises as advised   -Continue with Percocet 3 per day  -He was advised to increase fluids ( 5-7  glasses of fluid daily), limit caffeine, avoid cheese products, increase dietary fiber, increase activity and exercise as tolerated and relax regularly and enjoy meals   -CBT techniques- relaxation therapies such as biofeedback, mindfulness based stress reduction, imagery, cognitive restructuring, problem solving discussed with patient   -Continue with all other adjuvant medications as before   -Maybe getting opioids for acute post op pain post procedure  -Medication bottles reviewed, he is not on Lyrica  -Restart Lyrica   Current Outpatient Medications   Medication Sig Dispense Refill    DULoxetine (CYMBALTA) 30 MG extended release capsule Take 1 capsule by mouth daily 30 capsule 0    oxyCODONE-acetaminophen (PERCOCET) 5-325 MG per tablet Take 1 tablet by mouth 3 times daily as needed for Pain for up to 35 days. 105 tablet 0    pregabalin (LYRICA) 75 MG capsule Take one capsule am 2 capsule po pm 90 capsule 0    amLODIPine (NORVASC) 10 MG tablet Take 1 tablet by mouth daily 90 tablet 1    hydrOXYzine pamoate (VISTARIL) 25 MG capsule Take 25 mg by mouth daily 1-2 capsules evening      atorvastatin (LIPITOR) 20 MG tablet Take 20 mg by mouth daily      clopidogrel (PLAVIX) 75 MG tablet Take 1 tablet by mouth in the morning. 90 tablet 3    aspirin EC 81 MG EC tablet Take 1 tablet by mouth daily . 30 tablet 0     No current facility-administered medications for this visit. I will continue his current medication regimen  which is part of the above treatment schedule. It has been helping with Mr. Walter Ferrell chronic  medical problems which for this visit include:   Diagnoses of Chronic pain syndrome, Neuropathic pain, and Peripheral arterial disease ([address] were pertinent to this visit. Risks and benefits of the medications and other alternative treatments  including no treatment were discussed with the patient. The common side effects of these medications were also explained to the patient. Informed verbal consent was obtained. Goals of current treatment regimen include improvement in pain, restoration of functioning- with focus on improvement in physical performance, general activity, work or disability,emotional distress, health care utilization and  decreased medication consumption.  Will continue to monitor progress towards achieving/maintaining therapeutic goals with special emphasis on  1. Improvement in perceived interfernce  of pain with ADL's. Ability to do home exercises independently. Ability to do household chores indoor and/or outdoor work and social and leisure activities. Improve psychosocial and physical functioning. - he is showing progression towards this treatment goal with the current regimen. He was advised against drinking alcohol with the narcotic pain medicines, advised against driving or handling machinery while adjusting the dose of medicines or if having cognitive  issues related to the current medications. Risk of overdose and death, if medicines not taken as prescribed, were also discussed. If the patient develops new symptoms or if the symptoms worsen, the patient should call the office. While transcribing every attempt was made to maintain the accuracy of the note in terms of it's contents,there may have been some errors made inadvertently. Thank you for allowing me to participate in the care of this patient.     Anjana Judd MD.    Cc: Aubrey Sánchez

## 2022-12-16 ENCOUNTER — TELEPHONE (OUTPATIENT)
Dept: ORTHOPEDIC SURGERY | Age: 72
End: 2022-12-16

## 2022-12-16 NOTE — TELEPHONE ENCOUNTER
S/W STEVIE  After a lengthy discussion with Stevie he would like to know how long his appointment Monday will take - advised roughly 30 minutes give or take. Patient voiced understanding of the conversation and will contact the office with further questions or concerns.

## 2022-12-16 NOTE — TELEPHONE ENCOUNTER
General Question     Subject: Pt WOULD LIKE GUILLERMINA TO CALL HIM BACK   Patient and /or Facility Request: [facility] Benavides \"[facility]\"  Contact Number: [phone]    Pt THOUGHT OF ADDITIONAL THINGS HE WOULD LIKE TO ADD.

## 2022-12-19 ENCOUNTER — OFFICE VISIT (OUTPATIENT)
Dept: ORTHOPEDIC SURGERY | Age: 72
End: 2022-12-19
Payer: MEDICARE

## 2022-12-19 ENCOUNTER — TELEPHONE (OUTPATIENT)
Dept: SURGERY | Age: 72
End: 2022-12-19

## 2022-12-19 VITALS — RESPIRATION RATE: 16 BRPM | BODY MASS INDEX: 22.22 KG/M2 | HEIGHT: 69 IN | WEIGHT: 150 LBS

## 2022-12-19 DIAGNOSIS — S72.001D CLOSED FRACTURE OF RIGHT HIP WITH ROUTINE HEALING, SUBSEQUENT ENCOUNTER: Primary | ICD-10-CM

## 2022-12-19 PROCEDURE — 1123F ACP DISCUSS/DSCN MKR DOCD: CPT | Performed by: ORTHOPAEDIC SURGERY

## 2022-12-19 PROCEDURE — G8428 CUR MEDS NOT DOCUMENT: HCPCS | Performed by: ORTHOPAEDIC SURGERY

## 2022-12-19 PROCEDURE — 3017F COLORECTAL CA SCREEN DOC REV: CPT | Performed by: ORTHOPAEDIC SURGERY

## 2022-12-19 PROCEDURE — 1036F TOBACCO NON-USER: CPT | Performed by: ORTHOPAEDIC SURGERY

## 2022-12-19 PROCEDURE — G8420 CALC BMI NORM PARAMETERS: HCPCS | Performed by: ORTHOPAEDIC SURGERY

## 2022-12-19 PROCEDURE — 99213 OFFICE O/P EST LOW 20 MIN: CPT | Performed by: ORTHOPAEDIC SURGERY

## 2022-12-19 PROCEDURE — G8484 FLU IMMUNIZE NO ADMIN: HCPCS | Performed by: ORTHOPAEDIC SURGERY

## 2022-12-19 NOTE — TELEPHONE ENCOUNTER
Spoke to pt. Pt would like to reach out to Dr Malinda Farias office regarding the sacral mass/possible excision. Advised to call back to schedule RLE ADS to check patency of bypass. All questions answered.

## 2022-12-19 NOTE — PROGRESS NOTES
History:  Matheus Byrd is here for follow up after right hip pinning for nondisplaced subcapital femoral neck fracture. Surgery date was 4/24/22. He rates pain 1/10. He was last seen when he was only 6 weeks postop. He states that he had multiple health issues since that time. He had a right femoral popliteal bypass by Dr. Simi Goss in July. He had a left forehead flap to his nose for basal cell carcinoma last week. He states that they found a cystic mass in his right groin. He did see Dr. Tasia Glaser, who stated that this is likely not a malignant tumor. He complains of numbness in his right foot. He is wondering if he has peripheral neuropathy secondary to the mass. Physical Examination:  Resp 16   Ht 5' 9\" (1.753 m)   Wt 150 lb (68 kg) Comment: per report - in wheelchair  BMI 22.15 kg/m²    Patient is awake, alert, and in no acute distress. No pain with passive ER/IR of right hip. Right hip flexion 5/5.       3 views of the right hip taken today in the office show: Status post internal fixation right femoral neck. Anatomic alignment. No hardware complications. The fracture is healed      Assessment:   Almost 8 months status post right hip pinning         Plan:   Discussed that his fracture has healed. Activity as tolerated. Ice / NSAIDs prn. Follow-up as needed. Shannon Torrez. Abbie Ro MD  Orthopaedic Surgery and Sports Medicine     Disclaimer: This note was generated with use of a verbal recognition program (DRAGON) and an attempt was made to check for errors. It is possible that there are still dictated errors within this office note. If so, please bring any significant errors to my attention for an addendum. All efforts were made to ensure that this office note is accurate.

## 2022-12-21 ENCOUNTER — TELEPHONE (OUTPATIENT)
Dept: PAIN MANAGEMENT | Age: 72
End: 2022-12-21

## 2022-12-21 DIAGNOSIS — G89.4 CHRONIC PAIN SYNDROME: ICD-10-CM

## 2022-12-21 DIAGNOSIS — M79.2 NEUROPATHIC PAIN: ICD-10-CM

## 2022-12-21 NOTE — TELEPHONE ENCOUNTER
Patient called and stated he needs a refill on his Duloxetine.      Patient uses [place] [id number] [facility],[address], [address] [facility] [phone] - F [phone] (Ph: [phone])

## 2022-12-27 RX ORDER — DULOXETIN HYDROCHLORIDE 30 MG/1
30 CAPSULE, DELAYED RELEASE ORAL DAILY
Qty: 30 CAPSULE | Refills: 1 | Status: SHIPPED | OUTPATIENT
Start: 2022-12-27 | End: 2022-12-29 | Stop reason: SDUPTHER

## 2022-12-29 ENCOUNTER — OFFICE VISIT (OUTPATIENT)
Dept: PAIN MANAGEMENT | Age: 72
End: 2022-12-29
Payer: MEDICARE

## 2022-12-29 VITALS
DIASTOLIC BLOOD PRESSURE: 72 MMHG | SYSTOLIC BLOOD PRESSURE: 128 MMHG | HEART RATE: 97 BPM | BODY MASS INDEX: 22.45 KG/M2 | WEIGHT: 152 LBS

## 2022-12-29 DIAGNOSIS — G89.4 CHRONIC PAIN SYNDROME: ICD-10-CM

## 2022-12-29 DIAGNOSIS — M79.2 NEUROPATHIC PAIN: ICD-10-CM

## 2022-12-29 DIAGNOSIS — R20.2 NUMBNESS AND TINGLING OF RIGHT LOWER EXTREMITY: ICD-10-CM

## 2022-12-29 DIAGNOSIS — I73.9 PERIPHERAL ARTERIAL DISEASE (HCC): ICD-10-CM

## 2022-12-29 DIAGNOSIS — R20.0 NUMBNESS AND TINGLING OF RIGHT LOWER EXTREMITY: ICD-10-CM

## 2022-12-29 PROCEDURE — 3017F COLORECTAL CA SCREEN DOC REV: CPT | Performed by: INTERNAL MEDICINE

## 2022-12-29 PROCEDURE — 99213 OFFICE O/P EST LOW 20 MIN: CPT | Performed by: INTERNAL MEDICINE

## 2022-12-29 PROCEDURE — G8427 DOCREV CUR MEDS BY ELIG CLIN: HCPCS | Performed by: INTERNAL MEDICINE

## 2022-12-29 PROCEDURE — 1123F ACP DISCUSS/DSCN MKR DOCD: CPT | Performed by: INTERNAL MEDICINE

## 2022-12-29 PROCEDURE — G8484 FLU IMMUNIZE NO ADMIN: HCPCS | Performed by: INTERNAL MEDICINE

## 2022-12-29 PROCEDURE — G8420 CALC BMI NORM PARAMETERS: HCPCS | Performed by: INTERNAL MEDICINE

## 2022-12-29 PROCEDURE — 1036F TOBACCO NON-USER: CPT | Performed by: INTERNAL MEDICINE

## 2022-12-29 RX ORDER — OXYCODONE HYDROCHLORIDE AND ACETAMINOPHEN 5; 325 MG/1; MG/1
1 TABLET ORAL 3 TIMES DAILY PRN
Qty: 84 TABLET | Refills: 0 | Status: SHIPPED | OUTPATIENT
Start: 2022-12-29 | End: 2023-01-26

## 2022-12-29 RX ORDER — PREGABALIN 75 MG/1
75 CAPSULE ORAL 3 TIMES DAILY
Qty: 90 CAPSULE | Refills: 1 | Status: SHIPPED | OUTPATIENT
Start: 2022-12-29 | End: 2023-01-28

## 2022-12-29 RX ORDER — DULOXETIN HYDROCHLORIDE 30 MG/1
30 CAPSULE, DELAYED RELEASE ORAL DAILY
Qty: 30 CAPSULE | Refills: 1 | Status: SHIPPED | OUTPATIENT
Start: 2022-12-29

## 2022-12-29 NOTE — PROGRESS NOTES
Rosio Wilson  1950  [id number]      HISTORY OF PRESENT ILLNESS:  Mr. Jeromy Evans is a 67 y.o. male returns for a follow up visit for pain management  He has a diagnosis of   1. Chronic pain syndrome    2. Neuropathic pain    3. Peripheral arterial disease ([address]    4. Numbness and tingling of right lower extremity    . He complains of pain in the  Low back, left leg  He rates the pain 10/10 and describes it as sharp. Current treatment regimen has helped relieve about 10% of the pain. He denies any side effects from the current pain regimen. Patient reports that since the last follow up visit the physical functioning is worse, family/social relationships are worse, mood is worse sleep patterns are worse, and that the overall functioning is worse. Patient denies misusing/abusing his narcotic pain medications or using any illegal drugs. There are No indicators for possible drug abuse, addiction or diversion problems. Patient states he is doing fair, pain has been baseline tolerable. He mentions he is not using Lyrica and is on Cymbalta still. He says he had surgery for his nose and scalp. He reports he is using Percocet 3 per day. He is non complaint with his medications. ALLERGIES: Patients list of allergies were reviewed     MEDICATIONS: Mr. Jeromy Evans list of medications were reviewed. His current medications are   Outpatient Medications Prior to Visit   Medication Sig Dispense Refill    DULoxetine (CYMBALTA) 30 MG extended release capsule Take 1 capsule by mouth daily 30 capsule 1    oxyCODONE-acetaminophen (PERCOCET) 5-325 MG per tablet Take 1 tablet by mouth 3 times daily as needed for Pain for up to 35 days.  105 tablet 0    pregabalin (LYRICA) 75 MG capsule Take one capsule am 2 capsule po pm 90 capsule 0    amLODIPine (NORVASC) 10 MG tablet Take 1 tablet by mouth daily 90 tablet 1    hydrOXYzine pamoate (VISTARIL) 25 MG capsule Take 25 mg by mouth daily 1-2 capsules evening      atorvastatin (LIPITOR) 20 MG tablet Take 20 mg by mouth daily      clopidogrel (PLAVIX) 75 MG tablet Take 1 tablet by mouth in the morning. 90 tablet 3    aspirin EC 81 MG EC tablet Take 1 tablet by mouth daily . 30 tablet 0     No facility-administered medications prior to visit. REVIEW OF SYSTEMS:    Respiratory: Negative for apnea, chest tightness and shortness of breath or change in baseline breathing. PHYSICAL EXAM:   Nursing note and vitals reviewed. /72   Pulse 97   Wt 152 lb (68.9 kg)   BMI 22.45 kg/m²   Constitutional: He appears well-developed and well-nourished. No acute distress. Cardiovascular: Normal rate, regular rhythm, normal heart sounds, and does not have murmur. Pulmonary/Chest: Effort normal. No respiratory distress. He does not have wheezes in the lung fields. He has no rales. Neurological/Psychiatric:He is alert and oriented to person, place, and time. Coordination is  normal.  His mood isAppropriate and affect is Neutral/Euthymic(normal) . Other: + Fascial and nasal graft, using walker     IMPRESSION:   1. Chronic pain syndrome    2. Neuropathic pain    3. Peripheral arterial disease ([address]    4. Numbness and tingling of right lower extremity        PLAN:  Informed verbal consent was obtained  -continue with percocet 3 per day   -He was advised to increase fluids ( 5-7  glasses of fluid daily), limit caffeine, avoid cheese products, increase dietary fiber, increase activity and exercise as tolerated and relax regularly and enjoy meals   -Continue with Cymbalta 30 mg  -Continue/restart Lyrica 75 mg TID    Current Outpatient Medications   Medication Sig Dispense Refill    DULoxetine (CYMBALTA) 30 MG extended release capsule Take 1 capsule by mouth daily 30 capsule 1    oxyCODONE-acetaminophen (PERCOCET) 5-325 MG per tablet Take 1 tablet by mouth 3 times daily as needed for Pain for up to 35 days.  105 tablet 0    pregabalin (LYRICA) 75 MG capsule Take one capsule am 2 capsule po pm 90 capsule 0    amLODIPine (NORVASC) 10 MG tablet Take 1 tablet by mouth daily 90 tablet 1    hydrOXYzine pamoate (VISTARIL) 25 MG capsule Take 25 mg by mouth daily 1-2 capsules evening      atorvastatin (LIPITOR) 20 MG tablet Take 20 mg by mouth daily      clopidogrel (PLAVIX) 75 MG tablet Take 1 tablet by mouth in the morning. 90 tablet 3    aspirin EC 81 MG EC tablet Take 1 tablet by mouth daily . 30 tablet 0     No current facility-administered medications for this visit. I will continue his current medication regimen  which is part of the above treatment schedule. It has been helping with Mr. Caroline Castillo chronic  medical problems which for this visit include:   Diagnoses of Chronic pain syndrome, Neuropathic pain, Peripheral arterial disease ([address] and Numbness and tingling of right lower extremity were pertinent to this visit. Risks and benefits of the medications and other alternative treatments  including no treatment were discussed with the patient. The common side effects of these medications were also explained to the patient. Informed verbal consent was obtained. Goals of current treatment regimen include improvement in pain, restoration of functioning- with focus on improvement in physical performance, general activity, work or disability,emotional distress, health care utilization and  decreased medication consumption. Will continue to monitor progress towards achieving/maintaining therapeutic goals with special emphasis on  1. Improvement in perceived interfernce  of pain with ADL's. Ability to do home exercises independently. Ability to do household chores indoor and/or outdoor work and social and leisure activities. Improve psychosocial and physical functioning. - he is showing progression towards this treatment goal with the current regimen.      He was advised against drinking alcohol with the narcotic pain medicines, advised against driving or handling machinery while adjusting the dose of medicines or if having cognitive  issues related to the current medications. Risk of overdose and death, if medicines not taken as prescribed, were also discussed. If the patient develops new symptoms or if the symptoms worsen, the patient should call the office. While transcribing every attempt was made to maintain the accuracy of the note in terms of it's contents,there may have been some errors made inadvertently. Thank you for allowing me to participate in the care of this patient.     Dustin Lovell MD.    Cc: Asad Bethea

## 2022-12-30 RX ORDER — AMLODIPINE BESYLATE 10 MG/1
10 TABLET ORAL DAILY
Qty: 90 TABLET | Refills: 3 | Status: SHIPPED | OUTPATIENT
Start: 2022-12-30

## 2023-01-05 PROCEDURE — 93228 REMOTE 30 DAY ECG REV/REPORT: CPT | Performed by: NURSE PRACTITIONER

## 2023-01-26 ENCOUNTER — OFFICE VISIT (OUTPATIENT)
Dept: PAIN MANAGEMENT | Age: 73
End: 2023-01-26
Payer: MEDICARE

## 2023-01-26 VITALS
DIASTOLIC BLOOD PRESSURE: 87 MMHG | WEIGHT: 147 LBS | BODY MASS INDEX: 21.71 KG/M2 | SYSTOLIC BLOOD PRESSURE: 134 MMHG | HEART RATE: 104 BPM

## 2023-01-26 DIAGNOSIS — G89.4 CHRONIC PAIN SYNDROME: ICD-10-CM

## 2023-01-26 DIAGNOSIS — M79.2 NEUROPATHIC PAIN: ICD-10-CM

## 2023-01-26 DIAGNOSIS — I73.9 PERIPHERAL ARTERIAL DISEASE (HCC): ICD-10-CM

## 2023-01-26 PROCEDURE — 3017F COLORECTAL CA SCREEN DOC REV: CPT | Performed by: INTERNAL MEDICINE

## 2023-01-26 PROCEDURE — G8420 CALC BMI NORM PARAMETERS: HCPCS | Performed by: INTERNAL MEDICINE

## 2023-01-26 PROCEDURE — 1036F TOBACCO NON-USER: CPT | Performed by: INTERNAL MEDICINE

## 2023-01-26 PROCEDURE — G8427 DOCREV CUR MEDS BY ELIG CLIN: HCPCS | Performed by: INTERNAL MEDICINE

## 2023-01-26 PROCEDURE — 99213 OFFICE O/P EST LOW 20 MIN: CPT | Performed by: INTERNAL MEDICINE

## 2023-01-26 PROCEDURE — G8484 FLU IMMUNIZE NO ADMIN: HCPCS | Performed by: INTERNAL MEDICINE

## 2023-01-26 PROCEDURE — 1123F ACP DISCUSS/DSCN MKR DOCD: CPT | Performed by: INTERNAL MEDICINE

## 2023-01-26 RX ORDER — OXYCODONE HYDROCHLORIDE AND ACETAMINOPHEN 5; 325 MG/1; MG/1
1 TABLET ORAL 3 TIMES DAILY PRN
Qty: 84 TABLET | Refills: 0 | Status: SHIPPED | OUTPATIENT
Start: 2023-01-26 | End: 2023-02-23

## 2023-01-26 RX ORDER — PREGABALIN 75 MG/1
75 CAPSULE ORAL 3 TIMES DAILY
Qty: 90 CAPSULE | Refills: 1 | Status: SHIPPED | OUTPATIENT
Start: 2023-01-26 | End: 2023-02-25

## 2023-01-26 RX ORDER — DULOXETIN HYDROCHLORIDE 30 MG/1
30 CAPSULE, DELAYED RELEASE ORAL DAILY
Qty: 30 CAPSULE | Refills: 1 | Status: SHIPPED | OUTPATIENT
Start: 2023-01-26

## 2023-01-26 NOTE — PROGRESS NOTES
Patito Walls  1950  [id number]      HISTORY OF PRESENT ILLNESS:  Mr. Samantha Angel is a 67 y.o. male returns for a follow up visit for pain management  He has a diagnosis of   1. Chronic pain syndrome    2. Neuropathic pain    3. Peripheral arterial disease ([address]    . He complains of pain in the  Low back, left leg   He rates the pain 10/10 and describes it as sharp. Current treatment regimen has helped relieve about 10% of the pain. He denies any side effects from the current pain regimen. Patient reports that since the last follow up visit the physical functioning is unchanged, family/social relationships are unchanged, mood is unchanged sleep patterns are unchanged, and that the overall functioning is unchanged. Patient denies misusing/abusing his narcotic pain medications or using any illegal drugs. There are No indicators for possible drug abuse, addiction or diversion problems. Patient states he has been having pain in the foot , leg and hip. He says the pain is mostly in the right leg. He mentions he has not his xray of Lumbar spine done yet. He reports he is using Percocet 3 per day. He states he was diagnosis with a mass in his stomach a month ago and is seeing oncology. He reports he is using all the other adjuvants. ALLERGIES: Patients list of allergies were reviewed     MEDICATIONS: Mr. Samantha Angle list of medications were reviewed. His current medications are   Outpatient Medications Prior to Visit   Medication Sig Dispense Refill    amLODIPine (NORVASC) 10 MG tablet TAKE 1 TABLET BY MOUTH DAILY 90 tablet 3    hydrOXYzine pamoate (VISTARIL) 25 MG capsule Take 25 mg by mouth daily 1-2 capsules evening      atorvastatin (LIPITOR) 20 MG tablet Take 20 mg by mouth daily      clopidogrel (PLAVIX) 75 MG tablet Take 1 tablet by mouth in the morning. 90 tablet 3    oxyCODONE-acetaminophen (PERCOCET) 5-325 MG per tablet Take 1 tablet by mouth 3 times daily as needed for Pain for up to 28 days.  Max Daily Amount: 3 tablets 84 tablet 0    DULoxetine (CYMBALTA) 30 MG extended release capsule Take 1 capsule by mouth daily 30 capsule 1    pregabalin (LYRICA) 75 MG capsule Take 1 capsule by mouth 3 times daily for 30 days. Max Daily Amount: 225 mg 90 capsule 1    aspirin EC 81 MG EC tablet Take 1 tablet by mouth daily . 30 tablet 0     No facility-administered medications prior to visit.        REVIEW OF SYSTEMS:    Respiratory: Negative for apnea, chest tightness and shortness of breath or change in baseline breathing.      PHYSICAL EXAM:   Nursing note and vitals reviewed. /87   Pulse (!) 104   Wt 147 lb (66.7 kg)   BMI 21.71 kg/m²   Constitutional: He appears well-developed and well-nourished. No acute distress.   Cardiovascular: Normal rate, regular rhythm, normal heart sounds, and does not have murmur.     Pulmonary/Chest: Effort normal. No respiratory distress. He does not have wheezes in the lung fields. He has no rales.     Neurological/Psychiatric:He is alert and oriented to person, place, and time. Coordination is  normal.  His mood isAppropriate and affect is Neutral/Euthymic(normal) .   Other: using a walker     IMPRESSION:   1. Chronic pain syndrome    2. Neuropathic pain    3. Peripheral arterial disease ([facility])        PLAN:  Informed verbal consent was obtained  - [place] record was obtained and reviewed  for the last one year and no indicators of drug misuse  were found. Any other controlled substance prescriptions  seen on the record have been accounted for, I am aware of the patient receiving these medications. . [place] record will be rechecked as part of office protocol.    -ROM/Stretching exercises as advised   -He was advised to increase fluids ( 5-7  glasses of fluid daily), limit caffeine, avoid cheese products, increase dietary fiber, increase activity and exercise as tolerated and relax regularly and enjoy meals   -Continue with Percocet 3 per day   -Continue with all other adjuvants as  before   Current Outpatient Medications   Medication Sig Dispense Refill    oxyCODONE-acetaminophen (PERCOCET) 5-325 MG per tablet Take 1 tablet by mouth 3 times daily as needed for Pain for up to 28 days. 84 tablet 0    DULoxetine (CYMBALTA) 30 MG extended release capsule Take 1 capsule by mouth daily 30 capsule 1    pregabalin (LYRICA) 75 MG capsule Take 1 capsule by mouth 3 times daily for 30 days. Max Daily Amount: 225 mg 90 capsule 1    amLODIPine (NORVASC) 10 MG tablet TAKE 1 TABLET BY MOUTH DAILY 90 tablet 3    hydrOXYzine pamoate (VISTARIL) 25 MG capsule Take 25 mg by mouth daily 1-2 capsules evening      atorvastatin (LIPITOR) 20 MG tablet Take 20 mg by mouth daily      clopidogrel (PLAVIX) 75 MG tablet Take 1 tablet by mouth in the morning. 90 tablet 3    aspirin EC 81 MG EC tablet Take 1 tablet by mouth daily . 30 tablet 0     No current facility-administered medications for this visit. I will continue his current medication regimen  which is part of the above treatment schedule. It has been helping with Mr. Theresa Mccormick chronic  medical problems which for this visit include:   Diagnoses of Chronic pain syndrome, Neuropathic pain, and Peripheral arterial disease ([facility] [address] were pertinent to this visit. Risks and benefits of the medications and other alternative treatments  including no treatment were discussed with the patient. The common side effects of these medications were also explained to the patient. Informed verbal consent was obtained. Goals of current treatment regimen include improvement in pain, restoration of functioning- with focus on improvement in physical performance, general activity, work or disability,emotional distress, health care utilization and  decreased medication consumption. Will continue to monitor progress towards achieving/maintaining therapeutic goals with special emphasis on  1. Improvement in perceived interfernce  of pain with ADL's.  Ability to do home exercises independently. Ability to do household chores indoor and/or outdoor work and social and leisure activities. Improve psychosocial and physical functioning. - he is showing progression towards this treatment goal with the current regimen. He was advised against drinking alcohol with the narcotic pain medicines, advised against driving or handling machinery while adjusting the dose of medicines or if having cognitive  issues related to the current medications. Risk of overdose and death, if medicines not taken as prescribed, were also discussed. If the patient develops new symptoms or if the symptoms worsen, the patient should call the office. While transcribing every attempt was made to maintain the accuracy of the note in terms of it's contents,there may have been some errors made inadvertently. Thank you for allowing me to participate in the care of this patient.     Janyth Boeck, MD.    Cc: Raheel Driscoll

## 2023-02-02 ENCOUNTER — TELEPHONE (OUTPATIENT)
Dept: PAIN MANAGEMENT | Age: 73
End: 2023-02-02

## 2023-02-02 NOTE — TELEPHONE ENCOUNTER
Patient called and said they are out of stock of his PERCOCET and the [facility] that has it in stock is to the [facility] on [address] [facility]    Please advise

## 2023-03-02 ENCOUNTER — OFFICE VISIT (OUTPATIENT)
Dept: PAIN MANAGEMENT | Age: 73
End: 2023-03-02
Payer: MEDICARE

## 2023-03-02 VITALS
BODY MASS INDEX: 20.67 KG/M2 | SYSTOLIC BLOOD PRESSURE: 130 MMHG | WEIGHT: 140 LBS | HEART RATE: 100 BPM | DIASTOLIC BLOOD PRESSURE: 80 MMHG

## 2023-03-02 DIAGNOSIS — F39 MOOD DISORDER (HCC): ICD-10-CM

## 2023-03-02 DIAGNOSIS — R20.0 NUMBNESS AND TINGLING OF RIGHT LOWER EXTREMITY: ICD-10-CM

## 2023-03-02 DIAGNOSIS — I73.9 PERIPHERAL ARTERIAL DISEASE (HCC): ICD-10-CM

## 2023-03-02 DIAGNOSIS — M79.2 NEUROPATHIC PAIN: ICD-10-CM

## 2023-03-02 DIAGNOSIS — F51.01 PRIMARY INSOMNIA: ICD-10-CM

## 2023-03-02 DIAGNOSIS — G89.4 CHRONIC PAIN SYNDROME: ICD-10-CM

## 2023-03-02 DIAGNOSIS — R20.2 NUMBNESS AND TINGLING OF RIGHT LOWER EXTREMITY: ICD-10-CM

## 2023-03-02 PROCEDURE — 1036F TOBACCO NON-USER: CPT | Performed by: INTERNAL MEDICINE

## 2023-03-02 PROCEDURE — 99214 OFFICE O/P EST MOD 30 MIN: CPT | Performed by: INTERNAL MEDICINE

## 2023-03-02 PROCEDURE — 3017F COLORECTAL CA SCREEN DOC REV: CPT | Performed by: INTERNAL MEDICINE

## 2023-03-02 PROCEDURE — G8420 CALC BMI NORM PARAMETERS: HCPCS | Performed by: INTERNAL MEDICINE

## 2023-03-02 PROCEDURE — G8427 DOCREV CUR MEDS BY ELIG CLIN: HCPCS | Performed by: INTERNAL MEDICINE

## 2023-03-02 PROCEDURE — 1123F ACP DISCUSS/DSCN MKR DOCD: CPT | Performed by: INTERNAL MEDICINE

## 2023-03-02 PROCEDURE — G8484 FLU IMMUNIZE NO ADMIN: HCPCS | Performed by: INTERNAL MEDICINE

## 2023-03-02 RX ORDER — DIAZEPAM 10 MG/1
10 TABLET ORAL 2 TIMES DAILY PRN
COMMUNITY
End: 2023-03-02

## 2023-03-02 RX ORDER — OXYCODONE HYDROCHLORIDE AND ACETAMINOPHEN 5; 325 MG/1; MG/1
1 TABLET ORAL 3 TIMES DAILY PRN
Qty: 90 TABLET | Refills: 0 | Status: SHIPPED | OUTPATIENT
Start: 2023-03-02 | End: 2023-04-01

## 2023-03-02 RX ORDER — VANCOMYCIN HYDROCHLORIDE 125 MG/1
125 CAPSULE ORAL 4 TIMES DAILY
COMMUNITY

## 2023-03-02 RX ORDER — PREGABALIN 75 MG/1
75 CAPSULE ORAL 3 TIMES DAILY
Qty: 90 CAPSULE | Refills: 1 | Status: SHIPPED | OUTPATIENT
Start: 2023-03-02 | End: 2023-04-01

## 2023-03-02 RX ORDER — AMOXICILLIN 250 MG
1 CAPSULE ORAL EVERY 12 HOURS PRN
COMMUNITY

## 2023-03-02 RX ORDER — DULOXETIN HYDROCHLORIDE 60 MG/1
60 CAPSULE, DELAYED RELEASE ORAL DAILY
Qty: 30 CAPSULE | Refills: 1 | Status: SHIPPED | OUTPATIENT
Start: 2023-03-02

## 2023-03-02 RX ORDER — QUETIAPINE FUMARATE 25 MG/1
25-50 TABLET, FILM COATED ORAL NIGHTLY
Qty: 60 TABLET | Refills: 1 | Status: SHIPPED | OUTPATIENT
Start: 2023-03-02

## 2023-03-02 RX ORDER — ACETAMINOPHEN 500 MG
1000 TABLET ORAL EVERY 8 HOURS
COMMUNITY

## 2023-03-02 NOTE — PROGRESS NOTES
Eleanor Dixon  1950  [id number]    HISTORY OF PRESENT ILLNESS:  Mr. Lenore Machado is a 67 y.o. male returns for a follow up visit for multiple medical problems. His  presenting problems are   1. Chronic pain syndrome    2. Peripheral arterial disease ([facility] [address]    3. Neuropathic pain    4. Numbness and tingling of right lower extremity    5. Primary insomnia    6. Mood disorder ([facility] [address] [address]    . As per information/history obtained from the PADT(patient assessment and documentation tool) -  He complains of pain in the lower back with radiation to the hips Left, upper leg Left, knees Left, lower leg Left, ankles Left, and feet Left He rates the pain 10/10 and describes it as sharp. Pain is made worse by: movement, walking, standing, sitting, bending, lifting. Current treatment regimen has helped relieve about 10% of the pain. He denies side effects from the current pain regimen. Patient reports that since the last follow up visit the physical functioning is unchanged, family/social relationships are unchanged, mood is unchanged and sleep patterns are unchanged, and that the overall functioning is unchanged. Patient denies neurological bowel or bladder. Patient denies misusing/abusing his narcotic pain medications or using any illegal drugs. There are No indicators for possible drug abuse, addiction or diversion problems. Upon obtaining the medical history from Mr. Lenore Machado regarding today's office visit for his presenting problems, patient states he was in the hospital/ER due to [address] He mentions he is on medications for it. He says he is using Percocet 3 per day along with other adjuvants. He reports his sleep has been poor. Sleep is poor,  poor sleep latency, averages 2-3 hours of sleep at night. Intermittent, non restorative. Does not feel rested in AM. Complains of feeling sleepy  during the day. He reports the Valium helps a lot and wants to go on it. Patient's  subjective report of his mood is fair.  he describes occasional symptoms of depression, occasional  irritability and some mood swings. Describes his mood as being neutral and reports some pleasure in his daily activities. Reports  fair  appetite, energy and concentration. Able to function well in different aspects of his daily activities. Denies suicidal or homicidal ideation. Denies any complaints of increased tension, does   Worry sometimes and occasional  irritability  he denies any c/o increased anxiety, No c/o panic attacks or symptoms of PTSD. He says he is using Cymbalta 30 mg. ALLERGIES/PAST MED/FAM/SOC HISTORY: Mr. Cayetano Cazares allergies, past medical, family and social history were reviewed in the chart. Mr. Cayetano Cazares current medications are   Outpatient Medications Prior to Visit   Medication Sig Dispense Refill    vancomycin (VANCOCIN) 125 MG capsule Take 125 mg by mouth 4 times daily      senna-docusate (STIMULANT LAXATIVE) 8.6-50 MG per tablet Take 1 tablet by mouth every 12 hours as needed for Constipation      acetaminophen (TYLENOL) 500 MG tablet Take 1,000 mg by mouth every 8 (eight) hours      oxyCODONE-acetaminophen (PERCOCET) 5-325 MG per tablet Take 1 tablet by mouth 3 times daily as needed for Pain for up to 28 days. 84 tablet 0    DULoxetine (CYMBALTA) 30 MG extended release capsule Take 1 capsule by mouth daily 30 capsule 1    hydrOXYzine pamoate (VISTARIL) 25 MG capsule Take 25 mg by mouth daily 1-2 capsules evening      atorvastatin (LIPITOR) 20 MG tablet Take 20 mg by mouth at bedtime      clopidogrel (PLAVIX) 75 MG tablet Take 1 tablet by mouth in the morning. 90 tablet 3    aspirin EC 81 MG EC tablet Take 1 tablet by mouth daily . 30 tablet 0    diazePAM (VALIUM) 10 MG tablet Take 10 mg by mouth 2 times daily as needed for Anxiety. pregabalin (LYRICA) 75 MG capsule Take 1 capsule by mouth 3 times daily for 30 days.  Max Daily Amount: 225 mg 90 capsule 1    amLODIPine (NORVASC) 10 MG tablet TAKE 1 TABLET BY MOUTH DAILY 90 tablet 3 No facility-administered medications prior to visit. REVIEW OF SYSTEMS: .   Respiratory: Negative for shortness of breath. Cardiovascular: Negative for chest pain, palpitations  Gastrointestinal: Negative for blood in stool, abdominal distention, nausea, vomiting, abdominal pain, diarrhea,constipation. Neurological: Negative for speech difficulty, weakness and light-headedness, dizziness, tremors, sleepiness  Psychiatric/Behavioral: Negative for suicidal ideas, hallucinations, behavioral problems, self-injury, decreased concentration/cognition, agitation, confusion. PHYSICAL EXAM:   Nursing note and vitals reviewed. /80   Pulse 100   Wt 140 lb (63.5 kg)   BMI 20.67 kg/m²   General Appearance: Patient is well nourished, well developed. Skin: Skin is warm and dry, good turgor . No rash or lesions noted. He is not diaphoretic. Pulmonary/Chest: Effort normal. No respiratory distress or use of accessory muscles. Auscultation revealing normal air entry. He does not have wheezes in the lung fields. He has no rales. Cardiovascular: Normal rate, regular rhythm, normal heart sounds, and does not have murmur. Exam reveals no gallop and no friction rub. Musculoskeletal / Extremities: Range of motion is normal. Gait is normal, assistive devices use: walker. He exhibits edema: none, and no tenderness. Neurological/Psychiatric:He is alert and oriented to person, place, and time. Coordination is  normal.   Judgement and Insight is normal  His mood is Appropriate and affect is Neutral/Euthymic(normal) . His behavior is normal.   thought content normal.        IMPRESSION:     1. Chronic pain syndrome    2. Peripheral arterial disease ([address]    3. Neuropathic pain    4. Numbness and tingling of right lower extremity    5. Primary insomnia    6. Mood disorder ([address]        PLAN:  Informed verbal consent was obtained.   -[place] record was obtained and reviewed  for the last one year and no indicators of drug misuse  were found. Any other controlled substance prescriptions  seen on the record have been accounted for, I am aware of the patient receiving these medications. Joe Paez [place] record will be rechecked as part of office protocol.    -he was advised proper sleep hygiene-told to avoid:use of caffeine or other stimulants after noon, alcohol use near bedtime, long or frequent naps during the day, erratic sleep schedule, heavy meals near bedtime, vigorous exercise near bedtime and use of electronic devices near bedtime   -Start Seroquel 25 mg 1-2 at night   -CBT techniques- relaxation therapies such as biofeedback, mindfulness based stress reduction, imagery, cognitive restructuring, problem solving discussed with patient   -Continue with Cymbalta increase to 60 mg   -Continue with Lyrica 75 mg TID   -Continue with Percocet 3 per day   -Interim history reviewed   Mr. Jermaine Schaefer will be prescribed  the medications  listed below which are for treatment of his presenting  medical problems which for this visit include:   Diagnoses of Chronic pain syndrome, Peripheral arterial disease ([address] Neuropathic pain, Numbness and tingling of right lower extremity, Primary insomnia, and Mood disorder ([address] were pertinent to this visit. Medications/orders associated with this visit:    Current Outpatient Medications   Medication Sig Dispense Refill    vancomycin (VANCOCIN) 125 MG capsule Take 125 mg by mouth 4 times daily      senna-docusate (STIMULANT LAXATIVE) 8.6-50 MG per tablet Take 1 tablet by mouth every 12 hours as needed for Constipation      acetaminophen (TYLENOL) 500 MG tablet Take 1,000 mg by mouth every 8 (eight) hours      oxyCODONE-acetaminophen (PERCOCET) 5-325 MG per tablet Take 1 tablet by mouth 3 times daily as needed for Pain for up to 28 days.  84 tablet 0    DULoxetine (CYMBALTA) 30 MG extended release capsule Take 1 capsule by mouth daily 30 capsule 1    hydrOXYzine pamoate (VISTARIL) 25 MG capsule Take 25 mg by mouth daily 1-2 capsules evening      atorvastatin (LIPITOR) 20 MG tablet Take 20 mg by mouth at bedtime      clopidogrel (PLAVIX) 75 MG tablet Take 1 tablet by mouth in the morning. 90 tablet 3    aspirin EC 81 MG EC tablet Take 1 tablet by mouth daily . 30 tablet 0    pregabalin (LYRICA) 75 MG capsule Take 1 capsule by mouth 3 times daily for 30 days. Max Daily Amount: 225 mg 90 capsule 1     No current facility-administered medications for this visit. Goals of current treatment regimen include improvement in pain, restoration of functioning- with focus on improvement in physical performance, general activity, work or disability,emotional distress, health care utilization and  decreased medication consumption. Will continue to monitor progress towards achieving/maintaining therapeutic goals with special emphasis on  1. Improvement in perceived interfernce  of pain with ADL's. Ability to do home exercises independently. Ability to do household chores indoor and/or outdoor work and social and leisure activities. To increase flexibility/ROM, strength and endurance. Improve psychosocial and physical functioning.- he is showing progression towards this treatment goal with the current regimen. 2. Improving sleep to 6-7 hours a night. Improve mood/ anxiety and depression symptoms such as crying spells, low energy, problems with concentration, motivation.- he is not showing any significant progress/or showing regression  towards this goal and reassessment and adjustment of goals/treatment have been made. 3. Reduction of reliance on opioid analgesia/more appropriate opioid use. - he is showing progression towards this treatment goal with the current regimen. Risks and benefits of the medications and other alternative treatments have been/were  discussed with the patient. Any questions on the  common side effects of these medications were also answered.   He was advised against drinking alcohol with the narcotic pain medicines, advised against driving or handling machinery when  starting or adjusting the dose of medicines, feeling groggy or drowsy, or if having any cognitive issues related to the current medications. Heis fully aware of the risk of overdose and death, if medicines are misused and not taken as prescribed. If he develops new symptoms or if the symptoms worsen, he was told to call the office. .  Thank you for allowing me to participate in the care of this patient.     Lorna Singh MD    Cc: Timothy Johnson

## 2023-03-30 ENCOUNTER — OFFICE VISIT (OUTPATIENT)
Dept: PAIN MANAGEMENT | Age: 73
End: 2023-03-30
Payer: MEDICARE

## 2023-03-30 VITALS
DIASTOLIC BLOOD PRESSURE: 88 MMHG | BODY MASS INDEX: 22 KG/M2 | SYSTOLIC BLOOD PRESSURE: 143 MMHG | WEIGHT: 149 LBS | HEART RATE: 114 BPM

## 2023-03-30 DIAGNOSIS — R20.0 NUMBNESS AND TINGLING OF RIGHT LOWER EXTREMITY: ICD-10-CM

## 2023-03-30 DIAGNOSIS — R20.2 NUMBNESS AND TINGLING OF RIGHT LOWER EXTREMITY: ICD-10-CM

## 2023-03-30 DIAGNOSIS — M79.2 NEUROPATHIC PAIN: ICD-10-CM

## 2023-03-30 DIAGNOSIS — I73.9 PERIPHERAL ARTERIAL DISEASE (HCC): ICD-10-CM

## 2023-03-30 DIAGNOSIS — G89.4 CHRONIC PAIN SYNDROME: ICD-10-CM

## 2023-03-30 PROCEDURE — G8484 FLU IMMUNIZE NO ADMIN: HCPCS | Performed by: INTERNAL MEDICINE

## 2023-03-30 PROCEDURE — 3017F COLORECTAL CA SCREEN DOC REV: CPT | Performed by: INTERNAL MEDICINE

## 2023-03-30 PROCEDURE — G8420 CALC BMI NORM PARAMETERS: HCPCS | Performed by: INTERNAL MEDICINE

## 2023-03-30 PROCEDURE — 1123F ACP DISCUSS/DSCN MKR DOCD: CPT | Performed by: INTERNAL MEDICINE

## 2023-03-30 PROCEDURE — 1036F TOBACCO NON-USER: CPT | Performed by: INTERNAL MEDICINE

## 2023-03-30 PROCEDURE — 99213 OFFICE O/P EST LOW 20 MIN: CPT | Performed by: INTERNAL MEDICINE

## 2023-03-30 PROCEDURE — G8427 DOCREV CUR MEDS BY ELIG CLIN: HCPCS | Performed by: INTERNAL MEDICINE

## 2023-03-30 RX ORDER — DULOXETIN HYDROCHLORIDE 60 MG/1
60 CAPSULE, DELAYED RELEASE ORAL DAILY
Qty: 30 CAPSULE | Refills: 1 | Status: SHIPPED | OUTPATIENT
Start: 2023-03-30

## 2023-03-30 RX ORDER — PREGABALIN 75 MG/1
75 CAPSULE ORAL 3 TIMES DAILY
Qty: 90 CAPSULE | Refills: 1 | Status: SHIPPED | OUTPATIENT
Start: 2023-03-30 | End: 2023-04-29

## 2023-03-30 RX ORDER — OXYCODONE HYDROCHLORIDE AND ACETAMINOPHEN 5; 325 MG/1; MG/1
1 TABLET ORAL EVERY 6 HOURS PRN
Qty: 100 TABLET | Refills: 0 | Status: SHIPPED | OUTPATIENT
Start: 2023-03-30 | End: 2023-04-27

## 2023-03-31 NOTE — PROGRESS NOTES
obtained and reviewed  for the last one year and no indicators of drug misuse  were found. Any other controlled substance prescriptions  seen on the record have been accounted for, I am aware of the patient receiving these medications. Lucía GALO record will be rechecked as part of office protocol.    -Continue with all other adjuvants as before   -Patient was advised to bring in all their medication bottles on the next follow up visit for medication review.    -Discontinue Seroquel    Current Outpatient Medications   Medication Sig Dispense Refill    oxyCODONE-acetaminophen (PERCOCET) 5-325 MG per tablet Take 1 tablet by mouth every 6 hours as needed for Pain (max3-4 per day) for up to 28 days. Max Daily Amount: 4 tablets 100 tablet 0    DULoxetine (CYMBALTA) 60 MG extended release capsule Take 1 capsule by mouth daily 30 capsule 1    pregabalin (LYRICA) 75 MG capsule Take 1 capsule by mouth 3 times daily for 30 days. 90 capsule 1    vancomycin (VANCOCIN) 125 MG capsule Take 125 mg by mouth 4 times daily      senna-docusate (PERICOLACE) 8.6-50 MG per tablet Take 1 tablet by mouth every 12 hours as needed for Constipation      acetaminophen (TYLENOL) 500 MG tablet Take 1,000 mg by mouth every 8 (eight) hours      QUEtiapine (SEROQUEL) 25 MG tablet Take 1-2 tablets by mouth nightly 60 tablet 1    hydrOXYzine pamoate (VISTARIL) 25 MG capsule Take 25 mg by mouth daily 1-2 capsules evening      atorvastatin (LIPITOR) 20 MG tablet Take 20 mg by mouth at bedtime      clopidogrel (PLAVIX) 75 MG tablet Take 1 tablet by mouth in the morning. 90 tablet 3    aspirin EC 81 MG EC tablet Take 1 tablet by mouth daily . 30 tablet 0     No current facility-administered medications for this visit. I will continue his current medication regimen  which is part of the above treatment schedule.  It has been helping with Mr. Ja Carlson chronic  medical problems which for this visit include:   Diagnoses of Chronic pain syndrome, Numbness

## 2023-04-27 ENCOUNTER — OFFICE VISIT (OUTPATIENT)
Dept: PAIN MANAGEMENT | Age: 73
End: 2023-04-27
Payer: MEDICARE

## 2023-04-27 VITALS
SYSTOLIC BLOOD PRESSURE: 156 MMHG | WEIGHT: 152 LBS | DIASTOLIC BLOOD PRESSURE: 89 MMHG | HEART RATE: 72 BPM | BODY MASS INDEX: 22.45 KG/M2

## 2023-04-27 DIAGNOSIS — M79.2 NEUROPATHIC PAIN: ICD-10-CM

## 2023-04-27 DIAGNOSIS — R20.0 NUMBNESS AND TINGLING OF RIGHT LOWER EXTREMITY: ICD-10-CM

## 2023-04-27 DIAGNOSIS — R20.2 NUMBNESS AND TINGLING OF RIGHT LOWER EXTREMITY: ICD-10-CM

## 2023-04-27 DIAGNOSIS — G89.4 CHRONIC PAIN SYNDROME: ICD-10-CM

## 2023-04-27 DIAGNOSIS — I73.9 PERIPHERAL ARTERIAL DISEASE (HCC): ICD-10-CM

## 2023-04-27 PROCEDURE — 3017F COLORECTAL CA SCREEN DOC REV: CPT | Performed by: INTERNAL MEDICINE

## 2023-04-27 PROCEDURE — 99213 OFFICE O/P EST LOW 20 MIN: CPT | Performed by: INTERNAL MEDICINE

## 2023-04-27 PROCEDURE — G8420 CALC BMI NORM PARAMETERS: HCPCS | Performed by: INTERNAL MEDICINE

## 2023-04-27 PROCEDURE — 1123F ACP DISCUSS/DSCN MKR DOCD: CPT | Performed by: INTERNAL MEDICINE

## 2023-04-27 PROCEDURE — 1036F TOBACCO NON-USER: CPT | Performed by: INTERNAL MEDICINE

## 2023-04-27 PROCEDURE — G8427 DOCREV CUR MEDS BY ELIG CLIN: HCPCS | Performed by: INTERNAL MEDICINE

## 2023-04-27 RX ORDER — DULOXETIN HYDROCHLORIDE 60 MG/1
60 CAPSULE, DELAYED RELEASE ORAL DAILY
Qty: 30 CAPSULE | Refills: 1 | Status: SHIPPED | OUTPATIENT
Start: 2023-04-27

## 2023-04-27 RX ORDER — OXYCODONE HYDROCHLORIDE AND ACETAMINOPHEN 5; 325 MG/1; MG/1
1 TABLET ORAL EVERY 6 HOURS PRN
Qty: 120 TABLET | Refills: 0 | Status: SHIPPED | OUTPATIENT
Start: 2023-04-27 | End: 2023-06-01

## 2023-04-27 NOTE — PROGRESS NOTES
Radha Sifuentester  1950  [id number]      HISTORY OF PRESENT ILLNESS:  Mr. Nancy Paris is a 67 y.o. male returns for a follow up visit for pain management  He has a diagnosis of   1. Chronic pain syndrome    2. Numbness and tingling of right lower extremity    3. Peripheral arterial disease ([facility] [address] [facility]    4. [facility] disorder [facility]    5. Neuropathic pain    6. Primary insomnia    . He complains of pain in the right hip, right foot. He rates the pain 10/10 and describes it as sharp, aching, numbness, pins and needles. Current treatment regimen has helped relieve about 50% of the pain. He denies any side effects from the current pain regimen. Patient reports that since the last follow up visit the physical functioning is unchanged, family/social relationships are unchanged, mood is worse sleep patterns are worse, and that the overall functioning is worse. Patient denies misusing/abusing his narcotic pain medications or using any illegal drugs. There are No indicators for possible drug abuse, addiction or diversion problems. Patient states he has been doing fair. Mr. Nancy Paris mentions he is using Percocet along with Cymbalta. Patient denies any constipation symptoms. I am not sure if he has been compliant with his medications. Patients list of medications reviewed. He reports he has an appointment to see Oncology regarding the \"mass\" of abdomen. ALLERGIES: Patients list of allergies were reviewed     MEDICATIONS: Mr. Nancy Paris list of medications were reviewed. His current medications are   Outpatient Medications Prior to Visit   Medication Sig Dispense Refill    vancomycin (VANCOCIN) 125 MG capsule Take 1 capsule by mouth 4 times daily      senna-docusate (PERICOLACE) 8.6-50 MG per tablet Take 1 tablet by mouth every 12 hours as needed for Constipation      acetaminophen (TYLENOL) 500 MG tablet Take 2 tablets by mouth every 8 (eight) hours      QUEtiapine (SEROQUEL) 25 MG tablet Take 1-2 tablets by mouth nightly 60

## 2023-06-01 ENCOUNTER — OFFICE VISIT (OUTPATIENT)
Dept: PAIN MANAGEMENT | Age: 73
End: 2023-06-01
Payer: MEDICARE

## 2023-06-01 VITALS
SYSTOLIC BLOOD PRESSURE: 160 MMHG | HEART RATE: 86 BPM | DIASTOLIC BLOOD PRESSURE: 98 MMHG | WEIGHT: 152 LBS | BODY MASS INDEX: 22.45 KG/M2

## 2023-06-01 DIAGNOSIS — I73.9 PERIPHERAL ARTERIAL DISEASE (HCC): ICD-10-CM

## 2023-06-01 DIAGNOSIS — R20.2 NUMBNESS AND TINGLING OF RIGHT LOWER EXTREMITY: ICD-10-CM

## 2023-06-01 DIAGNOSIS — G89.4 CHRONIC PAIN SYNDROME: ICD-10-CM

## 2023-06-01 DIAGNOSIS — R20.0 NUMBNESS AND TINGLING OF RIGHT LOWER EXTREMITY: ICD-10-CM

## 2023-06-01 DIAGNOSIS — M79.2 NEUROPATHIC PAIN: ICD-10-CM

## 2023-06-01 PROCEDURE — G8420 CALC BMI NORM PARAMETERS: HCPCS | Performed by: INTERNAL MEDICINE

## 2023-06-01 PROCEDURE — 3017F COLORECTAL CA SCREEN DOC REV: CPT | Performed by: INTERNAL MEDICINE

## 2023-06-01 PROCEDURE — G8427 DOCREV CUR MEDS BY ELIG CLIN: HCPCS | Performed by: INTERNAL MEDICINE

## 2023-06-01 PROCEDURE — 99213 OFFICE O/P EST LOW 20 MIN: CPT | Performed by: INTERNAL MEDICINE

## 2023-06-01 PROCEDURE — 1123F ACP DISCUSS/DSCN MKR DOCD: CPT | Performed by: INTERNAL MEDICINE

## 2023-06-01 PROCEDURE — 1036F TOBACCO NON-USER: CPT | Performed by: INTERNAL MEDICINE

## 2023-06-01 RX ORDER — DULOXETIN HYDROCHLORIDE 60 MG/1
60 CAPSULE, DELAYED RELEASE ORAL DAILY
Qty: 30 CAPSULE | Refills: 1 | Status: SHIPPED | OUTPATIENT
Start: 2023-06-01

## 2023-06-01 RX ORDER — OXYCODONE HYDROCHLORIDE AND ACETAMINOPHEN 5; 325 MG/1; MG/1
1 TABLET ORAL EVERY 6 HOURS PRN
Qty: 100 TABLET | Refills: 0 | Status: SHIPPED | OUTPATIENT
Start: 2023-06-01 | End: 2023-06-29

## 2023-06-07 ENCOUNTER — HOSPITAL ENCOUNTER (OUTPATIENT)
Dept: CT IMAGING | Age: 73
Discharge: HOME | End: 2023-06-07
Payer: MEDICARE

## 2023-06-07 DIAGNOSIS — R19.09 OTHER INTRA-ABDOMINAL AND PELVIC SWELLING, MASS AND LUMP: ICD-10-CM

## 2023-06-07 LAB
PERFORMED ON: NORMAL
POC CREATININE: 1.1 MG/DL (ref 0.8–1.3)
POC SAMPLE TYPE: NORMAL

## 2023-06-07 PROCEDURE — 74177 CT ABD & PELVIS W/CONTRAST: CPT

## 2023-06-07 PROCEDURE — 82565 ASSAY OF CREATININE: CPT

## 2023-06-07 PROCEDURE — 6360000004 HC RX CONTRAST MEDICATION: Performed by: INTERNAL MEDICINE

## 2023-06-07 RX ADMIN — IOPAMIDOL 75 ML: 755 INJECTION, SOLUTION INTRAVENOUS at 15:16

## 2023-06-07 RX ADMIN — IOPAMIDOL 50 ML: 612 INJECTION, SOLUTION INTRAVENOUS at 15:16

## 2023-06-29 ENCOUNTER — OFFICE VISIT (OUTPATIENT)
Dept: PAIN MANAGEMENT | Age: 73
End: 2023-06-29
Payer: MEDICARE

## 2023-06-29 VITALS
BODY MASS INDEX: 23.48 KG/M2 | SYSTOLIC BLOOD PRESSURE: 134 MMHG | DIASTOLIC BLOOD PRESSURE: 74 MMHG | WEIGHT: 159 LBS | HEART RATE: 93 BPM

## 2023-06-29 DIAGNOSIS — I73.9 PERIPHERAL ARTERIAL DISEASE (HCC): ICD-10-CM

## 2023-06-29 DIAGNOSIS — M79.2 NEUROPATHIC PAIN: ICD-10-CM

## 2023-06-29 DIAGNOSIS — G89.4 CHRONIC PAIN SYNDROME: ICD-10-CM

## 2023-06-29 PROCEDURE — 99213 OFFICE O/P EST LOW 20 MIN: CPT | Performed by: INTERNAL MEDICINE

## 2023-06-29 PROCEDURE — G8427 DOCREV CUR MEDS BY ELIG CLIN: HCPCS | Performed by: INTERNAL MEDICINE

## 2023-06-29 PROCEDURE — 1036F TOBACCO NON-USER: CPT | Performed by: INTERNAL MEDICINE

## 2023-06-29 PROCEDURE — 1123F ACP DISCUSS/DSCN MKR DOCD: CPT | Performed by: INTERNAL MEDICINE

## 2023-06-29 PROCEDURE — 3017F COLORECTAL CA SCREEN DOC REV: CPT | Performed by: INTERNAL MEDICINE

## 2023-06-29 PROCEDURE — G8420 CALC BMI NORM PARAMETERS: HCPCS | Performed by: INTERNAL MEDICINE

## 2023-06-29 RX ORDER — DULOXETIN HYDROCHLORIDE 60 MG/1
60 CAPSULE, DELAYED RELEASE ORAL DAILY
Qty: 30 CAPSULE | Refills: 1 | Status: SHIPPED | OUTPATIENT
Start: 2023-06-29

## 2023-06-29 RX ORDER — OXYCODONE HYDROCHLORIDE AND ACETAMINOPHEN 5; 325 MG/1; MG/1
1 TABLET ORAL EVERY 6 HOURS PRN
Qty: 100 TABLET | Refills: 0 | Status: SHIPPED | OUTPATIENT
Start: 2023-06-29 | End: 2023-07-27

## 2023-07-24 ENCOUNTER — OFFICE VISIT (OUTPATIENT)
Dept: PAIN MANAGEMENT | Age: 73
End: 2023-07-24
Payer: MEDICARE

## 2023-07-24 VITALS
SYSTOLIC BLOOD PRESSURE: 128 MMHG | HEART RATE: 78 BPM | WEIGHT: 162 LBS | DIASTOLIC BLOOD PRESSURE: 88 MMHG | BODY MASS INDEX: 23.92 KG/M2

## 2023-07-24 DIAGNOSIS — M79.2 NEUROPATHIC PAIN: ICD-10-CM

## 2023-07-24 DIAGNOSIS — G89.4 CHRONIC PAIN SYNDROME: ICD-10-CM

## 2023-07-24 DIAGNOSIS — R20.2 NUMBNESS AND TINGLING OF RIGHT LOWER EXTREMITY: ICD-10-CM

## 2023-07-24 DIAGNOSIS — R20.0 NUMBNESS AND TINGLING OF RIGHT LOWER EXTREMITY: ICD-10-CM

## 2023-07-24 DIAGNOSIS — I73.9 PERIPHERAL ARTERIAL DISEASE (HCC): ICD-10-CM

## 2023-07-24 PROCEDURE — 3017F COLORECTAL CA SCREEN DOC REV: CPT | Performed by: INTERNAL MEDICINE

## 2023-07-24 PROCEDURE — G8427 DOCREV CUR MEDS BY ELIG CLIN: HCPCS | Performed by: INTERNAL MEDICINE

## 2023-07-24 PROCEDURE — 1036F TOBACCO NON-USER: CPT | Performed by: INTERNAL MEDICINE

## 2023-07-24 PROCEDURE — G8420 CALC BMI NORM PARAMETERS: HCPCS | Performed by: INTERNAL MEDICINE

## 2023-07-24 PROCEDURE — 1123F ACP DISCUSS/DSCN MKR DOCD: CPT | Performed by: INTERNAL MEDICINE

## 2023-07-24 PROCEDURE — 99213 OFFICE O/P EST LOW 20 MIN: CPT | Performed by: INTERNAL MEDICINE

## 2023-07-24 RX ORDER — OXYCODONE HYDROCHLORIDE AND ACETAMINOPHEN 5; 325 MG/1; MG/1
1 TABLET ORAL EVERY 6 HOURS PRN
Qty: 100 TABLET | Refills: 0 | Status: SHIPPED | OUTPATIENT
Start: 2023-07-24 | End: 2023-08-21

## 2023-07-24 RX ORDER — DULOXETIN HYDROCHLORIDE 60 MG/1
60 CAPSULE, DELAYED RELEASE ORAL DAILY
Qty: 30 CAPSULE | Refills: 1 | Status: SHIPPED | OUTPATIENT
Start: 2023-07-24

## 2023-07-24 NOTE — PROGRESS NOTES
Wsiam Berman  1950  [id number]      HISTORY OF PRESENT ILLNESS:  Mr. Arely Dozier is a 67 y.o. male returns for a follow up visit for pain management  He has a diagnosis of   1. Encounter for therapeutic drug monitoring    2. Chronic pain syndrome    3. Peripheral arterial disease ([address])    4. Neuropathic pain    5. Numbness and tingling of right lower extremity    6. Mood disorder ([address])    7. Primary insomnia    . He complains of pain in the  lower back, right hip, bilateral knees, right foot  He rates the pain 10/10 and describes it as sharp, aching, burning. Current treatment regimen has helped relieve about 50% of the pain. He has GI irritation any side effects from the current pain regimen. Patient reports that since the last follow up visit the physical functioning is worse, family/social relationships are unchanged, mood is unchanged sleep patterns are unchanged, and that the overall functioning is worse. Patient denies misusing/abusing his narcotic pain medications or using any illegal drugs. There are No indicators for possible drug abuse, addiction or diversion problems. Patient states he has been doing about the same. Mr. Arely Dozier mentions he is using Percocet 3-4 per day along with Cymbalta. He states he is doing his exercises at home. Patient denies any side effects with the medications. Patient says he has been walking some daily. ALLERGIES: Patients list of allergies were reviewed     MEDICATIONS: Mr. Arely Dozier list of medications were reviewed. His current medications are   Outpatient Medications Prior to Visit   Medication Sig Dispense Refill    oxyCODONE-acetaminophen (PERCOCET) 5-325 MG per tablet Take 1 tablet by mouth every 6 hours as needed for Pain (max3-4 per day) for up to 28 days.  100 tablet 0    DULoxetine (CYMBALTA) 60 MG extended release capsule Take 1 capsule by mouth daily 30 capsule 1    senna-docusate (PERICOLACE) 8.6-50 MG per tablet Take 1 tablet by mouth every 12

## 2023-08-01 NOTE — TELEPHONE ENCOUNTER
Dr Atiya Smith attempted to send the Rx to [place]. He gets an error message after tapping his badge. Patient informed he will need to  the Rx at the office. Confirmed with pharmacy they cannot accept a verbal or fax. Pt will discuss with his son.   Informed latest time for  would be 4:30 pm [Please see my note below.] : Please see my note below. [FreeTextEntry1] : Dear Dr. BEAN RAMOS  I had the pleasure of evaluating your patient MARIO GRULLON, thank you for allowing us to participate in their care. please see full note detailing our visit below. If you have any questions, please do not hesitate to call me and I would be happy to discuss further.   Siddharth Goldberg M.D. Attending Physician,   Department of Otolaryngology - Head and Neck Surgery [facility]  Office: [phone] Fax: [phone]

## 2023-08-21 ENCOUNTER — OFFICE VISIT (OUTPATIENT)
Dept: PAIN MANAGEMENT | Age: 73
End: 2023-08-21
Payer: MEDICARE

## 2023-08-21 VITALS
HEART RATE: 89 BPM | BODY MASS INDEX: 24.37 KG/M2 | DIASTOLIC BLOOD PRESSURE: 87 MMHG | SYSTOLIC BLOOD PRESSURE: 132 MMHG | WEIGHT: 165 LBS

## 2023-08-21 DIAGNOSIS — M79.2 NEUROPATHIC PAIN: ICD-10-CM

## 2023-08-21 DIAGNOSIS — G89.4 CHRONIC PAIN SYNDROME: ICD-10-CM

## 2023-08-21 DIAGNOSIS — R20.2 NUMBNESS AND TINGLING OF RIGHT LOWER EXTREMITY: ICD-10-CM

## 2023-08-21 DIAGNOSIS — R20.0 NUMBNESS AND TINGLING OF RIGHT LOWER EXTREMITY: ICD-10-CM

## 2023-08-21 DIAGNOSIS — I73.9 PERIPHERAL ARTERIAL DISEASE (HCC): ICD-10-CM

## 2023-08-21 DIAGNOSIS — F51.01 PRIMARY INSOMNIA: ICD-10-CM

## 2023-08-21 DIAGNOSIS — F11.10 NARCOTIC ABUSE (HCC): ICD-10-CM

## 2023-08-21 PROCEDURE — G8420 CALC BMI NORM PARAMETERS: HCPCS | Performed by: INTERNAL MEDICINE

## 2023-08-21 PROCEDURE — 1036F TOBACCO NON-USER: CPT | Performed by: INTERNAL MEDICINE

## 2023-08-21 PROCEDURE — 99214 OFFICE O/P EST MOD 30 MIN: CPT | Performed by: INTERNAL MEDICINE

## 2023-08-21 PROCEDURE — 1123F ACP DISCUSS/DSCN MKR DOCD: CPT | Performed by: INTERNAL MEDICINE

## 2023-08-21 PROCEDURE — 3017F COLORECTAL CA SCREEN DOC REV: CPT | Performed by: INTERNAL MEDICINE

## 2023-08-21 PROCEDURE — G8427 DOCREV CUR MEDS BY ELIG CLIN: HCPCS | Performed by: INTERNAL MEDICINE

## 2023-08-21 RX ORDER — OXYCODONE HYDROCHLORIDE AND ACETAMINOPHEN 5; 325 MG/1; MG/1
1 TABLET ORAL EVERY 6 HOURS PRN
Qty: 100 TABLET | Refills: 0 | Status: SHIPPED | OUTPATIENT
Start: 2023-08-21 | End: 2023-09-18

## 2023-08-21 RX ORDER — DULOXETIN HYDROCHLORIDE 60 MG/1
60 CAPSULE, DELAYED RELEASE ORAL DAILY
Qty: 30 CAPSULE | Refills: 0 | Status: SHIPPED | OUTPATIENT
Start: 2023-08-21

## 2023-08-22 ENCOUNTER — TELEPHONE (OUTPATIENT)
Dept: PAIN MANAGEMENT | Age: 73
End: 2023-08-22

## 2023-08-22 NOTE — PROGRESS NOTES
Johnathan [facility]  1950  [id number]    HISTORY OF PRESENT ILLNESS:  Mr. Gavino Beck is a 68 y.o. male returns for a follow up visit for multiple medical problems. His  presenting problems are   1. Chronic pain syndrome    2. Numbness and tingling of right lower extremity    3. Peripheral arterial disease ([address])    4. Primary insomnia    5. Neuropathic pain    6. Narcotic abuse ([address])    . As per information/history obtained from the PADT(patient assessment and documentation tool) -  He complains of pain in the lower back with radiation to the lower back, buttocks, hips Right, upper leg Right, knees Right, lower leg Right, and ankles Right He rates the pain 10/10 and describes it as aching. Pain is made worse by: movement, walking, standing, sitting, bending, lifting. Current treatment regimen has helped relieve about 30% of the pain. He denies side effects from the current pain regimen. Patient reports that since starting the current treatment regimen the physical functioning is better, family/social relationships are unchanged, mood is worse and sleep patterns are better, and that the overall functioning is better. Patient denies neurological bowel or bladder. Patient denies misusing/abusing his narcotic pain medications or using any illegal drugs. There are some indicators for possible drug abuse, addiction or diversion problems. Upon obtaining the medical history from Mr. Gavino Beck regarding today's office visit for his presenting problems,Patient states he has been in pain all the time, feels Percocet is not holding the pain. He complains \"Back starts killing me\". He says he is using Cymbalta daily. He reports PCP was giving Valium, but is being tapered off. He complains of excessive worry, agitation, labile mood,nervousness, restlessness and difficulty with concentration. He mentions he lives with his son. Patient's  subjective report of his mood is fair.  he describes occasional symptoms of depression,

## 2023-08-22 NOTE — TELEPHONE ENCOUNTER
Patient called stating that Isaak Murguia was suppose to write for Oxycodone 10 mg and the Pharmacy has not received anything from us. I advised the patient that Isaak Murguia stated to continue with the Oxycodone 5 mg, I also advised patient that the 100 tablets [facility] gave him yesterday has to last him a full month and that he is to taper off them due too being discharged from our practice. Patient stated he wanted me to ask [facility]SM why he was told that but kept him on Oxycodone 5 mg. Patient states that he would like a call back with an explanation. I advised him I will ask [facility] and somebody from the office will call him back.  I advised patient also that his Percocet 5 mg went to    [address], [address] [facility] [phone]

## 2023-10-17 RX ORDER — CLOPIDOGREL BISULFATE 75 MG/1
75 TABLET ORAL DAILY
Qty: 90 TABLET | Refills: 3 | Status: SHIPPED | OUTPATIENT
Start: 2023-10-17

## 2023-10-17 NOTE — TELEPHONE ENCOUNTER
Received refill request for Clopidogrel from [address] [place] [address].     Last ov: 06/13/2022 ABR    Last labs: 01/10/2023 Care Everywhere    Last Refill: 08/08/2022 #90 w/ 3 refills    Next appointment: None

## 2023-11-03 ENCOUNTER — APPOINTMENT (OUTPATIENT)
Dept: CT IMAGING | Age: 73
DRG: 897 | End: 2023-11-03
Payer: MEDICARE

## 2023-11-03 ENCOUNTER — APPOINTMENT (OUTPATIENT)
Dept: GENERAL RADIOLOGY | Age: 73
DRG: 897 | End: 2023-11-03
Payer: MEDICARE

## 2023-11-03 ENCOUNTER — HOSPITAL ENCOUNTER (INPATIENT)
Age: 73
LOS: 4 days | Discharge: HOME | DRG: 897 | End: 2023-11-07
Attending: HOSPITALIST | Admitting: HOSPITALIST
Payer: MEDICARE

## 2023-11-03 DIAGNOSIS — G47.00 INSOMNIA, UNSPECIFIED TYPE: ICD-10-CM

## 2023-11-03 DIAGNOSIS — I48.91 ATRIAL FIBRILLATION WITH RVR (HCC): ICD-10-CM

## 2023-11-03 DIAGNOSIS — F19.10 POLYSUBSTANCE ABUSE (HCC): ICD-10-CM

## 2023-11-03 DIAGNOSIS — T50.901A ACCIDENTAL DRUG OVERDOSE, INITIAL ENCOUNTER: Primary | ICD-10-CM

## 2023-11-03 PROBLEM — R41.82 ALTERED MENTAL STATUS: Status: ACTIVE | Noted: 2023-11-03

## 2023-11-03 LAB
ALBUMIN SERPL-MCNC: 4.6 G/DL (ref 3.4–5)
ALBUMIN/GLOB SERPL: 1.7 {RATIO} (ref 1.1–2.2)
ALP SERPL-CCNC: 112 U/L (ref 40–129)
ALT SERPL-CCNC: 15 U/L (ref 10–40)
AMPHETAMINES UR QL SCN>1000 NG/ML: ABNORMAL
ANION GAP SERPL CALCULATED.3IONS-SCNC: 16 MMOL/L (ref 3–16)
APAP SERPL-MCNC: <5 UG/ML (ref 10–30)
APAP SERPL-MCNC: <5 UG/ML (ref 10–30)
AST SERPL-CCNC: 17 U/L (ref 15–37)
BACTERIA URNS QL MICRO: NORMAL /HPF
BARBITURATES UR QL SCN>200 NG/ML: ABNORMAL
BASOPHILS # BLD: 0.1 K/UL (ref 0–0.2)
BASOPHILS NFR BLD: 0.8 %
BENZODIAZ UR QL SCN>200 NG/ML: POSITIVE
BILIRUB SERPL-MCNC: 0.9 MG/DL (ref 0–1)
BILIRUB UR QL STRIP.AUTO: NEGATIVE
BUN SERPL-MCNC: 22 MG/DL (ref 7–20)
CALCIUM SERPL-MCNC: 9.4 MG/DL (ref 8.3–10.6)
CANNABINOIDS UR QL SCN>50 NG/ML: POSITIVE
CHLORIDE SERPL-SCNC: 101 MMOL/L (ref 99–110)
CK SERPL-CCNC: 104 U/L (ref 39–308)
CLARITY UR: CLEAR
CO2 SERPL-SCNC: 20 MMOL/L (ref 21–32)
COCAINE UR QL SCN: ABNORMAL
COLOR UR: YELLOW
CREAT SERPL-MCNC: 1.1 MG/DL (ref 0.8–1.3)
DEPRECATED RDW RBC AUTO: 16 % (ref 12.4–15.4)
DRUG SCREEN COMMENT UR-IMP: ABNORMAL
EKG ATRIAL RATE: 85 BPM
EKG DIAGNOSIS: NORMAL
EKG P AXIS: 71 DEGREES
EKG P-R INTERVAL: 140 MS
EKG Q-T INTERVAL: 354 MS
EKG QRS DURATION: 82 MS
EKG QTC CALCULATION (BAZETT): 421 MS
EKG R AXIS: 98 DEGREES
EKG T AXIS: 51 DEGREES
EKG VENTRICULAR RATE: 85 BPM
EOSINOPHIL # BLD: 0.1 K/UL (ref 0–0.6)
EOSINOPHIL NFR BLD: 1 %
EPI CELLS #/AREA URNS AUTO: 1 /HPF (ref 0–5)
ETHANOLAMINE SERPL-MCNC: NORMAL MG/DL (ref 0–0.08)
FENTANYL SCREEN, URINE: ABNORMAL
GFR SERPLBLD CREATININE-BSD FMLA CKD-EPI: >60 ML/MIN/{1.73_M2}
GLUCOSE SERPL-MCNC: 110 MG/DL (ref 70–99)
GLUCOSE UR STRIP.AUTO-MCNC: NEGATIVE MG/DL
HCT VFR BLD AUTO: 40.9 % (ref 40.5–52.5)
HGB BLD-MCNC: 13.8 G/DL (ref 13.5–17.5)
HGB UR QL STRIP.AUTO: NEGATIVE
HYALINE CASTS #/AREA URNS AUTO: 2 /LPF (ref 0–8)
KETONES UR STRIP.AUTO-MCNC: 80 MG/DL
LEUKOCYTE ESTERASE UR QL STRIP.AUTO: NEGATIVE
LIPASE SERPL-CCNC: 27 U/L (ref 13–60)
LYMPHOCYTES # BLD: 1.9 K/UL (ref 1–5.1)
LYMPHOCYTES NFR BLD: 16.4 %
MAGNESIUM SERPL-MCNC: 2.3 MG/DL (ref 1.8–2.4)
MCH RBC QN AUTO: 29.2 PG (ref 26–34)
MCHC RBC AUTO-ENTMCNC: 33.7 G/DL (ref 31–36)
MCV RBC AUTO: 86.6 FL (ref 80–100)
METHADONE UR QL SCN>300 NG/ML: ABNORMAL
MONOCYTES # BLD: 0.6 K/UL (ref 0–1.3)
MONOCYTES NFR BLD: 5 %
NEUTROPHILS # BLD: 8.8 K/UL (ref 1.7–7.7)
NEUTROPHILS NFR BLD: 76.8 %
NITRITE UR QL STRIP.AUTO: NEGATIVE
OPIATES UR QL SCN>300 NG/ML: ABNORMAL
OXYCODONE UR QL SCN: ABNORMAL
PCP UR QL SCN>25 NG/ML: ABNORMAL
PH UR STRIP.AUTO: 5 [PH] (ref 5–8)
PH UR STRIP: 5 [PH]
PLATELET # BLD AUTO: 384 K/UL (ref 135–450)
PMV BLD AUTO: 7.1 FL (ref 5–10.5)
POTASSIUM SERPL-SCNC: 3.5 MMOL/L (ref 3.5–5.1)
PROT SERPL-MCNC: 7.3 G/DL (ref 6.4–8.2)
PROT UR STRIP.AUTO-MCNC: 30 MG/DL
RBC # BLD AUTO: 4.73 M/UL (ref 4.2–5.9)
RBC CLUMPS #/AREA URNS AUTO: 1 /HPF (ref 0–4)
SODIUM SERPL-SCNC: 137 MMOL/L (ref 136–145)
SP GR UR STRIP.AUTO: 1.02 (ref 1–1.03)
UA COMPLETE W REFLEX CULTURE PNL UR: ABNORMAL
UA DIPSTICK W REFLEX MICRO PNL UR: YES
URN SPEC COLLECT METH UR: ABNORMAL
UROBILINOGEN UR STRIP-ACNC: 1 E.U./DL
WBC # BLD AUTO: 11.4 K/UL (ref 4–11)
WBC #/AREA URNS AUTO: 1 /HPF (ref 0–5)

## 2023-11-03 PROCEDURE — 70450 CT HEAD/BRAIN W/O DYE: CPT

## 2023-11-03 PROCEDURE — 80307 DRUG TEST PRSMV CHEM ANLYZR: CPT

## 2023-11-03 PROCEDURE — 36415 COLL VENOUS BLD VENIPUNCTURE: CPT

## 2023-11-03 PROCEDURE — 6360000002 HC RX W HCPCS: Performed by: HOSPITALIST

## 2023-11-03 PROCEDURE — 71045 X-RAY EXAM CHEST 1 VIEW: CPT

## 2023-11-03 PROCEDURE — 81001 URINALYSIS AUTO W/SCOPE: CPT

## 2023-11-03 PROCEDURE — 2580000003 HC RX 258: Performed by: HOSPITALIST

## 2023-11-03 PROCEDURE — 80053 COMPREHEN METABOLIC PANEL: CPT

## 2023-11-03 PROCEDURE — 1200000000 HC SEMI PRIVATE

## 2023-11-03 PROCEDURE — 94760 N-INVAS EAR/PLS OXIMETRY 1: CPT

## 2023-11-03 PROCEDURE — 93010 ELECTROCARDIOGRAM REPORT: CPT | Performed by: INTERNAL MEDICINE

## 2023-11-03 PROCEDURE — 6370000000 HC RX 637 (ALT 250 FOR IP): Performed by: HOSPITALIST

## 2023-11-03 PROCEDURE — 99285 EMERGENCY DEPT VISIT HI MDM: CPT

## 2023-11-03 PROCEDURE — 83735 ASSAY OF MAGNESIUM: CPT

## 2023-11-03 PROCEDURE — 80143 DRUG ASSAY ACETAMINOPHEN: CPT

## 2023-11-03 PROCEDURE — 85025 COMPLETE CBC W/AUTO DIFF WBC: CPT

## 2023-11-03 PROCEDURE — 82550 ASSAY OF CK (CPK): CPT

## 2023-11-03 PROCEDURE — 82077 ASSAY SPEC XCP UR&BREATH IA: CPT

## 2023-11-03 PROCEDURE — 2580000003 HC RX 258: Performed by: PHYSICIAN ASSISTANT

## 2023-11-03 PROCEDURE — 93005 ELECTROCARDIOGRAM TRACING: CPT | Performed by: PHYSICIAN ASSISTANT

## 2023-11-03 PROCEDURE — 83690 ASSAY OF LIPASE: CPT

## 2023-11-03 RX ORDER — MAGNESIUM SULFATE IN WATER 40 MG/ML
2000 INJECTION, SOLUTION INTRAVENOUS PRN
Status: DISCONTINUED | OUTPATIENT
Start: 2023-11-03 | End: 2023-11-08 | Stop reason: HOSPADM

## 2023-11-03 RX ORDER — CLOPIDOGREL BISULFATE 75 MG/1
75 TABLET ORAL DAILY
Status: ON HOLD | COMMUNITY
End: 2023-11-07 | Stop reason: HOSPADM

## 2023-11-03 RX ORDER — POTASSIUM CHLORIDE 20 MEQ/1
40 TABLET, EXTENDED RELEASE ORAL PRN
Status: DISCONTINUED | OUTPATIENT
Start: 2023-11-03 | End: 2023-11-08 | Stop reason: HOSPADM

## 2023-11-03 RX ORDER — ONDANSETRON 4 MG/1
4 TABLET, ORALLY DISINTEGRATING ORAL EVERY 8 HOURS PRN
Status: DISCONTINUED | OUTPATIENT
Start: 2023-11-03 | End: 2023-11-08 | Stop reason: HOSPADM

## 2023-11-03 RX ORDER — SODIUM CHLORIDE 0.9 % (FLUSH) 0.9 %
5-40 SYRINGE (ML) INJECTION EVERY 12 HOURS SCHEDULED
Status: DISCONTINUED | OUTPATIENT
Start: 2023-11-03 | End: 2023-11-08 | Stop reason: HOSPADM

## 2023-11-03 RX ORDER — SODIUM CHLORIDE 0.9 % (FLUSH) 0.9 %
5-40 SYRINGE (ML) INJECTION PRN
Status: DISCONTINUED | OUTPATIENT
Start: 2023-11-03 | End: 2023-11-08 | Stop reason: HOSPADM

## 2023-11-03 RX ORDER — POTASSIUM CHLORIDE 7.45 MG/ML
10 INJECTION INTRAVENOUS PRN
Status: DISCONTINUED | OUTPATIENT
Start: 2023-11-03 | End: 2023-11-08 | Stop reason: HOSPADM

## 2023-11-03 RX ORDER — AMLODIPINE BESYLATE 10 MG/1
10 TABLET ORAL DAILY
Status: DISCONTINUED | OUTPATIENT
Start: 2023-11-03 | End: 2023-11-08 | Stop reason: HOSPADM

## 2023-11-03 RX ORDER — POLYETHYLENE GLYCOL 3350 17 G/17G
17 POWDER, FOR SOLUTION ORAL DAILY PRN
Status: DISCONTINUED | OUTPATIENT
Start: 2023-11-03 | End: 2023-11-08 | Stop reason: HOSPADM

## 2023-11-03 RX ORDER — CLOPIDOGREL BISULFATE 75 MG/1
75 TABLET ORAL DAILY
Status: DISCONTINUED | OUTPATIENT
Start: 2023-11-03 | End: 2023-11-07

## 2023-11-03 RX ORDER — ONDANSETRON 2 MG/ML
4 INJECTION INTRAMUSCULAR; INTRAVENOUS EVERY 6 HOURS PRN
Status: DISCONTINUED | OUTPATIENT
Start: 2023-11-03 | End: 2023-11-08 | Stop reason: HOSPADM

## 2023-11-03 RX ORDER — ACETAMINOPHEN 325 MG/1
650 TABLET ORAL EVERY 6 HOURS PRN
Status: DISCONTINUED | OUTPATIENT
Start: 2023-11-03 | End: 2023-11-08 | Stop reason: HOSPADM

## 2023-11-03 RX ORDER — ATORVASTATIN CALCIUM 20 MG/1
20 TABLET, FILM COATED ORAL NIGHTLY
Status: DISCONTINUED | OUTPATIENT
Start: 2023-11-03 | End: 2023-11-08 | Stop reason: HOSPADM

## 2023-11-03 RX ORDER — ALBUTEROL SULFATE 2.5 MG/3ML
2.5 SOLUTION RESPIRATORY (INHALATION) EVERY 4 HOURS PRN
Status: DISCONTINUED | OUTPATIENT
Start: 2023-11-03 | End: 2023-11-08 | Stop reason: HOSPADM

## 2023-11-03 RX ORDER — AMLODIPINE BESYLATE 10 MG/1
10 TABLET ORAL DAILY
COMMUNITY

## 2023-11-03 RX ORDER — ASPIRIN 81 MG/1
81 TABLET ORAL DAILY
Status: DISCONTINUED | OUTPATIENT
Start: 2023-11-03 | End: 2023-11-08 | Stop reason: HOSPADM

## 2023-11-03 RX ORDER — ACETAMINOPHEN 650 MG/1
650 SUPPOSITORY RECTAL EVERY 6 HOURS PRN
Status: DISCONTINUED | OUTPATIENT
Start: 2023-11-03 | End: 2023-11-08 | Stop reason: HOSPADM

## 2023-11-03 RX ORDER — ENOXAPARIN SODIUM 100 MG/ML
40 INJECTION SUBCUTANEOUS EVERY 24 HOURS
Status: DISCONTINUED | OUTPATIENT
Start: 2023-11-03 | End: 2023-11-07

## 2023-11-03 RX ORDER — SODIUM CHLORIDE 9 MG/ML
INJECTION, SOLUTION INTRAVENOUS CONTINUOUS
Status: DISCONTINUED | OUTPATIENT
Start: 2023-11-03 | End: 2023-11-04 | Stop reason: ALTCHOICE

## 2023-11-03 RX ORDER — 0.9 % SODIUM CHLORIDE 0.9 %
500 INTRAVENOUS SOLUTION INTRAVENOUS ONCE
Status: COMPLETED | OUTPATIENT
Start: 2023-11-03 | End: 2023-11-03

## 2023-11-03 RX ORDER — SODIUM CHLORIDE 9 MG/ML
INJECTION, SOLUTION INTRAVENOUS PRN
Status: DISCONTINUED | OUTPATIENT
Start: 2023-11-03 | End: 2023-11-08 | Stop reason: HOSPADM

## 2023-11-03 RX ADMIN — ASPIRIN 81 MG: 81 TABLET, COATED ORAL at 16:37

## 2023-11-03 RX ADMIN — AMLODIPINE BESYLATE 10 MG: 10 TABLET ORAL at 17:36

## 2023-11-03 RX ADMIN — SODIUM CHLORIDE 500 ML: 9 INJECTION, SOLUTION INTRAVENOUS at 11:14

## 2023-11-03 RX ADMIN — ENOXAPARIN SODIUM 40 MG: 100 INJECTION SUBCUTANEOUS at 16:37

## 2023-11-03 RX ADMIN — CLOPIDOGREL BISULFATE 75 MG: 75 TABLET ORAL at 16:37

## 2023-11-03 RX ADMIN — ATORVASTATIN CALCIUM 20 MG: 20 TABLET, FILM COATED ORAL at 20:21

## 2023-11-03 RX ADMIN — SODIUM CHLORIDE: 9 INJECTION, SOLUTION INTRAVENOUS at 16:13

## 2023-11-03 ASSESSMENT — LIFESTYLE VARIABLES
HOW MANY STANDARD DRINKS CONTAINING ALCOHOL DO YOU HAVE ON A TYPICAL DAY: PATIENT DOES NOT DRINK
HOW OFTEN DO YOU HAVE A DRINK CONTAINING ALCOHOL: NEVER

## 2023-11-03 ASSESSMENT — PAIN SCALES - GENERAL: PAINLEVEL_OUTOF10: 0

## 2023-11-03 ASSESSMENT — PAIN - FUNCTIONAL ASSESSMENT: PAIN_FUNCTIONAL_ASSESSMENT: NONE - DENIES PAIN

## 2023-11-03 NOTE — H&P
2022     PSHX:  has a past surgical history that includes hip surgery (Right, 2022); Cardiac catheterization (); and femoral bypass (Right, 2022). Allergies: No Known Allergies  Fam HX:  family history includes No Known Problems in his mother; Substance Abuse in his father. Soc HX:   Social History     Socioeconomic History    Marital status:      Spouse name: None    Number of children: None    Years of education: None    Highest education level: None   Tobacco Use    Smoking status: Former     Packs/day: 1.00     Years: 50.00     Additional pack years: 0.00     Total pack years: 50.00     Types: Cigarettes     Quit date: 2022     Years since quittin.5    Smokeless tobacco: Never   Vaping Use    Vaping Use: Some days    Substances: Nicotine, Flavoring    Devices: Refillable tank   Substance and Sexual Activity    Alcohol use: No    Drug use: Not Currently     Types: Marijuana (Weed)     Comment: in early years    Sexual activity: Not Currently     Partners: Female       Medications:   Medications:    Infusions:   PRN Meds:     Labs      CBC:   Recent Labs     23  1051   WBC 11.4*   HGB 13.8        BMP:    Recent Labs     23  1051      K 3.5      CO2 20*   BUN 22*   CREATININE 1.1   GLUCOSE 110*     Hepatic:   Recent Labs     23  1051   AST 17   ALT 15   BILITOT 0.9   ALKPHOS 112     Lipids:   Lab Results   Component Value Date/Time    CHOL 197 2022 05:31 AM    HDL 40 2022 05:31 AM    TRIG 123 2022 05:31 AM     Hemoglobin A1C:   Lab Results   Component Value Date/Time    LABA1C 5.8 2022 05:31 AM     TSH: No results found for: \"TSH\"  Troponin: No results found for: \"TROPONINT\"  Lactic Acid: No results for input(s): \"LACTA\" in the last 72 hours. BNP: No results for input(s): \"PROBNP\" in the last 72 hours.   UA:  Lab Results   Component Value Date/Time    NITRU Negative 2023 12:28 PM    COLORU Yellow 2023 12:28 PM

## 2023-11-03 NOTE — ED NOTES
Report to admission VERONIKA Ocasio. Patient to be transported to [facility] via transport. Patient assisted to and from bathroom via wheelchair.        Arin Garrett RN  11/03/23 2360

## 2023-11-03 NOTE — ED PROVIDER NOTES
[address] [facility] [address] [address]        Pt Name: Aubrey Zheng  MRN: [id number]  [address] [facility] 1950  Date of evaluation: 11/3/2023  Provider: Mal Arrington PA-C  PCP: TALITA Delgado CNP  Note Started: 11:31 AM EDT 11/3/23      HALEY. I have evaluated this patient. CHIEF COMPLAINT       Chief Complaint   Patient presents with    Altered Mental Status     Family stated \"noticed increased confusion and he was talking while sleeping. \" Family stated patient asked \"where am I? Where is she?\" Family stated he found the patient's prescription pregablan was empty and patient's symptoms were \"exactly like those side effects\" and patient was shaking previously. HISTORY OF PRESENT ILLNESS: 1 or more Elements     History From: Patient and his son            Chief Complaint: Confusion and potential overdose of previously prescribed pregabalin 75 mg. Aubrey Zheng is a 68 y.o. male who presents with complaint of maybe up to 2 days of increased confusion but particularly since last evening. Patient's son lives at home with him. He states that his dad has been a bit confused. Also patient's dad tells a story about being down in the basement in the dark working on some gold and being frustrated that the light was not on. He states there was a \"paper thin girl\" there with him as well as an old man and he thought his son was with him as well. He kept on asking about whether or not they can turn on the light but they would not respond. Patient states now that he still recalls this but is pretty convinced that it was just a dream.  His son mentions that he heard his dad speaking in his sleep a number of times through the night. However with the symptoms as above patient's son became concerned that he could have gotten into an old prescription of pregabalin 75 mg.   Patient's son had, to avoid issues once that medication was discontinued by the doctor

## 2023-11-03 NOTE — ED NOTES
Provided patient with a urinal. Patient has a vape pen in his hand. I requested that the patient put it away. Patient's son states \"he's just more comfortable holding it\" and told his dad to \"put it away\".      Malcolm Metz RN  11/03/23 8864

## 2023-11-03 NOTE — ED NOTES
Report received from [facility]. Assuming care of patient. Patient presents to the ED with family for confusion and possible medication non-compliance. Family expresses concern whether patient is taking medications appropriatly or taking medications that are not his. Patient unsure of his current medication list, but does have prescription bottles with family at bedside. Patient current alert and oriented to person/place/situation. Patient sitting in bed, talking with family at bedside.      Akilah Joseph RN  11/03/23 5277

## 2023-11-04 PROBLEM — T50.901A ACCIDENTAL DRUG OVERDOSE: Status: ACTIVE | Noted: 2023-11-04

## 2023-11-04 PROBLEM — F32.A DEPRESSION: Status: ACTIVE | Noted: 2023-11-04

## 2023-11-04 LAB
ANION GAP SERPL CALCULATED.3IONS-SCNC: 13 MMOL/L (ref 3–16)
BASOPHILS # BLD: 0.1 K/UL (ref 0–0.2)
BASOPHILS NFR BLD: 0.9 %
BUN SERPL-MCNC: 16 MG/DL (ref 7–20)
CALCIUM SERPL-MCNC: 9 MG/DL (ref 8.3–10.6)
CHLORIDE SERPL-SCNC: 106 MMOL/L (ref 99–110)
CO2 SERPL-SCNC: 22 MMOL/L (ref 21–32)
CREAT SERPL-MCNC: 0.8 MG/DL (ref 0.8–1.3)
DEPRECATED RDW RBC AUTO: 15.5 % (ref 12.4–15.4)
EOSINOPHIL # BLD: 0.1 K/UL (ref 0–0.6)
EOSINOPHIL NFR BLD: 1.1 %
GFR SERPLBLD CREATININE-BSD FMLA CKD-EPI: >60 ML/MIN/{1.73_M2}
GLUCOSE SERPL-MCNC: 104 MG/DL (ref 70–99)
HCT VFR BLD AUTO: 37.5 % (ref 40.5–52.5)
HGB BLD-MCNC: 12.7 G/DL (ref 13.5–17.5)
LYMPHOCYTES # BLD: 2.1 K/UL (ref 1–5.1)
LYMPHOCYTES NFR BLD: 24.7 %
MCH RBC QN AUTO: 29.3 PG (ref 26–34)
MCHC RBC AUTO-ENTMCNC: 33.9 G/DL (ref 31–36)
MCV RBC AUTO: 86.3 FL (ref 80–100)
MONOCYTES # BLD: 0.8 K/UL (ref 0–1.3)
MONOCYTES NFR BLD: 9.4 %
NEUTROPHILS # BLD: 5.3 K/UL (ref 1.7–7.7)
NEUTROPHILS NFR BLD: 63.9 %
PLATELET # BLD AUTO: 373 K/UL (ref 135–450)
PMV BLD AUTO: 7.5 FL (ref 5–10.5)
POTASSIUM SERPL-SCNC: 3.7 MMOL/L (ref 3.5–5.1)
RBC # BLD AUTO: 4.34 M/UL (ref 4.2–5.9)
SODIUM SERPL-SCNC: 141 MMOL/L (ref 136–145)
WBC # BLD AUTO: 8.4 K/UL (ref 4–11)

## 2023-11-04 PROCEDURE — 97535 SELF CARE MNGMENT TRAINING: CPT

## 2023-11-04 PROCEDURE — 97116 GAIT TRAINING THERAPY: CPT

## 2023-11-04 PROCEDURE — 99222 1ST HOSP IP/OBS MODERATE 55: CPT | Performed by: PSYCHIATRY & NEUROLOGY

## 2023-11-04 PROCEDURE — 36415 COLL VENOUS BLD VENIPUNCTURE: CPT

## 2023-11-04 PROCEDURE — 85025 COMPLETE CBC W/AUTO DIFF WBC: CPT

## 2023-11-04 PROCEDURE — 6370000000 HC RX 637 (ALT 250 FOR IP): Performed by: HOSPITALIST

## 2023-11-04 PROCEDURE — 1200000000 HC SEMI PRIVATE

## 2023-11-04 PROCEDURE — 97162 PT EVAL MOD COMPLEX 30 MIN: CPT

## 2023-11-04 PROCEDURE — 97530 THERAPEUTIC ACTIVITIES: CPT

## 2023-11-04 PROCEDURE — 80048 BASIC METABOLIC PNL TOTAL CA: CPT

## 2023-11-04 PROCEDURE — 97166 OT EVAL MOD COMPLEX 45 MIN: CPT

## 2023-11-04 PROCEDURE — 6360000002 HC RX W HCPCS: Performed by: HOSPITALIST

## 2023-11-04 PROCEDURE — 6370000000 HC RX 637 (ALT 250 FOR IP): Performed by: STUDENT IN AN ORGANIZED HEALTH CARE EDUCATION/TRAINING PROGRAM

## 2023-11-04 PROCEDURE — 2580000003 HC RX 258: Performed by: HOSPITALIST

## 2023-11-04 RX ORDER — DULOXETIN HYDROCHLORIDE 60 MG/1
60 CAPSULE, DELAYED RELEASE ORAL DAILY
Status: CANCELLED | OUTPATIENT
Start: 2023-11-04

## 2023-11-04 RX ORDER — HYDROXYZINE PAMOATE 25 MG/1
25 CAPSULE ORAL NIGHTLY PRN
Status: CANCELLED | OUTPATIENT
Start: 2023-11-04

## 2023-11-04 RX ORDER — DIAZEPAM 5 MG/1
10 TABLET ORAL EVERY 12 HOURS PRN
Status: DISCONTINUED | OUTPATIENT
Start: 2023-11-04 | End: 2023-11-05

## 2023-11-04 RX ORDER — DULOXETIN HYDROCHLORIDE 60 MG/1
60 CAPSULE, DELAYED RELEASE ORAL DAILY
Status: DISCONTINUED | OUTPATIENT
Start: 2023-11-04 | End: 2023-11-04

## 2023-11-04 RX ADMIN — ACETAMINOPHEN 650 MG: 325 TABLET ORAL at 17:32

## 2023-11-04 RX ADMIN — ENOXAPARIN SODIUM 40 MG: 100 INJECTION SUBCUTANEOUS at 17:33

## 2023-11-04 RX ADMIN — ACETAMINOPHEN 650 MG: 325 TABLET ORAL at 08:21

## 2023-11-04 RX ADMIN — AMLODIPINE BESYLATE 10 MG: 10 TABLET ORAL at 08:21

## 2023-11-04 RX ADMIN — ASPIRIN 81 MG: 81 TABLET, COATED ORAL at 08:21

## 2023-11-04 RX ADMIN — DIAZEPAM 10 MG: 5 TABLET ORAL at 16:15

## 2023-11-04 RX ADMIN — ATORVASTATIN CALCIUM 20 MG: 20 TABLET, FILM COATED ORAL at 21:14

## 2023-11-04 RX ADMIN — CLOPIDOGREL BISULFATE 75 MG: 75 TABLET ORAL at 08:21

## 2023-11-04 RX ADMIN — SODIUM CHLORIDE: 9 INJECTION, SOLUTION INTRAVENOUS at 01:57

## 2023-11-04 ASSESSMENT — PAIN - FUNCTIONAL ASSESSMENT: PAIN_FUNCTIONAL_ASSESSMENT: ACTIVITIES ARE NOT PREVENTED

## 2023-11-04 ASSESSMENT — PAIN SCALES - GENERAL
PAINLEVEL_OUTOF10: 10
PAINLEVEL_OUTOF10: 0
PAINLEVEL_OUTOF10: 0

## 2023-11-04 ASSESSMENT — PAIN DESCRIPTION - LOCATION: LOCATION: LEG

## 2023-11-04 ASSESSMENT — PAIN DESCRIPTION - DESCRIPTORS: DESCRIPTORS: ACHING;TINGLING

## 2023-11-04 ASSESSMENT — PAIN DESCRIPTION - ORIENTATION: ORIENTATION: RIGHT

## 2023-11-04 NOTE — CONSULTS
PSYCHIATRY CONSULT, INITIAL EVALUATION    Attending Provider:  Amish Renee MD    CC/Reason for Consult: safety evaluation    HPI:   context: Pt is a 67 yo M with hx of substance abuse, anxiety, chronic pain admitted for AMS. There was concern about overdose of pregabalin on admission (maybe about 10 tabs of 75mg caps). associated symptoms:   Pt reports that he did not take any extra pills or anything that wasn't prescribed to him. He denies any suicidal ideation, intent or plan. He does report dealing with depression chronically. He was tapered off valium and percocet about 2 months ago per [facility], which pt also endorsed which was due to a UDS +MJ per his report. Per son pt has a chronic history of substance abuse and abusing pills. He believes he got into an old Rx of lyrica as when he has taken this in the past as previously he has looked in a similar manner as a result of lyrica use (tremors, confusion). He also can't verify if he is chronically taking anything else. Symptoms started abruptly on Thursday. He denies that he has any baseline issues with cognition or memory. He doesn't believe patient was attempting suicide, he has not made any statements about self harm or not wanting to live anymore. modifying factors: hx of substance abuse. Pt reports he is non-adherent with duloxetine and doesn't taking hydroxyzine either.      Timing: acute on chronic  duration: 2 days  severity: moderate to severe    ROS:   Gen: no fevers or chills, HEENT: no vision or hearing problems, no HA CV: no cp, no palpitations RESP: no dyspnea : no dysuria MSK: +some pain in legs GI: no n/v/d, no abd pain  SKIN: no rashes NEURO:  +neuropathy ENDO: no weight changes, no tremors    Past Psychiatric History:   Hosp: denies  Diagnoses: anxiety  Med trials: duloxetine, valium, hydroxyzine recently per chart  Outpt: denies  NSSI: denies  Suicide Attempts: denies    Substance Use History:  Alcohol: denies  Illicits: Pt denies

## 2023-11-04 NOTE — PLAN OF CARE
Problem: Discharge Planning  Goal: Discharge to home or other facility with appropriate resources  11/4/2023 0828 by Osmar Bryant RN  Outcome: Progressing  11/3/2023 2300 by Geovanni Mcintosh RN  Outcome: Progressing     Problem: Skin/Tissue Integrity  Goal: Absence of new skin breakdown  Description: 1. Monitor for areas of redness and/or skin breakdown  2. Assess vascular access sites hourly  3. Every 4-6 hours minimum:  Change oxygen saturation probe site  4. Every 4-6 hours:  If on nasal continuous positive airway pressure, respiratory therapy assess nares and determine need for appliance change or resting period.   11/4/2023 0828 by Osmar Bryant RN  Outcome: Progressing  11/3/2023 2300 by Geovanni Mcintosh RN  Outcome: Progressing     Problem: Safety - Adult  Goal: Free from fall injury  11/4/2023 0828 by Osmar Bryant RN  Outcome: Progressing  11/3/2023 2300 by Geovanni Mcintosh RN  Outcome: Progressing     Problem: ABCDS Injury Assessment  Goal: Absence of physical injury  11/4/2023 0828 by Osmar Bryant RN  Outcome: Progressing  11/3/2023 2300 by Geovanni Mcintosh RN  Outcome: Progressing

## 2023-11-05 LAB
ANION GAP SERPL CALCULATED.3IONS-SCNC: 18 MMOL/L (ref 3–16)
BUN SERPL-MCNC: 19 MG/DL (ref 7–20)
CALCIUM SERPL-MCNC: 9.6 MG/DL (ref 8.3–10.6)
CHLORIDE SERPL-SCNC: 108 MMOL/L (ref 99–110)
CO2 SERPL-SCNC: 20 MMOL/L (ref 21–32)
CREAT SERPL-MCNC: 0.9 MG/DL (ref 0.8–1.3)
GFR SERPLBLD CREATININE-BSD FMLA CKD-EPI: >60 ML/MIN/{1.73_M2}
GLUCOSE SERPL-MCNC: 77 MG/DL (ref 70–99)
MAGNESIUM SERPL-MCNC: 2.3 MG/DL (ref 1.8–2.4)
POTASSIUM SERPL-SCNC: 3.5 MMOL/L (ref 3.5–5.1)
SODIUM SERPL-SCNC: 146 MMOL/L (ref 136–145)

## 2023-11-05 PROCEDURE — 6360000002 HC RX W HCPCS: Performed by: STUDENT IN AN ORGANIZED HEALTH CARE EDUCATION/TRAINING PROGRAM

## 2023-11-05 PROCEDURE — 6370000000 HC RX 637 (ALT 250 FOR IP): Performed by: STUDENT IN AN ORGANIZED HEALTH CARE EDUCATION/TRAINING PROGRAM

## 2023-11-05 PROCEDURE — 94760 N-INVAS EAR/PLS OXIMETRY 1: CPT

## 2023-11-05 PROCEDURE — 6360000002 HC RX W HCPCS: Performed by: REGISTERED NURSE

## 2023-11-05 PROCEDURE — 6360000002 HC RX W HCPCS: Performed by: HOSPITALIST

## 2023-11-05 PROCEDURE — 6370000000 HC RX 637 (ALT 250 FOR IP): Performed by: HOSPITALIST

## 2023-11-05 PROCEDURE — 1200000000 HC SEMI PRIVATE

## 2023-11-05 PROCEDURE — 6370000000 HC RX 637 (ALT 250 FOR IP): Performed by: REGISTERED NURSE

## 2023-11-05 PROCEDURE — 83735 ASSAY OF MAGNESIUM: CPT

## 2023-11-05 PROCEDURE — 2580000003 HC RX 258: Performed by: STUDENT IN AN ORGANIZED HEALTH CARE EDUCATION/TRAINING PROGRAM

## 2023-11-05 PROCEDURE — 2580000003 HC RX 258: Performed by: HOSPITALIST

## 2023-11-05 PROCEDURE — 80048 BASIC METABOLIC PNL TOTAL CA: CPT

## 2023-11-05 PROCEDURE — 36415 COLL VENOUS BLD VENIPUNCTURE: CPT

## 2023-11-05 RX ORDER — LORAZEPAM 1 MG/1
4 TABLET ORAL
Status: DISCONTINUED | OUTPATIENT
Start: 2023-11-05 | End: 2023-11-08 | Stop reason: HOSPADM

## 2023-11-05 RX ORDER — LORAZEPAM 2 MG/ML
1 INJECTION INTRAMUSCULAR
Status: DISCONTINUED | OUTPATIENT
Start: 2023-11-05 | End: 2023-11-05

## 2023-11-05 RX ORDER — LORAZEPAM 2 MG/ML
2 INJECTION INTRAMUSCULAR ONCE
Status: COMPLETED | OUTPATIENT
Start: 2023-11-05 | End: 2023-11-05

## 2023-11-05 RX ORDER — GAUZE BANDAGE 2" X 2"
100 BANDAGE TOPICAL DAILY
Status: DISCONTINUED | OUTPATIENT
Start: 2023-11-05 | End: 2023-11-08 | Stop reason: HOSPADM

## 2023-11-05 RX ORDER — LORAZEPAM 2 MG/ML
2 INJECTION INTRAMUSCULAR ONCE
Status: DISCONTINUED | OUTPATIENT
Start: 2023-11-05 | End: 2023-11-05

## 2023-11-05 RX ORDER — SODIUM CHLORIDE 9 MG/ML
INJECTION, SOLUTION INTRAVENOUS PRN
Status: DISCONTINUED | OUTPATIENT
Start: 2023-11-05 | End: 2023-11-08 | Stop reason: HOSPADM

## 2023-11-05 RX ORDER — DIAZEPAM 5 MG/1
10 TABLET ORAL EVERY 12 HOURS
Status: DISCONTINUED | OUTPATIENT
Start: 2023-11-06 | End: 2023-11-08 | Stop reason: HOSPADM

## 2023-11-05 RX ORDER — SODIUM CHLORIDE 0.9 % (FLUSH) 0.9 %
5-40 SYRINGE (ML) INJECTION PRN
Status: DISCONTINUED | OUTPATIENT
Start: 2023-11-05 | End: 2023-11-08 | Stop reason: HOSPADM

## 2023-11-05 RX ORDER — LORAZEPAM 2 MG/ML
4 INJECTION INTRAMUSCULAR
Status: DISCONTINUED | OUTPATIENT
Start: 2023-11-05 | End: 2023-11-08 | Stop reason: HOSPADM

## 2023-11-05 RX ORDER — NICOTINE 21 MG/24HR
1 PATCH, TRANSDERMAL 24 HOURS TRANSDERMAL DAILY
Status: DISCONTINUED | OUTPATIENT
Start: 2023-11-05 | End: 2023-11-08 | Stop reason: HOSPADM

## 2023-11-05 RX ORDER — CLONIDINE HYDROCHLORIDE 0.1 MG/1
0.1 TABLET ORAL EVERY 6 HOURS PRN
Status: DISCONTINUED | OUTPATIENT
Start: 2023-11-05 | End: 2023-11-08 | Stop reason: HOSPADM

## 2023-11-05 RX ORDER — SODIUM CHLORIDE 0.9 % (FLUSH) 0.9 %
5-40 SYRINGE (ML) INJECTION EVERY 12 HOURS SCHEDULED
Status: DISCONTINUED | OUTPATIENT
Start: 2023-11-05 | End: 2023-11-08 | Stop reason: HOSPADM

## 2023-11-05 RX ORDER — LORAZEPAM 1 MG/1
3 TABLET ORAL
Status: DISCONTINUED | OUTPATIENT
Start: 2023-11-05 | End: 2023-11-08 | Stop reason: HOSPADM

## 2023-11-05 RX ORDER — LORAZEPAM 2 MG/ML
1 INJECTION INTRAMUSCULAR
Status: DISCONTINUED | OUTPATIENT
Start: 2023-11-05 | End: 2023-11-08 | Stop reason: HOSPADM

## 2023-11-05 RX ORDER — LORAZEPAM 1 MG/1
1 TABLET ORAL
Status: DISCONTINUED | OUTPATIENT
Start: 2023-11-05 | End: 2023-11-08 | Stop reason: HOSPADM

## 2023-11-05 RX ORDER — LORAZEPAM 1 MG/1
2 TABLET ORAL
Status: DISCONTINUED | OUTPATIENT
Start: 2023-11-05 | End: 2023-11-08 | Stop reason: HOSPADM

## 2023-11-05 RX ORDER — HYDROXYZINE PAMOATE 25 MG/1
50 CAPSULE ORAL EVERY 8 HOURS PRN
Status: DISCONTINUED | OUTPATIENT
Start: 2023-11-05 | End: 2023-11-08 | Stop reason: HOSPADM

## 2023-11-05 RX ORDER — TRAZODONE HYDROCHLORIDE 50 MG/1
50 TABLET ORAL NIGHTLY PRN
Status: DISCONTINUED | OUTPATIENT
Start: 2023-11-05 | End: 2023-11-08 | Stop reason: HOSPADM

## 2023-11-05 RX ORDER — QUETIAPINE FUMARATE 25 MG/1
50 TABLET, FILM COATED ORAL 2 TIMES DAILY
Status: DISCONTINUED | OUTPATIENT
Start: 2023-11-05 | End: 2023-11-08 | Stop reason: HOSPADM

## 2023-11-05 RX ORDER — LORAZEPAM 2 MG/ML
3 INJECTION INTRAMUSCULAR
Status: DISCONTINUED | OUTPATIENT
Start: 2023-11-05 | End: 2023-11-08 | Stop reason: HOSPADM

## 2023-11-05 RX ORDER — OLANZAPINE 10 MG/2ML
10 INJECTION, POWDER, FOR SOLUTION INTRAMUSCULAR ONCE
Status: COMPLETED | OUTPATIENT
Start: 2023-11-05 | End: 2023-11-05

## 2023-11-05 RX ORDER — LORAZEPAM 2 MG/ML
2 INJECTION INTRAMUSCULAR
Status: DISCONTINUED | OUTPATIENT
Start: 2023-11-05 | End: 2023-11-08 | Stop reason: HOSPADM

## 2023-11-05 RX ADMIN — LORAZEPAM 2 MG: 2 INJECTION INTRAMUSCULAR; INTRAVENOUS at 01:28

## 2023-11-05 RX ADMIN — HYDROXYZINE PAMOATE 50 MG: 25 CAPSULE ORAL at 01:27

## 2023-11-05 RX ADMIN — ATORVASTATIN CALCIUM 20 MG: 20 TABLET, FILM COATED ORAL at 22:38

## 2023-11-05 RX ADMIN — DIAZEPAM 10 MG: 5 TABLET ORAL at 12:50

## 2023-11-05 RX ADMIN — OLANZAPINE 10 MG: 10 INJECTION, POWDER, FOR SOLUTION INTRAMUSCULAR at 01:27

## 2023-11-05 RX ADMIN — ENOXAPARIN SODIUM 40 MG: 100 INJECTION SUBCUTANEOUS at 17:53

## 2023-11-05 RX ADMIN — LORAZEPAM 1 MG: 2 INJECTION INTRAMUSCULAR; INTRAVENOUS at 13:10

## 2023-11-05 RX ADMIN — QUETIAPINE FUMARATE 50 MG: 25 TABLET ORAL at 21:51

## 2023-11-05 RX ADMIN — SODIUM CHLORIDE, PRESERVATIVE FREE 10 ML: 5 INJECTION INTRAVENOUS at 22:49

## 2023-11-05 RX ADMIN — HYDROXYZINE PAMOATE 50 MG: 25 CAPSULE ORAL at 22:37

## 2023-11-05 RX ADMIN — Medication 100 MG: at 13:23

## 2023-11-05 RX ADMIN — LORAZEPAM 3 MG: 2 INJECTION INTRAMUSCULAR; INTRAVENOUS at 21:51

## 2023-11-05 RX ADMIN — CLONIDINE HYDROCHLORIDE 0.1 MG: 0.1 TABLET ORAL at 22:38

## 2023-11-05 RX ADMIN — SODIUM CHLORIDE, PRESERVATIVE FREE 10 ML: 5 INJECTION INTRAVENOUS at 21:51

## 2023-11-05 RX ADMIN — LORAZEPAM 4 MG: 2 INJECTION INTRAMUSCULAR; INTRAVENOUS at 22:53

## 2023-11-05 RX ADMIN — LORAZEPAM 1 MG: 2 INJECTION INTRAMUSCULAR; INTRAVENOUS at 11:05

## 2023-11-05 ASSESSMENT — PAIN SCALES - PAIN ASSESSMENT IN ADVANCED DEMENTIA (PAINAD)
NEGVOCALIZATION: 0
FACIALEXPRESSION: 0
CONSOLABILITY: 0
BREATHING: 0
TOTALSCORE: 0
BODYLANGUAGE: 0
BREATHING: 0
TOTALSCORE: 0
BODYLANGUAGE: 0
CONSOLABILITY: 0
NEGVOCALIZATION: 0
FACIALEXPRESSION: 0

## 2023-11-05 ASSESSMENT — PAIN SCALES - GENERAL: PAINLEVEL_OUTOF10: 0

## 2023-11-05 NOTE — PLAN OF CARE
Problem: Safety - Adult  Goal: Free from fall injury  Outcome: Not Progressing     Problem: ABCDS Injury Assessment  Goal: Absence of physical injury  Outcome: Not Progressing     Problem: Chronic Conditions and Co-morbidities  Goal: Patient's chronic conditions and co-morbidity symptoms are monitored and maintained or improved  Outcome: Not Progressing     Problem: Anxiety  Goal: Will report anxiety at manageable levels  Description: INTERVENTIONS:  1. Administer medication as ordered  2. Teach and rehearse alternative coping skills  3. Provide emotional support with 1:1 interaction with staff  Outcome: Not Progressing     Problem: Decision Making  Goal: Pt/Family able to effectively weigh alternatives and participate in decision making related to treatment and care  Description: INTERVENTIONS:  1. Determine when there are differences between patient's view, family's view, and healthcare provider's view of condition  2. Facilitate patient and family articulation of goals for care  3. Help patient and family identify pros/cons of alternative solutions  4. Provide information as requested by patient/family  5. Respect patient/family right to receive or not to receive information  6. Serve as a liaison between patient and family and health care team  7. Initiate Consults from Ethics, Palliative Care or initiate [address] as is appropriate  Outcome: Not Progressing     Problem: Confusion  Goal: Confusion, delirium, dementia, or psychosis is improved or at baseline  Description: INTERVENTIONS:  1. Assess for possible contributors to thought disturbance, including medications, impaired vision or hearing, underlying metabolic abnormalities, dehydration, psychiatric diagnoses, and notify attending LIP  2. [place] high risk fall precautions, as indicated  3. Provide frequent short contacts to provide reality reorientation, refocusing and direction  4.  Decrease environmental stimuli, including noise as

## 2023-11-05 NOTE — PLAN OF CARE
Problem: Safety - Adult  Goal: Free from fall injury  11/5/2023 0959 by Mark Nazario RN  Outcome: Progressing  11/5/2023 0040 by Kristy Trevino RN  Outcome: Not Progressing     Problem: ABCDS Injury Assessment  Goal: Absence of physical injury  11/5/2023 0959 by Mark Nazario RN  Outcome: Progressing  11/5/2023 0040 by Kristy Trevino RN  Outcome: Not Progressing     Problem: Chronic Conditions and Co-morbidities  Goal: Patient's chronic conditions and co-morbidity symptoms are monitored and maintained or improved  11/5/2023 0959 by Mark Nazario RN  Outcome: Progressing  11/5/2023 0040 by Kristy Trevino RN  Outcome: Not Progressing     Problem: Anxiety  Goal: Will report anxiety at manageable levels  Description: INTERVENTIONS:  1. Administer medication as ordered  2. Teach and rehearse alternative coping skills  3. Provide emotional support with 1:1 interaction with staff  11/5/2023 0959 by Mark Nazario RN  Outcome: Progressing  11/5/2023 0040 by Kristy Trevino RN  Outcome: Not Progressing     Problem: Decision Making  Goal: Pt/Family able to effectively weigh alternatives and participate in decision making related to treatment and care  Description: INTERVENTIONS:  1. Determine when there are differences between patient's view, family's view, and healthcare provider's view of condition  2. Facilitate patient and family articulation of goals for care  3. Help patient and family identify pros/cons of alternative solutions  4. Provide information as requested by patient/family  5. Respect patient/family right to receive or not to receive information  6. Serve as a liaison between patient and family and health care team  7.  Initiate Consults from Ethics, Palliative Care or initiate [address] as is appropriate  11/5/2023 0959 by Mark Nazario RN  Outcome: Progressing  11/5/2023 0040 by Kristy Trevino RN  Outcome: Not Progressing     Problem: Confusion  Goal: Confusion, delirium,

## 2023-11-06 PROBLEM — I48.91 ATRIAL FIBRILLATION WITH RVR (HCC): Status: ACTIVE | Noted: 2023-11-06

## 2023-11-06 LAB
ANION GAP SERPL CALCULATED.3IONS-SCNC: 14 MMOL/L (ref 3–16)
BUN SERPL-MCNC: 28 MG/DL (ref 7–20)
CALCIUM SERPL-MCNC: 9 MG/DL (ref 8.3–10.6)
CHLORIDE SERPL-SCNC: 112 MMOL/L (ref 99–110)
CO2 SERPL-SCNC: 21 MMOL/L (ref 21–32)
CREAT SERPL-MCNC: 1.1 MG/DL (ref 0.8–1.3)
GFR SERPLBLD CREATININE-BSD FMLA CKD-EPI: >60 ML/MIN/{1.73_M2}
GLUCOSE SERPL-MCNC: 103 MG/DL (ref 70–99)
MAGNESIUM SERPL-MCNC: 2.7 MG/DL (ref 1.8–2.4)
POTASSIUM SERPL-SCNC: 3.5 MMOL/L (ref 3.5–5.1)
SODIUM SERPL-SCNC: 147 MMOL/L (ref 136–145)

## 2023-11-06 PROCEDURE — 6360000002 HC RX W HCPCS: Performed by: HOSPITALIST

## 2023-11-06 PROCEDURE — 2580000003 HC RX 258: Performed by: HOSPITALIST

## 2023-11-06 PROCEDURE — 51798 US URINE CAPACITY MEASURE: CPT

## 2023-11-06 PROCEDURE — 94760 N-INVAS EAR/PLS OXIMETRY 1: CPT

## 2023-11-06 PROCEDURE — 6370000000 HC RX 637 (ALT 250 FOR IP): Performed by: STUDENT IN AN ORGANIZED HEALTH CARE EDUCATION/TRAINING PROGRAM

## 2023-11-06 PROCEDURE — 80048 BASIC METABOLIC PNL TOTAL CA: CPT

## 2023-11-06 PROCEDURE — 36415 COLL VENOUS BLD VENIPUNCTURE: CPT

## 2023-11-06 PROCEDURE — 6370000000 HC RX 637 (ALT 250 FOR IP): Performed by: NURSE PRACTITIONER

## 2023-11-06 PROCEDURE — 2580000003 HC RX 258: Performed by: STUDENT IN AN ORGANIZED HEALTH CARE EDUCATION/TRAINING PROGRAM

## 2023-11-06 PROCEDURE — 2500000003 HC RX 250 WO HCPCS: Performed by: NURSE PRACTITIONER

## 2023-11-06 PROCEDURE — 1200000000 HC SEMI PRIVATE

## 2023-11-06 PROCEDURE — 6370000000 HC RX 637 (ALT 250 FOR IP): Performed by: REGISTERED NURSE

## 2023-11-06 PROCEDURE — 6360000002 HC RX W HCPCS: Performed by: STUDENT IN AN ORGANIZED HEALTH CARE EDUCATION/TRAINING PROGRAM

## 2023-11-06 PROCEDURE — 6370000000 HC RX 637 (ALT 250 FOR IP): Performed by: HOSPITALIST

## 2023-11-06 PROCEDURE — 83735 ASSAY OF MAGNESIUM: CPT

## 2023-11-06 PROCEDURE — 93005 ELECTROCARDIOGRAM TRACING: CPT | Performed by: NURSE PRACTITIONER

## 2023-11-06 RX ORDER — CARVEDILOL 12.5 MG/1
12.5 TABLET ORAL 2 TIMES DAILY WITH MEALS
Status: DISCONTINUED | OUTPATIENT
Start: 2023-11-07 | End: 2023-11-06

## 2023-11-06 RX ORDER — METOPROLOL TARTRATE 5 MG/5ML
5 INJECTION INTRAVENOUS EVERY 6 HOURS PRN
Status: DISCONTINUED | OUTPATIENT
Start: 2023-11-06 | End: 2023-11-08 | Stop reason: HOSPADM

## 2023-11-06 RX ORDER — CARVEDILOL 12.5 MG/1
12.5 TABLET ORAL 2 TIMES DAILY WITH MEALS
Status: DISCONTINUED | OUTPATIENT
Start: 2023-11-06 | End: 2023-11-08 | Stop reason: HOSPADM

## 2023-11-06 RX ORDER — METOPROLOL TARTRATE 5 MG/5ML
5 INJECTION INTRAVENOUS ONCE
Status: COMPLETED | OUTPATIENT
Start: 2023-11-06 | End: 2023-11-06

## 2023-11-06 RX ORDER — DIAZEPAM 5 MG/1
10 TABLET ORAL ONCE
Status: COMPLETED | OUTPATIENT
Start: 2023-11-06 | End: 2023-11-06

## 2023-11-06 RX ORDER — DILTIAZEM HYDROCHLORIDE 5 MG/ML
10 INJECTION INTRAVENOUS ONCE
Status: DISCONTINUED | OUTPATIENT
Start: 2023-11-06 | End: 2023-11-06

## 2023-11-06 RX ADMIN — LORAZEPAM 2 MG: 2 INJECTION INTRAMUSCULAR; INTRAVENOUS at 21:31

## 2023-11-06 RX ADMIN — METOPROLOL TARTRATE 5 MG: 5 INJECTION, SOLUTION INTRAVENOUS at 22:27

## 2023-11-06 RX ADMIN — QUETIAPINE FUMARATE 50 MG: 25 TABLET ORAL at 08:57

## 2023-11-06 RX ADMIN — SODIUM CHLORIDE, PRESERVATIVE FREE 10 ML: 5 INJECTION INTRAVENOUS at 08:58

## 2023-11-06 RX ADMIN — AMLODIPINE BESYLATE 10 MG: 10 TABLET ORAL at 08:57

## 2023-11-06 RX ADMIN — QUETIAPINE FUMARATE 50 MG: 25 TABLET ORAL at 20:20

## 2023-11-06 RX ADMIN — ENOXAPARIN SODIUM 40 MG: 100 INJECTION SUBCUTANEOUS at 17:23

## 2023-11-06 RX ADMIN — Medication 100 MG: at 08:57

## 2023-11-06 RX ADMIN — SODIUM CHLORIDE, PRESERVATIVE FREE 10 ML: 5 INJECTION INTRAVENOUS at 20:20

## 2023-11-06 RX ADMIN — ATORVASTATIN CALCIUM 20 MG: 20 TABLET, FILM COATED ORAL at 20:20

## 2023-11-06 RX ADMIN — CLOPIDOGREL BISULFATE 75 MG: 75 TABLET ORAL at 08:57

## 2023-11-06 RX ADMIN — DIAZEPAM 10 MG: 5 TABLET ORAL at 15:51

## 2023-11-06 RX ADMIN — ASPIRIN 81 MG: 81 TABLET, COATED ORAL at 08:57

## 2023-11-06 RX ADMIN — DIAZEPAM 10 MG: 5 TABLET ORAL at 04:11

## 2023-11-06 RX ADMIN — HYDROXYZINE PAMOATE 50 MG: 25 CAPSULE ORAL at 20:20

## 2023-11-06 RX ADMIN — TRAZODONE HYDROCHLORIDE 50 MG: 50 TABLET ORAL at 21:56

## 2023-11-06 RX ADMIN — CARVEDILOL 12.5 MG: 12.5 TABLET, FILM COATED ORAL at 23:11

## 2023-11-06 ASSESSMENT — PAIN SCALES - PAIN ASSESSMENT IN ADVANCED DEMENTIA (PAINAD)
CONSOLABILITY: 0
BODYLANGUAGE: 0
NEGVOCALIZATION: 0
BREATHING: 0
FACIALEXPRESSION: 0
BREATHING: 0
NEGVOCALIZATION: 0
TOTALSCORE: 0
BREATHING: 0
CONSOLABILITY: 0
TOTALSCORE: 0
TOTALSCORE: 0
NEGVOCALIZATION: 0
BODYLANGUAGE: 0
BODYLANGUAGE: 0
FACIALEXPRESSION: 0
CONSOLABILITY: 0
FACIALEXPRESSION: 0

## 2023-11-06 ASSESSMENT — PAIN SCALES - GENERAL
PAINLEVEL_OUTOF10: 0
PAINLEVEL_OUTOF10: 0

## 2023-11-06 NOTE — PLAN OF CARE
Problem: Skin/Tissue Integrity  Goal: Absence of new skin breakdown  Description: 1. Monitor for areas of redness and/or skin breakdown  2. Assess vascular access sites hourly  3. Every 4-6 hours minimum:  Change oxygen saturation probe site  4. Every 4-6 hours:  If on nasal continuous positive airway pressure, respiratory therapy assess nares and determine need for appliance change or resting period. 11/5/2023 2137 by Park Rodriguez RN  Outcome: Progressing  11/5/2023 2128 by Park Rodriguez RN  Outcome: Progressing  11/5/2023 0959 by Candelaria Kaur RN  Outcome: Progressing     Problem: Safety - Adult  Goal: Free from fall injury  11/5/2023 2137 by Park Rodriguez RN  Outcome: Progressing  11/5/2023 2128 by Park Rodriguez RN  Outcome: Progressing  11/5/2023 0959 by Candelaria Kaur RN  Outcome: Progressing     Problem: Safety - Medical Restraint  Goal: Remains free of injury from restraints (Restraint for Interference with Medical Device)  Description: INTERVENTIONS:  1. Determine that other, less restrictive measures have been tried or would not be effective before applying the restraint  2. Evaluate the patient's condition at the time of restraint application  3. Inform patient/family regarding the reason for restraint  4.  Q2H: Monitor safety, psychosocial status, comfort, nutrition and hydration  11/5/2023 2137 by Park Rodriguez RN  Outcome: Progressing  Flowsheets  Taken 11/5/2023 2001 by Park Rodriguez RN  Remains free of injury from restraints (restraint for interference with medical device):   Determine that other, less restrictive measures have been tried or would not be effective before applying the restraint   Evaluate the patient's condition at the time of restraint application   Inform patient/family regarding the reason for restraint   Every 2 hours: Monitor safety, psychosocial status, comfort, nutrition and hydration  Taken 11/5/2023 2000 by Park Rodriguez RN  Remains free

## 2023-11-06 NOTE — CARE COORDINATION
Case Management Assessment  Initial Evaluation    Date/Time of Evaluation: 11/6/2023 3:05 PM  Assessment Completed by: Vonnie Mcarthur RN    If patient is discharged prior to next notation, then this note serves as note for discharge by case management. Patient Name: Kobe Zapien                   YOB: 1950  Diagnosis: Altered mental status [R41.82]  Polysubstance abuse ([address]) [F19.10]  Accidental drug overdose, initial encounter [T50.906Y]  Insomnia, unspecified type [G47.00]                   Date / Time: 11/3/2023 10:24 AM    Patient Admission Status: Inpatient   Readmission Risk (Low < 19, Mod (19-27), High > 27): Readmission Risk Score: 10.7    Current PCP: Drea Frost APRN - CNP  PCP verified by CM? No (no primary care)    Chart Reviewed: Yes      History Provided by: Child/Family  Patient Orientation: Sedated    Patient Cognition: Severely Impaired    Hospitalization in the last 30 days (Readmission):  No    If yes, Readmission Assessment in  Navigator will be completed. Advance Directives:      Code Status: Full Code   Patient's Primary Decision Maker is: Legal Next of Kin    Primary Decision Maker: Shannon Madsen  Child - [phone]    Discharge Planning:    Patient lives with: Children Type of Home: House (3 steps to enter)  Primary Care Giver: Self  Patient Support Systems include: Children   Current Financial resources: Medicare  Current community resources: None  Current services prior to admission: None            Current DME: walker, shower chair             Type of Home Care services:  None    ADLS  Prior functional level: Assistance with the following:, Shopping  Current functional level: Assistance with the following:, Shopping    PT AM-PAC: 20 /24  OT AM-PAC: 15 /24    Family can provide assistance at DC: Yes  Would you like Case Management to discuss the discharge plan with any other family members/significant others, and if so, who?  Yes (Son)  Plans to Return to

## 2023-11-06 NOTE — PLAN OF CARE
Problem: Discharge Planning  Goal: Discharge to home or other facility with appropriate resources  Outcome: Progressing  Flowsheets (Taken 11/6/2023 0900)  Discharge to home or other facility with appropriate resources:   Identify barriers to discharge with patient and caregiver   Arrange for needed discharge resources and transportation as appropriate     Problem: Skin/Tissue Integrity  Goal: Absence of new skin breakdown  Description: 1. Monitor for areas of redness and/or skin breakdown  2. Assess vascular access sites hourly  3. Every 4-6 hours minimum:  Change oxygen saturation probe site  4. Every 4-6 hours:  If on nasal continuous positive airway pressure, respiratory therapy assess nares and determine need for appliance change or resting period. Outcome: Progressing     Problem: Safety - Adult  Goal: Free from fall injury  Outcome: Progressing     Problem: ABCDS Injury Assessment  Goal: Absence of physical injury  Outcome: Progressing     Problem: Chronic Conditions and Co-morbidities  Goal: Patient's chronic conditions and co-morbidity symptoms are monitored and maintained or improved  Outcome: Progressing     Problem: Anxiety  Goal: Will report anxiety at manageable levels  Description: INTERVENTIONS:  1. Administer medication as ordered  2. Teach and rehearse alternative coping skills  3. Provide emotional support with 1:1 interaction with staff  Outcome: Progressing     Problem: Decision Making  Goal: Pt/Family able to effectively weigh alternatives and participate in decision making related to treatment and care  Description: INTERVENTIONS:  1. Determine when there are differences between patient's view, family's view, and healthcare provider's view of condition  2. Facilitate patient and family articulation of goals for care  3. Help patient and family identify pros/cons of alternative solutions  4. Provide information as requested by patient/family  5.  Respect patient/family right to receive or

## 2023-11-06 NOTE — PLAN OF CARE
Problem: Skin/Tissue Integrity  Goal: Absence of new skin breakdown  Description: 1. Monitor for areas of redness and/or skin breakdown  2. Assess vascular access sites hourly  3. Every 4-6 hours minimum:  Change oxygen saturation probe site  4. Every 4-6 hours:  If on nasal continuous positive airway pressure, respiratory therapy assess nares and determine need for appliance change or resting period.   11/5/2023 2128 by Park Rodriguez RN  Outcome: Progressing  11/5/2023 0959 by Candelaria Kaur RN  Outcome: Progressing     Problem: ABCDS Injury Assessment  Goal: Absence of physical injury  11/5/2023 2128 by Park Rodriguez RN  Outcome: Progressing  11/5/2023 0959 by Candelaria Kaur RN  Outcome: Progressing No

## 2023-11-07 ENCOUNTER — TELEPHONE (OUTPATIENT)
Dept: CARDIOLOGY CLINIC | Age: 73
End: 2023-11-07

## 2023-11-07 VITALS
WEIGHT: 169.53 LBS | TEMPERATURE: 98 F | DIASTOLIC BLOOD PRESSURE: 80 MMHG | HEART RATE: 66 BPM | HEIGHT: 68 IN | SYSTOLIC BLOOD PRESSURE: 133 MMHG | RESPIRATION RATE: 15 BRPM | OXYGEN SATURATION: 97 % | BODY MASS INDEX: 25.69 KG/M2

## 2023-11-07 LAB
ANION GAP SERPL CALCULATED.3IONS-SCNC: 10 MMOL/L (ref 3–16)
BUN SERPL-MCNC: 39 MG/DL (ref 7–20)
CALCIUM SERPL-MCNC: 8.9 MG/DL (ref 8.3–10.6)
CHLORIDE SERPL-SCNC: 108 MMOL/L (ref 99–110)
CO2 SERPL-SCNC: 24 MMOL/L (ref 21–32)
CREAT SERPL-MCNC: 1.4 MG/DL (ref 0.8–1.3)
EKG ATRIAL RATE: 187 BPM
EKG DIAGNOSIS: NORMAL
EKG Q-T INTERVAL: 274 MS
EKG QRS DURATION: 72 MS
EKG QTC CALCULATION (BAZETT): 441 MS
EKG R AXIS: 71 DEGREES
EKG T AXIS: 6 DEGREES
EKG VENTRICULAR RATE: 156 BPM
GFR SERPLBLD CREATININE-BSD FMLA CKD-EPI: 53 ML/MIN/{1.73_M2}
GLUCOSE SERPL-MCNC: 116 MG/DL (ref 70–99)
POTASSIUM SERPL-SCNC: 3.6 MMOL/L (ref 3.5–5.1)
SODIUM SERPL-SCNC: 142 MMOL/L (ref 136–145)
TSH SERPL DL<=0.005 MIU/L-ACNC: 1.91 UIU/ML (ref 0.27–4.2)

## 2023-11-07 PROCEDURE — 36415 COLL VENOUS BLD VENIPUNCTURE: CPT

## 2023-11-07 PROCEDURE — 80048 BASIC METABOLIC PNL TOTAL CA: CPT

## 2023-11-07 PROCEDURE — 6370000000 HC RX 637 (ALT 250 FOR IP): Performed by: HOSPITALIST

## 2023-11-07 PROCEDURE — 6370000000 HC RX 637 (ALT 250 FOR IP): Performed by: STUDENT IN AN ORGANIZED HEALTH CARE EDUCATION/TRAINING PROGRAM

## 2023-11-07 PROCEDURE — 6370000000 HC RX 637 (ALT 250 FOR IP): Performed by: NURSE PRACTITIONER

## 2023-11-07 PROCEDURE — 93306 TTE W/DOPPLER COMPLETE: CPT

## 2023-11-07 PROCEDURE — 97530 THERAPEUTIC ACTIVITIES: CPT

## 2023-11-07 PROCEDURE — 93010 ELECTROCARDIOGRAM REPORT: CPT | Performed by: INTERNAL MEDICINE

## 2023-11-07 PROCEDURE — 2580000003 HC RX 258: Performed by: STUDENT IN AN ORGANIZED HEALTH CARE EDUCATION/TRAINING PROGRAM

## 2023-11-07 PROCEDURE — 6360000002 HC RX W HCPCS: Performed by: STUDENT IN AN ORGANIZED HEALTH CARE EDUCATION/TRAINING PROGRAM

## 2023-11-07 PROCEDURE — 84443 ASSAY THYROID STIM HORMONE: CPT

## 2023-11-07 PROCEDURE — 97116 GAIT TRAINING THERAPY: CPT

## 2023-11-07 PROCEDURE — 94760 N-INVAS EAR/PLS OXIMETRY 1: CPT

## 2023-11-07 PROCEDURE — 2580000003 HC RX 258: Performed by: HOSPITALIST

## 2023-11-07 PROCEDURE — 6370000000 HC RX 637 (ALT 250 FOR IP): Performed by: REGISTERED NURSE

## 2023-11-07 RX ORDER — CARVEDILOL 12.5 MG/1
12.5 TABLET ORAL 2 TIMES DAILY WITH MEALS
Qty: 60 TABLET | Refills: 3 | Status: SHIPPED | OUTPATIENT
Start: 2023-11-07

## 2023-11-07 RX ORDER — ATORVASTATIN CALCIUM 80 MG/1
80 TABLET, FILM COATED ORAL DAILY
Qty: 90 TABLET | Refills: 1 | Status: SHIPPED | OUTPATIENT
Start: 2023-11-07

## 2023-11-07 RX ORDER — THIAMINE MONONITRATE (VIT B1) 100 MG
100 TABLET ORAL DAILY
Qty: 30 TABLET | Refills: 5 | Status: SHIPPED | OUTPATIENT
Start: 2023-11-08

## 2023-11-07 RX ADMIN — QUETIAPINE FUMARATE 50 MG: 25 TABLET ORAL at 10:51

## 2023-11-07 RX ADMIN — AMLODIPINE BESYLATE 10 MG: 10 TABLET ORAL at 10:51

## 2023-11-07 RX ADMIN — SODIUM CHLORIDE, PRESERVATIVE FREE 10 ML: 5 INJECTION INTRAVENOUS at 09:00

## 2023-11-07 RX ADMIN — Medication 100 MG: at 10:51

## 2023-11-07 RX ADMIN — CARVEDILOL 12.5 MG: 12.5 TABLET, FILM COATED ORAL at 11:06

## 2023-11-07 RX ADMIN — SODIUM CHLORIDE, PRESERVATIVE FREE 10 ML: 5 INJECTION INTRAVENOUS at 10:59

## 2023-11-07 RX ADMIN — DIAZEPAM 10 MG: 5 TABLET ORAL at 17:03

## 2023-11-07 RX ADMIN — DIAZEPAM 10 MG: 5 TABLET ORAL at 04:03

## 2023-11-07 RX ADMIN — CARVEDILOL 12.5 MG: 12.5 TABLET, FILM COATED ORAL at 17:03

## 2023-11-07 RX ADMIN — LORAZEPAM 2 MG: 2 INJECTION INTRAMUSCULAR; INTRAVENOUS at 10:51

## 2023-11-07 RX ADMIN — ASPIRIN 81 MG: 81 TABLET, COATED ORAL at 10:51

## 2023-11-07 RX ADMIN — CLOPIDOGREL BISULFATE 75 MG: 75 TABLET ORAL at 10:51

## 2023-11-07 RX ADMIN — APIXABAN 5 MG: 5 TABLET, FILM COATED ORAL at 12:02

## 2023-11-07 ASSESSMENT — PAIN SCALES - GENERAL: PAINLEVEL_OUTOF10: 0

## 2023-11-07 NOTE — PLAN OF CARE
Problem: Discharge Planning  Goal: Discharge to home or other facility with appropriate resources  Outcome: Completed     Problem: Skin/Tissue Integrity  Goal: Absence of new skin breakdown  Description: 1. Monitor for areas of redness and/or skin breakdown  2. Assess vascular access sites hourly  3. Every 4-6 hours minimum:  Change oxygen saturation probe site  4. Every 4-6 hours:  If on nasal continuous positive airway pressure, respiratory therapy assess nares and determine need for appliance change or resting period. 11/7/2023 1213 by Ron Parkinson RN  Outcome: Completed  11/7/2023 0039 by Jerome Cabrera RN  Outcome: Progressing     Problem: Safety - Adult  Goal: Free from fall injury  11/7/2023 1213 by Ron Parkinson RN  Outcome: Completed  11/7/2023 0039 by Jerome Cabrera RN  Outcome: Progressing     Problem: ABCDS Injury Assessment  Goal: Absence of physical injury  11/7/2023 1213 by Ron Parkinson RN  Outcome: Completed  11/7/2023 0039 by Jerome Cabrera RN  Outcome: Progressing     Problem: Chronic Conditions and Co-morbidities  Goal: Patient's chronic conditions and co-morbidity symptoms are monitored and maintained or improved  Outcome: Completed     Problem: Anxiety  Goal: Will report anxiety at manageable levels  Description: INTERVENTIONS:  1. Administer medication as ordered  2. Teach and rehearse alternative coping skills  3. Provide emotional support with 1:1 interaction with staff  11/7/2023 1213 by Ron Parkinson RN  Outcome: Completed  11/7/2023 0039 by Jerome Cabrera RN  Outcome: Progressing     Problem: Decision Making  Goal: Pt/Family able to effectively weigh alternatives and participate in decision making related to treatment and care  Description: INTERVENTIONS:  1. Determine when there are differences between patient's view, family's view, and healthcare provider's view of condition  2. Facilitate patient and family articulation of goals for care  3.  Help patient and

## 2023-11-07 NOTE — CARE COORDINATION
DISCHARGE SUMMARY     DATE OF DISCHARGE: 11/7/23    DISCHARGE DESTINATION: Home with Son    HOME CARE: No    HEMODIALYSIS: No    TRANSPORTATION: Private Car    NEW DME ORDERED: yes-30 day cardiac event monitor. COMMENTS: Son will transport patient to home at 6:30 pm. New PCP appointment made for patient-on [facility].  Electronically signed by Srabjit Tim RN on 11/7/2023 at 12:53 PM

## 2023-11-07 NOTE — ACP (ADVANCE CARE PLANNING)
patient/agent/surrogate to review completed ACP document and update if needed with changes in condition, patient preferences or care setting    [] This note routed to one or more involved healthcare providers    Electronically signed by Adrien Means RN on 11/7/2023 at 11:55 AM

## 2023-11-07 NOTE — DISCHARGE INSTR - COC
Continuity of Care Form    Patient Name: Peewee Alberto   :  1950  MRN:  [id number]    Admit date:  11/3/2023  Discharge date:  ***    Code Status Order: Full Code   Advance Directives:     Admitting Physician:  Debra Gibbons MD  PCP: TALITA Reveles CNP    Discharging Nurse: [facility] Unit/Room#: [id number]  Discharging Unit Phone Number: ***    Emergency Contact:   Extended Emergency Contact Information  Primary Emergency Contact: [facility] Phone: [phone]  Work Phone: [phone]  Relation: Child    Past Surgical History:  Past Surgical History:   Procedure Laterality Date    CARDIAC CATHETERIZATION      FEMORAL BYPASS Right 2022    RIGHT FEMORAL POPLITEAL BYPASS performed by Teodoro Porter DO at [facility] Right 2022    HIP PINNING performed by Awa Feliz MD at [facility]       Immunization History:   Immunization History   Administered Date(s) Administered    COVID-19, PFIZER GRAY top, DO NOT Dilute, (age 15 y+), IM, 27 mcg/0.3 mL 2022    COVID-19, PFIZER PURPLE top, DILUTE for use, (age 15 y+), 30mcg/0.3mL 2022       Active Problems:  Patient Active Problem List   Diagnosis Code    Arthritis of left knee M17.12    Basal cell carcinoma (BCC) of skin of nose C44.311    Peripheral arterial disease (HCC) I73.9    Closed right hip fracture ([facility]) S72.001A    Numbness of right foot R20.0    Hip fracture, right, closed, initial encounter ([address]) S72.001A    Closed subcapital fracture of femur, right, initial encounter ([address]) S72.011A    Critical lower limb ischemia ([facility]) I70.229    Neuropathic pain M79.2    Chronic pain syndrome G89.4    Altered mental status R41.82    Accidental drug overdose T50.901A    Depression F32. A    Atrial fibrillation with RVR ([facility]) I48.91       Isolation/Infection:   Isolation            No Isolation          Patient Infection Status       None to display            Nurse Assessment:  Last Vital

## 2023-11-07 NOTE — CONSULTS
Cardiac Electrophysiology Consultation     Date: 11/7/2023  Admit Date:  11/3/2023  Admission Diagnosis: Altered mental status [R41.82]  Polysubstance abuse ([address]) [F19.10]  Accidental drug overdose, initial encounter [T50.901A]  Insomnia, unspecified type [G47.00]     Reason for Consultation: new atrial fibrillation with RVR  Consult Requesting Physician: Angelica Weinberg MD       History of Present Illness  Savi Rodriguez is a 68y.o. year old male with past medical history significant for CAD, PAD s/p R femoral-popliteal bypass, basal cell carcinoma and anxiety who presented to the ED on 11/3/23 with AMS, felt to be secondary to benzodiazepine withdrawal. He is overall improving but was noted to have about 8 hours of atrial fibrillation/flutter on telemetry on 11/6. He was given 5mg of IV metoprolol for rate control. Had periods in SVT (likely atrial flutter) with v-rates into the 190s. Denies any symptoms such as palpitations or dyspnea. No chest pain, edema or syncope. He converted spontaneously and remains in sinus rhythm. Past Medical History:   Diagnosis Date    Anxiety     Basal cell carcinoma     L side of nose    CAD (coronary artery disease)     Peripheral artery disease ([address])     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        Past Surgical History:   Procedure Laterality Date    CARDIAC CATHETERIZATION  2022    FEMORAL BYPASS Right 7/5/2022    RIGHT FEMORAL POPLITEAL BYPASS performed by Ruiz Hoover DO at [address] Right 4/24/2022    HIP PINNING performed by Cheikh Flor MD at [facility]       Current Outpatient Medications   Medication Instructions    amLODIPine (NORVASC) 10 mg, Oral, DAILY, Patient states he is taking when he can, however medication bottle patient has with him is from `12/2021 and 7/20222. aspirin EC 81 mg, Oral, DAILY, .     atorvastatin (LIPITOR) 20 mg, Oral, Nightly    clopidogrel (PLAVIX) 75 mg, Oral, DAILY    DULoxetine (CYMBALTA) 60 mg, Oral, DAILY    senna-docusate

## 2023-11-07 NOTE — TELEPHONE ENCOUNTER
Per [facility] NP patient needs 30 day monitor at [facility]. Patient son to pick him up at 31 [phone]. Monitor explained to paytient and activated. ANA number left for son if he has questions about monitor placement. Monitor placed by STEPHANIE Vargas [address]  Length of monitor 30 days  Monitor ordered by Dr. Cassandra Alberto  Serial number [id number]  Kit ID [id number]  Activation successful prior to pt leaving office? Yes    Please place on Spread sheet.

## 2023-11-07 NOTE — CARE COORDINATION
DISCHARGE SUMMARY     DATE OF DISCHARGE: 11/7/23    DISCHARGE DESTINATION: Home with son    HOME CARE: No    HEMODIALYSIS: No    TRANSPORTATION: Private Car    NEW DME ORDERED: no    COMMENTS: ***

## 2023-11-08 PROBLEM — C44.311 BASAL CELL CARCINOMA (BCC) OF SKIN OF NOSE: Status: RESOLVED | Noted: 2020-07-17 | Resolved: 2023-11-08

## 2023-11-08 PROBLEM — R41.82 ALTERED MENTAL STATUS: Status: RESOLVED | Noted: 2023-11-03 | Resolved: 2023-11-08

## 2023-11-08 PROBLEM — R19.09 OTHER INTRA-ABDOMINAL AND PELVIC SWELLING, MASS AND LUMP: Status: ACTIVE | Noted: 2023-11-08

## 2023-11-08 PROBLEM — E78.00 HIGH CHOLESTEROL: Status: ACTIVE | Noted: 2022-11-07

## 2023-11-08 PROBLEM — I25.10 CORONARY ATHEROSCLEROSIS: Status: ACTIVE | Noted: 2022-11-07

## 2023-11-08 PROBLEM — S72.001A CLOSED RIGHT HIP FRACTURE (HCC): Status: RESOLVED | Noted: 2022-04-23 | Resolved: 2023-11-08

## 2023-11-08 PROBLEM — C44.91 BASAL CELL CARCINOMA (BCC): Status: ACTIVE | Noted: 2023-11-08

## 2023-11-08 PROBLEM — S72.001A HIP FRACTURE, RIGHT, CLOSED, INITIAL ENCOUNTER (HCC): Status: RESOLVED | Noted: 2022-04-24 | Resolved: 2023-11-08

## 2023-11-08 PROBLEM — I10 HYPERTENSION: Status: ACTIVE | Noted: 2022-11-07

## 2023-11-08 NOTE — PROGRESS NOTES
4 Eyes Skin Assessment     NAME:  Manuelito Penaloza  YOB: 1950  MEDICAL RECORD NUMBER:  [id number]    The patient is being assessed for  Admission    I agree that at least one RN has performed a thorough Head to Toe Skin Assessment on the patient. ALL assessment sites listed below have been assessed. Areas assessed by both nurses:    Head, Face, Ears, Shoulders, Back, Chest, Arms, Elbows, Hands, Sacrum. Buttock, Coccyx, Ischium, Legs. Feet and Heels, and Under Medical Devices         Does the Patient have a Wound?  No noted wound(s)       Jalen Prevention initiated by RN: Yes  Wound Care Orders initiated by RN: No    Pressure Injury (Stage 3,4, Unstageable, DTI, NWPT, and Complex wounds) if present, place Wound referral order by RN under : No    New Ostomies, if present place, Ostomy referral order under : No     Nurse 1 eSignature: Electronically signed by Collette Bennett RN on 11/3/23 at 5:25 PM EDT    **SHARE this note so that the co-signing nurse can place an eSignature**    Nurse 2 eSignature: {Esignature:[id number]}
Acetaminophen level still undetectable. Poison control does not need to be notified per poison control.
Admitted patient to room [phone] from the emergency dept. . Oriented to room, call light, tv, phone and dietary services. Respirations easy unlabored, denies pain. Bed in lowest position and locked. Exit alarms in place. Non slip socks on. ID bracelet on and correct per patient verbally reporting name and date of birth. Call light and needed items in reach. Patient appears to be alert and oriented and answering questions appropriately.
BEDSIDe eval: paged for tachycardia  Admitted 3 days ago for AMS  STAT EKG ordered-> reviewed per myself: Afib w/ RVR, narrow complex: rate: 156, QRS: 72, QT: 274, QTc: 441  Pt has no prior documented hx    On exam: he is awake and alert: b/p 129/89    Plan: metoprolol 5mg IVP and re-eval, may consider cardizem bolus, if rate becomes controlled can add oral cardizem but hold the amlodipine. Cardiology consulted  Afib w/ rvr added to problem list  POC discussed with Primary RN and she is aware. Rate now done to 80's-> will continue to monitor.  PRN metoprolol ordered and Coreg 12.5mg BID PO
CLINICAL PHARMACY NOTE: MEDS TO BEDS    Total # of Prescriptions Filled: 1   The following medications were delivered to the patient:       START taking these medications    Details                 apixaban (ELIQUIS) 5 MG TABS tablet Take 1 tablet by mouth 2 times daily  Qty: 60 tablet, Refills: 3               Additional Documentation: Per Miriam BROWN In the inpatient pharmacy, Pt only wants Eliquis.  He has refused all other meds at this time
Discharge instructions and medications reviewed with patient. Pt. Verbalized understanding and denies any questions. Removed IV without complications. Home med's and prescriptions where given to pt.  Pt transported off unit via wheelchair with all personal belongings     Electronically signed by Matthew Marcelino RN on 11/7/2023 at 7:59 PM
Given valium to the pt, and Ativan per ciwa protocol, pt sleeping in bed comfortably at this time. Plan of care in progress.
Occupational Therapy  Facility/Department: [facility]  Occupational Therapy Initial Assessment    Name: Brunilda William  : 1950  MRN: [id number]  Date of Service: 2023    Discharge Recommendations:  Continue to assess pending progress, 24 hour supervision or assist, Patient would benefit from continued therapy after discharge, 3-5 sessions per week (Anticipate if cognition and tremors improve would be able to return home wtih 24 hr SBA. However, if unavailable for does not improve would require further skilled OT as pt is a high fall risk with lower AMPAC score)        Matty Lacey scored a 15/24 on the AM-PAC ADL Inpatient form. Current research shows that an AM-PAC score of 17 or less is typically not associated with a discharge to the patient's home setting. Based on the patient's AM-PAC score and their current ADL deficits, it is recommended that the patient have 3-5 sessions per week of Occupational Therapy at d/c to increase the patient's independence. Please see assessment section for further patient specific details. If patient discharges prior to next session this note will serve as a discharge summary. Please see below for the latest assessment towards goals. Patient Diagnosis(es): The primary encounter diagnosis was Accidental drug overdose, initial encounter. Diagnoses of Polysubstance abuse ([address]) and Insomnia, unspecified type were also pertinent to this visit. Past Medical History:  has a past medical history of Anxiety, Basal cell carcinoma, CAD (coronary artery disease), Peripheral artery disease ([address]), and Presacral mass. Past Surgical History:  has a past surgical history that includes hip surgery (Right, 2022); Cardiac catheterization (); and femoral bypass (Right, 2022). Treatment Diagnosis: impaired ADL/fxl mobility    Assessment   Performance deficits / Impairments: Decreased functional mobility ; Decreased balance;Decreased ADL
PT is restless and agitated. Secure messages send to [facility], [facility] NP. Pt started throwing items in the room. Pt took underwear off, exposing himself. He keeps asking for cigarettes and trying to get up out of bed. This RN kept reminding him he is in the hospital and can't smoke. Pt hasn't been on COWS protocol since he came in and his toxicity screen was positive for benzo's and marijuana. New orders placed. Medication administered. This RN while trying to administer IM zyprexa started hitting and kicking RN's and aides in the room. Pt called his son at approximately 21 . Son aware of possible restraints being place. PT started calling the  Americans in the room \"Trey\". New orders placed.  SEE MAR
Patient attempted to hit and kick staff. Continues to be verbally aggressive. Glendy Mclaughlin NP made aware.
Patient called son from personal cell phone. This RN spoke with son and updated on patient's status.
Patient continuously attempting to get out of bed, A&Oxself. Not redirectable at this time.
Patient is a standby assist, contact guard gait is unsteady. Mult staff members redirected patient throughout the day and patient remains impulsive. Patient stood up and walked away from his IV pole and pulled his IV out of his right hand. Tremors in BUE are worsening, patient experiencing some jerking like movements while lying in the bed. Doctor aware, will continue to monitor.
Patient jumping out of bed, verbally aggressive, agitated. MD aware.
Patient refusing telemetry @ time.
Patient verbally and physically aggressive with staff. Patient removed all clothes and undergarments exposing himself to staff. Refusing to put clothes on.
Physical Therapy  Facility/Department: [address] MED SURG  Physical Therapy Daily Note  If patient discharges prior to next session this note will serve as a discharge summary. Please see below for the latest assessment towards goals. Name: Maria Del Rosario Edwards  : 1950  MRN: [id number]  Date of Service: 2023    Discharge Recommendations:  24 hour supervision or assist, Patient would benefit from continued therapy after discharge, Home with Home health PT   Maria Del Rosario Edwards scored a 21/24 on the AM-PAC short mobility form. Current research shows that an AM-PAC score of 18 or greater is typically associated with a discharge to the patient's home setting. Based on the patient's AM-PAC score and their current functional mobility deficits, it is recommended that the patient have 2-3 sessions per week of Physical Therapy at d/c to increase the patient's independence. At this time, this patient demonstrates the endurance and safety to discharge home with initial 24/7 assist and home PT (home vs OP services) and a follow up treatment frequency of 2-3x/wk. Please see assessment section for further patient specific details. PT Equipment Recommendations  Equipment Needed: No      Patient Diagnosis(es): The primary encounter diagnosis was Accidental drug overdose, initial encounter. Diagnoses of Polysubstance abuse ([address]), Insomnia, unspecified type, and Atrial fibrillation with RVR ([address]) were also pertinent to this visit. Past Medical History:  has a past medical history of Anxiety, Basal cell carcinoma, CAD (coronary artery disease), Peripheral artery disease ([address]), and Presacral mass. Past Surgical History:  has a past surgical history that includes hip surgery (Right, 2022); Cardiac catheterization (); and femoral bypass (Right, 2022). Assessment   Assessment: Today, the pt demonstrated improved mobility and was able to use the walker to ambulate 250 feet x 2 with SBA.  He does demonstrate
Pt alert only to self. Pt denies any pain at this time. Pt tolerated night medications. VSS. Standard safety measures in place. Sitter at bedside. Call light within reach.
Pt in bed in restraints, oriented to himself only, not redirectable at this time, pt mumbling some words, Tried to give breakfast to the pt, pt not able to eat or grab spoon because of tremors, Meds held, charge RN aware and assessed pt with me, MD notified thru perfect serve. MD will stop by soon.      Electronically signed by Barb Gonzalez RN on 11/5/23 at 9:47 AM EST
Pt pulled out IV. Secure message sent to [facility]., NP. Pts labs look great. Pt eating and drinking. Will continue to encourage fluids. Fluid orders discontinued- per Inga Flynn.
Pt restraints been off for 1 hour and 20 minutes, pt resting comfortably in bed after eating breakfast, watching TV and his mobile phone. MD aware, okay to discontinue restraints per MD. Plan of care ongoing.     Electronically signed by Roxana Sheppard RN on 11/6/23 at 10:28 AM EST
Pt very drowsy, wakes up but goes back to sleep in few seconds, SpO2 99% RR 19, MD aware , Held valium per MD.     1545 pt up and alert asking for his Valium, Messaged MD, one time order placed by MD, Med administered.
Received call from [facility], pt had run 6 beats of PAT and then 8 beats of PAT with , MD aware, This RN in room with pt, incontinence care performed, pt bathed changed gown and linens, pt currently eating breakfast, watching TV, pt oriented X4 today, took morning meds, resting comfortably at this time, no needs voiced, Tele cam in place, restraints deferred so able to eat breakfast, plan of care in progress.      Electronically signed by Mark Nazario RN on 11/6/23 at 9:02 AM EST
Stat EKG completed.  Results handed to NP.
This RN pulled Valium 10 mg from [facility]ell for 1300 dose, pt was drowsy and did not give med so went ahead to waste med with second RN, [facility] only recorded partial dose waste but both RNs wasted the whole dose. Inpatient pharmacy informed of the situation.      Electronically signed by Victorina Quiles RN on 11/6/23 at 4:00 PM EST     Electronically signed by Kristy Mejia RN on 11/6/2023 at 5:16 PM
This RN spoke with Usha Lewis from poison control. She recommended a repeat acetaminophen level be ordered in the event the pt took percocet prior to coming to the ED. Notified Meem Vargas NP. Ordered repeat acetaminophen level. Will notify poison control if acetaminophen is detectable.
Unable to perform EKG at this time due to critical need in emergency. RRT charge therapist called.
V2.0  [facility] Hospitalist Progress Note      Name:  Anahi Eli /Age/Sex: 1950  (68 y.o. male)   MRN & CSN:  [id number] & [id number] Encounter Date/Time: 2023 9:34 AM EDT    Location:  [place] [address] PCP: Mark Parnell [address] [place] [address]  Day: 4    Assessment and Plan:   Anahi Eli is a 68 y.o. male with Altered Mental Status      Plan: Altered mental status and tremors  Suspected Benzodiazepine withdrawal  -Had a long discussion with patient's son about patient's long history of substance abuse. He states patient has been on Valium for last 50 years. When patient was discharged from pain management, they sent him out on a taper. However per patient's son (and to a degree patient himself), the patient has continued to use benzos. Patient's son said he has been trying to root out unwanted visitors.  -Patient's son states that patient had a very similar response to Lyrica in the past, which was the reason for it being discontinued  -Symptoms are also fairly consistent with acute benzodiazepine withdrawal.  -Monitoring on telemetry  -Patient less agitated today  -Scheduled valium 10mg BID; CIWA protocol  -Continue seroquel 50mg BID  -Mental status worsens again, consider adding mood stabilizer, depakote TID  CAD  -Continue aspirin, Plavix, statin    Ppx: Lovenox  Dispo: Pending medical improvement    Subjective:     Chief Complaint: Altered Mental Status (Family stated \"noticed increased confusion and he was talking while sleeping. \" Family stated patient asked \"where am I? Where is she?\" Family stated he found the patient's prescription pregablan was empty and patient's symptoms were \"exactly like those side effects\" and patient was shaking previously.)     Interval Hx:  Patient significant more calm this morning. Ate his breakfast, took his morning meds.   Patient somewhat sleepy, but otherwise no complaints    Brief HPI:  Anahi Eli is a 43-year-old male With history of CAD
V2.0  [facility] [place]      Name:  Lyle Roa /Age/Sex: 1950  (68 y.o. male)   MRN & CSN:  [id number] & [id number] Encounter Date/Time: 2023 9:34 AM EDT    Location:  [place] [address] PCP: Travis Turner, [address] [place] [address] Dr Day: 2    Assessment and Plan:   Lyle Roa is a 68 y.o. male with Altered Mental Status      Plan: Altered mental status and tremors  -Had a long discussion with patient's son about patient's long history of substance abuse. He states patient has been on Valium for last 50 years. When patient was discharged from pain management, they sent him out on a taper. However per patient's son (and to a degree patient himself), the patient has continued to use benzos. Patient's son said he has been trying to root out unwanted visitors.  -Patient's son states that patient had a very similar response to Lyrica in the past, which was the reason for it being discontinued  -Symptoms are also fairly consistent with acute benzodiazepine withdrawal.  -We will start Valium as needed at patient's previous dose  -Supportive care  -Monitoring on telemetry  -Psych following  CAD  -Continue aspirin, Plavix, statin    Ppx: Lovenox  Dispo: Pending medical improvement    Subjective:     Chief Complaint: Altered Mental Status (Family stated \"noticed increased confusion and he was talking while sleeping. \" Family stated patient asked \"where am I? Where is she?\" Family stated he found the patient's prescription pregablan was empty and patient's symptoms were \"exactly like those side effects\" and patient was shaking previously.)     Interval Hx:  Patient states he is feeling relatively well. However he makes several claims about people telling him that he is okay to leave. After I stepped out to discuss patient with the nurse, patient set off bed alarm. The patient then attempted to claim that I had told him that he was okay to leave.   When this was denied, he stated that I
mobility training, Therapeutic activities, Patient/Caregiver education & training  Safety Devices  Type of Devices: All fall risk precautions in place, Call light within reach, Chair alarm in place, Left in chair, Nurse notified, Patient at risk for falls     Restrictions  Restrictions/Precautions  Restrictions/Precautions: Fall Risk     Subjective   General  Chart Reviewed: Yes  Patient assessed for rehabilitation services?: Yes  Additional Pertinent Hx: here due to confusion, AMS  Response To Previous Treatment: Not applicable  Family / Caregiver Present: No  Follows Commands:  (distractable - not consistent)  Subjective  Subjective: arrived to room along with OT to patient resting in bed - awake and \"talking\" to someone not in room   - able to re-direct         Social/Functional History  Social/Functional History  Lives With: Son  Type of Home: [facility]  Home Layout: Two level  Home Access: Stairs to enter without rails  Entrance Stairs - Number of Steps: 2+1  Bathroom Shower/Tub: Tub/Shower unit, Shower chair with back, Curtain  Bathroom Toilet:  (comfort height)  Bathroom Equipment: Grab bars in shower, Shower chair, Hand-held shower  Bathroom Accessibility: Walker accessible  Home Equipment: Gifty Hair, rolling  Has the patient had two or more falls in the past year or any fall with injury in the past year?: No (???)  ADL Assistance: [address] Needs assistance (son does most)  Ambulation Assistance: Independent (RW)  Transfer Assistance: Independent  Active : No  Occupation: Retired  Type of Occupation: [address]: cards, pool, gambles  Vision/Hearing  Vision  Vision: Impaired    Objective      Observation/Palpation  Observation: tremoring (Simultaneous filing.  User may not have seen previous data.)  Gross Assessment  Tone:  (generalized tremors)  Sensation:  (not assessed formally)     Strength Other  Other: grossly functional as observed mobilizing from bed
[address]: cards, pool, gambles       Objective                Safety Devices  Type of Devices: Call light within reach; Left in bed;Bed alarm in place;Nurse notified     Wheelchair Bed Transfers  Wheelchair/Bed - Technique: Ambulating  Equipment Used: Other;Bed (recliner)  Level of Asssistance: Contact guard assistance  Wheelchair Transfers Comments: Pt completed mobility in the room with no AD. Pt did not want to use a RW. He was slightly unsteady this date but did not have any LOB. Pt is impulsive and does not respond to safety cues to slow down or for hand placement. Pt initially was going to stay in the chair to eat lunch but then stood up and went to bed without notice. ADL  Feeding Skilled Clinical Factors: Pt able to feed self after heating up food. LE Dressing: Stand by assistance  LE Dressing Skilled Clinical Factors: Pt lifted his legs to adjust his socks that were crooked on his feet. Additional Comments: Pt reports got a shower this AM with nursing. Declined grooming tasks or need to use the toilet. Bed mobility  Supine to Sit: Independent  Sit to Supine: Independent  Scooting: Independent  Bed Mobility Comments: HOB elevated. Pt also able to scoot self up toward the [facility]. Transfers  Sit to stand: Contact guard assistance  Stand to sit: Contact guard assistance  Transfer Comments: Pt is very impulsive and does not follow cues for safety. Cognition  Following Commands: Follows one step commands with increased time; Follows one step commands with repetition  Attention Span: Difficulty dividing attention; Difficulty attending to directions; Attends with cues to redirect  Memory: Decreased short term memory;Decreased recall of recent events  Safety Judgement: Decreased awareness of need for safety;Decreased awareness of need for assistance  Problem Solving: Decreased awareness of errors  Insights: Decreased awareness of deficits  Cognition Comment: Pt very impulsive
the visualized skull or soft tissues. No acute intracranial abnormality. Mild senescent changes with parenchymal volume loss and chronic small vessel ischemic changes. XR CHEST PORTABLE    Result Date: 11/3/2023  EXAMINATION: ONE XRAY VIEW OF THE CHEST 11/3/2023 11:10 am COMPARISON: None. HISTORY: ORDERING SYSTEM PROVIDED HISTORY: confusion TECHNOLOGIST PROVIDED HISTORY: Reason for exam:->confusion Reason for Exam: AMS, confusion FINDINGS: Normal cardiomediastinal silhouette. No acute airspace infiltrate.   No pneumothorax or pleural effusion     No acute cardiopulmonary findings       Electronically signed by Amber Andino MD on 11/5/2023 at 7:50 AM

## 2024-01-08 ENCOUNTER — TELEPHONE (OUTPATIENT)
Dept: CARDIOLOGY CLINIC | Age: 74
End: 2024-01-08

## 2024-01-08 NOTE — TELEPHONE ENCOUNTER
Called patient to discuss monitor results not answer left message intructing to call back regarding he results.    The monitor was worn from 11/07/2023-12/06/2023  Showed baseline normal sinus rhythm  No atrial fibrillation or atrial flutter seen.  PAC 3%.       Monitor placed at [place] from hospital to assess atrial fibrillation /atrial flutter burden.  EP was consulted.  No follow up scheduled.  Needs follow up with Dr. Casper scheduled.

## 2024-01-09 DIAGNOSIS — I48.91 ATRIAL FIBRILLATION, UNSPECIFIED TYPE (HCC): Primary | ICD-10-CM

## 2024-02-02 NOTE — TELEPHONE ENCOUNTER
Spoke with patient advised of monitor results verbalized understanding.    Offered to schedule an appointment with Eugene Casper MD for hospital follow up for management of atrial fibrillation.    He declined to schedule at this time citing need to take care of his hip issues pain and neuropathy.  Instructed to call back if interested in scheduling in the future.

## 2024-03-19 NOTE — PROGRESS NOTES
reviewed by me in its entirety.   I confirm that the note above accurately reflects all work, treatment, procedures, and medical decision making performed by me.   All portions of the note including but not limited to the chief complaint, history of present illness, physical exam, assessment and plan/medical decision making were personally reviewed, edited, and updated on the day of the visit.

## 2024-03-20 ENCOUNTER — TELEPHONE (OUTPATIENT)
Dept: CARDIOLOGY CLINIC | Age: 74
End: 2024-03-20

## 2024-03-20 ENCOUNTER — OFFICE VISIT (OUTPATIENT)
Dept: CARDIOLOGY CLINIC | Age: 74
End: 2024-03-20
Payer: MEDICARE

## 2024-03-20 VITALS
HEIGHT: 68 IN | SYSTOLIC BLOOD PRESSURE: 160 MMHG | HEART RATE: 68 BPM | WEIGHT: 183 LBS | DIASTOLIC BLOOD PRESSURE: 90 MMHG | OXYGEN SATURATION: 96 % | BODY MASS INDEX: 27.74 KG/M2

## 2024-03-20 DIAGNOSIS — I73.9 PAD (PERIPHERAL ARTERY DISEASE) (HCC): ICD-10-CM

## 2024-03-20 DIAGNOSIS — E78.5 HYPERLIPIDEMIA LDL GOAL <70: ICD-10-CM

## 2024-03-20 DIAGNOSIS — Z01.810 PREOP CARDIOVASCULAR EXAM: Primary | ICD-10-CM

## 2024-03-20 DIAGNOSIS — I10 ESSENTIAL HYPERTENSION: ICD-10-CM

## 2024-03-20 DIAGNOSIS — I25.10 CORONARY ARTERY DISEASE INVOLVING NATIVE CORONARY ARTERY OF NATIVE HEART WITHOUT ANGINA PECTORIS: ICD-10-CM

## 2024-03-20 DIAGNOSIS — I48.0 PAF (PAROXYSMAL ATRIAL FIBRILLATION) (HCC): ICD-10-CM

## 2024-03-20 PROCEDURE — 1036F TOBACCO NON-USER: CPT | Performed by: INTERNAL MEDICINE

## 2024-03-20 PROCEDURE — 3078F DIAST BP <80 MM HG: CPT | Performed by: INTERNAL MEDICINE

## 2024-03-20 PROCEDURE — 3077F SYST BP >= 140 MM HG: CPT | Performed by: INTERNAL MEDICINE

## 2024-03-20 PROCEDURE — 3017F COLORECTAL CA SCREEN DOC REV: CPT | Performed by: INTERNAL MEDICINE

## 2024-03-20 PROCEDURE — G8484 FLU IMMUNIZE NO ADMIN: HCPCS | Performed by: INTERNAL MEDICINE

## 2024-03-20 PROCEDURE — 93000 ELECTROCARDIOGRAM COMPLETE: CPT | Performed by: INTERNAL MEDICINE

## 2024-03-20 PROCEDURE — G8427 DOCREV CUR MEDS BY ELIG CLIN: HCPCS | Performed by: INTERNAL MEDICINE

## 2024-03-20 PROCEDURE — G8419 CALC BMI OUT NRM PARAM NOF/U: HCPCS | Performed by: INTERNAL MEDICINE

## 2024-03-20 PROCEDURE — 99214 OFFICE O/P EST MOD 30 MIN: CPT | Performed by: INTERNAL MEDICINE

## 2024-03-20 PROCEDURE — 1123F ACP DISCUSS/DSCN MKR DOCD: CPT | Performed by: INTERNAL MEDICINE

## 2024-03-20 NOTE — TELEPHONE ENCOUNTER
Cardiac clearance form scanned to chart.    Please complete and contact Bridgette: [phone] once completed

## 2024-06-10 ENCOUNTER — HOSPITAL ENCOUNTER (OUTPATIENT)
Dept: CT IMAGING | Age: 74
Discharge: HOME | End: 2024-06-10
Attending: INTERNAL MEDICINE
Payer: MEDICARE

## 2024-06-10 DIAGNOSIS — R19.09 OTHER INTRA-ABDOMINAL AND PELVIC SWELLING, MASS AND LUMP: ICD-10-CM

## 2024-06-10 LAB
PERFORMED ON: NORMAL
POC CREATININE: 1.1 MG/DL (ref 0.8–1.3)
POC SAMPLE TYPE: NORMAL

## 2024-06-10 PROCEDURE — 82565 ASSAY OF CREATININE: CPT

## 2024-06-10 PROCEDURE — 71260 CT THORAX DX C+: CPT

## 2024-06-10 PROCEDURE — 6360000004 HC RX CONTRAST MEDICATION: Performed by: INTERNAL MEDICINE

## 2024-06-10 RX ADMIN — IOPAMIDOL 75 ML: 755 INJECTION, SOLUTION INTRAVENOUS at 15:04

## 2024-06-10 RX ADMIN — IOPAMIDOL 50 ML: 612 INJECTION, SOLUTION INTRAVENOUS at 15:03

## 2024-09-03 ENCOUNTER — OFFICE VISIT (OUTPATIENT)
Dept: SURGERY | Age: 74
End: 2024-09-03
Payer: MEDICARE

## 2024-09-03 VITALS
HEIGHT: 68 IN | OXYGEN SATURATION: 95 % | TEMPERATURE: 98.2 F | BODY MASS INDEX: 27.83 KG/M2 | SYSTOLIC BLOOD PRESSURE: 135 MMHG | DIASTOLIC BLOOD PRESSURE: 83 MMHG | HEART RATE: 87 BPM

## 2024-09-03 DIAGNOSIS — I73.9 PERIPHERAL ARTERIAL DISEASE (HCC): ICD-10-CM

## 2024-09-03 DIAGNOSIS — R19.09 PRESACRAL MASS: Primary | ICD-10-CM

## 2024-09-03 PROCEDURE — 3079F DIAST BP 80-89 MM HG: CPT | Performed by: SURGERY

## 2024-09-03 PROCEDURE — 3075F SYST BP GE 130 - 139MM HG: CPT | Performed by: SURGERY

## 2024-09-03 PROCEDURE — 1123F ACP DISCUSS/DSCN MKR DOCD: CPT | Performed by: SURGERY

## 2024-09-03 PROCEDURE — 99205 OFFICE O/P NEW HI 60 MIN: CPT | Performed by: SURGERY

## 2024-09-03 PROCEDURE — G8419 CALC BMI OUT NRM PARAM NOF/U: HCPCS | Performed by: SURGERY

## 2024-09-03 PROCEDURE — 1036F TOBACCO NON-USER: CPT | Performed by: SURGERY

## 2024-09-03 PROCEDURE — 3017F COLORECTAL CA SCREEN DOC REV: CPT | Performed by: SURGERY

## 2024-09-03 PROCEDURE — G8427 DOCREV CUR MEDS BY ELIG CLIN: HCPCS | Performed by: SURGERY

## 2024-09-03 NOTE — PROGRESS NOTES
[facility] PHYSICIANS [place] SPECIALTY CARE [facility] COLORECTAL SURGERY  [address] [place] [address]  [address]  [place] [address]  Dept: [phone]  Dept Fax: [phone]  Loc: [phone]    Visit Date: 9/3/2024    Ad Lacey is a 74 y.o. male who presents today for: New Patient (Pelvic mass)      HPI:       Ad Lacey is a 74 y.o. male referred to me by Dr. Laboy for further evaluation regarding presacral cyst.  Stevie is accompanied by his friend today in the office.  He was undergoing CT scan as ordered by his vascular surgeon and incidentally was found to have a large presacral cyst.  This was a few years ago and he has had imaging studies showing stability.  He has intermittent constipation.  Denies previous colonoscopy.    He has following with Dr. Laboy of oncology.  He did have an MRI 2022, which was noted for an unchanged 5.9 cm well-defined pelvic complex cystic mass.    He does complain of some unilateral neuropathy    Past Medical History:   Diagnosis Date    Anxiety     Basal cell carcinoma     L side of nose    CAD (coronary artery disease)     Peripheral artery disease (HCC)     Presacral mass 2022       Past Surgical History:   Procedure Laterality Date    CARDIAC CATHETERIZATION      FEMORAL BYPASS Right 2022    RIGHT FEMORAL POPLITEAL BYPASS performed by Olvin Iyer DO at [facility] OR    HIP SURGERY Right 2022    HIP PINNING performed by Alexx Monreal MD at [facility] OR       Cancer-related family history is not on file.    Social History:   Social History     Tobacco Use    Smoking status: Former     Current packs/day: 0.00     Average packs/day: 1 pack/day for 50.0 years (50.0 ttl pk-yrs)     Types: Cigarettes     Start date: 1972     Quit date: 2022     Years since quittin.3    Smokeless tobacco: Never   Substance Use Topics    Alcohol use: No      Tobacco cessation counseling provided as appropriate.    No colonoscopy on

## 2024-09-10 ENCOUNTER — TELEPHONE (OUTPATIENT)
Dept: SURGERY | Age: 74
End: 2024-09-10

## 2024-09-13 ENCOUNTER — TELEPHONE (OUTPATIENT)
Dept: SURGERY | Age: 74
End: 2024-09-13

## 2024-11-09 ENCOUNTER — APPOINTMENT (OUTPATIENT)
Dept: GENERAL RADIOLOGY | Age: 74
DRG: 001 | End: 2024-11-09
Payer: MEDICARE

## 2024-11-09 ENCOUNTER — HOSPITAL ENCOUNTER (INPATIENT)
Age: 74
LOS: 12 days | Discharge: NURSING, REHAB OR LONG-TERM CARE | DRG: 001 | End: 2024-11-21
Attending: STUDENT IN AN ORGANIZED HEALTH CARE EDUCATION/TRAINING PROGRAM | Admitting: INTERNAL MEDICINE
Payer: MEDICARE

## 2024-11-09 DIAGNOSIS — I25.10 CAD (CORONARY ARTERY DISEASE): ICD-10-CM

## 2024-11-09 DIAGNOSIS — I21.02 ST ELEVATION MYOCARDIAL INFARCTION INVOLVING LEFT ANTERIOR DESCENDING (LAD) CORONARY ARTERY (HCC): ICD-10-CM

## 2024-11-09 DIAGNOSIS — R57.0 CARDIOGENIC SHOCK: ICD-10-CM

## 2024-11-09 DIAGNOSIS — I21.3 STEMI (ST ELEVATION MYOCARDIAL INFARCTION) (HCC): ICD-10-CM

## 2024-11-09 DIAGNOSIS — I21.3 ST ELEVATION MYOCARDIAL INFARCTION (STEMI), UNSPECIFIED ARTERY (HCC): ICD-10-CM

## 2024-11-09 DIAGNOSIS — I24.9 ACUTE CORONARY SYNDROME (HCC): Primary | ICD-10-CM

## 2024-11-09 DIAGNOSIS — I46.9 CARDIOPULMONARY ARREST: ICD-10-CM

## 2024-11-09 DIAGNOSIS — I21.29 ST ELEVATION MYOCARDIAL INFARCTION (STEMI) INVOLVING OTHER CORONARY ARTERY (HCC): ICD-10-CM

## 2024-11-09 DIAGNOSIS — I50.9 CONGESTIVE HEART FAILURE (HCC): ICD-10-CM

## 2024-11-09 DIAGNOSIS — R06.02 SHORTNESS OF BREATH: ICD-10-CM

## 2024-11-09 LAB
ALBUMIN SERPL-MCNC: 3.7 G/DL (ref 3.4–5)
ALBUMIN/GLOB SERPL: 1.5 {RATIO} (ref 1.1–2.2)
ALP SERPL-CCNC: 100 U/L (ref 40–129)
ALT SERPL-CCNC: ABNORMAL U/L (ref 10–40)
ANION GAP SERPL CALCULATED.3IONS-SCNC: 26 MMOL/L (ref 3–16)
APTT BLD: 23.3 SEC (ref 22.1–36.4)
AST SERPL-CCNC: 40 U/L (ref 15–37)
BASE EXCESS BLDV CALC-SCNC: -5.2 MMOL/L
BASOPHILS # BLD: 0 K/UL (ref 0–0.2)
BASOPHILS NFR BLD: 0 %
BILIRUB SERPL-MCNC: <0.2 MG/DL (ref 0–1)
BUN SERPL-MCNC: 14 MG/DL (ref 7–20)
CALCIUM SERPL-MCNC: 8.8 MG/DL (ref 8.3–10.6)
CHLORIDE SERPL-SCNC: 100 MMOL/L (ref 99–110)
CO2 BLDV-SCNC: 24 MMOL/L
CO2 SERPL-SCNC: 16 MMOL/L (ref 21–32)
COHGB MFR BLDV: 1.2 %
CREAT SERPL-MCNC: 1.3 MG/DL (ref 0.8–1.3)
DEPRECATED RDW RBC AUTO: 15.4 % (ref 12.4–15.4)
DEPRECATED RDW RBC AUTO: 15.6 % (ref 12.4–15.4)
EOSINOPHIL # BLD: 0.1 K/UL (ref 0–0.6)
EOSINOPHIL NFR BLD: 1 %
GFR SERPLBLD CREATININE-BSD FMLA CKD-EPI: 58 ML/MIN/{1.73_M2}
GLUCOSE SERPL-MCNC: 239 MG/DL (ref 70–99)
HCO3 BLDV-SCNC: 22 MMOL/L (ref 23–29)
HCT VFR BLD AUTO: 35.8 % (ref 40.5–52.5)
HCT VFR BLD AUTO: 37.1 % (ref 40.5–52.5)
HGB BLD-MCNC: 11.7 G/DL (ref 13.5–17.5)
HGB BLD-MCNC: 12.2 G/DL (ref 13.5–17.5)
LYMPHOCYTES # BLD: 7 K/UL (ref 1–5.1)
LYMPHOCYTES NFR BLD: 50 %
MCH RBC QN AUTO: 28.4 PG (ref 26–34)
MCH RBC QN AUTO: 28.9 PG (ref 26–34)
MCHC RBC AUTO-ENTMCNC: 32.5 G/DL (ref 31–36)
MCHC RBC AUTO-ENTMCNC: 32.9 G/DL (ref 31–36)
MCV RBC AUTO: 87.2 FL (ref 80–100)
MCV RBC AUTO: 87.8 FL (ref 80–100)
METHGB MFR BLDV: 0.2 %
MONOCYTES # BLD: 0.4 K/UL (ref 0–1.3)
MONOCYTES NFR BLD: 3 %
NEUTROPHILS # BLD: 6.4 K/UL (ref 1.7–7.7)
NEUTROPHILS NFR BLD: 46 %
NT-PROBNP SERPL-MCNC: 384 PG/ML (ref 0–449)
O2 THERAPY: ABNORMAL
PATH INTERP BLD-IMP: YES
PCO2 BLDV: 51 MMHG (ref 40–50)
PH BLDV: 7.25 [PH] (ref 7.35–7.45)
PLATELET # BLD AUTO: 448 K/UL (ref 135–450)
PLATELET # BLD AUTO: 480 K/UL (ref 135–450)
PMV BLD AUTO: 6.7 FL (ref 5–10.5)
PMV BLD AUTO: 7.1 FL (ref 5–10.5)
PO2 BLDV: 37 MMHG
POC ACT LR: 176 SEC
POTASSIUM SERPL-SCNC: ABNORMAL MMOL/L (ref 3.5–5.1)
PROT SERPL-MCNC: 6.2 G/DL (ref 6.4–8.2)
RBC # BLD AUTO: 4.11 M/UL (ref 4.2–5.9)
RBC # BLD AUTO: 4.23 M/UL (ref 4.2–5.9)
RBC MORPH BLD: NORMAL
SAO2 % BLDV: 61 %
SLIDE REVIEW: ABNORMAL
SODIUM SERPL-SCNC: 142 MMOL/L (ref 136–145)
TROPONIN, HIGH SENSITIVITY: 33 NG/L (ref 0–22)
WBC # BLD AUTO: 14 K/UL (ref 4–11)
WBC # BLD AUTO: 15.2 K/UL (ref 4–11)

## 2024-11-09 PROCEDURE — 82330 ASSAY OF CALCIUM: CPT

## 2024-11-09 PROCEDURE — 85014 HEMATOCRIT: CPT

## 2024-11-09 PROCEDURE — 96374 THER/PROPH/DIAG INJ IV PUSH: CPT

## 2024-11-09 PROCEDURE — C1725 CATH, TRANSLUMIN NON-LASER: HCPCS | Performed by: INTERNAL MEDICINE

## 2024-11-09 PROCEDURE — B2151ZZ FLUOROSCOPY OF LEFT HEART USING LOW OSMOLAR CONTRAST: ICD-10-PCS | Performed by: INTERNAL MEDICINE

## 2024-11-09 PROCEDURE — 3E033XZ INTRODUCTION OF VASOPRESSOR INTO PERIPHERAL VEIN, PERCUTANEOUS APPROACH: ICD-10-PCS | Performed by: INTERNAL MEDICINE

## 2024-11-09 PROCEDURE — 6360000002 HC RX W HCPCS

## 2024-11-09 PROCEDURE — B2111ZZ FLUOROSCOPY OF MULTIPLE CORONARY ARTERIES USING LOW OSMOLAR CONTRAST: ICD-10-PCS | Performed by: INTERNAL MEDICINE

## 2024-11-09 PROCEDURE — 2500000003 HC RX 250 WO HCPCS: Performed by: INTERNAL MEDICINE

## 2024-11-09 PROCEDURE — C1894 INTRO/SHEATH, NON-LASER: HCPCS | Performed by: INTERNAL MEDICINE

## 2024-11-09 PROCEDURE — 2709999900 HC NON-CHARGEABLE SUPPLY: Performed by: INTERNAL MEDICINE

## 2024-11-09 PROCEDURE — 84484 ASSAY OF TROPONIN QUANT: CPT

## 2024-11-09 PROCEDURE — P9041 ALBUMIN (HUMAN),5%, 50ML: HCPCS | Performed by: INTERNAL MEDICINE

## 2024-11-09 PROCEDURE — 85025 COMPLETE CBC W/AUTO DIFF WBC: CPT

## 2024-11-09 PROCEDURE — 027034Z DILATION OF CORONARY ARTERY, ONE ARTERY WITH DRUG-ELUTING INTRALUMINAL DEVICE, PERCUTANEOUS APPROACH: ICD-10-PCS | Performed by: INTERNAL MEDICINE

## 2024-11-09 PROCEDURE — 71045 X-RAY EXAM CHEST 1 VIEW: CPT

## 2024-11-09 PROCEDURE — 93458 L HRT ARTERY/VENTRICLE ANGIO: CPT | Performed by: INTERNAL MEDICINE

## 2024-11-09 PROCEDURE — 4A023N7 MEASUREMENT OF CARDIAC SAMPLING AND PRESSURE, LEFT HEART, PERCUTANEOUS APPROACH: ICD-10-PCS | Performed by: INTERNAL MEDICINE

## 2024-11-09 PROCEDURE — 82947 ASSAY GLUCOSE BLOOD QUANT: CPT

## 2024-11-09 PROCEDURE — 92973 PRQ TRLUML C MCHN ASP THRMBC: CPT | Performed by: INTERNAL MEDICINE

## 2024-11-09 PROCEDURE — 94002 VENT MGMT INPAT INIT DAY: CPT

## 2024-11-09 PROCEDURE — 85730 THROMBOPLASTIN TIME PARTIAL: CPT

## 2024-11-09 PROCEDURE — C9606 PERC D-E COR REVASC W AMI S: HCPCS | Performed by: INTERNAL MEDICINE

## 2024-11-09 PROCEDURE — 2100000000 HC CCU R&B

## 2024-11-09 PROCEDURE — C1751 CATH, INF, PER/CENT/MIDLINE: HCPCS | Performed by: INTERNAL MEDICINE

## 2024-11-09 PROCEDURE — 99223 1ST HOSP IP/OBS HIGH 75: CPT | Performed by: INTERNAL MEDICINE

## 2024-11-09 PROCEDURE — 80053 COMPREHEN METABOLIC PANEL: CPT

## 2024-11-09 PROCEDURE — 33990 INSJ PERQ VAD L HRT ARTERIAL: CPT | Performed by: INTERNAL MEDICINE

## 2024-11-09 PROCEDURE — 2720000010 HC SURG SUPPLY STERILE: Performed by: INTERNAL MEDICINE

## 2024-11-09 PROCEDURE — 2500000003 HC RX 250 WO HCPCS: Performed by: STUDENT IN AN ORGANIZED HEALTH CARE EDUCATION/TRAINING PROGRAM

## 2024-11-09 PROCEDURE — 2580000003 HC RX 258: Performed by: INTERNAL MEDICINE

## 2024-11-09 PROCEDURE — 84132 ASSAY OF SERUM POTASSIUM: CPT

## 2024-11-09 PROCEDURE — 6360000004 HC RX CONTRAST MEDICATION: Performed by: INTERNAL MEDICINE

## 2024-11-09 PROCEDURE — 6360000002 HC RX W HCPCS: Performed by: INTERNAL MEDICINE

## 2024-11-09 PROCEDURE — 5A1935Z RESPIRATORY VENTILATION, LESS THAN 24 CONSECUTIVE HOURS: ICD-10-PCS | Performed by: INTERNAL MEDICINE

## 2024-11-09 PROCEDURE — 99291 CRITICAL CARE FIRST HOUR: CPT | Performed by: INTERNAL MEDICINE

## 2024-11-09 PROCEDURE — 92978 ENDOLUMINL IVUS OCT C 1ST: CPT | Performed by: INTERNAL MEDICINE

## 2024-11-09 PROCEDURE — 99285 EMERGENCY DEPT VISIT HI MDM: CPT

## 2024-11-09 PROCEDURE — C1887 CATHETER, GUIDING: HCPCS | Performed by: INTERNAL MEDICINE

## 2024-11-09 PROCEDURE — 92941 PRQ TRLML REVSC TOT OCCL AMI: CPT | Performed by: INTERNAL MEDICINE

## 2024-11-09 PROCEDURE — C1753 CATH, INTRAVAS ULTRASOUND: HCPCS | Performed by: INTERNAL MEDICINE

## 2024-11-09 PROCEDURE — 6360000002 HC RX W HCPCS: Performed by: STUDENT IN AN ORGANIZED HEALTH CARE EDUCATION/TRAINING PROGRAM

## 2024-11-09 PROCEDURE — 0BH17EZ INSERTION OF ENDOTRACHEAL AIRWAY INTO TRACHEA, VIA NATURAL OR ARTIFICIAL OPENING: ICD-10-PCS | Performed by: INTERNAL MEDICINE

## 2024-11-09 PROCEDURE — 99152 MOD SED SAME PHYS/QHP 5/>YRS: CPT | Performed by: INTERNAL MEDICINE

## 2024-11-09 PROCEDURE — 85347 COAGULATION TIME ACTIVATED: CPT

## 2024-11-09 PROCEDURE — C1874 STENT, COATED/COV W/DEL SYS: HCPCS | Performed by: INTERNAL MEDICINE

## 2024-11-09 PROCEDURE — 6370000000 HC RX 637 (ALT 250 FOR IP): Performed by: INTERNAL MEDICINE

## 2024-11-09 PROCEDURE — 85027 COMPLETE CBC AUTOMATED: CPT

## 2024-11-09 PROCEDURE — 83605 ASSAY OF LACTIC ACID: CPT

## 2024-11-09 PROCEDURE — 51702 INSERT TEMP BLADDER CATH: CPT

## 2024-11-09 PROCEDURE — 31500 INSERT EMERGENCY AIRWAY: CPT

## 2024-11-09 PROCEDURE — C1769 GUIDE WIRE: HCPCS | Performed by: INTERNAL MEDICINE

## 2024-11-09 PROCEDURE — 2700000000 HC OXYGEN THERAPY PER DAY

## 2024-11-09 PROCEDURE — 82803 BLOOD GASES ANY COMBINATION: CPT

## 2024-11-09 PROCEDURE — C1760 CLOSURE DEV, VASC: HCPCS | Performed by: INTERNAL MEDICINE

## 2024-11-09 PROCEDURE — 84295 ASSAY OF SERUM SODIUM: CPT

## 2024-11-09 PROCEDURE — 83880 ASSAY OF NATRIURETIC PEPTIDE: CPT

## 2024-11-09 PROCEDURE — C1889 IMPLANT/INSERT DEVICE, NOC: HCPCS | Performed by: INTERNAL MEDICINE

## 2024-11-09 PROCEDURE — 02C03ZZ EXTIRPATION OF MATTER FROM CORONARY ARTERY, ONE ARTERY, PERCUTANEOUS APPROACH: ICD-10-PCS | Performed by: INTERNAL MEDICINE

## 2024-11-09 DEVICE — STENT ONYXNG30015UX ONYX 3.00X15RX
Type: IMPLANTABLE DEVICE | Status: FUNCTIONAL
Brand: ONYX FRONTIER™

## 2024-11-09 DEVICE — PUMP 371 14F LT CMR SET
Type: IMPLANTABLE DEVICE | Status: NON-FUNCTIONAL
Brand: IMPELLA
Removed: 2024-11-19

## 2024-11-09 RX ORDER — ETOMIDATE 2 MG/ML
INJECTION INTRAVENOUS DAILY PRN
Status: COMPLETED | OUTPATIENT
Start: 2024-11-09 | End: 2024-11-09

## 2024-11-09 RX ORDER — MIDAZOLAM HYDROCHLORIDE 1 MG/ML
1 INJECTION, SOLUTION INTRAMUSCULAR; INTRAVENOUS
Status: DISCONTINUED | OUTPATIENT
Start: 2024-11-09 | End: 2024-11-11

## 2024-11-09 RX ORDER — NOREPINEPHRINE BITARTRATE 0.06 MG/ML
INJECTION, SOLUTION INTRAVENOUS
Status: DISPENSED
Start: 2024-11-09 | End: 2024-11-10

## 2024-11-09 RX ORDER — GLUCAGON 1 MG/ML
1 KIT INJECTION PRN
Status: DISCONTINUED | OUTPATIENT
Start: 2024-11-09 | End: 2024-11-21 | Stop reason: HOSPADM

## 2024-11-09 RX ORDER — HEPARIN SODIUM 1000 [USP'U]/ML
5000 INJECTION, SOLUTION INTRAVENOUS; SUBCUTANEOUS ONCE
Status: COMPLETED | OUTPATIENT
Start: 2024-11-09 | End: 2024-11-09

## 2024-11-09 RX ORDER — FENTANYL CITRATE-0.9 % NACL/PF 10 MCG/ML
25-200 PLASTIC BAG, INJECTION (ML) INTRAVENOUS CONTINUOUS
Status: DISCONTINUED | OUTPATIENT
Start: 2024-11-09 | End: 2024-11-10

## 2024-11-09 RX ORDER — ACETAMINOPHEN 325 MG/1
650 TABLET ORAL EVERY 4 HOURS PRN
Status: DISCONTINUED | OUTPATIENT
Start: 2024-11-09 | End: 2024-11-21 | Stop reason: HOSPADM

## 2024-11-09 RX ORDER — ROCURONIUM BROMIDE 10 MG/ML
INJECTION, SOLUTION INTRAVENOUS DAILY PRN
Status: COMPLETED | OUTPATIENT
Start: 2024-11-09 | End: 2024-11-09

## 2024-11-09 RX ORDER — INSULIN LISPRO 100 [IU]/ML
0-16 INJECTION, SOLUTION INTRAVENOUS; SUBCUTANEOUS
Status: DISCONTINUED | OUTPATIENT
Start: 2024-11-09 | End: 2024-11-10

## 2024-11-09 RX ORDER — HEPARIN SODIUM 10000 [USP'U]/100ML
0-1000 INJECTION, SOLUTION INTRAVENOUS CONTINUOUS
Status: DISCONTINUED | OUTPATIENT
Start: 2024-11-09 | End: 2024-11-12

## 2024-11-09 RX ORDER — SODIUM CHLORIDE 9 MG/ML
INJECTION, SOLUTION INTRAVENOUS PRN
Status: DISCONTINUED | OUTPATIENT
Start: 2024-11-09 | End: 2024-11-11 | Stop reason: SDUPTHER

## 2024-11-09 RX ORDER — ALBUMIN HUMAN 50 G/1000ML
25 SOLUTION INTRAVENOUS ONCE
Status: COMPLETED | OUTPATIENT
Start: 2024-11-09 | End: 2024-11-10

## 2024-11-09 RX ORDER — HEPARIN SODIUM AND DEXTROSE 5000; 5 [USP'U]/100ML; G/100ML
INJECTION INTRAVENOUS CONTINUOUS
Status: DISCONTINUED | OUTPATIENT
Start: 2024-11-09 | End: 2024-11-09

## 2024-11-09 RX ORDER — PROPOFOL 10 MG/ML
5-50 INJECTION, EMULSION INTRAVENOUS CONTINUOUS
Status: DISCONTINUED | OUTPATIENT
Start: 2024-11-09 | End: 2024-11-09

## 2024-11-09 RX ORDER — 0.9 % SODIUM CHLORIDE 0.9 %
500 INTRAVENOUS SOLUTION INTRAVENOUS ONCE
Status: COMPLETED | OUTPATIENT
Start: 2024-11-09 | End: 2024-11-09

## 2024-11-09 RX ORDER — FENTANYL CITRATE 50 UG/ML
INJECTION, SOLUTION INTRAMUSCULAR; INTRAVENOUS PRN
Status: DISCONTINUED | OUTPATIENT
Start: 2024-11-09 | End: 2024-11-09 | Stop reason: HOSPADM

## 2024-11-09 RX ORDER — SODIUM CHLORIDE 0.9 % (FLUSH) 0.9 %
5-40 SYRINGE (ML) INJECTION PRN
Status: DISCONTINUED | OUTPATIENT
Start: 2024-11-09 | End: 2024-11-11 | Stop reason: SDUPTHER

## 2024-11-09 RX ORDER — FUROSEMIDE 10 MG/ML
INJECTION INTRAMUSCULAR; INTRAVENOUS PRN
Status: DISCONTINUED | OUTPATIENT
Start: 2024-11-09 | End: 2024-11-09 | Stop reason: HOSPADM

## 2024-11-09 RX ORDER — SODIUM CHLORIDE 0.9 % (FLUSH) 0.9 %
5-40 SYRINGE (ML) INJECTION EVERY 12 HOURS SCHEDULED
Status: DISCONTINUED | OUTPATIENT
Start: 2024-11-09 | End: 2024-11-11 | Stop reason: SDUPTHER

## 2024-11-09 RX ORDER — EPTIFIBATIDE 2 MG/ML
INJECTION, SOLUTION INTRAVENOUS PRN
Status: DISCONTINUED | OUTPATIENT
Start: 2024-11-09 | End: 2024-11-09 | Stop reason: HOSPADM

## 2024-11-09 RX ORDER — LIDOCAINE HYDROCHLORIDE 10 MG/ML
INJECTION, SOLUTION INFILTRATION; PERINEURAL PRN
Status: DISCONTINUED | OUTPATIENT
Start: 2024-11-09 | End: 2024-11-09 | Stop reason: HOSPADM

## 2024-11-09 RX ORDER — MIDAZOLAM HYDROCHLORIDE 1 MG/ML
INJECTION, SOLUTION INTRAMUSCULAR; INTRAVENOUS PRN
Status: DISCONTINUED | OUTPATIENT
Start: 2024-11-09 | End: 2024-11-09 | Stop reason: HOSPADM

## 2024-11-09 RX ORDER — MIDAZOLAM HYDROCHLORIDE 1 MG/ML
1-10 INJECTION, SOLUTION INTRAVENOUS CONTINUOUS
Status: DISCONTINUED | OUTPATIENT
Start: 2024-11-09 | End: 2024-11-10

## 2024-11-09 RX ORDER — SODIUM CHLORIDE 9 MG/ML
INJECTION, SOLUTION INTRAVENOUS CONTINUOUS PRN
Status: COMPLETED | OUTPATIENT
Start: 2024-11-09 | End: 2024-11-09

## 2024-11-09 RX ORDER — ATORVASTATIN CALCIUM 80 MG/1
80 TABLET, FILM COATED ORAL NIGHTLY
Status: DISCONTINUED | OUTPATIENT
Start: 2024-11-10 | End: 2024-11-21 | Stop reason: HOSPADM

## 2024-11-09 RX ORDER — MIDAZOLAM HYDROCHLORIDE 1 MG/ML
INJECTION, SOLUTION INTRAMUSCULAR; INTRAVENOUS
Status: COMPLETED
Start: 2024-11-09 | End: 2024-11-09

## 2024-11-09 RX ORDER — PROPOFOL 10 MG/ML
INJECTION, EMULSION INTRAVENOUS
Status: COMPLETED
Start: 2024-11-09 | End: 2024-11-09

## 2024-11-09 RX ORDER — HEPARIN SODIUM 1000 [USP'U]/ML
INJECTION, SOLUTION INTRAVENOUS; SUBCUTANEOUS PRN
Status: DISCONTINUED | OUTPATIENT
Start: 2024-11-09 | End: 2024-11-09 | Stop reason: HOSPADM

## 2024-11-09 RX ORDER — ASPIRIN 81 MG/1
81 TABLET ORAL DAILY
Status: DISCONTINUED | OUTPATIENT
Start: 2024-11-10 | End: 2024-11-10

## 2024-11-09 RX ORDER — IOPAMIDOL 755 MG/ML
INJECTION, SOLUTION INTRAVASCULAR PRN
Status: DISCONTINUED | OUTPATIENT
Start: 2024-11-09 | End: 2024-11-09 | Stop reason: HOSPADM

## 2024-11-09 RX ORDER — DEXTROSE MONOHYDRATE 100 MG/ML
INJECTION, SOLUTION INTRAVENOUS CONTINUOUS PRN
Status: DISCONTINUED | OUTPATIENT
Start: 2024-11-09 | End: 2024-11-21 | Stop reason: HOSPADM

## 2024-11-09 RX ADMIN — HEPARIN SODIUM 5000 UNITS: 1000 INJECTION INTRAVENOUS; SUBCUTANEOUS at 20:48

## 2024-11-09 RX ADMIN — MIDAZOLAM 1 MG: 1 INJECTION INTRAMUSCULAR; INTRAVENOUS at 23:35

## 2024-11-09 RX ADMIN — PROPOFOL 20 MCG/KG/MIN: 10 INJECTION, EMULSION INTRAVENOUS at 20:49

## 2024-11-09 RX ADMIN — SODIUM CHLORIDE 500 ML: 9 INJECTION, SOLUTION INTRAVENOUS at 23:17

## 2024-11-09 RX ADMIN — ETOMIDATE 20 MG: 2 INJECTION INTRAVENOUS at 20:33

## 2024-11-09 RX ADMIN — ROCURONIUM BROMIDE 100 MG: 10 INJECTION INTRAVENOUS at 20:33

## 2024-11-09 RX ADMIN — SODIUM BICARBONATE: 84 INJECTION, SOLUTION INTRAVENOUS at 23:24

## 2024-11-09 RX ADMIN — Medication 50 MCG/HR: at 23:05

## 2024-11-09 RX ADMIN — MIDAZOLAM IN SODIUM CHLORIDE 1 MG/HR: 1 INJECTION INTRAVENOUS at 23:03

## 2024-11-09 RX ADMIN — ALBUMIN (HUMAN) 25 G: 12.5 INJECTION, SOLUTION INTRAVENOUS at 23:57

## 2024-11-09 RX ADMIN — TICAGRELOR 180 MG: 90 TABLET ORAL at 23:39

## 2024-11-09 ASSESSMENT — PULMONARY FUNCTION TESTS
PIF_VALUE: 19
PIF_VALUE: 19
PIF_VALUE: 32
PIF_VALUE: 19
PIF_VALUE: 23

## 2024-11-10 ENCOUNTER — APPOINTMENT (OUTPATIENT)
Dept: GENERAL RADIOLOGY | Age: 74
DRG: 001 | End: 2024-11-10
Payer: MEDICARE

## 2024-11-10 LAB
ALBUMIN SERPL-MCNC: 4.2 G/DL (ref 3.4–5)
ALBUMIN/GLOB SERPL: 2.1 {RATIO} (ref 1.1–2.2)
ALP SERPL-CCNC: 78 U/L (ref 40–129)
ALT SERPL-CCNC: 33 U/L (ref 10–40)
ANION GAP SERPL CALCULATED.3IONS-SCNC: 12 MMOL/L (ref 3–16)
ANION GAP SERPL CALCULATED.3IONS-SCNC: 12 MMOL/L (ref 3–16)
ANION GAP SERPL CALCULATED.3IONS-SCNC: 9 MMOL/L (ref 3–16)
AST SERPL-CCNC: 221 U/L (ref 15–37)
BASE EXCESS BLDA CALC-SCNC: 2.4 MMOL/L (ref -3–3)
BASE EXCESS BLDV CALC-SCNC: 5 MMOL/L (ref -3–3)
BASE EXCESS MIXED: 1
BASE EXCESS MIXED: 2
BILIRUB DIRECT SERPL-MCNC: 0.2 MG/DL (ref 0–0.3)
BILIRUB INDIRECT SERPL-MCNC: 0.6 MG/DL (ref 0–1)
BILIRUB SERPL-MCNC: 0.8 MG/DL (ref 0–1)
BUN SERPL-MCNC: 13 MG/DL (ref 7–20)
BUN SERPL-MCNC: 13 MG/DL (ref 7–20)
BUN SERPL-MCNC: 14 MG/DL (ref 7–20)
CA-I BLD-SCNC: 1.02 MMOL/L (ref 1.12–1.32)
CA-I BLD-SCNC: 1.06 MMOL/L (ref 1.12–1.32)
CA-I BLD-SCNC: 1.09 MMOL/L (ref 1.12–1.32)
CALCIUM SERPL-MCNC: 8.1 MG/DL (ref 8.3–10.6)
CALCIUM SERPL-MCNC: 8.3 MG/DL (ref 8.3–10.6)
CALCIUM SERPL-MCNC: 8.4 MG/DL (ref 8.3–10.6)
CHLORIDE SERPL-SCNC: 100 MMOL/L (ref 99–110)
CHLORIDE SERPL-SCNC: 103 MMOL/L (ref 99–110)
CHLORIDE SERPL-SCNC: 98 MMOL/L (ref 99–110)
CO2 BLDA-SCNC: 27.3 MMOL/L
CO2 BLDV-SCNC: 32 MMOL/L
CO2 SERPL-SCNC: 25 MMOL/L (ref 21–32)
CO2 SERPL-SCNC: 25 MMOL/L (ref 21–32)
CO2 SERPL-SCNC: 27 MMOL/L (ref 21–32)
COHGB MFR BLDA: 1.2 % (ref 0–1.5)
CREAT SERPL-MCNC: 0.9 MG/DL (ref 0.8–1.3)
CREAT SERPL-MCNC: 1.3 MG/DL (ref 0.8–1.3)
CREAT SERPL-MCNC: 1.3 MG/DL (ref 0.8–1.3)
DEPRECATED RDW RBC AUTO: 15.5 % (ref 12.4–15.4)
GFR SERPLBLD CREATININE-BSD FMLA CKD-EPI: 58 ML/MIN/{1.73_M2}
GFR SERPLBLD CREATININE-BSD FMLA CKD-EPI: 58 ML/MIN/{1.73_M2}
GFR SERPLBLD CREATININE-BSD FMLA CKD-EPI: 89 ML/MIN/{1.73_M2}
GLUCOSE BLD-MCNC: 119 MG/DL (ref 70–99)
GLUCOSE BLD-MCNC: 121 MG/DL (ref 70–99)
GLUCOSE BLD-MCNC: 131 MG/DL (ref 70–99)
GLUCOSE BLD-MCNC: 140 MG/DL (ref 70–99)
GLUCOSE BLD-MCNC: 184 MG/DL (ref 70–99)
GLUCOSE SERPL-MCNC: 116 MG/DL (ref 70–99)
GLUCOSE SERPL-MCNC: 132 MG/DL (ref 70–99)
GLUCOSE SERPL-MCNC: 96 MG/DL (ref 70–99)
HCO3 BLDA-SCNC: 26.2 MMOL/L (ref 21–29)
HCO3 BLDV-SCNC: 29.9 MMOL/L (ref 23–29)
HCO3, MIXED: 25.3 MMOL/L
HCO3, MIXED: 26 MMOL/L
HCO3, MIXED: 26.2 MMOL/L
HCO3, MIXED: 26.3 MMOL/L
HCT VFR BLD AUTO: 27 % (ref 40.5–52.5)
HCT VFR BLD AUTO: 31.4 % (ref 40.5–52.5)
HCT VFR BLD AUTO: 37 % (ref 40.5–52.5)
HGB BLD CALC-MCNC: 12.6 GM/DL (ref 13.5–17.5)
HGB BLD CALC-MCNC: 9.3 GM/DL (ref 13.5–17.5)
HGB BLD-MCNC: 10.6 G/DL (ref 13.5–17.5)
HGB BLDA-MCNC: 8.9 G/DL (ref 13.5–17.5)
INHALED O2 FLOW RATE: 60 L/MIN
LACTATE BLD-SCNC: 1.36 MMOL/L (ref 0.4–2)
LACTATE BLD-SCNC: 1.8 MMOL/L (ref 0.4–2)
LACTATE BLD-SCNC: 3.28 MMOL/L (ref 0.4–2)
MAGNESIUM SERPL-MCNC: 1.87 MG/DL (ref 1.8–2.4)
MCH RBC QN AUTO: 28.7 PG (ref 26–34)
MCHC RBC AUTO-ENTMCNC: 33.8 G/DL (ref 31–36)
MCV RBC AUTO: 85 FL (ref 80–100)
METHGB MFR BLDA: 0 %
O2 SAT, MIXED: 43 %
O2 SAT, MIXED: 56 %
O2 SAT, MIXED: 63 %
O2 SAT, MIXED: 70 %
O2 THERAPY: ABNORMAL
ORGANISM: ABNORMAL
PCO2 BLDA: 36.6 MMHG (ref 35–45)
PCO2 BLDV: 49.2 MM HG (ref 40–50)
PCO2 MIXED: 31.7 MM HG
PCO2 MIXED: 38.8 MM HG
PCO2 MIXED: 39.8 MM HG
PCO2 MIXED: 46.4 MM HG
PERFORMED ON: ABNORMAL
PH BLDA: 7.46 [PH] (ref 7.35–7.45)
PH BLDV: 7.39 [PH] (ref 7.35–7.45)
PH, MIXED: 7.36 (ref 7.35–7.45)
PH, MIXED: 7.43 (ref 7.35–7.45)
PH, MIXED: 7.44 (ref 7.35–7.45)
PH, MIXED: 7.51 (ref 7.35–7.45)
PLATELET # BLD AUTO: 428 K/UL (ref 135–450)
PMV BLD AUTO: 6.5 FL (ref 5–10.5)
PO2 BLDA: 71.7 MMHG (ref 75–108)
PO2 BLDV: 18 MM HG
PO2 MIXED: 25 MM HG
PO2 MIXED: 28 MM HG
PO2 MIXED: 29 MM HG
PO2 MIXED: 36 MM HG
POC ACT LR: 151 SEC
POC ACT LR: 155 SEC
POC ACT LR: 195 SEC
POC ACT LR: 207 SEC
POC ACT LR: 226 SEC
POC ACT LR: 226 SEC
POC ACT LR: 247 SEC
POC ACT LR: 285 SEC
POC ACT LR: 345 SEC
POC ACT LR: >400 SEC
POC SAMPLE TYPE: ABNORMAL
POTASSIUM BLD-SCNC: 3.7 MMOL/L (ref 3.5–5.1)
POTASSIUM BLD-SCNC: 3.7 MMOL/L (ref 3.5–5.1)
POTASSIUM SERPL-SCNC: 3.4 MMOL/L (ref 3.5–5.1)
POTASSIUM SERPL-SCNC: 3.8 MMOL/L (ref 3.5–5.1)
POTASSIUM SERPL-SCNC: 3.8 MMOL/L (ref 3.5–5.1)
PROCALCITONIN SERPL IA-MCNC: 0.42 NG/ML (ref 0–0.15)
PROT SERPL-MCNC: 6.2 G/DL (ref 6.4–8.2)
RBC # BLD AUTO: 3.7 M/UL (ref 4.2–5.9)
REPORT: NORMAL
RESP PATH DNA+RNA PNL L RESP NAA+NON-PRB: ABNORMAL
SAO2 % BLDA: 95.9 %
SAO2 % BLDV: 26 %
SODIUM BLD-SCNC: 137 MMOL/L (ref 136–145)
SODIUM BLD-SCNC: 137 MMOL/L (ref 136–145)
SODIUM SERPL-SCNC: 137 MMOL/L (ref 136–145)
TCO2 CALC MIXED: 26 MMOL/L
TCO2 CALC MIXED: 27 MMOL/L
TCO2 CALC MIXED: 27 MMOL/L
TCO2 CALC MIXED: 28 MMOL/L
WBC # BLD AUTO: 12.2 K/UL (ref 4–11)

## 2024-11-10 PROCEDURE — 94003 VENT MGMT INPAT SUBQ DAY: CPT

## 2024-11-10 PROCEDURE — 87633 RESP VIRUS 12-25 TARGETS: CPT

## 2024-11-10 PROCEDURE — 6360000002 HC RX W HCPCS

## 2024-11-10 PROCEDURE — 85347 COAGULATION TIME ACTIVATED: CPT

## 2024-11-10 PROCEDURE — 84145 PROCALCITONIN (PCT): CPT

## 2024-11-10 PROCEDURE — 2580000003 HC RX 258: Performed by: INTERNAL MEDICINE

## 2024-11-10 PROCEDURE — 71045 X-RAY EXAM CHEST 1 VIEW: CPT

## 2024-11-10 PROCEDURE — 2500000003 HC RX 250 WO HCPCS: Performed by: INTERNAL MEDICINE

## 2024-11-10 PROCEDURE — 85027 COMPLETE CBC AUTOMATED: CPT

## 2024-11-10 PROCEDURE — 94761 N-INVAS EAR/PLS OXIMETRY MLT: CPT

## 2024-11-10 PROCEDURE — 82947 ASSAY GLUCOSE BLOOD QUANT: CPT

## 2024-11-10 PROCEDURE — 6360000002 HC RX W HCPCS: Performed by: INTERNAL MEDICINE

## 2024-11-10 PROCEDURE — 93005 ELECTROCARDIOGRAM TRACING: CPT | Performed by: INTERNAL MEDICINE

## 2024-11-10 PROCEDURE — 87077 CULTURE AEROBIC IDENTIFY: CPT

## 2024-11-10 PROCEDURE — 84132 ASSAY OF SERUM POTASSIUM: CPT

## 2024-11-10 PROCEDURE — 87186 SC STD MICRODIL/AGAR DIL: CPT

## 2024-11-10 PROCEDURE — 6370000000 HC RX 637 (ALT 250 FOR IP): Performed by: INTERNAL MEDICINE

## 2024-11-10 PROCEDURE — 84295 ASSAY OF SERUM SODIUM: CPT

## 2024-11-10 PROCEDURE — 83605 ASSAY OF LACTIC ACID: CPT

## 2024-11-10 PROCEDURE — 2700000000 HC OXYGEN THERAPY PER DAY

## 2024-11-10 PROCEDURE — 82803 BLOOD GASES ANY COMBINATION: CPT

## 2024-11-10 PROCEDURE — 85014 HEMATOCRIT: CPT

## 2024-11-10 PROCEDURE — 87205 SMEAR GRAM STAIN: CPT

## 2024-11-10 PROCEDURE — 82248 BILIRUBIN DIRECT: CPT

## 2024-11-10 PROCEDURE — 99291 CRITICAL CARE FIRST HOUR: CPT | Performed by: INTERNAL MEDICINE

## 2024-11-10 PROCEDURE — 83735 ASSAY OF MAGNESIUM: CPT

## 2024-11-10 PROCEDURE — 80053 COMPREHEN METABOLIC PANEL: CPT

## 2024-11-10 PROCEDURE — 2100000000 HC CCU R&B

## 2024-11-10 PROCEDURE — 87070 CULTURE OTHR SPECIMN AEROBIC: CPT

## 2024-11-10 PROCEDURE — 82330 ASSAY OF CALCIUM: CPT

## 2024-11-10 RX ORDER — 0.9 % SODIUM CHLORIDE 0.9 %
500 INTRAVENOUS SOLUTION INTRAVENOUS ONCE
Status: COMPLETED | OUTPATIENT
Start: 2024-11-10 | End: 2024-11-10

## 2024-11-10 RX ORDER — ASPIRIN 81 MG/1
81 TABLET, CHEWABLE ORAL DAILY
Status: DISCONTINUED | OUTPATIENT
Start: 2024-11-10 | End: 2024-11-21 | Stop reason: HOSPADM

## 2024-11-10 RX ORDER — HYDROXYZINE HYDROCHLORIDE 25 MG/1
25 TABLET, FILM COATED ORAL 3 TIMES DAILY PRN
Status: ON HOLD | COMMUNITY

## 2024-11-10 RX ORDER — NITROGLYCERIN 20 MG/100ML
5-200 INJECTION INTRAVENOUS CONTINUOUS
Status: DISCONTINUED | OUTPATIENT
Start: 2024-11-10 | End: 2024-11-21 | Stop reason: HOSPADM

## 2024-11-10 RX ORDER — POTASSIUM CHLORIDE 1500 MG/1
40 TABLET, EXTENDED RELEASE ORAL PRN
Status: DISCONTINUED | OUTPATIENT
Start: 2024-11-10 | End: 2024-11-21 | Stop reason: HOSPADM

## 2024-11-10 RX ORDER — EPTIFIBATIDE 0.75 MG/ML
2 INJECTION, SOLUTION INTRAVENOUS CONTINUOUS
Status: DISPENSED | OUTPATIENT
Start: 2024-11-10 | End: 2024-11-12

## 2024-11-10 RX ORDER — HEPARIN SODIUM 1000 [USP'U]/ML
1000 INJECTION, SOLUTION INTRAVENOUS; SUBCUTANEOUS ONCE
Status: COMPLETED | OUTPATIENT
Start: 2024-11-10 | End: 2024-11-10

## 2024-11-10 RX ORDER — MORPHINE SULFATE 2 MG/ML
INJECTION, SOLUTION INTRAMUSCULAR; INTRAVENOUS
Status: COMPLETED
Start: 2024-11-10 | End: 2024-11-10

## 2024-11-10 RX ORDER — INSULIN LISPRO 100 [IU]/ML
0-16 INJECTION, SOLUTION INTRAVENOUS; SUBCUTANEOUS EVERY 4 HOURS
Status: DISCONTINUED | OUTPATIENT
Start: 2024-11-10 | End: 2024-11-21 | Stop reason: HOSPADM

## 2024-11-10 RX ORDER — CALCIUM GLUCONATE 20 MG/ML
1000 INJECTION, SOLUTION INTRAVENOUS ONCE
Status: COMPLETED | OUTPATIENT
Start: 2024-11-10 | End: 2024-11-10

## 2024-11-10 RX ORDER — PROTAMINE SULFATE 10 MG/ML
25 INJECTION, SOLUTION INTRAVENOUS ONCE
Status: COMPLETED | OUTPATIENT
Start: 2024-11-10 | End: 2024-11-10

## 2024-11-10 RX ORDER — LIDOCAINE 4 G/G
1 PATCH TOPICAL EVERY 24 HOURS
Status: DISCONTINUED | OUTPATIENT
Start: 2024-11-10 | End: 2024-11-21 | Stop reason: HOSPADM

## 2024-11-10 RX ORDER — MORPHINE SULFATE 2 MG/ML
2 INJECTION, SOLUTION INTRAMUSCULAR; INTRAVENOUS EVERY 4 HOURS PRN
Status: DISCONTINUED | OUTPATIENT
Start: 2024-11-10 | End: 2024-11-11

## 2024-11-10 RX ORDER — POTASSIUM CHLORIDE 29.8 MG/ML
20 INJECTION INTRAVENOUS PRN
Status: DISCONTINUED | OUTPATIENT
Start: 2024-11-10 | End: 2024-11-21 | Stop reason: HOSPADM

## 2024-11-10 RX ORDER — DEXMEDETOMIDINE HYDROCHLORIDE 4 UG/ML
.1-1.5 INJECTION, SOLUTION INTRAVENOUS CONTINUOUS
Status: DISCONTINUED | OUTPATIENT
Start: 2024-11-10 | End: 2024-11-12

## 2024-11-10 RX ORDER — SODIUM CHLORIDE 9 MG/ML
INJECTION, SOLUTION INTRAVENOUS CONTINUOUS
Status: DISCONTINUED | OUTPATIENT
Start: 2024-11-10 | End: 2024-11-12

## 2024-11-10 RX ADMIN — INSULIN LISPRO 4 UNITS: 100 INJECTION, SOLUTION INTRAVENOUS; SUBCUTANEOUS at 00:31

## 2024-11-10 RX ADMIN — Medication 175 MCG/HR: at 04:42

## 2024-11-10 RX ADMIN — EPTIFIBATIDE 2 MCG/KG/MIN: 0.75 INJECTION INTRAVENOUS at 18:54

## 2024-11-10 RX ADMIN — MORPHINE SULFATE 2 MG: 2 INJECTION, SOLUTION INTRAMUSCULAR; INTRAVENOUS at 15:43

## 2024-11-10 RX ADMIN — MIDAZOLAM 1 MG: 1 INJECTION INTRAMUSCULAR; INTRAVENOUS at 03:41

## 2024-11-10 RX ADMIN — DEXMEDETOMIDINE HYDROCHLORIDE 1 MCG/KG/HR: 400 INJECTION INTRAVENOUS at 20:27

## 2024-11-10 RX ADMIN — AMPICILLIN SODIUM AND SULBACTAM SODIUM 3000 MG: 2; 1 INJECTION, POWDER, FOR SOLUTION INTRAMUSCULAR; INTRAVENOUS at 11:37

## 2024-11-10 RX ADMIN — NITROGLYCERIN 5 MCG/MIN: 20 INJECTION INTRAVENOUS at 05:48

## 2024-11-10 RX ADMIN — AMPICILLIN SODIUM AND SULBACTAM SODIUM 3000 MG: 2; 1 INJECTION, POWDER, FOR SOLUTION INTRAMUSCULAR; INTRAVENOUS at 23:07

## 2024-11-10 RX ADMIN — HEPARIN SODIUM 1000 UNITS: 1000 INJECTION INTRAVENOUS; SUBCUTANEOUS at 09:39

## 2024-11-10 RX ADMIN — ASPIRIN 81 MG: 81 TABLET, CHEWABLE ORAL at 12:12

## 2024-11-10 RX ADMIN — HEPARIN SODIUM 1200 UNITS/HR: 10000 INJECTION, SOLUTION INTRAVENOUS at 23:27

## 2024-11-10 RX ADMIN — SODIUM CHLORIDE 500 ML: 9 INJECTION, SOLUTION INTRAVENOUS at 05:40

## 2024-11-10 RX ADMIN — DEXMEDETOMIDINE HYDROCHLORIDE 0.4 MCG/KG/HR: 400 INJECTION INTRAVENOUS at 13:30

## 2024-11-10 RX ADMIN — Medication 200 MCG/HR: at 09:10

## 2024-11-10 RX ADMIN — PROTAMINE SULFATE 25 MG: 10 INJECTION, SOLUTION INTRAVENOUS at 00:34

## 2024-11-10 RX ADMIN — SODIUM CHLORIDE: 9 INJECTION, SOLUTION INTRAVENOUS at 13:43

## 2024-11-10 RX ADMIN — CALCIUM GLUCONATE 1000 MG: 20 INJECTION, SOLUTION INTRAVENOUS at 05:50

## 2024-11-10 RX ADMIN — Medication 10 ML: at 00:27

## 2024-11-10 RX ADMIN — AMPICILLIN SODIUM AND SULBACTAM SODIUM 3000 MG: 2; 1 INJECTION, POWDER, FOR SOLUTION INTRAMUSCULAR; INTRAVENOUS at 17:06

## 2024-11-10 RX ADMIN — POTASSIUM CHLORIDE 20 MEQ: 29.8 INJECTION, SOLUTION INTRAVENOUS at 05:14

## 2024-11-10 RX ADMIN — POTASSIUM CHLORIDE 20 MEQ: 29.8 INJECTION, SOLUTION INTRAVENOUS at 06:19

## 2024-11-10 RX ADMIN — EPTIFIBATIDE 2 MCG/KG/MIN: 0.75 INJECTION INTRAVENOUS at 11:43

## 2024-11-10 RX ADMIN — HEPARIN SODIUM 300 UNITS/HR: 10000 INJECTION, SOLUTION INTRAVENOUS at 06:15

## 2024-11-10 RX ADMIN — SODIUM CHLORIDE, PRESERVATIVE FREE 20 MG: 5 INJECTION INTRAVENOUS at 20:32

## 2024-11-10 RX ADMIN — SODIUM CHLORIDE 25 ML: 9 INJECTION, SOLUTION INTRAVENOUS at 05:13

## 2024-11-10 RX ADMIN — HEPARIN SODIUM 1000 UNITS: 1000 INJECTION INTRAVENOUS; SUBCUTANEOUS at 07:16

## 2024-11-10 ASSESSMENT — PULMONARY FUNCTION TESTS
PIF_VALUE: 21
PIF_VALUE: 21
PIF_VALUE: 9
PIF_VALUE: 19
PIF_VALUE: 20
PIF_VALUE: 24
PIF_VALUE: 18
PIF_VALUE: 18
PIF_VALUE: 17
PIF_VALUE: 20
PIF_VALUE: 20
PIF_VALUE: 14
PIF_VALUE: 19
PIF_VALUE: 18
PIF_VALUE: 21
PIF_VALUE: 23
PIF_VALUE: 21
PIF_VALUE: 27
PIF_VALUE: 22
PIF_VALUE: 18
PIF_VALUE: 19
PIF_VALUE: 20
PIF_VALUE: 22
PIF_VALUE: 24
PIF_VALUE: 17
PIF_VALUE: 20
PIF_VALUE: 15
PIF_VALUE: 21
PIF_VALUE: 20
PIF_VALUE: 12
PIF_VALUE: 20
PIF_VALUE: 20
PIF_VALUE: 19
PIF_VALUE: 22
PIF_VALUE: 37
PIF_VALUE: 18
PIF_VALUE: 20
PIF_VALUE: 18
PIF_VALUE: 20
PIF_VALUE: 23
PIF_VALUE: 11
PIF_VALUE: 22
PIF_VALUE: 15
PIF_VALUE: 14
PIF_VALUE: 21
PIF_VALUE: 20
PIF_VALUE: 20
PIF_VALUE: 21
PIF_VALUE: 19
PIF_VALUE: 20
PIF_VALUE: 11
PIF_VALUE: 11
PIF_VALUE: 21
PIF_VALUE: 13
PIF_VALUE: 21
PIF_VALUE: 11
PIF_VALUE: 12
PIF_VALUE: 11
PIF_VALUE: 14
PIF_VALUE: 22
PIF_VALUE: 24
PIF_VALUE: 20
PIF_VALUE: 14
PIF_VALUE: 22
PIF_VALUE: 19

## 2024-11-10 ASSESSMENT — PAIN DESCRIPTION - DESCRIPTORS
DESCRIPTORS: ACHING;SORE;SHARP
DESCRIPTORS: ACHING;SORE

## 2024-11-10 ASSESSMENT — PAIN SCALES - GENERAL
PAINLEVEL_OUTOF10: 7
PAINLEVEL_OUTOF10: 2

## 2024-11-10 ASSESSMENT — PAIN DESCRIPTION - PAIN TYPE
TYPE: ACUTE PAIN
TYPE: ACUTE PAIN

## 2024-11-10 ASSESSMENT — PAIN DESCRIPTION - LOCATION
LOCATION: CHEST
LOCATION: STERNUM

## 2024-11-10 ASSESSMENT — PAIN DESCRIPTION - ORIENTATION
ORIENTATION: MID;LOWER
ORIENTATION: RIGHT;LEFT;MID

## 2024-11-10 NOTE — FLOWSHEET NOTE
11/10/24 0515   Flagler-Yaneli   PAP (Systolic/Diastolic) 34/15   PAP (Mean) 22 mmHg   CVP (Mean) 7 mmHg   SVO2 (%) 56.2 %   CO (l/min) 3.5 l/min   CCO 3.5 L/Min   CI (l/min/m2) 1.8 l/min/m2   SVR (Using NBP Mean) 1782.86 dyne*sec/cm5     Impella CP @ P-7. Starting to have more frequent suction alarms on P-7 but not sustained.    Discussed with Dr. Rodríguez. New orders for slow 500ml NS bolus over 4 hours, start nitro gtt for MAP <80 and SVR < 1200. Ok for heparin gtt when ACT in range.

## 2024-11-10 NOTE — ED NOTES
Pt son called ems due to chest pain, became unresponsive on seen with vfib. Pt EKG in ED show stemi, cardiology called

## 2024-11-10 NOTE — SEDATION DOCUMENTATION
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         Surgical History:  Past Surgical History:   Procedure Laterality Date    CARDIAC CATHETERIZATION  2022    FEMORAL BYPASS Right 7/5/2022    RIGHT FEMORAL POPLITEAL BYPASS performed by Olvin Iyer DO at [facility] OR    HIP SURGERY Right 4/24/2022    HIP PINNING performed by Alexx Monreal MD at [facility] OR         Medications:  Current Facility-Administered Medications   Medication Dose Route Frequency Provider Last Rate Last Admin    norepinephrine-sodium chloride (LEVOPHED) 16-0.9 MG/250ML-% infusion             propofol infusion  5-50 mcg/kg/min IntraVENous Continuous Daniel Vaughn MD 10.5 mL/hr at 11/09/24 2049 20 mcg/kg/min at 11/09/24 2049           Pre-Sedation:    Pre-Sedation Documentation and Exam:  I have personally completed a history, physical exam & review of systems for this patient (see notes).    Prior History of Anesthesia Complications:   none    Modified Mallampati:  I (soft palate, uvula, fauces, tonsillar pillars visible)    ASA Classification:  Class 4 - A patient with an incapacitating systemic disease that is a constant threat to life      Medication Planned:  propofol intravenously    Patient is an appropriate candidate for plan of sedation: yes      Electronically signed by Dillon Rodríguez MD on 11/9/2024 at 9:32 PM

## 2024-11-10 NOTE — ED PROVIDER NOTES
[facility] EMERGENCY DEPARTMENT  EMERGENCY DEPARTMENT ENCOUNTER        Pt Name: Ad Lacey  MRN: [id number]  Birthdate 1950  Date of evaluation: 11/9/2024  Provider: MICHELLE Ledezma Jr  PCP: Drea Frost APRN - CNP  Note Started: 8:59 PM EST 11/9/24      PROCEDURES   Unless otherwise noted below, none     Intubation    Date/Time: 11/9/2024 9:00 PM    Performed by: Moisés Nava Jr., PA  Authorized by: Daniel Vaughn MD    Consent:     Consent obtained:  Emergent situation    Consent given by:  Healthcare agent    Risks discussed:  Aspiration, death, hypoxia, dental trauma and pneumothorax  Universal protocol:     Patient identity confirmed:  Anonymous protocol, patient vented/unresponsive  Pre-procedure details:     Indications: airway protection and altered consciousness      Patient status:  Altered mental status    Look externally comment:  Large neck circumference    Obstruction: none      Pharmacologic strategy: RSI      Induction agents:  Etomidate    Paralytics:  Rocuronium  Procedure details:     Preoxygenation:  Bag valve mask    CPR in progress: no      Number of attempts:  1  Successful intubation attempt details:     Intubation method:  Oral    Intubation technique: video assisted      Laryngoscope blade:  Mac 4    Bougie used: no      Tube size (mm):  7.5    Tube type:  Cuffed    Tube visualized through cords: yes    Placement assessment:     ETT at teeth/gumline (cm):  23    Tube secured with:  ETT urban    Breath sounds:  Equal and absent over the epigastrium    Placement verification: chest rise, colorimetric ETCO2, CXR verification, direct visualization, equal breath sounds and esophageal detector      CXR findings:  Appropriate position  Post-procedure details:     Procedure completion:  Tolerated                   (Please note that portions of this note were completed with a voice recognition program.  Efforts were made to edit the dictations 
EMERGENCY DEPARTMENT ENCOUNTER      CHIEF COMPLAINT    Cardiac Arrest    JUHI Lacey is a 74 y.o. male with past medical history significant for CAD, PVD who presents with out of hospital cardiac arrest    Patient with chest pain 1 hour prior to EMS arrival, per EMS patient appeared to be uncomfortable secondary to chest pain, and route they noticed the patient was having some bigeminy, they gave him 324 of aspirin a few minutes later he went to V-fib cardiac arrest  Patient received 1 mg of IV epinephrine, as well as 3 shocks, and ROSC was achieved in the prehospital setting  Patient arrives somnolent unresponsive but has a pulse does make some noises with sternal rub    PAST MEDICAL HISTORY    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       SURGICAL HISTORY    Past Surgical History:   Procedure Laterality Date    CARDIAC CATHETERIZATION  2022    FEMORAL BYPASS Right 7/5/2022    RIGHT FEMORAL POPLITEAL BYPASS performed by Olvin Iyer DO at [facility] OR    HIP SURGERY Right 4/24/2022    HIP PINNING performed by Alexx Monreal MD at [facility] OR       CURRENT MEDICATIONS          ALLERGIES    No Known Allergies    Family history reviewed and noncontributory other than:  Family History   Problem Relation Age of Onset    No Known Problems Mother     Substance Abuse Father         alochol       Social history reviewed and noncontributory other than:  Social History     Socioeconomic History    Marital status:      Spouse name: Not on file    Number of children: Not on file    Years of education: Not on file    Highest education level: Not on file   Occupational History    Not on file   Tobacco Use    Smoking status: Former     Current packs/day: 0.00     Average packs/day: 1 pack/day for 50.0 years (50.0 ttl pk-yrs)     Types: Cigarettes     Start date: 
Rx given previously

## 2024-11-10 NOTE — ED TRIAGE NOTES
EMS was called by son for pt experiencing chest pain. Pt had rapid decline and unresponsive with a  rhythm of vfib. Pt was shocked and given to rounds of epi. Pt was being bagged upon arrival with no CC. Pt has cardiac hx with stents.

## 2024-11-10 NOTE — ED NOTES
Pt currently intubated with OG placed. Pt has 2 IV. Pt reviced heparin bolus of 5000 uints and is currently on propofol infusion

## 2024-11-10 NOTE — BRIEF OP NOTE
Brief Postoperative Note      Patient: Ad Lacey  YOB: 1950  MRN: [id number]    Date of Procedure: 11/9/2024    Pre-Op Diagnosis Codes:      * STEMI (ST elevation myocardial infarction) ([facility]) [I21.3]    Post-Op Diagnosis: Same       Procedure(s):  Left heart cath / coronary angiography  Percutaneous coronary intervention  Ventricular assist device (VAD) insertion    Surgeon(s):  Dillon Rodríguez MD    Estimated Blood Loss (mL): less than 50     Complications: None    Specimens:   * No specimens in log *    Implants:  Implant Name Type Inv. Item Serial No.  Lot No. LRB No. Used Action   KIT CATHETER PUMP INSERTION INTRACARDIAC IMPELLA CP - [id number] Ventricular assist devices KIT CATHETER PUMP INSERTION INTRACARDIAC IMPELLA CP  [employer]WD [id number] N/A 1 Implanted   STENT CORONARY MAURICIO FRONTIER RX 3X15 MM ZOTAROLIMUS ELUT - [id number] Coronary stents STENT CORONARY MAURICIO FRONTIER RX 3X15 MM ZOTAROLIMUS ELUT  [employer]WD [id number] N/A 1 Implanted         Drains:   NG/OG/NJ/NE Tube Orogastric Center mouth (Active)       Urinary Catheter 11/09/24 [place] (Active)   $ Urethral catheter insertion $ Not inserted for procedure 11/09/24 2059   Catheter Indications Prolonged immobilization (e.g. unstable thoracic or lumbar spine, multiple traumatic injuries such as pelvic fractures) 11/09/24 2059   Urine Color Yellow 11/09/24 2059   Urine Appearance Hazy 11/09/24 2059   Collection Container Standard 11/09/24 2059   Securement Method Securing device (Describe) 11/09/24 2059   Catheter Best Practices  Catheter secured to thigh 11/09/24 2059   Status Draining 11/09/24 2059       Findings:  100% occluded proximal LAD, stent thrombosis   S/p aspiration thrombectomy, and IVUS guided PCI mid LAD X 1 TRISHA   Severe LV dysfunction with EF < 20%   S/p pLVAD   DAPT  -180  Hemodynamics q6 hrs   Possible 5.5 upgrade based on LV recovery over the next several days     Electronically

## 2024-11-10 NOTE — ED NOTES
Patient's clothing cut/removed and placed into belongings bag with label. Pants visibly soiled with urine.

## 2024-11-10 NOTE — H&P
participate in the care of your patient. Please do not hesitate to contact me if you have any questions.      Dillon Rodríguez MD FACC  General, Interventional Cardiology, and Peripheral Vascular Disease   [facility]   Ph: [phone]  Fax: [phone]

## 2024-11-11 ENCOUNTER — APPOINTMENT (OUTPATIENT)
Age: 74
DRG: 001 | End: 2024-11-11
Attending: INTERNAL MEDICINE
Payer: MEDICARE

## 2024-11-11 ENCOUNTER — ANESTHESIA EVENT (OUTPATIENT)
Age: 74
End: 2024-11-11
Payer: MEDICARE

## 2024-11-11 PROBLEM — I24.9 ACUTE CORONARY SYNDROME (HCC): Status: ACTIVE | Noted: 2024-11-11

## 2024-11-11 PROBLEM — I25.5 ISCHEMIC CARDIOMYOPATHY: Status: ACTIVE | Noted: 2024-11-11

## 2024-11-11 PROBLEM — J15.69 GRAM-NEGATIVE PNEUMONIA (HCC): Status: ACTIVE | Noted: 2024-11-11

## 2024-11-11 PROBLEM — R57.0 CARDIOGENIC SHOCK: Status: ACTIVE | Noted: 2024-11-11

## 2024-11-11 PROBLEM — I21.3 ST ELEVATION MYOCARDIAL INFARCTION (STEMI) (HCC): Status: ACTIVE | Noted: 2024-11-11

## 2024-11-11 LAB
ABO + RH BLD: NORMAL
ALBUMIN SERPL-MCNC: 3.3 G/DL (ref 3.4–5)
ALBUMIN/GLOB SERPL: 2.1 {RATIO} (ref 1.1–2.2)
ALP SERPL-CCNC: 61 U/L (ref 40–129)
ALT SERPL-CCNC: 26 U/L (ref 10–40)
ANION GAP SERPL CALCULATED.3IONS-SCNC: 9 MMOL/L (ref 3–16)
AST SERPL-CCNC: 129 U/L (ref 15–37)
BASE EXCESS MIXED: -1
BASE EXCESS MIXED: -1
BASE EXCESS MIXED: 1
BILIRUB SERPL-MCNC: 1 MG/DL (ref 0–1)
BLD GP AB SCN SERPL QL: NORMAL
BUN SERPL-MCNC: 13 MG/DL (ref 7–20)
CA-I BLD-SCNC: 1.17 MMOL/L (ref 1.12–1.32)
CALCIUM SERPL-MCNC: 8.3 MG/DL (ref 8.3–10.6)
CHLORIDE SERPL-SCNC: 106 MMOL/L (ref 99–110)
CO2 SERPL-SCNC: 23 MMOL/L (ref 21–32)
CREAT SERPL-MCNC: 0.8 MG/DL (ref 0.8–1.3)
DEPRECATED RDW RBC AUTO: 15.3 % (ref 12.4–15.4)
ECHO AO ASC DIAM: 3 CM
ECHO AO ASCENDING AORTA INDEX: 1.52 CM/M2
ECHO AO ROOT DIAM: 3.3 CM
ECHO AO ROOT INDEX: 1.68 CM/M2
ECHO AV AREA PEAK VELOCITY: 1.9 CM2
ECHO AV AREA VTI: 1.8 CM2
ECHO AV AREA/BSA PEAK VELOCITY: 1 CM2/M2
ECHO AV AREA/BSA VTI: 0.9 CM2/M2
ECHO AV MEAN GRADIENT: 4 MMHG
ECHO AV MEAN VELOCITY: 0.9 M/S
ECHO AV PEAK GRADIENT: 6 MMHG
ECHO AV PEAK VELOCITY: 1.3 M/S
ECHO AV VELOCITY RATIO: 0.54
ECHO AV VTI: 24.5 CM
ECHO BSA: 2 M2
ECHO IVC EXP: 2 CM
ECHO LA AREA 2C: 12.9 CM2
ECHO LA AREA 4C: 16.4 CM2
ECHO LA MAJOR AXIS: 4.8 CM
ECHO LA MINOR AXIS: 4.5 CM
ECHO LA VOL BP: 37 ML (ref 18–58)
ECHO LA VOL MOD A2C: 30 ML (ref 18–58)
ECHO LA VOL MOD A4C: 44 ML (ref 18–58)
ECHO LA VOL/BSA BIPLANE: 19 ML/M2 (ref 16–34)
ECHO LA VOLUME INDEX MOD A2C: 15 ML/M2 (ref 16–34)
ECHO LA VOLUME INDEX MOD A4C: 22 ML/M2 (ref 16–34)
ECHO LV EF PHYSICIAN: 25 %
ECHO LV FRACTIONAL SHORTENING: 36 % (ref 28–44)
ECHO LV INTERNAL DIMENSION DIASTOLE INDEX: 2.13 CM/M2
ECHO LV INTERNAL DIMENSION DIASTOLIC: 4.2 CM (ref 4.2–5.9)
ECHO LV INTERNAL DIMENSION SYSTOLIC INDEX: 1.37 CM/M2
ECHO LV INTERNAL DIMENSION SYSTOLIC: 2.7 CM
ECHO LV IVSD: 1.1 CM (ref 0.6–1)
ECHO LV MASS 2D: 157.1 G (ref 88–224)
ECHO LV MASS INDEX 2D: 79.7 G/M2 (ref 49–115)
ECHO LV POSTERIOR WALL DIASTOLIC: 1.1 CM (ref 0.6–1)
ECHO LV RELATIVE WALL THICKNESS RATIO: 0.52
ECHO LVOT AREA: 3.5 CM2
ECHO LVOT AV VTI INDEX: 0.52
ECHO LVOT DIAM: 2.1 CM
ECHO LVOT MEAN GRADIENT: 1 MMHG
ECHO LVOT PEAK GRADIENT: 2 MMHG
ECHO LVOT PEAK VELOCITY: 0.7 M/S
ECHO LVOT STROKE VOLUME INDEX: 22.3 ML/M2
ECHO LVOT SV: 44 ML
ECHO LVOT VTI: 12.7 CM
ECHO MV A VELOCITY: 0.85 M/S
ECHO MV AREA VTI: 1.7 CM2
ECHO MV E DECELERATION TIME (DT): 295 MS
ECHO MV E VELOCITY: 0.66 M/S
ECHO MV E/A RATIO: 0.78
ECHO MV LVOT VTI INDEX: 2.07
ECHO MV MAX VELOCITY: 0.9 M/S
ECHO MV MEAN GRADIENT: 1 MMHG
ECHO MV MEAN VELOCITY: 0.5 M/S
ECHO MV PEAK GRADIENT: 3 MMHG
ECHO MV VTI: 26.3 CM
ECHO RA AREA 4C: 13.7 CM2
ECHO RA END SYSTOLIC VOLUME APICAL 4 CHAMBER INDEX BSA: 17 ML/M2
ECHO RA VOLUME: 34 ML
ECHO RV BASAL DIMENSION: 3.3 CM
ECHO RV FREE WALL PEAK S': 10 CM/S
ECHO RV LONGITUDINAL DIMENSION: 7.5 CM
ECHO RV MID DIMENSION: 2.8 CM
ECHO RV TAPSE: 2.3 CM (ref 1.7–?)
EKG ATRIAL RATE: 83 BPM
EKG DIAGNOSIS: NORMAL
EKG P AXIS: 67 DEGREES
EKG P-R INTERVAL: 136 MS
EKG Q-T INTERVAL: 358 MS
EKG QRS DURATION: 80 MS
EKG QTC CALCULATION (BAZETT): 420 MS
EKG R AXIS: 60 DEGREES
EKG T AXIS: 47 DEGREES
EKG VENTRICULAR RATE: 83 BPM
GFR SERPLBLD CREATININE-BSD FMLA CKD-EPI: >90 ML/MIN/{1.73_M2}
GLUCOSE BLD-MCNC: 120 MG/DL (ref 70–99)
GLUCOSE BLD-MCNC: 121 MG/DL (ref 70–99)
GLUCOSE BLD-MCNC: 126 MG/DL (ref 70–99)
GLUCOSE BLD-MCNC: 128 MG/DL (ref 70–99)
GLUCOSE BLD-MCNC: 131 MG/DL (ref 70–99)
GLUCOSE SERPL-MCNC: 116 MG/DL (ref 70–99)
HCO3, MIXED: 22.3 MMOL/L
HCO3, MIXED: 22.7 MMOL/L
HCO3, MIXED: 24.5 MMOL/L
HCT VFR BLD AUTO: 21 % (ref 40.5–52.5)
HCT VFR BLD AUTO: 22 % (ref 40.5–52.5)
HCT VFR BLD AUTO: 22.3 % (ref 40.5–52.5)
HGB BLD CALC-MCNC: 7.1 GM/DL (ref 13.5–17.5)
HGB BLD CALC-MCNC: 7.3 GM/DL (ref 13.5–17.5)
HGB BLD-MCNC: 7.5 G/DL (ref 13.5–17.5)
LACTATE BLD-SCNC: 0.75 MMOL/L (ref 0.4–2)
MAGNESIUM SERPL-MCNC: 1.7 MG/DL (ref 1.8–2.4)
MCH RBC QN AUTO: 28.4 PG (ref 26–34)
MCHC RBC AUTO-ENTMCNC: 33.4 G/DL (ref 31–36)
MCV RBC AUTO: 85 FL (ref 80–100)
O2 SAT, MIXED: 43 %
O2 SAT, MIXED: 44 %
O2 SAT, MIXED: 48 %
O2 SAT, MIXED: 50 %
O2 SAT, MIXED: 55 %
PATH INTERP BLD-IMP: NORMAL
PCO2 MIXED: 30.6 MM HG
PCO2 MIXED: 32.2 MM HG
PCO2 MIXED: 32.4 MM HG
PCO2 MIXED: 33.3 MM HG
PCO2 MIXED: 34.6 MM HG
PERFORMED ON: ABNORMAL
PERFORMED ON: NORMAL
PH, MIXED: 7.44 (ref 7.35–7.45)
PH, MIXED: 7.46 (ref 7.35–7.45)
PH, MIXED: 7.47 (ref 7.35–7.45)
PH, MIXED: 7.49 (ref 7.35–7.45)
PH, MIXED: 7.49 (ref 7.35–7.45)
PHOSPHATE SERPL-MCNC: 2.6 MG/DL (ref 2.5–4.9)
PLATELET # BLD AUTO: 260 K/UL (ref 135–450)
PMV BLD AUTO: 6.7 FL (ref 5–10.5)
PO2 MIXED: 22 MM HG
PO2 MIXED: 23 MM HG
PO2 MIXED: 24 MM HG
PO2 MIXED: 25 MM HG
PO2 MIXED: 26 MM HG
POC SAMPLE TYPE: ABNORMAL
POC SAMPLE TYPE: NORMAL
POTASSIUM BLD-SCNC: 3.8 MMOL/L (ref 3.5–5.1)
POTASSIUM SERPL-SCNC: 3.5 MMOL/L (ref 3.5–5.1)
PROT SERPL-MCNC: 4.9 G/DL (ref 6.4–8.2)
RBC # BLD AUTO: 2.63 M/UL (ref 4.2–5.9)
SODIUM BLD-SCNC: 136 MMOL/L (ref 136–145)
SODIUM SERPL-SCNC: 138 MMOL/L (ref 136–145)
TCO2 CALC MIXED: 23 MMOL/L
TCO2 CALC MIXED: 24 MMOL/L
TCO2 CALC MIXED: 26 MMOL/L
WBC # BLD AUTO: 6.7 K/UL (ref 4–11)

## 2024-11-11 PROCEDURE — 6360000002 HC RX W HCPCS: Performed by: INTERNAL MEDICINE

## 2024-11-11 PROCEDURE — 36569 INSJ PICC 5 YR+ W/O IMAGING: CPT

## 2024-11-11 PROCEDURE — P9016 RBC LEUKOCYTES REDUCED: HCPCS

## 2024-11-11 PROCEDURE — 6370000000 HC RX 637 (ALT 250 FOR IP): Performed by: INTERNAL MEDICINE

## 2024-11-11 PROCEDURE — APPNB15 APP NON BILLABLE TIME 0-15 MINS: Performed by: NURSE PRACTITIONER

## 2024-11-11 PROCEDURE — 99291 CRITICAL CARE FIRST HOUR: CPT | Performed by: INTERNAL MEDICINE

## 2024-11-11 PROCEDURE — 86900 BLOOD TYPING SEROLOGIC ABO: CPT

## 2024-11-11 PROCEDURE — 80053 COMPREHEN METABOLIC PANEL: CPT

## 2024-11-11 PROCEDURE — 82947 ASSAY GLUCOSE BLOOD QUANT: CPT

## 2024-11-11 PROCEDURE — 83735 ASSAY OF MAGNESIUM: CPT

## 2024-11-11 PROCEDURE — 85014 HEMATOCRIT: CPT

## 2024-11-11 PROCEDURE — 6360000004 HC RX CONTRAST MEDICATION: Performed by: INTERNAL MEDICINE

## 2024-11-11 PROCEDURE — 85347 COAGULATION TIME ACTIVATED: CPT

## 2024-11-11 PROCEDURE — 86901 BLOOD TYPING SEROLOGIC RH(D): CPT

## 2024-11-11 PROCEDURE — 2580000003 HC RX 258: Performed by: INTERNAL MEDICINE

## 2024-11-11 PROCEDURE — 85027 COMPLETE CBC AUTOMATED: CPT

## 2024-11-11 PROCEDURE — 83605 ASSAY OF LACTIC ACID: CPT

## 2024-11-11 PROCEDURE — 93010 ELECTROCARDIOGRAM REPORT: CPT | Performed by: INTERNAL MEDICINE

## 2024-11-11 PROCEDURE — 02HV33Z INSERTION OF INFUSION DEVICE INTO SUPERIOR VENA CAVA, PERCUTANEOUS APPROACH: ICD-10-PCS | Performed by: INTERNAL MEDICINE

## 2024-11-11 PROCEDURE — 93306 TTE W/DOPPLER COMPLETE: CPT | Performed by: INTERNAL MEDICINE

## 2024-11-11 PROCEDURE — 03HY32Z INSERTION OF MONITORING DEVICE INTO UPPER ARTERY, PERCUTANEOUS APPROACH: ICD-10-PCS | Performed by: INTERNAL MEDICINE

## 2024-11-11 PROCEDURE — P9041 ALBUMIN (HUMAN),5%, 50ML: HCPCS | Performed by: INTERNAL MEDICINE

## 2024-11-11 PROCEDURE — 2500000003 HC RX 250 WO HCPCS: Performed by: INTERNAL MEDICINE

## 2024-11-11 PROCEDURE — 92610 EVALUATE SWALLOWING FUNCTION: CPT

## 2024-11-11 PROCEDURE — 86923 COMPATIBILITY TEST ELECTRIC: CPT

## 2024-11-11 PROCEDURE — 30233N1 TRANSFUSION OF NONAUTOLOGOUS RED BLOOD CELLS INTO PERIPHERAL VEIN, PERCUTANEOUS APPROACH: ICD-10-PCS | Performed by: INTERNAL MEDICINE

## 2024-11-11 PROCEDURE — C8929 TTE W OR WO FOL WCON,DOPPLER: HCPCS

## 2024-11-11 PROCEDURE — 84100 ASSAY OF PHOSPHORUS: CPT

## 2024-11-11 PROCEDURE — 84132 ASSAY OF SERUM POTASSIUM: CPT

## 2024-11-11 PROCEDURE — 86850 RBC ANTIBODY SCREEN: CPT

## 2024-11-11 PROCEDURE — 84295 ASSAY OF SERUM SODIUM: CPT

## 2024-11-11 PROCEDURE — 82330 ASSAY OF CALCIUM: CPT

## 2024-11-11 PROCEDURE — 2580000003 HC RX 258: Performed by: NURSE PRACTITIONER

## 2024-11-11 PROCEDURE — 6360000002 HC RX W HCPCS: Performed by: NURSE PRACTITIONER

## 2024-11-11 PROCEDURE — 36620 INSERTION CATHETER ARTERY: CPT

## 2024-11-11 PROCEDURE — 2580000003 HC RX 258: Performed by: ANESTHESIOLOGY

## 2024-11-11 PROCEDURE — 99222 1ST HOSP IP/OBS MODERATE 55: CPT | Performed by: SURGERY

## 2024-11-11 PROCEDURE — C1751 CATH, INF, PER/CENT/MIDLINE: HCPCS

## 2024-11-11 PROCEDURE — 2100000000 HC CCU R&B

## 2024-11-11 PROCEDURE — 82803 BLOOD GASES ANY COMBINATION: CPT

## 2024-11-11 RX ORDER — SODIUM CHLORIDE 0.9 % (FLUSH) 0.9 %
5-40 SYRINGE (ML) INJECTION PRN
Status: DISCONTINUED | OUTPATIENT
Start: 2024-11-11 | End: 2024-11-21 | Stop reason: HOSPADM

## 2024-11-11 RX ORDER — LACTOBACILLUS RHAMNOSUS GG 10B CELL
2 CAPSULE ORAL 2 TIMES DAILY WITH MEALS
Status: DISCONTINUED | OUTPATIENT
Start: 2024-11-11 | End: 2024-11-21 | Stop reason: HOSPADM

## 2024-11-11 RX ORDER — MORPHINE SULFATE 2 MG/ML
2 INJECTION, SOLUTION INTRAMUSCULAR; INTRAVENOUS
Status: DISCONTINUED | OUTPATIENT
Start: 2024-11-11 | End: 2024-11-13

## 2024-11-11 RX ORDER — SODIUM CHLORIDE 9 MG/ML
INJECTION, SOLUTION INTRAVENOUS PRN
Status: DISCONTINUED | OUTPATIENT
Start: 2024-11-11 | End: 2024-11-21 | Stop reason: HOSPADM

## 2024-11-11 RX ORDER — 0.9 % SODIUM CHLORIDE 0.9 %
500 INTRAVENOUS SOLUTION INTRAVENOUS PRN
Status: DISCONTINUED | OUTPATIENT
Start: 2024-11-11 | End: 2024-11-21 | Stop reason: HOSPADM

## 2024-11-11 RX ORDER — MAGNESIUM SULFATE IN WATER 40 MG/ML
2000 INJECTION, SOLUTION INTRAVENOUS ONCE
Status: COMPLETED | OUTPATIENT
Start: 2024-11-11 | End: 2024-11-11

## 2024-11-11 RX ORDER — HYDROXYZINE HYDROCHLORIDE 10 MG/1
25 TABLET, FILM COATED ORAL 3 TIMES DAILY PRN
Status: DISCONTINUED | OUTPATIENT
Start: 2024-11-11 | End: 2024-11-12

## 2024-11-11 RX ORDER — ALBUMIN HUMAN 50 G/1000ML
25 SOLUTION INTRAVENOUS ONCE
Status: COMPLETED | OUTPATIENT
Start: 2024-11-11 | End: 2024-11-11

## 2024-11-11 RX ORDER — LIDOCAINE HYDROCHLORIDE 10 MG/ML
50 INJECTION, SOLUTION EPIDURAL; INFILTRATION; INTRACAUDAL; PERINEURAL ONCE
Status: DISCONTINUED | OUTPATIENT
Start: 2024-11-11 | End: 2024-11-12

## 2024-11-11 RX ORDER — SODIUM CHLORIDE 0.9 % (FLUSH) 0.9 %
5-40 SYRINGE (ML) INJECTION EVERY 12 HOURS SCHEDULED
Status: DISCONTINUED | OUTPATIENT
Start: 2024-11-11 | End: 2024-11-21 | Stop reason: HOSPADM

## 2024-11-11 RX ADMIN — AMPICILLIN SODIUM AND SULBACTAM SODIUM 3000 MG: 2; 1 INJECTION, POWDER, FOR SOLUTION INTRAMUSCULAR; INTRAVENOUS at 06:09

## 2024-11-11 RX ADMIN — DEXMEDETOMIDINE HYDROCHLORIDE 0.8 MCG/KG/HR: 400 INJECTION INTRAVENOUS at 06:12

## 2024-11-11 RX ADMIN — POTASSIUM CHLORIDE 20 MEQ: 29.8 INJECTION, SOLUTION INTRAVENOUS at 09:44

## 2024-11-11 RX ADMIN — MORPHINE SULFATE 2 MG: 2 INJECTION, SOLUTION INTRAMUSCULAR; INTRAVENOUS at 15:33

## 2024-11-11 RX ADMIN — MAGNESIUM SULFATE HEPTAHYDRATE 2000 MG: 40 INJECTION, SOLUTION INTRAVENOUS at 09:55

## 2024-11-11 RX ADMIN — POTASSIUM CHLORIDE 20 MEQ: 29.8 INJECTION, SOLUTION INTRAVENOUS at 06:14

## 2024-11-11 RX ADMIN — EPTIFIBATIDE 2 MCG/KG/MIN: 0.75 INJECTION INTRAVENOUS at 09:06

## 2024-11-11 RX ADMIN — SODIUM BICARBONATE: 84 INJECTION, SOLUTION INTRAVENOUS at 00:11

## 2024-11-11 RX ADMIN — ATORVASTATIN CALCIUM 80 MG: 80 TABLET, FILM COATED ORAL at 20:15

## 2024-11-11 RX ADMIN — HEPARIN SODIUM 1200 UNITS/HR: 10000 INJECTION, SOLUTION INTRAVENOUS at 22:00

## 2024-11-11 RX ADMIN — MORPHINE SULFATE 2 MG: 2 INJECTION, SOLUTION INTRAMUSCULAR; INTRAVENOUS at 19:49

## 2024-11-11 RX ADMIN — ALBUMIN (HUMAN) 25 G: 12.5 INJECTION, SOLUTION INTRAVENOUS at 22:52

## 2024-11-11 RX ADMIN — DEXMEDETOMIDINE HYDROCHLORIDE 1 MCG/KG/HR: 400 INJECTION INTRAVENOUS at 01:07

## 2024-11-11 RX ADMIN — MEROPENEM 2000 MG: 1 INJECTION INTRAVENOUS at 11:06

## 2024-11-11 RX ADMIN — DEXMEDETOMIDINE HYDROCHLORIDE 0.8 MCG/KG/HR: 400 INJECTION INTRAVENOUS at 13:18

## 2024-11-11 RX ADMIN — PERFLUTREN 1.5 ML: 6.52 INJECTION, SUSPENSION INTRAVENOUS at 08:02

## 2024-11-11 RX ADMIN — EPTIFIBATIDE 2 MCG/KG/MIN: 0.75 INJECTION INTRAVENOUS at 17:34

## 2024-11-11 RX ADMIN — DEXMEDETOMIDINE HYDROCHLORIDE 0.8 MCG/KG/HR: 400 INJECTION INTRAVENOUS at 19:46

## 2024-11-11 RX ADMIN — SODIUM CHLORIDE, PRESERVATIVE FREE 10 ML: 5 INJECTION INTRAVENOUS at 20:11

## 2024-11-11 RX ADMIN — MEROPENEM 1000 MG: 1 INJECTION INTRAVENOUS at 18:44

## 2024-11-11 RX ADMIN — HYDROMORPHONE HYDROCHLORIDE 1 MG: 1 INJECTION, SOLUTION INTRAMUSCULAR; INTRAVENOUS; SUBCUTANEOUS at 23:39

## 2024-11-11 RX ADMIN — EPTIFIBATIDE 2 MCG/KG/MIN: 0.75 INJECTION INTRAVENOUS at 00:39

## 2024-11-11 ASSESSMENT — PAIN SCALES - GENERAL
PAINLEVEL_OUTOF10: 10
PAINLEVEL_OUTOF10: 8
PAINLEVEL_OUTOF10: 10
PAINLEVEL_OUTOF10: 10

## 2024-11-11 ASSESSMENT — PAIN DESCRIPTION - LOCATION
LOCATION: CHEST;SACRUM
LOCATION: SACRUM;RIB CAGE
LOCATION: SACRUM;RIB CAGE
LOCATION: GENERALIZED

## 2024-11-11 ASSESSMENT — PAIN DESCRIPTION - FREQUENCY: FREQUENCY: CONTINUOUS

## 2024-11-11 ASSESSMENT — PAIN DESCRIPTION - DESCRIPTORS
DESCRIPTORS: ACHING
DESCRIPTORS: SHARP;SORE

## 2024-11-11 ASSESSMENT — PAIN DESCRIPTION - ONSET: ONSET: ON-GOING

## 2024-11-11 ASSESSMENT — PAIN DESCRIPTION - PAIN TYPE: TYPE: ACUTE PAIN

## 2024-11-11 ASSESSMENT — PAIN DESCRIPTION - ORIENTATION
ORIENTATION: RIGHT;LEFT
ORIENTATION: MID

## 2024-11-11 NOTE — FLOWSHEET NOTE
Hemodynamics calculated as below using Elijah's calculation. Patient on Precedex, Heparin & Integrilin  per eMAR. Hgb dropped from 10.6 to 7.3 since last checked.     11/11/24 0000   [place]-Yaneli   PAP (Systolic/Diastolic) 32/12   PAP (Mean) 18 mmHg   CVP (Mean) 6 mmHg   MAP (Monitor) 87   PAP Wave Form Appropriate wave forms   SVO2 (%) 43.63 %   CO (l/min) 4 l/min   CCO 4 L/Min   CI (l/min/m2) 2 l/min/m2   SVR (Using NBP Mean) 1620 dyne*sec/cm5   Impella Left   Systolic 91   Diastolic 59   Mean 79   Impella Performance Level P-7   Placement (cm) 90 cm   Pedal Pulse +1   Purge Flow 13.2 mL/hr   Purge Pressure 456 mmHg   Impella Flow 2.7 L/min   Motor Current - Systolic 765   Motor Current - Diastolic 541   Motor Current - Mean 649   Motor Current Pulsatile Yes   Activated Clotting Time (ACT) 173   Location Left Femoral   Impella Site Check Yes    0.77   NAKIA 3.33   Hemodynamic Monitoring   Hemodynamic Device [place]-Yaneli   Ventricular Assist Device Impella Left  (P-7)     Due to significant drop in Hgb & MvO2, call placed to BRANDON Vargas on call. No blood transfusion at this time. Nitro gtt restarted to attempt to reduce SVR.

## 2024-11-11 NOTE — FLOWSHEET NOTE
Hemodynamics calculated as below using Elijah's calculation. Patient on Precedex, Heparin & Integrilin per eMAR. Nitroglycerin @ 20mcg/min resulting in imroved hemodynamiccs, MAP at goal of <85, and reduction in SVR.     11/11/24 0415   [place]-Yaneli   PAP (Systolic/Diastolic) 30/11   PAP (Mean) 18 mmHg   CVP (Mean) 7 mmHg   MAP (Monitor) 81   PAP Wave Form Appropriate wave forms   SVO2 (%) 47.6 %   CO (l/min) 4.5 l/min   CCO 4.5 L/Min   CI (l/min/m2) 2.3 l/min/m2   SVR (Using NBP Mean) 1315.56 dyne*sec/cm5   Hemodynamic Monitoring   Hemodynamic Device [place]-Yaneli   Ventricular Assist Device Impella Left  (P-7)

## 2024-11-12 ENCOUNTER — APPOINTMENT (OUTPATIENT)
Age: 74
DRG: 001 | End: 2024-11-12
Attending: INTERNAL MEDICINE
Payer: MEDICARE

## 2024-11-12 ENCOUNTER — ANESTHESIA (OUTPATIENT)
Age: 74
End: 2024-11-12
Payer: MEDICARE

## 2024-11-12 ENCOUNTER — APPOINTMENT (OUTPATIENT)
Dept: CT IMAGING | Age: 74
DRG: 001 | End: 2024-11-12
Payer: MEDICARE

## 2024-11-12 LAB
ALBUMIN SERPL-MCNC: 3.2 G/DL (ref 3.4–5)
ALBUMIN/GLOB SERPL: 2.9 {RATIO} (ref 1.1–2.2)
ALP SERPL-CCNC: 55 U/L (ref 40–129)
ALT SERPL-CCNC: 18 U/L (ref 10–40)
ANION GAP SERPL CALCULATED.3IONS-SCNC: 10 MMOL/L (ref 3–16)
ANION GAP SERPL CALCULATED.3IONS-SCNC: 12 MMOL/L (ref 3–16)
ANION GAP SERPL CALCULATED.3IONS-SCNC: 9 MMOL/L (ref 3–16)
AST SERPL-CCNC: 72 U/L (ref 15–37)
BACTERIA SPEC RESP CULT: ABNORMAL
BACTERIA SPEC RESP CULT: ABNORMAL
BASE EXCESS BLDA CALC-SCNC: -5 MMOL/L (ref -3–3)
BASE EXCESS MIXED: -2
BASE EXCESS MIXED: -3
BASE EXCESS MIXED: -4
BASOPHILS # BLD: 0 K/UL (ref 0–0.2)
BASOPHILS NFR BLD: 0.6 %
BILIRUB SERPL-MCNC: 1.2 MG/DL (ref 0–1)
BUN SERPL-MCNC: 11 MG/DL (ref 7–20)
BUN SERPL-MCNC: 12 MG/DL (ref 7–20)
BUN SERPL-MCNC: 12 MG/DL (ref 7–20)
CALCIUM SERPL-MCNC: 7.7 MG/DL (ref 8.3–10.6)
CALCIUM SERPL-MCNC: 8.3 MG/DL (ref 8.3–10.6)
CALCIUM SERPL-MCNC: 8.6 MG/DL (ref 8.3–10.6)
CHLORIDE SERPL-SCNC: 102 MMOL/L (ref 99–110)
CHLORIDE SERPL-SCNC: 103 MMOL/L (ref 99–110)
CHLORIDE SERPL-SCNC: 105 MMOL/L (ref 99–110)
CO2 BLDA-SCNC: 21 MMOL/L
CO2 SERPL-SCNC: 19 MMOL/L (ref 21–32)
CO2 SERPL-SCNC: 19 MMOL/L (ref 21–32)
CO2 SERPL-SCNC: 20 MMOL/L (ref 21–32)
CREAT SERPL-MCNC: 0.8 MG/DL (ref 0.8–1.3)
CREAT SERPL-MCNC: 0.8 MG/DL (ref 0.8–1.3)
CREAT SERPL-MCNC: 0.9 MG/DL (ref 0.8–1.3)
DEPRECATED RDW RBC AUTO: 14.7 % (ref 12.4–15.4)
DEPRECATED RDW RBC AUTO: 14.9 % (ref 12.4–15.4)
ECHO BSA: 2.01 M2
EOSINOPHIL # BLD: 0.1 K/UL (ref 0–0.6)
EOSINOPHIL NFR BLD: 1.2 %
GFR SERPLBLD CREATININE-BSD FMLA CKD-EPI: 89 ML/MIN/{1.73_M2}
GFR SERPLBLD CREATININE-BSD FMLA CKD-EPI: >90 ML/MIN/{1.73_M2}
GFR SERPLBLD CREATININE-BSD FMLA CKD-EPI: >90 ML/MIN/{1.73_M2}
GLUCOSE BLD-MCNC: 118 MG/DL (ref 70–99)
GLUCOSE BLD-MCNC: 124 MG/DL (ref 70–99)
GLUCOSE BLD-MCNC: 125 MG/DL (ref 70–99)
GLUCOSE BLD-MCNC: 170 MG/DL (ref 70–99)
GLUCOSE SERPL-MCNC: 113 MG/DL (ref 70–99)
GLUCOSE SERPL-MCNC: 128 MG/DL (ref 70–99)
GLUCOSE SERPL-MCNC: 137 MG/DL (ref 70–99)
GRAM STN SPEC: ABNORMAL
HCO3 BLDA-SCNC: 20.2 MMOL/L (ref 21–29)
HCO3, MIXED: 20.3 MMOL/L
HCO3, MIXED: 21.6 MMOL/L
HCO3, MIXED: 22.2 MMOL/L
HCO3, MIXED: 22.2 MMOL/L
HCO3, MIXED: 22.8 MMOL/L
HCT VFR BLD AUTO: 17.4 % (ref 40.5–52.5)
HCT VFR BLD AUTO: 20.7 % (ref 40.5–52.5)
HCT VFR BLD AUTO: 24.4 % (ref 40.5–52.5)
HCT VFR BLD AUTO: 25.4 % (ref 40.5–52.5)
HCT VFR BLD AUTO: 26.9 % (ref 40.5–52.5)
HGB BLD-MCNC: 5.9 G/DL (ref 13.5–17.5)
HGB BLD-MCNC: 7.2 G/DL (ref 13.5–17.5)
HGB BLD-MCNC: 8.4 G/DL (ref 13.5–17.5)
HGB BLD-MCNC: 8.8 G/DL (ref 13.5–17.5)
HGB BLD-MCNC: 9.3 G/DL (ref 13.5–17.5)
LACTATE BLD-SCNC: 0.81 MMOL/L (ref 0.4–2)
LYMPHOCYTES # BLD: 0.6 K/UL (ref 1–5.1)
LYMPHOCYTES NFR BLD: 9.2 %
MAGNESIUM SERPL-MCNC: 1.89 MG/DL (ref 1.8–2.4)
MAGNESIUM SERPL-MCNC: 2 MG/DL (ref 1.8–2.4)
MCH RBC QN AUTO: 28.9 PG (ref 26–34)
MCH RBC QN AUTO: 28.9 PG (ref 26–34)
MCHC RBC AUTO-ENTMCNC: 34.6 G/DL (ref 31–36)
MCHC RBC AUTO-ENTMCNC: 34.7 G/DL (ref 31–36)
MCV RBC AUTO: 83.2 FL (ref 80–100)
MCV RBC AUTO: 83.5 FL (ref 80–100)
MONOCYTES # BLD: 0.3 K/UL (ref 0–1.3)
MONOCYTES NFR BLD: 4.3 %
NEUTROPHILS # BLD: 5.4 K/UL (ref 1.7–7.7)
NEUTROPHILS NFR BLD: 84.7 %
O2 SAT, MIXED: 25 %
O2 SAT, MIXED: 34 %
O2 SAT, MIXED: 41 %
O2 SAT, MIXED: 45 %
O2 SAT, MIXED: 52 %
ORGANISM: ABNORMAL
PCO2 BLDA: 34 MM HG (ref 35–45)
PCO2 MIXED: 27.2 MM HG
PCO2 MIXED: 31.7 MM HG
PCO2 MIXED: 34.2 MM HG
PCO2 MIXED: 34.5 MM HG
PCO2 MIXED: 39.8 MM HG
PERFORMED ON: ABNORMAL
PERFORMED ON: NORMAL
PERFORMED ON: NORMAL
PH BLDA: 7.38 [PH] (ref 7.35–7.45)
PH, MIXED: 7.34 (ref 7.35–7.45)
PH, MIXED: 7.42 (ref 7.35–7.45)
PH, MIXED: 7.43 (ref 7.35–7.45)
PH, MIXED: 7.45 (ref 7.35–7.45)
PH, MIXED: 7.48 (ref 7.35–7.45)
PHOSPHATE SERPL-MCNC: 2.3 MG/DL (ref 2.5–4.9)
PLATELET # BLD AUTO: 163 K/UL (ref 135–450)
PLATELET # BLD AUTO: 186 K/UL (ref 135–450)
PMV BLD AUTO: 6.5 FL (ref 5–10.5)
PMV BLD AUTO: 6.9 FL (ref 5–10.5)
PO2 BLDA: 66.3 MM HG (ref 75–108)
PO2 MIXED: 17 MM HG
PO2 MIXED: 19 MM HG
PO2 MIXED: 21 MM HG
PO2 MIXED: 24 MM HG
PO2 MIXED: 29 MM HG
POC ACT LR: 154 SEC
POC ACT LR: 158 SEC
POC ACT LR: 216 SEC
POC ACT LR: 230 SEC
POC ACT LR: 334 SEC
POC SAMPLE TYPE: ABNORMAL
POC SAMPLE TYPE: NORMAL
POC SAMPLE TYPE: NORMAL
POTASSIUM SERPL-SCNC: 3.5 MMOL/L (ref 3.5–5.1)
POTASSIUM SERPL-SCNC: 3.7 MMOL/L (ref 3.5–5.1)
POTASSIUM SERPL-SCNC: 3.9 MMOL/L (ref 3.5–5.1)
PROT SERPL-MCNC: 4.3 G/DL (ref 6.4–8.2)
RBC # BLD AUTO: 2.49 M/UL (ref 4.2–5.9)
RBC # BLD AUTO: 2.92 M/UL (ref 4.2–5.9)
REASON FOR REJECTION: NORMAL
REJECTED TEST: NORMAL
SAO2 % BLDA: 93 % (ref 93–100)
SODIUM SERPL-SCNC: 133 MMOL/L (ref 136–145)
TCO2 CALC MIXED: 21 MMOL/L
TCO2 CALC MIXED: 23 MMOL/L
TCO2 CALC MIXED: 24 MMOL/L
WBC # BLD AUTO: 6.3 K/UL (ref 4–11)
WBC # BLD AUTO: 6.4 K/UL (ref 4–11)

## 2024-11-12 PROCEDURE — 02HA0RZ INSERTION OF SHORT-TERM EXTERNAL HEART ASSIST SYSTEM INTO HEART, OPEN APPROACH: ICD-10-PCS | Performed by: INTERNAL MEDICINE

## 2024-11-12 PROCEDURE — 85347 COAGULATION TIME ACTIVATED: CPT

## 2024-11-12 PROCEDURE — 3700000001 HC ADD 15 MINUTES (ANESTHESIA): Performed by: INTERNAL MEDICINE

## 2024-11-12 PROCEDURE — 6360000002 HC RX W HCPCS: Performed by: INTERNAL MEDICINE

## 2024-11-12 PROCEDURE — 99291 CRITICAL CARE FIRST HOUR: CPT | Performed by: INTERNAL MEDICINE

## 2024-11-12 PROCEDURE — 3700000000 HC ANESTHESIA ATTENDED CARE: Performed by: INTERNAL MEDICINE

## 2024-11-12 PROCEDURE — 6360000004 HC RX CONTRAST MEDICATION: Performed by: INTERNAL MEDICINE

## 2024-11-12 PROCEDURE — 94761 N-INVAS EAR/PLS OXIMETRY MLT: CPT

## 2024-11-12 PROCEDURE — 83735 ASSAY OF MAGNESIUM: CPT

## 2024-11-12 PROCEDURE — 2500000003 HC RX 250 WO HCPCS: Performed by: NURSE PRACTITIONER

## 2024-11-12 PROCEDURE — C1760 CLOSURE DEV, VASC: HCPCS | Performed by: INTERNAL MEDICINE

## 2024-11-12 PROCEDURE — 82947 ASSAY GLUCOSE BLOOD QUANT: CPT

## 2024-11-12 PROCEDURE — 33990 INSJ PERQ VAD L HRT ARTERIAL: CPT | Performed by: INTERNAL MEDICINE

## 2024-11-12 PROCEDURE — 2500000003 HC RX 250 WO HCPCS: Performed by: INTERNAL MEDICINE

## 2024-11-12 PROCEDURE — P9041 ALBUMIN (HUMAN),5%, 50ML: HCPCS

## 2024-11-12 PROCEDURE — 6360000002 HC RX W HCPCS: Performed by: NURSE PRACTITIONER

## 2024-11-12 PROCEDURE — 36430 TRANSFUSION BLD/BLD COMPNT: CPT

## 2024-11-12 PROCEDURE — C1889 IMPLANT/INSERT DEVICE, NOC: HCPCS | Performed by: INTERNAL MEDICINE

## 2024-11-12 PROCEDURE — 74174 CTA ABD&PLVS W/CONTRAST: CPT

## 2024-11-12 PROCEDURE — 2100000000 HC CCU R&B

## 2024-11-12 PROCEDURE — B2101ZZ FLUOROSCOPY OF SINGLE CORONARY ARTERY USING LOW OSMOLAR CONTRAST: ICD-10-PCS | Performed by: INTERNAL MEDICINE

## 2024-11-12 PROCEDURE — 2709999900 HC NON-CHARGEABLE SUPPLY: Performed by: INTERNAL MEDICINE

## 2024-11-12 PROCEDURE — 85027 COMPLETE CBC AUTOMATED: CPT

## 2024-11-12 PROCEDURE — 34716 OPN AX/SUBCLA ART EXPOS CNDT: CPT | Performed by: INTERNAL MEDICINE

## 2024-11-12 PROCEDURE — 82803 BLOOD GASES ANY COMBINATION: CPT

## 2024-11-12 PROCEDURE — 6360000002 HC RX W HCPCS

## 2024-11-12 PROCEDURE — 5A0221D ASSISTANCE WITH CARDIAC OUTPUT USING IMPELLER PUMP, CONTINUOUS: ICD-10-PCS | Performed by: INTERNAL MEDICINE

## 2024-11-12 PROCEDURE — 84100 ASSAY OF PHOSPHORUS: CPT

## 2024-11-12 PROCEDURE — 85025 COMPLETE CBC W/AUTO DIFF WBC: CPT

## 2024-11-12 PROCEDURE — 37799 UNLISTED PX VASCULAR SURGERY: CPT

## 2024-11-12 PROCEDURE — 6370000000 HC RX 637 (ALT 250 FOR IP): Performed by: NURSE PRACTITIONER

## 2024-11-12 PROCEDURE — 2580000003 HC RX 258

## 2024-11-12 PROCEDURE — 02HQ32Z INSERTION OF MONITORING DEVICE INTO RIGHT PULMONARY ARTERY, PERCUTANEOUS APPROACH: ICD-10-PCS | Performed by: INTERNAL MEDICINE

## 2024-11-12 PROCEDURE — 6370000000 HC RX 637 (ALT 250 FOR IP): Performed by: INTERNAL MEDICINE

## 2024-11-12 PROCEDURE — C1769 GUIDE WIRE: HCPCS | Performed by: INTERNAL MEDICINE

## 2024-11-12 PROCEDURE — 85018 HEMOGLOBIN: CPT

## 2024-11-12 PROCEDURE — C1768 GRAFT, VASCULAR: HCPCS | Performed by: INTERNAL MEDICINE

## 2024-11-12 PROCEDURE — B24BZZ4 ULTRASONOGRAPHY OF HEART WITH AORTA, TRANSESOPHAGEAL: ICD-10-PCS | Performed by: INTERNAL MEDICINE

## 2024-11-12 PROCEDURE — X2HL0F9 INSERTION OF CONDUIT TO SHORT-TERM EXTERNAL HEART ASSIST SYSTEM INTO RIGHT AXILLARY ARTERY, OPEN APPROACH, NEW TECHNOLOGY GROUP 9: ICD-10-PCS | Performed by: INTERNAL MEDICINE

## 2024-11-12 PROCEDURE — 2580000003 HC RX 258: Performed by: NURSE PRACTITIONER

## 2024-11-12 PROCEDURE — 93308 TTE F-UP OR LMTD: CPT

## 2024-11-12 PROCEDURE — 85014 HEMATOCRIT: CPT

## 2024-11-12 PROCEDURE — 83605 ASSAY OF LACTIC ACID: CPT

## 2024-11-12 PROCEDURE — 93312 ECHO TRANSESOPHAGEAL: CPT

## 2024-11-12 PROCEDURE — 2580000003 HC RX 258: Performed by: ANESTHESIOLOGY

## 2024-11-12 PROCEDURE — 80053 COMPREHEN METABOLIC PANEL: CPT

## 2024-11-12 PROCEDURE — 2700000000 HC OXYGEN THERAPY PER DAY

## 2024-11-12 PROCEDURE — 2580000003 HC RX 258: Performed by: INTERNAL MEDICINE

## 2024-11-12 PROCEDURE — 93308 TTE F-UP OR LMTD: CPT | Performed by: INTERNAL MEDICINE

## 2024-11-12 PROCEDURE — APPNB15 APP NON BILLABLE TIME 0-15 MINS: Performed by: NURSE PRACTITIONER

## 2024-11-12 PROCEDURE — 2500000003 HC RX 250 WO HCPCS

## 2024-11-12 DEVICE — HEMASHIELD PLATINUM WOVEN STRAIGHT DOUBLE VELOUR VASCULAR GRAFT WITH GRAFT SIZER ACCESSORY
Type: IMPLANTABLE DEVICE | Status: FUNCTIONAL
Brand: HEMASHIELD

## 2024-11-12 RX ORDER — MAGNESIUM SULFATE IN WATER 40 MG/ML
2000 INJECTION, SOLUTION INTRAVENOUS ONCE
Status: COMPLETED | OUTPATIENT
Start: 2024-11-12 | End: 2024-11-12

## 2024-11-12 RX ORDER — DEXAMETHASONE SODIUM PHOSPHATE 4 MG/ML
INJECTION, SOLUTION INTRA-ARTICULAR; INTRALESIONAL; INTRAMUSCULAR; INTRAVENOUS; SOFT TISSUE
Status: DISCONTINUED | OUTPATIENT
Start: 2024-11-12 | End: 2024-11-12 | Stop reason: SDUPTHER

## 2024-11-12 RX ORDER — MEPERIDINE HYDROCHLORIDE 50 MG/ML
12.5 INJECTION INTRAMUSCULAR; INTRAVENOUS; SUBCUTANEOUS EVERY 5 MIN PRN
Status: CANCELLED | OUTPATIENT
Start: 2024-11-12

## 2024-11-12 RX ORDER — EPINEPHRINE 1 MG/ML
INJECTION, SOLUTION, CONCENTRATE INTRAVENOUS
Status: DISCONTINUED | OUTPATIENT
Start: 2024-11-12 | End: 2024-11-12 | Stop reason: SDUPTHER

## 2024-11-12 RX ORDER — ALBUMIN HUMAN 50 G/1000ML
25 SOLUTION INTRAVENOUS ONCE
Status: COMPLETED | OUTPATIENT
Start: 2024-11-12 | End: 2024-11-12

## 2024-11-12 RX ORDER — FENTANYL CITRATE 50 UG/ML
25 INJECTION, SOLUTION INTRAMUSCULAR; INTRAVENOUS EVERY 5 MIN PRN
Status: CANCELLED | OUTPATIENT
Start: 2024-11-12

## 2024-11-12 RX ORDER — ROCURONIUM BROMIDE 10 MG/ML
INJECTION, SOLUTION INTRAVENOUS
Status: DISCONTINUED | OUTPATIENT
Start: 2024-11-12 | End: 2024-11-12 | Stop reason: SDUPTHER

## 2024-11-12 RX ORDER — NOREPINEPHRINE BITARTRATE 0.06 MG/ML
INJECTION, SOLUTION INTRAVENOUS
Status: DISPENSED
Start: 2024-11-12 | End: 2024-11-13

## 2024-11-12 RX ORDER — IOPAMIDOL 755 MG/ML
75 INJECTION, SOLUTION INTRAVASCULAR
Status: COMPLETED | OUTPATIENT
Start: 2024-11-12 | End: 2024-11-12

## 2024-11-12 RX ORDER — FUROSEMIDE 10 MG/ML
40 INJECTION INTRAMUSCULAR; INTRAVENOUS ONCE
Status: COMPLETED | OUTPATIENT
Start: 2024-11-12 | End: 2024-11-12

## 2024-11-12 RX ORDER — NALOXONE HYDROCHLORIDE 0.4 MG/ML
INJECTION, SOLUTION INTRAMUSCULAR; INTRAVENOUS; SUBCUTANEOUS PRN
Status: CANCELLED | OUTPATIENT
Start: 2024-11-12

## 2024-11-12 RX ORDER — HEPARIN SODIUM 1000 [USP'U]/ML
1000 INJECTION, SOLUTION INTRAVENOUS; SUBCUTANEOUS ONCE
Status: COMPLETED | OUTPATIENT
Start: 2024-11-12 | End: 2024-11-12

## 2024-11-12 RX ORDER — SODIUM CHLORIDE 0.9 % (FLUSH) 0.9 %
5-40 SYRINGE (ML) INJECTION EVERY 12 HOURS SCHEDULED
Status: CANCELLED | OUTPATIENT
Start: 2024-11-12

## 2024-11-12 RX ORDER — SODIUM CHLORIDE 9 MG/ML
INJECTION, SOLUTION INTRAVENOUS PRN
Status: DISCONTINUED | OUTPATIENT
Start: 2024-11-12 | End: 2024-11-19

## 2024-11-12 RX ORDER — ONDANSETRON 2 MG/ML
INJECTION INTRAMUSCULAR; INTRAVENOUS
Status: DISCONTINUED | OUTPATIENT
Start: 2024-11-12 | End: 2024-11-12 | Stop reason: SDUPTHER

## 2024-11-12 RX ORDER — ALBUMIN HUMAN 50 G/1000ML
SOLUTION INTRAVENOUS
Status: COMPLETED
Start: 2024-11-12 | End: 2024-11-12

## 2024-11-12 RX ORDER — CALCIUM CHLORIDE 100 MG/ML
INJECTION INTRAVENOUS; INTRAVENTRICULAR
Status: DISCONTINUED | OUTPATIENT
Start: 2024-11-12 | End: 2024-11-12 | Stop reason: SDUPTHER

## 2024-11-12 RX ORDER — SODIUM CHLORIDE 9 MG/ML
INJECTION, SOLUTION INTRAVENOUS
Status: DISCONTINUED | OUTPATIENT
Start: 2024-11-12 | End: 2024-11-12 | Stop reason: SDUPTHER

## 2024-11-12 RX ORDER — SODIUM CHLORIDE 9 MG/ML
INJECTION, SOLUTION INTRAVENOUS PRN
Status: CANCELLED | OUTPATIENT
Start: 2024-11-12

## 2024-11-12 RX ORDER — SODIUM CHLORIDE 9 MG/ML
INJECTION, SOLUTION INTRAVENOUS PRN
Status: DISCONTINUED | OUTPATIENT
Start: 2024-11-12 | End: 2024-11-13

## 2024-11-12 RX ORDER — LIDOCAINE HYDROCHLORIDE 20 MG/ML
INJECTION, SOLUTION EPIDURAL; INFILTRATION; INTRACAUDAL; PERINEURAL
Status: DISCONTINUED | OUTPATIENT
Start: 2024-11-12 | End: 2024-11-12 | Stop reason: SDUPTHER

## 2024-11-12 RX ORDER — OXYCODONE HYDROCHLORIDE 5 MG/1
5 TABLET ORAL EVERY 4 HOURS PRN
Status: DISCONTINUED | OUTPATIENT
Start: 2024-11-12 | End: 2024-11-16

## 2024-11-12 RX ORDER — PROPOFOL 10 MG/ML
INJECTION, EMULSION INTRAVENOUS
Status: DISCONTINUED | OUTPATIENT
Start: 2024-11-12 | End: 2024-11-12 | Stop reason: SDUPTHER

## 2024-11-12 RX ORDER — HEPARIN SODIUM 1000 [USP'U]/ML
INJECTION, SOLUTION INTRAVENOUS; SUBCUTANEOUS
Status: DISCONTINUED | OUTPATIENT
Start: 2024-11-12 | End: 2024-11-12 | Stop reason: SDUPTHER

## 2024-11-12 RX ORDER — FENTANYL CITRATE 50 UG/ML
INJECTION, SOLUTION INTRAMUSCULAR; INTRAVENOUS
Status: DISCONTINUED | OUTPATIENT
Start: 2024-11-12 | End: 2024-11-12 | Stop reason: SDUPTHER

## 2024-11-12 RX ORDER — ONDANSETRON 2 MG/ML
4 INJECTION INTRAMUSCULAR; INTRAVENOUS
Status: CANCELLED | OUTPATIENT
Start: 2024-11-12 | End: 2024-11-13

## 2024-11-12 RX ORDER — PROTAMINE SULFATE 10 MG/ML
INJECTION, SOLUTION INTRAVENOUS
Status: DISCONTINUED | OUTPATIENT
Start: 2024-11-12 | End: 2024-11-12 | Stop reason: SDUPTHER

## 2024-11-12 RX ORDER — DEXAMETHASONE SODIUM PHOSPHATE 4 MG/ML
INJECTION, SOLUTION INTRA-ARTICULAR; INTRALESIONAL; INTRAMUSCULAR; INTRAVENOUS; SOFT TISSUE
Status: DISCONTINUED | OUTPATIENT
Start: 2024-11-12 | End: 2024-11-12

## 2024-11-12 RX ORDER — HEPARIN SODIUM 10000 [USP'U]/100ML
0-4000 INJECTION, SOLUTION INTRAVENOUS CONTINUOUS
Status: DISCONTINUED | OUTPATIENT
Start: 2024-11-12 | End: 2024-11-19

## 2024-11-12 RX ORDER — EPHEDRINE SULFATE/0.9% NACL/PF 25 MG/5 ML
SYRINGE (ML) INTRAVENOUS
Status: DISCONTINUED | OUTPATIENT
Start: 2024-11-12 | End: 2024-11-12 | Stop reason: SDUPTHER

## 2024-11-12 RX ORDER — CALCIUM GLUCONATE 20 MG/ML
1000 INJECTION, SOLUTION INTRAVENOUS ONCE
Status: COMPLETED | OUTPATIENT
Start: 2024-11-12 | End: 2024-11-12

## 2024-11-12 RX ORDER — ALBUMIN, HUMAN INJ 5% 5 %
SOLUTION INTRAVENOUS
Status: DISCONTINUED | OUTPATIENT
Start: 2024-11-12 | End: 2024-11-12 | Stop reason: SDUPTHER

## 2024-11-12 RX ORDER — FENTANYL CITRATE 50 UG/ML
50 INJECTION, SOLUTION INTRAMUSCULAR; INTRAVENOUS EVERY 5 MIN PRN
Status: CANCELLED | OUTPATIENT
Start: 2024-11-12

## 2024-11-12 RX ORDER — SODIUM CHLORIDE 0.9 % (FLUSH) 0.9 %
5-40 SYRINGE (ML) INJECTION PRN
Status: CANCELLED | OUTPATIENT
Start: 2024-11-12

## 2024-11-12 RX ADMIN — PROTAMINE SULFATE 25 MG: 10 INJECTION, SOLUTION INTRAVENOUS at 14:03

## 2024-11-12 RX ADMIN — DEXMEDETOMIDINE HYDROCHLORIDE 1.4 MCG/KG/HR: 400 INJECTION INTRAVENOUS at 15:34

## 2024-11-12 RX ADMIN — MORPHINE SULFATE 2 MG: 2 INJECTION, SOLUTION INTRAMUSCULAR; INTRAVENOUS at 08:34

## 2024-11-12 RX ADMIN — EPINEPHRINE 5 MCG: 1 INJECTION, SOLUTION, CONCENTRATE INTRAVENOUS at 12:36

## 2024-11-12 RX ADMIN — NITROGLYCERIN 110 MCG/MIN: 20 INJECTION INTRAVENOUS at 10:00

## 2024-11-12 RX ADMIN — DEXAMETHASONE SODIUM PHOSPHATE 10 MG: 4 INJECTION, SOLUTION INTRAMUSCULAR; INTRAVENOUS at 12:30

## 2024-11-12 RX ADMIN — SODIUM CHLORIDE: 9 INJECTION, SOLUTION INTRAVENOUS at 12:11

## 2024-11-12 RX ADMIN — ROCURONIUM BROMIDE 50 MG: 10 SOLUTION INTRAVENOUS at 12:24

## 2024-11-12 RX ADMIN — ATORVASTATIN CALCIUM 80 MG: 80 TABLET, FILM COATED ORAL at 21:34

## 2024-11-12 RX ADMIN — EPHEDRINE SULFATE 10 MG: 5 INJECTION INTRAVENOUS at 12:33

## 2024-11-12 RX ADMIN — PROPOFOL 50 MG: 10 INJECTION, EMULSION INTRAVENOUS at 12:24

## 2024-11-12 RX ADMIN — FENTANYL CITRATE 100 MCG: 50 INJECTION, SOLUTION INTRAMUSCULAR; INTRAVENOUS at 12:28

## 2024-11-12 RX ADMIN — PHENYLEPHRINE HYDROCHLORIDE 25 MCG/MIN: 10 INJECTION INTRAVENOUS at 12:34

## 2024-11-12 RX ADMIN — DEXMEDETOMIDINE HYDROCHLORIDE 1.4 MCG/KG/HR: 400 INJECTION INTRAVENOUS at 10:30

## 2024-11-12 RX ADMIN — CALCIUM CHLORIDE INJECTION 0.25 G: 100 INJECTION, SOLUTION INTRAVENOUS at 13:49

## 2024-11-12 RX ADMIN — SODIUM BICARBONATE: 84 INJECTION, SOLUTION INTRAVENOUS at 00:51

## 2024-11-12 RX ADMIN — ALBUMIN HUMAN 25 G: 50 SOLUTION INTRAVENOUS at 16:40

## 2024-11-12 RX ADMIN — SUGAMMADEX 200 MG: 100 INJECTION, SOLUTION INTRAVENOUS at 14:31

## 2024-11-12 RX ADMIN — DEXMEDETOMIDINE HYDROCHLORIDE 1.2 MCG/KG/HR: 400 INJECTION INTRAVENOUS at 00:16

## 2024-11-12 RX ADMIN — HYDROMORPHONE HYDROCHLORIDE 1 MG: 1 INJECTION, SOLUTION INTRAMUSCULAR; INTRAVENOUS; SUBCUTANEOUS at 15:42

## 2024-11-12 RX ADMIN — CALCIUM CHLORIDE INJECTION 0.25 G: 100 INJECTION, SOLUTION INTRAVENOUS at 13:53

## 2024-11-12 RX ADMIN — NITROGLYCERIN 40 MCG/MIN: 20 INJECTION INTRAVENOUS at 00:30

## 2024-11-12 RX ADMIN — MORPHINE SULFATE 2 MG: 2 INJECTION, SOLUTION INTRAMUSCULAR; INTRAVENOUS at 20:55

## 2024-11-12 RX ADMIN — HEPARIN SODIUM 1200 UNITS/HR: 10000 INJECTION, SOLUTION INTRAVENOUS at 22:41

## 2024-11-12 RX ADMIN — HEPARIN SODIUM 2000 UNITS: 1000 INJECTION INTRAVENOUS; SUBCUTANEOUS at 13:06

## 2024-11-12 RX ADMIN — NITROGLYCERIN 5 MCG/MIN: 20 INJECTION INTRAVENOUS at 15:33

## 2024-11-12 RX ADMIN — CALCIUM CHLORIDE INJECTION 0.25 G: 100 INJECTION, SOLUTION INTRAVENOUS at 13:56

## 2024-11-12 RX ADMIN — MEROPENEM 1000 MG: 1 INJECTION INTRAVENOUS at 18:06

## 2024-11-12 RX ADMIN — SODIUM NITROPRUSSIDE 0.25 MCG/KG/MIN: 50 INJECTION, SOLUTION INTRAVENOUS at 23:26

## 2024-11-12 RX ADMIN — ALBUMIN (HUMAN) 25 G: 12.5 INJECTION, SOLUTION INTRAVENOUS at 13:06

## 2024-11-12 RX ADMIN — MORPHINE SULFATE 2 MG: 2 INJECTION, SOLUTION INTRAMUSCULAR; INTRAVENOUS at 03:09

## 2024-11-12 RX ADMIN — SODIUM CHLORIDE, PRESERVATIVE FREE 10 ML: 5 INJECTION INTRAVENOUS at 20:56

## 2024-11-12 RX ADMIN — DEXMEDETOMIDINE HYDROCHLORIDE 1.2 MCG/KG/HR: 400 INJECTION INTRAVENOUS at 03:32

## 2024-11-12 RX ADMIN — SODIUM CHLORIDE, PRESERVATIVE FREE 10 ML: 5 INJECTION INTRAVENOUS at 22:29

## 2024-11-12 RX ADMIN — FUROSEMIDE 40 MG: 10 INJECTION, SOLUTION INTRAMUSCULAR; INTRAVENOUS at 22:56

## 2024-11-12 RX ADMIN — POTASSIUM PHOSPHATE, MONOBASIC POTASSIUM PHOSPHATE, DIBASIC 20 MMOL: 224; 236 INJECTION, SOLUTION, CONCENTRATE INTRAVENOUS at 09:54

## 2024-11-12 RX ADMIN — ONDANSETRON 4 MG: 2 INJECTION INTRAMUSCULAR; INTRAVENOUS at 14:13

## 2024-11-12 RX ADMIN — SODIUM CHLORIDE: 9 INJECTION, SOLUTION INTRAVENOUS at 10:22

## 2024-11-12 RX ADMIN — DEXMEDETOMIDINE HYDROCHLORIDE 1.4 MCG/KG/HR: 400 INJECTION INTRAVENOUS at 07:06

## 2024-11-12 RX ADMIN — IOPAMIDOL 75 ML: 755 INJECTION, SOLUTION INTRAVENOUS at 18:37

## 2024-11-12 RX ADMIN — HEPARIN SODIUM 1000 UNITS: 1000 INJECTION INTRAVENOUS; SUBCUTANEOUS at 23:13

## 2024-11-12 RX ADMIN — Medication 30 MG: at 12:24

## 2024-11-12 RX ADMIN — NITROGLYCERIN 140 MCG/MIN: 20 INJECTION INTRAVENOUS at 23:42

## 2024-11-12 RX ADMIN — HYDROMORPHONE HYDROCHLORIDE 1 MG: 1 INJECTION, SOLUTION INTRAMUSCULAR; INTRAVENOUS; SUBCUTANEOUS at 19:26

## 2024-11-12 RX ADMIN — SODIUM CHLORIDE, PRESERVATIVE FREE 10 ML: 5 INJECTION INTRAVENOUS at 07:39

## 2024-11-12 RX ADMIN — HYDROMORPHONE HYDROCHLORIDE 1 MG: 1 INJECTION, SOLUTION INTRAMUSCULAR; INTRAVENOUS; SUBCUTANEOUS at 05:34

## 2024-11-12 RX ADMIN — FUROSEMIDE 40 MG: 10 INJECTION, SOLUTION INTRAMUSCULAR; INTRAVENOUS at 09:15

## 2024-11-12 RX ADMIN — SUGAMMADEX 200 MG: 100 INJECTION, SOLUTION INTRAVENOUS at 14:20

## 2024-11-12 RX ADMIN — MEROPENEM 1000 MG: 1 INJECTION INTRAVENOUS at 10:24

## 2024-11-12 RX ADMIN — PROPOFOL 40 MG: 10 INJECTION, EMULSION INTRAVENOUS at 12:27

## 2024-11-12 RX ADMIN — ALBUMIN (HUMAN) 25 G: 12.5 INJECTION, SOLUTION INTRAVENOUS at 16:40

## 2024-11-12 RX ADMIN — EPTIFIBATIDE 2 MCG/KG/MIN: 0.75 INJECTION INTRAVENOUS at 00:45

## 2024-11-12 RX ADMIN — HYDROMORPHONE HYDROCHLORIDE 1 MG: 1 INJECTION, SOLUTION INTRAMUSCULAR; INTRAVENOUS; SUBCUTANEOUS at 22:29

## 2024-11-12 RX ADMIN — CALCIUM GLUCONATE 1000 MG: 20 INJECTION, SOLUTION INTRAVENOUS at 10:52

## 2024-11-12 RX ADMIN — SODIUM CHLORIDE, PRESERVATIVE FREE 10 ML: 5 INJECTION INTRAVENOUS at 22:56

## 2024-11-12 RX ADMIN — LIDOCAINE HYDROCHLORIDE 50 MG: 20 INJECTION, SOLUTION EPIDURAL; INFILTRATION; INTRACAUDAL; PERINEURAL at 12:24

## 2024-11-12 RX ADMIN — ASPIRIN 81 MG: 81 TABLET, CHEWABLE ORAL at 08:49

## 2024-11-12 RX ADMIN — MEROPENEM 1000 MG: 1 INJECTION INTRAVENOUS at 01:48

## 2024-11-12 RX ADMIN — SODIUM BICARBONATE: 84 INJECTION, SOLUTION INTRAVENOUS at 22:45

## 2024-11-12 RX ADMIN — SODIUM CHLORIDE 500 ML: 9 INJECTION, SOLUTION INTRAVENOUS at 11:49

## 2024-11-12 RX ADMIN — SODIUM CHLORIDE: 9 INJECTION, SOLUTION INTRAVENOUS at 04:21

## 2024-11-12 RX ADMIN — HEPARIN SODIUM 5000 UNITS: 1000 INJECTION INTRAVENOUS; SUBCUTANEOUS at 12:58

## 2024-11-12 RX ADMIN — MORPHINE SULFATE 2 MG: 2 INJECTION, SOLUTION INTRAMUSCULAR; INTRAVENOUS at 17:44

## 2024-11-12 RX ADMIN — CALCIUM CHLORIDE INJECTION 0.25 G: 100 INJECTION, SOLUTION INTRAVENOUS at 14:05

## 2024-11-12 RX ADMIN — HYDROXYZINE HYDROCHLORIDE 25 MG: 10 TABLET ORAL at 21:34

## 2024-11-12 RX ADMIN — MORPHINE SULFATE 2 MG: 2 INJECTION, SOLUTION INTRAMUSCULAR; INTRAVENOUS at 01:07

## 2024-11-12 RX ADMIN — MAGNESIUM SULFATE HEPTAHYDRATE 2000 MG: 40 INJECTION, SOLUTION INTRAVENOUS at 08:47

## 2024-11-12 ASSESSMENT — PAIN DESCRIPTION - PAIN TYPE
TYPE: SURGICAL PAIN
TYPE: ACUTE PAIN
TYPE: SURGICAL PAIN

## 2024-11-12 ASSESSMENT — PAIN DESCRIPTION - FREQUENCY
FREQUENCY: CONTINUOUS
FREQUENCY: CONTINUOUS

## 2024-11-12 ASSESSMENT — PAIN DESCRIPTION - ORIENTATION
ORIENTATION: RIGHT
ORIENTATION: RIGHT
ORIENTATION: MID;LOWER
ORIENTATION: RIGHT;UPPER
ORIENTATION: RIGHT
ORIENTATION: RIGHT;ANTERIOR;LEFT

## 2024-11-12 ASSESSMENT — PAIN SCALES - GENERAL
PAINLEVEL_OUTOF10: 7
PAINLEVEL_OUTOF10: 0
PAINLEVEL_OUTOF10: 0
PAINLEVEL_OUTOF10: 10
PAINLEVEL_OUTOF10: 0
PAINLEVEL_OUTOF10: 0
PAINLEVEL_OUTOF10: 8
PAINLEVEL_OUTOF10: 7
PAINLEVEL_OUTOF10: 10
PAINLEVEL_OUTOF10: 0
PAINLEVEL_OUTOF10: 0
PAINLEVEL_OUTOF10: 6
PAINLEVEL_OUTOF10: 9
PAINLEVEL_OUTOF10: 9
PAINLEVEL_OUTOF10: 10
PAINLEVEL_OUTOF10: 7
PAINLEVEL_OUTOF10: 8
PAINLEVEL_OUTOF10: 8

## 2024-11-12 ASSESSMENT — PAIN DESCRIPTION - DESCRIPTORS
DESCRIPTORS: STABBING
DESCRIPTORS: ACHING;DISCOMFORT
DESCRIPTORS: ACHING
DESCRIPTORS: DISCOMFORT
DESCRIPTORS: STABBING
DESCRIPTORS: ACHING
DESCRIPTORS: ACHING
DESCRIPTORS: STABBING
DESCRIPTORS: ACHING
DESCRIPTORS: STABBING
DESCRIPTORS: ACHING;DISCOMFORT
DESCRIPTORS: ACHING
DESCRIPTORS: STABBING

## 2024-11-12 ASSESSMENT — PAIN - FUNCTIONAL ASSESSMENT
PAIN_FUNCTIONAL_ASSESSMENT: PREVENTS OR INTERFERES SOME ACTIVE ACTIVITIES AND ADLS
PAIN_FUNCTIONAL_ASSESSMENT: ACTIVITIES ARE NOT PREVENTED
PAIN_FUNCTIONAL_ASSESSMENT: PREVENTS OR INTERFERES SOME ACTIVE ACTIVITIES AND ADLS

## 2024-11-12 ASSESSMENT — PAIN DESCRIPTION - LOCATION
LOCATION: SHOULDER
LOCATION: SHOULDER
LOCATION: RIB CAGE;SACRUM
LOCATION: CHEST;BACK;HIP
LOCATION: RIB CAGE;SACRUM
LOCATION: SHOULDER
LOCATION: ARM;CHEST
LOCATION: SHOULDER
LOCATION: RIB CAGE;BACK
LOCATION: HIP
LOCATION: SHOULDER

## 2024-11-12 ASSESSMENT — PAIN DESCRIPTION - ONSET
ONSET: ON-GOING
ONSET: ON-GOING

## 2024-11-12 ASSESSMENT — ENCOUNTER SYMPTOMS: SHORTNESS OF BREATH: 1

## 2024-11-12 NOTE — FLOWSHEET NOTE
Pt returned from [facility] at 1500. RU axillary site slightly oozy. Pressure dressing in place. Site is soft. ECHO placement verified at bedside. Dr. Rodríguez updated. Labs sent.    11/12/24 1542   [place]-Yaneli   PAP (Systolic/Diastolic) 36/14   PAP (Mean) 23 mmHg   CVP (Mean) 10 mmHg   MAP (Monitor) 83   PAP Wave Form Appropriate wave forms   CVP (mmHg) 10 mmHg   SVO2 (%) 52 %   CO (l/min) 4.4 l/min   CCO 4.4 L/Min   CI (l/min/m2) 2.2 l/min/m2   SVR (Using ABP Mean) 1327.27 dyne*sec/cm5     NTG 5 mcg/min.

## 2024-11-12 NOTE — FLOWSHEET NOTE
11/12/24 0600   [place]-Yaneli   PAP (Systolic/Diastolic) 28/11   PAP (Mean) 18 mmHg   CVP (Mean) 7 mmHg   PAP Wave Form Appropriate wave forms   SVO2 (%) 33.9 %   CO (l/min) 3.8 l/min   CCO 3.8 L/Min   CI (l/min/m2) 1.9 l/min/m2   SVR (Using ABP Mean) 1347.37 dyne*sec/cm5     Impella @ P-6. Nitro @ 100mcg, Heparin @ 1200units, NS @ 50ml  /59 (71). Second unit PRBCs infusing at this time. Dr. Rodríguez notified.

## 2024-11-12 NOTE — FLOWSHEET NOTE
11/12/24 0944   [place]-Yaneli   PAP (Systolic/Diastolic) 28/11   PAP (Mean) 18 mmHg   CVP (Mean) 6 mmHg   MAP (Monitor) 77   PAP Wave Form Appropriate wave forms   CVP (mmHg) 6 mmHg   SVO2 (%) 40.9 %   CO (l/min) 4.4 l/min   CCO 4.4 L/Min   CI (l/min/m2) 2.2 l/min/m2   SVR (Using ABP Mean) 1290.91 dyne*sec/cm5     3rd unit of RBC's infusing. One time dose of Lasix 40 mg IVP given. NTG gtt @ 110 mcg/min.

## 2024-11-12 NOTE — ANESTHESIA PRE PROCEDURE
Drug/Infectious Status (If Applicable):  No results found for: \"HIV\", \"HEPCAB\"    COVID-19 Screening (If Applicable): No results found for: \"COVID19\"        Anesthesia Evaluation  Patient summary reviewed and Nursing notes reviewed   no history of anesthetic complications:   Airway: Mallampati: II  TM distance: >3 FB   Neck ROM: full  Mouth opening: > = 3 FB   Dental:    (+) edentulous      Pulmonary:   (+) pneumonia:   shortness of breath:   sleep apnea:   decreased breath sounds        (-) COPD and recent URI                           Cardiovascular:  Exercise tolerance: poor (<4 METS)  (+) hypertension:, past MI:, CAD:, dysrhythmias (V tach arrest):, MANCUSO:    (-) pacemaker and no pulmonary hypertension        Rate: normal                 ROS comment: V tach arrest, intubated in field, stent placed, now with impella. Low EF    PE comment: Having a lot of ectopy   Neuro/Psych:   (+) psychiatric history:   (-) depression/anxiety            GI/Hepatic/Renal:             Endo/Other:    (+) blood dyscrasia (received 3 units PRBC today): anemia:..                 Abdominal:             Vascular:          Other Findings:   Right arm PIV   Left arm PICC   Left groin with impella/SWAN  A-line left radial  6L Nasal cannula  Nitroglycerin 100mcg/min  Heparin gtt  Precedex gtt            Anesthesia Plan      general     ASA 4       Induction: intravenous.    MIPS: Postoperative opioids intended, Prophylactic antiemetics administered and Postoperative trial extubation.  Anesthetic plan and risks discussed with patient.    Use of blood products discussed with patient whom consented to blood products.    Plan discussed with CRNA.                Saad Lyle MD   11/12/2024

## 2024-11-12 NOTE — FLOWSHEET NOTE
11/12/24 0000   [place]-Yaneli   PAP (Systolic/Diastolic) 35/13   PAP (Mean) 22 mmHg   CVP (Mean) 10 mmHg   PAP Wave Form Appropriate wave forms   SVO2 (%) 25.4 %   CO (l/min) 3.4 l/min   CCO 3.4 L/Min   CI (l/min/m2) 1.7 l/min/m2   SVR (Using NBP Mean) 1294.12 dyne*sec/cm5   SVR (Using ABP Mean) 1294.12 dyne*sec/cm5     Impella @ P-6. Nitro @ 40mcg. Heparin @ 1200units. Integrilin @ 2mcg, Precedex @ 1.2mcg. Dr. Rodríguez notified. New orders received.

## 2024-11-13 ENCOUNTER — APPOINTMENT (OUTPATIENT)
Dept: CT IMAGING | Age: 74
DRG: 001 | End: 2024-11-13
Payer: MEDICARE

## 2024-11-13 ENCOUNTER — APPOINTMENT (OUTPATIENT)
Dept: GENERAL RADIOLOGY | Age: 74
DRG: 001 | End: 2024-11-13
Payer: MEDICARE

## 2024-11-13 ENCOUNTER — APPOINTMENT (OUTPATIENT)
Age: 74
DRG: 001 | End: 2024-11-13
Attending: INTERNAL MEDICINE
Payer: MEDICARE

## 2024-11-13 LAB
ANION GAP SERPL CALCULATED.3IONS-SCNC: 15 MMOL/L (ref 3–16)
ANION GAP SERPL CALCULATED.3IONS-SCNC: 17 MMOL/L (ref 3–16)
BASE EXCESS BLDA CALC-SCNC: 3 MMOL/L (ref -3–3)
BASE EXCESS BLDA CALC-SCNC: 4 MMOL/L (ref -3–3)
BASE EXCESS BLDA CALC-SCNC: 5 MMOL/L (ref -3–3)
BASE EXCESS BLDA CALC-SCNC: 8 MMOL/L (ref -3–3)
BASE EXCESS MIXED: 6
BASE EXCESS MIXED: 6
BASE EXCESS MIXED: 8
BASOPHILS # BLD: 0 K/UL (ref 0–0.2)
BASOPHILS NFR BLD: 0.1 %
BUN SERPL-MCNC: 12 MG/DL (ref 7–20)
BUN SERPL-MCNC: 12 MG/DL (ref 7–20)
CALCIUM SERPL-MCNC: 8.4 MG/DL (ref 8.3–10.6)
CALCIUM SERPL-MCNC: 8.6 MG/DL (ref 8.3–10.6)
CHLORIDE SERPL-SCNC: 113 MMOL/L (ref 99–110)
CHLORIDE SERPL-SCNC: 99 MMOL/L (ref 99–110)
CO2 BLDA-SCNC: 28 MMOL/L
CO2 BLDA-SCNC: 28 MMOL/L
CO2 BLDA-SCNC: 30 MMOL/L
CO2 BLDA-SCNC: 32 MMOL/L
CO2 SERPL-SCNC: 23 MMOL/L (ref 21–32)
CO2 SERPL-SCNC: 26 MMOL/L (ref 21–32)
CREAT SERPL-MCNC: 1 MG/DL (ref 0.8–1.3)
CREAT SERPL-MCNC: 1 MG/DL (ref 0.8–1.3)
DEPRECATED RDW RBC AUTO: 14.6 % (ref 12.4–15.4)
DEPRECATED RDW RBC AUTO: 15 % (ref 12.4–15.4)
ECHO BSA: 1.97 M2
ECHO LV EF PHYSICIAN: 35 %
EOSINOPHIL # BLD: 0 K/UL (ref 0–0.6)
EOSINOPHIL NFR BLD: 0.1 %
GFR SERPLBLD CREATININE-BSD FMLA CKD-EPI: 79 ML/MIN/{1.73_M2}
GFR SERPLBLD CREATININE-BSD FMLA CKD-EPI: 79 ML/MIN/{1.73_M2}
GLUCOSE BLD-MCNC: 113 MG/DL (ref 70–99)
GLUCOSE BLD-MCNC: 119 MG/DL (ref 70–99)
GLUCOSE BLD-MCNC: 132 MG/DL (ref 70–99)
GLUCOSE BLD-MCNC: 147 MG/DL (ref 70–99)
GLUCOSE BLD-MCNC: 148 MG/DL (ref 70–99)
GLUCOSE BLD-MCNC: 153 MG/DL (ref 70–99)
GLUCOSE BLD-MCNC: 154 MG/DL (ref 70–99)
GLUCOSE SERPL-MCNC: 132 MG/DL (ref 70–99)
GLUCOSE SERPL-MCNC: 136 MG/DL (ref 70–99)
HCO3 BLDA-SCNC: 26.5 MMOL/L (ref 21–29)
HCO3 BLDA-SCNC: 27 MMOL/L (ref 21–29)
HCO3 BLDA-SCNC: 28.5 MMOL/L (ref 21–29)
HCO3 BLDA-SCNC: 30.7 MMOL/L (ref 21–29)
HCO3, MIXED: 29.5 MMOL/L
HCO3, MIXED: 29.8 MMOL/L
HCO3, MIXED: 31.6 MMOL/L
HCT VFR BLD AUTO: 24.1 % (ref 40.5–52.5)
HCT VFR BLD AUTO: 25.8 % (ref 40.5–52.5)
HGB BLD-MCNC: 8.2 G/DL (ref 13.5–17.5)
HGB BLD-MCNC: 8.8 G/DL (ref 13.5–17.5)
LACTATE BLDV-SCNC: 1.3 MMOL/L (ref 0.4–2)
LYMPHOCYTES # BLD: 1 K/UL (ref 1–5.1)
LYMPHOCYTES NFR BLD: 11.6 %
MAGNESIUM SERPL-MCNC: 2.05 MG/DL (ref 1.8–2.4)
MAGNESIUM SERPL-MCNC: 2.07 MG/DL (ref 1.8–2.4)
MCH RBC QN AUTO: 28.1 PG (ref 26–34)
MCH RBC QN AUTO: 28.2 PG (ref 26–34)
MCHC RBC AUTO-ENTMCNC: 33.9 G/DL (ref 31–36)
MCHC RBC AUTO-ENTMCNC: 34 G/DL (ref 31–36)
MCV RBC AUTO: 82.9 FL (ref 80–100)
MCV RBC AUTO: 82.9 FL (ref 80–100)
MONOCYTES # BLD: 0.8 K/UL (ref 0–1.3)
MONOCYTES NFR BLD: 9.5 %
NEUTROPHILS # BLD: 6.8 K/UL (ref 1.7–7.7)
NEUTROPHILS NFR BLD: 78.7 %
NT-PROBNP SERPL-MCNC: ABNORMAL PG/ML (ref 0–449)
O2 SAT, MIXED: 53 %
O2 SAT, MIXED: 58 %
O2 SAT, MIXED: 58 %
PCO2 BLDA: 33.2 MM HG (ref 35–45)
PCO2 BLDA: 34.1 MM HG (ref 35–45)
PCO2 BLDA: 35.9 MM HG (ref 35–45)
PCO2 BLDA: 38.6 MM HG (ref 35–45)
PCO2 MIXED: 40.3 MM HG
PCO2 MIXED: 41.9 MM HG
PCO2 MIXED: 42.3 MM HG
PERFORMED ON: ABNORMAL
PH BLDA: 7.5 [PH] (ref 7.35–7.45)
PH BLDA: 7.51 [PH] (ref 7.35–7.45)
PH BLDA: 7.51 [PH] (ref 7.35–7.45)
PH BLDA: 7.52 [PH] (ref 7.35–7.45)
PH, MIXED: 7.45 (ref 7.35–7.45)
PH, MIXED: 7.48 (ref 7.35–7.45)
PH, MIXED: 7.49 (ref 7.35–7.45)
PHOSPHATE SERPL-MCNC: 3.3 MG/DL (ref 2.5–4.9)
PHOSPHATE SERPL-MCNC: 3.6 MG/DL (ref 2.5–4.9)
PLATELET # BLD AUTO: 151 K/UL (ref 135–450)
PLATELET # BLD AUTO: 167 K/UL (ref 135–450)
PMV BLD AUTO: 7 FL (ref 5–10.5)
PMV BLD AUTO: 7.2 FL (ref 5–10.5)
PO2 BLDA: 47.5 MM HG (ref 75–108)
PO2 BLDA: 50.5 MM HG (ref 75–108)
PO2 BLDA: 56.2 MM HG (ref 75–108)
PO2 BLDA: 66.6 MM HG (ref 75–108)
PO2 MIXED: 26 MM HG
PO2 MIXED: 28 MM HG
PO2 MIXED: 29 MM HG
POC ACT LR: 142 SEC
POC ACT LR: 145 SEC
POC ACT LR: 146 SEC
POC ACT LR: 147 SEC
POC ACT LR: 149 SEC
POC ACT LR: 149 SEC
POC ACT LR: 151 SEC
POC ACT LR: 155 SEC
POC ACT LR: 157 SEC
POC ACT LR: 159 SEC
POC ACT LR: 159 SEC
POC ACT LR: 162 SEC
POC ACT LR: 163 SEC
POC ACT LR: 165 SEC
POC ACT LR: 165 SEC
POC ACT LR: 166 SEC
POC ACT LR: 167 SEC
POC ACT LR: 168 SEC
POC ACT LR: 169 SEC
POC ACT LR: 169 SEC
POC ACT LR: 170 SEC
POC ACT LR: 171 SEC
POC ACT LR: 172 SEC
POC ACT LR: 173 SEC
POC ACT LR: 174 SEC
POC ACT LR: 174 SEC
POC ACT LR: 175 SEC
POC ACT LR: 175 SEC
POC ACT LR: 176 SEC
POC ACT LR: 177 SEC
POC ACT LR: 178 SEC
POC ACT LR: 179 SEC
POC ACT LR: 181 SEC
POC ACT LR: 181 SEC
POC SAMPLE TYPE: ABNORMAL
POTASSIUM SERPL-SCNC: 3.3 MMOL/L (ref 3.5–5.1)
POTASSIUM SERPL-SCNC: 3.4 MMOL/L (ref 3.5–5.1)
RBC # BLD AUTO: 2.91 M/UL (ref 4.2–5.9)
RBC # BLD AUTO: 3.11 M/UL (ref 4.2–5.9)
SAO2 % BLDA: 88 % (ref 93–100)
SAO2 % BLDA: 89 % (ref 93–100)
SAO2 % BLDA: 92 % (ref 93–100)
SAO2 % BLDA: 95 % (ref 93–100)
SODIUM SERPL-SCNC: 140 MMOL/L (ref 136–145)
SODIUM SERPL-SCNC: 153 MMOL/L (ref 136–145)
TCO2 CALC MIXED: 31 MMOL/L
TCO2 CALC MIXED: 31 MMOL/L
TCO2 CALC MIXED: 33 MMOL/L
WBC # BLD AUTO: 7.5 K/UL (ref 4–11)
WBC # BLD AUTO: 8.6 K/UL (ref 4–11)

## 2024-11-13 PROCEDURE — 85025 COMPLETE CBC W/AUTO DIFF WBC: CPT

## 2024-11-13 PROCEDURE — 2580000003 HC RX 258: Performed by: NURSE PRACTITIONER

## 2024-11-13 PROCEDURE — APPNB15 APP NON BILLABLE TIME 0-15 MINS: Performed by: NURSE PRACTITIONER

## 2024-11-13 PROCEDURE — 6370000000 HC RX 637 (ALT 250 FOR IP): Performed by: INTERNAL MEDICINE

## 2024-11-13 PROCEDURE — 2100000000 HC CCU R&B

## 2024-11-13 PROCEDURE — 71045 X-RAY EXAM CHEST 1 VIEW: CPT

## 2024-11-13 PROCEDURE — 99233 SBSQ HOSP IP/OBS HIGH 50: CPT | Performed by: INTERNAL MEDICINE

## 2024-11-13 PROCEDURE — 85347 COAGULATION TIME ACTIVATED: CPT

## 2024-11-13 PROCEDURE — 84100 ASSAY OF PHOSPHORUS: CPT

## 2024-11-13 PROCEDURE — 2700000000 HC OXYGEN THERAPY PER DAY

## 2024-11-13 PROCEDURE — 6360000002 HC RX W HCPCS

## 2024-11-13 PROCEDURE — 6360000002 HC RX W HCPCS: Performed by: INTERNAL MEDICINE

## 2024-11-13 PROCEDURE — 71250 CT THORAX DX C-: CPT

## 2024-11-13 PROCEDURE — 99291 CRITICAL CARE FIRST HOUR: CPT | Performed by: INTERNAL MEDICINE

## 2024-11-13 PROCEDURE — 51702 INSERT TEMP BLADDER CATH: CPT

## 2024-11-13 PROCEDURE — 83880 ASSAY OF NATRIURETIC PEPTIDE: CPT

## 2024-11-13 PROCEDURE — 82803 BLOOD GASES ANY COMBINATION: CPT

## 2024-11-13 PROCEDURE — 6360000004 HC RX CONTRAST MEDICATION

## 2024-11-13 PROCEDURE — 74018 RADEX ABDOMEN 1 VIEW: CPT

## 2024-11-13 PROCEDURE — 80048 BASIC METABOLIC PNL TOTAL CA: CPT

## 2024-11-13 PROCEDURE — 37799 UNLISTED PX VASCULAR SURGERY: CPT

## 2024-11-13 PROCEDURE — 6360000002 HC RX W HCPCS: Performed by: NURSE PRACTITIONER

## 2024-11-13 PROCEDURE — 83605 ASSAY OF LACTIC ACID: CPT

## 2024-11-13 PROCEDURE — 36620 INSERTION CATHETER ARTERY: CPT

## 2024-11-13 PROCEDURE — C8924 2D TTE W OR W/O FOL W/CON,FU: HCPCS

## 2024-11-13 PROCEDURE — 5A0945A ASSISTANCE WITH RESPIRATORY VENTILATION, 24-96 CONSECUTIVE HOURS, HIGH NASAL FLOW/VELOCITY: ICD-10-PCS | Performed by: INTERNAL MEDICINE

## 2024-11-13 PROCEDURE — 99231 SBSQ HOSP IP/OBS SF/LOW 25: CPT | Performed by: SURGERY

## 2024-11-13 PROCEDURE — 94761 N-INVAS EAR/PLS OXIMETRY MLT: CPT

## 2024-11-13 PROCEDURE — 6370000000 HC RX 637 (ALT 250 FOR IP): Performed by: NURSE PRACTITIONER

## 2024-11-13 PROCEDURE — 2580000003 HC RX 258: Performed by: INTERNAL MEDICINE

## 2024-11-13 PROCEDURE — 2500000003 HC RX 250 WO HCPCS: Performed by: INTERNAL MEDICINE

## 2024-11-13 PROCEDURE — 83735 ASSAY OF MAGNESIUM: CPT

## 2024-11-13 PROCEDURE — 2580000003 HC RX 258: Performed by: ANESTHESIOLOGY

## 2024-11-13 PROCEDURE — 93308 TTE F-UP OR LMTD: CPT | Performed by: INTERNAL MEDICINE

## 2024-11-13 PROCEDURE — 85027 COMPLETE CBC AUTOMATED: CPT

## 2024-11-13 RX ORDER — FUROSEMIDE 10 MG/ML
INJECTION INTRAMUSCULAR; INTRAVENOUS
Status: COMPLETED
Start: 2024-11-13 | End: 2024-11-13

## 2024-11-13 RX ORDER — LORAZEPAM 2 MG/ML
1 INJECTION INTRAMUSCULAR ONCE
Status: COMPLETED | OUTPATIENT
Start: 2024-11-13 | End: 2024-11-13

## 2024-11-13 RX ORDER — BISACODYL 10 MG
10 SUPPOSITORY, RECTAL RECTAL ONCE
Status: COMPLETED | OUTPATIENT
Start: 2024-11-13 | End: 2024-11-13

## 2024-11-13 RX ORDER — DEXMEDETOMIDINE HYDROCHLORIDE 4 UG/ML
.1-1.5 INJECTION, SOLUTION INTRAVENOUS CONTINUOUS
Status: DISCONTINUED | OUTPATIENT
Start: 2024-11-13 | End: 2024-11-21 | Stop reason: HOSPADM

## 2024-11-13 RX ORDER — ONDANSETRON 2 MG/ML
4 INJECTION INTRAMUSCULAR; INTRAVENOUS EVERY 6 HOURS PRN
Status: DISCONTINUED | OUTPATIENT
Start: 2024-11-13 | End: 2024-11-21 | Stop reason: HOSPADM

## 2024-11-13 RX ORDER — LORAZEPAM 1 MG/1
1 TABLET ORAL ONCE
Status: COMPLETED | OUTPATIENT
Start: 2024-11-13 | End: 2024-11-13

## 2024-11-13 RX ORDER — FUROSEMIDE 10 MG/ML
40 INJECTION INTRAMUSCULAR; INTRAVENOUS ONCE
Status: COMPLETED | OUTPATIENT
Start: 2024-11-13 | End: 2024-11-13

## 2024-11-13 RX ORDER — 0.9 % SODIUM CHLORIDE 0.9 %
500 INTRAVENOUS SOLUTION INTRAVENOUS ONCE
Status: COMPLETED | OUTPATIENT
Start: 2024-11-13 | End: 2024-11-13

## 2024-11-13 RX ORDER — METOPROLOL SUCCINATE 50 MG/1
50 TABLET, EXTENDED RELEASE ORAL DAILY
Status: DISCONTINUED | OUTPATIENT
Start: 2024-11-13 | End: 2024-11-14 | Stop reason: ALTCHOICE

## 2024-11-13 RX ADMIN — Medication 2 CAPSULE: at 17:18

## 2024-11-13 RX ADMIN — Medication 2 CAPSULE: at 08:03

## 2024-11-13 RX ADMIN — OXYCODONE 5 MG: 5 TABLET ORAL at 08:03

## 2024-11-13 RX ADMIN — HYDROMORPHONE HYDROCHLORIDE 1 MG: 1 INJECTION, SOLUTION INTRAMUSCULAR; INTRAVENOUS; SUBCUTANEOUS at 01:29

## 2024-11-13 RX ADMIN — OXYCODONE 5 MG: 5 TABLET ORAL at 20:15

## 2024-11-13 RX ADMIN — CEFEPIME 2000 MG: 2 INJECTION, POWDER, FOR SOLUTION INTRAVENOUS at 09:49

## 2024-11-13 RX ADMIN — MORPHINE SULFATE 2 MG: 2 INJECTION, SOLUTION INTRAMUSCULAR; INTRAVENOUS at 13:03

## 2024-11-13 RX ADMIN — MORPHINE SULFATE 2 MG: 2 INJECTION, SOLUTION INTRAMUSCULAR; INTRAVENOUS at 08:03

## 2024-11-13 RX ADMIN — POTASSIUM CHLORIDE 20 MEQ: 29.8 INJECTION, SOLUTION INTRAVENOUS at 06:34

## 2024-11-13 RX ADMIN — SODIUM CHLORIDE, PRESERVATIVE FREE 10 ML: 5 INJECTION INTRAVENOUS at 02:33

## 2024-11-13 RX ADMIN — SODIUM CHLORIDE, PRESERVATIVE FREE 10 ML: 5 INJECTION INTRAVENOUS at 01:29

## 2024-11-13 RX ADMIN — BISACODYL 10 MG: 10 SUPPOSITORY RECTAL at 13:07

## 2024-11-13 RX ADMIN — DEXMEDETOMIDINE HYDROCHLORIDE 0.2 MCG/KG/HR: 400 INJECTION INTRAVENOUS at 09:48

## 2024-11-13 RX ADMIN — LORAZEPAM 1 MG: 1 TABLET ORAL at 22:47

## 2024-11-13 RX ADMIN — SODIUM CHLORIDE, PRESERVATIVE FREE 10 ML: 5 INJECTION INTRAVENOUS at 05:22

## 2024-11-13 RX ADMIN — FUROSEMIDE 40 MG: 10 INJECTION INTRAMUSCULAR; INTRAVENOUS at 07:09

## 2024-11-13 RX ADMIN — HYDROMORPHONE HYDROCHLORIDE 1 MG: 1 INJECTION, SOLUTION INTRAMUSCULAR; INTRAVENOUS; SUBCUTANEOUS at 08:27

## 2024-11-13 RX ADMIN — HYDROMORPHONE HYDROCHLORIDE 1 MG: 1 INJECTION, SOLUTION INTRAMUSCULAR; INTRAVENOUS; SUBCUTANEOUS at 11:29

## 2024-11-13 RX ADMIN — POTASSIUM CHLORIDE 20 MEQ: 29.8 INJECTION, SOLUTION INTRAVENOUS at 13:32

## 2024-11-13 RX ADMIN — OXYCODONE 5 MG: 5 TABLET ORAL at 04:09

## 2024-11-13 RX ADMIN — DEXMEDETOMIDINE HYDROCHLORIDE 0.2 MCG/KG/HR: 400 INJECTION INTRAVENOUS at 20:11

## 2024-11-13 RX ADMIN — HYDROMORPHONE HYDROCHLORIDE 1 MG: 1 INJECTION, SOLUTION INTRAMUSCULAR; INTRAVENOUS; SUBCUTANEOUS at 21:15

## 2024-11-13 RX ADMIN — METOPROLOL SUCCINATE 50 MG: 50 TABLET, EXTENDED RELEASE ORAL at 08:04

## 2024-11-13 RX ADMIN — HYDROMORPHONE HYDROCHLORIDE 1 MG: 1 INJECTION, SOLUTION INTRAMUSCULAR; INTRAVENOUS; SUBCUTANEOUS at 05:22

## 2024-11-13 RX ADMIN — POTASSIUM CHLORIDE 20 MEQ: 29.8 INJECTION, SOLUTION INTRAVENOUS at 05:32

## 2024-11-13 RX ADMIN — MORPHINE SULFATE 2 MG: 2 INJECTION, SOLUTION INTRAMUSCULAR; INTRAVENOUS at 02:33

## 2024-11-13 RX ADMIN — HEPARIN SODIUM 1300 UNITS/HR: 10000 INJECTION, SOLUTION INTRAVENOUS at 17:24

## 2024-11-13 RX ADMIN — HYDROMORPHONE HYDROCHLORIDE 1 MG: 1 INJECTION, SOLUTION INTRAMUSCULAR; INTRAVENOUS; SUBCUTANEOUS at 18:14

## 2024-11-13 RX ADMIN — TICAGRELOR 90 MG: 90 TABLET ORAL at 08:03

## 2024-11-13 RX ADMIN — SACUBITRIL AND VALSARTAN 1 TABLET: 24; 26 TABLET, FILM COATED ORAL at 20:15

## 2024-11-13 RX ADMIN — SODIUM NITROPRUSSIDE 0.5 MCG/KG/MIN: 50 INJECTION, SOLUTION INTRAVENOUS at 13:06

## 2024-11-13 RX ADMIN — SODIUM BICARBONATE: 84 INJECTION, SOLUTION INTRAVENOUS at 22:57

## 2024-11-13 RX ADMIN — ASPIRIN 81 MG: 81 TABLET, CHEWABLE ORAL at 08:04

## 2024-11-13 RX ADMIN — TICAGRELOR 90 MG: 90 TABLET ORAL at 20:15

## 2024-11-13 RX ADMIN — SODIUM CHLORIDE 500 ML: 9 INJECTION, SOLUTION INTRAVENOUS at 14:04

## 2024-11-13 RX ADMIN — SODIUM CHLORIDE, PRESERVATIVE FREE 10 ML: 5 INJECTION INTRAVENOUS at 20:15

## 2024-11-13 RX ADMIN — SODIUM CHLORIDE, PRESERVATIVE FREE 10 ML: 5 INJECTION INTRAVENOUS at 07:09

## 2024-11-13 RX ADMIN — SACUBITRIL AND VALSARTAN 1 TABLET: 24; 26 TABLET, FILM COATED ORAL at 08:03

## 2024-11-13 RX ADMIN — OXYCODONE 5 MG: 5 TABLET ORAL at 00:13

## 2024-11-13 RX ADMIN — PERFLUTREN 2 ML: 6.52 INJECTION, SUSPENSION INTRAVENOUS at 15:39

## 2024-11-13 RX ADMIN — CEFEPIME 2000 MG: 2 INJECTION, POWDER, FOR SOLUTION INTRAVENOUS at 17:17

## 2024-11-13 RX ADMIN — LORAZEPAM 1 MG: 2 INJECTION INTRAMUSCULAR; INTRAVENOUS at 23:27

## 2024-11-13 RX ADMIN — POTASSIUM CHLORIDE 20 MEQ: 29.8 INJECTION, SOLUTION INTRAVENOUS at 12:28

## 2024-11-13 RX ADMIN — ATORVASTATIN CALCIUM 80 MG: 80 TABLET, FILM COATED ORAL at 20:15

## 2024-11-13 RX ADMIN — NITROGLYCERIN 100 MCG/MIN: 20 INJECTION INTRAVENOUS at 06:12

## 2024-11-13 RX ADMIN — SODIUM NITROPRUSSIDE 2 MCG/KG/MIN: 50 INJECTION, SOLUTION INTRAVENOUS at 06:16

## 2024-11-13 RX ADMIN — HYDROMORPHONE HYDROCHLORIDE 1 MG: 1 INJECTION, SOLUTION INTRAMUSCULAR; INTRAVENOUS; SUBCUTANEOUS at 15:14

## 2024-11-13 RX ADMIN — SODIUM CHLORIDE, PRESERVATIVE FREE 10 ML: 5 INJECTION INTRAVENOUS at 09:50

## 2024-11-13 RX ADMIN — FUROSEMIDE 40 MG: 10 INJECTION, SOLUTION INTRAMUSCULAR; INTRAVENOUS at 07:09

## 2024-11-13 RX ADMIN — MEROPENEM 1000 MG: 1 INJECTION INTRAVENOUS at 01:35

## 2024-11-13 ASSESSMENT — PAIN DESCRIPTION - PAIN TYPE
TYPE: ACUTE PAIN;SURGICAL PAIN
TYPE: ACUTE PAIN;SURGICAL PAIN
TYPE: SURGICAL PAIN
TYPE: SURGICAL PAIN
TYPE: ACUTE PAIN;SURGICAL PAIN
TYPE: SURGICAL PAIN
TYPE: SURGICAL PAIN
TYPE: ACUTE PAIN;SURGICAL PAIN
TYPE: SURGICAL PAIN

## 2024-11-13 ASSESSMENT — PAIN DESCRIPTION - ORIENTATION
ORIENTATION: RIGHT
ORIENTATION: RIGHT;MID
ORIENTATION: RIGHT;MID
ORIENTATION: RIGHT
ORIENTATION: RIGHT;MID
ORIENTATION: RIGHT

## 2024-11-13 ASSESSMENT — PAIN SCALES - GENERAL
PAINLEVEL_OUTOF10: 8
PAINLEVEL_OUTOF10: 8
PAINLEVEL_OUTOF10: 7
PAINLEVEL_OUTOF10: 0
PAINLEVEL_OUTOF10: 10
PAINLEVEL_OUTOF10: 10
PAINLEVEL_OUTOF10: 4
PAINLEVEL_OUTOF10: 9
PAINLEVEL_OUTOF10: 8
PAINLEVEL_OUTOF10: 10
PAINLEVEL_OUTOF10: 8
PAINLEVEL_OUTOF10: 10
PAINLEVEL_OUTOF10: 10
PAINLEVEL_OUTOF10: 0
PAINLEVEL_OUTOF10: 7
PAINLEVEL_OUTOF10: 8
PAINLEVEL_OUTOF10: 4
PAINLEVEL_OUTOF10: 7
PAINLEVEL_OUTOF10: 8
PAINLEVEL_OUTOF10: 4
PAINLEVEL_OUTOF10: 7
PAINLEVEL_OUTOF10: 8
PAINLEVEL_OUTOF10: 10
PAINLEVEL_OUTOF10: 7
PAINLEVEL_OUTOF10: 9
PAINLEVEL_OUTOF10: 9

## 2024-11-13 ASSESSMENT — PAIN DESCRIPTION - LOCATION
LOCATION: SHOULDER;CHEST;BACK
LOCATION: SHOULDER;CHEST;BACK
LOCATION: SHOULDER
LOCATION: SHOULDER;CHEST;BACK
LOCATION: SHOULDER;CHEST;BACK
LOCATION: SHOULDER
LOCATION: SHOULDER;CHEST;BACK
LOCATION: SHOULDER
LOCATION: SHOULDER;CHEST;BACK
LOCATION: SHOULDER
LOCATION: SHOULDER

## 2024-11-13 ASSESSMENT — PAIN DESCRIPTION - DESCRIPTORS
DESCRIPTORS: ACHING
DESCRIPTORS: THROBBING
DESCRIPTORS: ACHING
DESCRIPTORS: THROBBING
DESCRIPTORS: ACHING
DESCRIPTORS: ACHING
DESCRIPTORS: THROBBING
DESCRIPTORS: ACHING
DESCRIPTORS: SORE
DESCRIPTORS: ACHING
DESCRIPTORS: ACHING

## 2024-11-13 ASSESSMENT — PAIN - FUNCTIONAL ASSESSMENT
PAIN_FUNCTIONAL_ASSESSMENT: PREVENTS OR INTERFERES SOME ACTIVE ACTIVITIES AND ADLS

## 2024-11-13 ASSESSMENT — PAIN DESCRIPTION - ONSET
ONSET: ON-GOING

## 2024-11-13 ASSESSMENT — PAIN DESCRIPTION - FREQUENCY
FREQUENCY: CONTINUOUS

## 2024-11-13 NOTE — FLOWSHEET NOTE
11/13/24 1149   [place]-Yaneli   PAP (Systolic/Diastolic) 26/10   PAP (Mean) 16 mmHg   CVP (Mean) 4 mmHg   MAP (Monitor) 69   PAP Wave Form Appropriate wave forms   CVP (mmHg) 4 mmHg   SVO2 (%) 58.2 %   CO (l/min) 5.5 l/min   CCO 5.5 L/Min   CI (l/min/m2) 2.8 l/min/m2   SVR (Using ABP Mean) 945.45 dyne*sec/cm5     Notified Dr. Rodríugez of hemodynamics listed above.

## 2024-11-13 NOTE — ANESTHESIA POSTPROCEDURE EVALUATION
Department of Anesthesiology  Postprocedure Note    Patient: Ad Lacey  MRN: [id number]  YOB: 1950  Date of evaluation: 11/13/2024    Procedure Summary       Date: 11/12/24 Room / Location: T CATH/OR HYBRID 1 / [facility] CARDIAC CATH LAB    Anesthesia Start: 1211 Anesthesia Stop: 1458    Procedure: Ventricular assist device (VAD) insertion Diagnosis:       Cardiopulmonary arrest      (Cardiopulmonary arrest [I46.9])    Providers: Dillon Rodríguez MD Responsible Provider: Saad Lyle MD    Anesthesia Type: General ASA Status: 4            Anesthesia Type: General    Griffin Phase I: Griffin Score: 5    Griffin Phase II:      Anesthesia Post Evaluation    Patient location during evaluation: ICU  Patient participation: complete - patient participated  Level of consciousness: awake  Pain score: 0  Airway patency: patent  Nausea & Vomiting: no nausea and no vomiting  Cardiovascular status: blood pressure returned to baseline  Respiratory status: acceptable  Hydration status: euvolemic  Pain management: adequate    There were no known notable events for this encounter.

## 2024-11-13 NOTE — FLOWSHEET NOTE
11/13/24 1745   [place]-Yaneli   PAP (Systolic/Diastolic) 31/22   PAP (Mean) 26 mmHg   CVP (Mean) 6 mmHg   MAP (Monitor) 79   PAP Wave Form Appropriate wave forms   CVP (mmHg) 6 mmHg   SVO2 (%) 57.8 %   CO (l/min) 5 l/min   CCO 5 L/Min   CI (l/min/m2) 2.5 l/min/m2   SVR (Using ABP Mean) 1168 dyne*sec/cm5     Notified Dr. Lopez of hemodynamics listed above. Patient currently only on heparin gtt at 1,300units/hr and precedex at 0.2mcg/kg/hr. No changes at this time.  Care ongoing by RN

## 2024-11-13 NOTE — FLOWSHEET NOTE
Hemodynamics calculated as below using Elijah's calculation. Pt. has Impella 5.5 at P-6 level, on Nitroglycerin drip @ 100 mcg/min, Nipride drip @ 2 mcg/kg/min, and on Heparin drip @ 1,300 units/hr.      11/13/24 0644   [place]-Yaneli   PAP (Systolic/Diastolic) 35/14   PAP (Mean) 21 mmHg   CVP (Mean) 6 mmHg   PAP Wave Form Appropriate wave forms   SVO2 (%) 53 %   CO (l/min) 5.5 l/min   CCO 5.5 L/Min   CI (l/min/m2) 2.8 l/min/m2   SVR (Using ABP Mean) 872.73 dyne*sec/cm5        11/13/24 0644   Vitals   Pulse 97   Respirations 19   Art Line   ABP (Arterial line BP) 104/54   ABP Mean (Arterial Line Mean) 66 mmHg   Oxygen Therapy   SpO2 91 %   Pulse via Oximetry 95 beats per minute   O2 Device High flow nasal cannula   O2 Flow Rate (L/min) 10 L/min      Latest Reference Range & Units 11/13/24 05:40   Hemoglobin Quant 13.5 - 17.5 g/dL 8.2 (L)   Hematocrit 40.5 - 52.5 % 24.1 (L)   (L): Data is abnormally low   Latest Reference Range & Units 11/13/24 03:08   Potassium 3.5 - 5.1 mmol/L 3.3 (L)   (L): Data is abnormally low   Latest Reference Range & Units 11/13/24 03:08   Magnesium 1.80 - 2.40 mg/dL 2.05     @ 0656 - This RN updated Dr. Dillon Rodríguez of the above hemodynamics and vitals, last hour's urine output of 30 ml, and recent Hgb/Hct and serum K and Mg results, as shown above.    Electronically signed by Petra Erazo RN on 11/13/2024 at 7:46 AM

## 2024-11-13 NOTE — FLOWSHEET NOTE
Late entry due to patient care.    Hemodynamics calculated as below using Elijah's calculation. Pt. has Impella 5.5 at P-6 level and on Nitroglycerin drip @ 160 mcg/min.     11/12/24 2141   [place]-Yaneli   PAP (Systolic/Diastolic) 52/23   PAP (Mean) 32 mmHg   CVP (Mean) 13 mmHg   PAP Wave Form Appropriate wave forms   SVO2 (%) 45 %   CO (l/min) 4 l/min   CCO 4 L/Min   CI (l/min/m2) 2 l/min/m2   SVR (Using ABP Mean) 1660 dyne*sec/cm5        11/12/24 2141   Vitals   Pulse 98   Respirations 21   Art Line   ABP (Arterial line BP) 145/76   ABP Mean (Arterial Line Mean) 96 mmHg   Oxygen Therapy   SpO2 92 %   O2 Flow Rate (L/min) 6 L/min      Latest Reference Range & Units 11/12/24 21:10   Hemoglobin Quant 13.5 - 17.5 g/dL 8.8 (L)   Hematocrit 40.5 - 52.5 % 25.4 (L)   (L): Data is abnormally low   Latest Reference Range & Units 11/12/24 21:10   Potassium 3.5 - 5.1 mmol/L 3.7      Latest Reference Range & Units 11/12/24 21:10   Magnesium 1.80 - 2.40 mg/dL 2.00     CTA OF THE ABDOMEN AND PELVIS WITH AND WITHOUT CONTRAST   IMPRESSION:  1. No active GI bleed.  2. Mild pulmonary edema with small pleural effusions.    @ 2146 to 2213 - This RN updated Dr. Dillon Rodríguez of the above hemodynamics and vitals, last hour's urine output of 70 ml, recent Hgb/Hct and serum K and Mg results, & CTA of the abdomen and pelvis impression, as written above. Dr. Rodríguez was also informed that pt. is still having intermittent runs of VTach and persistent Impella site pain even after PRN Dilaudid and Morphine were administered. Dr. Rodríguez acknowledged and ordered Lasix 40 mg IV x1, add Nipride drip to keep SVR < 1200, wean off the NTG drip, start Heparin drip w/ ACT goal of 160-180, not to restart the Amiodarone drip, and add PRN Oxycodone (see MAR). Dr. Rodríguez also instructed this RN to check hemodynamics w/ mixed venous at 6 AM tomorrow and relay results to him.    Electronically signed by Petra Erazo RN on 11/13/2024 at 12:45 AM

## 2024-11-14 ENCOUNTER — APPOINTMENT (OUTPATIENT)
Age: 74
DRG: 001 | End: 2024-11-14
Attending: INTERNAL MEDICINE
Payer: MEDICARE

## 2024-11-14 ENCOUNTER — APPOINTMENT (OUTPATIENT)
Dept: GENERAL RADIOLOGY | Age: 74
DRG: 001 | End: 2024-11-14
Payer: MEDICARE

## 2024-11-14 LAB
ALBUMIN SERPL-MCNC: 3.9 G/DL (ref 3.4–5)
ALBUMIN/GLOB SERPL: 1.8 {RATIO} (ref 1.1–2.2)
ALP SERPL-CCNC: 129 U/L (ref 40–129)
ALT SERPL-CCNC: 47 U/L (ref 10–40)
ANION GAP SERPL CALCULATED.3IONS-SCNC: 10 MMOL/L (ref 3–16)
ANION GAP SERPL CALCULATED.3IONS-SCNC: 13 MMOL/L (ref 3–16)
AST SERPL-CCNC: 75 U/L (ref 15–37)
BASE EXCESS MIXED: -2
BASE EXCESS MIXED: 1
BASE EXCESS MIXED: 2
BASE EXCESS MIXED: 2
BASE EXCESS MIXED: 3
BASOPHILS # BLD: 0.1 K/UL (ref 0–0.2)
BASOPHILS NFR BLD: 0.6 %
BILIRUB SERPL-MCNC: 2.5 MG/DL (ref 0–1)
BUN SERPL-MCNC: 14 MG/DL (ref 7–20)
BUN SERPL-MCNC: 14 MG/DL (ref 7–20)
CA-I BLD-SCNC: 1.1 MMOL/L (ref 1.12–1.32)
CALCIUM SERPL-MCNC: 8.1 MG/DL (ref 8.3–10.6)
CALCIUM SERPL-MCNC: 8.6 MG/DL (ref 8.3–10.6)
CHLORIDE SERPL-SCNC: 102 MMOL/L (ref 99–110)
CHLORIDE SERPL-SCNC: 98 MMOL/L (ref 99–110)
CO2 SERPL-SCNC: 23 MMOL/L (ref 21–32)
CO2 SERPL-SCNC: 23 MMOL/L (ref 21–32)
CREAT SERPL-MCNC: 0.8 MG/DL (ref 0.8–1.3)
CREAT SERPL-MCNC: 0.8 MG/DL (ref 0.8–1.3)
DEPRECATED RDW RBC AUTO: 14.8 % (ref 12.4–15.4)
DEPRECATED RDW RBC AUTO: 14.9 % (ref 12.4–15.4)
ECHO BSA: 1.95 M2
ECHO BSA: 2.01 M2
ECHO BSA: 2.01 M2
ECHO LV EF PHYSICIAN: 48 %
EOSINOPHIL # BLD: 0.2 K/UL (ref 0–0.6)
EOSINOPHIL NFR BLD: 2.5 %
GFR SERPLBLD CREATININE-BSD FMLA CKD-EPI: >90 ML/MIN/{1.73_M2}
GFR SERPLBLD CREATININE-BSD FMLA CKD-EPI: >90 ML/MIN/{1.73_M2}
GLUCOSE BLD-MCNC: 120 MG/DL (ref 70–99)
GLUCOSE BLD-MCNC: 125 MG/DL (ref 70–99)
GLUCOSE BLD-MCNC: 126 MG/DL (ref 70–99)
GLUCOSE BLD-MCNC: 128 MG/DL (ref 70–99)
GLUCOSE BLD-MCNC: 131 MG/DL (ref 70–99)
GLUCOSE BLD-MCNC: 133 MG/DL (ref 70–99)
GLUCOSE SERPL-MCNC: 108 MG/DL (ref 70–99)
GLUCOSE SERPL-MCNC: 127 MG/DL (ref 70–99)
HCO3, MIXED: 21.6 MMOL/L
HCO3, MIXED: 24 MMOL/L
HCO3, MIXED: 24.9 MMOL/L
HCO3, MIXED: 25.7 MMOL/L
HCO3, MIXED: 25.8 MMOL/L
HCT VFR BLD AUTO: 24.5 % (ref 40.5–52.5)
HCT VFR BLD AUTO: 26.8 % (ref 40.5–52.5)
HGB BLD-MCNC: 8.2 G/DL (ref 13.5–17.5)
HGB BLD-MCNC: 9 G/DL (ref 13.5–17.5)
LACTATE BLD-SCNC: 0.71 MMOL/L (ref 0.4–2)
LYMPHOCYTES # BLD: 1.4 K/UL (ref 1–5.1)
LYMPHOCYTES NFR BLD: 14.7 %
MAGNESIUM SERPL-MCNC: 2.08 MG/DL (ref 1.8–2.4)
MCH RBC QN AUTO: 28.1 PG (ref 26–34)
MCH RBC QN AUTO: 28.4 PG (ref 26–34)
MCHC RBC AUTO-ENTMCNC: 33.5 G/DL (ref 31–36)
MCHC RBC AUTO-ENTMCNC: 33.6 G/DL (ref 31–36)
MCV RBC AUTO: 84.1 FL (ref 80–100)
MCV RBC AUTO: 84.4 FL (ref 80–100)
MONOCYTES # BLD: 0.8 K/UL (ref 0–1.3)
MONOCYTES NFR BLD: 8.1 %
NEUTROPHILS # BLD: 7.1 K/UL (ref 1.7–7.7)
NEUTROPHILS NFR BLD: 74.1 %
O2 SAT, MIXED: 48 %
O2 SAT, MIXED: 49 %
O2 SAT, MIXED: 53 %
O2 SAT, MIXED: 53 %
O2 SAT, MIXED: 58 %
PCO2 MIXED: 28.1 MM HG
PCO2 MIXED: 29.5 MM HG
PCO2 MIXED: 31.1 MM HG
PCO2 MIXED: 31.5 MM HG
PCO2 MIXED: 34.7 MM HG
PERFORMED ON: ABNORMAL
PH, MIXED: 7.48 (ref 7.35–7.45)
PH, MIXED: 7.49 (ref 7.35–7.45)
PH, MIXED: 7.51 (ref 7.35–7.45)
PH, MIXED: 7.52 (ref 7.35–7.45)
PH, MIXED: 7.52 (ref 7.35–7.45)
PHOSPHATE SERPL-MCNC: 2.3 MG/DL (ref 2.5–4.9)
PLATELET # BLD AUTO: 148 K/UL (ref 135–450)
PLATELET # BLD AUTO: 166 K/UL (ref 135–450)
PMV BLD AUTO: 7.1 FL (ref 5–10.5)
PMV BLD AUTO: 7.3 FL (ref 5–10.5)
PO2 MIXED: 23 MM HG
PO2 MIXED: 23 MM HG
PO2 MIXED: 25 MM HG
PO2 MIXED: 26 MM HG
PO2 MIXED: 27 MM HG
POC ACT LR: 140 SEC
POC ACT LR: 146 SEC
POC ACT LR: 154 SEC
POC ACT LR: 156 SEC
POC ACT LR: 156 SEC
POC ACT LR: 157 SEC
POC ACT LR: 159 SEC
POC ACT LR: 160 SEC
POC ACT LR: 161 SEC
POC ACT LR: 162 SEC
POC ACT LR: 162 SEC
POC ACT LR: 163 SEC
POC ACT LR: 164 SEC
POC ACT LR: 166 SEC
POC ACT LR: 167 SEC
POC SAMPLE TYPE: ABNORMAL
POTASSIUM BLD-SCNC: 3.1 MMOL/L (ref 3.5–5.1)
POTASSIUM SERPL-SCNC: 3.3 MMOL/L (ref 3.5–5.1)
POTASSIUM SERPL-SCNC: 3.9 MMOL/L (ref 3.5–5.1)
PROT SERPL-MCNC: 6.1 G/DL (ref 6.4–8.2)
RBC # BLD AUTO: 2.91 M/UL (ref 4.2–5.9)
RBC # BLD AUTO: 3.17 M/UL (ref 4.2–5.9)
SODIUM SERPL-SCNC: 134 MMOL/L (ref 136–145)
SODIUM SERPL-SCNC: 135 MMOL/L (ref 136–145)
TCO2 CALC MIXED: 23 MMOL/L
TCO2 CALC MIXED: 25 MMOL/L
TCO2 CALC MIXED: 26 MMOL/L
TCO2 CALC MIXED: 27 MMOL/L
TCO2 CALC MIXED: 27 MMOL/L
WBC # BLD AUTO: 7.9 K/UL (ref 4–11)
WBC # BLD AUTO: 9.6 K/UL (ref 4–11)

## 2024-11-14 PROCEDURE — C1751 CATH, INF, PER/CENT/MIDLINE: HCPCS | Performed by: INTERNAL MEDICINE

## 2024-11-14 PROCEDURE — 6360000002 HC RX W HCPCS: Performed by: INTERNAL MEDICINE

## 2024-11-14 PROCEDURE — 6370000000 HC RX 637 (ALT 250 FOR IP): Performed by: INTERNAL MEDICINE

## 2024-11-14 PROCEDURE — 80051 ELECTROLYTE PANEL: CPT

## 2024-11-14 PROCEDURE — 2580000003 HC RX 258: Performed by: NURSE PRACTITIONER

## 2024-11-14 PROCEDURE — 6370000000 HC RX 637 (ALT 250 FOR IP): Performed by: NURSE PRACTITIONER

## 2024-11-14 PROCEDURE — 93325 DOPPLER ECHO COLOR FLOW MAPG: CPT | Performed by: INTERNAL MEDICINE

## 2024-11-14 PROCEDURE — 99291 CRITICAL CARE FIRST HOUR: CPT | Performed by: INTERNAL MEDICINE

## 2024-11-14 PROCEDURE — C1760 CLOSURE DEV, VASC: HCPCS | Performed by: INTERNAL MEDICINE

## 2024-11-14 PROCEDURE — 6360000002 HC RX W HCPCS

## 2024-11-14 PROCEDURE — 83880 ASSAY OF NATRIURETIC PEPTIDE: CPT

## 2024-11-14 PROCEDURE — 84132 ASSAY OF SERUM POTASSIUM: CPT

## 2024-11-14 PROCEDURE — 82330 ASSAY OF CALCIUM: CPT

## 2024-11-14 PROCEDURE — 2100000000 HC CCU R&B

## 2024-11-14 PROCEDURE — 6360000002 HC RX W HCPCS: Performed by: NURSE PRACTITIONER

## 2024-11-14 PROCEDURE — 82947 ASSAY GLUCOSE BLOOD QUANT: CPT

## 2024-11-14 PROCEDURE — 85610 PROTHROMBIN TIME: CPT

## 2024-11-14 PROCEDURE — P9041 ALBUMIN (HUMAN),5%, 50ML: HCPCS | Performed by: INTERNAL MEDICINE

## 2024-11-14 PROCEDURE — 71045 X-RAY EXAM CHEST 1 VIEW: CPT

## 2024-11-14 PROCEDURE — C1769 GUIDE WIRE: HCPCS | Performed by: INTERNAL MEDICINE

## 2024-11-14 PROCEDURE — 2580000003 HC RX 258: Performed by: INTERNAL MEDICINE

## 2024-11-14 PROCEDURE — 37799 UNLISTED PX VASCULAR SURGERY: CPT

## 2024-11-14 PROCEDURE — 85025 COMPLETE CBC W/AUTO DIFF WBC: CPT

## 2024-11-14 PROCEDURE — APPNB15 APP NON BILLABLE TIME 0-15 MINS: Performed by: NURSE PRACTITIONER

## 2024-11-14 PROCEDURE — 93460 R&L HRT ART/VENTRICLE ANGIO: CPT | Performed by: INTERNAL MEDICINE

## 2024-11-14 PROCEDURE — 99231 SBSQ HOSP IP/OBS SF/LOW 25: CPT | Performed by: SURGERY

## 2024-11-14 PROCEDURE — 2580000003 HC RX 258: Performed by: ANESTHESIOLOGY

## 2024-11-14 PROCEDURE — 85347 COAGULATION TIME ACTIVATED: CPT

## 2024-11-14 PROCEDURE — 82565 ASSAY OF CREATININE: CPT

## 2024-11-14 PROCEDURE — 2500000003 HC RX 250 WO HCPCS: Performed by: INTERNAL MEDICINE

## 2024-11-14 PROCEDURE — P9041 ALBUMIN (HUMAN),5%, 50ML: HCPCS

## 2024-11-14 PROCEDURE — 2500000003 HC RX 250 WO HCPCS: Performed by: NURSE PRACTITIONER

## 2024-11-14 PROCEDURE — 85027 COMPLETE CBC AUTOMATED: CPT

## 2024-11-14 PROCEDURE — 2709999900 HC NON-CHARGEABLE SUPPLY: Performed by: INTERNAL MEDICINE

## 2024-11-14 PROCEDURE — 83605 ASSAY OF LACTIC ACID: CPT

## 2024-11-14 PROCEDURE — 94761 N-INVAS EAR/PLS OXIMETRY MLT: CPT

## 2024-11-14 PROCEDURE — 93320 DOPPLER ECHO COMPLETE: CPT | Performed by: INTERNAL MEDICINE

## 2024-11-14 PROCEDURE — 93308 TTE F-UP OR LMTD: CPT

## 2024-11-14 PROCEDURE — 93312 ECHO TRANSESOPHAGEAL: CPT | Performed by: INTERNAL MEDICINE

## 2024-11-14 PROCEDURE — 84295 ASSAY OF SERUM SODIUM: CPT

## 2024-11-14 PROCEDURE — 82803 BLOOD GASES ANY COMBINATION: CPT

## 2024-11-14 PROCEDURE — 2700000000 HC OXYGEN THERAPY PER DAY

## 2024-11-14 PROCEDURE — 84100 ASSAY OF PHOSPHORUS: CPT

## 2024-11-14 PROCEDURE — 6360000004 HC RX CONTRAST MEDICATION: Performed by: INTERNAL MEDICINE

## 2024-11-14 PROCEDURE — 84520 ASSAY OF UREA NITROGEN: CPT

## 2024-11-14 PROCEDURE — 84484 ASSAY OF TROPONIN QUANT: CPT

## 2024-11-14 PROCEDURE — C1894 INTRO/SHEATH, NON-LASER: HCPCS | Performed by: INTERNAL MEDICINE

## 2024-11-14 PROCEDURE — 83735 ASSAY OF MAGNESIUM: CPT

## 2024-11-14 PROCEDURE — 85014 HEMATOCRIT: CPT

## 2024-11-14 PROCEDURE — 80053 COMPREHEN METABOLIC PANEL: CPT

## 2024-11-14 PROCEDURE — 93308 TTE F-UP OR LMTD: CPT | Performed by: INTERNAL MEDICINE

## 2024-11-14 PROCEDURE — 93503 INSERT/PLACE HEART CATHETER: CPT

## 2024-11-14 RX ORDER — CARVEDILOL 3.12 MG/1
3.12 TABLET ORAL ONCE
Status: COMPLETED | OUTPATIENT
Start: 2024-11-14 | End: 2024-11-14

## 2024-11-14 RX ORDER — ATORVASTATIN CALCIUM 80 MG/1
80 TABLET, FILM COATED ORAL DAILY
Status: ON HOLD | COMMUNITY

## 2024-11-14 RX ORDER — HYDROXYZINE HYDROCHLORIDE 10 MG/1
10 TABLET, FILM COATED ORAL 3 TIMES DAILY PRN
Status: DISCONTINUED | OUTPATIENT
Start: 2024-11-14 | End: 2024-11-21 | Stop reason: HOSPADM

## 2024-11-14 RX ORDER — CARVEDILOL 3.12 MG/1
3.12 TABLET ORAL 2 TIMES DAILY WITH MEALS
Status: DISCONTINUED | OUTPATIENT
Start: 2024-11-14 | End: 2024-11-15

## 2024-11-14 RX ORDER — ALBUMIN HUMAN 50 G/1000ML
SOLUTION INTRAVENOUS
Status: COMPLETED
Start: 2024-11-14 | End: 2024-11-15

## 2024-11-14 RX ORDER — IOPAMIDOL 755 MG/ML
INJECTION, SOLUTION INTRAVASCULAR PRN
Status: DISCONTINUED | OUTPATIENT
Start: 2024-11-14 | End: 2024-11-14 | Stop reason: HOSPADM

## 2024-11-14 RX ORDER — ASPIRIN 81 MG/1
81 TABLET ORAL DAILY
Status: ON HOLD | COMMUNITY

## 2024-11-14 RX ORDER — TRAZODONE HYDROCHLORIDE 50 MG/1
50 TABLET, FILM COATED ORAL ONCE
Status: COMPLETED | OUTPATIENT
Start: 2024-11-14 | End: 2024-11-14

## 2024-11-14 RX ORDER — ALBUMIN HUMAN 50 G/1000ML
25 SOLUTION INTRAVENOUS ONCE
Status: COMPLETED | OUTPATIENT
Start: 2024-11-14 | End: 2024-11-14

## 2024-11-14 RX ORDER — HEPARIN SODIUM 1000 [USP'U]/ML
1000 INJECTION, SOLUTION INTRAVENOUS; SUBCUTANEOUS ONCE
Status: COMPLETED | OUTPATIENT
Start: 2024-11-14 | End: 2024-11-14

## 2024-11-14 RX ORDER — LIDOCAINE HYDROCHLORIDE 10 MG/ML
INJECTION, SOLUTION INFILTRATION; PERINEURAL PRN
Status: DISCONTINUED | OUTPATIENT
Start: 2024-11-14 | End: 2024-11-14 | Stop reason: HOSPADM

## 2024-11-14 RX ORDER — ALBUMIN HUMAN 50 G/1000ML
25 SOLUTION INTRAVENOUS ONCE
Status: COMPLETED | OUTPATIENT
Start: 2024-11-14 | End: 2024-11-15

## 2024-11-14 RX ORDER — CALCIUM GLUCONATE 20 MG/ML
1000 INJECTION, SOLUTION INTRAVENOUS ONCE
Status: COMPLETED | OUTPATIENT
Start: 2024-11-14 | End: 2024-11-14

## 2024-11-14 RX ORDER — BISACODYL 10 MG
10 SUPPOSITORY, RECTAL RECTAL DAILY PRN
Status: DISCONTINUED | OUTPATIENT
Start: 2024-11-14 | End: 2024-11-21 | Stop reason: HOSPADM

## 2024-11-14 RX ORDER — FUROSEMIDE 10 MG/ML
20 INJECTION INTRAMUSCULAR; INTRAVENOUS ONCE
Status: COMPLETED | OUTPATIENT
Start: 2024-11-14 | End: 2024-11-14

## 2024-11-14 RX ORDER — ALBUTEROL SULFATE 0.83 MG/ML
2.5 SOLUTION RESPIRATORY (INHALATION) EVERY 6 HOURS PRN
Status: DISCONTINUED | OUTPATIENT
Start: 2024-11-14 | End: 2024-11-21 | Stop reason: HOSPADM

## 2024-11-14 RX ADMIN — CARVEDILOL 3.12 MG: 3.12 TABLET, FILM COATED ORAL at 11:38

## 2024-11-14 RX ADMIN — ALBUMIN HUMAN 25 G: 50 SOLUTION INTRAVENOUS at 23:32

## 2024-11-14 RX ADMIN — BISACODYL 10 MG: 10 SUPPOSITORY RECTAL at 08:30

## 2024-11-14 RX ADMIN — TICAGRELOR 90 MG: 90 TABLET ORAL at 20:28

## 2024-11-14 RX ADMIN — Medication 2 CAPSULE: at 08:44

## 2024-11-14 RX ADMIN — POTASSIUM PHOSPHATE, MONOBASIC POTASSIUM PHOSPHATE, DIBASIC 20 MMOL: 224; 236 INJECTION, SOLUTION, CONCENTRATE INTRAVENOUS at 10:10

## 2024-11-14 RX ADMIN — NITROGLYCERIN 5 MCG/MIN: 20 INJECTION INTRAVENOUS at 21:03

## 2024-11-14 RX ADMIN — HYDROMORPHONE HYDROCHLORIDE 1 MG: 1 INJECTION, SOLUTION INTRAMUSCULAR; INTRAVENOUS; SUBCUTANEOUS at 12:22

## 2024-11-14 RX ADMIN — OXYCODONE 5 MG: 5 TABLET ORAL at 00:33

## 2024-11-14 RX ADMIN — HEPARIN SODIUM 1000 UNITS: 1000 INJECTION INTRAVENOUS; SUBCUTANEOUS at 22:04

## 2024-11-14 RX ADMIN — ALBUMIN (HUMAN) 25 G: 12.5 INJECTION, SOLUTION INTRAVENOUS at 23:32

## 2024-11-14 RX ADMIN — SODIUM CHLORIDE, PRESERVATIVE FREE 10 ML: 5 INJECTION INTRAVENOUS at 09:15

## 2024-11-14 RX ADMIN — ATORVASTATIN CALCIUM 80 MG: 80 TABLET, FILM COATED ORAL at 20:28

## 2024-11-14 RX ADMIN — POTASSIUM BICARBONATE 40 MEQ: 782 TABLET, EFFERVESCENT ORAL at 06:21

## 2024-11-14 RX ADMIN — CEFEPIME 2000 MG: 2 INJECTION, POWDER, FOR SOLUTION INTRAVENOUS at 17:52

## 2024-11-14 RX ADMIN — HYDROMORPHONE HYDROCHLORIDE 1 MG: 1 INJECTION, SOLUTION INTRAMUSCULAR; INTRAVENOUS; SUBCUTANEOUS at 23:11

## 2024-11-14 RX ADMIN — TRAZODONE HYDROCHLORIDE 50 MG: 50 TABLET ORAL at 00:33

## 2024-11-14 RX ADMIN — HYDROMORPHONE HYDROCHLORIDE 1 MG: 1 INJECTION, SOLUTION INTRAMUSCULAR; INTRAVENOUS; SUBCUTANEOUS at 00:14

## 2024-11-14 RX ADMIN — SODIUM CHLORIDE, PRESERVATIVE FREE 10 ML: 5 INJECTION INTRAVENOUS at 20:28

## 2024-11-14 RX ADMIN — DEXMEDETOMIDINE HYDROCHLORIDE 0.2 MCG/KG/HR: 400 INJECTION INTRAVENOUS at 13:57

## 2024-11-14 RX ADMIN — ALBUMIN (HUMAN) 25 G: 12.5 INJECTION, SOLUTION INTRAVENOUS at 19:33

## 2024-11-14 RX ADMIN — SACUBITRIL AND VALSARTAN 1 TABLET: 24; 26 TABLET, FILM COATED ORAL at 20:29

## 2024-11-14 RX ADMIN — CALCIUM GLUCONATE 1000 MG: 20 INJECTION, SOLUTION INTRAVENOUS at 09:07

## 2024-11-14 RX ADMIN — HEPARIN SODIUM 1400 UNITS/HR: 10000 INJECTION, SOLUTION INTRAVENOUS at 12:18

## 2024-11-14 RX ADMIN — NITROGLYCERIN 5 MCG/MIN: 20 INJECTION INTRAVENOUS at 00:13

## 2024-11-14 RX ADMIN — ASPIRIN 81 MG: 81 TABLET, CHEWABLE ORAL at 08:44

## 2024-11-14 RX ADMIN — CEFEPIME 2000 MG: 2 INJECTION, POWDER, FOR SOLUTION INTRAVENOUS at 00:17

## 2024-11-14 RX ADMIN — DEXMEDETOMIDINE HYDROCHLORIDE 0.8 MCG/KG/HR: 400 INJECTION INTRAVENOUS at 22:38

## 2024-11-14 RX ADMIN — OXYCODONE 5 MG: 5 TABLET ORAL at 05:49

## 2024-11-14 RX ADMIN — CEFEPIME 2000 MG: 2 INJECTION, POWDER, FOR SOLUTION INTRAVENOUS at 23:42

## 2024-11-14 RX ADMIN — CARVEDILOL 3.12 MG: 3.12 TABLET, FILM COATED ORAL at 09:11

## 2024-11-14 RX ADMIN — CEFEPIME 2000 MG: 2 INJECTION, POWDER, FOR SOLUTION INTRAVENOUS at 08:50

## 2024-11-14 RX ADMIN — TICAGRELOR 90 MG: 90 TABLET ORAL at 08:44

## 2024-11-14 RX ADMIN — HEPARIN SODIUM 1000 UNITS: 1000 INJECTION INTRAVENOUS; SUBCUTANEOUS at 21:26

## 2024-11-14 RX ADMIN — FUROSEMIDE 20 MG: 10 INJECTION, SOLUTION INTRAMUSCULAR; INTRAVENOUS at 11:38

## 2024-11-14 RX ADMIN — SODIUM CHLORIDE 25 ML: 9 INJECTION, SOLUTION INTRAVENOUS at 23:42

## 2024-11-14 RX ADMIN — OXYCODONE 5 MG: 5 TABLET ORAL at 20:28

## 2024-11-14 RX ADMIN — HYDROMORPHONE HYDROCHLORIDE 1 MG: 1 INJECTION, SOLUTION INTRAMUSCULAR; INTRAVENOUS; SUBCUTANEOUS at 09:22

## 2024-11-14 RX ADMIN — HYDROMORPHONE HYDROCHLORIDE 1 MG: 1 INJECTION, SOLUTION INTRAMUSCULAR; INTRAVENOUS; SUBCUTANEOUS at 06:21

## 2024-11-14 RX ADMIN — SODIUM CHLORIDE 500 ML: 9 INJECTION, SOLUTION INTRAVENOUS at 16:10

## 2024-11-14 RX ADMIN — SACUBITRIL AND VALSARTAN 1 TABLET: 24; 26 TABLET, FILM COATED ORAL at 08:44

## 2024-11-14 RX ADMIN — HYDROMORPHONE HYDROCHLORIDE 1 MG: 1 INJECTION, SOLUTION INTRAMUSCULAR; INTRAVENOUS; SUBCUTANEOUS at 03:12

## 2024-11-14 RX ADMIN — OXYCODONE 5 MG: 5 TABLET ORAL at 10:49

## 2024-11-14 RX ADMIN — NITROGLYCERIN 100 MCG/MIN: 20 INJECTION INTRAVENOUS at 10:42

## 2024-11-14 RX ADMIN — Medication 2 CAPSULE: at 16:30

## 2024-11-14 ASSESSMENT — PAIN SCALES - GENERAL
PAINLEVEL_OUTOF10: 9
PAINLEVEL_OUTOF10: 7
PAINLEVEL_OUTOF10: 7
PAINLEVEL_OUTOF10: 9
PAINLEVEL_OUTOF10: 6
PAINLEVEL_OUTOF10: 6
PAINLEVEL_OUTOF10: 8
PAINLEVEL_OUTOF10: 9
PAINLEVEL_OUTOF10: 0
PAINLEVEL_OUTOF10: 5
PAINLEVEL_OUTOF10: 6
PAINLEVEL_OUTOF10: 0
PAINLEVEL_OUTOF10: 9
PAINLEVEL_OUTOF10: 5
PAINLEVEL_OUTOF10: 6
PAINLEVEL_OUTOF10: 0

## 2024-11-14 ASSESSMENT — PAIN DESCRIPTION - ONSET
ONSET: ON-GOING

## 2024-11-14 ASSESSMENT — PAIN - FUNCTIONAL ASSESSMENT
PAIN_FUNCTIONAL_ASSESSMENT: PREVENTS OR INTERFERES SOME ACTIVE ACTIVITIES AND ADLS
PAIN_FUNCTIONAL_ASSESSMENT: ACTIVITIES ARE NOT PREVENTED
PAIN_FUNCTIONAL_ASSESSMENT: PREVENTS OR INTERFERES SOME ACTIVE ACTIVITIES AND ADLS
PAIN_FUNCTIONAL_ASSESSMENT: ACTIVITIES ARE NOT PREVENTED

## 2024-11-14 ASSESSMENT — PAIN DESCRIPTION - FREQUENCY
FREQUENCY: CONTINUOUS

## 2024-11-14 ASSESSMENT — PAIN DESCRIPTION - ORIENTATION
ORIENTATION: RIGHT;LEFT
ORIENTATION: RIGHT;MID
ORIENTATION: RIGHT
ORIENTATION: RIGHT;MID
ORIENTATION: RIGHT
ORIENTATION: RIGHT
ORIENTATION: RIGHT;MID
ORIENTATION: RIGHT
ORIENTATION: RIGHT;MID
ORIENTATION: LOWER
ORIENTATION: RIGHT
ORIENTATION: LOWER
ORIENTATION: RIGHT;LEFT;POSTERIOR
ORIENTATION: RIGHT;MID
ORIENTATION: RIGHT;MID
ORIENTATION: RIGHT
ORIENTATION: RIGHT;LEFT;POSTERIOR

## 2024-11-14 ASSESSMENT — PAIN DESCRIPTION - PAIN TYPE
TYPE: ACUTE PAIN;SURGICAL PAIN
TYPE: ACUTE PAIN;SURGICAL PAIN
TYPE: ACUTE PAIN
TYPE: ACUTE PAIN;SURGICAL PAIN

## 2024-11-14 ASSESSMENT — PAIN DESCRIPTION - LOCATION
LOCATION: BACK
LOCATION: SHOULDER;CHEST;BACK
LOCATION: SHOULDER;CHEST
LOCATION: SHOULDER;CHEST;BACK
LOCATION: BACK
LOCATION: BACK
LOCATION: SHOULDER;CHEST;BACK
LOCATION: SHOULDER
LOCATION: SHOULDER;CHEST;BACK
LOCATION: SHOULDER;BACK;CHEST
LOCATION: BACK
LOCATION: SHOULDER;CHEST;BACK
LOCATION: SHOULDER;CHEST
LOCATION: SHOULDER;CHEST;BACK
LOCATION: BACK
LOCATION: SHOULDER;CHEST

## 2024-11-14 ASSESSMENT — PAIN SCALES - WONG BAKER
WONGBAKER_NUMERICALRESPONSE: NO HURT

## 2024-11-14 ASSESSMENT — PAIN DESCRIPTION - DESCRIPTORS
DESCRIPTORS: ACHING
DESCRIPTORS: ACHING;DISCOMFORT;THROBBING
DESCRIPTORS: ACHING
DESCRIPTORS: ACHING;BURNING;CRAMPING
DESCRIPTORS: ACHING
DESCRIPTORS: ACHING;DISCOMFORT;THROBBING
DESCRIPTORS: ACHING;CRAMPING;DISCOMFORT
DESCRIPTORS: ACHING

## 2024-11-14 NOTE — FLOWSHEET NOTE
11/14/24 0000   [place]-Yaneli   PAP (Systolic/Diastolic) 35/10   PAP (Mean) 18 mmHg   CVP (Mean) 7 mmHg   SVO2 (%) 57.5 %   CO (l/min) 4.6 l/min   CCO 4.6 L/Min   CI (l/min/m2) 2.4 l/min/m2   SVR (Using NBP Mean) 1878.26 dyne*sec/cm5   SVR (Using ABP Mean) 1582.61 dyne*sec/cm5   Impella Left   Systolic 123   Diastolic 63   Mean 98   Impella Performance Level P-6   Placement (cm) 46 cm   Pedal Pulse +2   Purge Flow 10.8 mL/hr   Purge Pressure 492 mmHg   Impella Flow 2.6 L/min   Motor Current - Systolic 333   Motor Current - Diastolic 227   Motor Current - Mean 274   Motor Current Pulsatile Yes   Activated Clotting Time (ACT) 164   Location Right Axillary   Impella Site Check Yes    1   NAKIA 3.57     Calculated using PACHECO score. Impella at P-6. Pt currently on heparin gtt @1300 units/hr. Nitro restarted for SVR 1582. Dr. Lopez notified. MD also informed that neither the po or IV ativan doses has helped pt's anxiety. trazadone ordered.

## 2024-11-14 NOTE — FLOWSHEET NOTE
11/14/24 1852   [place]-Yaneli   PAP (Systolic/Diastolic) 25/10   PAP (Mean) 16 mmHg   CVP (Mean) 4 mmHg   MAP (Monitor) 80   PAP Wave Form Appropriate wave forms;Dampened   CVP (mmHg) 4 mmHg   SVO2 (%) 47.5 %   CO (l/min) 3.7 l/min   CCO 3.7 L/Min   CI (l/min/m2) 1.9 l/min/m2   SVR (Using NBP Mean) 1643.24 dyne*sec/cm5   Core (Body) Temperature 98.1 °F (36.7 °C)     Heparin stopped and will restart at 8pm.     Precedex @ 0.8 mcg/kg/hr    Discussed with Dr. Rodríguez, increase Impella to P7.

## 2024-11-14 NOTE — FLOWSHEET NOTE
11/14/24 1315   [place]-Yaneli   PAP (Systolic/Diastolic) 19/3   PAP (Mean) 4 mmHg   CVP (Mean) 4 mmHg   MAP (Monitor) 70   PAP Wave Form Appropriate wave forms;Dampened   SVO2 (%) 48.5 %   CO (l/min) 4.1 l/min   CCO 4.1 L/Min   CI (l/min/m2) 2.1 l/min/m2   SVR (Using NBP Mean) 1287.8 dyne*sec/cm5   SVR (Using ABP Mean) 1541.46 dyne*sec/cm5   Core (Body) Temperature 97.8 °F (36.6 °C)     Discussed with Dr. Rodríguez.     Heparin @ 1400 units/hr

## 2024-11-14 NOTE — FLOWSHEET NOTE
11/14/24 0430   [place]-Yaneli   PAP (Systolic/Diastolic) 30/10   PAP (Mean) 15 mmHg   CVP (Mean) 3 mmHg   SVO2 (%) 53.2 %   CO (l/min) 4.4 l/min   CCO 4.4 L/Min   CI (l/min/m2) 2.2 l/min/m2   SVR (Using ABP Mean) 1418.18 dyne*sec/cm5   Impella Left   Systolic 95   Diastolic 69   Purge Flow 10.8 mL/hr   Purge Pressure 493 mmHg   Impella Flow 3 L/min   Motor Current - Systolic 334   Motor Current - Diastolic 233   Motor Current - Mean 270   Motor Current Pulsatile Yes    0.79   NAKIA 6.67     Impella at P-6. Currently on nitro at 50mcg/min and heparin at 1350 units/hr. Dr. Lopez notified of results.

## 2024-11-15 ENCOUNTER — APPOINTMENT (OUTPATIENT)
Dept: GENERAL RADIOLOGY | Age: 74
DRG: 001 | End: 2024-11-15
Payer: MEDICARE

## 2024-11-15 LAB
ALBUMIN SERPL-MCNC: 3.8 G/DL (ref 3.4–5)
ALBUMIN SERPL-MCNC: 3.9 G/DL (ref 3.4–5)
ALBUMIN/GLOB SERPL: 2.2 {RATIO} (ref 1.1–2.2)
ALP SERPL-CCNC: 102 U/L (ref 40–129)
ALP SERPL-CCNC: 116 U/L (ref 40–129)
ALT SERPL-CCNC: 27 U/L (ref 10–40)
ALT SERPL-CCNC: 32 U/L (ref 10–40)
ANION GAP SERPL CALCULATED.3IONS-SCNC: 10 MMOL/L (ref 3–16)
ANION GAP SERPL CALCULATED.3IONS-SCNC: 12 MMOL/L (ref 3–16)
AST SERPL-CCNC: 41 U/L (ref 15–37)
AST SERPL-CCNC: 49 U/L (ref 15–37)
BASE EXCESS BLDA CALC-SCNC: -2 MMOL/L (ref -3–3)
BASE EXCESS BLDA CALC-SCNC: -2 MMOL/L (ref -3–3)
BASE EXCESS MIXED: -1
BASE EXCESS MIXED: -2
BASOPHILS # BLD: 0 K/UL (ref 0–0.2)
BASOPHILS NFR BLD: 0.7 %
BILIRUB DIRECT SERPL-MCNC: 0.9 MG/DL (ref 0–0.3)
BILIRUB INDIRECT SERPL-MCNC: 2.4 MG/DL (ref 0–1)
BILIRUB SERPL-MCNC: 3.3 MG/DL (ref 0–1)
BILIRUB SERPL-MCNC: 3.3 MG/DL (ref 0–1)
BLOOD BANK DISPENSE STATUS: NORMAL
BLOOD BANK PRODUCT CODE: NORMAL
BPU ID: NORMAL
BUN SERPL-MCNC: 15 MG/DL (ref 7–20)
BUN SERPL-MCNC: 16 MG/DL (ref 7–20)
CALCIUM SERPL-MCNC: 8.6 MG/DL (ref 8.3–10.6)
CALCIUM SERPL-MCNC: 8.6 MG/DL (ref 8.3–10.6)
CHLORIDE SERPL-SCNC: 106 MMOL/L (ref 99–110)
CHLORIDE SERPL-SCNC: 106 MMOL/L (ref 99–110)
CO2 BLDA-SCNC: 21 MMOL/L
CO2 BLDA-SCNC: 22 MMOL/L
CO2 SERPL-SCNC: 19 MMOL/L (ref 21–32)
CO2 SERPL-SCNC: 21 MMOL/L (ref 21–32)
CREAT SERPL-MCNC: 0.7 MG/DL (ref 0.8–1.3)
CREAT SERPL-MCNC: 0.8 MG/DL (ref 0.8–1.3)
DEPRECATED RDW RBC AUTO: 14.5 % (ref 12.4–15.4)
DEPRECATED RDW RBC AUTO: 14.6 % (ref 12.4–15.4)
DESCRIPTION BLOOD BANK: NORMAL
EOSINOPHIL # BLD: 0.4 K/UL (ref 0–0.6)
EOSINOPHIL NFR BLD: 5.3 %
GFR SERPLBLD CREATININE-BSD FMLA CKD-EPI: >90 ML/MIN/{1.73_M2}
GFR SERPLBLD CREATININE-BSD FMLA CKD-EPI: >90 ML/MIN/{1.73_M2}
GLUCOSE BLD-MCNC: 129 MG/DL (ref 70–99)
GLUCOSE BLD-MCNC: 130 MG/DL (ref 70–99)
GLUCOSE BLD-MCNC: 131 MG/DL (ref 70–99)
GLUCOSE BLD-MCNC: 134 MG/DL (ref 70–99)
GLUCOSE BLD-MCNC: 136 MG/DL (ref 70–99)
GLUCOSE SERPL-MCNC: 123 MG/DL (ref 70–99)
GLUCOSE SERPL-MCNC: 125 MG/DL (ref 70–99)
HCO3 BLDA-SCNC: 19.8 MMOL/L (ref 21–29)
HCO3 BLDA-SCNC: 21.2 MMOL/L (ref 21–29)
HCO3, MIXED: 20.8 MMOL/L
HCO3, MIXED: 21.2 MMOL/L
HCO3, MIXED: 21.4 MMOL/L
HCO3, MIXED: 22.3 MMOL/L
HCT VFR BLD AUTO: 19 % (ref 40.5–52.5)
HCT VFR BLD AUTO: 21.3 % (ref 40.5–52.5)
HCT VFR BLD AUTO: 22.5 % (ref 40.5–52.5)
HGB BLD CALC-MCNC: 6.6 GM/DL (ref 13.5–17.5)
HGB BLD-MCNC: 7.3 G/DL (ref 13.5–17.5)
HGB BLD-MCNC: 7.7 G/DL (ref 13.5–17.5)
LACTATE BLD-SCNC: 0.83 MMOL/L (ref 0.4–2)
LYMPHOCYTES # BLD: 1.3 K/UL (ref 1–5.1)
LYMPHOCYTES NFR BLD: 18.3 %
MAGNESIUM SERPL-MCNC: 2.08 MG/DL (ref 1.8–2.4)
MCH RBC QN AUTO: 28.5 PG (ref 26–34)
MCH RBC QN AUTO: 28.7 PG (ref 26–34)
MCHC RBC AUTO-ENTMCNC: 34 G/DL (ref 31–36)
MCHC RBC AUTO-ENTMCNC: 34.4 G/DL (ref 31–36)
MCV RBC AUTO: 83.4 FL (ref 80–100)
MCV RBC AUTO: 83.8 FL (ref 80–100)
MONOCYTES # BLD: 0.4 K/UL (ref 0–1.3)
MONOCYTES NFR BLD: 6.3 %
NEUTROPHILS # BLD: 4.8 K/UL (ref 1.7–7.7)
NEUTROPHILS NFR BLD: 69.4 %
O2 SAT, MIXED: 48 %
O2 SAT, MIXED: 50 %
O2 SAT, MIXED: 50 %
O2 SAT, MIXED: 56 %
PCO2 BLDA: 21.8 MM HG (ref 35–45)
PCO2 BLDA: 25 MM HG (ref 35–45)
PCO2 MIXED: 26.8 MM HG
PCO2 MIXED: 28 MM HG
PCO2 MIXED: 28.5 MM HG
PCO2 MIXED: 29.4 MM HG
PERFORMED ON: ABNORMAL
PH BLDA: 7.54 [PH] (ref 7.35–7.45)
PH BLDA: 7.57 [PH] (ref 7.35–7.45)
PH, MIXED: 7.48 (ref 7.35–7.45)
PH, MIXED: 7.49 (ref 7.35–7.45)
PH, MIXED: 7.49 (ref 7.35–7.45)
PH, MIXED: 7.5 (ref 7.35–7.45)
PLATELET # BLD AUTO: 150 K/UL (ref 135–450)
PLATELET # BLD AUTO: 156 K/UL (ref 135–450)
PMV BLD AUTO: 7.2 FL (ref 5–10.5)
PMV BLD AUTO: 7.3 FL (ref 5–10.5)
PO2 BLDA: 59.5 MM HG (ref 75–108)
PO2 BLDA: 67.8 MM HG (ref 75–108)
PO2 MIXED: 24 MM HG
PO2 MIXED: 26 MM HG
POC ACT LR: 138 SEC
POC ACT LR: 138 SEC
POC ACT LR: 141 SEC
POC ACT LR: 148 SEC
POC ACT LR: 153 SEC
POC ACT LR: 156 SEC
POC ACT LR: 158 SEC
POC ACT LR: 159 SEC
POC ACT LR: 159 SEC
POC ACT LR: 160 SEC
POC ACT LR: 161 SEC
POC ACT LR: 162 SEC
POC ACT LR: 163 SEC
POC ACT LR: 163 SEC
POC ACT LR: 164 SEC
POC ACT LR: 165 SEC
POC ACT LR: 170 SEC
POC ACT LR: 174 SEC
POC ACT LR: 175 SEC
POC ACT LR: 180 SEC
POC ACT LR: 183 SEC
POC ACT LR: 186 SEC
POC SAMPLE TYPE: ABNORMAL
POTASSIUM SERPL-SCNC: 3.3 MMOL/L (ref 3.5–5.1)
POTASSIUM SERPL-SCNC: 3.7 MMOL/L (ref 3.5–5.1)
PROT SERPL-MCNC: 5.4 G/DL (ref 6.4–8.2)
PROT SERPL-MCNC: 5.7 G/DL (ref 6.4–8.2)
RBC # BLD AUTO: 2.56 M/UL (ref 4.2–5.9)
RBC # BLD AUTO: 2.69 M/UL (ref 4.2–5.9)
SAO2 % BLDA: 94 % (ref 93–100)
SAO2 % BLDA: 96 % (ref 93–100)
SODIUM SERPL-SCNC: 137 MMOL/L (ref 136–145)
SODIUM SERPL-SCNC: 137 MMOL/L (ref 136–145)
TCO2 CALC MIXED: 22 MMOL/L
TCO2 CALC MIXED: 23 MMOL/L
WBC # BLD AUTO: 6.9 K/UL (ref 4–11)
WBC # BLD AUTO: 7.3 K/UL (ref 4–11)

## 2024-11-15 PROCEDURE — 2700000000 HC OXYGEN THERAPY PER DAY

## 2024-11-15 PROCEDURE — 2580000003 HC RX 258: Performed by: INTERNAL MEDICINE

## 2024-11-15 PROCEDURE — 80053 COMPREHEN METABOLIC PANEL: CPT

## 2024-11-15 PROCEDURE — 6370000000 HC RX 637 (ALT 250 FOR IP): Performed by: NURSE PRACTITIONER

## 2024-11-15 PROCEDURE — 97166 OT EVAL MOD COMPLEX 45 MIN: CPT

## 2024-11-15 PROCEDURE — 99233 SBSQ HOSP IP/OBS HIGH 50: CPT | Performed by: INTERNAL MEDICINE

## 2024-11-15 PROCEDURE — 85025 COMPLETE CBC W/AUTO DIFF WBC: CPT

## 2024-11-15 PROCEDURE — 6370000000 HC RX 637 (ALT 250 FOR IP): Performed by: INTERNAL MEDICINE

## 2024-11-15 PROCEDURE — 97530 THERAPEUTIC ACTIVITIES: CPT

## 2024-11-15 PROCEDURE — 83605 ASSAY OF LACTIC ACID: CPT

## 2024-11-15 PROCEDURE — 74018 RADEX ABDOMEN 1 VIEW: CPT

## 2024-11-15 PROCEDURE — 2100000000 HC CCU R&B

## 2024-11-15 PROCEDURE — 36592 COLLECT BLOOD FROM PICC: CPT

## 2024-11-15 PROCEDURE — 37799 UNLISTED PX VASCULAR SURGERY: CPT

## 2024-11-15 PROCEDURE — 82803 BLOOD GASES ANY COMBINATION: CPT

## 2024-11-15 PROCEDURE — 85014 HEMATOCRIT: CPT

## 2024-11-15 PROCEDURE — 2580000003 HC RX 258: Performed by: ANESTHESIOLOGY

## 2024-11-15 PROCEDURE — 2580000003 HC RX 258: Performed by: NURSE PRACTITIONER

## 2024-11-15 PROCEDURE — 94761 N-INVAS EAR/PLS OXIMETRY MLT: CPT

## 2024-11-15 PROCEDURE — 6360000002 HC RX W HCPCS: Performed by: INTERNAL MEDICINE

## 2024-11-15 PROCEDURE — 85027 COMPLETE CBC AUTOMATED: CPT

## 2024-11-15 PROCEDURE — 82947 ASSAY GLUCOSE BLOOD QUANT: CPT

## 2024-11-15 PROCEDURE — 6360000002 HC RX W HCPCS: Performed by: NURSE PRACTITIONER

## 2024-11-15 PROCEDURE — 92526 ORAL FUNCTION THERAPY: CPT

## 2024-11-15 PROCEDURE — 83735 ASSAY OF MAGNESIUM: CPT

## 2024-11-15 PROCEDURE — 85347 COAGULATION TIME ACTIVATED: CPT

## 2024-11-15 PROCEDURE — 97162 PT EVAL MOD COMPLEX 30 MIN: CPT

## 2024-11-15 PROCEDURE — 2500000003 HC RX 250 WO HCPCS: Performed by: INTERNAL MEDICINE

## 2024-11-15 RX ORDER — CARVEDILOL 6.25 MG/1
6.25 TABLET ORAL 2 TIMES DAILY WITH MEALS
Status: DISCONTINUED | OUTPATIENT
Start: 2024-11-16 | End: 2024-11-15

## 2024-11-15 RX ORDER — SPIRONOLACTONE 25 MG/1
25 TABLET ORAL DAILY
Status: DISCONTINUED | OUTPATIENT
Start: 2024-11-15 | End: 2024-11-21 | Stop reason: HOSPADM

## 2024-11-15 RX ORDER — CARVEDILOL 6.25 MG/1
6.25 TABLET ORAL 2 TIMES DAILY WITH MEALS
Status: DISCONTINUED | OUTPATIENT
Start: 2024-11-15 | End: 2024-11-17 | Stop reason: DRUGHIGH

## 2024-11-15 RX ADMIN — NITROGLYCERIN 70 MCG/MIN: 20 INJECTION INTRAVENOUS at 16:06

## 2024-11-15 RX ADMIN — Medication 2 CAPSULE: at 16:05

## 2024-11-15 RX ADMIN — SODIUM CHLORIDE, PRESERVATIVE FREE 40 MG: 5 INJECTION INTRAVENOUS at 08:53

## 2024-11-15 RX ADMIN — TICAGRELOR 90 MG: 90 TABLET ORAL at 20:59

## 2024-11-15 RX ADMIN — ATORVASTATIN CALCIUM 80 MG: 80 TABLET, FILM COATED ORAL at 20:59

## 2024-11-15 RX ADMIN — HYDROXYZINE HYDROCHLORIDE 10 MG: 10 TABLET, FILM COATED ORAL at 15:50

## 2024-11-15 RX ADMIN — SACUBITRIL AND VALSARTAN 1 TABLET: 24; 26 TABLET, FILM COATED ORAL at 20:59

## 2024-11-15 RX ADMIN — POTASSIUM CHLORIDE 20 MEQ: 29.8 INJECTION, SOLUTION INTRAVENOUS at 01:09

## 2024-11-15 RX ADMIN — NITROGLYCERIN 130 MCG/MIN: 20 INJECTION INTRAVENOUS at 06:12

## 2024-11-15 RX ADMIN — POTASSIUM CHLORIDE 40 MEQ: 1500 TABLET, EXTENDED RELEASE ORAL at 14:30

## 2024-11-15 RX ADMIN — SODIUM CHLORIDE, PRESERVATIVE FREE 40 MG: 5 INJECTION INTRAVENOUS at 20:59

## 2024-11-15 RX ADMIN — OXYCODONE 5 MG: 5 TABLET ORAL at 15:30

## 2024-11-15 RX ADMIN — DEXMEDETOMIDINE HYDROCHLORIDE 0.6 MCG/KG/HR: 400 INJECTION INTRAVENOUS at 06:02

## 2024-11-15 RX ADMIN — OXYCODONE 5 MG: 5 TABLET ORAL at 20:59

## 2024-11-15 RX ADMIN — OXYCODONE 5 MG: 5 TABLET ORAL at 06:33

## 2024-11-15 RX ADMIN — SODIUM BICARBONATE: 84 INJECTION, SOLUTION INTRAVENOUS at 00:09

## 2024-11-15 RX ADMIN — TICAGRELOR 90 MG: 90 TABLET ORAL at 08:53

## 2024-11-15 RX ADMIN — HYDROMORPHONE HYDROCHLORIDE 1 MG: 1 INJECTION, SOLUTION INTRAMUSCULAR; INTRAVENOUS; SUBCUTANEOUS at 18:21

## 2024-11-15 RX ADMIN — ACETAMINOPHEN 325MG 650 MG: 325 TABLET ORAL at 10:56

## 2024-11-15 RX ADMIN — HYDROXYZINE HYDROCHLORIDE 10 MG: 10 TABLET, FILM COATED ORAL at 00:31

## 2024-11-15 RX ADMIN — DEXMEDETOMIDINE HYDROCHLORIDE 0.4 MCG/KG/HR: 400 INJECTION INTRAVENOUS at 16:03

## 2024-11-15 RX ADMIN — ASPIRIN 81 MG: 81 TABLET, CHEWABLE ORAL at 08:53

## 2024-11-15 RX ADMIN — SODIUM CHLORIDE 500 ML: 9 INJECTION, SOLUTION INTRAVENOUS at 09:26

## 2024-11-15 RX ADMIN — CEFEPIME 2000 MG: 2 INJECTION, POWDER, FOR SOLUTION INTRAVENOUS at 09:14

## 2024-11-15 RX ADMIN — CARVEDILOL 6.25 MG: 6.25 TABLET, FILM COATED ORAL at 17:26

## 2024-11-15 RX ADMIN — OXYCODONE 5 MG: 5 TABLET ORAL at 00:31

## 2024-11-15 RX ADMIN — HEPARIN SODIUM 1700 UNITS/HR: 10000 INJECTION, SOLUTION INTRAVENOUS at 06:11

## 2024-11-15 RX ADMIN — HYDROMORPHONE HYDROCHLORIDE 1 MG: 1 INJECTION, SOLUTION INTRAMUSCULAR; INTRAVENOUS; SUBCUTANEOUS at 03:21

## 2024-11-15 RX ADMIN — Medication 2 CAPSULE: at 08:53

## 2024-11-15 RX ADMIN — ACETAMINOPHEN 325MG 650 MG: 325 TABLET ORAL at 06:33

## 2024-11-15 RX ADMIN — POTASSIUM CHLORIDE 20 MEQ: 29.8 INJECTION, SOLUTION INTRAVENOUS at 00:08

## 2024-11-15 RX ADMIN — SACUBITRIL AND VALSARTAN 1 TABLET: 24; 26 TABLET, FILM COATED ORAL at 08:53

## 2024-11-15 RX ADMIN — SPIRONOLACTONE 25 MG: 25 TABLET ORAL at 17:23

## 2024-11-15 RX ADMIN — OXYCODONE 5 MG: 5 TABLET ORAL at 10:57

## 2024-11-15 RX ADMIN — SODIUM CHLORIDE 500 ML: 9 INJECTION, SOLUTION INTRAVENOUS at 13:05

## 2024-11-15 RX ADMIN — HYDROXYZINE HYDROCHLORIDE 10 MG: 10 TABLET, FILM COATED ORAL at 09:30

## 2024-11-15 RX ADMIN — ACETAMINOPHEN 325MG 650 MG: 325 TABLET ORAL at 15:00

## 2024-11-15 RX ADMIN — HEPARIN SODIUM 1550 UNITS/HR: 10000 INJECTION, SOLUTION INTRAVENOUS at 21:53

## 2024-11-15 RX ADMIN — CEFEPIME 2000 MG: 2 INJECTION, POWDER, FOR SOLUTION INTRAVENOUS at 16:04

## 2024-11-15 RX ADMIN — CARVEDILOL 3.12 MG: 3.12 TABLET, FILM COATED ORAL at 08:53

## 2024-11-15 RX ADMIN — SODIUM CHLORIDE, PRESERVATIVE FREE 10 ML: 5 INJECTION INTRAVENOUS at 08:59

## 2024-11-15 ASSESSMENT — PAIN DESCRIPTION - ORIENTATION
ORIENTATION: LOWER
ORIENTATION: ANTERIOR;LOWER;MID
ORIENTATION: RIGHT;MID;LOWER
ORIENTATION: ANTERIOR;MID
ORIENTATION: RIGHT
ORIENTATION: RIGHT;LEFT;POSTERIOR
ORIENTATION: LEFT;RIGHT;POSTERIOR
ORIENTATION: RIGHT;LEFT;POSTERIOR

## 2024-11-15 ASSESSMENT — PAIN DESCRIPTION - LOCATION
LOCATION: BACK
LOCATION: BACK;SHOULDER
LOCATION: BACK;CHEST
LOCATION: CHEST;BACK
LOCATION: SHOULDER
LOCATION: BACK

## 2024-11-15 ASSESSMENT — PAIN DESCRIPTION - DESCRIPTORS
DESCRIPTORS: ACHING;DISCOMFORT
DESCRIPTORS: ACHING
DESCRIPTORS: ACHING;DISCOMFORT
DESCRIPTORS: ACHING;DISCOMFORT
DESCRIPTORS: ACHING;DISCOMFORT;THROBBING
DESCRIPTORS: ACHING;SORE
DESCRIPTORS: ACHING;DISCOMFORT;THROBBING

## 2024-11-15 ASSESSMENT — PAIN SCALES - GENERAL
PAINLEVEL_OUTOF10: 5
PAINLEVEL_OUTOF10: 8
PAINLEVEL_OUTOF10: 0
PAINLEVEL_OUTOF10: 9
PAINLEVEL_OUTOF10: 2
PAINLEVEL_OUTOF10: 0
PAINLEVEL_OUTOF10: 8
PAINLEVEL_OUTOF10: 6
PAINLEVEL_OUTOF10: 0
PAINLEVEL_OUTOF10: 10
PAINLEVEL_OUTOF10: 1

## 2024-11-15 ASSESSMENT — PAIN SCALES - WONG BAKER: WONGBAKER_NUMERICALRESPONSE: NO HURT

## 2024-11-15 ASSESSMENT — PAIN DESCRIPTION - PAIN TYPE: TYPE: ACUTE PAIN;SURGICAL PAIN

## 2024-11-15 ASSESSMENT — PAIN DESCRIPTION - FREQUENCY: FREQUENCY: CONTINUOUS

## 2024-11-15 ASSESSMENT — PAIN DESCRIPTION - ONSET: ONSET: ON-GOING

## 2024-11-15 NOTE — BRIEF OP NOTE
Brief Postoperative Note      Patient: Ad Lacey  YOB: 1950  MRN: [id number]    Date of Procedure: 11/14/2024    Pre-Op Diagnosis Codes:      * CAD (coronary artery disease) [I25.10]    Post-Op Diagnosis: Same       Procedure(s):  Coronary angiography  [place] filippo  insertion    Surgeon(s):  Dillon Rodríguez MD    Assistant:  * No surgical staff found *    Anesthesia: IV Sedation    Estimated Blood Loss (mL): less than 50     Complications: None        Drains:   NG/OG/NJ/NE Tube Nasogastric Right nostril (Active)   Surrounding Skin Clean, dry & intact 11/14/24 2000   Securement device Adhesive based urban 11/14/24 2000   Status Suction-low continuous 11/14/24 2200   Placement Verified External Catheter Length 11/14/24 2000   NG/OG/NJ/NE External Measurement (cm) 68 cm 11/14/24 2000   Drainage Appearance Bile 11/14/24 2200   Free Water/Flush (mL) 75 mL 11/14/24 2030   Output (mL) 50 ml 11/14/24 2200   Action Taken Retaped 11/14/24 0800       Urinary Catheter 11/09/24 [place] (Active)   $ Urethral catheter insertion $ Not inserted for procedure 11/13/24 0800   Catheter Indications Need for fluid volume management of the critically ill patient in a critical care setting 11/14/24 2000   Site Assessment No urethral drainage 11/14/24 2000   Urine Color Yellow 11/14/24 2000   Urine Appearance Clear 11/14/24 2000   Collection Container Standard 11/14/24 2000   Securement Method Securing device (Describe) 11/14/24 2000   Catheter Care  Perineal wipes 11/14/24 0600   Catheter Best Practices  Drainage tube clipped to bed;Catheter secured to thigh;Tamper seal intact;Bag below bladder;Bag not on floor;Lack of dependent loop in tubing;Drainage bag less than half full 11/14/24 2000   Status Draining;Patent 11/14/24 2000   Output (mL) 295 mL 11/14/24 2200       [REMOVED] NG/OG/NJ/NE Tube Orogastric Center mouth (Removed)   Surrounding Skin Clean, dry & intact 11/10/24 0800   Securement device Tape 11/10/24 0800

## 2024-11-15 NOTE — FLOWSHEET NOTE
11/15/24 0500   [place]-Yaneli   PAP (Systolic/Diastolic) 27/8   PAP (Mean) 14 mmHg   CVP (Mean) 4 mmHg   SVO2 (%) 50.3 %   CO (l/min) 4.8 l/min   CCO 4.8 L/Min   CI (l/min/m2) 2.5 l/min/m2   SVR (Using ABP Mean) 1250 dyne*sec/cm5     Impella 5.5 @ P-7 generating 3.5L flow  Gtts: Ntg @ 110mcg, precedex @ 0.6mcg.   Net negative 329ml so for the shift.

## 2024-11-15 NOTE — SEDATION DOCUMENTATION
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         Surgical History:  Past Surgical History:   Procedure Laterality Date    CARDIAC CATHETERIZATION  2022    CARDIAC PROCEDURE N/A 11/9/2024    Left heart cath / coronary angiography performed by Dillon Rodríguez MD at [facility] CARDIAC CATH LAB    CARDIAC PROCEDURE N/A 11/9/2024    Percutaneous coronary intervention performed by Dillon Rodríguez MD at [facility] CARDIAC CATH LAB    CARDIAC PROCEDURE N/A 11/9/2024    Ventricular assist device (VAD) insertion performed by Dillon Rodríguez MD at [facility] CARDIAC CATH LAB    FEMORAL BYPASS Right 7/5/2022    RIGHT FEMORAL POPLITEAL BYPASS performed by Olvin Iyer DO at [facility] OR    HIP SURGERY Right 4/24/2022    HIP PINNING performed by Alexx Monreal MD at [facility] OR         Medications:  Current Facility-Administered Medications   Medication Dose Route Frequency Provider Last Rate Last Admin    bisacodyl (DULCOLAX) suppository 10 mg  10 mg Rectal Daily PRN Evonne Menchaca MD   10 mg at 11/14/24 0830    hydrOXYzine HCl (ATARAX) tablet 10 mg  10 mg Oral TID PRN Dillon Rodríguez MD        carvedilol (COREG) tablet 3.125 mg  3.125 mg Oral BID WC Dillon Rodríguez MD   3.125 mg at 11/14/24 0911    albuterol (PROVENTIL) (2.5 MG/3ML) 0.083% nebulizer solution 2.5 mg  2.5 mg Nebulization Q6H PRN Armen Jain DO        sacubitril-valsartan (ENTRESTO)

## 2024-11-15 NOTE — FLOWSHEET NOTE
11/15/24 1400   [place]-Yaneli   PAP (Systolic/Diastolic) 27/5   PAP (Mean) 15 mmHg   CVP (Mean) 4 mmHg   MAP (Monitor) 83   PAP Wave Form Dampened;Appropriate wave forms;Rezeroed   CVP (mmHg) 4 mmHg   SVO2 (%) 55.7 %   CO (l/min) 4.9 l/min   CCO 4.9 L/Min   CI (l/min/m2) 2.5 l/min/m2   SVR (Using NBP Mean) 1289.8 dyne*sec/cm5   SVR (Using ABP Mean) 1224.49 dyne*sec/cm5   Core (Body) Temperature 97.8 °F (36.6 °C)   Impella Left   Systolic 101   Diastolic 61   Mean 52   Impella Performance Level P-5   Placement (cm) 48 cm   Pedal Pulse +2   Purge Flow 10.2 mL/hr   Purge Pressure 525 mmHg   Impella Flow 2.6 L/min   Motor Current - Systolic 272   Motor Current - Diastolic 191   Motor Current - Mean 218   Motor Current Pulsatile Yes   Activated Clotting Time (ACT) 189   Location Right Axillary   Impella Site Check Yes    0.86   NAKIA 5.5     Discussed with Dr. Rodríguez.    Change P level from P5 to P4.     Nitroglycerin @ 90 mcg/min  Precedex @ 0.4 mcg/kg/hr  Heparin @ 1700 units/hr    Electronically signed by Melina Galindo RN on 11/15/2024 at 3:00 PM

## 2024-11-15 NOTE — CONSULTS
Clinical Pharmacy Note  Heparin Dosing - Impella Device    Consult received from Dr. Rodríguez to titrate systemic heparin to maintain -180.    Shift ACT Results and Heparin Adjustments:      Date   Time POC ACT  Result Heparin  Bolus   (units) Systemic Heparin Infusion Rate (100 units/mL)   11/09 2300   300       Pharmacy will continue to monitor and adjust based on ACT results.     Deborah Corcoran, KathyD    
GASTROENTEROLOGY INPATIENT CONSULTATION:        IDENTIFYING DATA/REASON FOR CONSULTATION   PATIENT:  Ad Lacey  MRN:  [id number]  ADMIT DATE: 11/9/2024  TIME OF EVALUATION: 11/13/2024 1:01 PM  HOSPITAL STAY:   LOS: 4 days     REASON FOR CONSULTATION:  vomiting     HISTORY OF PRESENT ILLNESS   Ad Lacey is a 74 y.o. male with history of CAD, prior PCI 2022, peripheral artery disease status post femoral-popliteal bypass, paroxysmal atrial fibrillation, who presents after cardiac arrest 11/9/2024.  He had V-fib arrest at home with ROSC and intubated in the field.  He has had PCI due to STEMI and 100% LAD occlusion/LAD in-stent thrombosis, severe LV dysfunction with EF 20%, with Impella in place.    GIs been consulted due to multiple episodes of projectile emesis this morning.  Patient currently has an NG tube in place with bilious output and some scant hematin.  He denies abdominal pain.  He has not had a bowel movement in 8 days.    He states that he has otherwise been healthy his entire life.  He did see Dr. Laboy and brought in September 2024 due to a presacral cyst and there was discussion at rectal cancer tumor board to consider transperineal excision.  He has not had a prior colonoscopy or endoscopy.    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 
[facility]  Consultation Note    11/11/2024 1:45 PM    Chief Complaint: Chest Pain/Cardiac Arrest     Reason for Consultation: Impella 5.5 Upgrade    History of Present Illness:  Patient is a 74 y.o. male who presented to the ED on 11/9/2024 with complaints of Chest Pain. He has a significant PMH of Anxiety, CAD, PAD, and Right Fem-Pop Artery Bypass with Dr. Iyer in July 2022. The patient apparently called EMS with complaints of chest pain and went into V-fib cardiac arrest while en route to the ED. ROSC was achieved while en route and the patient was noted to have a pulse upon arrival to the ED. The patient underwent Aspiration Thrombectomy of the Proximal LAD with Cardiology and an Impella Device was placed for severe LV Dysfunction. We are consulted to evaluate the patient for Impella 5.5 Upgrade. The patient is seen resting in bed, he is alert and oriented and appears to be in critical but stable condition.       Review of Systems  Review of Systems   Constitutional: Negative.    HENT: Negative.     Respiratory: Negative.     Cardiovascular: Negative.    Gastrointestinal: Negative.    Musculoskeletal: Negative.    Skin: Negative.    Neurological: Negative.    Psychiatric/Behavioral: Negative.          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 
REASON FOR CONSULTATION/CC:  intubated       Consult at request of Dillon Rodríguez MD for      PCP: Drea Frost, APRN - CNP  Established Pulmonologist:   None    HISTORY OF PRESENT ILLNESS: Ad Lacey is a 74 y.o. year old male with a history of  who presents with        Currently mechanical ventilation therefore all history per the chart.  Patient was taken to the Cath Lab last night secondary to STEMI.  Found to have a EF less than 20% with 100 and occlusion of the proximal LAD with stent thrombosis.  Status post aspiration with additional LAD stent placed.  Impella placed after procedure.      This note may have been  transcribed using Dragon Dictation software. Please disregard any translational errors.      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        Mechanical ventilator  Change to SBT.  Did well.  Per nursing report, needs to stay on sedation for CV issues.  Therefore, no extubation.       Abnormal radiograph  Fevers  Concern for aspiration.  Possibly pulmonary edema.  With fevers, will start empiric antibiotics of Unasyn.  Check sputum culture with pneumonia molecular panel.  Check procalcitonin.      Hemoptysis   Per nursing report, patient was bolused nearly entire bag of heparin.  I cannot find documentation to support this but can find protomine given 12:36 this morning.    Nurse trying to find documentation.   Hemoptysis could be from intubation trauma vs heparin with brilinta         Prophylaxis  - GI - pepcid    - DVT - heparin        Nutrition  - Diet NPO    Mobility       Access  Arterial     Arterial Sheath 11/09/24 Left (Active)   Site Assessment Clean, dry & intact 11/10/24 0800   Line Status Intact and in place;Blood return noted 11/10/24 0800   Dressing Occlusive 11/10/24 0800   Dressing Status Clean, dry & 
initiated PT today.   Noting impairments in strength, endurance, balance, cognition.   Anticipating will need rehab. Will follow progress to get better sense of his activity tolerance to determine if ARU is appropriate.   D/w RN and SW.     Thank you for this consult. Please contact me with any questions or concerns.     Nicolasa Abreu MD 11/15/2024, 10:15 AM

## 2024-11-15 NOTE — FLOWSHEET NOTE
11/15/24 0000   [place]-Yaneli   PAP (Systolic/Diastolic) 24/8   PAP (Mean) 12 mmHg   CVP (Mean) 2 mmHg   SVO2 (%) 52.7 %   CO (l/min) 4.3 l/min   CCO 4.3 L/Min   CI (l/min/m2) 2.2 l/min/m2   SVR (Using NBP Mean) 1246.51 dyne*sec/cm5   SVR (Using ABP Mean) 1246.51 dyne*sec/cm5     Impella 5.5 @ P-6 generating 3.1L flow. Attempting to titrate to P-7 after albumin infused. Frequent conduction changes and bradycardia causing intermittent hypotension and suction alarms.     Nitro @ 90mcg.   Precedex @ 0.7mcg.

## 2024-11-15 NOTE — FLOWSHEET NOTE
11/15/24 0800   [place]-Yaneli   PAP (Systolic/Diastolic) 29/12   PAP (Mean) 18 mmHg   CVP (Mean) 8 mmHg   MAP (Monitor) 71   PAP Wave Form Appropriate wave forms;Dampened;Rezeroed   CVP (mmHg) 8 mmHg   SVO2 (%) 48.4 %   CO (l/min) 4.4 l/min   CCO 4.4 L/Min   CI (l/min/m2) 2.3 l/min/m2   SVR (Using NBP Mean) 1145.45 dyne*sec/cm5   Core (Body) Temperature 98.4 °F (36.9 °C)     Impella @ P6    Heparin @ 1700 units/hr  Precedex @ 0.6 mcg/kg/hr  Nitroglycerin @ 120 mcg/min    Discussed with Dr. Rodríguez

## 2024-11-16 LAB
ALBUMIN SERPL-MCNC: 3.4 G/DL (ref 3.4–5)
ALP SERPL-CCNC: 126 U/L (ref 40–129)
ALT SERPL-CCNC: 43 U/L (ref 10–40)
ANION GAP SERPL CALCULATED.3IONS-SCNC: 12 MMOL/L (ref 3–16)
AST SERPL-CCNC: 48 U/L (ref 15–37)
BASE EXCESS MIXED: -2
BASE EXCESS MIXED: -3
BASE EXCESS MIXED: -3
BASE EXCESS MIXED: -5
BILIRUB DIRECT SERPL-MCNC: 0.8 MG/DL (ref 0–0.3)
BILIRUB INDIRECT SERPL-MCNC: 2 MG/DL (ref 0–1)
BILIRUB SERPL-MCNC: 2.8 MG/DL (ref 0–1)
BUN SERPL-MCNC: 16 MG/DL (ref 7–20)
CALCIUM SERPL-MCNC: 8.4 MG/DL (ref 8.3–10.6)
CHLORIDE SERPL-SCNC: 108 MMOL/L (ref 99–110)
CO2 SERPL-SCNC: 18 MMOL/L (ref 21–32)
CREAT SERPL-MCNC: 0.7 MG/DL (ref 0.8–1.3)
DEPRECATED RDW RBC AUTO: 14.7 % (ref 12.4–15.4)
ECHO BSA: 1.95 M2
GFR SERPLBLD CREATININE-BSD FMLA CKD-EPI: >90 ML/MIN/{1.73_M2}
GLUCOSE BLD-MCNC: 115 MG/DL (ref 70–99)
GLUCOSE BLD-MCNC: 122 MG/DL (ref 70–99)
GLUCOSE BLD-MCNC: 123 MG/DL (ref 70–99)
GLUCOSE BLD-MCNC: 124 MG/DL (ref 70–99)
GLUCOSE BLD-MCNC: 133 MG/DL (ref 70–99)
GLUCOSE SERPL-MCNC: 107 MG/DL (ref 70–99)
HCO3, MIXED: 19.4 MMOL/L
HCO3, MIXED: 20.3 MMOL/L
HCO3, MIXED: 20.8 MMOL/L
HCO3, MIXED: 21.5 MMOL/L
HCT VFR BLD AUTO: 21 % (ref 40.5–52.5)
HGB BLD-MCNC: 7.4 G/DL (ref 13.5–17.5)
MAGNESIUM SERPL-MCNC: 2.08 MG/DL (ref 1.8–2.4)
MCH RBC QN AUTO: 29.2 PG (ref 26–34)
MCHC RBC AUTO-ENTMCNC: 35.1 G/DL (ref 31–36)
MCV RBC AUTO: 83.3 FL (ref 80–100)
O2 SAT, MIXED: 46 %
O2 SAT, MIXED: 47 %
O2 SAT, MIXED: 50 %
O2 SAT, MIXED: 56 %
PCO2 MIXED: 27.1 MM HG
PCO2 MIXED: 28.6 MM HG
PCO2 MIXED: 28.8 MM HG
PCO2 MIXED: 29.6 MM HG
PERFORMED ON: ABNORMAL
PERFORMED ON: NORMAL
PH, MIXED: 7.42 (ref 7.35–7.45)
PH, MIXED: 7.47 (ref 7.35–7.45)
PH, MIXED: 7.48 (ref 7.35–7.45)
PH, MIXED: 7.48 (ref 7.35–7.45)
PLATELET # BLD AUTO: 163 K/UL (ref 135–450)
PMV BLD AUTO: 7.1 FL (ref 5–10.5)
PO2 MIXED: 23 MM HG
PO2 MIXED: 24 MM HG
PO2 MIXED: 25 MM HG
PO2 MIXED: 26 MM HG
POC SAMPLE TYPE: ABNORMAL
POC SAMPLE TYPE: NORMAL
POTASSIUM SERPL-SCNC: 3.7 MMOL/L (ref 3.5–5.1)
PROT SERPL-MCNC: 4.8 G/DL (ref 6.4–8.2)
RBC # BLD AUTO: 2.53 M/UL (ref 4.2–5.9)
SODIUM SERPL-SCNC: 138 MMOL/L (ref 136–145)
TCO2 CALC MIXED: 20 MMOL/L
TCO2 CALC MIXED: 21 MMOL/L
TCO2 CALC MIXED: 22 MMOL/L
TCO2 CALC MIXED: 22 MMOL/L
WBC # BLD AUTO: 6.6 K/UL (ref 4–11)

## 2024-11-16 PROCEDURE — 37799 UNLISTED PX VASCULAR SURGERY: CPT

## 2024-11-16 PROCEDURE — 36592 COLLECT BLOOD FROM PICC: CPT

## 2024-11-16 PROCEDURE — 6370000000 HC RX 637 (ALT 250 FOR IP): Performed by: NURSE PRACTITIONER

## 2024-11-16 PROCEDURE — 6360000002 HC RX W HCPCS: Performed by: INTERNAL MEDICINE

## 2024-11-16 PROCEDURE — 6370000000 HC RX 637 (ALT 250 FOR IP): Performed by: INTERNAL MEDICINE

## 2024-11-16 PROCEDURE — 85347 COAGULATION TIME ACTIVATED: CPT

## 2024-11-16 PROCEDURE — 85027 COMPLETE CBC AUTOMATED: CPT

## 2024-11-16 PROCEDURE — 99233 SBSQ HOSP IP/OBS HIGH 50: CPT | Performed by: INTERNAL MEDICINE

## 2024-11-16 PROCEDURE — 2100000000 HC CCU R&B

## 2024-11-16 PROCEDURE — 82803 BLOOD GASES ANY COMBINATION: CPT

## 2024-11-16 PROCEDURE — 6360000002 HC RX W HCPCS: Performed by: NURSE PRACTITIONER

## 2024-11-16 PROCEDURE — 2580000003 HC RX 258: Performed by: NURSE PRACTITIONER

## 2024-11-16 PROCEDURE — APPNB15 APP NON BILLABLE TIME 0-15 MINS: Performed by: NURSE PRACTITIONER

## 2024-11-16 PROCEDURE — 83735 ASSAY OF MAGNESIUM: CPT

## 2024-11-16 PROCEDURE — 2580000003 HC RX 258: Performed by: INTERNAL MEDICINE

## 2024-11-16 PROCEDURE — 80076 HEPATIC FUNCTION PANEL: CPT

## 2024-11-16 PROCEDURE — 80048 BASIC METABOLIC PNL TOTAL CA: CPT

## 2024-11-16 PROCEDURE — 2500000003 HC RX 250 WO HCPCS: Performed by: INTERNAL MEDICINE

## 2024-11-16 PROCEDURE — 2580000003 HC RX 258: Performed by: ANESTHESIOLOGY

## 2024-11-16 PROCEDURE — 82947 ASSAY GLUCOSE BLOOD QUANT: CPT

## 2024-11-16 RX ORDER — ALBUMIN HUMAN 50 G/1000ML
SOLUTION INTRAVENOUS
Status: COMPLETED
Start: 2024-11-16 | End: 2024-11-17

## 2024-11-16 RX ORDER — ALBUMIN HUMAN 50 G/1000ML
25 SOLUTION INTRAVENOUS PRN
Status: DISCONTINUED | OUTPATIENT
Start: 2024-11-16 | End: 2024-11-21 | Stop reason: HOSPADM

## 2024-11-16 RX ORDER — OXYCODONE AND ACETAMINOPHEN 5; 325 MG/1; MG/1
2 TABLET ORAL EVERY 4 HOURS PRN
Status: DISCONTINUED | OUTPATIENT
Start: 2024-11-16 | End: 2024-11-21 | Stop reason: HOSPADM

## 2024-11-16 RX ORDER — POLYETHYLENE GLYCOL 3350 17 G/17G
17 POWDER, FOR SOLUTION ORAL 2 TIMES DAILY
Status: DISCONTINUED | OUTPATIENT
Start: 2024-11-16 | End: 2024-11-21 | Stop reason: HOSPADM

## 2024-11-16 RX ADMIN — SODIUM BICARBONATE: 84 INJECTION, SOLUTION INTRAVENOUS at 00:37

## 2024-11-16 RX ADMIN — SODIUM CHLORIDE, PRESERVATIVE FREE 40 MG: 5 INJECTION INTRAVENOUS at 21:12

## 2024-11-16 RX ADMIN — CARVEDILOL 6.25 MG: 6.25 TABLET, FILM COATED ORAL at 15:55

## 2024-11-16 RX ADMIN — HEPARIN SODIUM 1300 UNITS/HR: 10000 INJECTION, SOLUTION INTRAVENOUS at 16:11

## 2024-11-16 RX ADMIN — HYDROXYZINE HYDROCHLORIDE 10 MG: 10 TABLET, FILM COATED ORAL at 23:05

## 2024-11-16 RX ADMIN — HYDROMORPHONE HYDROCHLORIDE 1 MG: 1 INJECTION, SOLUTION INTRAMUSCULAR; INTRAVENOUS; SUBCUTANEOUS at 12:06

## 2024-11-16 RX ADMIN — OXYCODONE HYDROCHLORIDE AND ACETAMINOPHEN 2 TABLET: 5; 325 TABLET ORAL at 10:52

## 2024-11-16 RX ADMIN — SPIRONOLACTONE 25 MG: 25 TABLET ORAL at 08:32

## 2024-11-16 RX ADMIN — Medication 2 CAPSULE: at 15:54

## 2024-11-16 RX ADMIN — SODIUM CHLORIDE, PRESERVATIVE FREE 10 ML: 5 INJECTION INTRAVENOUS at 21:12

## 2024-11-16 RX ADMIN — HYDROMORPHONE HYDROCHLORIDE 1 MG: 1 INJECTION, SOLUTION INTRAMUSCULAR; INTRAVENOUS; SUBCUTANEOUS at 08:41

## 2024-11-16 RX ADMIN — CEFEPIME 2000 MG: 2 INJECTION, POWDER, FOR SOLUTION INTRAVENOUS at 15:44

## 2024-11-16 RX ADMIN — SACUBITRIL AND VALSARTAN 1 TABLET: 24; 26 TABLET, FILM COATED ORAL at 08:32

## 2024-11-16 RX ADMIN — HYDROMORPHONE HYDROCHLORIDE 1 MG: 1 INJECTION, SOLUTION INTRAMUSCULAR; INTRAVENOUS; SUBCUTANEOUS at 16:17

## 2024-11-16 RX ADMIN — HYDROMORPHONE HYDROCHLORIDE 1 MG: 1 INJECTION, SOLUTION INTRAMUSCULAR; INTRAVENOUS; SUBCUTANEOUS at 02:16

## 2024-11-16 RX ADMIN — ACETAMINOPHEN 325MG 650 MG: 325 TABLET ORAL at 06:44

## 2024-11-16 RX ADMIN — CEFEPIME 2000 MG: 2 INJECTION, POWDER, FOR SOLUTION INTRAVENOUS at 08:30

## 2024-11-16 RX ADMIN — SODIUM CHLORIDE, PRESERVATIVE FREE 40 MG: 5 INJECTION INTRAVENOUS at 08:34

## 2024-11-16 RX ADMIN — CEFEPIME 2000 MG: 2 INJECTION, POWDER, FOR SOLUTION INTRAVENOUS at 00:13

## 2024-11-16 RX ADMIN — HYDROXYZINE HYDROCHLORIDE 10 MG: 10 TABLET, FILM COATED ORAL at 06:18

## 2024-11-16 RX ADMIN — ATORVASTATIN CALCIUM 80 MG: 80 TABLET, FILM COATED ORAL at 21:11

## 2024-11-16 RX ADMIN — DEXMEDETOMIDINE HYDROCHLORIDE 0.8 MCG/KG/HR: 400 INJECTION INTRAVENOUS at 00:11

## 2024-11-16 RX ADMIN — CARVEDILOL 6.25 MG: 6.25 TABLET, FILM COATED ORAL at 08:32

## 2024-11-16 RX ADMIN — OXYCODONE 5 MG: 5 TABLET ORAL at 06:44

## 2024-11-16 RX ADMIN — DEXMEDETOMIDINE HYDROCHLORIDE 0.4 MCG/KG/HR: 400 INJECTION INTRAVENOUS at 14:05

## 2024-11-16 RX ADMIN — SACUBITRIL AND VALSARTAN 0.5 TABLET: 24; 26 TABLET, FILM COATED ORAL at 21:09

## 2024-11-16 RX ADMIN — OXYCODONE HYDROCHLORIDE AND ACETAMINOPHEN 2 TABLET: 5; 325 TABLET ORAL at 19:52

## 2024-11-16 RX ADMIN — HYDROMORPHONE HYDROCHLORIDE 1 MG: 1 INJECTION, SOLUTION INTRAMUSCULAR; INTRAVENOUS; SUBCUTANEOUS at 21:14

## 2024-11-16 RX ADMIN — ONDANSETRON 4 MG: 2 INJECTION INTRAMUSCULAR; INTRAVENOUS at 15:40

## 2024-11-16 RX ADMIN — NITROGLYCERIN 50 MCG/MIN: 20 INJECTION INTRAVENOUS at 05:25

## 2024-11-16 RX ADMIN — OXYCODONE HYDROCHLORIDE AND ACETAMINOPHEN 2 TABLET: 5; 325 TABLET ORAL at 14:54

## 2024-11-16 RX ADMIN — DEXMEDETOMIDINE HYDROCHLORIDE 0.8 MCG/KG/HR: 400 INJECTION INTRAVENOUS at 07:04

## 2024-11-16 RX ADMIN — HYDROMORPHONE HYDROCHLORIDE 1 MG: 1 INJECTION, SOLUTION INTRAMUSCULAR; INTRAVENOUS; SUBCUTANEOUS at 05:39

## 2024-11-16 RX ADMIN — Medication 2 CAPSULE: at 08:32

## 2024-11-16 RX ADMIN — POLYETHYLENE GLYCOL 3350 17 G: 17 POWDER, FOR SOLUTION ORAL at 10:52

## 2024-11-16 RX ADMIN — SODIUM CHLORIDE 500 ML: 9 INJECTION, SOLUTION INTRAVENOUS at 13:54

## 2024-11-16 RX ADMIN — HYDROXYZINE HYDROCHLORIDE 10 MG: 10 TABLET, FILM COATED ORAL at 14:50

## 2024-11-16 RX ADMIN — ACETAMINOPHEN 325MG 650 MG: 325 TABLET ORAL at 13:06

## 2024-11-16 ASSESSMENT — PAIN SCALES - GENERAL
PAINLEVEL_OUTOF10: 9
PAINLEVEL_OUTOF10: 6
PAINLEVEL_OUTOF10: 10
PAINLEVEL_OUTOF10: 8
PAINLEVEL_OUTOF10: 0
PAINLEVEL_OUTOF10: 7
PAINLEVEL_OUTOF10: 7
PAINLEVEL_OUTOF10: 4
PAINLEVEL_OUTOF10: 9
PAINLEVEL_OUTOF10: 0
PAINLEVEL_OUTOF10: 8
PAINLEVEL_OUTOF10: 0
PAINLEVEL_OUTOF10: 0
PAINLEVEL_OUTOF10: 10
PAINLEVEL_OUTOF10: 10
PAINLEVEL_OUTOF10: 0
PAINLEVEL_OUTOF10: 10
PAINLEVEL_OUTOF10: 8

## 2024-11-16 ASSESSMENT — PAIN DESCRIPTION - DESCRIPTORS
DESCRIPTORS: ACHING;DISCOMFORT;SORE
DESCRIPTORS: ACHING;STABBING
DESCRIPTORS: ACHING
DESCRIPTORS: ACHING;DISCOMFORT;SORE
DESCRIPTORS: ACHING;DISCOMFORT;SORE
DESCRIPTORS: ACHING;SORE
DESCRIPTORS: ACHING;STABBING
DESCRIPTORS: ACHING;DISCOMFORT;SORE

## 2024-11-16 ASSESSMENT — PAIN DESCRIPTION - ORIENTATION
ORIENTATION: RIGHT;MID;LOWER
ORIENTATION: RIGHT;MID
ORIENTATION: RIGHT;MID;LOWER
ORIENTATION: MID;RIGHT;LEFT
ORIENTATION: RIGHT;UPPER
ORIENTATION: RIGHT;MID
ORIENTATION: RIGHT;MID
ORIENTATION: RIGHT;MID;LOWER
ORIENTATION: RIGHT

## 2024-11-16 ASSESSMENT — PAIN DESCRIPTION - LOCATION
LOCATION: BACK;SHOULDER
LOCATION: SHOULDER;CHEST
LOCATION: BACK;SHOULDER;STERNUM
LOCATION: BACK;SHOULDER
LOCATION: BACK
LOCATION: BACK;SHOULDER;HIP
LOCATION: SHOULDER
LOCATION: SHOULDER;BACK;LEG
LOCATION: BACK;SHOULDER
LOCATION: SHOULDER;BACK
LOCATION: SHOULDER;BACK

## 2024-11-16 ASSESSMENT — PAIN DESCRIPTION - FREQUENCY: FREQUENCY: CONTINUOUS

## 2024-11-16 ASSESSMENT — PAIN - FUNCTIONAL ASSESSMENT
PAIN_FUNCTIONAL_ASSESSMENT: PREVENTS OR INTERFERES SOME ACTIVE ACTIVITIES AND ADLS

## 2024-11-16 ASSESSMENT — PAIN DESCRIPTION - ONSET: ONSET: ON-GOING

## 2024-11-16 NOTE — FLOWSHEET NOTE
Hemodynamics as below calculated using Elijah's calculation. Patient currently on Nitroglycerin @ 70mcg/min, Precedex @ 0.8mcg/kg/h & Heparin @ 1550U/hr per eMAR. Impella 5.5 LVAD @ P-4. Patient awake at time of calculation.         11/16/24 0545   [place]-Yaneli   PAP (Systolic/Diastolic) 27/12   PAP (Mean) 19 mmHg   CVP (Mean) 9 mmHg   MAP (Monitor) 74   PAP Wave Form Appropriate wave forms   CO (l/min) 4.4 l/min   CCO 4.4 L/Min   CI (l/min/m2) 2.3 l/min/m2   SVR (Using ABP Mean) 1181.82 dyne*sec/cm5   Hemodynamic Monitoring   Hemodynamic Device [place]-Yaneli   Ventricular Assist Device Impella Left  (P4)

## 2024-11-16 NOTE — FLOWSHEET NOTE
11/16/24 1550   [place]-Yaneli   PAP (Systolic/Diastolic) 38/10   PAP (Mean) 18 mmHg   CVP (Mean) 5 mmHg   SVO2 (%) 47 %   CO (l/min) 4.7 l/min   CCO 4.7 L/Min   CI (l/min/m2) 2.5 l/min/m2   SVR (Using ABP Mean) 1072.34 dyne*sec/cm5     Hemodynamics calculated using Elijah's formula. Impella running @ P-4.  Nitroglycerin running @ 30 mcg/min

## 2024-11-16 NOTE — FLOWSHEET NOTE
11/16/24 1135   [place]-Yaneli   PAP (Systolic/Diastolic) 35/12   PAP (Mean) 19 mmHg   CVP (Mean) 9 mmHg   SVO2 (%) 46 %   CO (l/min) 4.3 l/min   CCO 4.3 L/Min   CI (l/min/m2) 2.2 l/min/m2   SVR (Using ABP Mean) 1153.49 dyne*sec/cm5     Hemodynamics calculated using Elijah's formula. Impella running at P-4.  Nitroglycerin running @ 20 mcg/min

## 2024-11-16 NOTE — FLOWSHEET NOTE
Hemodynamics as below calculated using Elijah's calculation. Patient currently on Nitroglycerin @ 50mcg/min, Precedex @ 0.8mcg/kg/h & Heparin @ 1550U/hr per eMAR. Impella 5.5 LVAD @ P-4. Patient asleep at time of calculation.     11/16/24 0000   [place]-Yaneli   PAP (Systolic/Diastolic) 27/8   PAP (Mean) 14 mmHg   CVP (Mean) 4 mmHg   MAP (Monitor) 61   PAP Wave Form Appropriate wave forms   SVO2 (%) 56 %   CO (l/min) 5 l/min   CCO 5 L/Min   CI (l/min/m2) 2.6 l/min/m2   SVR (Using ABP Mean) 912 dyne*sec/cm5   Core (Body) Temperature 98.3 °F (36.8 °C)   Hemodynamic Monitoring   Hemodynamic Device [place]-Yaneli   Ventricular Assist Device Impella Left  (P4)

## 2024-11-17 LAB
ABO + RH BLD: NORMAL
ALBUMIN SERPL-MCNC: 3.5 G/DL (ref 3.4–5)
ALP SERPL-CCNC: 113 U/L (ref 40–129)
ALT SERPL-CCNC: 32 U/L (ref 10–40)
ANION GAP SERPL CALCULATED.3IONS-SCNC: 9 MMOL/L (ref 3–16)
AST SERPL-CCNC: 36 U/L (ref 15–37)
BASE EXCESS MIXED: -3
BASE EXCESS MIXED: -4
BASE EXCESS MIXED: -5
BILIRUB DIRECT SERPL-MCNC: 0.7 MG/DL (ref 0–0.3)
BILIRUB INDIRECT SERPL-MCNC: 1.2 MG/DL (ref 0–1)
BILIRUB SERPL-MCNC: 1.9 MG/DL (ref 0–1)
BLD GP AB SCN SERPL QL: NORMAL
BLOOD BANK DISPENSE STATUS: NORMAL
BLOOD BANK PRODUCT CODE: NORMAL
BPU ID: NORMAL
BUN SERPL-MCNC: 21 MG/DL (ref 7–20)
CALCIUM SERPL-MCNC: 8.3 MG/DL (ref 8.3–10.6)
CHLORIDE SERPL-SCNC: 109 MMOL/L (ref 99–110)
CO2 SERPL-SCNC: 19 MMOL/L (ref 21–32)
CREAT SERPL-MCNC: 0.8 MG/DL (ref 0.8–1.3)
DEPRECATED RDW RBC AUTO: 15 % (ref 12.4–15.4)
DESCRIPTION BLOOD BANK: NORMAL
GFR SERPLBLD CREATININE-BSD FMLA CKD-EPI: >90 ML/MIN/{1.73_M2}
GLUCOSE BLD-MCNC: 104 MG/DL (ref 70–99)
GLUCOSE BLD-MCNC: 108 MG/DL (ref 70–99)
GLUCOSE BLD-MCNC: 111 MG/DL (ref 70–99)
GLUCOSE BLD-MCNC: 112 MG/DL (ref 70–99)
GLUCOSE BLD-MCNC: 114 MG/DL (ref 70–99)
GLUCOSE BLD-MCNC: 122 MG/DL (ref 70–99)
GLUCOSE SERPL-MCNC: 91 MG/DL (ref 70–99)
HCO3, MIXED: 20.6 MMOL/L
HCO3, MIXED: 20.9 MMOL/L
HCO3, MIXED: 21.8 MMOL/L
HCT VFR BLD AUTO: 19.7 % (ref 40.5–52.5)
HCT VFR BLD AUTO: 24.2 % (ref 40.5–52.5)
HGB BLD-MCNC: 6.8 G/DL (ref 13.5–17.5)
HGB BLD-MCNC: 8.3 G/DL (ref 13.5–17.5)
MAGNESIUM SERPL-MCNC: 2.1 MG/DL (ref 1.8–2.4)
MCH RBC QN AUTO: 29.2 PG (ref 26–34)
MCHC RBC AUTO-ENTMCNC: 34.5 G/DL (ref 31–36)
MCV RBC AUTO: 84.7 FL (ref 80–100)
O2 SAT, MIXED: 41 %
O2 SAT, MIXED: 45 %
O2 SAT, MIXED: 49 %
PCO2 MIXED: 31.9 MM HG
PCO2 MIXED: 37.2 MM HG
PCO2 MIXED: 37.6 MM HG
PERFORMED ON: ABNORMAL
PERFORMED ON: NORMAL
PERFORMED ON: NORMAL
PH, MIXED: 7.36 (ref 7.35–7.45)
PH, MIXED: 7.37 (ref 7.35–7.45)
PH, MIXED: 7.42 (ref 7.35–7.45)
PLATELET # BLD AUTO: 174 K/UL (ref 135–450)
PMV BLD AUTO: 7.4 FL (ref 5–10.5)
PO2 MIXED: 23 MM HG
PO2 MIXED: 26 MM HG
PO2 MIXED: 27 MM HG
POC SAMPLE TYPE: ABNORMAL
POC SAMPLE TYPE: NORMAL
POC SAMPLE TYPE: NORMAL
POTASSIUM SERPL-SCNC: 3.9 MMOL/L (ref 3.5–5.1)
PROT SERPL-MCNC: 5.2 G/DL (ref 6.4–8.2)
RBC # BLD AUTO: 2.32 M/UL (ref 4.2–5.9)
SODIUM SERPL-SCNC: 137 MMOL/L (ref 136–145)
TCO2 CALC MIXED: 22 MMOL/L
TCO2 CALC MIXED: 22 MMOL/L
TCO2 CALC MIXED: 23 MMOL/L
WBC # BLD AUTO: 5.7 K/UL (ref 4–11)

## 2024-11-17 PROCEDURE — 36415 COLL VENOUS BLD VENIPUNCTURE: CPT

## 2024-11-17 PROCEDURE — P9041 ALBUMIN (HUMAN),5%, 50ML: HCPCS

## 2024-11-17 PROCEDURE — 85018 HEMOGLOBIN: CPT

## 2024-11-17 PROCEDURE — 86923 COMPATIBILITY TEST ELECTRIC: CPT

## 2024-11-17 PROCEDURE — 37799 UNLISTED PX VASCULAR SURGERY: CPT

## 2024-11-17 PROCEDURE — P9016 RBC LEUKOCYTES REDUCED: HCPCS

## 2024-11-17 PROCEDURE — 86900 BLOOD TYPING SEROLOGIC ABO: CPT

## 2024-11-17 PROCEDURE — 85027 COMPLETE CBC AUTOMATED: CPT

## 2024-11-17 PROCEDURE — 2500000003 HC RX 250 WO HCPCS: Performed by: INTERNAL MEDICINE

## 2024-11-17 PROCEDURE — 2100000000 HC CCU R&B

## 2024-11-17 PROCEDURE — 6370000000 HC RX 637 (ALT 250 FOR IP): Performed by: NURSE PRACTITIONER

## 2024-11-17 PROCEDURE — 83735 ASSAY OF MAGNESIUM: CPT

## 2024-11-17 PROCEDURE — 2580000003 HC RX 258: Performed by: NURSE PRACTITIONER

## 2024-11-17 PROCEDURE — 36430 TRANSFUSION BLD/BLD COMPNT: CPT

## 2024-11-17 PROCEDURE — 6370000000 HC RX 637 (ALT 250 FOR IP): Performed by: INTERNAL MEDICINE

## 2024-11-17 PROCEDURE — 2580000003 HC RX 258: Performed by: INTERNAL MEDICINE

## 2024-11-17 PROCEDURE — 2700000000 HC OXYGEN THERAPY PER DAY

## 2024-11-17 PROCEDURE — 85014 HEMATOCRIT: CPT

## 2024-11-17 PROCEDURE — 85347 COAGULATION TIME ACTIVATED: CPT

## 2024-11-17 PROCEDURE — 6360000002 HC RX W HCPCS

## 2024-11-17 PROCEDURE — 86901 BLOOD TYPING SEROLOGIC RH(D): CPT

## 2024-11-17 PROCEDURE — 6360000002 HC RX W HCPCS: Performed by: INTERNAL MEDICINE

## 2024-11-17 PROCEDURE — 80076 HEPATIC FUNCTION PANEL: CPT

## 2024-11-17 PROCEDURE — 2580000003 HC RX 258: Performed by: ANESTHESIOLOGY

## 2024-11-17 PROCEDURE — 86850 RBC ANTIBODY SCREEN: CPT

## 2024-11-17 PROCEDURE — 80048 BASIC METABOLIC PNL TOTAL CA: CPT

## 2024-11-17 PROCEDURE — 36592 COLLECT BLOOD FROM PICC: CPT

## 2024-11-17 PROCEDURE — 6360000002 HC RX W HCPCS: Performed by: NURSE PRACTITIONER

## 2024-11-17 PROCEDURE — 82803 BLOOD GASES ANY COMBINATION: CPT

## 2024-11-17 PROCEDURE — 82947 ASSAY GLUCOSE BLOOD QUANT: CPT

## 2024-11-17 PROCEDURE — 94761 N-INVAS EAR/PLS OXIMETRY MLT: CPT

## 2024-11-17 PROCEDURE — 99233 SBSQ HOSP IP/OBS HIGH 50: CPT | Performed by: INTERNAL MEDICINE

## 2024-11-17 RX ORDER — SODIUM CHLORIDE 9 MG/ML
INJECTION, SOLUTION INTRAVENOUS PRN
Status: DISCONTINUED | OUTPATIENT
Start: 2024-11-17 | End: 2024-11-19

## 2024-11-17 RX ORDER — CARVEDILOL 6.25 MG/1
6.25 TABLET ORAL ONCE
Status: COMPLETED | OUTPATIENT
Start: 2024-11-17 | End: 2024-11-17

## 2024-11-17 RX ORDER — CARVEDILOL 12.5 MG/1
12.5 TABLET ORAL 2 TIMES DAILY WITH MEALS
Status: DISCONTINUED | OUTPATIENT
Start: 2024-11-18 | End: 2024-11-21 | Stop reason: HOSPADM

## 2024-11-17 RX ADMIN — OXYCODONE HYDROCHLORIDE AND ACETAMINOPHEN 2 TABLET: 5; 325 TABLET ORAL at 22:03

## 2024-11-17 RX ADMIN — DEXMEDETOMIDINE HYDROCHLORIDE 0.2 MCG/KG/HR: 400 INJECTION INTRAVENOUS at 00:30

## 2024-11-17 RX ADMIN — OXYCODONE HYDROCHLORIDE AND ACETAMINOPHEN 2 TABLET: 5; 325 TABLET ORAL at 00:14

## 2024-11-17 RX ADMIN — SACUBITRIL AND VALSARTAN 1 TABLET: 24; 26 TABLET, FILM COATED ORAL at 07:58

## 2024-11-17 RX ADMIN — OXYCODONE HYDROCHLORIDE AND ACETAMINOPHEN 2 TABLET: 5; 325 TABLET ORAL at 04:11

## 2024-11-17 RX ADMIN — CARVEDILOL 6.25 MG: 6.25 TABLET, FILM COATED ORAL at 16:05

## 2024-11-17 RX ADMIN — Medication 2 CAPSULE: at 16:05

## 2024-11-17 RX ADMIN — SPIRONOLACTONE 25 MG: 25 TABLET ORAL at 07:58

## 2024-11-17 RX ADMIN — CARVEDILOL 6.25 MG: 6.25 TABLET, FILM COATED ORAL at 07:58

## 2024-11-17 RX ADMIN — HYDROMORPHONE HYDROCHLORIDE 1 MG: 1 INJECTION, SOLUTION INTRAMUSCULAR; INTRAVENOUS; SUBCUTANEOUS at 22:59

## 2024-11-17 RX ADMIN — NITROGLYCERIN 5 MCG/MIN: 20 INJECTION INTRAVENOUS at 11:20

## 2024-11-17 RX ADMIN — SACUBITRIL AND VALSARTAN 2 TABLET: 24; 26 TABLET, FILM COATED ORAL at 20:24

## 2024-11-17 RX ADMIN — ALBUMIN (HUMAN) 25 G: 12.5 INJECTION, SOLUTION INTRAVENOUS at 03:27

## 2024-11-17 RX ADMIN — SODIUM CHLORIDE, PRESERVATIVE FREE 10 ML: 5 INJECTION INTRAVENOUS at 20:28

## 2024-11-17 RX ADMIN — SODIUM CHLORIDE, PRESERVATIVE FREE 40 MG: 5 INJECTION INTRAVENOUS at 20:24

## 2024-11-17 RX ADMIN — CARVEDILOL 6.25 MG: 6.25 TABLET, FILM COATED ORAL at 18:50

## 2024-11-17 RX ADMIN — ALBUMIN HUMAN 25 G: 50 SOLUTION INTRAVENOUS at 03:27

## 2024-11-17 RX ADMIN — HYDROMORPHONE HYDROCHLORIDE 1 MG: 1 INJECTION, SOLUTION INTRAMUSCULAR; INTRAVENOUS; SUBCUTANEOUS at 20:57

## 2024-11-17 RX ADMIN — HYDROXYZINE HYDROCHLORIDE 10 MG: 10 TABLET, FILM COATED ORAL at 14:11

## 2024-11-17 RX ADMIN — HYDROMORPHONE HYDROCHLORIDE 1 MG: 1 INJECTION, SOLUTION INTRAMUSCULAR; INTRAVENOUS; SUBCUTANEOUS at 02:07

## 2024-11-17 RX ADMIN — HYDROMORPHONE HYDROCHLORIDE 1 MG: 1 INJECTION, SOLUTION INTRAMUSCULAR; INTRAVENOUS; SUBCUTANEOUS at 06:15

## 2024-11-17 RX ADMIN — HYDROMORPHONE HYDROCHLORIDE 1 MG: 1 INJECTION, SOLUTION INTRAMUSCULAR; INTRAVENOUS; SUBCUTANEOUS at 18:58

## 2024-11-17 RX ADMIN — CEFEPIME 2000 MG: 2 INJECTION, POWDER, FOR SOLUTION INTRAVENOUS at 16:08

## 2024-11-17 RX ADMIN — CEFEPIME 2000 MG: 2 INJECTION, POWDER, FOR SOLUTION INTRAVENOUS at 23:30

## 2024-11-17 RX ADMIN — Medication 2 CAPSULE: at 07:58

## 2024-11-17 RX ADMIN — OXYCODONE HYDROCHLORIDE AND ACETAMINOPHEN 2 TABLET: 5; 325 TABLET ORAL at 13:02

## 2024-11-17 RX ADMIN — CEFEPIME 2000 MG: 2 INJECTION, POWDER, FOR SOLUTION INTRAVENOUS at 08:10

## 2024-11-17 RX ADMIN — ASPIRIN 81 MG: 81 TABLET, CHEWABLE ORAL at 07:58

## 2024-11-17 RX ADMIN — HYDROMORPHONE HYDROCHLORIDE 1 MG: 1 INJECTION, SOLUTION INTRAMUSCULAR; INTRAVENOUS; SUBCUTANEOUS at 16:13

## 2024-11-17 RX ADMIN — HEPARIN SODIUM 1150 UNITS/HR: 10000 INJECTION, SOLUTION INTRAVENOUS at 15:09

## 2024-11-17 RX ADMIN — ATORVASTATIN CALCIUM 80 MG: 80 TABLET, FILM COATED ORAL at 20:24

## 2024-11-17 RX ADMIN — HYDROMORPHONE HYDROCHLORIDE 1 MG: 1 INJECTION, SOLUTION INTRAMUSCULAR; INTRAVENOUS; SUBCUTANEOUS at 11:42

## 2024-11-17 RX ADMIN — SODIUM BICARBONATE: 84 INJECTION, SOLUTION INTRAVENOUS at 02:37

## 2024-11-17 RX ADMIN — CEFEPIME 2000 MG: 2 INJECTION, POWDER, FOR SOLUTION INTRAVENOUS at 00:00

## 2024-11-17 RX ADMIN — HYDROXYZINE HYDROCHLORIDE 10 MG: 10 TABLET, FILM COATED ORAL at 06:15

## 2024-11-17 RX ADMIN — HYDROXYZINE HYDROCHLORIDE 10 MG: 10 TABLET, FILM COATED ORAL at 22:04

## 2024-11-17 RX ADMIN — OXYCODONE HYDROCHLORIDE AND ACETAMINOPHEN 2 TABLET: 5; 325 TABLET ORAL at 08:33

## 2024-11-17 RX ADMIN — NITROGLYCERIN 180 MCG/MIN: 20 INJECTION INTRAVENOUS at 23:26

## 2024-11-17 RX ADMIN — SODIUM CHLORIDE, PRESERVATIVE FREE 40 MG: 5 INJECTION INTRAVENOUS at 07:58

## 2024-11-17 RX ADMIN — HYDROMORPHONE HYDROCHLORIDE 1 MG: 1 INJECTION, SOLUTION INTRAMUSCULAR; INTRAVENOUS; SUBCUTANEOUS at 14:11

## 2024-11-17 RX ADMIN — OXYCODONE HYDROCHLORIDE AND ACETAMINOPHEN 2 TABLET: 5; 325 TABLET ORAL at 17:56

## 2024-11-17 RX ADMIN — NITROGLYCERIN 110 MCG/MIN: 20 INJECTION INTRAVENOUS at 18:18

## 2024-11-17 RX ADMIN — HYDROMORPHONE HYDROCHLORIDE 1 MG: 1 INJECTION, SOLUTION INTRAMUSCULAR; INTRAVENOUS; SUBCUTANEOUS at 09:42

## 2024-11-17 ASSESSMENT — PAIN SCALES - GENERAL
PAINLEVEL_OUTOF10: 8
PAINLEVEL_OUTOF10: 0
PAINLEVEL_OUTOF10: 7
PAINLEVEL_OUTOF10: 8
PAINLEVEL_OUTOF10: 0
PAINLEVEL_OUTOF10: 9
PAINLEVEL_OUTOF10: 7
PAINLEVEL_OUTOF10: 8
PAINLEVEL_OUTOF10: 8
PAINLEVEL_OUTOF10: 0
PAINLEVEL_OUTOF10: 3
PAINLEVEL_OUTOF10: 7
PAINLEVEL_OUTOF10: 7
PAINLEVEL_OUTOF10: 6
PAINLEVEL_OUTOF10: 0
PAINLEVEL_OUTOF10: 8
PAINLEVEL_OUTOF10: 6
PAINLEVEL_OUTOF10: 6
PAINLEVEL_OUTOF10: 7
PAINLEVEL_OUTOF10: 7
PAINLEVEL_OUTOF10: 10
PAINLEVEL_OUTOF10: 6
PAINLEVEL_OUTOF10: 9
PAINLEVEL_OUTOF10: 8
PAINLEVEL_OUTOF10: 7
PAINLEVEL_OUTOF10: 8
PAINLEVEL_OUTOF10: 6
PAINLEVEL_OUTOF10: 7

## 2024-11-17 ASSESSMENT — PAIN DESCRIPTION - DESCRIPTORS
DESCRIPTORS: STABBING;ACHING
DESCRIPTORS: ACHING;DISCOMFORT;SORE
DESCRIPTORS: ACHING;STABBING
DESCRIPTORS: ACHING;STABBING
DESCRIPTORS: ACHING;DISCOMFORT;SORE
DESCRIPTORS: ACHING;DISCOMFORT;SORE
DESCRIPTORS: ACHING;STABBING
DESCRIPTORS: ACHING;STABBING
DESCRIPTORS: STABBING;ACHING
DESCRIPTORS: ACHING;DISCOMFORT;SORE
DESCRIPTORS: ACHING;DISCOMFORT;SORE
DESCRIPTORS: ACHING;STABBING
DESCRIPTORS: ACHING;STABBING

## 2024-11-17 ASSESSMENT — PAIN DESCRIPTION - LOCATION
LOCATION: BACK;SHOULDER
LOCATION: BACK;HIP;SHOULDER
LOCATION: BACK;CHEST;SHOULDER
LOCATION: CHEST;BACK;SHOULDER
LOCATION: BACK;SHOULDER
LOCATION: BACK;CHEST;SHOULDER
LOCATION: BACK;LEG;SHOULDER
LOCATION: BACK;SHOULDER;LEG
LOCATION: BACK;SHOULDER
LOCATION: BACK;HIP;NECK;SHOULDER
LOCATION: BACK;NECK;SHOULDER
LOCATION: HIP;SHOULDER;BACK
LOCATION: BACK;HIP;SHOULDER

## 2024-11-17 ASSESSMENT — PAIN DESCRIPTION - ORIENTATION
ORIENTATION: RIGHT;MID
ORIENTATION: RIGHT;MID;LOWER
ORIENTATION: RIGHT;MID
ORIENTATION: RIGHT;MID;LOWER
ORIENTATION: RIGHT;MID;LOWER
ORIENTATION: RIGHT
ORIENTATION: RIGHT;MID
ORIENTATION: RIGHT;MID;LOWER
ORIENTATION: RIGHT;MID
ORIENTATION: RIGHT;MID;LOWER

## 2024-11-17 ASSESSMENT — PAIN - FUNCTIONAL ASSESSMENT
PAIN_FUNCTIONAL_ASSESSMENT: PREVENTS OR INTERFERES SOME ACTIVE ACTIVITIES AND ADLS

## 2024-11-17 ASSESSMENT — PAIN SCALES - WONG BAKER
WONGBAKER_NUMERICALRESPONSE: NO HURT

## 2024-11-17 NOTE — FLOWSHEET NOTE
Hemodynamics calculated as below using Elijah's calculation. Impella running @ P-4. Only infusions are Precedex & Heparin per eMAR.     11/17/24 0000   [place]-Yaneli   PAP (Systolic/Diastolic) 37/14   PAP (Mean) 24 mmHg   CVP (Mean) 11 mmHg   MAP (Monitor) 69   PAP Wave Form Appropriate wave forms   SVO2 (%) 45.1 %   CO (l/min) 4.3 l/min   CCO 4.3 L/Min   CI (l/min/m2) 2.2 l/min/m2   SVR (Using NBP Mean) 1079.07 dyne*sec/cm5   SVR (Using ABP Mean) 1079.07 dyne*sec/cm5   Core (Body) Temperature 97.3 °F (36.3 °C)   Hemodynamic Monitoring   Hemodynamic Device [place]-Yaneli   Ventricular Assist Device Impella Left  (P4)

## 2024-11-17 NOTE — FLOWSHEET NOTE
Hemodynamics calculated as below using Elijah's calculation. Impella running @ P-4. Only infusion is Heparin per eMAR. Precedex stopped. Albumin 500ml given for low BP.     11/17/24 0600   [place]-Y[place]eli   PAP (Systolic/Diastolic) 36/18   PAP (Mean) 24 mmHg   CVP (Mean) 11 mmHg   MAP (Monitor) 80   PAP Wave Form Appropriate wave forms   SVO2 (%) 48.7 %   CO (l/min) 5.1 l/min   CCO 5.1 L/Min   CI (l/min/m2) 2.7 l/min/m2   SVR (Using ABP Mean) 1082.35 dyne*sec/cm5   Impella Left   Systolic 98   Diastolic 53   Mean 72   Impella Performance Level P-4   Placement (cm) 48 cm   Pedal Pulse +2   Purge Flow 10.1 mL/hr   Purge Pressure 504 mmHg   Impella Flow 2.3 L/min   Motor Current - Systolic 219   Motor Current - Diastolic 152   Motor Current - Mean 171   Motor Current Pulsatile Yes   Activated Clotting Time (ACT) 173   Location Right Axillary   Impella Site Check Yes    0.9   NAKIA 1.64   Hemodynamic Monitoring   Hemodynamic Device [place]-Yaneli   Ventricular Assist Device Impella Left  (P4)

## 2024-11-17 NOTE — FLOWSHEET NOTE
11/17/24 1200   [place]-Yaneli   PAP (Systolic/Diastolic) 49/14   PAP (Mean) 25 mmHg   CVP (Mean) 6 mmHg   SVO2 (%) 48.2 %   CO (l/min) 4 l/min   CCO 4 L/Min   CI (l/min/m2) 2.1 l/min/m2   SVR (Using ABP Mean) 1580 dyne*sec/cm5     Hemodynamics calculated using Elijah's formula.  Nitro had to be restarted and is currently running @ 20 mcg/min. Will continue to titrate up per order d/t high SVR.

## 2024-11-18 ENCOUNTER — ANESTHESIA EVENT (OUTPATIENT)
Dept: OPERATING ROOM | Age: 74
DRG: 001 | End: 2024-11-18
Payer: MEDICARE

## 2024-11-18 PROBLEM — E44.0 MODERATE MALNUTRITION (HCC): Status: ACTIVE | Noted: 2024-11-18

## 2024-11-18 LAB
ANION GAP SERPL CALCULATED.3IONS-SCNC: 10 MMOL/L (ref 3–16)
BASE EXCESS MIXED: -1
BASE EXCESS MIXED: -2
BASE EXCESS MIXED: -4
BASE EXCESS MIXED: 0
BUN SERPL-MCNC: 16 MG/DL (ref 7–20)
CA-I BLD-SCNC: 1.22 MMOL/L (ref 1.12–1.32)
CALCIUM SERPL-MCNC: 8.9 MG/DL (ref 8.3–10.6)
CHLORIDE SERPL-SCNC: 104 MMOL/L (ref 99–110)
CO2 SERPL-SCNC: 19 MMOL/L (ref 21–32)
CREAT SERPL-MCNC: 0.8 MG/DL (ref 0.8–1.3)
DEPRECATED RDW RBC AUTO: 15.1 % (ref 12.4–15.4)
GFR SERPLBLD CREATININE-BSD FMLA CKD-EPI: >90 ML/MIN/{1.73_M2}
GLUCOSE BLD-MCNC: 115 MG/DL (ref 70–99)
GLUCOSE BLD-MCNC: 116 MG/DL (ref 70–99)
GLUCOSE BLD-MCNC: 119 MG/DL (ref 70–99)
GLUCOSE BLD-MCNC: 126 MG/DL (ref 70–99)
GLUCOSE BLD-MCNC: 132 MG/DL (ref 70–99)
GLUCOSE BLD-MCNC: 135 MG/DL (ref 70–99)
GLUCOSE SERPL-MCNC: 105 MG/DL (ref 70–99)
HCO3, MIXED: 21 MMOL/L
HCO3, MIXED: 23.2 MMOL/L
HCO3, MIXED: 23.3 MMOL/L
HCO3, MIXED: 23.8 MMOL/L
HCT VFR BLD AUTO: 24.6 % (ref 40.5–52.5)
HGB BLD-MCNC: 8.3 G/DL (ref 13.5–17.5)
INR PPP: 1.36 (ref 0.85–1.15)
MAGNESIUM SERPL-MCNC: 2.19 MG/DL (ref 1.8–2.4)
MCH RBC QN AUTO: 28.5 PG (ref 26–34)
MCHC RBC AUTO-ENTMCNC: 33.7 G/DL (ref 31–36)
MCV RBC AUTO: 84.4 FL (ref 80–100)
O2 SAT, MIXED: 35 %
O2 SAT, MIXED: 43 %
O2 SAT, MIXED: 46 %
O2 SAT, MIXED: 54 %
PCO2 MIXED: 33.7 MM HG
PCO2 MIXED: 33.9 MM HG
PCO2 MIXED: 34.8 MM HG
PCO2 MIXED: 40.1 MM HG
PERFORMED ON: ABNORMAL
PERFORMED ON: NORMAL
PERFORMED ON: NORMAL
PH, MIXED: 7.37 (ref 7.35–7.45)
PH, MIXED: 7.4 (ref 7.35–7.45)
PH, MIXED: 7.43 (ref 7.35–7.45)
PH, MIXED: 7.45 (ref 7.35–7.45)
PLATELET # BLD AUTO: 230 K/UL (ref 135–450)
PMV BLD AUTO: 7.2 FL (ref 5–10.5)
PO2 MIXED: 20 MM HG
PO2 MIXED: 24 MM HG
PO2 MIXED: 25 MM HG
PO2 MIXED: 28 MM HG
POC ACT LR: 151 SEC
POC ACT LR: 153 SEC
POC ACT LR: 156 SEC
POC ACT LR: 157 SEC
POC ACT LR: 157 SEC
POC ACT LR: 158 SEC
POC ACT LR: 159 SEC
POC ACT LR: 160 SEC
POC ACT LR: 160 SEC
POC ACT LR: 161 SEC
POC ACT LR: 161 SEC
POC ACT LR: 163 SEC
POC ACT LR: 163 SEC
POC ACT LR: 165 SEC
POC ACT LR: 165 SEC
POC ACT LR: 166 SEC
POC ACT LR: 166 SEC
POC ACT LR: 168 SEC
POC ACT LR: 170 SEC
POC ACT LR: 171 SEC
POC ACT LR: 171 SEC
POC ACT LR: 172 SEC
POC ACT LR: 173 SEC
POC ACT LR: 175 SEC
POC ACT LR: 176 SEC
POC ACT LR: 177 SEC
POC ACT LR: 178 SEC
POC ACT LR: 179 SEC
POC ACT LR: 180 SEC
POC ACT LR: 181 SEC
POC ACT LR: 181 SEC
POC ACT LR: 182 SEC
POC ACT LR: 183 SEC
POC ACT LR: 183 SEC
POC ACT LR: 184 SEC
POC ACT LR: 184 SEC
POC ACT LR: 185 SEC
POC ACT LR: 186 SEC
POC ACT LR: 186 SEC
POC ACT LR: 187 SEC
POC ACT LR: 187 SEC
POC ACT LR: 188 SEC
POC ACT LR: 188 SEC
POC ACT LR: 189 SEC
POC ACT LR: 195 SEC
POC ACT LR: 195 SEC
POC SAMPLE TYPE: ABNORMAL
POC SAMPLE TYPE: ABNORMAL
POC SAMPLE TYPE: NORMAL
POC SAMPLE TYPE: NORMAL
POTASSIUM BLD-SCNC: 3.8 MMOL/L (ref 3.5–5.1)
POTASSIUM SERPL-SCNC: 3.7 MMOL/L (ref 3.5–5.1)
PROTHROMBIN TIME: 16.9 SEC (ref 11.9–14.9)
RBC # BLD AUTO: 2.92 M/UL (ref 4.2–5.9)
SODIUM SERPL-SCNC: 133 MMOL/L (ref 136–145)
TCO2 CALC MIXED: 22 MMOL/L
TCO2 CALC MIXED: 24 MMOL/L
TCO2 CALC MIXED: 24 MMOL/L
TCO2 CALC MIXED: 25 MMOL/L
WBC # BLD AUTO: 7 K/UL (ref 4–11)

## 2024-11-18 PROCEDURE — 2580000003 HC RX 258: Performed by: ANESTHESIOLOGY

## 2024-11-18 PROCEDURE — 6370000000 HC RX 637 (ALT 250 FOR IP): Performed by: NURSE PRACTITIONER

## 2024-11-18 PROCEDURE — 82947 ASSAY GLUCOSE BLOOD QUANT: CPT

## 2024-11-18 PROCEDURE — 99291 CRITICAL CARE FIRST HOUR: CPT | Performed by: INTERNAL MEDICINE

## 2024-11-18 PROCEDURE — 6360000002 HC RX W HCPCS: Performed by: INTERNAL MEDICINE

## 2024-11-18 PROCEDURE — 84132 ASSAY OF SERUM POTASSIUM: CPT

## 2024-11-18 PROCEDURE — 83735 ASSAY OF MAGNESIUM: CPT

## 2024-11-18 PROCEDURE — 85027 COMPLETE CBC AUTOMATED: CPT

## 2024-11-18 PROCEDURE — 97530 THERAPEUTIC ACTIVITIES: CPT

## 2024-11-18 PROCEDURE — 85610 PROTHROMBIN TIME: CPT

## 2024-11-18 PROCEDURE — 6370000000 HC RX 637 (ALT 250 FOR IP): Performed by: INTERNAL MEDICINE

## 2024-11-18 PROCEDURE — 37799 UNLISTED PX VASCULAR SURGERY: CPT

## 2024-11-18 PROCEDURE — 80048 BASIC METABOLIC PNL TOTAL CA: CPT

## 2024-11-18 PROCEDURE — 2100000000 HC CCU R&B

## 2024-11-18 PROCEDURE — 97110 THERAPEUTIC EXERCISES: CPT

## 2024-11-18 PROCEDURE — 82330 ASSAY OF CALCIUM: CPT

## 2024-11-18 PROCEDURE — 2580000003 HC RX 258: Performed by: NURSE PRACTITIONER

## 2024-11-18 PROCEDURE — 2580000003 HC RX 258: Performed by: INTERNAL MEDICINE

## 2024-11-18 PROCEDURE — 6360000002 HC RX W HCPCS: Performed by: NURSE PRACTITIONER

## 2024-11-18 PROCEDURE — 92526 ORAL FUNCTION THERAPY: CPT

## 2024-11-18 PROCEDURE — 85347 COAGULATION TIME ACTIVATED: CPT

## 2024-11-18 PROCEDURE — 97535 SELF CARE MNGMENT TRAINING: CPT

## 2024-11-18 PROCEDURE — APPNB15 APP NON BILLABLE TIME 0-15 MINS: Performed by: NURSE PRACTITIONER

## 2024-11-18 PROCEDURE — 82803 BLOOD GASES ANY COMBINATION: CPT

## 2024-11-18 PROCEDURE — 2500000003 HC RX 250 WO HCPCS: Performed by: INTERNAL MEDICINE

## 2024-11-18 PROCEDURE — 36592 COLLECT BLOOD FROM PICC: CPT

## 2024-11-18 RX ORDER — HYDRALAZINE HYDROCHLORIDE 50 MG/1
50 TABLET, FILM COATED ORAL 2 TIMES DAILY
Status: DISCONTINUED | OUTPATIENT
Start: 2024-11-18 | End: 2024-11-21 | Stop reason: HOSPADM

## 2024-11-18 RX ORDER — POTASSIUM CHLORIDE 29.8 MG/ML
20 INJECTION INTRAVENOUS ONCE
Status: COMPLETED | OUTPATIENT
Start: 2024-11-18 | End: 2024-11-18

## 2024-11-18 RX ADMIN — HYDROMORPHONE HYDROCHLORIDE 1 MG: 1 INJECTION, SOLUTION INTRAMUSCULAR; INTRAVENOUS; SUBCUTANEOUS at 07:42

## 2024-11-18 RX ADMIN — CEFEPIME 2000 MG: 2 INJECTION, POWDER, FOR SOLUTION INTRAVENOUS at 08:28

## 2024-11-18 RX ADMIN — SACUBITRIL AND VALSARTAN 2 TABLET: 24; 26 TABLET, FILM COATED ORAL at 20:52

## 2024-11-18 RX ADMIN — HYDROXYZINE HYDROCHLORIDE 10 MG: 10 TABLET, FILM COATED ORAL at 20:52

## 2024-11-18 RX ADMIN — HYDROXYZINE HYDROCHLORIDE 10 MG: 10 TABLET, FILM COATED ORAL at 12:35

## 2024-11-18 RX ADMIN — SODIUM CHLORIDE, PRESERVATIVE FREE 10 ML: 5 INJECTION INTRAVENOUS at 07:43

## 2024-11-18 RX ADMIN — HYDROMORPHONE HYDROCHLORIDE 1 MG: 1 INJECTION, SOLUTION INTRAMUSCULAR; INTRAVENOUS; SUBCUTANEOUS at 22:03

## 2024-11-18 RX ADMIN — HYDRALAZINE HYDROCHLORIDE 50 MG: 50 TABLET ORAL at 20:52

## 2024-11-18 RX ADMIN — OXYCODONE HYDROCHLORIDE AND ACETAMINOPHEN 2 TABLET: 5; 325 TABLET ORAL at 13:45

## 2024-11-18 RX ADMIN — CARVEDILOL 12.5 MG: 12.5 TABLET, FILM COATED ORAL at 08:24

## 2024-11-18 RX ADMIN — POLYETHYLENE GLYCOL 3350 17 G: 17 POWDER, FOR SOLUTION ORAL at 20:52

## 2024-11-18 RX ADMIN — Medication 2 CAPSULE: at 16:07

## 2024-11-18 RX ADMIN — Medication 2 CAPSULE: at 08:24

## 2024-11-18 RX ADMIN — NITROGLYCERIN 100 MCG/MIN: 20 INJECTION INTRAVENOUS at 12:40

## 2024-11-18 RX ADMIN — NITROGLYCERIN 150 MCG/MIN: 20 INJECTION INTRAVENOUS at 18:54

## 2024-11-18 RX ADMIN — HYDROMORPHONE HYDROCHLORIDE 1 MG: 1 INJECTION, SOLUTION INTRAMUSCULAR; INTRAVENOUS; SUBCUTANEOUS at 01:03

## 2024-11-18 RX ADMIN — CARVEDILOL 12.5 MG: 12.5 TABLET, FILM COATED ORAL at 16:07

## 2024-11-18 RX ADMIN — CEFEPIME 2000 MG: 2 INJECTION, POWDER, FOR SOLUTION INTRAVENOUS at 16:07

## 2024-11-18 RX ADMIN — HYDROMORPHONE HYDROCHLORIDE 1 MG: 1 INJECTION, SOLUTION INTRAMUSCULAR; INTRAVENOUS; SUBCUTANEOUS at 19:45

## 2024-11-18 RX ADMIN — HYDRALAZINE HYDROCHLORIDE 50 MG: 50 TABLET ORAL at 13:11

## 2024-11-18 RX ADMIN — SACUBITRIL AND VALSARTAN 2 TABLET: 24; 26 TABLET, FILM COATED ORAL at 08:24

## 2024-11-18 RX ADMIN — HYDROMORPHONE HYDROCHLORIDE 1 MG: 1 INJECTION, SOLUTION INTRAMUSCULAR; INTRAVENOUS; SUBCUTANEOUS at 14:51

## 2024-11-18 RX ADMIN — ATORVASTATIN CALCIUM 80 MG: 80 TABLET, FILM COATED ORAL at 20:52

## 2024-11-18 RX ADMIN — OXYCODONE HYDROCHLORIDE AND ACETAMINOPHEN 2 TABLET: 5; 325 TABLET ORAL at 04:47

## 2024-11-18 RX ADMIN — SODIUM CHLORIDE, PRESERVATIVE FREE 10 ML: 5 INJECTION INTRAVENOUS at 20:53

## 2024-11-18 RX ADMIN — HYDROMORPHONE HYDROCHLORIDE 1 MG: 1 INJECTION, SOLUTION INTRAMUSCULAR; INTRAVENOUS; SUBCUTANEOUS at 17:41

## 2024-11-18 RX ADMIN — SPIRONOLACTONE 25 MG: 25 TABLET ORAL at 08:24

## 2024-11-18 RX ADMIN — ASPIRIN 81 MG: 81 TABLET, CHEWABLE ORAL at 08:25

## 2024-11-18 RX ADMIN — OXYCODONE HYDROCHLORIDE AND ACETAMINOPHEN 2 TABLET: 5; 325 TABLET ORAL at 18:47

## 2024-11-18 RX ADMIN — HYDROMORPHONE HYDROCHLORIDE 1 MG: 1 INJECTION, SOLUTION INTRAMUSCULAR; INTRAVENOUS; SUBCUTANEOUS at 09:52

## 2024-11-18 RX ADMIN — POTASSIUM CHLORIDE 20 MEQ: 29.8 INJECTION, SOLUTION INTRAVENOUS at 12:43

## 2024-11-18 RX ADMIN — OXYCODONE HYDROCHLORIDE AND ACETAMINOPHEN 2 TABLET: 5; 325 TABLET ORAL at 09:22

## 2024-11-18 RX ADMIN — HYDROMORPHONE HYDROCHLORIDE 1 MG: 1 INJECTION, SOLUTION INTRAMUSCULAR; INTRAVENOUS; SUBCUTANEOUS at 11:54

## 2024-11-18 RX ADMIN — SODIUM CHLORIDE, PRESERVATIVE FREE 40 MG: 5 INJECTION INTRAVENOUS at 20:52

## 2024-11-18 RX ADMIN — OXYCODONE HYDROCHLORIDE AND ACETAMINOPHEN 2 TABLET: 5; 325 TABLET ORAL at 22:44

## 2024-11-18 RX ADMIN — HEPARIN SODIUM 1150 UNITS/HR: 10000 INJECTION, SOLUTION INTRAVENOUS at 12:42

## 2024-11-18 RX ADMIN — SODIUM BICARBONATE: 84 INJECTION, SOLUTION INTRAVENOUS at 06:15

## 2024-11-18 RX ADMIN — SODIUM CHLORIDE, PRESERVATIVE FREE 40 MG: 5 INJECTION INTRAVENOUS at 08:25

## 2024-11-18 ASSESSMENT — PAIN DESCRIPTION - ONSET
ONSET: ON-GOING

## 2024-11-18 ASSESSMENT — PAIN DESCRIPTION - ORIENTATION
ORIENTATION: RIGHT;LEFT;LOWER
ORIENTATION: RIGHT
ORIENTATION: RIGHT;LOWER;MID
ORIENTATION: RIGHT;LEFT;LOWER
ORIENTATION: RIGHT
ORIENTATION: RIGHT
ORIENTATION: RIGHT;LEFT;LOWER
ORIENTATION: RIGHT
ORIENTATION: RIGHT;MID;LOWER
ORIENTATION: LEFT;LOWER;RIGHT
ORIENTATION: RIGHT;LEFT;LOWER

## 2024-11-18 ASSESSMENT — PAIN DESCRIPTION - LOCATION
LOCATION: SHOULDER;CHEST
LOCATION: BACK;CHEST;SHOULDER;NECK
LOCATION: BACK;CHEST;SHOULDER;NECK
LOCATION: BACK;NECK;SHOULDER
LOCATION: CHEST;SHOULDER
LOCATION: SHOULDER;CHEST
LOCATION: BACK;CHEST;SHOULDER;NECK
LOCATION: BACK;CHEST;SHOULDER
LOCATION: BACK;NECK;SHOULDER
LOCATION: BACK;NECK;SHOULDER
LOCATION: SHOULDER;CHEST

## 2024-11-18 ASSESSMENT — PAIN SCALES - GENERAL
PAINLEVEL_OUTOF10: 8
PAINLEVEL_OUTOF10: 10
PAINLEVEL_OUTOF10: 8
PAINLEVEL_OUTOF10: 8
PAINLEVEL_OUTOF10: 7
PAINLEVEL_OUTOF10: 10
PAINLEVEL_OUTOF10: 6
PAINLEVEL_OUTOF10: 7
PAINLEVEL_OUTOF10: 0
PAINLEVEL_OUTOF10: 9
PAINLEVEL_OUTOF10: 10
PAINLEVEL_OUTOF10: 9
PAINLEVEL_OUTOF10: 10
PAINLEVEL_OUTOF10: 10
PAINLEVEL_OUTOF10: 8
PAINLEVEL_OUTOF10: 8
PAINLEVEL_OUTOF10: 10
PAINLEVEL_OUTOF10: 8
PAINLEVEL_OUTOF10: 10
PAINLEVEL_OUTOF10: 7
PAINLEVEL_OUTOF10: 9
PAINLEVEL_OUTOF10: 10
PAINLEVEL_OUTOF10: 8

## 2024-11-18 ASSESSMENT — PAIN DESCRIPTION - DESCRIPTORS
DESCRIPTORS: ACHING;DISCOMFORT;SORE;TENDER
DESCRIPTORS: ACHING;DISCOMFORT;TENDER;SORE
DESCRIPTORS: ACHING
DESCRIPTORS: ACHING;DISCOMFORT;SORE;TENDER;THROBBING
DESCRIPTORS: ACHING;SORE;THROBBING;TENDER
DESCRIPTORS: ACHING
DESCRIPTORS: ACHING;DISCOMFORT;SORE;THROBBING;TENDER
DESCRIPTORS: ACHING;DISCOMFORT;SORE;TENDER;THROBBING
DESCRIPTORS: ACHING;DISCOMFORT;THROBBING;TENDER;SORE
DESCRIPTORS: ACHING
DESCRIPTORS: ACHING
DESCRIPTORS: ACHING;DISCOMFORT;SORE;TENDER;THROBBING
DESCRIPTORS: ACHING

## 2024-11-18 ASSESSMENT — PAIN DESCRIPTION - PAIN TYPE
TYPE: ACUTE PAIN;SURGICAL PAIN

## 2024-11-18 ASSESSMENT — PAIN - FUNCTIONAL ASSESSMENT
PAIN_FUNCTIONAL_ASSESSMENT: PREVENTS OR INTERFERES SOME ACTIVE ACTIVITIES AND ADLS
PAIN_FUNCTIONAL_ASSESSMENT: PREVENTS OR INTERFERES WITH MANY ACTIVE NOT PASSIVE ACTIVITIES
PAIN_FUNCTIONAL_ASSESSMENT: PREVENTS OR INTERFERES WITH MANY ACTIVE NOT PASSIVE ACTIVITIES
PAIN_FUNCTIONAL_ASSESSMENT: PREVENTS OR INTERFERES SOME ACTIVE ACTIVITIES AND ADLS
PAIN_FUNCTIONAL_ASSESSMENT: PREVENTS OR INTERFERES WITH MANY ACTIVE NOT PASSIVE ACTIVITIES
PAIN_FUNCTIONAL_ASSESSMENT: PREVENTS OR INTERFERES SOME ACTIVE ACTIVITIES AND ADLS
PAIN_FUNCTIONAL_ASSESSMENT: PREVENTS OR INTERFERES WITH MANY ACTIVE NOT PASSIVE ACTIVITIES
PAIN_FUNCTIONAL_ASSESSMENT: PREVENTS OR INTERFERES SOME ACTIVE ACTIVITIES AND ADLS
PAIN_FUNCTIONAL_ASSESSMENT: PREVENTS OR INTERFERES WITH MANY ACTIVE NOT PASSIVE ACTIVITIES

## 2024-11-18 ASSESSMENT — PAIN SCALES - WONG BAKER: WONGBAKER_NUMERICALRESPONSE: NO HURT

## 2024-11-18 ASSESSMENT — PAIN DESCRIPTION - FREQUENCY
FREQUENCY: CONTINUOUS

## 2024-11-18 NOTE — FLOWSHEET NOTE
11/18/24 1200   [place]-Yaneli   PAP (Systolic/Diastolic) 35/4   PAP (Mean) 15 mmHg   CVP (Mean) 2 mmHg   MAP (Monitor) 91   PAP Wave Form Appropriate wave forms   CVP (mmHg) 2 mmHg   SVO2 (%) 35.4 %   CO (l/min) 3.1 l/min   CCO 3.1 L/Min   CI (l/min/m2) 1.6 l/min/m2   SV (ml) 48 ml   SVR (Using ABP Mean) 2296.77 dyne*sec/cm5   Core (Body) Temperature 97.1 °F (36.2 °C)     PACHECO calculated. Nitro at 70mcg. Impella at P-4. Dr. Rodríguez aware. Hydralazine 50mg PO Q12H scheduled added.

## 2024-11-18 NOTE — FLOWSHEET NOTE
11/17/24 1830   [place]-Yaneli   PAP (Systolic/Diastolic) 44/12   PAP (Mean) 22 mmHg   CVP (Mean) 6 mmHg   SVO2 (%) 41 %   CO (l/min) 3.4 l/min   CCO 3.4 L/Min   CI (l/min/m2) 1.8 l/min/m2   SVR (Using ABP Mean) 1952.94 dyne*sec/cm5     Hemodynamics calculated using Elijah's formula.  Nitroglycerin currently running @ 120 mcg/min.    Dr. Rodríguez had been notified of patient's worsened hypertension over the past 1.5 hours and need to go up on nitro even after giving the evening dose of coreg. New orders received for one time dose of 6.25 mg coreg. BID coreg changed to 12.5 mg. Entresto dose also doubled per Dr. Rodríguez.  Dr. Rodríguez also at bedside and made aware of above hemodynamics.

## 2024-11-18 NOTE — FLOWSHEET NOTE
Hemodynamics calculated as bel;ow using Elijah's formula. Patient on Nitroglycerin @ 60mcg/min, Heparin gtt continues, Precedex off.     11/18/24 0600   [place]-Yaneli   PAP (Systolic/Diastolic) 26/7   PAP (Mean) 13 mmHg   CVP (Mean) 4 mmHg   MAP (Monitor) 68   PAP Wave Form Appropriate wave forms   SVO2 (%) 43.2 %   CO (l/min) 3.7 l/min   CCO 3.7 L/Min   CI (l/min/m2) 1.9 l/min/m2   SVR (Using ABP Mean) 1383.78 dyne*sec/cm5   Impella Left   Systolic 82   Diastolic 48   Mean 63   Impella Performance Level P-4   Placement (cm) 48 cm   Pedal Pulse +2   Purge Flow 10 mL/hr   Purge Pressure 515 mmHg   Impella Flow 2.2 L/min   Motor Current - Systolic 217   Motor Current - Diastolic 152   Motor Current - Mean 173   Motor Current Pulsatile Yes   Activated Clotting Time (ACT) 160   Location Right Axillary   Impella Site Check Yes    0.56   NAKIA 4.75   Hemodynamic Monitoring   Hemodynamic Device [place]-Yaneli   Ventricular Assist Device Impella Left  (P4)

## 2024-11-18 NOTE — FLOWSHEET NOTE
Hemodynamics calculated as bel;ow using Elijah's formula. Patient on Nitroglycerin @ 180mcg/min, Heparin gtt, Precedex @ 0.4mcg/kg/H;     11/18/24 0000   [place]-Yaneli   PAP (Systolic/Diastolic) 48/16   PAP (Mean) 26 mmHg   CVP (Mean) 8 mmHg   SVO2 (%) 53.6 %   CO (l/min) 4.5 l/min   CCO 4.5 L/Min   CI (l/min/m2) 2.3 l/min/m2   SVR (Using ABP Mean) 1528.89 dyne*sec/cm5   Impella Left   Systolic 115   Diastolic 53   Mean 87   Impella Performance Level P-4   Placement (cm) 48 cm   Pedal Pulse +2   Purge Flow 10 mL/hr   Purge Pressure 511 mmHg   Impella Flow 2 L/min   Motor Current - Systolic 219   Motor Current - Diastolic 153   Motor Current - Mean 177   Motor Current Pulsatile Yes   Activated Clotting Time (ACT) 180   Location Right Axillary   Impella Site Check Yes    0.94   NAKIA 4   Hemodynamic Monitoring   Hemodynamic Device [place]-Yaneli   Ventricular Assist Device Impella Left  (P4)

## 2024-11-18 NOTE — FLOWSHEET NOTE
11/18/24 1800   [place]-Yaneli   PAP (Systolic/Diastolic) 40/16   PAP (Mean) 24 mmHg   CVP (Mean) 11 mmHg   MAP (Monitor) 101   PAP Wave Form Appropriate wave forms;Rezeroed   CVP (mmHg) 11 mmHg   SVO2 (%) 45.6 %   CO (l/min) 3.8 l/min   CCO 3.8 L/Min   CI (l/min/m2) 2 l/min/m2   SV (ml) 58 ml   SVR (Using ABP Mean) 1894.74 dyne*sec/cm5     PACHECO calculated. On 120 mcg of Nitro. Hydralazine added earlier. Dr. Rodríguez notified of results. No further changes other than titrating Nitro to a MAP of <80 / SVR <1200. Plan of care ongoing. Plan to explant still tomorrow at 1100.

## 2024-11-19 ENCOUNTER — ANESTHESIA (OUTPATIENT)
Dept: OPERATING ROOM | Age: 74
DRG: 001 | End: 2024-11-19
Payer: MEDICARE

## 2024-11-19 LAB
ANION GAP SERPL CALCULATED.3IONS-SCNC: 8 MMOL/L (ref 3–16)
BASE EXCESS MIXED: -1
BASE EXCESS MIXED: -1
BASE EXCESS MIXED: -5
BASE EXCESS MIXED: 0
BUN SERPL-MCNC: 13 MG/DL (ref 7–20)
CALCIUM SERPL-MCNC: 8.8 MG/DL (ref 8.3–10.6)
CHLORIDE SERPL-SCNC: 105 MMOL/L (ref 99–110)
CO2 SERPL-SCNC: 23 MMOL/L (ref 21–32)
CREAT SERPL-MCNC: 0.8 MG/DL (ref 0.8–1.3)
DEPRECATED RDW RBC AUTO: 14.5 % (ref 12.4–15.4)
GFR SERPLBLD CREATININE-BSD FMLA CKD-EPI: >90 ML/MIN/{1.73_M2}
GLUCOSE BLD-MCNC: 128 MG/DL (ref 70–99)
GLUCOSE BLD-MCNC: 133 MG/DL (ref 70–99)
GLUCOSE BLD-MCNC: 137 MG/DL (ref 70–99)
GLUCOSE BLD-MCNC: 140 MG/DL (ref 70–99)
GLUCOSE BLD-MCNC: 149 MG/DL (ref 70–99)
GLUCOSE SERPL-MCNC: 120 MG/DL (ref 70–99)
HCO3, MIXED: 19.9 MMOL/L
HCO3, MIXED: 22.8 MMOL/L
HCO3, MIXED: 23.7 MMOL/L
HCO3, MIXED: 24.4 MMOL/L
HCT VFR BLD AUTO: 24 % (ref 40.5–52.5)
HGB BLD-MCNC: 8.4 G/DL (ref 13.5–17.5)
MAGNESIUM SERPL-MCNC: 2.12 MG/DL (ref 1.8–2.4)
MCH RBC QN AUTO: 28.7 PG (ref 26–34)
MCHC RBC AUTO-ENTMCNC: 34.9 G/DL (ref 31–36)
MCV RBC AUTO: 82.1 FL (ref 80–100)
O2 SAT, MIXED: 43 %
O2 SAT, MIXED: 48 %
O2 SAT, MIXED: 53 %
O2 SAT, MIXED: 54 %
PCO2 MIXED: 32.8 MM HG
PCO2 MIXED: 33.2 MM HG
PCO2 MIXED: 36.2 MM HG
PCO2 MIXED: 38.3 MM HG
PERFORMED ON: ABNORMAL
PERFORMED ON: NORMAL
PH, MIXED: 7.38 (ref 7.35–7.45)
PH, MIXED: 7.41 (ref 7.35–7.45)
PH, MIXED: 7.42 (ref 7.35–7.45)
PH, MIXED: 7.45 (ref 7.35–7.45)
PLATELET # BLD AUTO: 275 K/UL (ref 135–450)
PMV BLD AUTO: 6.9 FL (ref 5–10.5)
PO2 MIXED: 24 MM HG
PO2 MIXED: 26 MM HG
PO2 MIXED: 26 MM HG
PO2 MIXED: 28 MM HG
POC ACT LR: 155 SEC
POC ACT LR: 157 SEC
POC ACT LR: 159 SEC
POC ACT LR: 161 SEC
POC ACT LR: 162 SEC
POC ACT LR: 162 SEC
POC ACT LR: 164 SEC
POC ACT LR: 165 SEC
POC ACT LR: 165 SEC
POC ACT LR: 166 SEC
POC ACT LR: 167 SEC
POC ACT LR: 168 SEC
POC ACT LR: 169 SEC
POC ACT LR: 170 SEC
POC ACT LR: 170 SEC
POC ACT LR: 172 SEC
POC ACT LR: 173 SEC
POC ACT LR: 174 SEC
POC ACT LR: 174 SEC
POC ACT LR: 176 SEC
POC ACT LR: 186 SEC
POC SAMPLE TYPE: NORMAL
POTASSIUM SERPL-SCNC: 3.4 MMOL/L (ref 3.5–5.1)
RBC # BLD AUTO: 2.92 M/UL (ref 4.2–5.9)
SODIUM SERPL-SCNC: 136 MMOL/L (ref 136–145)
TCO2 CALC MIXED: 21 MMOL/L
TCO2 CALC MIXED: 24 MMOL/L
TCO2 CALC MIXED: 25 MMOL/L
TCO2 CALC MIXED: 26 MMOL/L
WBC # BLD AUTO: 7.8 K/UL (ref 4–11)

## 2024-11-19 PROCEDURE — 2580000003 HC RX 258: Performed by: ANESTHESIOLOGY

## 2024-11-19 PROCEDURE — 80048 BASIC METABOLIC PNL TOTAL CA: CPT

## 2024-11-19 PROCEDURE — 94761 N-INVAS EAR/PLS OXIMETRY MLT: CPT

## 2024-11-19 PROCEDURE — 2580000003 HC RX 258

## 2024-11-19 PROCEDURE — 6370000000 HC RX 637 (ALT 250 FOR IP): Performed by: INTERNAL MEDICINE

## 2024-11-19 PROCEDURE — 6370000000 HC RX 637 (ALT 250 FOR IP): Performed by: NURSE PRACTITIONER

## 2024-11-19 PROCEDURE — 85347 COAGULATION TIME ACTIVATED: CPT

## 2024-11-19 PROCEDURE — 2700000000 HC OXYGEN THERAPY PER DAY

## 2024-11-19 PROCEDURE — 6360000002 HC RX W HCPCS

## 2024-11-19 PROCEDURE — 9990000010 HC NO CHARGE VISIT

## 2024-11-19 PROCEDURE — 2580000003 HC RX 258: Performed by: INTERNAL MEDICINE

## 2024-11-19 PROCEDURE — 3600000013 HC SURGERY LEVEL 3 ADDTL 15MIN: Performed by: SURGERY

## 2024-11-19 PROCEDURE — 6360000002 HC RX W HCPCS: Performed by: SURGERY

## 2024-11-19 PROCEDURE — 02PA0RZ REMOVAL OF SHORT-TERM EXTERNAL HEART ASSIST SYSTEM FROM HEART, OPEN APPROACH: ICD-10-PCS | Performed by: SURGERY

## 2024-11-19 PROCEDURE — 2720000010 HC SURG SUPPLY STERILE: Performed by: SURGERY

## 2024-11-19 PROCEDURE — 6360000002 HC RX W HCPCS: Performed by: NURSE PRACTITIONER

## 2024-11-19 PROCEDURE — 2100000000 HC CCU R&B

## 2024-11-19 PROCEDURE — 85027 COMPLETE CBC AUTOMATED: CPT

## 2024-11-19 PROCEDURE — 2500000003 HC RX 250 WO HCPCS

## 2024-11-19 PROCEDURE — 82803 BLOOD GASES ANY COMBINATION: CPT

## 2024-11-19 PROCEDURE — 6360000002 HC RX W HCPCS: Performed by: INTERNAL MEDICINE

## 2024-11-19 PROCEDURE — 3600000003 HC SURGERY LEVEL 3 BASE: Performed by: SURGERY

## 2024-11-19 PROCEDURE — 99233 SBSQ HOSP IP/OBS HIGH 50: CPT | Performed by: INTERNAL MEDICINE

## 2024-11-19 PROCEDURE — 2500000003 HC RX 250 WO HCPCS: Performed by: INTERNAL MEDICINE

## 2024-11-19 PROCEDURE — APPNB15 APP NON BILLABLE TIME 0-15 MINS: Performed by: NURSE PRACTITIONER

## 2024-11-19 PROCEDURE — 2709999900 HC NON-CHARGEABLE SUPPLY: Performed by: SURGERY

## 2024-11-19 PROCEDURE — 3700000001 HC ADD 15 MINUTES (ANESTHESIA): Performed by: SURGERY

## 2024-11-19 PROCEDURE — 83735 ASSAY OF MAGNESIUM: CPT

## 2024-11-19 PROCEDURE — 3700000000 HC ANESTHESIA ATTENDED CARE: Performed by: SURGERY

## 2024-11-19 PROCEDURE — 2580000003 HC RX 258: Performed by: NURSE PRACTITIONER

## 2024-11-19 RX ORDER — DEXAMETHASONE SODIUM PHOSPHATE 4 MG/ML
INJECTION, SOLUTION INTRA-ARTICULAR; INTRALESIONAL; INTRAMUSCULAR; INTRAVENOUS; SOFT TISSUE
Status: DISCONTINUED | OUTPATIENT
Start: 2024-11-19 | End: 2024-11-19 | Stop reason: SDUPTHER

## 2024-11-19 RX ORDER — SUCCINYLCHOLINE/SOD CL,ISO/PF 200MG/10ML
SYRINGE (ML) INTRAVENOUS
Status: DISCONTINUED | OUTPATIENT
Start: 2024-11-19 | End: 2024-11-19 | Stop reason: SDUPTHER

## 2024-11-19 RX ORDER — VANCOMYCIN HYDROCHLORIDE 1 G/20ML
INJECTION, POWDER, LYOPHILIZED, FOR SOLUTION INTRAVENOUS
Status: COMPLETED | OUTPATIENT
Start: 2024-11-19 | End: 2024-11-19

## 2024-11-19 RX ORDER — PROPOFOL 10 MG/ML
INJECTION, EMULSION INTRAVENOUS
Status: DISCONTINUED | OUTPATIENT
Start: 2024-11-19 | End: 2024-11-19 | Stop reason: SDUPTHER

## 2024-11-19 RX ORDER — PHENYLEPHRINE HCL IN 0.9% NACL 1 MG/10 ML
SYRINGE (ML) INTRAVENOUS
Status: DISCONTINUED | OUTPATIENT
Start: 2024-11-19 | End: 2024-11-19 | Stop reason: SDUPTHER

## 2024-11-19 RX ORDER — EPHEDRINE SULFATE/0.9% NACL/PF 25 MG/5 ML
SYRINGE (ML) INTRAVENOUS
Status: DISCONTINUED | OUTPATIENT
Start: 2024-11-19 | End: 2024-11-19 | Stop reason: SDUPTHER

## 2024-11-19 RX ORDER — ROCURONIUM BROMIDE 10 MG/ML
INJECTION, SOLUTION INTRAVENOUS
Status: DISCONTINUED | OUTPATIENT
Start: 2024-11-19 | End: 2024-11-19 | Stop reason: SDUPTHER

## 2024-11-19 RX ORDER — ONDANSETRON 2 MG/ML
INJECTION INTRAMUSCULAR; INTRAVENOUS
Status: DISCONTINUED | OUTPATIENT
Start: 2024-11-19 | End: 2024-11-19 | Stop reason: SDUPTHER

## 2024-11-19 RX ORDER — FENTANYL CITRATE 50 UG/ML
INJECTION, SOLUTION INTRAMUSCULAR; INTRAVENOUS
Status: DISCONTINUED | OUTPATIENT
Start: 2024-11-19 | End: 2024-11-19 | Stop reason: SDUPTHER

## 2024-11-19 RX ORDER — CALCIUM CHLORIDE 100 MG/ML
INJECTION INTRAVENOUS; INTRAVENTRICULAR
Status: DISCONTINUED | OUTPATIENT
Start: 2024-11-19 | End: 2024-11-19 | Stop reason: SDUPTHER

## 2024-11-19 RX ORDER — LIDOCAINE HYDROCHLORIDE 20 MG/ML
INJECTION, SOLUTION EPIDURAL; INFILTRATION; INTRACAUDAL; PERINEURAL
Status: DISCONTINUED | OUTPATIENT
Start: 2024-11-19 | End: 2024-11-19 | Stop reason: SDUPTHER

## 2024-11-19 RX ORDER — SODIUM CHLORIDE 9 MG/ML
INJECTION, SOLUTION INTRAVENOUS
Status: DISCONTINUED | OUTPATIENT
Start: 2024-11-19 | End: 2024-11-19 | Stop reason: SDUPTHER

## 2024-11-19 RX ADMIN — Medication 200 MCG: at 12:25

## 2024-11-19 RX ADMIN — EPHEDRINE SULFATE 10 MG: 5 INJECTION INTRAVENOUS at 12:52

## 2024-11-19 RX ADMIN — OXYCODONE HYDROCHLORIDE AND ACETAMINOPHEN 2 TABLET: 5; 325 TABLET ORAL at 18:59

## 2024-11-19 RX ADMIN — HYDROMORPHONE HYDROCHLORIDE 1 MG: 1 INJECTION, SOLUTION INTRAMUSCULAR; INTRAVENOUS; SUBCUTANEOUS at 06:23

## 2024-11-19 RX ADMIN — NITROGLYCERIN 150 MCG/MIN: 20 INJECTION INTRAVENOUS at 00:31

## 2024-11-19 RX ADMIN — ASPIRIN 81 MG: 81 TABLET, CHEWABLE ORAL at 13:35

## 2024-11-19 RX ADMIN — CALCIUM CHLORIDE INJECTION 0.5 G: 100 INJECTION, SOLUTION INTRAVENOUS at 12:54

## 2024-11-19 RX ADMIN — Medication 100 MCG: at 12:50

## 2024-11-19 RX ADMIN — LIDOCAINE HYDROCHLORIDE 60 MG: 20 INJECTION, SOLUTION EPIDURAL; INFILTRATION; INTRACAUDAL; PERINEURAL at 12:19

## 2024-11-19 RX ADMIN — SUGAMMADEX 200 MG: 100 INJECTION, SOLUTION INTRAVENOUS at 12:52

## 2024-11-19 RX ADMIN — PROPOFOL 50 MG: 10 INJECTION, EMULSION INTRAVENOUS at 12:20

## 2024-11-19 RX ADMIN — SACUBITRIL AND VALSARTAN 2 TABLET: 24; 26 TABLET, FILM COATED ORAL at 08:13

## 2024-11-19 RX ADMIN — SODIUM CHLORIDE, PRESERVATIVE FREE 40 MG: 5 INJECTION INTRAVENOUS at 21:04

## 2024-11-19 RX ADMIN — Medication 2 CAPSULE: at 16:36

## 2024-11-19 RX ADMIN — FENTANYL CITRATE 25 MCG: 50 INJECTION INTRAMUSCULAR; INTRAVENOUS at 12:19

## 2024-11-19 RX ADMIN — SPIRONOLACTONE 25 MG: 25 TABLET ORAL at 08:12

## 2024-11-19 RX ADMIN — TICAGRELOR 90 MG: 90 TABLET ORAL at 21:03

## 2024-11-19 RX ADMIN — CEFEPIME 2000 MG: 2 INJECTION, POWDER, FOR SOLUTION INTRAVENOUS at 00:10

## 2024-11-19 RX ADMIN — HYDROMORPHONE HYDROCHLORIDE 1 MG: 1 INJECTION, SOLUTION INTRAMUSCULAR; INTRAVENOUS; SUBCUTANEOUS at 17:50

## 2024-11-19 RX ADMIN — Medication 100 MCG: at 12:19

## 2024-11-19 RX ADMIN — CARVEDILOL 12.5 MG: 12.5 TABLET, FILM COATED ORAL at 08:12

## 2024-11-19 RX ADMIN — HYDRALAZINE HYDROCHLORIDE 50 MG: 50 TABLET ORAL at 21:03

## 2024-11-19 RX ADMIN — SODIUM CHLORIDE: 9 INJECTION, SOLUTION INTRAVENOUS at 12:03

## 2024-11-19 RX ADMIN — PROPOFOL 50 MG: 10 INJECTION, EMULSION INTRAVENOUS at 12:19

## 2024-11-19 RX ADMIN — HYDROMORPHONE HYDROCHLORIDE 1 MG: 1 INJECTION, SOLUTION INTRAMUSCULAR; INTRAVENOUS; SUBCUTANEOUS at 19:59

## 2024-11-19 RX ADMIN — EPHEDRINE SULFATE 10 MG: 5 INJECTION INTRAVENOUS at 12:43

## 2024-11-19 RX ADMIN — CARVEDILOL 12.5 MG: 12.5 TABLET, FILM COATED ORAL at 16:36

## 2024-11-19 RX ADMIN — HYDRALAZINE HYDROCHLORIDE 50 MG: 50 TABLET ORAL at 08:12

## 2024-11-19 RX ADMIN — FENTANYL CITRATE 25 MCG: 50 INJECTION INTRAMUSCULAR; INTRAVENOUS at 12:47

## 2024-11-19 RX ADMIN — SACUBITRIL AND VALSARTAN 2 TABLET: 24; 26 TABLET, FILM COATED ORAL at 21:04

## 2024-11-19 RX ADMIN — CEFEPIME 2000 MG: 2 INJECTION, POWDER, FOR SOLUTION INTRAVENOUS at 16:36

## 2024-11-19 RX ADMIN — HYDROXYZINE HYDROCHLORIDE 10 MG: 10 TABLET, FILM COATED ORAL at 08:13

## 2024-11-19 RX ADMIN — SODIUM BICARBONATE: 84 INJECTION, SOLUTION INTRAVENOUS at 05:06

## 2024-11-19 RX ADMIN — EPHEDRINE SULFATE 5 MG: 5 INJECTION INTRAVENOUS at 12:41

## 2024-11-19 RX ADMIN — PROPOFOL 30 MG: 10 INJECTION, EMULSION INTRAVENOUS at 12:32

## 2024-11-19 RX ADMIN — POTASSIUM CHLORIDE 20 MEQ: 29.8 INJECTION, SOLUTION INTRAVENOUS at 06:59

## 2024-11-19 RX ADMIN — NITROGLYCERIN 130 MCG/MIN: 20 INJECTION INTRAVENOUS at 06:08

## 2024-11-19 RX ADMIN — SODIUM CHLORIDE, PRESERVATIVE FREE 10 ML: 5 INJECTION INTRAVENOUS at 08:14

## 2024-11-19 RX ADMIN — HYDROMORPHONE HYDROCHLORIDE 1 MG: 1 INJECTION, SOLUTION INTRAMUSCULAR; INTRAVENOUS; SUBCUTANEOUS at 00:07

## 2024-11-19 RX ADMIN — OXYCODONE HYDROCHLORIDE AND ACETAMINOPHEN 2 TABLET: 5; 325 TABLET ORAL at 14:49

## 2024-11-19 RX ADMIN — CALCIUM CHLORIDE INJECTION 0.5 G: 100 INJECTION, SOLUTION INTRAVENOUS at 12:38

## 2024-11-19 RX ADMIN — FENTANYL CITRATE 50 MCG: 50 INJECTION INTRAMUSCULAR; INTRAVENOUS at 12:04

## 2024-11-19 RX ADMIN — HYDROXYZINE HYDROCHLORIDE 10 MG: 10 TABLET, FILM COATED ORAL at 22:03

## 2024-11-19 RX ADMIN — DEXAMETHASONE SODIUM PHOSPHATE 8 MG: 4 INJECTION, SOLUTION INTRAMUSCULAR; INTRAVENOUS at 12:25

## 2024-11-19 RX ADMIN — HYDROMORPHONE HYDROCHLORIDE 1 MG: 1 INJECTION, SOLUTION INTRAMUSCULAR; INTRAVENOUS; SUBCUTANEOUS at 08:23

## 2024-11-19 RX ADMIN — Medication 100 MCG: at 12:22

## 2024-11-19 RX ADMIN — ONDANSETRON 4 MG: 2 INJECTION INTRAMUSCULAR; INTRAVENOUS at 12:25

## 2024-11-19 RX ADMIN — HYDROMORPHONE HYDROCHLORIDE 1 MG: 1 INJECTION, SOLUTION INTRAMUSCULAR; INTRAVENOUS; SUBCUTANEOUS at 13:35

## 2024-11-19 RX ADMIN — HYDROMORPHONE HYDROCHLORIDE 1 MG: 1 INJECTION, SOLUTION INTRAMUSCULAR; INTRAVENOUS; SUBCUTANEOUS at 22:03

## 2024-11-19 RX ADMIN — ROCURONIUM BROMIDE 15 MG: 10 SOLUTION INTRAVENOUS at 12:23

## 2024-11-19 RX ADMIN — HYDROMORPHONE HYDROCHLORIDE 1 MG: 1 INJECTION, SOLUTION INTRAMUSCULAR; INTRAVENOUS; SUBCUTANEOUS at 10:24

## 2024-11-19 RX ADMIN — HYDROMORPHONE HYDROCHLORIDE 1 MG: 1 INJECTION, SOLUTION INTRAMUSCULAR; INTRAVENOUS; SUBCUTANEOUS at 02:03

## 2024-11-19 RX ADMIN — POTASSIUM CHLORIDE 20 MEQ: 29.8 INJECTION, SOLUTION INTRAVENOUS at 08:07

## 2024-11-19 RX ADMIN — SODIUM CHLORIDE: 9 INJECTION, SOLUTION INTRAVENOUS at 12:43

## 2024-11-19 RX ADMIN — SODIUM CHLORIDE, PRESERVATIVE FREE 40 MG: 5 INJECTION INTRAVENOUS at 08:12

## 2024-11-19 RX ADMIN — SODIUM CHLORIDE, PRESERVATIVE FREE 10 ML: 5 INJECTION INTRAVENOUS at 21:04

## 2024-11-19 RX ADMIN — CEFEPIME 2000 MG: 2 INJECTION, POWDER, FOR SOLUTION INTRAVENOUS at 09:30

## 2024-11-19 RX ADMIN — HYDROMORPHONE HYDROCHLORIDE 1 MG: 1 INJECTION, SOLUTION INTRAMUSCULAR; INTRAVENOUS; SUBCUTANEOUS at 15:46

## 2024-11-19 RX ADMIN — Medication 140 MG: at 12:19

## 2024-11-19 RX ADMIN — ATORVASTATIN CALCIUM 80 MG: 80 TABLET, FILM COATED ORAL at 21:04

## 2024-11-19 RX ADMIN — OXYCODONE HYDROCHLORIDE AND ACETAMINOPHEN 2 TABLET: 5; 325 TABLET ORAL at 07:06

## 2024-11-19 ASSESSMENT — PAIN DESCRIPTION - LOCATION
LOCATION: SHOULDER;CHEST
LOCATION: SHOULDER;CHEST
LOCATION: CHEST;SHOULDER
LOCATION: SHOULDER;CHEST

## 2024-11-19 ASSESSMENT — PAIN SCALES - GENERAL
PAINLEVEL_OUTOF10: 9
PAINLEVEL_OUTOF10: 9
PAINLEVEL_OUTOF10: 10
PAINLEVEL_OUTOF10: 8
PAINLEVEL_OUTOF10: 10
PAINLEVEL_OUTOF10: 9
PAINLEVEL_OUTOF10: 8
PAINLEVEL_OUTOF10: 10
PAINLEVEL_OUTOF10: 9
PAINLEVEL_OUTOF10: 10
PAINLEVEL_OUTOF10: 8
PAINLEVEL_OUTOF10: 10
PAINLEVEL_OUTOF10: 9
PAINLEVEL_OUTOF10: 10
PAINLEVEL_OUTOF10: 8

## 2024-11-19 ASSESSMENT — ENCOUNTER SYMPTOMS: SHORTNESS OF BREATH: 1

## 2024-11-19 ASSESSMENT — PAIN DESCRIPTION - DESCRIPTORS
DESCRIPTORS: ACHING
DESCRIPTORS: ACHING;DISCOMFORT;TENDER;SORE;THROBBING
DESCRIPTORS: THROBBING
DESCRIPTORS: ACHING;DISCOMFORT;SORE;TENDER
DESCRIPTORS: ACHING
DESCRIPTORS: ACHING;DISCOMFORT;SORE;TENDER;THROBBING
DESCRIPTORS: ACHING

## 2024-11-19 ASSESSMENT — PAIN DESCRIPTION - FREQUENCY
FREQUENCY: CONTINUOUS

## 2024-11-19 ASSESSMENT — PAIN - FUNCTIONAL ASSESSMENT
PAIN_FUNCTIONAL_ASSESSMENT: PREVENTS OR INTERFERES SOME ACTIVE ACTIVITIES AND ADLS
PAIN_FUNCTIONAL_ASSESSMENT: PREVENTS OR INTERFERES WITH MANY ACTIVE NOT PASSIVE ACTIVITIES
PAIN_FUNCTIONAL_ASSESSMENT: PREVENTS OR INTERFERES WITH MANY ACTIVE NOT PASSIVE ACTIVITIES
PAIN_FUNCTIONAL_ASSESSMENT: PREVENTS OR INTERFERES SOME ACTIVE ACTIVITIES AND ADLS
PAIN_FUNCTIONAL_ASSESSMENT: PREVENTS OR INTERFERES WITH MANY ACTIVE NOT PASSIVE ACTIVITIES
PAIN_FUNCTIONAL_ASSESSMENT: PREVENTS OR INTERFERES SOME ACTIVE ACTIVITIES AND ADLS
PAIN_FUNCTIONAL_ASSESSMENT: PREVENTS OR INTERFERES WITH MANY ACTIVE NOT PASSIVE ACTIVITIES
PAIN_FUNCTIONAL_ASSESSMENT: PREVENTS OR INTERFERES SOME ACTIVE ACTIVITIES AND ADLS
PAIN_FUNCTIONAL_ASSESSMENT: PREVENTS OR INTERFERES SOME ACTIVE ACTIVITIES AND ADLS
PAIN_FUNCTIONAL_ASSESSMENT: PREVENTS OR INTERFERES WITH MANY ACTIVE NOT PASSIVE ACTIVITIES
PAIN_FUNCTIONAL_ASSESSMENT: PREVENTS OR INTERFERES SOME ACTIVE ACTIVITIES AND ADLS

## 2024-11-19 ASSESSMENT — PAIN DESCRIPTION - ORIENTATION
ORIENTATION: RIGHT
ORIENTATION: RIGHT;MID
ORIENTATION: RIGHT
ORIENTATION: RIGHT;LEFT;LOWER;MID
ORIENTATION: RIGHT
ORIENTATION: RIGHT;LEFT;LOWER;MID
ORIENTATION: RIGHT
ORIENTATION: RIGHT;MID
ORIENTATION: RIGHT
ORIENTATION: RIGHT;LOWER
ORIENTATION: RIGHT;LOWER
ORIENTATION: RIGHT;MID;LOWER
ORIENTATION: RIGHT

## 2024-11-19 ASSESSMENT — PAIN SCALES - WONG BAKER
WONGBAKER_NUMERICALRESPONSE: NO HURT

## 2024-11-19 ASSESSMENT — PAIN DESCRIPTION - ONSET
ONSET: ON-GOING
ONSET: ON-GOING
ONSET: GRADUAL
ONSET: ON-GOING

## 2024-11-19 ASSESSMENT — PAIN DESCRIPTION - PAIN TYPE
TYPE: ACUTE PAIN;SURGICAL PAIN

## 2024-11-19 NOTE — BRIEF OP NOTE
Brief Postoperative Note      Patient: Ad Lacey  YOB: 1950  MRN: [id number]    Date of Procedure: 11/19/2024    Pre-Op Diagnosis Codes:      * Cardiopulmonary arrest [I46.9]    Post-Op Diagnosis: Same       Procedure(s):  IMPELLA EXPLANT    Surgeon(s):  Benjamin Lopez MD    Assistant:  Surgical Assistant: Maxi Morales    Anesthesia: Choice    Estimated Blood Loss (mL): less than 50     Complications: None    Specimens:   * No specimens in log *    Implants:  Implant Name Type Inv. Item Serial No.  Lot No. LRB No. Used Action   KIT CATHETER PUMP INSERTION INTRACARDIAC IMPELLA CP - [id number] Ventricular assist devices KIT CATHETER PUMP INSERTION INTRACARDIAC IMPELLA CP  [id number] N/A 1 Explanted         Drains:   Urinary Catheter 11/09/24 [place] (Active)   $ Urethral catheter insertion $ Not inserted for procedure 11/13/24 0800   Catheter Indications Need for fluid volume management of the critically ill patient in a critical care setting 11/19/24 0800   Site Assessment No urethral drainage 11/19/24 0800   Urine Color Yellow 11/19/24 0800   Urine Appearance Clear 11/19/24 0800   Urine Odor Other (Comment) 11/19/24 0800   Collection Container Standard 11/19/24 0800   Securement Method Securing device (Describe) 11/19/24 0800   Catheter Care  Perineal wipes 11/19/24 0800   Catheter Best Practices  Drainage tube clipped to bed;Catheter secured to thigh;Tamper seal intact;Bag below bladder;Bag not on floor;Lack of dependent loop in tubing;Drainage bag less than half full 11/19/24 0800   Status Draining;Patent 11/19/24 0800   Output (mL) 250 mL 11/19/24 1325       [REMOVED] NG/OG/NJ/NE Tube Orogastric Center mouth (Removed)   Surrounding Skin Clean, dry & intact 11/10/24 0800   Securement device Tape 11/10/24 0800   Status Clamped 11/10/24 0800   Placement Verified X-Ray (Initial);External Catheter Length 11/10/24 0800   NG/OG/NJ/NE External Measurement (cm) 58 cm

## 2024-11-19 NOTE — ANESTHESIA PRE PROCEDURE
Department of Anesthesiology  Preprocedure Note       Name:  Ad Lacey   Age:  74 y.o.  :  1950                                          MRN:  [id number]         Date:  2024      Surgeon: Surgeon(s):  Benjamin Lopez MD    Procedure: Procedure(s):  IMPELLA EXPLANT    Medications prior to admission:   Prior to Admission medications    Medication Sig Start Date End Date Taking? Authorizing Provider   aspirin 81 MG EC tablet Take 1 tablet by mouth daily   Yes ProviderJeff MD   hydrOXYzine HCl (ATARAX) 25 MG tablet Take 1 tablet by mouth 3 times daily as needed for Itching or Anxiety   Yes Provider, MD Jeff   amLODIPine (NORVASC) 10 MG tablet Take 1 tablet by mouth daily   Yes Provider, MD Jeff   atorvastatin (LIPITOR) 80 MG tablet Take 1 tablet by mouth daily    ProviderJeff MD   carvedilol (COREG) 12.5 MG tablet Take 1 tablet by mouth 2 times daily (with meals)  Patient not taking: Reported on 2024   Deandre Roblero MD   aspirin EC 81 MG EC tablet Take 1 tablet by mouth daily . 23  Deandre Roblero MD   apixaban (ELIQUIS) 5 MG TABS tablet Take 1 tablet by mouth 2 times daily  Patient not taking: Reported on 2024   Roberta Hauser APRN - CNP       Current medications:    Current Facility-Administered Medications   Medication Dose Route Frequency Provider Last Rate Last Admin   • hydrALAZINE (APRESOLINE) tablet 50 mg  50 mg Oral BID Dillon Rodríguez MD   50 mg at 24 08   • 0.9 % sodium chloride infusion   IntraVENous PRN Dillon Rodríguez MD       • carvedilol (COREG) tablet 12.5 mg  12.5 mg Oral BID WC Dillon Rodríguez MD   12.5 mg at 24 0812   • sacubitril-valsartan (ENTRESTO) 24-26 MG per tablet 2 tablet  2 tablet Oral BID Dillon Rodríguez MD   2 tablet at 24 0813   • oxyCODONE-acetaminophen (PERCOCET) 5-325 MG per tablet 2 tablet  2 tablet Oral Q4H PRN Corina Angela APRN - CNP   2 tablet at 24

## 2024-11-19 NOTE — ANESTHESIA POSTPROCEDURE EVALUATION
Department of Anesthesiology  Postprocedure Note    Patient: Ad Lacey  MRN: [id number]  YOB: 1950  Date of evaluation: 11/19/2024    Procedure Summary       Date: 11/19/24 Room / Location: [address]    Anesthesia Start: 1203 Anesthesia Stop: 1320    Procedure: IMPELLA EXPLANT (Neck) Diagnosis:       Cardiopulmonary arrest      (Cardiopulmonary arrest [I46.9])    Surgeons: Benjamin Lopez MD Responsible Provider: Saad Lyle MD    Anesthesia Type: General ASA Status: 4            Anesthesia Type: General    Griffin Phase I: Griffin Score: 8    Griffin Phase II:      Anesthesia Post Evaluation    Patient location during evaluation: ICU  Patient participation: complete - patient participated  Level of consciousness: awake and alert  Pain score: 3  Airway patency: patent  Nausea & Vomiting: no nausea and no vomiting  Cardiovascular status: blood pressure returned to baseline  Respiratory status: acceptable  Hydration status: euvolemic  Pain management: adequate    No notable events documented.

## 2024-11-19 NOTE — FLOWSHEET NOTE
11/19/24 0600   [place]   PAP (Systolic/Diastolic) 29/9   PAP (Mean) 15 mmHg   CVP (Mean) 2 mmHg   SVO2 (%) 53.7 %   CO (l/min) 4.7 l/min   CCO 4.7 L/Min   CI (l/min/m2) 2.4 l/min/m2   SV (ml) 76 ml   SVR (Using ABP Mean) 1378.72 dyne*sec/cm5   Impella Left   Systolic 101   Diastolic 53   Mean 80   Impella Performance Level P-4   Placement (cm) 48 cm   Pedal Pulse +2  (radial)   Purge Flow 10.3 mL/hr   Purge Pressure 523 mmHg   Impella Flow 1.8 L/min   Motor Current - Systolic 222   Motor Current - Diastolic 152   Motor Current - Mean 172   Motor Current Pulsatile Yes   Activated Clotting Time (ACT) 169    0.86   NAKIA 10     Impella currently P-4. Calculated using PACHECO. Pt currently on nitro at 130 mcg/min and heparin at 1150 units/hr

## 2024-11-19 NOTE — FLOWSHEET NOTE
11/19/24 0000   [place]   PAP (Systolic/Diastolic) 45/12   PAP (Mean) 23 mmHg   CVP (Mean) 4 mmHg   PAP Wave Form Appropriate wave forms;Rezeroed   SVO2 (%) 52.9 %   CO (l/min) 4.7 l/min   CCO 4.7 L/Min   CI (l/min/m2) 2.4 l/min/m2   SV (ml) 77 ml   SVR (Using ABP Mean) 1395.74 dyne*sec/cm5   Impella Left   Systolic 118   Diastolic 58   Mean 84   Impella Performance Level P-4   Placement (cm) 48 cm   Pedal Pulse +2  (radial)   Purge Flow 10 mL/hr   Purge Pressure 531 mmHg   Impella Flow 1.8 L/min   Motor Current - Systolic 221   Motor Current - Diastolic 150   Motor Current - Mean 177   Motor Current Pulsatile Yes   Activated Clotting Time (ACT) 166   Location Right Axillary   Impella Site Check Yes    0.9   NAKIA 8.25     Calculated using PACHECO formula. Pt currently on P-4 with heparin @ 1150 units/hr and nitro 150 mcg/min.   Detail Level: Detailed Show Applicator Variable?: Yes Consent: The patient's consent was obtained including but not limited to risks of crusting, scabbing, blistering, scarring, darker or lighter pigmentary change, recurrence, incomplete removal and infection. Render Note In Bullet Format When Appropriate: No Duration Of Freeze Thaw-Cycle (Seconds): 2 Number Of Freeze-Thaw Cycles: 2 freeze-thaw cycles Post-Care Instructions: I reviewed with the patient in detail post-care instructions. Patient is to wear sunprotection, and avoid picking at any of the treated lesions. Pt may apply Vaseline to crusted or scabbing areas.

## 2024-11-19 NOTE — FLOWSHEET NOTE
11/19/24 1648   [place]   PAP (Systolic/Diastolic) 56/21   PAP (Mean) 34 mmHg   CVP (Mean) 8 mmHg   MAP (Monitor) 96   PAP Wave Form Appropriate wave forms;Rezeroed   CVP (mmHg) 8 mmHg   SVO2 (%) 42.6 %   CO (l/min) 3.6 l/min   CCO 3.6 L/Min   CI (l/min/m2) 1.9 l/min/m2   SV (ml) 42 ml   SVR (Using ABP Mean) 1955.56 dyne*sec/cm5     PACHECO calculated. Off Nitro. Dr. Rodríguez aware. No changes at this time, check again later tonight. Wait for 1700 BP meds effect before restarting Nitro if necessary per Dr. Rodríguez.

## 2024-11-20 LAB
ANION GAP SERPL CALCULATED.3IONS-SCNC: 10 MMOL/L (ref 3–16)
BASE EXCESS MIXED: -2
BUN SERPL-MCNC: 16 MG/DL (ref 7–20)
CALCIUM SERPL-MCNC: 9.2 MG/DL (ref 8.3–10.6)
CHLORIDE SERPL-SCNC: 105 MMOL/L (ref 99–110)
CO2 SERPL-SCNC: 22 MMOL/L (ref 21–32)
CREAT SERPL-MCNC: 0.9 MG/DL (ref 0.8–1.3)
DEPRECATED RDW RBC AUTO: 15.1 % (ref 12.4–15.4)
GFR SERPLBLD CREATININE-BSD FMLA CKD-EPI: 89 ML/MIN/{1.73_M2}
GLUCOSE BLD-MCNC: 103 MG/DL (ref 70–99)
GLUCOSE BLD-MCNC: 105 MG/DL (ref 70–99)
GLUCOSE BLD-MCNC: 115 MG/DL (ref 70–99)
GLUCOSE BLD-MCNC: 116 MG/DL (ref 70–99)
GLUCOSE BLD-MCNC: 141 MG/DL (ref 70–99)
GLUCOSE BLD-MCNC: 149 MG/DL (ref 70–99)
GLUCOSE SERPL-MCNC: 136 MG/DL (ref 70–99)
HCO3, MIXED: 23 MMOL/L
HCT VFR BLD AUTO: 25.4 % (ref 40.5–52.5)
HGB BLD-MCNC: 8.7 G/DL (ref 13.5–17.5)
MAGNESIUM SERPL-MCNC: 2.25 MG/DL (ref 1.8–2.4)
MCH RBC QN AUTO: 28.7 PG (ref 26–34)
MCHC RBC AUTO-ENTMCNC: 34.3 G/DL (ref 31–36)
MCV RBC AUTO: 83.6 FL (ref 80–100)
O2 SAT, MIXED: 60 %
PCO2 MIXED: 38.9 MM HG
PERFORMED ON: ABNORMAL
PERFORMED ON: NORMAL
PH, MIXED: 7.38 (ref 7.35–7.45)
PLATELET # BLD AUTO: 314 K/UL (ref 135–450)
PMV BLD AUTO: 7.5 FL (ref 5–10.5)
PO2 MIXED: 32 MM HG
POC SAMPLE TYPE: NORMAL
POTASSIUM SERPL-SCNC: 4.3 MMOL/L (ref 3.5–5.1)
RBC # BLD AUTO: 3.04 M/UL (ref 4.2–5.9)
SODIUM SERPL-SCNC: 137 MMOL/L (ref 136–145)
TCO2 CALC MIXED: 24 MMOL/L
WBC # BLD AUTO: 7.5 K/UL (ref 4–11)

## 2024-11-20 PROCEDURE — 97535 SELF CARE MNGMENT TRAINING: CPT

## 2024-11-20 PROCEDURE — 2580000003 HC RX 258: Performed by: ANESTHESIOLOGY

## 2024-11-20 PROCEDURE — 6370000000 HC RX 637 (ALT 250 FOR IP): Performed by: NURSE PRACTITIONER

## 2024-11-20 PROCEDURE — 6370000000 HC RX 637 (ALT 250 FOR IP): Performed by: INTERNAL MEDICINE

## 2024-11-20 PROCEDURE — 2580000003 HC RX 258: Performed by: NURSE PRACTITIONER

## 2024-11-20 PROCEDURE — 97530 THERAPEUTIC ACTIVITIES: CPT

## 2024-11-20 PROCEDURE — 82803 BLOOD GASES ANY COMBINATION: CPT

## 2024-11-20 PROCEDURE — 97110 THERAPEUTIC EXERCISES: CPT

## 2024-11-20 PROCEDURE — 6360000002 HC RX W HCPCS: Performed by: INTERNAL MEDICINE

## 2024-11-20 PROCEDURE — 85027 COMPLETE CBC AUTOMATED: CPT

## 2024-11-20 PROCEDURE — 6360000002 HC RX W HCPCS: Performed by: NURSE PRACTITIONER

## 2024-11-20 PROCEDURE — 97116 GAIT TRAINING THERAPY: CPT

## 2024-11-20 PROCEDURE — 83735 ASSAY OF MAGNESIUM: CPT

## 2024-11-20 PROCEDURE — 2100000000 HC CCU R&B

## 2024-11-20 PROCEDURE — 80048 BASIC METABOLIC PNL TOTAL CA: CPT

## 2024-11-20 PROCEDURE — 2580000003 HC RX 258: Performed by: INTERNAL MEDICINE

## 2024-11-20 RX ADMIN — SACUBITRIL AND VALSARTAN 2 TABLET: 24; 26 TABLET, FILM COATED ORAL at 20:18

## 2024-11-20 RX ADMIN — HYDRALAZINE HYDROCHLORIDE 50 MG: 50 TABLET ORAL at 07:59

## 2024-11-20 RX ADMIN — SODIUM CHLORIDE, PRESERVATIVE FREE 40 MG: 5 INJECTION INTRAVENOUS at 20:18

## 2024-11-20 RX ADMIN — SACUBITRIL AND VALSARTAN 2 TABLET: 24; 26 TABLET, FILM COATED ORAL at 07:59

## 2024-11-20 RX ADMIN — SODIUM CHLORIDE, PRESERVATIVE FREE 40 MG: 5 INJECTION INTRAVENOUS at 07:59

## 2024-11-20 RX ADMIN — OXYCODONE HYDROCHLORIDE AND ACETAMINOPHEN 2 TABLET: 5; 325 TABLET ORAL at 02:23

## 2024-11-20 RX ADMIN — HYDROMORPHONE HYDROCHLORIDE 1 MG: 1 INJECTION, SOLUTION INTRAMUSCULAR; INTRAVENOUS; SUBCUTANEOUS at 08:01

## 2024-11-20 RX ADMIN — SODIUM CHLORIDE, PRESERVATIVE FREE 10 ML: 5 INJECTION INTRAVENOUS at 08:53

## 2024-11-20 RX ADMIN — TICAGRELOR 90 MG: 90 TABLET ORAL at 07:59

## 2024-11-20 RX ADMIN — HYDRALAZINE HYDROCHLORIDE 50 MG: 50 TABLET ORAL at 20:19

## 2024-11-20 RX ADMIN — HYDROMORPHONE HYDROCHLORIDE 1 MG: 1 INJECTION, SOLUTION INTRAMUSCULAR; INTRAVENOUS; SUBCUTANEOUS at 20:17

## 2024-11-20 RX ADMIN — HYDROXYZINE HYDROCHLORIDE 10 MG: 10 TABLET, FILM COATED ORAL at 12:09

## 2024-11-20 RX ADMIN — CARVEDILOL 12.5 MG: 12.5 TABLET, FILM COATED ORAL at 07:59

## 2024-11-20 RX ADMIN — SODIUM CHLORIDE, PRESERVATIVE FREE 10 ML: 5 INJECTION INTRAVENOUS at 20:18

## 2024-11-20 RX ADMIN — HYDROMORPHONE HYDROCHLORIDE 1 MG: 1 INJECTION, SOLUTION INTRAMUSCULAR; INTRAVENOUS; SUBCUTANEOUS at 15:48

## 2024-11-20 RX ADMIN — Medication 2 CAPSULE: at 17:52

## 2024-11-20 RX ADMIN — HYDROMORPHONE HYDROCHLORIDE 1 MG: 1 INJECTION, SOLUTION INTRAMUSCULAR; INTRAVENOUS; SUBCUTANEOUS at 10:35

## 2024-11-20 RX ADMIN — Medication 2 CAPSULE: at 07:59

## 2024-11-20 RX ADMIN — HYDROXYZINE HYDROCHLORIDE 10 MG: 10 TABLET, FILM COATED ORAL at 07:59

## 2024-11-20 RX ADMIN — POLYETHYLENE GLYCOL 3350 17 G: 17 POWDER, FOR SOLUTION ORAL at 07:59

## 2024-11-20 RX ADMIN — OXYCODONE HYDROCHLORIDE AND ACETAMINOPHEN 2 TABLET: 5; 325 TABLET ORAL at 09:46

## 2024-11-20 RX ADMIN — HYDROMORPHONE HYDROCHLORIDE 1 MG: 1 INJECTION, SOLUTION INTRAMUSCULAR; INTRAVENOUS; SUBCUTANEOUS at 17:52

## 2024-11-20 RX ADMIN — HYDROMORPHONE HYDROCHLORIDE 1 MG: 1 INJECTION, SOLUTION INTRAMUSCULAR; INTRAVENOUS; SUBCUTANEOUS at 22:26

## 2024-11-20 RX ADMIN — SPIRONOLACTONE 25 MG: 25 TABLET ORAL at 07:59

## 2024-11-20 RX ADMIN — HYDROMORPHONE HYDROCHLORIDE 1 MG: 1 INJECTION, SOLUTION INTRAMUSCULAR; INTRAVENOUS; SUBCUTANEOUS at 13:02

## 2024-11-20 RX ADMIN — OXYCODONE HYDROCHLORIDE AND ACETAMINOPHEN 2 TABLET: 5; 325 TABLET ORAL at 14:36

## 2024-11-20 RX ADMIN — HYDROXYZINE HYDROCHLORIDE 10 MG: 10 TABLET, FILM COATED ORAL at 22:55

## 2024-11-20 RX ADMIN — ASPIRIN 81 MG: 81 TABLET, CHEWABLE ORAL at 07:59

## 2024-11-20 RX ADMIN — POLYETHYLENE GLYCOL 3350 17 G: 17 POWDER, FOR SOLUTION ORAL at 20:17

## 2024-11-20 RX ADMIN — CEFEPIME 2000 MG: 2 INJECTION, POWDER, FOR SOLUTION INTRAVENOUS at 00:09

## 2024-11-20 RX ADMIN — ATORVASTATIN CALCIUM 80 MG: 80 TABLET, FILM COATED ORAL at 20:18

## 2024-11-20 RX ADMIN — TICAGRELOR 90 MG: 90 TABLET ORAL at 20:18

## 2024-11-20 RX ADMIN — HYDROMORPHONE HYDROCHLORIDE 1 MG: 1 INJECTION, SOLUTION INTRAMUSCULAR; INTRAVENOUS; SUBCUTANEOUS at 00:05

## 2024-11-20 ASSESSMENT — PAIN SCALES - WONG BAKER
WONGBAKER_NUMERICALRESPONSE: NO HURT

## 2024-11-20 ASSESSMENT — PAIN DESCRIPTION - ORIENTATION
ORIENTATION: RIGHT
ORIENTATION: LEFT;RIGHT
ORIENTATION: RIGHT

## 2024-11-20 ASSESSMENT — PAIN SCALES - GENERAL
PAINLEVEL_OUTOF10: 0
PAINLEVEL_OUTOF10: 7
PAINLEVEL_OUTOF10: 0
PAINLEVEL_OUTOF10: 9
PAINLEVEL_OUTOF10: 2
PAINLEVEL_OUTOF10: 9
PAINLEVEL_OUTOF10: 0
PAINLEVEL_OUTOF10: 0
PAINLEVEL_OUTOF10: 8
PAINLEVEL_OUTOF10: 7
PAINLEVEL_OUTOF10: 9
PAINLEVEL_OUTOF10: 10
PAINLEVEL_OUTOF10: 0
PAINLEVEL_OUTOF10: 10
PAINLEVEL_OUTOF10: 9
PAINLEVEL_OUTOF10: 0

## 2024-11-20 ASSESSMENT — PAIN DESCRIPTION - LOCATION
LOCATION: CHEST
LOCATION: CHEST
LOCATION: RIB CAGE
LOCATION: CHEST
LOCATION: SHOULDER
LOCATION: SHOULDER;CHEST
LOCATION: CHEST
LOCATION: BACK
LOCATION: SHOULDER;CHEST
LOCATION: CHEST
LOCATION: SHOULDER;CHEST

## 2024-11-20 ASSESSMENT — PAIN DESCRIPTION - DESCRIPTORS
DESCRIPTORS: JABBING
DESCRIPTORS: ACHING
DESCRIPTORS: THROBBING
DESCRIPTORS: ACHING;TENDER
DESCRIPTORS: ACHING
DESCRIPTORS: THROBBING

## 2024-11-20 ASSESSMENT — PAIN DESCRIPTION - FREQUENCY
FREQUENCY: CONTINUOUS

## 2024-11-20 ASSESSMENT — PAIN DESCRIPTION - PAIN TYPE
TYPE: ACUTE PAIN;SURGICAL PAIN

## 2024-11-20 ASSESSMENT — PAIN DESCRIPTION - ONSET
ONSET: ON-GOING

## 2024-11-20 ASSESSMENT — PAIN - FUNCTIONAL ASSESSMENT
PAIN_FUNCTIONAL_ASSESSMENT: PREVENTS OR INTERFERES SOME ACTIVE ACTIVITIES AND ADLS

## 2024-11-20 NOTE — OP NOTE
cut and removed.  I then evacuated any hematoma as well as the hemostatic agent that was packed within the wound.  The wound was then copiously irrigated with vancomycin and saline.  The wound was explored and there was noted to be no significant bleeding.  At this point in time I cut the sutures of the device that had it sutured to his skin at the tunnel site.  The Impella device was first placed to P2 to allow Anesthesia to monitor his cardiac function with minimal support and then placed to P0 as I withdrew and crossed the valve.  The device was withdrawn without issue.  Once the device was within the graft, I cut the sutures holding the graft to the device and then removed the device.  There was some bleeding.  Any thrombus lining the graft was removed.  The graft was then pulled back through the tunnel and to the axillary incision.  I used a single white load TA stapler to staple across the graft and then transected the graft with scissors.  I then oversewed the graft with 5-0 Prolene suture.  The wound was then copiously irrigated with vancomycin saline including the tunnel.  The axillary wound was packed with vancomycin powder and then wound was closed in multiple layers.  The pectoral fascia was closed with 2-0 Vicryl suture.  Subcutaneous tissues closed with 3-0 Vicryl suture and skin was closed with staples.  A single staple was placed in the tunnel site leaving gaps to allow for drainage.  The incision was then covered with Mepilex dressings.  The patient then awakened from anesthesia, extubated in the OR suite, and returned to the CVU in stable condition.  There were no known immediate postoperative complications.  All instrument, needle, and sponge counts were correct at the end of the procedure.          ZACH CORNELL MD      D:  11/19/2024 14:18:36     T:  11/19/2024 20:39:14     ND/S  Job #:  [id number]     Doc#:  [id number]

## 2024-11-20 NOTE — DISCHARGE INSTR - COC
Continuity of Care Form    Patient Name: Ad Lacey   :  1950  MRN:  [id number]    Admit date:  2024  Discharge date:  ***    Code Status Order: Full Code   Advance Directives:   Advance Care Flowsheet Documentation        Date/Time Healthcare Directive Type of Healthcare Directive Copy in Chart Healthcare Agent Appointed Healthcare Agent's Name Healthcare Agent's Phone Number    24 0411 No, patient does not have an advance directive for healthcare treatment  --  --  --  --  --                     Admitting Physician:  Dillon Rodríguez MD  PCP: Drea Frost APRN - CNP    Discharging Nurse: ***  Discharging Hospital Unit/Room#: S2U-7347/1303-01  Discharging Unit Phone Number: ***    Emergency Contact:   Extended Emergency Contact Information  Primary Emergency Contact: DeepthiJacobo  Home Phone: [phone]  Work Phone: [phone]  Relation: Child    Past Surgical History:  Past Surgical History:   Procedure Laterality Date    CARDIAC CATHETERIZATION      CARDIAC PROCEDURE N/A 2024    Left heart cath / coronary angiography performed by Dillon Rodríguez MD at [facility] CARDIAC CATH LAB    CARDIAC PROCEDURE N/A 2024    Percutaneous coronary intervention performed by Dillon Rodríguez MD at [facility] CARDIAC CATH LAB    CARDIAC PROCEDURE N/A 2024    Ventricular assist device (VAD) insertion performed by Dillon Rodríguez MD at [facility] CARDIAC CATH LAB    CARDIAC PROCEDURE N/A 2024    Ventricular assist device (VAD) insertion performed by Dillon Rodríguez MD at [facility] CARDIAC CATH LAB    CARDIAC PROCEDURE N/A 2024    Coronary angiography performed by Dillon Rodríguez MD at [facility] CARDIAC CATH LAB    CARDIAC PROCEDURE N/A 2024    [place] filippo  insertion performed by Dillon Rodríguez MD at [facility] CARDIAC CATH LAB    CAROTID ENDARTERECTOMY N/A 2024    IMPELLA EXPLANT performed by Benjamin Lopez MD at [facility] OR    FEMORAL BYPASS Right 2022    RIGHT FEMORAL POPLITEAL BYPASS performed

## 2024-11-20 NOTE — FLOWSHEET NOTE
11/20/24 0400   [place]   PAP (Systolic/Diastolic) 30/12   PAP (Mean) 19 mmHg   CVP (Mean) 4 mmHg   PAP Wave Form Appropriate wave forms;Rezeroed   SVO2 (%) 60 %   CO (l/min) 4.9 l/min   CCO 4.9 L/Min   CI (l/min/m2) 2.6 l/min/m2   SVR (Using NBP Mean) 1093.88 dyne*sec/cm5   SVR (Using ABP Mean) 1012.24 dyne*sec/cm5     Calculated using Elijah score. Pt currently on no continuous IV infusions.

## 2024-11-21 ENCOUNTER — HOSPITAL ENCOUNTER (INPATIENT)
Age: 74
DRG: 091 | End: 2024-11-21
Attending: PHYSICAL MEDICINE & REHABILITATION | Admitting: PHYSICAL MEDICINE & REHABILITATION
Payer: MEDICARE

## 2024-11-21 VITALS
BODY MASS INDEX: 24.79 KG/M2 | SYSTOLIC BLOOD PRESSURE: 127 MMHG | HEIGHT: 68 IN | RESPIRATION RATE: 18 BRPM | HEART RATE: 65 BPM | DIASTOLIC BLOOD PRESSURE: 78 MMHG | WEIGHT: 163.58 LBS | OXYGEN SATURATION: 98 % | TEMPERATURE: 98.9 F

## 2024-11-21 DIAGNOSIS — I73.9 PERIPHERAL ARTERIAL DISEASE (HCC): ICD-10-CM

## 2024-11-21 DIAGNOSIS — F41.9 ANXIETY: ICD-10-CM

## 2024-11-21 DIAGNOSIS — G89.4 CHRONIC PAIN SYNDROME: ICD-10-CM

## 2024-11-21 DIAGNOSIS — I46.9 CARDIOPULMONARY ARREST: ICD-10-CM

## 2024-11-21 DIAGNOSIS — I24.9 ACUTE CORONARY SYNDROME (HCC): Primary | ICD-10-CM

## 2024-11-21 PROBLEM — G93.1 ANOXIC ENCEPHALOPATHY DUE TO CARDIAC ARREST (HCC): Status: ACTIVE | Noted: 2024-11-21

## 2024-11-21 LAB
ANION GAP SERPL CALCULATED.3IONS-SCNC: 13 MMOL/L (ref 3–16)
BASE EXCESS BLDA CALC-SCNC: ABNORMAL MMOL/L (ref -3–3)
BASE EXCESS MIXED: 2
BUN SERPL-MCNC: 22 MG/DL (ref 7–20)
CA-I BLD-SCNC: 1.14 MMOL/L (ref 1.12–1.32)
CALCIUM SERPL-MCNC: 9.1 MG/DL (ref 8.3–10.6)
CHLORIDE SERPL-SCNC: 102 MMOL/L (ref 99–110)
CO2 BLDA-SCNC: ABNORMAL MMOL/L
CO2 SERPL-SCNC: 21 MMOL/L (ref 21–32)
CREAT SERPL-MCNC: 0.9 MG/DL (ref 0.8–1.3)
DEPRECATED RDW RBC AUTO: 14.9 % (ref 12.4–15.4)
GFR SERPLBLD CREATININE-BSD FMLA CKD-EPI: 89 ML/MIN/{1.73_M2}
GLUCOSE BLD-MCNC: 118 MG/DL (ref 70–99)
GLUCOSE BLD-MCNC: 123 MG/DL (ref 70–99)
GLUCOSE BLD-MCNC: 128 MG/DL (ref 70–99)
GLUCOSE SERPL-MCNC: 94 MG/DL (ref 70–99)
HCO3 BLDA-SCNC: ABNORMAL MMOL/L (ref 21–29)
HCO3, MIXED: 25.7 MMOL/L
HCT VFR BLD AUTO: 23 % (ref 40.5–52.5)
HCT VFR BLD AUTO: 28.2 % (ref 40.5–52.5)
HGB BLD CALC-MCNC: 7.8 GM/DL (ref 13.5–17.5)
HGB BLD-MCNC: 9.5 G/DL (ref 13.5–17.5)
LACTATE BLD-SCNC: 0.65 MMOL/L (ref 0.4–2)
MAGNESIUM SERPL-MCNC: 2.07 MG/DL (ref 1.8–2.4)
MCH RBC QN AUTO: 28.8 PG (ref 26–34)
MCHC RBC AUTO-ENTMCNC: 33.6 G/DL (ref 31–36)
MCV RBC AUTO: 85.7 FL (ref 80–100)
O2 SAT, MIXED: 55 %
PCO2 BLDA: ABNORMAL MM HG (ref 35–45)
PCO2 MIXED: 35.7 MM HG
PERFORMED ON: ABNORMAL
PH BLDA: ABNORMAL [PH] (ref 7.35–7.45)
PH, MIXED: 7.46 (ref 7.35–7.45)
PLATELET # BLD AUTO: 442 K/UL (ref 135–450)
PMV BLD AUTO: 7.7 FL (ref 5–10.5)
PO2 BLDA: ABNORMAL MM HG (ref 75–108)
PO2 MIXED: 27 MM HG
POC SAMPLE TYPE: ABNORMAL
POTASSIUM BLD-SCNC: 3.6 MMOL/L (ref 3.5–5.1)
POTASSIUM SERPL-SCNC: 3.8 MMOL/L (ref 3.5–5.1)
RBC # BLD AUTO: 3.29 M/UL (ref 4.2–5.9)
SAO2 % BLDA: ABNORMAL % (ref 93–100)
SODIUM BLD-SCNC: 133 MMOL/L (ref 136–145)
SODIUM SERPL-SCNC: 136 MMOL/L (ref 136–145)
TCO2 CALC MIXED: 27 MMOL/L
WBC # BLD AUTO: 10.8 K/UL (ref 4–11)

## 2024-11-21 PROCEDURE — 97116 GAIT TRAINING THERAPY: CPT

## 2024-11-21 PROCEDURE — 6370000000 HC RX 637 (ALT 250 FOR IP): Performed by: INTERNAL MEDICINE

## 2024-11-21 PROCEDURE — 6370000000 HC RX 637 (ALT 250 FOR IP): Performed by: NURSE PRACTITIONER

## 2024-11-21 PROCEDURE — 2580000003 HC RX 258: Performed by: INTERNAL MEDICINE

## 2024-11-21 PROCEDURE — 6360000002 HC RX W HCPCS: Performed by: INTERNAL MEDICINE

## 2024-11-21 PROCEDURE — 97530 THERAPEUTIC ACTIVITIES: CPT

## 2024-11-21 PROCEDURE — 2580000003 HC RX 258: Performed by: ANESTHESIOLOGY

## 2024-11-21 PROCEDURE — 80048 BASIC METABOLIC PNL TOTAL CA: CPT

## 2024-11-21 PROCEDURE — 92526 ORAL FUNCTION THERAPY: CPT

## 2024-11-21 PROCEDURE — 6360000002 HC RX W HCPCS: Performed by: NURSE PRACTITIONER

## 2024-11-21 PROCEDURE — 85027 COMPLETE CBC AUTOMATED: CPT

## 2024-11-21 PROCEDURE — 1280000000 HC REHAB R&B

## 2024-11-21 PROCEDURE — 6370000000 HC RX 637 (ALT 250 FOR IP): Performed by: PHYSICAL MEDICINE & REHABILITATION

## 2024-11-21 PROCEDURE — 83735 ASSAY OF MAGNESIUM: CPT

## 2024-11-21 RX ORDER — OXYCODONE AND ACETAMINOPHEN 10; 325 MG/1; MG/1
1 TABLET ORAL EVERY 4 HOURS PRN
Status: CANCELLED | OUTPATIENT
Start: 2024-11-21

## 2024-11-21 RX ORDER — ALBUTEROL SULFATE 0.83 MG/ML
2.5 SOLUTION RESPIRATORY (INHALATION) EVERY 6 HOURS PRN
Status: DISPENSED | OUTPATIENT
Start: 2024-11-21

## 2024-11-21 RX ORDER — ACETAMINOPHEN 325 MG/1
650 TABLET ORAL EVERY 6 HOURS PRN
Status: CANCELLED | OUTPATIENT
Start: 2024-11-21

## 2024-11-21 RX ORDER — SODIUM CHLORIDE 0.9 % (FLUSH) 0.9 %
5-40 SYRINGE (ML) INJECTION PRN
Status: ACTIVE | OUTPATIENT
Start: 2024-11-21

## 2024-11-21 RX ORDER — SODIUM CHLORIDE 9 MG/ML
INJECTION, SOLUTION INTRAVENOUS PRN
Status: ACTIVE | OUTPATIENT
Start: 2024-11-21

## 2024-11-21 RX ORDER — HYDRALAZINE HYDROCHLORIDE 10 MG/1
10 TABLET, FILM COATED ORAL EVERY 6 HOURS PRN
Status: ACTIVE | OUTPATIENT
Start: 2024-11-21

## 2024-11-21 RX ORDER — ONDANSETRON 4 MG/1
4 TABLET, FILM COATED ORAL EVERY 8 HOURS PRN
Status: CANCELLED | OUTPATIENT
Start: 2024-11-21

## 2024-11-21 RX ORDER — BISACODYL 10 MG
10 SUPPOSITORY, RECTAL RECTAL DAILY PRN
Status: ACTIVE | OUTPATIENT
Start: 2024-11-21

## 2024-11-21 RX ORDER — ALBUTEROL SULFATE 0.83 MG/ML
2.5 SOLUTION RESPIRATORY (INHALATION) EVERY 6 HOURS PRN
Status: CANCELLED | OUTPATIENT
Start: 2024-11-21

## 2024-11-21 RX ORDER — OXYCODONE HYDROCHLORIDE 10 MG/1
10 TABLET ORAL ONCE
Status: COMPLETED | OUTPATIENT
Start: 2024-11-21 | End: 2024-11-21

## 2024-11-21 RX ORDER — PANTOPRAZOLE SODIUM 40 MG/1
40 TABLET, DELAYED RELEASE ORAL
Status: CANCELLED | OUTPATIENT
Start: 2024-11-22

## 2024-11-21 RX ORDER — SODIUM CHLORIDE 0.9 % (FLUSH) 0.9 %
5-40 SYRINGE (ML) INJECTION EVERY 12 HOURS SCHEDULED
Status: CANCELLED | OUTPATIENT
Start: 2024-11-21

## 2024-11-21 RX ORDER — ASPIRIN 81 MG/1
81 TABLET, CHEWABLE ORAL DAILY
Qty: 30 TABLET | Refills: 3 | Status: ON HOLD | OUTPATIENT
Start: 2024-11-22

## 2024-11-21 RX ORDER — ALPRAZOLAM 0.5 MG
0.5 TABLET ORAL NIGHTLY PRN
Status: DISPENSED | OUTPATIENT
Start: 2024-11-21

## 2024-11-21 RX ORDER — HYDROXYZINE HYDROCHLORIDE 10 MG/1
10 TABLET, FILM COATED ORAL 3 TIMES DAILY PRN
Status: CANCELLED | OUTPATIENT
Start: 2024-11-21

## 2024-11-21 RX ORDER — ATORVASTATIN CALCIUM 80 MG/1
80 TABLET, FILM COATED ORAL NIGHTLY
Status: DISPENSED | OUTPATIENT
Start: 2024-11-21

## 2024-11-21 RX ORDER — SPIRONOLACTONE 25 MG/1
25 TABLET ORAL DAILY
Status: DISPENSED | OUTPATIENT
Start: 2024-11-22

## 2024-11-21 RX ORDER — POLYETHYLENE GLYCOL 3350 17 G/17G
17 POWDER, FOR SOLUTION ORAL 2 TIMES DAILY
Status: CANCELLED | OUTPATIENT
Start: 2024-11-21

## 2024-11-21 RX ORDER — SODIUM CHLORIDE 9 MG/ML
INJECTION, SOLUTION INTRAVENOUS PRN
Status: CANCELLED | OUTPATIENT
Start: 2024-11-21

## 2024-11-21 RX ORDER — OXYCODONE AND ACETAMINOPHEN 5; 325 MG/1; MG/1
2 TABLET ORAL EVERY 6 HOURS PRN
Qty: 20 TABLET | Refills: 0 | Status: ON HOLD | OUTPATIENT
Start: 2024-11-21 | End: 2025-10-30

## 2024-11-21 RX ORDER — LIDOCAINE 4 G/G
2 PATCH TOPICAL EVERY 24 HOURS
Status: CANCELLED | OUTPATIENT
Start: 2024-11-21

## 2024-11-21 RX ORDER — HYDRALAZINE HYDROCHLORIDE 10 MG/1
10 TABLET, FILM COATED ORAL EVERY 6 HOURS PRN
Status: CANCELLED | OUTPATIENT
Start: 2024-11-21

## 2024-11-21 RX ORDER — SPIRONOLACTONE 25 MG/1
25 TABLET ORAL DAILY
Status: CANCELLED | OUTPATIENT
Start: 2024-11-22

## 2024-11-21 RX ORDER — BISACODYL 10 MG
10 SUPPOSITORY, RECTAL RECTAL DAILY PRN
Status: CANCELLED | OUTPATIENT
Start: 2024-11-21

## 2024-11-21 RX ORDER — HYDROXYZINE HYDROCHLORIDE 10 MG/1
10 TABLET, FILM COATED ORAL 3 TIMES DAILY PRN
Status: DISCONTINUED | OUTPATIENT
Start: 2024-11-21 | End: 2024-11-22

## 2024-11-21 RX ORDER — ACETAMINOPHEN 325 MG/1
650 TABLET ORAL EVERY 6 HOURS PRN
Status: DISPENSED | OUTPATIENT
Start: 2024-11-21

## 2024-11-21 RX ORDER — HYDRALAZINE HYDROCHLORIDE 50 MG/1
50 TABLET, FILM COATED ORAL 2 TIMES DAILY
Status: DISPENSED | OUTPATIENT
Start: 2024-11-21

## 2024-11-21 RX ORDER — SODIUM CHLORIDE 0.9 % (FLUSH) 0.9 %
5-40 SYRINGE (ML) INJECTION EVERY 12 HOURS SCHEDULED
Status: ACTIVE | OUTPATIENT
Start: 2024-11-21

## 2024-11-21 RX ORDER — ATORVASTATIN CALCIUM 80 MG/1
80 TABLET, FILM COATED ORAL NIGHTLY
Status: CANCELLED | OUTPATIENT
Start: 2024-11-21

## 2024-11-21 RX ORDER — SPIRONOLACTONE 25 MG/1
25 TABLET ORAL DAILY
Qty: 30 TABLET | Refills: 3 | Status: ON HOLD | OUTPATIENT
Start: 2024-11-22

## 2024-11-21 RX ORDER — LACTOBACILLUS RHAMNOSUS GG 10B CELL
2 CAPSULE ORAL 2 TIMES DAILY WITH MEALS
Status: CANCELLED | OUTPATIENT
Start: 2024-11-21

## 2024-11-21 RX ORDER — SODIUM CHLORIDE 0.9 % (FLUSH) 0.9 %
5-40 SYRINGE (ML) INJECTION PRN
Status: CANCELLED | OUTPATIENT
Start: 2024-11-21

## 2024-11-21 RX ORDER — ONDANSETRON 4 MG/1
4 TABLET, FILM COATED ORAL EVERY 8 HOURS PRN
Status: ACTIVE | OUTPATIENT
Start: 2024-11-21

## 2024-11-21 RX ORDER — HYDRALAZINE HYDROCHLORIDE 50 MG/1
50 TABLET, FILM COATED ORAL 2 TIMES DAILY
Status: CANCELLED | OUTPATIENT
Start: 2024-11-21

## 2024-11-21 RX ORDER — CARVEDILOL 12.5 MG/1
12.5 TABLET ORAL 2 TIMES DAILY WITH MEALS
Status: CANCELLED | OUTPATIENT
Start: 2024-11-21

## 2024-11-21 RX ORDER — HYDROXYZINE HYDROCHLORIDE 10 MG/1
10 TABLET, FILM COATED ORAL 3 TIMES DAILY PRN
Status: DISCONTINUED | OUTPATIENT
Start: 2024-11-21 | End: 2024-11-21

## 2024-11-21 RX ORDER — LIDOCAINE 4 G/G
2 PATCH TOPICAL EVERY 24 HOURS
Status: DISPENSED | OUTPATIENT
Start: 2024-11-21

## 2024-11-21 RX ORDER — POLYETHYLENE GLYCOL 3350 17 G/17G
17 POWDER, FOR SOLUTION ORAL 2 TIMES DAILY
Status: DISPENSED | OUTPATIENT
Start: 2024-11-21

## 2024-11-21 RX ORDER — ASPIRIN 81 MG/1
81 TABLET, CHEWABLE ORAL DAILY
Status: DISPENSED | OUTPATIENT
Start: 2024-11-22

## 2024-11-21 RX ORDER — CARVEDILOL 12.5 MG/1
12.5 TABLET ORAL 2 TIMES DAILY WITH MEALS
Status: DISPENSED | OUTPATIENT
Start: 2024-11-21

## 2024-11-21 RX ORDER — PANTOPRAZOLE SODIUM 40 MG/1
40 TABLET, DELAYED RELEASE ORAL
Status: DISPENSED | OUTPATIENT
Start: 2024-11-22

## 2024-11-21 RX ORDER — HYDRALAZINE HYDROCHLORIDE 50 MG/1
50 TABLET, FILM COATED ORAL 2 TIMES DAILY
Qty: 90 TABLET | Refills: 3 | Status: ON HOLD | OUTPATIENT
Start: 2024-11-21

## 2024-11-21 RX ORDER — LACTOBACILLUS RHAMNOSUS GG 10B CELL
2 CAPSULE ORAL 2 TIMES DAILY WITH MEALS
Status: DISPENSED | OUTPATIENT
Start: 2024-11-22

## 2024-11-21 RX ORDER — OXYCODONE AND ACETAMINOPHEN 10; 325 MG/1; MG/1
1 TABLET ORAL EVERY 4 HOURS PRN
Status: DISCONTINUED | OUTPATIENT
Start: 2024-11-21 | End: 2024-11-22

## 2024-11-21 RX ORDER — ASPIRIN 81 MG/1
81 TABLET, CHEWABLE ORAL DAILY
Status: CANCELLED | OUTPATIENT
Start: 2024-11-22

## 2024-11-21 RX ADMIN — OXYCODONE HYDROCHLORIDE 10 MG: 10 TABLET ORAL at 23:35

## 2024-11-21 RX ADMIN — ATORVASTATIN CALCIUM 80 MG: 80 TABLET, FILM COATED ORAL at 20:46

## 2024-11-21 RX ADMIN — SODIUM CHLORIDE, PRESERVATIVE FREE 40 MG: 5 INJECTION INTRAVENOUS at 08:16

## 2024-11-21 RX ADMIN — TICAGRELOR 90 MG: 90 TABLET ORAL at 20:46

## 2024-11-21 RX ADMIN — SODIUM CHLORIDE, PRESERVATIVE FREE 10 ML: 5 INJECTION INTRAVENOUS at 08:19

## 2024-11-21 RX ADMIN — OXYCODONE HYDROCHLORIDE AND ACETAMINOPHEN 2 TABLET: 5; 325 TABLET ORAL at 10:29

## 2024-11-21 RX ADMIN — HYDRALAZINE HYDROCHLORIDE 50 MG: 50 TABLET ORAL at 08:17

## 2024-11-21 RX ADMIN — HYDROXYZINE HYDROCHLORIDE 10 MG: 10 TABLET ORAL at 21:51

## 2024-11-21 RX ADMIN — HYDROMORPHONE HYDROCHLORIDE 1 MG: 1 INJECTION, SOLUTION INTRAMUSCULAR; INTRAVENOUS; SUBCUTANEOUS at 12:52

## 2024-11-21 RX ADMIN — OXYCODONE AND ACETAMINOPHEN 1 TABLET: 325; 10 TABLET ORAL at 20:46

## 2024-11-21 RX ADMIN — OXYCODONE HYDROCHLORIDE AND ACETAMINOPHEN 2 TABLET: 5; 325 TABLET ORAL at 16:11

## 2024-11-21 RX ADMIN — OXYCODONE HYDROCHLORIDE AND ACETAMINOPHEN 2 TABLET: 5; 325 TABLET ORAL at 06:32

## 2024-11-21 RX ADMIN — HYDROXYZINE HYDROCHLORIDE 10 MG: 10 TABLET, FILM COATED ORAL at 10:29

## 2024-11-21 RX ADMIN — SACUBITRIL AND VALSARTAN 2 TABLET: 24; 26 TABLET, FILM COATED ORAL at 20:46

## 2024-11-21 RX ADMIN — CARVEDILOL 12.5 MG: 12.5 TABLET, FILM COATED ORAL at 18:21

## 2024-11-21 RX ADMIN — HYDROMORPHONE HYDROCHLORIDE 1 MG: 1 INJECTION, SOLUTION INTRAMUSCULAR; INTRAVENOUS; SUBCUTANEOUS at 04:33

## 2024-11-21 RX ADMIN — TICAGRELOR 90 MG: 90 TABLET ORAL at 08:16

## 2024-11-21 RX ADMIN — POLYETHYLENE GLYCOL 3350 17 G: 17 POWDER, FOR SOLUTION ORAL at 08:16

## 2024-11-21 RX ADMIN — CARVEDILOL 12.5 MG: 12.5 TABLET, FILM COATED ORAL at 08:16

## 2024-11-21 RX ADMIN — POLYETHYLENE GLYCOL 3350 17 G: 17 POWDER, FOR SOLUTION ORAL at 20:47

## 2024-11-21 RX ADMIN — Medication 2 CAPSULE: at 16:10

## 2024-11-21 RX ADMIN — OXYCODONE HYDROCHLORIDE AND ACETAMINOPHEN 2 TABLET: 5; 325 TABLET ORAL at 02:30

## 2024-11-21 RX ADMIN — Medication 3 MG: at 20:46

## 2024-11-21 RX ADMIN — SACUBITRIL AND VALSARTAN 2 TABLET: 24; 26 TABLET, FILM COATED ORAL at 08:17

## 2024-11-21 RX ADMIN — HYDRALAZINE HYDROCHLORIDE 50 MG: 50 TABLET ORAL at 20:46

## 2024-11-21 RX ADMIN — ASPIRIN 81 MG: 81 TABLET, CHEWABLE ORAL at 08:16

## 2024-11-21 RX ADMIN — Medication 2 CAPSULE: at 08:17

## 2024-11-21 RX ADMIN — HYDROMORPHONE HYDROCHLORIDE 1 MG: 1 INJECTION, SOLUTION INTRAMUSCULAR; INTRAVENOUS; SUBCUTANEOUS at 00:28

## 2024-11-21 RX ADMIN — SPIRONOLACTONE 25 MG: 25 TABLET ORAL at 08:17

## 2024-11-21 RX ADMIN — HYDROMORPHONE HYDROCHLORIDE 1 MG: 1 INJECTION, SOLUTION INTRAMUSCULAR; INTRAVENOUS; SUBCUTANEOUS at 08:17

## 2024-11-21 ASSESSMENT — PAIN DESCRIPTION - LOCATION
LOCATION: CHEST
LOCATION: CHEST;HIP
LOCATION: NECK;CHEST
LOCATION: RIB CAGE
LOCATION: CHEST
LOCATION: RIB CAGE
LOCATION: CHEST
LOCATION: RIB CAGE
LOCATION: CHEST
LOCATION: RIB CAGE

## 2024-11-21 ASSESSMENT — PAIN SCALES - GENERAL
PAINLEVEL_OUTOF10: 9
PAINLEVEL_OUTOF10: 10
PAINLEVEL_OUTOF10: 9
PAINLEVEL_OUTOF10: 10
PAINLEVEL_OUTOF10: 6
PAINLEVEL_OUTOF10: 8
PAINLEVEL_OUTOF10: 10
PAINLEVEL_OUTOF10: 6
PAINLEVEL_OUTOF10: 10

## 2024-11-21 ASSESSMENT — PAIN DESCRIPTION - ORIENTATION
ORIENTATION: LEFT;RIGHT
ORIENTATION: LEFT;RIGHT
ORIENTATION: MID
ORIENTATION: RIGHT;LEFT
ORIENTATION: LEFT;RIGHT
ORIENTATION: RIGHT;LEFT
ORIENTATION: RIGHT;LEFT

## 2024-11-21 ASSESSMENT — PAIN - FUNCTIONAL ASSESSMENT: PAIN_FUNCTIONAL_ASSESSMENT: PREVENTS OR INTERFERES SOME ACTIVE ACTIVITIES AND ADLS

## 2024-11-21 ASSESSMENT — PAIN DESCRIPTION - ONSET
ONSET: ON-GOING

## 2024-11-21 ASSESSMENT — PAIN DESCRIPTION - FREQUENCY
FREQUENCY: CONTINUOUS

## 2024-11-21 ASSESSMENT — PAIN DESCRIPTION - DESCRIPTORS
DESCRIPTORS: ACHING
DESCRIPTORS: ACHING
DESCRIPTORS: THROBBING
DESCRIPTORS: ACHING
DESCRIPTORS: THROBBING
DESCRIPTORS: THROBBING
DESCRIPTORS: ACHING
DESCRIPTORS: ACHING
DESCRIPTORS: THROBBING
DESCRIPTORS: ACHING;THROBBING
DESCRIPTORS: ACHING

## 2024-11-21 ASSESSMENT — PAIN DESCRIPTION - PAIN TYPE
TYPE: ACUTE PAIN
TYPE: ACUTE PAIN
TYPE: ACUTE PAIN;SURGICAL PAIN

## 2024-11-21 NOTE — PROGRESS NOTES
Patient admitted to room 3272 per chair. Patient was oriented to the Call Light, Phone, TV, Thermostat, Bed Controls, Bathroom and Emergency Cord.  Patient verbalized and demonstrated understanding of all.  Patient was also given an over view of Unit Routines for Acute Rehab, including what to wear for therapy. The patient's role in goal setting was reviewed along with an explanation of the Interdisciplinary Team meeting, the 's role in coordinating services and the Discharge Planning/Continuum of Care process. Patient Rights and Responsibilities were reviewed. Meal times were explained, including how to order food.  The white board (used for communication) was pointed out emphasizing  the 3 hours/day Therapy Schedule (posted most evenings), the number (and process) for reporting grievances, and the Doctor's, Nurse's, and PCA's names. It was recommended that any family that will be care givers or any care givers the patient has, take part in therapy. Informed patient of visiting hours, lunch group and conference as well as bed/chair alarm use and gait belt use. Electronically signed by URIEL THOMAS RN on 11/21/2024 at 5:53 PM

## 2024-11-21 NOTE — PROGRESS NOTES
Interventional Cardiology Consultation     Ad Lacey  1950    PCP: Drea Frost, TALITA - CNP  Referring Physician: Dr. Vaughn  Reason for Referral: Vfib Arrest   Chief Complaint:   Chief Complaint   Patient presents with    Cardiac Arrest     Interval history:  S/p PCI for proximal LAD stent thrombosis  Severe LV dysfunction with EF < 20% s/p pLVAD  Intubated - awake following commands     Subjective:     History of Present Illness: The patient is 74 y.o. male with a past medical history significant for coronary artery disease, with prior PCI of the mid LAD in 2022, peripheral arterial disease with right Ramirez popliteal bypass, paroxysmal atrial fibs, who presents with the above complaint.  Patient was complaining of chest pain at home and EMS was contacted upon their arrival patient was still awake however then suddenly had a V-fib arrest with 3 shocks and return of spontaneous circulation.  He was intubated in the field.  He is currently intubated sedated.  Postarrest EKG consistent with large anterior ST elevations and the cardiac Cath Lab has been activated.          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     Past Surgical History:   Procedure Laterality Date    CARDIAC CATHETERIZATION  2022    FEMORAL BYPASS Right 7/5/2022    RIGHT FEMORAL POPLITEAL BYPASS performed by Olvin Iyer DO at [facility]    HIP SURGERY Right 4/24/2022    HIP PINNING performed by Alexx Monreal MD at [facility] OR     Family History   Problem Relation Age of Onset    No Known Problems Mother     Substance Abuse Father         alochol     Social History     Tobacco Use    Smoking status: Former     Current packs/day: 0.00     Average packs/day: 1 pack/day for 50.0 years (50.0 ttl pk-yrs)     Types: Cigarettes     Start date: 4/24/1972     Quit date: 4/24/2022     
       Pulmonary Critical Care Progress Note     Patient's name:  Ad Lacey  Medical Record Number: [id number]  Patient's account/billing number: [id number]  Patient's YOB: 1950  Age: 74 y.o.  Date of Admission: 11/9/2024  8:29 PM  Date of Consult: 11/18/2024      Primary Care Physician: Drea Frost APRN - CNP      Code Status: Full Code    Chief complaint: Acute anterior STEMI/V-fib arrest    Assessment and Plan     Acute anterior STEMI status post PCI  V-fib arrest status post extubation  Acute HFrEF, status post Impella  Acute respiratory failure with hypoxia  Gram-negative pneumonia  COPD/emphysema  Paroxysmal atrial fibrillation  Hemoptysis resolved    Plan:  Cefepime for 7 days  Wean O2 keep sat > 90%  Aspiration precautions   Impella per cardiology  GDMT  Anticoagulation  Due to the immediate potential for life-threatening deterioration due to above , I spent 31 minutes providing critical care.  This time is excluding time spent performing procedures.  This note was transcribed using Dragon Dictation software. Please disregard any translational errors.        Overnight:  No acute events overnight  Impella in place      REVIEW OF SYSTEMS:  Review of Systems -   General ROS: negative  Psychological ROS: negative  Ophthalmic ROS: negative  ENT ROS: negative  Allergy and Immunology ROS: negative  Hematological and Lymphatic ROS: negative  Endocrine ROS: negative  Breast ROS: negative  Respiratory ROS: no cough, shortness of breath, or wheezing  Cardiovascular ROS: no chest pain or dyspnea on exertion  Gastrointestinal ROS:negative  Genito-Urinary ROS: negative  Musculoskeletal ROS: negative  Neurological ROS: negative  Dermatological ROS: negative        Physical Exam:    Vitals: BP (!) 144/68   Pulse 65   Temp 97.7 °F (36.5 °C) (Axillary)   Resp 14   Ht 1.727 m (5' 8\")   Wt 78.6 kg (173 lb 4.5 oz)   SpO2 97%   BMI 26.35 kg/m²     Last Body weight:   Wt Readings from Last 3 
       Pulmonary Critical Care Progress Note     Patient's name:  Ad Lacey  Medical Record Number: [id number]  Patient's account/billing number: [id number]  Patient's YOB: 1950  Age: 74 y.o.  Date of Admission: 11/9/2024  8:29 PM  Date of Consult: 11/19/2024      Primary Care Physician: Drea Frost APRN - CNP      Code Status: Full Code    Chief complaint: Acute anterior STEMI/V-fib arrest    Assessment and Plan     Acute anterior STEMI status post PCI  V-fib arrest status post extubation  Acute HFrEF, status post Impella  Acute respiratory failure with hypoxia  Gram-negative pneumonia  COPD/emphysema  Paroxysmal atrial fibrillation  Hemoptysis resolved    Plan:  Cefepime for 7 days  Wean O2 keep sat > 90%  Impella per cardiology  GDMT  Anticoagulation  Advance activities   IS/Acapella       Overnight:  No acute events overnight  C/o  pleuritic chest pain     REVIEW OF SYSTEMS:  Review of Systems -   General ROS: negative  Psychological ROS: negative  Ophthalmic ROS: negative  ENT ROS: negative  Allergy and Immunology ROS: negative  Hematological and Lymphatic ROS: negative  Endocrine ROS: negative  Breast ROS: negative  Respiratory ROS: no cough, shortness of breath, or wheezing  Cardiovascular ROS: no chest pain or dyspnea on exertion  Gastrointestinal ROS:negative  Genito-Urinary ROS: negative  Musculoskeletal ROS: pleuritic chest pain   Neurological ROS: negative  Dermatological ROS: negative        Physical Exam:    Vitals: BP (!) 147/69   Pulse 63   Temp 98 °F (36.7 °C) (Oral)   Resp 17   Ht 1.727 m (5' 8\")   Wt 74.5 kg (164 lb 3.9 oz)   SpO2 96%   BMI 24.97 kg/m²     Last Body weight:   Wt Readings from Last 3 Encounters:   11/19/24 74.5 kg (164 lb 3.9 oz)   03/20/24 83 kg (183 lb)   11/07/23 76.9 kg (169 lb 8.5 oz)       Body Mass Index : Body mass index is 24.97 kg/m².      Intake and Output summary:   Intake/Output Summary (Last 24 hours) at 11/19/2024 0921  Last data filed 
       [facility]   Respiratory Therapy        Extubation Assessment        Name:  Ad Lacey  Medical Record Number:  [id number]  Age: 74 y.o.   Gender: male  : 1950  Today's Date:  11/10/2024  Room:  T3K-4238/1303-01      Assessment       Patient Admission Diagnosis      Allergies  No Known Allergies         BP 96/74   Pulse 91   Temp 98.5 °F (36.9 °C)   Resp 18   Ht 1.727 m (5' 8\")   Wt 82.3 kg (181 lb 7 oz)   SpO2 96%   BMI 27.59 kg/m²       The patient's spontaneous breathing trial results were reviewed with  MD.    The order was received to extubate the patient.  The patient's sedation was turned off by the bedside nurse.  The patient was extubated at 15:05 and placed on  Nasal Cannula at 4 lpm . The patient was stable at the time of extubation. Stridor was not present      Patient/caregiver was educated on the extubation process:  Yes      Level of patient/caregiver understanding able to:   [] Verbalize understanding   [] Demonstrate understanding       [] Teach back        [] Needs reinforcement       []  No available caregiver               []  Other:       Response to education:  Good       Teaching Time:  10  minutes         Ludwig Aguilar RCP on 11/10/2024 at 3:09 PM      
      [facility]   Daily Progress Note      Admit Date:  11/9/2024    CC: VF arrest    HPI:   Ad Lacey is a 74 y.o. male with PMH CAD, PCI to LAD 2022, PAD/ R fem-pop bypass, PAF.    Called EMS for CP, VF arrest en route to ED.  Received Epi, shocks w ROSC. Post arrest EKG consistent with large anterior STEMI.   Emergent LHC- aspiration thrombectomy and IVUS guided PCI   LVEF <20% s/p PLVAD.    C/O R shoulder pain 10/10.   He is on precedex and getting dilaudid, and oxycodone PRN with little relief.   He takes percocet 5mg TID for pain PTA  Denies SOB or CP    Review of Systems:   General: Denies fever, chills  RESP: Denies cough, sputum, dyspnea, wheeze, snoring  CARD: Denies palpitations,  murmur  GI:Denies nausea, vomiting, heartburn, loss of appetite, change in bowels  VASC: Denies claudication, leg cramps  NEURO: Denies numbness, tingling, weakness,change in mood or memory  HEME: Denies abn bruising, bleeding, anemia    Objective:   BP (!) 97/55   Pulse 70   Temp 98.2 °F (36.8 °C) (Axillary)   Resp 16   Ht 1.727 m (5' 8\")   Wt 76.7 kg (169 lb 1.5 oz)   SpO2 98%   BMI 25.71 kg/m²         Intake/Output Summary (Last 24 hours) at 11/16/2024 0724  Last data filed at 11/16/2024 0556  Gross per 24 hour   Intake 2192.77 ml   Output 3180 ml   Net -987.23 ml     I/O since adm: Neg 9L    WEIGHT:Admit Weight - Scale: 87.9 kg (193 lb 12.6 oz)         Today  Weight - Scale: 76.7 kg (169 lb 1.5 oz)   DRY WEIGHT:  Wt Readings from Last 3 Encounters:   11/16/24 76.7 kg (169 lb 1.5 oz)   03/20/24 83 kg (183 lb)   11/07/23 76.9 kg (169 lb 8.5 oz)       Physical Exam:  GEN: Appears ill,  no acute distress  SKIN: Pink, warm, dry.   LUNG: AP diameter normal. Clear bilateral. No wheeze, rales, or ronchi. Respiratory effort normal.  HEART: S1S2 A/R. No JVD. No carotid bruit. No murmur, rub or gallop.  ABD: Soft, nontender. +BS X 4 quads. No hepatomegaly.   EXT: Radial and pedal pulses 2+ and symmetric. 
      [facility]   Daily Progress Note      Admit Date:  11/9/2024    CC: VF arrest    HPI:   Ad Lacey is a 74 y.o. male with PMH CAD, PCI to LAD 2022, PAD/ R fem-pop bypass, PAF.    Called EMS for CP, VF arrest en route to ED.  Received Epi, shocks w ROSC. Post arrest EKG consistent with large anterior STEMI.   Emergent LHC- aspiration thrombectomy and IVUS guided PCI   LVEF <20% s/p PLVAD.    Yesterday C/O R shoulder pain 10/10.   Increased pain pains.   Pain much improved today.   He recently had projectile vomiting, mild ileus and noted no BM X 8 days, possibly R/T chronic pain meds.   That is now improved, he is eating clear liq diet. Reports BM X3 last 24 hrs    Denies SOB or CP    Hgb 6.8- getting 1 PRBC today    Review of Systems:   General: Denies fever, chills  RESP: Denies cough, sputum, dyspnea, wheeze, snoring  CARD: Denies palpitations,  murmur  GI:Denies nausea, vomiting, heartburn, loss of appetite, change in bowels  VASC: Denies claudication, leg cramps  NEURO: Denies numbness, tingling, weakness,change in mood or memory  HEME: Denies abn bruising, bleeding, anemia    Objective:   BP (!) 110/58   Pulse 72   Temp 97.5 °F (36.4 °C)   Resp 13   Ht 1.727 m (5' 8\")   Wt 78.1 kg (172 lb 2.9 oz)   SpO2 94%   BMI 26.18 kg/m²         Intake/Output Summary (Last 24 hours) at 11/17/2024 0744  Last data filed at 11/17/2024 0603  Gross per 24 hour   Intake 2485.24 ml   Output 1060 ml   Net 1425.24 ml     I/O since adm: Neg 7.7L    WEIGHT:Admit Weight - Scale: 87.9 kg (193 lb 12.6 oz)         Today  Weight - Scale: 78.1 kg (172 lb 2.9 oz)   DRY WEIGHT:  Wt Readings from Last 3 Encounters:   11/17/24 78.1 kg (172 lb 2.9 oz)   03/20/24 83 kg (183 lb)   11/07/23 76.9 kg (169 lb 8.5 oz)       Physical Exam:  GEN: Appears ill,  no acute distress  SKIN: Pink, warm, dry.   LUNG: AP diameter normal. Clear bilateral. No wheeze, rales, or ronchi. Respiratory effort normal.  HEART: S1S2 A/R. No 
      [facility]   Daily Progress Note      Admit Date:  11/9/2024    CC: VF arrest    HPI:   Ad Lacey is a 74 y.o. male with PMH CAD, PCI to LAD 2022, PAD/ R fem-pop bypass, PAF.    Called EMS for CP, VF arrest en route to ED.  Received Epi, shocks w ROSC. Post arrest EKG consistent with large anterior STEMI.   Emergent LHC- aspiration thrombectomy and IVUS guided PCI   LVEF <20% s/p PLVAD.    Doing well   OOB to chair   EF improved to > 35%     Review of Systems:   General: Denies fever, chills  RESP: Denies cough, sputum, dyspnea, wheeze, snoring  CARD: Denies palpitations,  murmur  GI:Denies nausea, vomiting, heartburn, loss of appetite, change in bowels  VASC: Denies claudication, leg cramps  NEURO: Denies numbness, tingling, weakness,change in mood or memory  HEME: Denies abn bruising, bleeding, anemia    Objective:   /77   Pulse 60   Temp 98.2 °F (36.8 °C) (Oral)   Resp 14   Ht 1.727 m (5' 8\")   Wt 78.6 kg (173 lb 4.5 oz)   SpO2 95%   BMI 26.35 kg/m²         Intake/Output Summary (Last 24 hours) at 11/18/2024 2200  Last data filed at 11/18/2024 2100  Gross per 24 hour   Intake 1750.22 ml   Output 2305 ml   Net -554.78 ml     I/O since adm: Neg 7.7L    WEIGHT:Admit Weight - Scale: 87.9 kg (193 lb 12.6 oz)         Today  Weight - Scale: 78.6 kg (173 lb 4.5 oz)   DRY WEIGHT:  Wt Readings from Last 3 Encounters:   11/18/24 78.6 kg (173 lb 4.5 oz)   03/20/24 83 kg (183 lb)   11/07/23 76.9 kg (169 lb 8.5 oz)       Physical Exam:  GEN: Appears ill,  no acute distress  SKIN: Pink, warm, dry.   LUNG: AP diameter normal. Clear bilateral. No wheeze, rales, or ronchi. Respiratory effort normal.  HEART: S1S2 A/R. No JVD. No carotid bruit. No murmur, rub or gallop.  ABD: Soft, nontender. +BS X 4 quads. No hepatomegaly.   EXT: Radial and pedal pulses 2+ and symmetric. Without varicosities. No edema.  MUSCSKEL: Good ROM X4 extremities. No deformity.   NEURO: A/O X3. Calm and cooperative.   R 
    Gastroenterology Progress Note    Ad Lacey is a 74 y.o. male patient.  Hospitalization Day:5    SUBJECTIVE:    No acute events overnight  NG tube in place   About 1000 cc of output from NG tube   1 small BM recorded     ROS:  Gastrointestinal ROS: positive for - nausea/vomiting  negative for - blood in stools    Physical    VITALS:  BP (!) 159/93   Pulse 88   Temp 97.8 °F (36.6 °C) (Oral)   Resp 16   Ht 1.727 m (5' 8\")   Wt 79.8 kg (175 lb 14.8 oz)   SpO2 95%   BMI 26.75 kg/m²   TEMPERATURE:  Current - Temp: 97.8 °F (36.6 °C); Max - Temp  Av.8 °F (36.6 °C)  Min: 97.3 °F (36.3 °C)  Max: 98.1 °F (36.7 °C)    General:  Alert and oriented,  No apparent distress  Skin- without jaundice  Eyes: anicteric sclera  Cardiac: RRR  Lungs Normal effort  Abdomen soft, ND, NT  Ext: without edema  Neuro: normal speech     Data    Data Review:    Recent Labs     24  0540 24  0900 24  0500   WBC 7.5 8.6 7.9   HGB 8.2* 8.8* 8.2*   HCT 24.1* 25.8* 24.5*   MCV 82.9 82.9 84.1    167 148     Recent Labs     24  0308 24  0900 24  0500   * 140 135*   K 3.3* 3.4* 3.3*   * 99 102   CO2 23 26 23   PHOS 3.6 3.3 2.3*   BUN 12 12 14   CREATININE 1.0 1.0 0.8     Recent Labs     24  0630   AST 72*   ALT 18   BILITOT 1.2*   ALKPHOS 55     No results for input(s): \"LIPASE\", \"AMYLASE\" in the last 72 hours.  No results for input(s): \"PROTIME\", \"INR\" in the last 72 hours.    Radiology Review:    CT CHEST WO CONTRAST   Final Result   1. Pulmonary edema is predominantly in the upper lobes.   2. Small bilateral pleural effusions   3. Postsurgical changes in the right clavicular region         XR CHEST PORTABLE   Final Result   Pulmonary artery catheter is in the region of the right lower lobe pulmonary   artery.         XR CHEST PORTABLE   Final Result   IVC approach pulmonary artery catheter tip overlies the expected location of   the right ventricular outflow tract.    
    Gastroenterology Progress Note    Ad Lacey is a 74 y.o. male patient.  Hospitalization Day:6    SUBJECTIVE:    No acute events overnight  NG tube in place   135 cc output over past 24 hours     ROS:  Gastrointestinal ROS: positive for - nausea/vomiting  negative for - blood in stools    Physical    VITALS:  /68   Pulse 66   Temp 98.4 °F (36.9 °C) (Oral)   Resp 14   Ht 1.727 m (5' 8\")   Wt 78.2 kg (172 lb 6.4 oz)   SpO2 95%   BMI 26.21 kg/m²   TEMPERATURE:  Current - Temp: 98.4 °F (36.9 °C); Max - Temp  Av.9 °F (36.6 °C)  Min: 97.5 °F (36.4 °C)  Max: 98.4 °F (36.9 °C)    General:  Alert and oriented,  No apparent distress  Skin- without jaundice  Eyes: anicteric sclera  Cardiac: RRR  Lungs Normal effort  Abdomen soft, ND, NT  Ext: without edema  Neuro: normal speech     Data    Data Review:    Recent Labs     24  0500 24  0935 24  1320 11/15/24  0405   WBC 7.9  --  9.6 7.3   HGB 8.2* 7.8* 9.0* 7.3*   HCT 24.5*  --  26.8* 21.3*   MCV 84.1  --  84.4 83.4     --  166 150     Recent Labs     24  0308 24  0900 24  0500 24  1320 11/15/24  0405   * 140 135* 134* 137   K 3.3* 3.4* 3.3* 3.9 3.7   * 99 102 98* 106   CO2 23 26 23 23 21   PHOS 3.6 3.3 2.3*  --   --    BUN 12 12 14 14 15   CREATININE 1.0 1.0 0.8 0.8 0.7*     Recent Labs     24  1320 11/15/24  0405   AST 75* 41*   ALT 47* 27   BILIDIR  --  0.9*   BILITOT 2.5* 3.3*   ALKPHOS 129 102     No results for input(s): \"LIPASE\", \"AMYLASE\" in the last 72 hours.  No results for input(s): \"PROTIME\", \"INR\" in the last 72 hours.    Radiology Review:    XR CHEST PORTABLE   Final Result   Slight improvement in pulmonary edema.         CT CHEST WO CONTRAST   Final Result   1. Pulmonary edema is predominantly in the upper lobes.   2. Small bilateral pleural effusions   3. Postsurgical changes in the right clavicular region         XR CHEST PORTABLE   Final Result   Pulmonary artery 
    Gastroenterology Progress Note    Ad Lacey is a 74 y.o. male patient.  Hospitalization Day:7    SUBJECTIVE:    No acute events overnight  NG tube removed; tolerating PO     ROS:  Gastrointestinal ROS: negative for - blood in stools, n/v     Physical    VITALS:  BP (!) 105/57   Pulse (!) 49   Temp 97.6 °F (36.4 °C) (Oral)   Resp 13   Ht 1.727 m (5' 8\")   Wt 76.7 kg (169 lb 1.5 oz)   SpO2 97%   BMI 25.71 kg/m²   TEMPERATURE:  Current - Temp: 97.6 °F (36.4 °C); Max - Temp  Av.1 °F (36.7 °C)  Min: 97.6 °F (36.4 °C)  Max: 98.6 °F (37 °C)    General:  Alert and oriented,  No apparent distress  Skin- without jaundice  Eyes: anicteric sclera  Cardiac: RRR  Lungs Normal effort  Abdomen soft, ND, NT  Ext: without edema  Neuro: normal speech     Data    Data Review:    Recent Labs     11/15/24  0405 11/15/24  1224 11/15/24  1405 24  0505   WBC 7.3 6.9  --  6.6   HGB 7.3* 7.7* 6.6* 7.4*   HCT 21.3* 22.5*  --  21.0*   MCV 83.4 83.8  --  83.3    156  --  163     Recent Labs     24  0500 24  1320 11/15/24  0405 11/15/24  1224 24  0505   *   < > 137 137 138   K 3.3*   < > 3.7 3.3* 3.7      < > 106 106 108   CO2 23   < > 21 19* 18*   PHOS 2.3*  --   --   --   --    BUN 14   < > 15 16 16   CREATININE 0.8   < > 0.7* 0.8 0.7*    < > = values in this interval not displayed.     Recent Labs     11/15/24  0405 11/15/24  1224 24  0505   AST 41* 49* 48*   ALT 27 32 43*   BILIDIR 0.9*  --  0.8*   BILITOT 3.3* 3.3* 2.8*   ALKPHOS 102 116 126     No results for input(s): \"LIPASE\", \"AMYLASE\" in the last 72 hours.  No results for input(s): \"PROTIME\", \"INR\" in the last 72 hours.    Radiology Review:    XR ABDOMEN (KUB) (SINGLE AP VIEW)   Final Result   No dilated loops of small or large bowel are identified to suggest ileus.      Enteric tube tip and side port overlie the gastric body.         XR CHEST PORTABLE   Final Result   Slight improvement in pulmonary edema.       
   11/09/24 2048   Breath Sounds   Right Upper Lobe Diminished   Right Middle Lobe Diminished   Right Lower Lobe Diminished   Left Upper Lobe Diminished   Left Lower Lobe Diminished   Vent Information   Ventilator ID [id number]   Ventilator Initiate Yes   Vent Mode AC/PRVC   $Ventilation $Initial Day   Vent Patient Data (Readings)   I Time/ I Time % 1 s   Backup Apnea On   Backup Rate 16 Breaths Per Minute   Backup Vt 500   Backup I Time 1   Vent Alarm Settings   Low Minute Volume (lpm) 3 L/min   Low Exhaled Vt (ml) 100 mL   High Exhaled Vt (ml) 1000 mL   RR Low (bpm) 10   RR High (bpm) 40 br/min   Apnea (secs) 20 secs   Additional Respiratoray Assessments   Humidification Source Heated wire   Humidification Temp 36   Circuit Condensation Drained   Ambu Bag With Mask At Bedside Yes   ETT    Placement Date/Time: 11/09/24 2037   Present on Admission/Arrival: No  Placed By: In ED  Placement Verified By: Auscultation  Preoxygenation: Yes  Airway Tube Size: 7.5 mm  Laryngoscope: GlideScope  Location: Oral  Insertion attempts: 1  Secured At: 2...   $ Intubation Emergent  $ Yes   Secured At 23 cm   Measured From Lips   ETT Placement Center   Secured By Commercial tube urban   Site Assessment Dry       
  Arrived to place PICC line with bedside RN. Pre-procedure and timeout done with RN, discussed limitations of placement and allergies.      Pt's basilic, brachial, cephalic are all easily collapsible with no indication for a clot. Vein selected is large enough for catheter. Pt tolerated sterile procedure well, with no difficulty accessing basilic vein, when accessed - blood was free flowing and non-pulsatile. Guidewire, introducer, and catheter went in smoothly.   PICC line verified with 3CG technology with peaked P-waves (please see image below). PICC tip terminates in the SVC according to [employer] 3CG tip confirmation system. PICC was seen dropping into SVC with tip tracking technology and discernable peaked p waves were noted without negative deflection.   OK to use PICC.   Please use new IV tubing when connecting to the newly placed central line.      Post procedure - reorganized pt table, placed pt in lowest position, with call light and educated on line care. Reported off to bedside RN.      Please call if you have any questions about the PICC or ML. The  will direct you to the PICC RN that is on call.      [phone]          
4 Eyes Skin Assessment     NAME:  Ad Lacey  YOB: 1950  MEDICAL RECORD NUMBER:  [id number]    The patient is being assessed for  Admission/Cath Lab    I agree that at least one RN has performed a thorough Head to Toe Skin Assessment on the patient. ALL assessment sites listed below have been assessed.      Areas assessed by both nurses:    Head, Face, Ears, Shoulders, Back, Chest, Arms, Elbows, Hands, Sacrum. Buttock, Coccyx, Ischium, Legs. Feet and Heels, and Under Medical Devices         Does the Patient have a Wound? No noted wound(s)       Jalen Prevention initiated by RN: Yes  Wound Care Orders initiated by RN: No    Pressure Injury (Stage 3,4, Unstageable, DTI, NWPT, and Complex wounds) if present, place Wound referral order by RN under : No    New Ostomies, if present place, Ostomy referral order under : No     Nurse 1 eSignature: Electronically signed by Corrie Beal RN on 11/10/24 at 2:54 AM EST    **SHARE this note so that the co-signing nurse can place an eSignature**    Nurse 2 eSignature: Electronically signed by SPRING ROGER RN on 11/10/24 at 6:48 AM EST  
@ 2317: 500ml NS bolus ordered. Scanned and started infusing per orders.    @2348: This RN noticed that heparin bag that was hanging next to normal saline bag was almost empty. Heparin tubing had been put into channel instead of NS tubing. Immediately clamped heparin line and disconnected.     @2349: Call placed to Dr. Rodríguez to inform of error. Orders to give protamine for reversal and consult with pharmacy to determine correct dosing. Pt is neurologically intact and following all commands. No signs of bleeding outside of bloody secretions in ETT which were present upon arrival to CVU.    @2353: Spoke with both Deborah and Summer in pharmacy. Summer collaborated with Dr. Rodríguez regarding reversal dosing. Pt assessment unchanged. ACT checked. >400    @0000: Remains neuro intact. No signs of bleeding.    @0028: Protamine 25mg ordered. Remains neuro intact. No signs of bleeding.    @0034. Protamine given slow IVP. Neuro intact and no signs of bleeding    @0100: Neuro intact. No signs of bleeding. . Updated Summer from pharmacy.    @0130: . Neuro intact. No signs of bleeding    @0200: . Neuro intact. No signs of bleeding. Updated Summer in pharmacy,    @0400: : Neuro intact. Some oozing noted from left femoral site following big turn side to side to check skin and change linens under patient.     @0600: ACT now below range. Ok to start heparin per Dr. Rodríguez. Neurologically intact following all commands. Some oozing noted to left groin site that remains unchanged from 0400.  
Arterial Catheter Insertion Procedure Note    Consent: The patient provided verbal consent for this procedure.    Procedure: Insertion of Arterial Catheter    Indications:  Hypotension, Shock    Estimated Blood Loss: Minimal    Procedure Details   Informed consent was obtained for the procedure, including sedation.  Risks of hemorrhage, arrhythmia, infection and adverse drug reaction were discussed.   A time out was performed at 1600  Ultrasound used and Left Radial artery was noted to be patent.  Collatoral flow was confirmed with an David's Test.  Under sterile conditions the skin above the Left Radial artery was prepped with Chloraprep and covered with a sterile drape after donning mask, sterile gown, annd sterile gloves.  A 22-gauge needle was inserted into the Left Radial artery.  A guide wire was then passed easily through the needle which was advanced with no resistance. Good flash of blood returned. The catheter was advanced over the existing wire without resistance or complication and subsequently sutured into place after pressure tubing connected and waveform verified on monitor.    Findings:  There were no complications nor changes to vital signs. Patient tolerated procedure well.    Total time of procedure: 15 minutes    Electronically signed by Navdeep Saavedra RN on 11/11/2024 at 4:20 PM    
Chart review for possible admission  
Clinical Pharmacy Note  Heparin Dosing - Impella Device    Consult received from Dr. Rodríguez to titrate systemic heparin to maintain -180.    Shift ACT Results and Heparin Adjustments:      Date   Time POC ACT  Result Heparin  Bolus   (units) Systemic Heparin Infusion Rate (100 units/mL)   11/09 2300 207  300   11/10 0000 See iVent  Hold    0100 See iVent  Hold    0200 247  Hold    0300 226  Hold    0400 226  Hold    0500 195  Hold    0600 155  1000    0700 147 1000 1100       Pharmacy will continue to monitor and adjust based on ACT results.    Deborah Corcoran, KathyD    
Clinical Pharmacy Note  Heparin Dosing - Impella Device    Consult received from Dr. Rodríguez to titrate systemic heparin to maintain -180.    Shift ACT Results and Heparin Adjustments:      Date   Time POC ACT  Result Heparin  Bolus   (units) Systemic Heparin Infusion Rate (100 units/mL)   11/15 1600 187  1650    1700 184  1650    1800 178  1650    1900 183  1600    2000 181  1600    2100 189  1550       Pharmacy will continue to monitor and adjust based on ACT results.    Veronika Giordano, PharmD, BCPS  11/15/2024 9:47 PM    
Clinical Pharmacy Note  Heparin Dosing - Impella Device    Consult received from Dr. Rodríguez to titrate systemic heparin to maintain -180.    Shift ACT Results and Heparin Adjustments:      Date   Time POC ACT  Result Heparin  Bolus   (units) Systemic Heparin Infusion Rate (100 units/mL)   11/16 1100 186  1350    1200 185  1300                                   Pharmacy will continue to monitor and adjust based on ACT results.  Marcell Rosario [facility], 11/16/2024 12:04 PM      
Clinical Pharmacy Note  Heparin Dosing - Impella Device    Consult received from Dr. Rodríguez to titrate systemic heparin to maintain -180.    Shift ACT Results and Heparin Adjustments:      Date   Time POC ACT  Result Heparin  Bolus   (units) Systemic Heparin Infusion Rate (100 units/mL)   11/16 1700 183  1250    1800 189  1200    1900 188  1150                            Pharmacy will continue to monitor and adjust based on ACT results.    Zuleyma Garcia RPH, PharmD 11/16/2024 9:25 PM        
Clinical Pharmacy Note  Heparin Dosing - Impella Device    Consult received from Dr. Rodríguez to titrate systemic heparin to maintain -180.    Shift ACT Results and Heparin Adjustments:      Date   Time POC ACT  Result Heparin  Bolus   (units) Systemic Heparin Infusion Rate (100 units/mL)   11/17 0900 182  1200    1200 188  1150                                   Pharmacy will continue to monitor and adjust based on ACT results.  Marcell Rosario [facility], 11/17/2024 12:05 PM      
Comprehensive Nutrition Assessment    Type and Reason for Visit:  Reassess    Nutrition Recommendations/Plan:   Liberalize diet slightly to no added salt vs cardiac to allow more menu items  Continue Ensure with meals   Will monitor nutritional adequacy, nutrition-related labs, weights, BMs, and clinical progress    Monitor need for education when medically appropriate     Malnutrition Assessment:  Malnutrition Status:  Moderate malnutrition (11/18/24 1234)    Context:  Acute Illness     Findings of the 6 clinical characteristics of malnutrition:  Energy Intake:  50% or less of estimated energy requirements for 5 or more days  Weight Loss:   (patient feels like he has lost 10 lbs; weight trended down by 20 lbs in EMR, unsure accuracy at this time)     Body Fat Loss:  Mild body fat loss Buccal region, Orbital   Muscle Mass Loss:  Mild muscle mass loss    Fluid Accumulation:  Unable to assess     Strength:  Not Performed    Nutrition Assessment:    Follow up:  Diet advanced to cardiac from full liquids on 11/18.  Patient was again made NPO on 11/19 for medical reasons but then readvanced later that day.  Patient reported not sleeping well last night and still feeling sore.  Patient stated eating a little better and drinking some of the Ensure.  After discharge, plan is for yazan to be discharged to ARU at [facility] [place].  RD will follow there as well.    Nutrition Related Findings:    BUN 22 on 11/21; patient reported one small movement Wound Type: Surgical Incision       Current Nutrition Intake & Therapies:    Average Meal Intake: 51-75%, %  Average Supplements Intake: 51-75%  ADULT ORAL NUTRITION SUPPLEMENT; Breakfast, Lunch, Dinner; Standard High Calorie/High Protein Oral Supplement  ADULT DIET; Regular; No Added Salt (3-4 gm)    Anthropometric Measures:  Height: 172.7 cm (5' 8\")  Ideal Body Weight (IBW): 154 lbs (70 kg)       Current Body Weight: 78.6 kg (173 lb 4.5 oz),   IBW. Weight Source: Bed 
Critical Hgb called from hematology;    Latest Reference Range & Units 11/17/24 04:10   Hemoglobin Quant 13.5 - 17.5 g/dL 6.8 (LL)   Hematocrit 40.5 - 52.5 % 19.7 (LL)   Cardiology note says to transfuse for Hgb <7, new type & screen sent. Per Dr ROSEANNE Rodríguez, will transfuse 1 unit PRBC's.    
Department of Physical Medicine & Rehabilitation  Progress Note    Patient Identification:  Ad Lacey  [id number]  : 1950  Admit date: 2024    Chief Complaint: Cardiopulmonary arrest    Subjective:   No acute events overnight.   Patient s/p explant of Imeplla ().   Patient seen this afternoon sitting up in chair. Continues to have chest all discomfort. He does feel his strength, mobility, energy are improving. He is willing to come to ARU.   Labs reviewed.     ROS: No f/c, n/v    Objective:  Patient Vitals for the past 24 hrs:   BP Temp Temp src Pulse Resp SpO2 Weight   24 1400 -- -- -- 72 -- (!) 80 % --   24 1332 -- -- -- 56 18 99 % --   24 1302 (!) 119/104 -- -- 61 12 99 % --   24 1300 (!) 119/104 -- -- 64 -- 100 % --   24 1200 (!) 113/58 99.1 °F (37.3 °C) Oral 56 14 99 % --   24 1105 -- -- -- -- 14 -- --   24 1035 -- -- -- -- (!) 7 -- --   24 1016 -- -- -- 57 17 100 % --   24 0946 -- -- -- -- 17 -- --   24 0900 -- -- -- 63 -- (!) 79 % --   24 0831 -- -- -- 63 17 -- --   24 0801 -- -- -- 71 16 95 % --   24 0800 -- -- -- 73 -- 97 % --   24 0700 -- 97.5 °F (36.4 °C) Oral 62 16 94 % --   24 0600 -- -- -- 60 14 95 % 74.1 kg (163 lb 5.8 oz)   24 0500 -- -- -- 60 16 95 % --   24 0400 (!) 92/59 97.5 °F (36.4 °C) Oral 62 16 97 % --   24 0315 -- -- -- 63 -- 90 % --   24 0313 -- -- -- 65 -- (!) 88 % --   24 0300 -- -- -- 63 16 92 % --   24 0223 -- -- -- 72 -- 97 % --   24 0200 -- -- -- 66 18 97 % --   24 0100 -- -- -- 59 16 97 % --   24 0000 98/69 97.5 °F (36.4 °C) Oral 64 18 97 % --   24 -- -- -- 65 18 99 % --   24 230 -- -- -- 63 -- 99 % --   24 230 -- -- -- 71 18 (!) 79 % --   24 98/68 -- -- 65 20 92 % --   24 -- -- -- 65 18 94 % --   24 99/73 97.5 °F (36.4 °C) Oral 77 16 94 % --   24 
Department of Physical Medicine & Rehabilitation  Progress Note    Patient Identification:  dA Lacey  [id number]  : 1950  Admit date: 2024    Chief Complaint: Cardiopulmonary arrest    Subjective:   No acute events overnight.   Patient seen this afternoon sitting up in room. Reports some ongoing shortness of breath. Has severe chest wall pain. He feels he will be able to return home with home care therapies but is willing to consider ARU if needed.   Labs reviewed.     ROS: No f/c, n/v    Objective:  Patient Vitals for the past 24 hrs:   BP Temp Temp src Pulse Resp SpO2 Weight   24 1645 -- -- -- 59 15 99 % --   24 1630 -- -- -- 57 14 (!) 85 % --   24 1615 -- -- -- 62 10 99 % --   24 1600 139/61 97.9 °F (36.6 °C) Oral 65 23 98 % --   24 1545 -- -- -- 68 19 99 % --   24 1530 -- -- -- 68 18 100 % --   24 1521 -- -- -- -- 14 -- --   24 1515 -- -- -- 55 15 98 % --   24 1500 -- -- -- 67 21 99 % --   24 1451 -- -- -- -- 18 -- --   24 1445 -- -- -- 69 23 97 % --   24 1430 -- -- -- 59 12 98 % --   24 1415 -- -- -- 65 17 98 % --   24 1400 -- -- -- 57 18 98 % --   24 1345 -- -- -- 67 14 100 % --   24 1330 -- -- -- 72 18 -- --   24 1315 -- -- -- 63 14 100 % --   24 1300 -- -- -- 57 14 98 % --   24 1245 -- -- -- 57 15 99 % --   24 1230 -- -- -- (!) 48 14 98 % --   24 1224 -- -- -- -- 16 -- --   24 1215 -- -- -- 58 21 98 % --   24 1200 (!) 157/65 97.1 °F (36.2 °C) Axillary 65 16 94 % --   24 1154 -- -- -- -- 14 -- --   24 1115 -- -- -- 62 19 97 % --   24 1100 -- -- -- 57 21 93 % --   24 1045 -- -- -- 56 11 95 % --   24 1030 -- -- -- 54 10 95 % --   24 1022 -- -- -- 56 14 94 % --   24 1015 -- -- -- 54 15 93 % --   24 1000 -- -- -- 65 16 97 % --   24 0952 -- -- -- -- 16 -- --   24 0945 -- -- -- 64 15 98 % -- 
Dr. Dillon Rodríguez called this RN and this RN updated MD that this AM, this RN had to increase his O2 support from 6 L/min to 7 L/min then ABG showed pO2 of 47.5 so this RN increased his O2 to 10 L/min per high-flow nasal cannula. This RN informed Dr. Rodríguez that the repeat ABG on O2 @ 10 L/min showed only pO2 of 50.5. Dr. Rodríguez ordered a one-time Lasix 40 mg IV and stat portable CXR.    Electronically signed by Petra Erazo RN on 11/13/2024 at 7:02 AM  
Dr. Rodríguez at bedside. Impella moved to 47 eddie. Echo at bedside for placement.   
Escalante catheter removed, external catheter placed.   
HR 40-50's on precedex at 0.2 mcg/kg/hr and pt remains anxious with RASS +1. Precedex stopped and Dr. Lopez notified. 1mg ativan po ordered.   
[facility]  Cardiology Consult    Ad Lacey  1950    November 15, 2024      Reason for Referral: Vfib arrest    Referring physician:    CC: vomiting     Interval history:  Sitting up in bed   Impell weaning to p4  Cleared for liquids today       Subjective:     History of Present Illness:    Ad Lacey is a 74 y.o. patient with a PMH significant for coronary artery disease, with prior PCI of the mid LAD in 2022, peripheral arterial disease with right femoral popliteal bypass, paroxysmal atrial fibrillation. He presented to the emergency room on 11/9/2024 with out of hospital cardiac arrest. Patient with chest pain 1 hour prior to EMS arrival, per EMS patient appeared to be uncomfortable secondary to chest pain, and route they noticed the patient was having some bigeminy, they gave him 324 of aspirin a few minutes later he went to V-fib cardiac arrest. He received 1 mg of IV epinephrine, as well as 3 shocks, and ROSC was achieved in the prehospital setting. Postarrest EKG consistent with large anterior ST elevations and the cardiac Cath Lab has been activated. He is s/p aspiration thrombectomy and IVUS guided PCI LVEF < 20% s/p PLVAD.          Past Medical History:   has a past medical history of Anxiety, Basal cell carcinoma, CAD (coronary artery disease), Peripheral artery disease (HCC), and Presacral mass.    Surgical History:   has a past surgical history that includes hip surgery (Right, 4/24/2022); Cardiac catheterization (2022); femoral bypass (Right, 7/5/2022); Cardiac procedure (N/A, 11/9/2024); Cardiac procedure (N/A, 11/9/2024); and Cardiac procedure (N/A, 11/9/2024).     Social History:   reports that he quit smoking about 2 years ago. His smoking use included cigarettes. He started smoking about 52 years ago. He has a 50 pack-year smoking history. He has never used smokeless tobacco. He reports that he does not currently use drugs after having used the following drugs: 
[facility]  Cardiology Consult    Ad Lacey  1950 November 13, 2024      Reason for Referral: Vfib arrest    Referring physician:    CC: vomiting       Subjective:     History of Present Illness:    Ad Lacey is a 74 y.o. patient with a PMH significant for coronary artery disease, with prior PCI of the mid LAD in 2022, peripheral arterial disease with right femoral popliteal bypass, paroxysmal atrial fibrillation. He presented to the emergency room on 11/9/2024 with out of hospital cardiac arrest. Patient with chest pain 1 hour prior to EMS arrival, per EMS patient appeared to be uncomfortable secondary to chest pain, and route they noticed the patient was having some bigeminy, they gave him 324 of aspirin a few minutes later he went to V-fib cardiac arrest. He received 1 mg of IV epinephrine, as well as 3 shocks, and ROSC was achieved in the prehospital setting. Postarrest EKG consistent with large anterior ST elevations and the cardiac Cath Lab has been activated. He is s/p aspiration thrombectomy and IVUS guided PCI LVEF < 20% s/p PLVAD.          Past Medical History:   has a past medical history of Anxiety, Basal cell carcinoma, CAD (coronary artery disease), Peripheral artery disease (HCC), and Presacral mass.    Surgical History:   has a past surgical history that includes hip surgery (Right, 4/24/2022); Cardiac catheterization (2022); femoral bypass (Right, 7/5/2022); Cardiac procedure (N/A, 11/9/2024); Cardiac procedure (N/A, 11/9/2024); and Cardiac procedure (N/A, 11/9/2024).     Social History:   reports that he quit smoking about 2 years ago. His smoking use included cigarettes. He started smoking about 52 years ago. He has a 50 pack-year smoking history. He has never used smokeless tobacco. He reports that he does not currently use drugs after having used the following drugs: Marijuana (Weed). He reports that he does not drink alcohol.     Family History:  family history 
Heparin drip held at 1000 per Dr. Lopez.    Dany Webb RN    
Impella suction alarming.  P level decreased to P-5 from P-6. Suction alarm resolved. Normal saline bolus infusing per PRN orders.  
[facility]: [place] 1W CVICU  DYSPHAGIA BEDSIDE SWALLOW EVALUATION     Patient: Ad Lacey   : 1950   MRN: [id number]      Evaluation Date: 2024     Admitting Diagnosis:   STEMI (ST elevation myocardial infarction) (HCC) [I21.3]  Cardiac arrest [I46.9]   has a past medical history of Anxiety, Basal cell carcinoma, CAD (coronary artery disease), Peripheral artery disease (HCC), and Presacral mass.   has a past surgical history that includes hip surgery (Right, 2022); Cardiac catheterization (); and femoral bypass (Right, 2022).  Allergies: NKA  Dysphagia History including instrumental assessment: none on record  GI history: none on record  ENT history: followed for nose reconstruction s/p nasal defect d/t skin CA  Baseline/Prior Level of Function: admitted from home; reg/thin at baseline    Onset Date: 2024        Reason for referral: SLP evaluation orders received due to recent intubation                         CURRENT ENCOUNTER DIAGNOSTICS/COURSE OF ADMISSION     2024 admitted with c/o chest pain. MD ADMISSION H&P HPI: \" The patient is 74 y.o. male with a past medical history significant for coronary artery disease, with prior PCI of the mid LAD in , peripheral arterial disease with right Ramirez popliteal bypass, paroxysmal atrial fibs, who presents with the above complaint.  Patient was complaining of chest pain at home and EMS was contacted upon their arrival patient was still awake however then suddenly had a V-fib arrest with 3 shocks and return of spontaneous circulation.  He was intubated in the field.  He is currently intubated sedated.  Postarrest EKG consistent with large anterior ST elevations and the cardiac Cath Lab has been activated.\"   Consults noted: Pulmonology, Cardiology  2024 intubated en route  2024 L heart cath 2/2 STEMI; LAD stent and impella placed  11/10/2024 extubated to 4L; weaned to 2L    Most Recent Imagin/10/2024 CXR: 
[facility]  SLP DYSPHAGIA THERAPY    Patient: Ad Lacey   : 1950   MRN: [id number]    Treatment Date: 2024   Admitting Diagnosis:   STEMI (ST elevation myocardial infarction) (HCC) [I21.3]  Cardiac arrest [I46.9]   has a past medical history of Anxiety, Basal cell carcinoma, CAD (coronary artery disease), Peripheral artery disease (HCC), and Presacral mass (2022).   has a past surgical history that includes hip surgery (Right, 2022); Cardiac catheterization (); femoral bypass (Right, 2022); Cardiac procedure (N/A, 2024); Cardiac procedure (N/A, 2024); and Cardiac procedure (N/A, 2024).  Allergies: NKA  Dysphagia History including instrumental assessment: none on record  GI history: none on record  ENT history: followed for nose reconstruction s/p nasal defect d/t skin CA  Baseline/Prior Level of Function: admitted from home; reg/thin at baseline    Onset: 2024     Treatment Diagnosis: Oropharyngeal dysphagia    CURRENT ENCOUNTER DIAGNOSTICS/COURSE OF ADMISSION     2024 admitted with c/o chest pain. MD ADMISSION H&P HPI: \" The patient is 74 y.o. male with a past medical history significant for coronary artery disease, with prior PCI of the mid LAD in , peripheral arterial disease with right Ramirez popliteal bypass, paroxysmal atrial fibs, who presents with the above complaint.  Patient was complaining of chest pain at home and EMS was contacted upon their arrival patient was still awake however then suddenly had a V-fib arrest with 3 shocks and return of spontaneous circulation.  He was intubated in the field.  He is currently intubated sedated.  Postarrest EKG consistent with large anterior ST elevations and the cardiac Cath Lab has been activated.\"   Consults noted: Pulmonology, Cardiology  2024 intubated en route  2024 L heart cath 2/2 STEMI; LAD stent and impella placed  11/10/2024 extubated to 4L; weaned to 2L  2024 CSE rec NPO; 
[facility]  [facility]P DYSPHAGIA THERAPY    Patient: Ad Lacey   : 1950   MRN: [id number]    Treatment Date: 11/15/2024   Admitting Diagnosis:   STEMI (ST elevation myocardial infarction) (HCC) [I21.3]  Cardiac arrest [I46.9]   has a past medical history of Anxiety, Basal cell carcinoma, CAD (coronary artery disease), Peripheral artery disease (HCC), and Presacral mass (2022).   has a past surgical history that includes hip surgery (Right, 2022); Cardiac catheterization (); femoral bypass (Right, 2022); Cardiac procedure (N/A, 2024); Cardiac procedure (N/A, 2024); and Cardiac procedure (N/A, 2024).  Allergies: NKA  Dysphagia History including instrumental assessment: none on record  GI history: none on record  ENT history: followed for nose reconstruction s/p nasal defect d/t skin CA  Baseline/Prior Level of Function: admitted from home; reg/thin at baseline    Onset: 2024     Treatment Diagnosis: Oropharyngeal dysphagia    CURRENT ENCOUNTER DIAGNOSTICS/COURSE OF ADMISSION     2024 admitted with c/o chest pain. MD ADMISSION H&P HPI: \" The patient is 74 y.o. male with a past medical history significant for coronary artery disease, with prior PCI of the mid LAD in , peripheral arterial disease with right Ramirez popliteal bypass, paroxysmal atrial fibs, who presents with the above complaint.  Patient was complaining of chest pain at home and EMS was contacted upon their arrival patient was still awake however then suddenly had a V-fib arrest with 3 shocks and return of spontaneous circulation.  He was intubated in the field.  He is currently intubated sedated.  Postarrest EKG consistent with large anterior ST elevations and the cardiac Cath Lab has been activated.\"   Consults noted: Pulmonology, Cardiology  2024 intubated en route  2024 L heart cath 2/2 STEMI; LAD stent and impella placed  11/10/2024 extubated to 4L; weaned to 2L  2024 CSE rec NPO; 
[facility]P DYSPHAGIA THERAPY  DISCHARGE SUMMARY    Patient: Ad Lacey   : 1950   MRN: [id number]    Treatment Date: 2024   Admitting Diagnosis:   STEMI (ST elevation myocardial infarction) (HCC) [I21.3]  Cardiac arrest [I46.9]   has a past medical history of Anxiety, Basal cell carcinoma, CAD (coronary artery disease), Peripheral artery disease (HCC), and Presacral mass (2022).   has a past surgical history that includes hip surgery (Right, 2022); Cardiac catheterization (); femoral bypass (Right, 2022); Cardiac procedure (N/A, 2024); Cardiac procedure (N/A, 2024); Cardiac procedure (N/A, 2024); Cardiac procedure (N/A, 2024); Cardiac procedure (N/A, 2024); Cardiac procedure (N/A, 2024); and Carotid endarterectomy (N/A, 2024).  Allergies: NKA  Dysphagia History including instrumental assessment: none on record  GI history: none on record  ENT history: followed for nose reconstruction s/p nasal defect d/t skin CA  Baseline/Prior Level of Function: admitted from home; reg/thin at baseline    Onset: 2024     Treatment Diagnosis: Oropharyngeal dysphagia    CURRENT ENCOUNTER DIAGNOSTICS/COURSE OF ADMISSION     2024 admitted with c/o chest pain. MD ADMISSION H&P HPI: \" The patient is 74 y.o. male with a past medical history significant for coronary artery disease, with prior PCI of the mid LAD in , peripheral arterial disease with right Ramirez popliteal bypass, paroxysmal atrial fibs, who presents with the above complaint.  Patient was complaining of chest pain at home and EMS was contacted upon their arrival patient was still awake however then suddenly had a V-fib arrest with 3 shocks and return of spontaneous circulation.  He was intubated in the field.  He is currently intubated sedated.  Postarrest EKG consistent with large anterior ST elevations and the cardiac Cath Lab has been activated.\"   Consults noted: Pulmonology, 
[facility]  Progress Note    11/13/2024 8:27 AM    Chief Complaint: Chest Pain/Cardiac Arrest     Reason for Consult: Impella 5.5 Upgrade    POD #1 Right Axillary Impella Placement with Dr. Lopez    Subjective: Pt seen resting in bed, alert and oriented, reports pain to Right Shoulder/incision area at Impella insertion site    Vital Signs:  Vitals:    11/13/24 0700 11/13/24 0715 11/13/24 0730 11/13/24 0745   BP: (!) 113/56   119/66   Pulse: 94 93 96 99   Resp: 21 18 24 12   Temp:    98.5 °F (36.9 °C)   TempSrc:    Oral   SpO2: 91% 90% 91% 91%   Weight:       Height:           I/O:    Intake/Output Summary (Last 24 hours) at 11/13/2024 0827  Last data filed at 11/13/2024 0816  Gross per 24 hour   Intake 5464.95 ml   Output 8805 ml   Net -3340.05 ml       Physical Exam:   General: no apparent distress, alert and oriented, afebrile  Chest/Lungs: non labored breathing no tachypnea, retractions or cyanosis, Right chest Impella access site with mild swelling, pressure dressing in place  Cardiac: Heart regular rate and rhythm  Extremities:   -RUE - hand warm to touch, motor intact     Pulses:  -R Radial: +2/4 palpable     Labs:   Lab Results   Component Value Date/Time     11/13/2024 03:08 AM    K 3.3 11/13/2024 03:08 AM     11/13/2024 03:08 AM    CO2 23 11/13/2024 03:08 AM    BUN 12 11/13/2024 03:08 AM    CREATININE 1.0 11/13/2024 03:08 AM    GFRAA >60 07/11/2022 06:06 AM    LABGLOM 79 11/13/2024 03:08 AM    LABGLOM 53 11/07/2023 05:07 AM    GLUCOSE 132 11/13/2024 03:08 AM    PHOS 3.6 11/13/2024 03:08 AM    MG 2.05 11/13/2024 03:08 AM    CALCIUM 8.4 11/13/2024 03:08 AM     Lab Results   Component Value Date/Time    WBC 7.5 11/13/2024 05:40 AM    RBC 2.91 11/13/2024 05:40 AM    HGB 8.2 11/13/2024 05:40 AM    HCT 24.1 11/13/2024 05:40 AM    MCV 82.9 11/13/2024 05:40 AM    RDW 14.6 11/13/2024 05:40 AM     11/13/2024 05:40 AM     Lab Results   Component Value Date    INR 0.99 
[facility]  Progress Note    11/14/2024 10:44 AM    Chief Complaint: Chest Pain/Cardiac Arrest     Reason for Consult: Impella 5.5 Upgrade    POD #2 Right Axillary Impella Placement with Dr. Lopez    Subjective: Pt seen resting in bed, alert, reports pain to Right Shoulder/incision area at Impella insertion site, NGT inserted d/t N/V    Vital Signs:  Vitals:    11/14/24 0955 11/14/24 1000 11/14/24 1015 11/14/24 1023   BP:    (!) 167/84   Pulse: (!) 101 90 95    Resp: 18 16 17    Temp:       TempSrc:       SpO2: 98% 98% 96%    Weight:    79.4 kg (175 lb)   Height:    1.727 m (5' 8\")       I/O:    Intake/Output Summary (Last 24 hours) at 11/14/2024 1044  Last data filed at 11/14/2024 0959  Gross per 24 hour   Intake 1845.64 ml   Output 3360 ml   Net -1514.36 ml       Physical Exam:   General: no apparent distress, alert and oriented, afebrile  Chest/Lungs: non labored breathing no tachypnea, retractions or cyanosis, Right chest Impella access site with mild swelling, pressure dressing in place  Cardiac: Heart regular rate and rhythm  Extremities:   -RUE - hand warm to touch, motor intact     Pulses:  -R Radial: +2/4 palpable     Labs:   Lab Results   Component Value Date/Time     11/14/2024 05:00 AM    K 3.3 11/14/2024 05:00 AM     11/14/2024 05:00 AM    CO2 23 11/14/2024 05:00 AM    BUN 14 11/14/2024 05:00 AM    CREATININE 0.8 11/14/2024 05:00 AM    GFRAA >60 07/11/2022 06:06 AM    LABGLOM >90 11/14/2024 05:00 AM    LABGLOM 53 11/07/2023 05:07 AM    GLUCOSE 108 11/14/2024 05:00 AM    PHOS 2.3 11/14/2024 05:00 AM    MG 2.08 11/14/2024 05:00 AM    CALCIUM 8.1 11/14/2024 05:00 AM     Lab Results   Component Value Date/Time    WBC 7.9 11/14/2024 05:00 AM    RBC 2.91 11/14/2024 05:00 AM    HGB 8.2 11/14/2024 05:00 AM    HCT 24.5 11/14/2024 05:00 AM    MCV 84.1 11/14/2024 05:00 AM    RDW 14.8 11/14/2024 05:00 AM     11/14/2024 05:00 AM     Lab Results   Component Value Date 
[facility]  Progress Note    11/15/2024 11:11 AM    Chief Complaint: Chest Pain/Cardiac Arrest     Reason for Consult: Impella 5.5 Upgrade    POD #3 Right Axillary Impella Placement with Dr. Lopez    Subjective: Pt seen sitting up in chair, reports issues with breathing and nausea overnight    Vital Signs:  Vitals:    11/15/24 0830 11/15/24 0845 11/15/24 0900 11/15/24 0915   BP:       Pulse: 69 62 56 61   Resp: 24 18 16 26   Temp:       TempSrc:       SpO2: 97% 95% 96% 98%   Weight:       Height:           I/O:    Intake/Output Summary (Last 24 hours) at 11/15/2024 1111  Last data filed at 11/15/2024 1100  Gross per 24 hour   Intake 3840.71 ml   Output 6975 ml   Net -3134.29 ml       Physical Exam:   General: no apparent distress, alert and oriented, afebrile  Chest/Lungs: non labored breathing no tachypnea, retractions or cyanosis, Right chest Impella access site with mild swelling, pressure dressing in place  Cardiac: Heart regular rate and rhythm  Extremities:   -RUE - hand warm to touch, motor intact     Labs:   Lab Results   Component Value Date/Time     11/15/2024 04:05 AM    K 3.7 11/15/2024 04:05 AM     11/15/2024 04:05 AM    CO2 21 11/15/2024 04:05 AM    BUN 15 11/15/2024 04:05 AM    CREATININE 0.7 11/15/2024 04:05 AM    GFRAA >60 07/11/2022 06:06 AM    LABGLOM >90 11/15/2024 04:05 AM    LABGLOM 53 11/07/2023 05:07 AM    GLUCOSE 123 11/15/2024 04:05 AM    PHOS 2.3 11/14/2024 05:00 AM    MG 2.08 11/14/2024 05:00 AM    CALCIUM 8.6 11/15/2024 04:05 AM     Lab Results   Component Value Date/Time    WBC 7.3 11/15/2024 04:05 AM    RBC 2.56 11/15/2024 04:05 AM    HGB 7.3 11/15/2024 04:05 AM    HCT 21.3 11/15/2024 04:05 AM    MCV 83.4 11/15/2024 04:05 AM    RDW 14.5 11/15/2024 04:05 AM     11/15/2024 04:05 AM     Lab Results   Component Value Date    INR 0.99 07/05/2022    PROTIME 13.0 07/05/2022        Scheduled Meds:    pantoprazole (PROTONIX) 40 mg in sodium 
[facility]  Progress Note    11/18/2024 11:11 AM    Chief Complaint: Chest Pain/Cardiac Arrest     Reason for Consult: Impella 5.5 Upgrade    POD #6 Right Axillary Impella Placement with Dr. Lopez    Subjective: Pt seen sitting up in chair, reports breathing issues have improved over weekend     Vital Signs:  Vitals:    11/18/24 0922 11/18/24 0952 11/18/24 1000 11/18/24 1022   BP:       Pulse:   65    Resp: 14 16 16 14   Temp:       TempSrc:       SpO2:   97%    Weight:       Height:           I/O:    Intake/Output Summary (Last 24 hours) at 11/18/2024 1111  Last data filed at 11/18/2024 1000  Gross per 24 hour   Intake 1626.46 ml   Output 2230 ml   Net -603.54 ml       Physical Exam:   General: no apparent distress, alert and oriented, afebrile  Chest/Lungs: non labored breathing no tachypnea, retractions or cyanosis, Right chest Impella access site with mild swelling, pressure dressing in place  Cardiac: Heart regular rate and rhythm  Extremities:   -RUE - hand warm to touch, motor intact     Labs:   Lab Results   Component Value Date/Time     11/18/2024 05:00 AM    K 3.7 11/18/2024 05:00 AM    K 3.3 11/15/2024 12:24 PM     11/18/2024 05:00 AM    CO2 19 11/18/2024 05:00 AM    BUN 16 11/18/2024 05:00 AM    CREATININE 0.8 11/18/2024 05:00 AM    GFRAA >60 07/11/2022 06:06 AM    LABGLOM >90 11/18/2024 05:00 AM    LABGLOM 53 11/07/2023 05:07 AM    GLUCOSE 105 11/18/2024 05:00 AM    PHOS 2.3 11/14/2024 05:00 AM    MG 2.19 11/18/2024 05:00 AM    CALCIUM 8.9 11/18/2024 05:00 AM     Lab Results   Component Value Date/Time    WBC 7.0 11/18/2024 05:00 AM    RBC 2.92 11/18/2024 05:00 AM    HGB 8.3 11/18/2024 05:00 AM    HCT 24.6 11/18/2024 05:00 AM    MCV 84.4 11/18/2024 05:00 AM    RDW 15.1 11/18/2024 05:00 AM     11/18/2024 05:00 AM     Lab Results   Component Value Date    INR 1.36 (H) 11/18/2024    PROTIME 16.9 (H) 11/18/2024        Scheduled Meds:    potassium chloride  20 
[facility]  Progress Note    11/19/2024 8:15 AM    Chief Complaint: Chest Pain/Cardiac Arrest     Reason for Consult: Impella 5.5 Upgrade    POD #7 Right Axillary Impella Placement with Dr. Lopez    Subjective: Pt seen resting in bed, reports ongoing rib cage and right chest pain at Impella site    Vital Signs:  Vitals:    11/19/24 0653 11/19/24 0700 11/19/24 0715 11/19/24 0730   BP:       Pulse:  73 73 70   Resp: 20 15 15 15   Temp:       TempSrc:       SpO2:  95% 95% 94%   Weight:       Height:           I/O:    Intake/Output Summary (Last 24 hours) at 11/19/2024 0815  Last data filed at 11/19/2024 0700  Gross per 24 hour   Intake 1790.31 ml   Output 3655 ml   Net -1864.69 ml       Physical Exam:   General: no apparent distress, alert and oriented, afebrile  Chest/Lungs: non labored breathing no tachypnea, retractions or cyanosis, Right chest Impella access site with mild swelling, pressure dressing in place  Cardiac: Heart regular rate and rhythm  Extremities:   -RUE - hand warm to touch, motor intact     Pulses:  -R Radial: +2/4 palpable    Labs:   Lab Results   Component Value Date/Time     11/19/2024 05:55 AM    K 3.4 11/19/2024 05:55 AM    K 3.3 11/15/2024 12:24 PM     11/19/2024 05:55 AM    CO2 23 11/19/2024 05:55 AM    BUN 13 11/19/2024 05:55 AM    CREATININE 0.8 11/19/2024 05:55 AM    GFRAA >60 07/11/2022 06:06 AM    LABGLOM >90 11/19/2024 05:55 AM    LABGLOM 53 11/07/2023 05:07 AM    GLUCOSE 120 11/19/2024 05:55 AM    PHOS 2.3 11/14/2024 05:00 AM    MG 2.12 11/19/2024 05:55 AM    CALCIUM 8.8 11/19/2024 05:55 AM     Lab Results   Component Value Date/Time    WBC 7.8 11/19/2024 05:55 AM    RBC 2.92 11/19/2024 05:55 AM    HGB 8.4 11/19/2024 05:55 AM    HCT 24.0 11/19/2024 05:55 AM    MCV 82.1 11/19/2024 05:55 AM    RDW 14.5 11/19/2024 05:55 AM     11/19/2024 05:55 AM     Lab Results   Component Value Date    INR 1.36 (H) 11/18/2024    PROTIME 16.9 (H) 
[facility]  Progress Note    11/20/2024 9:26 AM    Chief Complaint: Chest Pain/Cardiac Arrest     Reason for Consult: Impella 5.5 Upgrade    POD #1 Right Axillary Impella Explant with Dr. Lopez    Subjective: Pt seen resting in bed, reports Right shoulder discomfort is improving    Vital Signs:  Vitals:    11/20/24 0700 11/20/24 0800 11/20/24 0801 11/20/24 0831   BP:       Pulse: 62 73 71 63   Resp: 16  16 17   Temp: 97.5 °F (36.4 °C)      TempSrc: Oral      SpO2: 94% 97% 95%    Weight:       Height:           I/O:    Intake/Output Summary (Last 24 hours) at 11/20/2024 0926  Last data filed at 11/20/2024 0800  Gross per 24 hour   Intake 1173.58 ml   Output 1615 ml   Net -441.42 ml       Physical Exam:   General: no apparent distress, alert and oriented, afebrile  Chest/Lungs: non labored breathing no tachypnea, retractions or cyanosis, Right chest Impella access site dressing in place with minimal drainage noted   Cardiac: Heart regular rate and rhythm  Extremities:   -RUE - hand warm to touch, motor intact     Pulses:  -R Radial: +2/4 palpable    Labs:   Lab Results   Component Value Date/Time     11/20/2024 04:00 AM    K 4.3 11/20/2024 04:00 AM    K 3.3 11/15/2024 12:24 PM     11/20/2024 04:00 AM    CO2 22 11/20/2024 04:00 AM    BUN 16 11/20/2024 04:00 AM    CREATININE 0.9 11/20/2024 04:00 AM    GFRAA >60 07/11/2022 06:06 AM    LABGLOM 89 11/20/2024 04:00 AM    LABGLOM 53 11/07/2023 05:07 AM    GLUCOSE 136 11/20/2024 04:00 AM    PHOS 2.3 11/14/2024 05:00 AM    MG 2.25 11/20/2024 04:00 AM    CALCIUM 9.2 11/20/2024 04:00 AM     Lab Results   Component Value Date/Time    WBC 7.5 11/20/2024 04:00 AM    RBC 3.04 11/20/2024 04:00 AM    HGB 8.7 11/20/2024 04:00 AM    HCT 25.4 11/20/2024 04:00 AM    MCV 83.6 11/20/2024 04:00 AM    RDW 15.1 11/20/2024 04:00 AM     11/20/2024 04:00 AM     Lab Results   Component Value Date    INR 1.36 (H) 11/18/2024    PROTIME 16.9 (H) 
NAME:  Ad Lacey  YOB: 1950  MEDICAL RECORD NUMBER:  [id number]    Shift Summary:   -NG placed for decompression due to uncontrolled vomiting  -GI consulted  -NmL out for shift.   -Large BM today.   -500mL NS bolus given due to suction alarms  -Precedex gtt started for anxiety   -Potassium replaced per protocol   -Nipride and nitro weaned off  -Repeat ECHO completed  -CT scan completed    Family updated: Yes:  Spoke to Jacobo Dyepatricia on phone today and gave an update    Rhythm: Normal Sinus Rhythm     Most recent vitals:   Visit Vitals  /67   Pulse 63   Temp 97.3 °F (36.3 °C) (Oral)   Resp 16   Ht 1.727 m (5' 8\")   Wt 80.7 kg (178 lb)   SpO2 96%   BMI 27.06 kg/m²      ABP (Arterial line BP): 101/63  ABP Mean (Arterial Line Mean): 76 mmHg    Patient Vitals for the past 12 hrs:   CO (l/min) CI (l/min/m2) SVO2 (%)    24 1745 5 l/min 2.5 l/min/m2 57.8 % 0.88   24 1149 5.5 l/min 2.8 l/min/m2 58.2 % 0.84   24 0644 5.5 l/min 2.8 l/min/m2 53 % 0.8       Patient Vitals for the past 12 hrs:   NAKIA CVP (Mean)   24 1815 2.86 7 mmHg   24 1800 3.33 6 mmHg   24 1745 1.5 6 mmHg   24 1730 2.25 8 mmHg   24 1715 2.86 7 mmHg   24 1700 2.86 7 mmHg   24 1630 3.17 6 mmHg   24 1615 3 6 mmHg   24 1600 3.17 6 mmHg   24 1545 2.4 10 mmHg   24 1530 1.4 15 mmHg   24 1515 3 8 mmHg   24 1500 2 12 mmHg   24 1445 5.29 7 mmHg   24 1430 5 6 mmHg   24 1415 4.43 7 mmHg   24 1400 1.08 13 mmHg   24 1345 4.2 5 mmHg   24 1330 3.67 6 mmHg   24 1325 4 6 mmHg   24 1315 4 6 mmHg   24 1313 1.89 9 mmHg   24 1300 1.86 7 mmHg   24 1215 6 3 mmHg   24 1200 5.33 3 mmHg   24 1149 4 4 mmHg   24 1100 9.5 2 mmHg   24 1030 6.33 3 mmHg   24 1015 10.5 2 mmHg   24 1000 4.75 4 mmHg   24 0915 3.4 5 mmHg   24 0900 3.33 6 mmHg 
NAME:  Ad Lacey  YOB: 1950  MEDICAL RECORD NUMBER:  [id number]    Shift Summary: PRN dilaudid given nearly Q2 for rib pain. External catheter removed, otherwise uneventful     Family updated: No    Rhythm: Normal Sinus Rhythm     Most recent vitals:   Visit Vitals  /71   Pulse 52   Temp 98.4 °F (36.9 °C) (Oral)   Resp 20   Ht 1.727 m (5' 8\")   Wt 74.1 kg (163 lb 5.8 oz)   SpO2 98%   BMI 24.84 kg/m²      ABP (Arterial line BP): 96/41  ABP Mean (Arterial Line Mean): 288 mmHg    No data found.    No data found.      Respiratory support needed (if any):  - RA    Admission weight Weight - Scale: 87.9 kg (193 lb 12.6 oz) (11/09/24 2044)    Today's weight    Wt Readings from Last 1 Encounters:   11/20/24 74.1 kg (163 lb 5.8 oz)        Silveira need assessed each shift: N/A - no silveira present  UOP >30ml/hr: YES  Last documented BM (in last 48 hrs):  Patient Vitals for the past 48 hrs:   Last BM (including prior to admit)   11/19/24 0800 11/18/24 11/19/24 2000 11/18/24 11/20/24 0000 11/18/24 11/20/24 2000 11/18/24                Restraints (in use currently or dc'd in last 12 hrs): No        Lines/Drains reviewed @ bedside.  PICC 11/11/24 Left Basilic (Active)   Number of days: 9         Drip rates at handoff:    dexmedeTOMIDine Stopped (11/18/24 0449)    nitroPRUSSide (NIPRIDE) 50 mg in sodium chloride 0.9 % 250 mL infusion Stopped (11/13/24 1401)    sodium chloride Stopped (11/15/24 0357)    nitroGLYCERIN Stopped (11/19/24 1236)    dextrose         Lab Data:   CBC:   Recent Labs     11/20/24  0400 11/21/24  0440   WBC 7.5 10.8   HGB 8.7* 9.5*   HCT 25.4* 28.2*   MCV 83.6 85.7    442     BMP:    Recent Labs     11/19/24  0555 11/20/24  0400    137   K 3.4* 4.3   CO2 23 22   BUN 13 16   CREATININE 0.8 0.9     LIVR: No results for input(s): \"AST\", \"ALT\" in the last 72 hours.  PT/INR: No results for input(s): \"INR\" in the last 72 hours.    Invalid input(s): \"PROT\"  APTT: No results 
NAME:  Ad Lacey  YOB: 1950  MEDICAL RECORD NUMBER:  [id number]    Shift Summary: Stayed on P5 most of the day d/t suctioning alarms and intermittent rhythm changes. Still on nitro, heparin, and precedex. NGT out and pt able to have thin liquids. Ilene stedy to chair with PT/OT. Able to take oxycodone most of the day. Only 1x dose of dilaudid needed. Replaced electrolytes.     Family updated: Yes:  Son Mike on phone    Rhythm: Normal Sinus Rhythm , prolonged QT, lots of PVCs and PACs. Pt did have some pauses during the day, Dr. Rodríguez aware.     Most recent vitals:   Visit Vitals  BP (!) 160/80   Pulse 74   Temp 98.1 °F (36.7 °C) (Oral)   Resp 18   Ht 1.727 m (5' 8\")   Wt 78.2 kg (172 lb 6.4 oz)   SpO2 99%   BMI 26.21 kg/m²      ABP (Arterial line BP): 147/68  ABP Mean (Arterial Line Mean): 86 mmHg    Patient Vitals for the past 12 hrs:   CO (l/min) CI (l/min/m2) SVO2 (%)  Core (Body) Temperature   11/15/24 1400 4.9 l/min 2.5 l/min/m2 55.7 % 0.86 97.8 °F (36.6 °C)   11/15/24 0800 4.4 l/min 2.3 l/min/m2 48.4 % 0.8 98.4 °F (36.9 °C)       Patient Vitals for the past 12 hrs:   NAKIA CVP (Mean)   11/15/24 1700 5.67 3 mmHg   11/15/24 1600 5 4 mmHg   11/15/24 1545 5 5 mmHg   11/15/24 1530 13 2 mmHg   11/15/24 1515 24 1 mmHg   11/15/24 1500 -- 0 mmHg   11/15/24 1445 9 2 mmHg   11/15/24 1430 4.25 4 mmHg   11/15/24 1415 6 3 mmHg   11/15/24 1400 5.5 4 mmHg   11/15/24 1345 4.4 5 mmHg   11/15/24 1330 7 3 mmHg   11/15/24 1315 -- 0 mmHg   11/15/24 1300 -- 0 mmHg   11/15/24 1245 -- 0 mmHg   11/15/24 1200 -- 0 mmHg   11/15/24 1130 -- 0 mmHg   11/15/24 1115 -- 0 mmHg   11/15/24 1100 -- 0 mmHg   11/15/24 1045 12 2 mmHg   11/15/24 1030 -- 0 mmHg   11/15/24 1015 21 1 mmHg   11/15/24 1000 -- 0 mmHg   11/15/24 0945 21 1 mmHg   11/15/24 0930 -- 0 mmHg   11/15/24 0915 18 1 mmHg   11/15/24 0900 20 1 mmHg   11/15/24 0845 16 1 mmHg   11/15/24 0815 1.45 11 mmHg   11/15/24 0800 2.13 8 mmHg   11/15/24 0745 4.75 4 mmHg 
NAME:  Ad Lacey  YOB: 1950  MEDICAL RECORD NUMBER:  [id number]    Shift Summary: Bryant into 40's with precedex. Precedex stopped. Ativan po x1, ativan IV x1, and trazadone given. Nitro gtt restarted for SVR >1200 and MAP> 80- nitro currently running at 70 mcg/min. A- line very positional.     Family updated: Yes:  son at bedside at beginning of shift.     Rhythm: Normal Sinus Rhythm     Most recent vitals:   Visit Vitals  BP (!) 159/93   Pulse 88   Temp 97.8 °F (36.6 °C) (Oral)   Resp 10   Ht 1.727 m (5' 8\")   Wt 79.8 kg (175 lb 14.8 oz)   SpO2 96%   BMI 26.75 kg/m²      ABP (Arterial line BP): 127/68  ABP Mean (Arterial Line Mean): 83 mmHg    Patient Vitals for the past 12 hrs:   CO (l/min) CI (l/min/m2) SVO2 (%)  SV (ml)   11/14/24 0430 4.4 l/min 2.2 l/min/m2 53.2 % 0.79 76 ml   11/14/24 0000 4.6 l/min 2.4 l/min/m2 57.5 % 1 80 ml       Patient Vitals for the past 12 hrs:   NAKIA CVP (Mean)   11/14/24 0645 5.25 4 mmHg   11/14/24 0630 4.2 5 mmHg   11/14/24 0615 5.67 3 mmHg   11/14/24 0600 4 5 mmHg   11/14/24 0530 3.33 6 mmHg   11/14/24 0515 9 2 mmHg   11/14/24 0505 9.5 2 mmHg   11/14/24 0500 7 3 mmHg   11/14/24 0450 7.67 3 mmHg   11/14/24 0445 6 4 mmHg   11/14/24 0430 6.67 3 mmHg   11/14/24 0415 6.67 3 mmHg   11/14/24 0400 1.7 10 mmHg   11/14/24 0345 5 4 mmHg   11/14/24 0330 2.86 7 mmHg   11/14/24 0312 4.2 5 mmHg   11/14/24 0300 3 7 mmHg   11/14/24 0245 5.75 4 mmHg   11/14/24 0230 5.75 4 mmHg   11/14/24 0215 6.33 3 mmHg   11/14/24 0200 9.5 2 mmHg   11/14/24 0145 6.67 3 mmHg   11/14/24 0130 5.5 4 mmHg   11/14/24 0115 7.33 3 mmHg   11/14/24 0100 7 3 mmHg   11/14/24 0055 7.67 3 mmHg   11/14/24 0050 5.2 5 mmHg   11/14/24 0045 5.75 4 mmHg   11/14/24 0040 2.44 9 mmHg   11/14/24 0035 3.29 7 mmHg   11/14/24 0030 5.4 5 mmHg   11/14/24 0025 4 7 mmHg   11/14/24 0020 3.25 8 mmHg   11/14/24 0015 3.43 7 mmHg   11/14/24 0005 3.29 7 mmHg   11/14/24 0000 3.57 7 mmHg   11/13/24 2330 3.43 7 mmHg   11/13/24 
NAME:  Ad Lacey  YOB: 1950  MEDICAL RECORD NUMBER:  [id number]    Shift Summary: Albumin x2 overnight d/t frequent suction alarms even on P-6. Helped. Intermittent suction alarms when very cecelia with rhythm changes. Dilaudid seems to worsen it. Able to maintain on P-7 currently. Ntg restarted for high MAP and SVR. Net neg 329ml for the night. Precedex remains on and helpful to keep pt calm. Only threatened to get up and leave x1.Remains nauseous with water, NG to CLWS. PT eval in for today. Bedrest over.    Family updated: Yes:  Son Mike at bedside beginning of shift    Rhythm: Normal Sinus Rhythm SB, prolonged QT. PVC,PAC    Most recent vitals:   Visit Vitals  /73   Pulse 67   Temp 98 °F (36.7 °C) (Oral)   Resp 15   Ht 1.727 m (5' 8\")   Wt 78.2 kg (172 lb 6.4 oz)   SpO2 97%   BMI 26.21 kg/m²      ABP (Arterial line BP): 119/70  ABP Mean (Arterial Line Mean): 80 mmHg    Patient Vitals for the past 12 hrs:   CO (l/min) CI (l/min/m2) SVO2 (%)  Core (Body) Temperature   11/15/24 0500 4.8 l/min 2.5 l/min/m2 50.3 % 0.84 --   11/15/24 0000 4.3 l/min 2.2 l/min/m2 52.7 % 0.66 --   11/14/24 1852 3.7 l/min 1.9 l/min/m2 47.5 % 0.8 98.1 °F (36.7 °C)       Patient Vitals for the past 12 hrs:   NAKIA CVP (Mean)   11/15/24 0517 4.25 4 mmHg   11/15/24 0515 5.67 3 mmHg   11/15/24 0500 4.75 4 mmHg   11/15/24 0445 4 4 mmHg   11/15/24 0430 5.33 3 mmHg   11/15/24 0415 3.2 5 mmHg   11/15/24 0400 5.33 3 mmHg   11/15/24 0345 3.4 5 mmHg   11/15/24 0330 3.6 5 mmHg   11/15/24 0320 1.32 22 mmHg   11/15/24 0315 3 6 mmHg   11/15/24 0310 1.36 11 mmHg   11/15/24 0300 4 6 mmHg   11/15/24 0250 4 4 mmHg   11/15/24 0245 3.5 4 mmHg   11/15/24 0240 3.4 5 mmHg   11/15/24 0238 4 4 mmHg   11/15/24 0230 5.67 3 mmHg   11/15/24 0220 4.25 4 mmHg   11/15/24 0215 4.5 4 mmHg   11/15/24 0210 5.67 3 mmHg   11/15/24 0200 5.33 3 mmHg   11/15/24 0150 5 3 mmHg   11/15/24 0145 5.33 3 mmHg   11/15/24 0140 5 4 mmHg   11/15/24 0130 5.33 3 
NAME:  Ad Lacey  YOB: 1950  MEDICAL RECORD NUMBER:  [id number]    Shift Summary: Impella explanted today. Pain related behavior continues, see previous notes for details. Cooperative and pleasant, but continuously complains of 10/10 pain. Out of bed to commode x1 today s/p explant. Mixed every shift, last 42.6%, Dr. Rodríguez aware. Nitro gtt off. Heparin gtt d/c, restarting oral AC at discharge per Dr. Rodríguez. Brilinta to restart tonight per Dr. Rodríguez. No activity restrictions. Tolerating PO diet.    Family updated: Yes:  son    Rhythm: Normal Sinus Rhythm     Most recent vitals:   Visit Vitals  /73   Pulse 71   Temp 97.2 °F (36.2 °C) (Oral)   Resp 16   Ht 1.727 m (5' 8\")   Wt 74.5 kg (164 lb 3.9 oz)   SpO2 97%   BMI 24.97 kg/m²      ABP (Arterial line BP): 122/58  ABP Mean (Arterial Line Mean): 80 mmHg    Patient Vitals for the past 12 hrs:   CO (l/min) CI (l/min/m2) SVO2 (%)  SV (ml) Core (Body) Temperature   11/19/24 1648 3.6 l/min 1.9 l/min/m2 42.6 % 0.77 42 ml 97.7 °F (36.5 °C)       Patient Vitals for the past 12 hrs:   NAKIA CVP (Mean)   11/19/24 1800 3.83 6 mmHg   11/19/24 1750 5 5 mmHg   11/19/24 1730 6.8 5 mmHg   11/19/24 1700 3.63 8 mmHg   11/19/24 1648 4.38 8 mmHg   11/19/24 1630 3.88 8 mmHg   11/19/24 1600 6.2 5 mmHg   11/19/24 1530 11 3 mmHg   11/19/24 1500 3.86 7 mmHg   11/19/24 1430 5.8 5 mmHg   11/19/24 1400 6.25 4 mmHg   11/19/24 1345 5.25 4 mmHg   11/19/24 1115 9.5 2 mmHg   11/19/24 1100 6 3 mmHg   11/19/24 1045 10.5 2 mmHg   11/19/24 1030 5.33 3 mmHg   11/19/24 1015 5.67 3 mmHg   11/19/24 1000 10 2 mmHg   11/19/24 0945 10 2 mmHg   11/19/24 0930 9.5 2 mmHg   11/19/24 0915 10 2 mmHg   11/19/24 0900 10 2 mmHg   11/19/24 0845 16 1 mmHg   11/19/24 0830 17 1 mmHg   11/19/24 0815 10.5 2 mmHg   11/19/24 0800 8.33 3 mmHg   11/19/24 0745 4.67 3 mmHg   11/19/24 0730 2.67 3 mmHg   11/19/24 0715 4.33 3 mmHg   11/19/24 0700 2.17 6 mmHg   11/19/24 0645 -- 0 mmHg   11/19/24 0630 7 
NAME:  Ad Lacey  YOB: 1950  MEDICAL RECORD NUMBER:  [id number]    Shift Summary: Pt very agitated and irritable. Pt very uncomfortable and wanting impella out. Precedex gtt started. Heparin gtt stopped prior to cath lab and will be restarted at 8pm. Venous sheath removed in L groin. Venous sheath placed in [facility] with swan.     Family updated: Yes:  Son at bedside    Rhythm: Normal Sinus Rhythm with frequent PVCs    Most recent vitals:   Visit Vitals  BP (!) 95/57   Pulse 67   Temp 97.8 °F (36.6 °C) (Oral)   Resp 14   Ht 1.727 m (5' 8\")   Wt 79.4 kg (175 lb)   SpO2 94%   BMI 26.61 kg/m²      ABP (Arterial line BP): 103/69  ABP Mean (Arterial Line Mean): 77 mmHg    Patient Vitals for the past 12 hrs:   CO (l/min) CI (l/min/m2) SVO2 (%)  Core (Body) Temperature   11/14/24 1852 3.7 l/min 1.9 l/min/m2 47.5 % 0.8 98.1 °F (36.7 °C)   11/14/24 1315 4.1 l/min 2.1 l/min/m2 48.5 % 0.75 97.8 °F (36.6 °C)   11/14/24 0939 4.8 l/min 2.5 l/min/m2 55.4 % 0.88 98.2 °F (36.8 °C)   11/14/24 0800 -- -- -- -- 98.1 °F (36.7 °C)       Patient Vitals for the past 12 hrs:   NAKIA CVP (Mean)   11/14/24 1935 14 1 mmHg   11/14/24 1900 5.67 3 mmHg   11/14/24 1852 3.75 4 mmHg   11/14/24 1830 8 2 mmHg   11/14/24 1815 2.38 8 mmHg   11/14/24 1800 2.29 7 mmHg   11/14/24 1745 2 7 mmHg   11/14/24 1730 -- 314 mmHg   11/14/24 1615 1.78 9 mmHg   11/14/24 1600 2 8 mmHg   11/14/24 1545 2 8 mmHg   11/14/24 1530 1.88 8 mmHg   11/14/24 1528 2.43 7 mmHg   11/14/24 1527 2.29 7 mmHg   11/14/24 1525 2.5 8 mmHg   11/14/24 1515 2.83 6 mmHg   11/14/24 1500 1.5 10 mmHg   11/14/24 1445 2.14 7 mmHg   11/14/24 1430 2 6 mmHg   11/14/24 1415 1.1 10 mmHg   11/14/24 1400 2.57 7 mmHg   11/14/24 1345 1.56 9 mmHg   11/14/24 1330 3.75 4 mmHg   11/14/24 1315 4 4 mmHg   11/14/24 1300 4.67 3 mmHg   11/14/24 1245 2 6 mmHg   11/14/24 1230 2.4 5 mmHg   11/14/24 1218 11 1 mmHg   11/14/24 1215 4 3 mmHg   11/14/24 1200 6.5 2 mmHg   11/14/24 1145 5.67 3 mmHg 
NAME:  Ad Lacey  YOB: 1950  MEDICAL RECORD NUMBER:  [id number]    Shift Summary: Pt NPO since MN for explant later today. Pain remains uncontrolled despite dilaudid and percocet. 1 dose of atarax given for anxiety. CHG bath given and all linens changed at 6:15. Nitro remains on to keep MAP< 80. Pt assisted to bedside commode x 3 overnight and had 1 BM.     Family updated: No    Rhythm: Normal Sinus Rhythm     Most recent vitals:   Visit Vitals  /77   Pulse 73   Temp 98.1 °F (36.7 °C) (Oral)   Resp 15   Ht 1.727 m (5' 8\")   Wt 74.5 kg (164 lb 3.9 oz)   SpO2 95%   BMI 24.97 kg/m²      ABP (Arterial line BP): 144/65  ABP Mean (Arterial Line Mean): 88 mmHg    Patient Vitals for the past 12 hrs:   CO (l/min) CI (l/min/m2) SVO2 (%)  SV (ml)   11/19/24 0600 4.7 l/min 2.4 l/min/m2 53.7 % 0.86 76 ml   11/19/24 0000 4.7 l/min 2.4 l/min/m2 52.9 % 0.9 77 ml       Patient Vitals for the past 12 hrs:   NAKIA CVP (Mean)   11/19/24 0700 2.17 6 mmHg   11/19/24 0645 -- 0 mmHg   11/19/24 0630 7 3 mmHg   11/19/24 0600 10 2 mmHg   11/19/24 0545 21 1 mmHg   11/19/24 0530 10.5 2 mmHg   11/19/24 0515 9.5 2 mmHg   11/19/24 0510 10 2 mmHg   11/19/24 0500 7 3 mmHg   11/19/24 0445 10.5 2 mmHg   11/19/24 0430 6.5 4 mmHg   11/19/24 0415 7 3 mmHg   11/19/24 0400 8 3 mmHg   11/19/24 0345 10 3 mmHg   11/19/24 0335 9.25 4 mmHg   11/19/24 0330 8 3 mmHg   11/19/24 0325 11.5 2 mmHg   11/19/24 0320 11 2 mmHg   11/19/24 0315 11.5 2 mmHg   11/19/24 0300 13 2 mmHg   11/19/24 0245 12 2 mmHg   11/19/24 0230 11.5 2 mmHg   11/19/24 0215 12.5 2 mmHg   11/19/24 0200 5 5 mmHg   11/19/24 0145 4 6 mmHg   11/19/24 0140 7 5 mmHg   11/19/24 0135 6.5 6 mmHg   11/19/24 0130 6.67 6 mmHg   11/19/24 0115 4.57 7 mmHg   11/19/24 0100 7.5 4 mmHg   11/19/24 0045 9.67 3 mmHg   11/19/24 0030 34 1 mmHg   11/19/24 0015 14.5 2 mmHg   11/19/24 0000 8.25 4 mmHg   11/18/24 2345 31 1 mmHg   11/18/24 2330 26 1 mmHg   11/18/24 2315 12.5 2 mmHg   11/18/24 
NAME:  Ad Lacey  YOB: 1950  MEDICAL RECORD NUMBER:  [id number]    Shift Summary: 3 doses of dilaudid, 1 dose of percocet and 1 dose of hydroxyzine overnight. MAP consistently 60's overnight and did not require nitro. Not nearly as anxious as previous night and slept most of night. Mixed sat 60% and CI 2.6 this am.     Family updated: No    Rhythm: Normal Sinus Rhythm     Most recent vitals:   Visit Vitals  BP (!) 92/59   Pulse 60   Temp 97.5 °F (36.4 °C) (Oral)   Resp 14   Ht 1.727 m (5' 8\")   Wt 74.1 kg (163 lb 5.8 oz)   SpO2 95%   BMI 24.84 kg/m²      ABP (Arterial line BP): 101/48  ABP Mean (Arterial Line Mean): 67 mmHg    Patient Vitals for the past 12 hrs:   CO (l/min) CI (l/min/m2) SVO2 (%)    11/20/24 0400 4.9 l/min 2.6 l/min/m2 60 % 0.72       Patient Vitals for the past 12 hrs:   NAKIA CVP (Mean)   11/20/24 0600 5 4 mmHg   11/20/24 0500 4.6 5 mmHg   11/20/24 0400 4.5 4 mmHg   11/20/24 0300 4 5 mmHg   11/20/24 0223 3.5 6 mmHg   11/20/24 0200 22 1 mmHg   11/20/24 0100 21 1 mmHg   11/20/24 0000 6.67 3 mmHg   11/19/24 2305 7.25 4 mmHg   11/19/24 2300 6.75 4 mmHg   11/19/24 2200 13.5 2 mmHg   11/19/24 2100 8 3 mmHg   11/19/24 2000 5.75 4 mmHg   11/19/24 1959 7.67 3 mmHg   11/19/24 1900 3.33 3 mmHg         Respiratory support needed (if any):  - RA    Admission weight Weight - Scale: 87.9 kg (193 lb 12.6 oz) (11/09/24 2044)    Today's weight    Wt Readings from Last 1 Encounters:   11/20/24 74.1 kg (163 lb 5.8 oz)        Silveira need assessed each shift: YES -  - continue silveira r/t - strict I/O  UOP >30ml/hr: YES  Last documented BM (in last 48 hrs):  Patient Vitals for the past 48 hrs:   Last BM (including prior to admit) Stool Occurrence   11/18/24 0800 11/17/24 --   11/18/24 0900 11/18/24 1   11/18/24 2000 11/18/24 --   11/18/24 2200 11/18/24 --   11/19/24 0000 11/18/24 --   11/19/24 0400 11/18/24 --   11/19/24 0800 11/18/24 --   11/19/24 2000 11/18/24 --   11/20/24 0000 11/18/24 -- 
NAME:  Ad Lacey  YOB: 1950  MEDICAL RECORD NUMBER:  [id number]    Shift Summary: Last mixed 45%, Dr. Rodríguez aware. Complains of pain almost constantly despite around the clock PRN Dilaudid and Percocet and Atarax. Plan to explant tomorrow at 1100. Heparin to be turned off tomorrow morning at 1000 per Dr. Lopez.     Family updated: Yes:  son    Rhythm: Normal Sinus Rhythm     Most recent vitals:   Visit Vitals  /61   Pulse 66   Temp 97.9 °F (36.6 °C) (Oral)   Resp 17   Ht 1.727 m (5' 8\")   Wt 78.6 kg (173 lb 4.5 oz)   SpO2 96%   BMI 26.35 kg/m²      ABP (Arterial line BP): 159/76  ABP Mean (Arterial Line Mean): 101 mmHg    Patient Vitals for the past 12 hrs:   CO (l/min) CI (l/min/m2) SVO2 (%)  SV (ml) Core (Body) Temperature   11/18/24 1800 3.8 l/min 2 l/min/m2 45.6 % 0.85 58 ml 97.5 °F (36.4 °C)   11/18/24 1200 3.1 l/min 1.6 l/min/m2 35.4 % 0.63 48 ml 97.1 °F (36.2 °C)       Patient Vitals for the past 12 hrs:   NAKIA CVP (Mean)   11/18/24 1800 2.18 11 mmHg   11/18/24 1745 2.17 12 mmHg   11/18/24 1730 2.6 10 mmHg   11/18/24 1715 2.79 14 mmHg   11/18/24 1700 3.25 8 mmHg   11/18/24 1645 3.25 8 mmHg   11/18/24 1630 3.63 8 mmHg   11/18/24 1615 3.11 9 mmHg   11/18/24 1600 3.57 7 mmHg   11/18/24 1545 2.82 11 mmHg   11/18/24 1530 2.33 12 mmHg   11/18/24 1515 3.33 6 mmHg   11/18/24 1500 2.27 11 mmHg   11/18/24 1445 2.25 12 mmHg   11/18/24 1430 6.25 4 mmHg   11/18/24 1415 9.33 3 mmHg   11/18/24 1400 8 3 mmHg   11/18/24 1345 2.45 11 mmHg   11/18/24 1330 0.78 37 mmHg   11/18/24 1315 10 3 mmHg   11/18/24 1300 28 1 mmHg   11/18/24 1230 -- 0 mmHg   11/18/24 1215 -- 0 mmHg   11/18/24 1200 15.5 2 mmHg   11/18/24 1115 6.75 4 mmHg   11/18/24 1100 7.33 3 mmHg   11/18/24 1045 4.5 4 mmHg   11/18/24 1015 -- 0 mmHg   11/18/24 1000 0.95 19 mmHg   11/18/24 0945 11 2 mmHg   11/18/24 0930 3.43 7 mmHg   11/18/24 0915 6 4 mmHg   11/18/24 0900 4.8 5 mmHg   11/18/24 0845 2.21 14 mmHg   11/18/24 0830 1.79 14 mmHg 
NAME:  Ad Lacey  YOB: 1950  MEDICAL RECORD NUMBER:  [id number]    Shift Summary: Pt upgraded to 5.5 Impella. Pt received a total of 4 units of PRBC's. Pt taken for CT of abdomen to r/o GIB.     Family updated: Yes:  Mike at bedside.     Rhythm:  Sinus with frequent PVC's and runs of VTach.    Most recent vitals:   Visit Vitals  BP (!) 172/77   Pulse 90   Temp 98 °F (36.7 °C) (Oral)   Resp 16   Ht 1.727 m (5' 8\")   Wt 83.9 kg (185 lb)   SpO2 93%   BMI 28.13 kg/m²      ABP (Arterial line BP): 162/77  ABP Mean (Arterial Line Mean): 96 mmHg    Patient Vitals for the past 12 hrs:   CO (l/min) CI (l/min/m2) SVO2 (%)    11/12/24 1542 4.4 l/min 2.2 l/min/m2 52 % 0.81   11/12/24 0944 4.4 l/min 2.2 l/min/m2 40.9 % 0.75       Patient Vitals for the past 12 hrs:   NAKIA CVP (Mean)   11/12/24 2000 2.9 10 mmHg   11/12/24 1945 1.12 17 mmHg   11/12/24 1930 1.63 16 mmHg   11/12/24 1915 2.53 15 mmHg   11/12/24 1815 1.92 13 mmHg   11/12/24 1800 1.75 16 mmHg   11/12/24 1745 2.7 10 mmHg   11/12/24 1730 3 9 mmHg   11/12/24 1715 3 11 mmHg   11/12/24 1700 1.86 14 mmHg   11/12/24 1600 1.82 11 mmHg   11/12/24 1542 2.2 10 mmHg   11/12/24 1200 2.67 6 mmHg   11/12/24 1145 3 5 mmHg   11/12/24 1130 3.2 5 mmHg   11/12/24 1115 2.83 6 mmHg   11/12/24 1100 2.13 8 mmHg   11/12/24 1045 3 6 mmHg   11/12/24 1030 2.67 6 mmHg   11/12/24 1015 3 6 mmHg   11/12/24 1000 2 7 mmHg   11/12/24 0945 2.67 6 mmHg   11/12/24 0944 2.83 6 mmHg   11/12/24 0930 2.14 7 mmHg   11/12/24 0915 2 9 mmHg   11/12/24 0913 1.9 10 mmHg   11/12/24 0900 1.8 10 mmHg       Respiratory support needed (if any):  - O2 - NC - 6 lpm    Admission weight Weight - Scale: 87.9 kg (193 lb 12.6 oz) (11/09/24 2044)    Today's weight    Wt Readings from Last 1 Encounters:   11/12/24 83.9 kg (185 lb)        Silveira need assessed each shift: YES -  - continue silveira r/t - hemodynamic instability in a critically ill pt.  UOP >30ml/hr: YES  Last documented BM (in last 48 hrs):  
NAME:  Ad Lacey  YOB: 1950  MEDICAL RECORD NUMBER:  [id number]    Shift Summary: 11/20/2024    Family updated: yes per pt     Rhythm: Normal Sinus Rhythm     Most recent vitals:   Visit Vitals  BP 96/62   Pulse 52   Temp 98.5 °F (36.9 °C) (Oral)   Resp 20   Ht 1.727 m (5' 8\")   Wt 74.1 kg (163 lb 5.8 oz)   SpO2 100%   BMI 24.84 kg/m²      ABP (Arterial line BP): 96/41  ABP Mean (Arterial Line Mean): 288 mmHg    No data found.    Patient Vitals for the past 12 hrs:   NAKIA CVP (Mean)   11/20/24 1332 -- 86 mmHg   11/20/24 1302 -- 88 mmHg   11/20/24 1300 -- 95 mmHg   11/20/24 1200 -- 66 mmHg   11/20/24 1016 8 2 mmHg   11/20/24 0831 2.2 10 mmHg   11/20/24 0801 3.29 7 mmHg   11/20/24 0800 5 5 mmHg   11/20/24 0700 5.75 4 mmHg   11/20/24 0600 5 4 mmHg   11/20/24 0500 4.6 5 mmHg         Respiratory support needed (if any):  - RA    Admission weight Weight - Scale: 87.9 kg (193 lb 12.6 oz) (11/09/24 2044)    Today's weight    Wt Readings from Last 1 Encounters:   11/20/24 74.1 kg (163 lb 5.8 oz)        Silveira need assessed each shift: N/A - no silveira present  UOP >30ml/hr: YES  Last documented BM (in last 48 hrs):  Patient Vitals for the past 48 hrs:   Last BM (including prior to admit)   11/18/24 2000 11/18/24 11/18/24 2200 11/18/24 11/19/24 0000 11/18/24 11/19/24 0400 11/18/24 11/19/24 0800 11/18/24 11/19/24 2000 11/18/24 11/20/24 0000 11/18/24                Restraints (in use currently or dc'd in last 12 hrs): No    Order current and documentation up to date? No    Lines/Drains reviewed @ bedside.  PICC 11/11/24 Left Basilic (Active)   Number of days: 8         Drip rates at handoff:    dexmedeTOMIDine Stopped (11/18/24 0449)    nitroPRUSSide (NIPRIDE) 50 mg in sodium chloride 0.9 % 250 mL infusion Stopped (11/13/24 1401)    sodium chloride Stopped (11/15/24 0357)    nitroGLYCERIN Stopped (11/19/24 1236)    dextrose         Lab Data:   CBC:   Recent Labs     11/19/24  0555 11/20/24  0400 
NAME:  Ad Lacey  YOB: 1950  MEDICAL RECORD NUMBER:  [id number]    Shift Summary: BP soft overnight, entresto dose halved as 1 time dose per Dr ROSEANNE Rodríguez. Required 1 albumin 500ml bolus for low BP. UO adequate. Pain medication administered frequently throughout the night per eMAR. Low dose Precedex weaned off overnight. Hydroxyzine effective to keep patient relaxed & calm. Impella remains @ P4, hemodynamics as below, no suction alarms or problems with LVAD overnight. Hgb 6.8 as per previously written progress notes, 1 unit PRBC to be given.    Family updated: Yes    Rhythm: Normal Sinus Rhythm  occasional SB    Most recent vitals:   Visit Vitals  BP (!) 110/58   Pulse 68   Temp 97.3 °F (36.3 °C)   Resp 12   Ht 1.727 m (5' 8\")   Wt 78.1 kg (172 lb 2.9 oz)   SpO2 93%   BMI 26.18 kg/m²      ABP (Arterial line BP): 110/58  ABP Mean (Arterial Line Mean): 74 mmHg    Patient Vitals for the past 12 hrs:   CO (l/min) CI (l/min/m2) SVO2 (%)  Core (Body) Temperature   11/17/24 0600 5.1 l/min 2.7 l/min/m2 48.7 % 0.9 --   11/17/24 0000 4.3 l/min 2.2 l/min/m2 45.1 % 0.66 97.3 °F (36.3 °C)       Patient Vitals for the past 12 hrs:   NAKIA CVP (Mean)   11/17/24 0630 3.22 9 mmHg   11/17/24 0615 2.9 10 mmHg   11/17/24 0600 1.64 11 mmHg   11/17/24 0545 3.11 9 mmHg   11/17/24 0530 2.78 9 mmHg   11/17/24 0515 3.11 9 mmHg   11/17/24 0500 3.22 9 mmHg   11/17/24 0445 2.44 16 mmHg   11/17/24 0430 3.45 11 mmHg   11/17/24 0415 2.86 14 mmHg   11/17/24 0300 2.7 10 mmHg   11/17/24 0245 2.8 10 mmHg   11/17/24 0230 3 9 mmHg   11/17/24 0215 2.73 11 mmHg   11/17/24 0200 2.7 10 mmHg   11/17/24 0145 2.92 12 mmHg   11/17/24 0130 2.8 10 mmHg   11/17/24 0115 2.64 11 mmHg   11/17/24 0100 2.42 12 mmHg   11/17/24 0045 2.36 11 mmHg   11/17/24 0030 2.2 10 mmHg   11/17/24 0015 2.2 10 mmHg   11/17/24 0000 2.09 11 mmHg   11/16/24 2345 2.4 10 mmHg   11/16/24 2330 2.27 11 mmHg   11/16/24 2315 2.27 11 mmHg   11/16/24 2300 2.6 10 mmHg 
NAME:  Ad Lacey  YOB: 1950  MEDICAL RECORD NUMBER:  [id number]    Shift Summary: See previous notes from this writer for additional shift summary. Precedex gtt stopped at 0449am. NTG gtt resumed to target MAP <80. Dr VINNIE Lopez contacted yesterday evening regarding timing for Impella explant today; possibly late afternoon today, or on Tuesday am. Patient has been NPO since MN. Decision regarding when to stop Heparin gtt to be made by Dr VINNIE Lopez today when he rounds.    Family updated: No    Rhythm: Normal Sinus Rhythm / Sinus cecelia    Most recent vitals:   Visit Vitals  BP (!) 102/57   Pulse 54   Temp 97.3 °F (36.3 °C)   Resp 11   Ht 1.727 m (5' 8\")   Wt 78.6 kg (173 lb 4.5 oz)   SpO2 96%   BMI 26.35 kg/m²      ABP (Arterial line BP): 113/59  ABP Mean (Arterial Line Mean): 73 mmHg    Patient Vitals for the past 12 hrs:   CO (l/min) CI (l/min/m2) SVO2 (%)    11/18/24 0600 3.7 l/min 1.9 l/min/m2 43.2 % 0.56   11/18/24 0000 4.5 l/min 2.3 l/min/m2 53.6 % 0.94   11/17/24 1830 3.4 l/min 1.8 l/min/m2 41 % 0.67       Patient Vitals for the past 12 hrs:   NAKIA CVP (Mean)   11/18/24 0615 2.86 7 mmHg   11/18/24 0600 4.75 4 mmHg   11/18/24 0545 6.67 3 mmHg   11/18/24 0530 7 4 mmHg   11/18/24 0500 3 8 mmHg   11/18/24 0445 2.36 11 mmHg   11/18/24 0400 2.86 7 mmHg   11/18/24 0300 2.8 5 mmHg   11/18/24 0200 3.29 7 mmHg   11/18/24 0100 4.33 6 mmHg   11/18/24 0045 6 5 mmHg   11/18/24 0030 7 4 mmHg   11/18/24 0015 6 4 mmHg   11/18/24 0000 4 8 mmHg   11/17/24 2345 2.42 12 mmHg   11/17/24 2330 4.43 7 mmHg   11/17/24 2315 3.11 9 mmHg   11/17/24 2300 2.17 12 mmHg   11/17/24 2245 4.75 8 mmHg   11/17/24 2230 3.86 7 mmHg   11/17/24 2215 3.5 8 mmHg   11/17/24 2200 4.5 8 mmHg   11/17/24 2145 7 5 mmHg   11/17/24 2130 5.67 6 mmHg   11/17/24 2115 5.33 6 mmHg   11/17/24 2100 4.33 9 mmHg   11/17/24 2045 4.86 7 mmHg   11/17/24 2030 6.17 6 mmHg   11/17/24 2015 5.83 6 mmHg   11/17/24 2000 6.33 6 mmHg   11/17/24 1945 6.17 6 
NAME:  Ad Lacey  YOB: 1950  MEDICAL RECORD NUMBER:  [id number]    Shift Summary: Echo completed Anne aware plan on 5.5 upgrade in am,  ABT  changed by critical care, no adverse reaction noted     Family updated: Yes:  Son At bedside     Rhythm: Normal Sinus Rhythm     Most recent vitals:   Visit Vitals  /69   Pulse 73   Temp 98 °F (36.7 °C)   Resp 22   Ht 1.727 m (5' 8\")   Wt 83.5 kg (184 lb)   SpO2 91%   BMI 27.98 kg/m²      ABP (Arterial line BP): 149/75  ABP Mean (Arterial Line Mean): 97 mmHg    Patient Vitals for the past 12 hrs:   CO (l/min) CI (l/min/m2) SVO2 (%)  SV (ml)   11/11/24 1900 6.4 l/min 3.2 l/min/m2 55 % 1.38 85 ml   11/11/24 1200 4.6 l/min 2.3 l/min/m2 50 % 0.97 64 ml       Patient Vitals for the past 12 hrs:   NAKIA CVP (Mean)   11/11/24 1900 3.29 7 mmHg   11/11/24 1800 2 9 mmHg   11/11/24 1700 1.6 10 mmHg   11/11/24 1600 2.75 8 mmHg   11/11/24 1500 2.22 9 mmHg   11/11/24 1400 2.57 7 mmHg   11/11/24 1300 2 10 mmHg   11/11/24 1215 2.56 9 mmHg   11/11/24 1200 1.6 15 mmHg   11/11/24 1145 1.64 14 mmHg   11/11/24 1100 2.1 10 mmHg   11/11/24 1000 2 12 mmHg   11/11/24 0900 2.1 10 mmHg   11/11/24 0800 2 11 mmHg         Respiratory support needed (if any):  - O2 - NC - 2 lpm    Admission weight Weight - Scale: 87.9 kg (193 lb 12.6 oz) (11/09/24 2044)    Today's weight    Wt Readings from Last 1 Encounters:   11/11/24 83.5 kg (184 lb)        Silveira need assessed each shift: YES -  - continue silveira r/t - fluid volume management of critically ill.   UOP >30ml/hr: YES  Last documented BM (in last 48 hrs):  No data found.             Restraints (in use currently or dc'd in last 12 hrs): No    Order current and documentation up to date? NA    Lines/Drains reviewed @ bedside.  PICC 11/11/24 Left Basilic (Active)   Number of days: 0       Pulmonary Artery Cath  11/10/24 Left Femoral (Active)   Number of days: 1       Peripheral IV 11/09/24 Right Antecubital (Active)   Number of 
NAME:  Ad Lacey  YOB: 1950  MEDICAL RECORD NUMBER:  [id number]    Shift Summary: Hemodynamics have remained stable. No Impella alarms throughout shift. Nitroglycerin weaned off towards end of shift. Pain meds readjusted - patient reports slightly better pain control. Precedex also able to be weaned a little. The patient did not eat much of his clear liquid diet, stating that he does not have an appetite. He also received zofran once in the afternoon for nausea which helped.    Family updated: Yes:  at bedside    Rhythm: Normal Sinus Rhythm     Most recent vitals:   Visit Vitals  BP (!) 105/57   Pulse 57   Temp 97.6 °F (36.4 °C) (Oral)   Resp 14   Ht 1.727 m (5' 8\")   Wt 76.7 kg (169 lb 1.5 oz)   SpO2 94%   BMI 25.71 kg/m²      ABP (Arterial line BP): 96/54  ABP Mean (Arterial Line Mean): 68 mmHg    Patient Vitals for the past 12 hrs:   CO (l/min) CI (l/min/m2) SVO2 (%)    11/16/24 1550 4.7 l/min 2.5 l/min/m2 47 % 0.71   11/16/24 1135 4.3 l/min 2.2 l/min/m2 46 % 0.68   11/16/24 0545 4.4 l/min 2.3 l/min/m2 -- 0.72       Patient Vitals for the past 12 hrs:   NAKIA CVP (Mean)   11/16/24 1715 4.14 7 mmHg   11/16/24 1700 4 7 mmHg   11/16/24 1645 3.86 7 mmHg   11/16/24 1630 3.25 8 mmHg   11/16/24 1615 3.13 8 mmHg   11/16/24 1600 4 5 mmHg   11/16/24 1550 5.6 5 mmHg   11/16/24 1545 4.6 5 mmHg   11/16/24 1530 2.75 8 mmHg   11/16/24 1515 4.2 5 mmHg   11/16/24 1500 3 7 mmHg   11/16/24 1445 3.29 7 mmHg   11/16/24 1430 3.83 6 mmHg   11/16/24 1415 3.5 6 mmHg   11/16/24 1400 2.38 8 mmHg   11/16/24 1345 3 5 mmHg   11/16/24 1330 2.09 11 mmHg   11/16/24 1315 5 6 mmHg   11/16/24 1300 4 8 mmHg   11/16/24 1245 2.78 9 mmHg   11/16/24 1230 3.13 8 mmHg   11/16/24 1215 2.63 8 mmHg   11/16/24 1200 2.44 9 mmHg   11/16/24 1145 3.25 8 mmHg   11/16/24 1135 2.56 9 mmHg   11/16/24 1130 2.88 8 mmHg   11/16/24 1115 2.86 7 mmHg   11/16/24 1100 2.11 9 mmHg   11/16/24 1045 2.5 8 mmHg   11/16/24 1030 2 9 mmHg   11/16/24 1015 
NAME:  dA Lacey  YOB: 1950  MEDICAL RECORD NUMBER:  [id number]    Shift Summary: Pt admitted from cath lab s/p vfib arrest/STEMI. 100% LAD. DESx1. Impella placed. Issues with suction initially. Boluses given. Difficult to sedate, awake and following all commands. Pulm edema and EDP 19. 40mg lasix given and dumped 2.4L urine overnight. Plan to repeat echo Monday and decide if needs a 5.5 or work to explant.     Family updated: Yes:  Son Jacobo updated by Dr. Rodríguez post cath    Rhythm: Normal Sinus Rhythm  Frequent ectopy, NSVT.    Most recent vitals:   Visit Vitals  BP 95/80   Pulse 85   Temp 98.6 °F (37 °C) (Axillary)   Resp 16   Ht 1.727 m (5' 8\")   Wt 82.3 kg (181 lb 7 oz)   SpO2 99%   BMI 27.59 kg/m²           Patient Vitals for the past 12 hrs:   CO (l/min) CI (l/min/m2) SVO2 (%)    11/10/24 0515 3.5 l/min 1.8 l/min/m2 56.2 % 0.66   11/09/24 2330 2.3 l/min 1.1 l/min/m2 42.6 % 0.45       Patient Vitals for the past 12 hrs:   NAKIA CVP (Mean)   11/10/24 0630 2.86 7 mmHg   11/10/24 0615 2.86 7 mmHg   11/10/24 0600 3 7 mmHg   11/10/24 0545 2.63 8 mmHg   11/10/24 0530 2.5 8 mmHg   11/10/24 0515 2.71 7 mmHg   11/10/24 0500 2.57 7 mmHg   11/10/24 0445 3.17 6 mmHg   11/10/24 0430 3.33 6 mmHg   11/10/24 0415 2.86 7 mmHg   11/10/24 0400 4 6 mmHg   11/10/24 0345 1.92 12 mmHg   11/10/24 0330 2.33 9 mmHg   11/10/24 0315 2.2 10 mmHg   11/10/24 0300 2.3 10 mmHg   11/10/24 0245 2.1 10 mmHg   11/10/24 0230 2.1 10 mmHg   11/10/24 0215 1.9 10 mmHg   11/10/24 0200 2.3 10 mmHg   11/10/24 0145 1.92 12 mmHg   11/10/24 0130 2.17 12 mmHg   11/10/24 0115 2.17 12 mmHg   11/10/24 0100 1.4 15 mmHg   11/10/24 0045 1.62 13 mmHg   11/10/24 0030 1.82 11 mmHg   11/10/24 0015 1.55 11 mmHg   11/10/24 0000 1.44 16 mmHg   11/09/24 2345 1.56 16 mmHg   11/09/24 2330 1.17 12 mmHg   11/09/24 2315 2 10 mmHg   11/09/24 2300 2.5 8 mmHg         Respiratory support needed (if any):  - Ventilator Settings:  Vent Days 1    Vt (Set, 
NG clamped for 3 hours. Pt became nauseous after approx 120ml water in that timeframe. Placed NG back to CLWS. Minimal NG output for the shift however.  
NGT removed, pt tolerated trial clears with SLP.   
Notified Dr. Lopez of [place] waveform changing and significant changes in pressures.  [place] currently at 60cm and was advanced to 69cm by Dr. Lopez.  CXR ordered to confirm proper placement.   
Notified MD of recent mixed venous results.   
Occupational Therapy  [address]  Occupational Therapy Daily Treatment    Name: Ad Lacey  : 1950  MRN: [id number]  Date of Service: 2024    Discharge Recommendations:  Patient would benefit from continued therapy after discharge, 5-7 sessions per week  OT Equipment Recommendations  Other: defer to d/c facility  Ad Lacey scored a 14/24 on the AM-PAC ADL Inpatient form. Current research shows that an AM-PAC score of 17 or less is typically not associated with a discharge to the patient's home setting. Based on the patient's AM-PAC score and their current ADL deficits, it is recommended that the patient have 5-7 sessions per week of Occupational Therapy at d/c to increase the patient's independence.  At this time, this patient demonstrates complex nursing, medical, and rehabilitative needs, and would benefit from intensive rehabilitation services upon discharge from the Inpatient setting.  This patient demonstrates the ability to participate in and benefit from an intensive therapy program with a coordinated interdisciplinary team approach to foster frequent, structured, and documented communication among disciplines, who will work together to establish, prioritize, and achieve treatment goals. Please see assessment section for further patient specific details.    If patient discharges prior to next session this note will serve as a discharge summary.  Please see below for the latest assessment towards goals.       Patient Diagnosis(es): The primary encounter diagnosis was Acute coronary syndrome (HCC). Diagnoses of STEMI (ST elevation myocardial infarction) (HCC), ST elevation myocardial infarction (STEMI), unspecified artery (HCC), Cardiopulmonary arrest, Cardiogenic shock, CAD (coronary artery disease), Congestive heart failure (HCC), ST elevation myocardial infarction (STEMI) involving other coronary artery (HCC), and Shortness of breath were also pertinent to 
Occupational Therapy  [address]  Occupational Therapy Initial Assessment    Name: Ad Lacey  : 1950  MRN: [id number]  Date of Service: 11/15/2024    Discharge Recommendations:  Patient would benefit from continued therapy after discharge, 5-7 sessions per week  OT Equipment Recommendations  Equipment Needed: No  Other: defer to d/c facility  Ad Lacey scored a 10/24 on the AM-PAC ADL Inpatient form. Current research shows that an AM-PAC score of 17 or less is typically not associated with a discharge to the patient's home setting. Based on the patient's AM-PAC score and their current ADL deficits, it is recommended that the patient have 5-7 sessions per week of Occupational Therapy at d/c to increase the patient's independence.  At this time, this patient demonstrates complex nursing, medical, and rehabilitative needs, and would benefit from intensive rehabilitation services upon discharge from the Inpatient setting.  This patient demonstrates the ability to participate in and benefit from an intensive therapy program with a coordinated interdisciplinary team approach to foster frequent, structured, and documented communication among disciplines, who will work together to establish, prioritize, and achieve treatment goals. Please see assessment section for further patient specific details.    If patient discharges prior to next session this note will serve as a discharge summary.  Please see below for the latest assessment towards goals.       Patient Diagnosis(es): The primary encounter diagnosis was Acute coronary syndrome (HCC). Diagnoses of STEMI (ST elevation myocardial infarction) (HCC), ST elevation myocardial infarction (STEMI), unspecified artery (HCC), Cardiopulmonary arrest, Cardiogenic shock, CAD (coronary artery disease), Congestive heart failure (HCC), ST elevation myocardial infarction (STEMI) involving other coronary artery (HCC), and Shortness of 
Per order, heparin to be restarted @ 2000. Clarified with pharmacy that heparin should be started @ current rate of 1400units/hr. Per pharmacy, rate should be 1450units/hr. Order updated.   
Physical Therapy  Facility/Department: [address] [facility]  Physical Therapy Treatment Note    Name: Ad Lacey  : 1950  MRN: [id number]  Date of Service: 2024    Discharge Recommendations:  Continue to assess pending progress, IP Rehab   PT Equipment Recommendations  Other: Will monitor for potential equipt needs.      Ad Lacey scored a 15/24 on the AM-PAC short mobility form. Current research shows that an AM-PAC score of 17 or less is typically not associated with a discharge to the patient's home setting. Based on the patient's AM-PAC score and their current functional mobility deficits, it is recommended that the patient have 5-7 sessions per week of Physical Therapy at d/c to increase the patient's independence.  At this time, this patient demonstrates complex nursing, medical, and rehabilitative needs, and would benefit from intensive rehabilitation services upon discharge from the Inpatient setting.  This patient demonstrates the ability to participate in and benefit from an intensive therapy program with a coordinated interdisciplinary team approach to foster frequent, structured, and documented communication among disciplines, who will work together to establish, prioritize, and achieve treatment goals. Please see assessment section for further patient specific details.    If patient discharges prior to next session this note will serve as a discharge summary.  Please see below for the latest assessment towards goals.      Assessment  Body Structures, Functions, Activity Limitations Requiring Skilled Therapeutic Intervention: Decreased functional mobility ;Decreased strength;Decreased safe awareness;Decreased cognition;Decreased endurance;Decreased balance  Assessment: 73 y/o male admit 2024 with Cardiopulm Arrest, Cardiogenic Shock, STEMI, Ischemic Cardiomyopathy.  - 11/10/2024 Pt Intubated.2024 Cardiac Cath : Severe LV Dysfunction : EF <20%, Impella placed; LAD 
Physical Therapy  Facility/Department: [address] CVICU  Physical Therapy Treatment Note    Name: Ad Lacey  : 1950  MRN: [id number]  Date of Service: 2024    Discharge Recommendations:  Continue to assess pending progress, IP Rehab   PT Equipment Recommendations  Other: Will monitor for potential equipt needs.      Ad Lacey scored a  on the AM-PAC short mobility form. Current research shows that an AM-PAC score of 17 or less is typically not associated with a discharge to the patient's home setting. Based on the patient's AM-PAC score and their current functional mobility deficits, it is recommended that the patient have 5-7 sessions per week of Physical Therapy at d/c to increase the patient's independence.  At this time, this patient demonstrates complex nursing, medical, and rehabilitative needs, and would benefit from intensive rehabilitation services upon discharge from the Inpatient setting.  This patient demonstrates the ability to participate in and benefit from an intensive therapy program with a coordinated interdisciplinary team approach to foster frequent, structured, and documented communication among disciplines, who will work together to establish, prioritize, and achieve treatment goals. Please see assessment section for further patient specific details.    If patient discharges prior to next session this note will serve as a discharge summary.  Please see below for the latest assessment towards goals.      Assessment  Body Structures, Functions, Activity Limitations Requiring Skilled Therapeutic Intervention: Decreased functional mobility ;Decreased strength;Decreased safe awareness;Decreased cognition;Decreased endurance;Decreased balance  Assessment: 75 y/o male admit 2024 with Cardiopulm Arrest, Cardiogenic Shock, STEMI, Ischemic Cardiomyopathy.  - 11/10/2024 Pt Intubated.2024 Cardiac Cath : Severe LV Dysfunction : EF <20%, Impella placed; LAD 
Physical Therapy  Facility/Department: [address] CVICU  Physical Therapy Treatment Note    Name: Ad Lacey  : 1950  MRN: [id number]  Date of Service: 2024    Discharge Recommendations:  Continue to assess pending progress, IP Rehab   PT Equipment Recommendations  Other: Will monitor for potential equipt needs.      Ad Lacey scored a  on the AM-PAC short mobility form. Current research shows that an AM-PAC score of 17 or less is typically not associated with a discharge to the patient's home setting. Based on the patient's AM-PAC score and their current functional mobility deficits, it is recommended that the patient have 5-7 sessions per week of Physical Therapy at d/c to increase the patient's independence.  At this time, this patient demonstrates complex nursing, medical, and rehabilitative needs, and would benefit from intensive rehabilitation services upon discharge from the Inpatient setting.  This patient demonstrates the ability to participate in and benefit from an intensive therapy program with a coordinated interdisciplinary team approach to foster frequent, structured, and documented communication among disciplines, who will work together to establish, prioritize, and achieve treatment goals. Please see assessment section for further patient specific details.    If patient discharges prior to next session this note will serve as a discharge summary.  Please see below for the latest assessment towards goals.      Assessment  Body Structures, Functions, Activity Limitations Requiring Skilled Therapeutic Intervention: Decreased functional mobility ;Decreased strength;Decreased safe awareness;Decreased cognition;Decreased endurance;Decreased balance  Assessment: 75 y/o male admit 2024 with Cardiopulm Arrest, Cardiogenic Shock, STEMI, Ischemic Cardiomyopathy.  - 11/10/2024 Pt Intubated.2024 Cardiac Cath : Severe LV Dysfunction : EF <20%, Impella placed; LAD 
Physical Therapy  Facility/Department: [address] CVICU  Physical Therapy Treatment Note    Name: Ad Lacey  : 1950  MRN: [id number]  Date of Service: 2024    Discharge Recommendations:  Continue to assess pending progress, IP Rehab   PT Equipment Recommendations  Other: Will monitor for potential equipt needs.      Ad Lacey scored a 15/24 on the AM-PAC short mobility form. Current research shows that an AM-PAC score of 17 or less is typically not associated with a discharge to the patient's home setting. Based on the patient's AM-PAC score and their current functional mobility deficits, it is recommended that the patient have 5-7 sessions per week of Physical Therapy at d/c to increase the patient's independence.  At this time, this patient demonstrates complex nursing, medical, and rehabilitative needs, and would benefit from intensive rehabilitation services upon discharge from the Inpatient setting.  This patient demonstrates the ability to participate in and benefit from an intensive therapy program with a coordinated interdisciplinary team approach to foster frequent, structured, and documented communication among disciplines, who will work together to establish, prioritize, and achieve treatment goals. Please see assessment section for further patient specific details.    If patient discharges prior to next session this note will serve as a discharge summary.  Please see below for the latest assessment towards goals.      Assessment  Body Structures, Functions, Activity Limitations Requiring Skilled Therapeutic Intervention: Decreased functional mobility ;Decreased strength;Decreased safe awareness;Decreased cognition;Decreased endurance;Decreased balance  Assessment: 75 y/o male admit 2024 with Cardiopulm Arrest, Cardiogenic Shock, STEMI, Ischemic Cardiomyopathy.  - 11/10/2024 Pt Intubated.2024 Cardiac Cath : Severe LV Dysfunction : EF <20%, Impella placed; LAD 
Physical Therapy  Pt off floor for Impella Explant.  Will cont current POC as approp.  Melvina Drew, PT    
Pt arrived to 1303 from Cath lab. Intubated/sedated. Impella CP in left fem @ 90cm, auto mode at this time. [place] cath in L fem @ 74. [place] cath in place draining almost colorless, clear urine. Aye Hughes and Dr. Rodríguez at bedside. NS and albumin bolus ordered d/t low CVP and suction alarms on CP when attempting to take out of auto mode. Able to drop to P-7 without suction. Pt following commands but restless and pulling at lines. Soft wrist restraints applied. Current plan is to attempt to increase P level on CP after boluses. Repeat echo Monday. If little improvement, possible 5.5 upgrade. Dr. Rodríguez spoke with son out in waiting room. No family at this time back to see patient.  
Pt having frequent suction alarms. P level changed to P4. 500 cc bolus added per PRN orders.     Electronically signed by Melina Galindo RN on 11/14/2024 at 4:10 PM    
Pt having intermittent pauses with 3 beat runs of vtach. Discussed with Dr. Rodríguez.   
Pt having intermittent suction alarms. P level turned down to P5.     Electronically signed by Melina Galindo RN on 11/14/2024 at 12:33 PM    
Pt remains anxious despite po ativan. RASS +1. 1 mg ativan IV ordered and given.   
Pt remains continues to remain agitated and restless. Ok to titrate sedation faster than recommended for patient safety per Dr. Rodríguez. [facility] nursing order placed. PRN versed ordered as well.   
Pt taken to CCL for LHC and placement of LIJ swan.     Electronically signed by Melina Galindo RN on 11/14/2024 at 4:01` PM    
Pt's MAP still greater than 80 with nitro being maxed. Left message with Dr. Rodríguez.    Received call back from Dr. Rodríguez. New orders for 1x dose of coreg and 1x dose of lasix.     Electronically signed by Melina Galindo RN on 11/14/2024 at 11:58 AM    
Pulmonary Progress Note    CC:  Follow up respiratory failure, v tach arrest    Subjective:  2 liters of oxygen  Diuresing well  Respiratory alkalosis   He says he is better  Moving his bowels     ROS  Vomiting has resolved  Having BMs  Breathing better         Intake/Output Summary (Last 24 hours) at 11/15/2024 0809  Last data filed at 11/15/2024 0700  Gross per 24 hour   Intake 3197.48 ml   Output 6210 ml   Net -3012.52 ml         PHYSICAL EXAM:  Blood pressure 124/73, pulse 68, temperature 98 °F (36.7 °C), temperature source Oral, resp. rate 14, height 1.727 m (5' 8\"), weight 78.2 kg (172 lb 6.4 oz), SpO2 98%.'  Gen: No distress. Anxious   Eyes: PERRL. No sclera icterus. No conjunctival injection.   ENT: No discharge. Pharynx with NG  Neck: Trachea midline. No obvious mass.    Resp: Rhonchi has improved   CV: Regular rate. Regular rhythm. No murmur or rub.    GI: slight distention, soft   Skin: Pale  Lymph: No cervical LAD. No supraclavicular LAD.   M/S: No cyanosis. No clubbing. No joint deformity.    Neuro: more talkative and alert  Ext:   + edema    Medications:    Scheduled Meds:   carvedilol  3.125 mg Oral BID     sacubitril-valsartan  1 tablet Oral BID    ticagrelor  90 mg Oral BID    cefepime  2,000 mg IntraVENous Q8H    lactobacillus  2 capsule Oral BID     sodium chloride flush  5-40 mL IntraVENous 2 times per day    insulin lispro  0-16 Units SubCUTAneous Q4H    aspirin  81 mg Oral Daily    lidocaine  1 patch TransDERmal Q24H    atorvastatin  80 mg Oral Nightly       Continuous Infusions:   dexmedeTOMIDine 0.6 mcg/kg/hr (11/15/24 0608)    sodium chloride      sodium bicarbonate 12.5 mEq in dextrose 5 % 500 mL infusion (FOR IMPELLA PURGE)      heparin (PORCINE) Infusion 1,750 Units/hr (11/15/24 0657)    nitroPRUSSide (NIPRIDE) 50 mg in sodium chloride 0.9 % 250 mL infusion Stopped (11/13/24 1401)    sodium chloride Stopped (11/15/24 0727)    nitroGLYCERIN 130 mcg/min (11/15/24 0612)    dextrose   
Pulmonary Progress Note    CC:  Follow up respiratory failure, v tach arrest    Subjective:  2 liters of oxygen  Off precedex   Hb low again  Poor appetite but doing ok with clear liquid    ROS  Tolerating clears  Shoulder is bothering him        Intake/Output Summary (Last 24 hours) at 11/17/2024 0505  Last data filed at 11/17/2024 0200  Gross per 24 hour   Intake 2080.18 ml   Output 840 ml   Net 1240.18 ml         PHYSICAL EXAM:  Blood pressure (!) 94/57, pulse 58, temperature 97.3 °F (36.3 °C), resp. rate 15, height 1.727 m (5' 8\"), weight 76.7 kg (169 lb 1.5 oz), SpO2 96%.'  Gen: No distress. Chronically ill   Eyes: PERRL. No sclera icterus. No conjunctival injection.   ENT: No discharge. Pharynx clear  Neck: Trachea midline. No obvious mass.    Resp: Diminished   CV: Regular rate. Regular rhythm. No murmur or rub.    GI: slight distention, soft   Skin: Pale  Lymph: No cervical LAD. No supraclavicular LAD.   M/S: No cyanosis. No clubbing. No joint deformity.    Neuro: awake and alert, calmer    Ext:   + edema    Medications:    Scheduled Meds:   polyethylene glycol  17 g Oral BID    pantoprazole (PROTONIX) 40 mg in sodium chloride (PF) 0.9 % 10 mL injection  40 mg IntraVENous Q12H    spironolactone  25 mg Oral Daily    carvedilol  6.25 mg Oral BID     sacubitril-valsartan  1 tablet Oral BID    [Held by provider] ticagrelor  90 mg Oral BID    cefepime  2,000 mg IntraVENous Q8H    lactobacillus  2 capsule Oral BID     sodium chloride flush  5-40 mL IntraVENous 2 times per day    insulin lispro  0-16 Units SubCUTAneous Q4H    aspirin  81 mg Oral Daily    lidocaine  1 patch TransDERmal Q24H    atorvastatin  80 mg Oral Nightly       Continuous Infusions:   dexmedeTOMIDine 0.2 mcg/kg/hr (11/17/24 0030)    sodium chloride      sodium bicarbonate 12.5 mEq in dextrose 5 % 500 mL infusion (FOR IMPELLA PURGE)      heparin (PORCINE) Infusion 1,150 Units/hr (11/16/24 1910)    nitroPRUSSide (NIPRIDE) 50 mg in sodium 
Pulmonary Progress Note    CC:  Follow up respiratory failure, v tach arrest    Subjective:  2 liters of oxygen  Still on precedex  Less alkalosis   Hb dropped to 6.6   KUB showed no dilated loops of bowel   NG tube removed     ROS  Tolerating clears  More pain today         Intake/Output Summary (Last 24 hours) at 11/16/2024 0534  Last data filed at 11/16/2024 0515  Gross per 24 hour   Intake 2006.93 ml   Output 3275 ml   Net -1268.07 ml         PHYSICAL EXAM:  Blood pressure (!) 97/55, pulse 60, temperature 98.2 °F (36.8 °C), temperature source Axillary, resp. rate 16, height 1.727 m (5' 8\"), weight 78.2 kg (172 lb 6.4 oz), SpO2 98%.'  Gen: No distress.   Eyes: PERRL. No sclera icterus. No conjunctival injection.   ENT: No discharge. Pharynx clear  Neck: Trachea midline. No obvious mass.    Resp: Diminished   CV: Regular rate. Regular rhythm. No murmur or rub.    GI: slight distention, soft   Skin: Pale  Lymph: No cervical LAD. No supraclavicular LAD.   M/S: No cyanosis. No clubbing. No joint deformity.    Neuro: awake and alert, less tachypnea   Ext:   + edema    Medications:    Scheduled Meds:   pantoprazole (PROTONIX) 40 mg in sodium chloride (PF) 0.9 % 10 mL injection  40 mg IntraVENous Q12H    spironolactone  25 mg Oral Daily    carvedilol  6.25 mg Oral BID     sacubitril-valsartan  1 tablet Oral BID    ticagrelor  90 mg Oral BID    cefepime  2,000 mg IntraVENous Q8H    lactobacillus  2 capsule Oral BID     sodium chloride flush  5-40 mL IntraVENous 2 times per day    insulin lispro  0-16 Units SubCUTAneous Q4H    aspirin  81 mg Oral Daily    lidocaine  1 patch TransDERmal Q24H    atorvastatin  80 mg Oral Nightly       Continuous Infusions:   dexmedeTOMIDine 0.8 mcg/kg/hr (11/16/24 0034)    sodium chloride      sodium bicarbonate 12.5 mEq in dextrose 5 % 500 mL infusion (FOR IMPELLA PURGE)      heparin (PORCINE) Infusion 1,500 Units/hr (11/16/24 0311)    nitroPRUSSide (NIPRIDE) 50 mg in sodium chloride 
Pulmonary Progress Note    CC:  Follow up respiratory failure, v tach arrest    Subjective:  Extubated yesterday   On precedex infusion   He says his breathing is ok but mouth is very dry  He says he does not have chronic breathing issues    ROS  Dry mouth  No SOB        Intake/Output Summary (Last 24 hours) at 11/11/2024 1224  Last data filed at 11/11/2024 0700  Gross per 24 hour   Intake 1230.76 ml   Output 1002 ml   Net 228.76 ml         PHYSICAL EXAM:  Blood pressure (!) 88/77, pulse 59, temperature 98 °F (36.7 °C), resp. rate 19, height 1.727 m (5' 8\"), weight 83.5 kg (184 lb), SpO2 92%.'  Gen: No distress. Flat affect, pale   Eyes: PERRL. No sclera icterus. No conjunctival injection.   ENT: No discharge. Pharynx clear. External appearance of ears and nose normal.  Neck: Trachea midline. No obvious mass.    Resp: Diminished   CV: Regular rate. Regular rhythm. No murmur or rub.    GI: Non-tender. Non-distended. No hernia.   Skin: Pale  Lymph: No cervical LAD. No supraclavicular LAD.   M/S: No cyanosis. No clubbing. No joint deformity.    Neuro: Moves all four extremities. CN 2-12 tested, no defect noted.  Ext:   + edema    Medications:    Scheduled Meds:   meropenem  1,000 mg IntraVENous Q8H    lactobacillus  2 capsule Oral BID WC    insulin lispro  0-16 Units SubCUTAneous Q4H    aspirin  81 mg Oral Daily    lidocaine  1 patch TransDERmal Q24H    sodium chloride flush  5-40 mL IntraVENous 2 times per day    [Held by provider] ticagrelor  90 mg Orogastric BID    atorvastatin  80 mg Oral Nightly       Continuous Infusions:   nitroGLYCERIN 20 mcg/min (11/11/24 0700)    eptifibatide 2 mcg/kg/min (11/11/24 0906)    sodium chloride Stopped (11/10/24 1422)    dexmedeTOMIDine 0.8 mcg/kg/hr (11/11/24 0700)    sodium chloride 10 mL/hr at 11/10/24 0847    sodium bicarbonate 12.5 mEq in dextrose 5 % 500 mL infusion (FOR IMPELLA PURGE)      dextrose      heparin (PORCINE) Infusion 1,200 Units/hr (11/11/24 0700)       PRN 
RN to bedside.  Patient projectile vomited x3 this morning after receiving PO medications.  Bowel sounds hypoactive and abdomen distended.  Dr. Jain verbal order to place NG tube.  Care ongoing by RN  
Routine FYI sent to Dr. Dee via [facility] for critcal care consult.  
SLP NOTE    Chart reviewed and patient discussed with RN. Patient NPO for possible explant later this date. ST to hold at this time with plan to re-attempt at a later date unless otherwise notified. RN reports good tolerance (but poor intake) of PO diet since PO resumed.    Thank you.  Debby Moulton MA, CCC-SLP, #[id number]  Speech-Language Pathologist  Portable phone: [phone]   
SLP NOTE    Chart reviewed and patient discussed with RN. Patient with plan for cath lab procedure mid-day this date. ST to hold this date with plan to re-assess 11/13/2024 AM unless otherwise notified.    Thank you.  Debby Moulton MA, CCC-SLP, #[id number]  Speech-Language Pathologist  Portable phone: [phone]   
SLP NOTE    Dysphagia follow up attempted. Patient unable to tolerate session at this time d/t current nursing care/needs at this time. ST to re-attempt as schedule permits at a later date unless otherwise notified.    Thank you.  Debby Moulton MA, CCC-SLP, #[id number]  Speech-Language Pathologist  Portable phone: [phone]   
SLP NOTE    Patient NPO for procedure planned for later this date. ST to re-attempt as schedule permits at a later date unless otherwise notified.    Thank you.  Debby Moulton MA, CCC-SLP, #[id number]  Speech-Language Pathologist  Portable phone: [phone]   
SLP NOTE    Patient discussed with RN. Patient continues with NGT in place for decompression. ST to hold this date unless otherwise notified with plan to re-attempt 11/14/2024 pending med status.    Thank you.  Debby Moulton MA, CCC-SLP, #[id number]  Speech-Language Pathologist  Portable phone: [phone]   
SLP NOTE    Patient discussed with RN. Patient with projectile vomiting this AM and now with NGT in place for decompression. ST to hold this date unless otherwise notified with plan to re-attempt 11/14/2024 pending status.    Thank you.  Debby Moulton MA, CCC-SLP, #[id number]  Speech-Language Pathologist  Portable phone: [phone]   
Since shift change this patient's pain experience has not been able to be well controlled. Despite receiving Dilaudid 1mg every 2 hours, and percocet x2 & Hydroxyzine, patient continues to be fixated on his pain experience, restless & uncomfortable. He frequently says things like \"I'm not a dope fiend man, I just need more to be comfortable\", and \"I'm not trying to be high, I just have a really high tolerance for narcotics\". He told this RN he has the phone numbers of over 20 doctors stored in his phone and no-one will prescribe him the amount he needs. He also told this RN that he would frequently take more than was prescribed and his prescriptions ran out before they were due to. We talked about development of his tolerance being due to him taking the proscribed narcotics outside of the prescription parameters, and he agrees. This RN explained to him that the doctors do not prescribe dose & timing based on patient tolerance to certain medications, it is base on therapeutic recommendations. He also agreed with this concept. The described behavior here has been ongoing in various degrees of persistence for the past 3 nights. The complicating factor tonight is that his blood pressure is continuously above goal despite having his BB dose increased, and the Entresto dose doubled today, and he is now also on high a dose Nitroglycerin infusion to try to achieve a MAP <80. With all of these considerations the clinical decision was made to restart the Precedex gtt to try to help him relax, get some relief & rest tonight.  
With the restarting of low dose Precedex, patient appears more comfortable, pain experience is improving, and Nitroglycerin gtt is weaning down quickly to achieve & maintain MAP goals.  
(PORCINE) Infusion 1,200 Units/hr (24 0639)       PRN Meds:  sodium chloride, sodium chloride, hydrOXYzine HCl, sodium chloride flush, sodium chloride, sodium chloride, HYDROmorphone, morphine, potassium chloride **OR** potassium alternative oral replacement, potassium chloride, acetaminophen, glucose, dextrose bolus **OR** dextrose bolus, glucagon (rDNA), dextrose    Labs:  CBC:   Recent Labs     11/10/24  0402 11/10/24  1119 24  0400 24  1154 24  0010 24  0402   WBC 12.2*  --  6.7  --   --  6.3   HGB 10.6*   < > 7.5* 7.1* 5.9* 7.2*   HCT 31.4*  --  22.3*  --  17.4* 20.7*   MCV 85.0  --  85.0  --   --  83.2     --  260  --   --  186    < > = values in this interval not displayed.     BMP:   Recent Labs     11/10/24  1130 24  0400 24  0630    138 133*   K 3.8 3.5 3.5    106 105   CO2 25 23 19*   PHOS  --  2.6 2.3*   BUN 14 13 12   CREATININE 0.9 0.8 0.8     LIVER PROFILE:   Recent Labs     11/10/24  0402 24  0400 24  0630   * 129* 72*   ALT 33 26 18   BILIDIR 0.2  --   --    BILITOT 0.8 1.0 1.2*   ALKPHOS 78 61 55     PT/INR: No results for input(s): \"PROTIME\", \"INR\" in the last 72 hours.  APTT:   Recent Labs     24  2044   APTT 23.3     UA:No results for input(s): \"NITRITE\", \"COLORU\", \"PHUR\", \"LABCAST\", \"WBCUA\", \"RBCUA\", \"MUCUS\", \"TRICHOMONAS\", \"YEAST\", \"BACTERIA\", \"CLARITYU\", \"SPECGRAV\", \"LEUKOCYTESUR\", \"UROBILINOGEN\", \"BILIRUBINUR\", \"BLOODU\", \"GLUCOSEU\", \"AMORPHOUS\" in the last 72 hours.    Invalid input(s): \"KETONESU\"  No results for input(s): \"PH\", \"PCO2\", \"PO2\" in the last 72 hours.        Films:  Chest imaging reports were reviewed and imaging was reviewed by me and showed perihilar airspace disease     AB.    Cultures:  Pneumonia panel:  pseudomonas, klebsiella pneumoniae and aerogenes    I reviewed the labs and images listed above    Assessment/Plan:   Cardiopulmonary arrest   S/p extubation  Titrate oxygen 
scale  Current BMI (kg/m2): 26.4                             BMI Categories: Overweight (BMI 25.0-29.9)    Estimated Daily Nutrient Needs:  Energy Requirements Based On: Kcal/kg  Weight Used for Energy Requirements: Current  Energy (kcal/day): 1729-1965c(22-25 kcal/78.6 kg)  Weight Used for Protein Requirements: Ideal  Protein (g/day): 84-91 (1.2-1.3 g/70 kg)  Method Used for Fluid Requirements: 1 ml/kcal  Fluid (ml/day):      Nutrition Diagnosis:   Increased nutrient needs related to increase demand for energy/nutrients as evidenced by  (extended LOS increasing risk of skin breakdown and muscle loss)  Inadequate protein-energy intake related to lack or limited access to food as evidenced by NPO or clear liquid status due to medical condition    Nutrition Interventions:   Food and/or Nutrient Delivery: Start Oral Nutrition Supplement, Continue Current Diet (when medically appropriate)  Nutrition Education/Counseling:  (monitor need)  Coordination of Nutrition Care: Continue to monitor while inpatient       Goals:  Goals: PO intake 50% or greater          Nutrition Monitoring and Evaluation:      Food/Nutrient Intake Outcomes: Diet Advancement/Tolerance, Food and Nutrient Intake  Physical Signs/Symptoms Outcomes: Nutrition Focused Physical Findings, Biochemical Data, Weight    Discharge Planning:    No discharge needs at this time     SHYLA JUSTICE RD, LD  Contact: Office: [phone]; [place]: [id number]      
    LIVR:   Recent Labs     11/11/24  0400 11/12/24  0630   * 72*   ALT 26 18     PT/INR: No results for input(s): \"INR\" in the last 72 hours.    Invalid input(s): \"PROT\"  APTT: No results for input(s): \"APTT\" in the last 72 hours.  ABG:   Recent Labs     11/13/24  0540 11/13/24  0632   PHART 7.518* 7.499*   [id number] 33.2* 34.1*   PO2ART 47.5* 50.5*       Any consults during the shift? No    Any signed and held orders to be released?  No        4 Eyes Skin Assessment       The patient is being assessed for  Shift Handoff    I agree that at least one RN has performed a thorough Head to Toe Skin Assessment on the patient. ALL assessment sites listed below have been assessed.      Areas assessed by both nurses: Head, Face, Ears, Shoulders, Back, Chest, Arms, Elbows, Hands, Sacrum. Buttock, Coccyx, Ischium, Legs. Feet and Heels, and Under Medical Devices         Does the Patient have a Wound? No noted wound(s)    Wound Care Orders initiated by RN: No       Jalen Prevention initiated by RN: Yes    Pressure Injury (Stage 3,4, Unstageable, DTI, NWPT, and Complex wounds) if present, place Wound referral order by RN under : No    New Ostomies, if present place, Ostomy referral order under : No     Nurse 1 eSignature: Electronically signed by Petra Erazo RN on 11/13/24 at 7:55 AM EST    **SHARE this note so that the co-signing nurse can place an eSignature**    Nurse 2 eSignature: Electronically signed by Aparna Webb RN on 11/13/24 at 1:42 PM EST   
CBC:   Recent Labs     11/15/24  1224 11/15/24  1405 11/16/24  0505   WBC 6.9  --  6.6   HGB 7.7* 6.6* 7.4*   HCT 22.5*  --  21.0*   MCV 83.8  --  83.3     --  163     BMP:    Recent Labs     11/15/24  0405 11/15/24  1224    137   K 3.7 3.3*   CO2 21 19*   BUN 15 16   CREATININE 0.7* 0.8     LIVR:   Recent Labs     11/15/24  0405 11/15/24  1224   AST 41* 49*   ALT 27 32     PT/INR: No results for input(s): \"INR\" in the last 72 hours.    Invalid input(s): \"PROT\"  APTT: No results for input(s): \"APTT\" in the last 72 hours.  ABG:   Recent Labs     11/15/24  0502 11/15/24  0954   PHART 7.568* 7.535*   LQS1URM 21.8* 25.0*   PO2ART 67.8* 59.5*       Any consults during the shift? No    Any signed and held orders to be released?  No        4 Eyes Skin Assessment       The patient is being assessed for  Shift Handoff    I agree that at least one RN has performed a thorough Head to Toe Skin Assessment on the patient. ALL assessment sites listed below have been assessed.      Areas assessed by both nurses: Head, Face, Ears, Shoulders, Back, Chest, Arms, Elbows, Hands, Sacrum. Buttock, Coccyx, Ischium, Legs. Feet and Heels, and Under Medical Devices         Does the Patient have a Wound? No noted wound(s)    Wound Care Orders initiated by RN: No       Jalen Prevention initiated by RN: Yes    Pressure Injury (Stage 3,4, Unstageable, DTI, NWPT, and Complex wounds) if present, place Wound referral order by RN under : No    New Ostomies, if present place, Ostomy referral order under : No     Nurse 1 eSignature: Electronically signed by Dylan Spears RN on 11/16/24 at 5:59 AM EST    **SHARE this note so that the co-signing nurse can place an eSignature**    Nurse 2 eSignature: Electronically signed by Ann Caballero RN on 11/16/24 at 7:29 AM EST           
\"PROT\"  APTT:   Recent Labs     11/09/24 2044   APTT 23.3     ABG:   Recent Labs     11/10/24  1435   PHART 7.463*   [id number] 36.6   PO2ART 71.7*       Any consults during the shift? No    Any signed and held orders to be released?  No        4 Eyes Skin Assessment       The patient is being assessed for  Shift Handoff    I agree that at least one RN has performed a thorough Head to Toe Skin Assessment on the patient. ALL assessment sites listed below have been assessed.      Areas assessed by both nurses: Head, Face, Ears, Shoulders, Back, Chest, Arms, Elbows, Hands, Sacrum. Buttock, Coccyx, Ischium, Legs. Feet and Heels, and Under Medical Devices         Does the Patient have a Wound? No noted wound(s)    Wound Care Orders initiated by RN: No       Jalen Prevention initiated by RN: Yes    Pressure Injury (Stage 3,4, Unstageable, DTI, NWPT, and Complex wounds) if present, place Wound referral order by RN under : No    New Ostomies, if present place, Ostomy referral order under : No     Nurse 1 eSignature: Electronically signed by Nory Saavedra RN on 11/10/24 at 7:33 PM EST    **SHARE this note so that the co-signing nurse can place an eSignature**    Nurse 2 eSignature: Electronically signed by Dylan Spears RN on 11/10/24 at 9:21 PM EST         
chloride 0.9 % 250 mL infusion Stopped (11/13/24 1401)    sodium chloride Stopped (11/15/24 0357)    nitroGLYCERIN 130 mcg/min (11/17/24 1901)    dextrose         Lab Data:   CBC:   Recent Labs     11/16/24  0505 11/17/24  0410 11/17/24  1037   WBC 6.6 5.7  --    HGB 7.4* 6.8* 8.3*   HCT 21.0* 19.7* 24.2*   MCV 83.3 84.7  --     174  --      BMP:    Recent Labs     11/16/24  0505 11/17/24  0410    137   K 3.7 3.9   CO2 18* 19*   BUN 16 21*   CREATININE 0.7* 0.8     LIVR:   Recent Labs     11/16/24  0505 11/17/24  0410   AST 48* 36   ALT 43* 32     PT/INR: No results for input(s): \"INR\" in the last 72 hours.    Invalid input(s): \"PROT\"  APTT: No results for input(s): \"APTT\" in the last 72 hours.  ABG:   Recent Labs     11/15/24  0502 11/15/24  0954   PHART 7.568* 7.535*   [id number] 21.8* 25.0*   PO2ART 67.8* 59.5*       Any consults during the shift? No    Any signed and held orders to be released?  No        4 Eyes Skin Assessment       The patient is being assessed for  Shift Handoff    I agree that at least one RN has performed a thorough Head to Toe Skin Assessment on the patient. ALL assessment sites listed below have been assessed.      Areas assessed by both nurses: Head, Face, Ears, Shoulders, Back, Chest, Arms, Elbows, Hands, Sacrum. Buttock, Coccyx, Ischium, Legs. Feet and Heels, and Under Medical Devices         Does the Patient have a Wound? No noted wound(s)    Wound Care Orders initiated by RN: No       Jalen Prevention initiated by RN: Yes    Pressure Injury (Stage 3,4, Unstageable, DTI, NWPT, and Complex wounds) if present, place Wound referral order by RN under : No    New Ostomies, if present place, Ostomy referral order under : No     Nurse 1 eSignature: Electronically signed by Ann Caballero RN on 11/17/24 at 7:31 PM EST    **SHARE this note so that the co-signing nurse can place an eSignature**    Nurse 2 eSignature: Electronically signed by Dyaln Spears RN on 
past 24 hours  Likely aspirated/aspirating   NG tube to be placed today   Has been on antibiotics for several days  Needs NG tube to reduce risk for further aspiration and increasing oxygen demands   Continue with diuresis   STEMI with V fib   S/p stent  Heparin for impella  Impella upgraded.    Nitro/nipride for afterload reduction   Severe cardiomyopathy  Avoid precedex.  Currently off   Gram negative pneumonia (multiple organisms)  Change Merrem to cefepime based on sensitivities   Lactobacillus   Hemoptysis (resolved)  He received a bolus of heparin the other day  Anemia, likely blood loss  Will receive 4 total units of PRBC  Ileus   NPO  Recommend NG tube (loops of bowel distended on CXR)  Check lactic acid   Give suppository   Hypernatremia  Recheck labs       DVT prophylaxis  Heparin      Critical care time of 31 discontinuous minutes.  Critical care time is cumulative for the day.  This does not include procedural time.  Please see procedural notes for details.    DO [facility]    
room air, worsening in past 24 hours  CT imaging with pulmonary edema and underlying pneumonia.  Pneumonia was already being treated  Needs diuresis if able too   STEMI with V fib   S/p stent  Heparin for impella  Impella upgraded.    Nitro/nipride for afterload reduction   Severe cardiomyopathy  Avoid precedex due to cardiac suppression and bradycardia   Needs diuresis due to continued oxygen requirements but unable to diuresis due to suction alarms   Gram negative pneumonia (multiple organisms)  Cefepime   Lactobacillus   Emphysema  Add PRN albuterol nebs   Hemoptysis (resolved)  He received a bolus of heparin the other day  Anemia, likely blood loss  Total of 4 units of PRBC   Ileus   NPO  NG to LIS  Hypernatremia, resolved   Hypokalemia       DVT prophylaxis  Heparin    D/W Dr Rodríguez    Critical care time of 31 discontinuous minutes.  Critical care time is cumulative for the day.  This does not include procedural time.  Please see procedural notes for details.    DO [facility]    
wound(s)    Wound Care Orders initiated by RN: No       Jalen Prevention initiated by RN: Yes    Pressure Injury (Stage 3,4, Unstageable, DTI, NWPT, and Complex wounds) if present, place Wound referral order by RN under : No    New Ostomies, if present place, Ostomy referral order under : No     Nurse 1 eSignature: Electronically signed by Dylan Spears RN on 11/11/24 at 6:21 AM EST    **SHARE this note so that the co-signing nurse can place an eSignature**    Nurse 2 eSignature: Electronically signed by Nory Saavedra RN on 11/11/24 at 9:22 AM EST           
thickness and systolic function with an  estimated ejection fraction of 65-70%.  No regional wall motion abnormalities are seen.  Reversal of the mitral valve inflow velocities suggestive of impaired left  ventricular relaxation.  Normal function of all valves.    Cardiac cath 11/10/2024    100% occluded prox LAD    S/p aspiration thrombectomy and iVUS guided PCI X 1 TRISHA    Severe LV systolic dysfunction, s/p pLVAD    Hemodynamics q6 hrs , mixed venous goal > 55 , SVR < 1200    ECHO 11/11/2024  Left Ventricle: Mildly reduced left ventricular systolic function with a visually estimated EF of 20 - 25%. Left ventricle size is normal. Mildly increased wall thickness. Akinesis of the mid to apical left ventricular wall segments. Grade I diastolic dysfunction with normal LAP. Impella catheter is present and measures 3.93cm within the left ventricle.  Right Ventricle: Right ventricle size is normal. Normal systolic function. TAPSE is 2.3 cm.  Image quality is fair. Contrast used: Definity.      ECHO (limited) 11/12/2024  Limited study.  Left Ventricle: Tip of impella measures 5.2cm from the aortic valve annulus.    ECHO (TTE) LIMITED (PRN CONTRAST/BUBBLE/STRAIN/3D) 11/14/2024  2:36 PM (Final)    Interpretation Summary    Left Ventricle: Mildly reduced left ventricular systolic function with a visually estimated EF of 45 - 50%. Left ventricle size is normal. Increased wall thickness. Moderate to severe hypokinesis of the apex. The impella device measures 4.3cm within the left ventricle. The cannula appears to be posteriorly directed and the inlet is positioned against the papillary muscle.    Image quality is suboptimal.    Signed by: Guzman Berrios MD on 11/14/2024  2:36 PM    CARDIAC PROCEDURE 11/13/2024 12:07 PM, 11/14/2024  9:15 AM (Final)    Conclusion  Status post successful placement of axillary graft per vascular surgery and Impella 5 5 placement by interventional cardiology as well as Impella CP removal without 
Lot  How much help is needed walking in hospital room?: Total  How much help is needed climbing 3-5 steps with a railing?: Total  AM-PAC Inpatient Mobility Raw Score : 9  AM-[facility] Inpatient T-Scale Score : 30.55  Mobility Inpatient CMS 0-100% Score: 81.38  Mobility Inpatient CMS G-Code Modifier : CM         Goals  Short Term Goals  Time Frame for Short Term Goals: Upon d/c acute care setting.  Short Term Goal 1: Bed Mob Min assist.  Short Term Goal 2: EOB ~2-3 min CGA.  Short Term Goal 3: Transfers with assist device Min/CGA.  Short Term Goal 4: Amb with assist device 15-25' Min assist.  Short Term Goal 5: Pt participating in approp Strength Exs.  Patient Goals   Patient Goals : Return home with son.       Education  Patient Education  Education Given To: Patient  Education Provided Comments: Role of PT, POC, Need to call for assist, Transfer Activities via Stedy, Current R UE Restrictions due to Impella.  Education Method: Verbal;Demonstration  Barriers to Learning: Cognition  Education Outcome: Continued education needed      Therapy Time   Individual Concurrent Group Co-treatment   Time In 0750/1230         Time Out 0830/1245         Minutes 40/15               12:30 : PT return to bedside to assist with return to bed via Stedy.  Assist as above Mod assist x 2 to/from Stedy; cont to ensure pt maintain restrictions R UE, avoid use during transfers due to Axillary Impella.  Following return to bed, repositioned for comfort and all needs/call light in reach.  Nursing remains at bedside.  Rx : 15 min          Melvina Drew, PT         
require up to Max-DEP for full ADLs based on his current strength, endurance, cognition, and RUE restrictions  Skin Care: N/A     Activity Tolerance  Activity Tolerance: Patient tolerated treatment well  Bed mobility  Supine to Sit:  (Bed converted to chair position (for improved line manage).)  Transfers  Sit to stand: Minimal assistance;2 Person assistance  Stand to sit: Minimal assistance;2 Person assistance  Transfer Comments: to/from RW for x5 fxl transfers, cues for not using RUE for transfers, assisted w/ RUE throughout. Seated rest break between transfers  Vision  Vision: Impaired  Vision Exceptions: Wears glasses at all times  Hearing  Hearing: Within functional limits  Cognition  Overall Cognitive Status: Exceptions  Arousal/Alertness: Appears intact  Following Commands: Follows one step commands with repetition  Attention Span: Appears intact  Memory: Decreased recall of precautions  Safety Judgement: Impaired  Insights: Decreased awareness of deficits  Initiation: Requires cues for some  Sequencing: Requires cues for some  Cognition Comment: Diminished safety awareness/line awareness; can be abit impulsive.  Orientation  Orientation Level: Oriented to person;Oriented to place               Dynamic Sitting Balance Exercises: Min A while standing to RW and weight shifting, cues and assistance for not putting weight through RUE  Education Given To: Patient  Education Provided: Role of Therapy;Plan of Care;Transfer Training;Precautions;Fall Prevention Strategies  Education Method: Demonstration;Verbal  Barriers to Learning: Cognition  Education Outcome: Verbalized understanding;Continued education needed    AM-PAC - ADL  AM-PAC Daily Activity - Inpatient   How much help is needed for putting on and taking off regular lower body clothing?: Total  How much help is needed for bathing (which includes washing, rinsing, drying)?: Total  How much help is needed for toileting (which includes using toilet, bedpan, or 
for allowing me to participate in the care of your patient. Please do not hesitate to contact me if you have any questions.      Dillon Rodríguez MD FACC  General, Interventional Cardiology, and Peripheral Vascular Disease   [facility]   Ph: [phone]  Fax: [phone]          
  Timed Code Treatment Minutes: 40 Minutes       Cory Lynn, OTR/L [id number]

## 2024-11-21 NOTE — PLAN OF CARE
Problem: Pain  Goal: Verbalizes/displays adequate comfort level or baseline comfort level  11/13/2024 2220 by Angélica Francis RN  Outcome: Progressing     Problem: Safety - Adult  Goal: Free from fall injury  11/13/2024 2220 by Angélica Francis RN  Outcome: Progressing  Flowsheets (Taken 11/13/2024 2216)  Free From Fall Injury: Instruct family/caregiver on patient safety     Problem: Skin/Tissue Integrity  Goal: Absence of new skin breakdown  Description: 1.  Monitor for areas of redness and/or skin breakdown  2.  Assess vascular access sites hourly  3.  Every 4-6 hours minimum:  Change oxygen saturation probe site  4.  Every 4-6 hours:  If on nasal continuous positive airway pressure, respiratory therapy assess nares and determine need for appliance change or resting period.  11/13/2024 2220 by Angélica Francis RN  Outcome: Progressing     Problem: ABCDS Injury Assessment  Goal: Absence of physical injury  Outcome: Progressing  Flowsheets (Taken 11/13/2024 2216)  Absence of Physical Injury: Implement safety measures based on patient assessment     Problem: Respiratory - Adult  Goal: Achieves optimal ventilation and oxygenation  Outcome: Progressing  Flowsheets (Taken 11/13/2024 2000)  Achieves optimal ventilation and oxygenation: Assess for changes in respiratory status     Problem: Cardiovascular - Adult  Goal: Maintains optimal cardiac output and hemodynamic stability  Outcome: Progressing  Flowsheets (Taken 11/13/2024 2000)  Maintains optimal cardiac output and hemodynamic stability: Monitor blood pressure and heart rate     Problem: Cardiovascular - Adult  Goal: Absence of cardiac dysrhythmias or at baseline  Outcome: Progressing  Flowsheets (Taken 11/13/2024 2000)  Absence of cardiac dysrhythmias or at baseline: Monitor cardiac rate and rhythm     Problem: Hematologic - Adult  Goal: Maintains hematologic stability  Outcome: Progressing  Flowsheets (Taken 11/13/2024 2000)  Maintains hematologic stability: 
  Problem: Pain  Goal: Verbalizes/displays adequate comfort level or baseline comfort level  11/18/2024 2043 by Angélica Francis RN  Outcome: Not Progressing  Flowsheets (Taken 11/18/2024 0800 by Dany Webb RN)  Verbalizes/displays adequate comfort level or baseline comfort level:   Encourage patient to monitor pain and request assistance   Assess pain using appropriate pain scale   Administer analgesics based on type and severity of pain and evaluate response   Implement non-pharmacological measures as appropriate and evaluate response   Notify Licensed Independent Practitioner if interventions unsuccessful or patient reports new pain   Consider cultural and social influences on pain and pain management    Problem: Safety - Adult  Goal: Free from fall injury  11/18/2024 2043 by Angélica Francis RN  Outcome: Progressing  Flowsheets (Taken 11/18/2024 2041)  Free From Fall Injury: Instruct family/caregiver on patient safety     Problem: Skin/Tissue Integrity  Goal: Absence of new skin breakdown  Description: 1.  Monitor for areas of redness and/or skin breakdown  2.  Assess vascular access sites hourly  3.  Every 4-6 hours minimum:  Change oxygen saturation probe site  4.  Every 4-6 hours:  If on nasal continuous positive airway pressure, respiratory therapy assess nares and determine need for appliance change or resting period.  11/18/2024 2043 by Angélica Francis RN  Outcome: Progressing     Problem: ABCDS Injury Assessment  Goal: Absence of physical injury  11/18/2024 2043 by Angélica Francis RN  Outcome: Progressing  Flowsheets (Taken 11/18/2024 2041)  Absence of Physical Injury: Implement safety measures based on patient assessment     Problem: Respiratory - Adult  Goal: Achieves optimal ventilation and oxygenation  11/18/2024 2043 by Angélica Francis RN  Outcome: Progressing  Flowsheets  Taken 11/18/2024 2000 by Angélica Francis RN  Achieves optimal ventilation and oxygenation: Assess for changes in 
  Problem: Pain  Goal: Verbalizes/displays adequate comfort level or baseline comfort level  11/19/2024 2058 by Angélica Francis RN  Outcome: Not Progressing  Flowsheets (Taken 11/19/2024 0800 by Dany Webb RN)  Verbalizes/displays adequate comfort level or baseline comfort level:   Encourage patient to monitor pain and request assistance   Assess pain using appropriate pain scale   Administer analgesics based on type and severity of pain and evaluate response   Consider cultural and social influences on pain and pain management   Notify Licensed Independent Practitioner if interventions unsuccessful or patient reports new pain   Implement non-pharmacological measures as appropriate and evaluate response      Problem: Safety - Adult  Goal: Free from fall injury  11/19/2024 2058 by Angélica Francis RN  Outcome: Progressing  Flowsheets (Taken 11/19/2024 2057)  Free From Fall Injury: Instruct family/caregiver on patient safety     Problem: Skin/Tissue Integrity  Goal: Absence of new skin breakdown  Description: 1.  Monitor for areas of redness and/or skin breakdown  2.  Assess vascular access sites hourly  3.  Every 4-6 hours minimum:  Change oxygen saturation probe site  4.  Every 4-6 hours:  If on nasal continuous positive airway pressure, respiratory therapy assess nares and determine need for appliance change or resting period.  11/19/2024 2058 by Angélica Francis RN  Outcome: Progressing     Problem: ABCDS Injury Assessment  Goal: Absence of physical injury  11/19/2024 2058 by Angélica Francis RN  Outcome: Progressing  Flowsheets (Taken 11/19/2024 2057)  Absence of Physical Injury: Implement safety measures based on patient assessment     Problem: Respiratory - Adult  Goal: Achieves optimal ventilation and oxygenation  11/19/2024 2058 by Angélica Francis RN  Outcome: Progressing  Flowsheets  Taken 11/19/2024 2000 by Angélica Francis RN  Achieves optimal ventilation and oxygenation: Assess for changes in 
  Problem: Pain  Goal: Verbalizes/displays adequate comfort level or baseline comfort level  11/20/2024 0858 by Kelly Pulido RN  Outcome: Progressing     Problem: Safety - Adult  Goal: Free from fall injury  11/20/2024 0858 by Kelly Pulido RN  Outcome: Progressing     Problem: Skin/Tissue Integrity  Goal: Absence of new skin breakdown  Description: 1.  Monitor for areas of redness and/or skin breakdown  2.  Assess vascular access sites hourly  3.  Every 4-6 hours minimum:  Change oxygen saturation probe site  4.  Every 4-6 hours:  If on nasal continuous positive airway pressure, respiratory therapy assess nares and determine need for appliance change or resting period.  11/20/2024 0858 by Kelly Pulido RN  Outcome: Progressing     Problem: Pain  Goal: Verbalizes/displays adequate comfort level or baseline comfort level  11/20/2024 0858 by Kelly Pulido RN  Outcome: Progressing  11/19/2024 2058 by Angélica Francis, RN  Outcome: Not Progressing  Flowsheets (Taken 11/19/2024 0800 by Dany Webb, RN)  Verbalizes/displays adequate comfort level or baseline comfort level:   Encourage patient to monitor pain and request assistance   Assess pain using appropriate pain scale   Administer analgesics based on type and severity of pain and evaluate response   Consider cultural and social influences on pain and pain management   Notify Licensed Independent Practitioner if interventions unsuccessful or patient reports new pain   Implement non-pharmacological measures as appropriate and evaluate response     
  Problem: Pain  Goal: Verbalizes/displays adequate comfort level or baseline comfort level  11/20/2024 0859 by Kelly Pulido, RN  Outcome: Progressing     Problem: Pain  Goal: Verbalizes/displays adequate comfort level or baseline comfort level  11/20/2024 0859 by Kelly Pulido, RN  Outcome: Progressing       
  Problem: Pain  Goal: Verbalizes/displays adequate comfort level or baseline comfort level  11/20/2024 2048 by Corrie Rodrigues RN  Outcome: Progressing  Flowsheets (Taken 11/20/2024 2048)  Verbalizes/displays adequate comfort level or baseline comfort level:   Encourage patient to monitor pain and request assistance   Assess pain using appropriate pain scale   Administer analgesics based on type and severity of pain and evaluate response   Implement non-pharmacological measures as appropriate and evaluate response  11/20/2024 0859 by Kelly Pulido RN  Outcome: Progressing  11/20/2024 0858 by Kelly Pulido RN  Outcome: Progressing     Problem: Safety - Adult  Goal: Free from fall injury  11/20/2024 2048 by Corrie Rodrigues RN  Outcome: Progressing  Flowsheets (Taken 11/20/2024 2048)  Free From Fall Injury:   Instruct family/caregiver on patient safety   Based on caregiver fall risk screen, instruct family/caregiver to ask for assistance with transferring infant if caregiver noted to have fall risk factors  11/20/2024 0859 by Kelly Pulido RN  Outcome: Progressing  11/20/2024 0858 by Kelly Pulido RN  Outcome: Progressing     Problem: Skin/Tissue Integrity  Goal: Absence of new skin breakdown  Description: 1.  Monitor for areas of redness and/or skin breakdown  2.  Assess vascular access sites hourly  3.  Every 4-6 hours minimum:  Change oxygen saturation probe site  4.  Every 4-6 hours:  If on nasal continuous positive airway pressure, respiratory therapy assess nares and determine need for appliance change or resting period.  11/20/2024 0859 by Kelly Pulido RN  Outcome: Progressing  11/20/2024 0858 by Kelly Pulido RN  Outcome: Progressing     
  Problem: Pain  Goal: Verbalizes/displays adequate comfort level or baseline comfort level  Outcome: Progressing     Problem: Safety - Adult  Goal: Free from fall injury  Outcome: Progressing     Problem: Safety - Medical Restraint  Goal: Remains free of injury from restraints (Restraint for Interference with Medical Device)  Description: INTERVENTIONS:  1. Determine that other, less restrictive measures have been tried or would not be effective before applying the restraint  2. Evaluate the patient's condition at the time of restraint application  3. Inform patient/family regarding the reason for restraint  4. Q2H: Monitor safety, psychosocial status, comfort, nutrition and hydration  Outcome: Progressing     Problem: Skin/Tissue Integrity  Goal: Absence of new skin breakdown  Description: 1.  Monitor for areas of redness and/or skin breakdown  2.  Assess vascular access sites hourly  3.  Every 4-6 hours minimum:  Change oxygen saturation probe site  4.  Every 4-6 hours:  If on nasal continuous positive airway pressure, respiratory therapy assess nares and determine need for appliance change or resting period.  Outcome: Progressing     Problem: ABCDS Injury Assessment  Goal: Absence of physical injury  Outcome: Progressing     
  Problem: Pain  Goal: Verbalizes/displays adequate comfort level or baseline comfort level  Outcome: Progressing     Problem: Safety - Adult  Goal: Free from fall injury  Outcome: Progressing     Problem: Skin/Tissue Integrity  Goal: Absence of new skin breakdown  Description: 1.  Monitor for areas of redness and/or skin breakdown  2.  Assess vascular access sites hourly  3.  Every 4-6 hours minimum:  Change oxygen saturation probe site  4.  Every 4-6 hours:  If on nasal continuous positive airway pressure, respiratory therapy assess nares and determine need for appliance change or resting period.  Outcome: Progressing     Problem: ABCDS Injury Assessment  Goal: Absence of physical injury  Outcome: Progressing     Problem: Respiratory - Adult  Goal: Achieves optimal ventilation and oxygenation  Outcome: Progressing     Problem: Cardiovascular - Adult  Goal: Maintains optimal cardiac output and hemodynamic stability  Outcome: Progressing  Goal: Absence of cardiac dysrhythmias or at baseline  Outcome: Progressing     Problem: Hematologic - Adult  Goal: Maintains hematologic stability  Outcome: Progressing     Problem: Neurosensory - Adult  Goal: Achieves stable or improved neurological status  Outcome: Progressing     Problem: Genitourinary - Adult  Goal: Urinary catheter remains patent  Outcome: Progressing     Problem: Infection - Adult  Goal: Absence of infection at discharge  Outcome: Progressing     Problem: Metabolic/Fluid and Electrolytes - Adult  Goal: Electrolytes maintained within normal limits  Outcome: Progressing  Goal: Glucose maintained within prescribed range  Outcome: Progressing     
  Problem: Pain  Goal: Verbalizes/displays adequate comfort level or baseline comfort level  Outcome: Progressing     Problem: Safety - Adult  Goal: Free from fall injury  Outcome: Progressing     Problem: Skin/Tissue Integrity  Goal: Absence of new skin breakdown  Description: 1.  Monitor for areas of redness and/or skin breakdown  2.  Assess vascular access sites hourly  3.  Every 4-6 hours minimum:  Change oxygen saturation probe site  4.  Every 4-6 hours:  If on nasal continuous positive airway pressure, respiratory therapy assess nares and determine need for appliance change or resting period.  Outcome: Progressing     Problem: ABCDS Injury Assessment  Goal: Absence of physical injury  Outcome: Progressing     Problem: Respiratory - Adult  Goal: Achieves optimal ventilation and oxygenation  Outcome: Progressing     Problem: Cardiovascular - Adult  Goal: Maintains optimal cardiac output and hemodynamic stability  Outcome: Progressing  Goal: Absence of cardiac dysrhythmias or at baseline  Outcome: Progressing     Problem: Hematologic - Adult  Goal: Maintains hematologic stability  Outcome: Progressing     Problem: Neurosensory - Adult  Goal: Achieves stable or improved neurological status  Outcome: Progressing     Problem: Genitourinary - Adult  Goal: Urinary catheter remains patent  Outcome: Progressing     Problem: Infection - Adult  Goal: Absence of infection at discharge  Outcome: Progressing     Problem: Metabolic/Fluid and Electrolytes - Adult  Goal: Electrolytes maintained within normal limits  Outcome: Progressing  Goal: Glucose maintained within prescribed range  Outcome: Progressing     
  Problem: Pain  Goal: Verbalizes/displays adequate comfort level or baseline comfort level  Outcome: Progressing     Problem: Safety - Adult  Goal: Free from fall injury  Outcome: Progressing     Problem: Skin/Tissue Integrity  Goal: Absence of new skin breakdown  Description: 1.  Monitor for areas of redness and/or skin breakdown  2.  Assess vascular access sites hourly  3.  Every 4-6 hours minimum:  Change oxygen saturation probe site  4.  Every 4-6 hours:  If on nasal continuous positive airway pressure, respiratory therapy assess nares and determine need for appliance change or resting period.  Outcome: Progressing     Problem: ABCDS Injury Assessment  Goal: Absence of physical injury  Outcome: Progressing     Problem: Respiratory - Adult  Goal: Achieves optimal ventilation and oxygenation  Outcome: Progressing  Flowsheets (Taken 11/16/2024 0800)  Achieves optimal ventilation and oxygenation:   Assess for changes in respiratory status   Assess for changes in mentation and behavior     Problem: Cardiovascular - Adult  Goal: Maintains optimal cardiac output and hemodynamic stability  Outcome: Progressing  Flowsheets (Taken 11/16/2024 0800)  Maintains optimal cardiac output and hemodynamic stability:   Monitor blood pressure and heart rate   Monitor urine output and notify Licensed Independent Practitioner for values outside of normal range     Problem: Cardiovascular - Adult  Goal: Absence of cardiac dysrhythmias or at baseline  Outcome: Progressing  Flowsheets (Taken 11/16/2024 0800)  Absence of cardiac dysrhythmias or at baseline: Monitor cardiac rate and rhythm     Problem: Hematologic - Adult  Goal: Maintains hematologic stability  Outcome: Progressing  Flowsheets (Taken 11/16/2024 0800)  Maintains hematologic stability: Assess for signs and symptoms of bleeding or hemorrhage     Problem: Neurosensory - Adult  Goal: Achieves stable or improved neurological status  Outcome: Progressing  Flowsheets (Taken 
  Problem: Pain  Goal: Verbalizes/displays adequate comfort level or baseline comfort level  Outcome: Progressing     Problem: Safety - Adult  Goal: Free from fall injury  Outcome: Progressing     Problem: Skin/Tissue Integrity  Goal: Absence of new skin breakdown  Description: 1.  Monitor for areas of redness and/or skin breakdown  2.  Assess vascular access sites hourly  3.  Every 4-6 hours minimum:  Change oxygen saturation probe site  4.  Every 4-6 hours:  If on nasal continuous positive airway pressure, respiratory therapy assess nares and determine need for appliance change or resting period.  Outcome: Progressing     Problem: ABCDS Injury Assessment  Goal: Absence of physical injury  Outcome: Progressing     Problem: Respiratory - Adult  Goal: Achieves optimal ventilation and oxygenation  Outcome: Progressing  Flowsheets (Taken 11/21/2024 0800)  Achieves optimal ventilation and oxygenation:   Assess for changes in respiratory status   Assess for changes in mentation and behavior   Position to facilitate oxygenation and minimize respiratory effort   Oxygen supplementation based on oxygen saturation or arterial blood gases     Problem: Cardiovascular - Adult  Goal: Maintains optimal cardiac output and hemodynamic stability  Outcome: Progressing  Flowsheets (Taken 11/21/2024 0800)  Maintains optimal cardiac output and hemodynamic stability:   Monitor blood pressure and heart rate   Monitor urine output and notify Licensed Independent Practitioner for values outside of normal range     
  Problem: Pain  Goal: Verbalizes/displays adequate comfort level or baseline comfort level  Outcome: Progressing  Flowsheets (Taken 11/10/2024 0000)  Verbalizes/displays adequate comfort level or baseline comfort level:   Assess pain using appropriate pain scale   Administer analgesics based on type and severity of pain and evaluate response   Implement non-pharmacological measures as appropriate and evaluate response   Consider cultural and social influences on pain and pain management   Notify Licensed Independent Practitioner if interventions unsuccessful or patient reports new pain     Problem: Safety - Adult  Goal: Free from fall injury  Outcome: Progressing  Flowsheets (Taken 11/10/2024 0255)  Free From Fall Injury: Instruct family/caregiver on patient safety     Problem: Safety - Medical Restraint  Goal: Remains free of injury from restraints (Restraint for Interference with Medical Device)  Description: INTERVENTIONS:  1. Determine that other, less restrictive measures have been tried or would not be effective before applying the restraint  2. Evaluate the patient's condition at the time of restraint application  3. Inform patient/family regarding the reason for restraint  4. Q2H: Monitor safety, psychosocial status, comfort, nutrition and hydration  Outcome: Progressing  Flowsheets (Taken 11/9/2024 2300)  Remains free of injury from restraints (restraint for interference with medical device):   Determine that other, less restrictive measures have been tried or would not be effective before applying the restraint   Evaluate the patient's condition at the time of restraint application   Inform patient/family regarding the reason for restraint   Every 2 hours: Monitor safety, psychosocial status, comfort, nutrition and hydration     Problem: Skin/Tissue Integrity  Goal: Absence of new skin breakdown  Description: 1.  Monitor for areas of redness and/or skin breakdown  2.  Assess vascular access sites 
  Problem: Safety - Adult  Goal: Free from fall injury  11/19/2024 0742 by Dany Webb RN  Outcome: Progressing  11/18/2024 2043 by Angélica Francis RN  Outcome: Progressing  Flowsheets (Taken 11/18/2024 2041)  Free From Fall Injury: Instruct family/caregiver on patient safety     Problem: Skin/Tissue Integrity  Goal: Absence of new skin breakdown  Description: 1.  Monitor for areas of redness and/or skin breakdown  2.  Assess vascular access sites hourly  3.  Every 4-6 hours minimum:  Change oxygen saturation probe site  4.  Every 4-6 hours:  If on nasal continuous positive airway pressure, respiratory therapy assess nares and determine need for appliance change or resting period.  11/19/2024 0742 by Dany Webb RN  Outcome: Progressing  11/18/2024 2043 by Angélica Francis RN  Outcome: Progressing     Problem: ABCDS Injury Assessment  Goal: Absence of physical injury  11/19/2024 0742 by Dany Webb RN  Outcome: Progressing  11/18/2024 2043 by Angélica Francis RN  Outcome: Progressing  Flowsheets (Taken 11/18/2024 2041)  Absence of Physical Injury: Implement safety measures based on patient assessment     Problem: Respiratory - Adult  Goal: Achieves optimal ventilation and oxygenation  11/19/2024 0742 by Dany Webb RN  Outcome: Progressing  11/18/2024 2043 by Angélica Francis RN  Outcome: Progressing  Flowsheets  Taken 11/18/2024 2000 by Angélica Francis RN  Achieves optimal ventilation and oxygenation: Assess for changes in respiratory status  Taken 11/18/2024 0800 by Dany Webb RN  Achieves optimal ventilation and oxygenation:   Assess for changes in respiratory status   Assess for changes in mentation and behavior   Position to facilitate oxygenation and minimize respiratory effort   Initiate smoking cessation protocol as indicated   Encourage broncho-pulmonary hygiene including cough, deep breathe, incentive spirometry   Oxygen supplementation based on oxygen saturation or arterial 
  Problem: Safety - Adult  Goal: Free from fall injury  Recent Flowsheet Documentation  Taken 11/11/2024 1000 by Nory Saavedra, RN  Free From Fall Injury:   Instruct family/caregiver on patient safety   Based on caregiver fall risk screen, instruct family/caregiver to ask for assistance with transferring infant if caregiver noted to have fall risk factors     Problem: ABCDS Injury Assessment  Goal: Absence of physical injury  Recent Flowsheet Documentation  Taken 11/11/2024 1000 by Nory Saavedra, RN  Absence of Physical Injury: Implement safety measures based on patient assessment     
Problem: Pain  Goal: Verbalizes/displays adequate comfort level or baseline comfort level  11/12/2024 1309 by Domitila Drew, RN  Outcome: Progressing  Flowsheets (Taken 11/12/2024 1309)  Verbalizes/displays adequate comfort level or baseline comfort level:   Encourage patient to monitor pain and request assistance   Administer analgesics based on type and severity of pain and evaluate response   Consider cultural and social influences on pain and pain management   Assess pain using appropriate pain scale   Implement non-pharmacological measures as appropriate and evaluate response   Notify Licensed Independent Practitioner if interventions unsuccessful or patient reports new pain  11/12/2024 0142 by Aldo Amaral RN  Outcome: Progressing     Problem: Safety - Adult  Goal: Free from fall injury  11/12/2024 1309 by Domitila Drew, RN  Outcome: Progressing  Flowsheets (Taken 11/11/2024 1000 by Nory Saavedra RN)  Free From Fall Injury:   Instruct family/caregiver on patient safety   Based on caregiver fall risk screen, instruct family/caregiver to ask for assistance with transferring infant if caregiver noted to have fall risk factors  11/12/2024 0142 by Aldo Amaral RN  Outcome: Progressing     Problem: Safety - Medical Restraint  Goal: Remains free of injury from restraints (Restraint for Interference with Medical Device)  Description: INTERVENTIONS:  1. Determine that other, less restrictive measures have been tried or would not be effective before applying the restraint  2. Evaluate the patient's condition at the time of restraint application  3. Inform patient/family regarding the reason for restraint  4. Q2H: Monitor safety, psychosocial status, comfort, nutrition and hydration  11/12/2024 1309 by Domitila Drew, RN  Outcome: Completed    Problem: Skin/Tissue Integrity  Goal: Absence of new skin breakdown  Description: 1.  Monitor for areas of redness and/or skin breakdown  2.  
Problem: Pain  Goal: Verbalizes/displays adequate comfort level or baseline comfort level  Outcome: Not Progressing  Verbalizes/displays adequate comfort level or baseline comfort level:   Encourage patient to monitor pain and request assistance   Assess pain using appropriate pain scale   Administer analgesics based on type and severity of pain and evaluate response   Implement non-pharmacological measures as appropriate and evaluate response   Consider cultural and social influences on pain and pain management   Notify Licensed Independent Practitioner if interventions unsuccessful or patient reports new pain     Problem: Respiratory - Adult  Goal: Achieves optimal ventilation and oxygenation  Outcome: Not Progressing  Achieves optimal ventilation and oxygenation:   Assess for changes in respiratory status   Assess for changes in mentation and behavior   Position to facilitate oxygenation and minimize respiratory effort   Oxygen supplementation based on oxygen saturation or arterial blood gases   Initiate smoking cessation protocol as indicated   Encourage broncho-pulmonary hygiene including cough, deep breathe, incentive spirometry   Assess and instruct to report shortness of breath or any respiratory difficulty     Problem: Cardiovascular - Adult  Goal: Maintains optimal cardiac output and hemodynamic stability  Outcome: Progressing  Maintains optimal cardiac output and hemodynamic stability:   Monitor blood pressure and heart rate   Monitor urine output and notify Licensed Independent Practitioner for values outside of normal range   Assess for signs of decreased cardiac output   Administer vasoactive medications as ordered     Problem: Cardiovascular - Adult  Goal: Absence of cardiac dysrhythmias or at baseline  Outcome: Progressing  Absence of cardiac dysrhythmias or at baseline:   Monitor cardiac rate and rhythm   Assess for signs of decreased cardiac output     Problem: Neurosensory - Adult  Goal: Achieves 
neurological status:   Assess for and report changes in neurological status   Initiate measures to prevent increased intracranial pressure     Problem: Genitourinary - Adult  Goal: Urinary catheter remains patent  Outcome: Progressing  Flowsheets (Taken 11/17/2024 0800)  Urinary catheter remains patent: Assess patency of urinary catheter     Problem: Infection - Adult  Goal: Absence of infection at discharge  Outcome: Progressing  Flowsheets (Taken 11/17/2024 0800)  Absence of infection at discharge:   Assess and monitor for signs and symptoms of infection   Monitor lab/diagnostic results     Problem: Metabolic/Fluid and Electrolytes - Adult  Goal: Electrolytes maintained within normal limits  Outcome: Progressing  Flowsheets (Taken 11/17/2024 0800)  Electrolytes maintained within normal limits: Monitor labs and assess patient for signs and symptoms of electrolyte imbalances     
11/14/2024 2000)  Maintains hematologic stability:   Assess for signs and symptoms of bleeding or hemorrhage   Monitor labs for bleeding or clotting disorders   Administer blood products/factors as ordered  11/14/2024 1953 by Melina Galindo RN  Outcome: Progressing     Problem: Neurosensory - Adult  Goal: Achieves stable or improved neurological status  11/15/2024 0338 by Corrie Beal RN  Outcome: Progressing  Flowsheets (Taken 11/14/2024 2000)  Achieves stable or improved neurological status: Assess for and report changes in neurological status  11/14/2024 1953 by Melina Galindo RN  Outcome: Progressing     Problem: Genitourinary - Adult  Goal: Urinary catheter remains patent  11/15/2024 0338 by Corrie Beal RN  Outcome: Progressing  Flowsheets (Taken 11/14/2024 2000)  Urinary catheter remains patent: Assess patency of urinary catheter  11/14/2024 1953 by Melina Galindo RN  Outcome: Progressing     Problem: Infection - Adult  Goal: Absence of infection at discharge  11/15/2024 0338 by Corrie Beal RN  Outcome: Progressing  Flowsheets (Taken 11/14/2024 2000)  Absence of infection at discharge:   Assess and monitor for signs and symptoms of infection   Monitor lab/diagnostic results   Monitor all insertion sites i.e., indwelling lines, tubes and drains   [place] appropriate cooling/warming therapies per order   Administer medications as ordered   Instruct and encourage patient and family to use good hand hygiene technique  11/14/2024 1953 by Melina Galindo RN  Outcome: Progressing     Problem: Metabolic/Fluid and Electrolytes - Adult  Goal: Electrolytes maintained within normal limits  11/15/2024 0338 by Corrie Beal RN  Outcome: Progressing  Flowsheets (Taken 11/14/2024 2000)  Electrolytes maintained within normal limits:   Monitor labs and assess patient for signs and symptoms of electrolyte imbalances   Administer electrolyte replacement as ordered   Monitor response to

## 2024-11-21 NOTE — CARE COORDINATION
SOCIAL WORK DISCHARGE SUMMARY        DATE OF DISCHARGE:    Thursday, 11-      LOCATION:      [facility]    [facility]    Report:  [phone]  Fax;  [phone]       IMM:  11-      SARAH Uribe     Case Management   [phone]    11/21/2024  2:55 PM                                
   11/20/24 1540   IMM Letter   IMM Letter given to Patient/Family/Significant other/Guardian/POA/by: presented to patient who verbalized understanding by guera butler   IMM Letter date given: 11/20/24   IMM Letter time given: 1540       
   24 1331   Service Assessment   Patient Orientation Alert and Oriented   Cognition Alert   History Provided By Patient   Primary Caregiver Self   Accompanied By/Relationship none   Support Systems Children  (Son, Mike)   Patient's Healthcare Decision Maker is: Legal Next of Kin  (Mike Martin.  Only child.  Wife  when son was 4 years old)   PCP Verified by CM No  (Pt does not have a pcp but is willing for me to set one up for him.)   Prior Functional Level Independent in ADLs/IADLs   Current Functional Level Assistance with the following:;Bathing;Dressing;Toileting;Cooking;Housework;Shopping;Mobility   Can patient return to prior living arrangement Unknown at present   Ability to make needs known: Good   Family able to assist with home care needs: Yes   Would you like for me to discuss the discharge plan with any other family members/significant others, and if so, who? Yes  (sonMike)   Financial Resources Medicare   Community Resources None   CM/SW Referral No PCP   Discharge Planning   Type of Residence House   Living Arrangements Children  (Adult Son)   Current Services Prior To Admission Durable Medical Equipment   Current DME Prior to Arrival Walker;Cane;Shower Chair   Potential Assistance Needed Other (Comment)  (New PCP)   DME Ordered? No   Potential Assistance Purchasing Medications No   Type of Home Care Services None   Patient expects to be discharged to: House   One/Two Story Residence One story   History of falls? 0   Services At/After Discharge   Transition of Care Consult (CM Consult) Home Health;SNF   Internal Home Health Yes   Partner SNF Yes   Services At/After Discharge Home Health;Skilled Nursing Facility (SNF)   [place] Resource Information Provided? No   Mode of Transport at Discharge Other (see comment)  (To be determined.)   Confirm Follow Up Transport Family       
Chart Reviewed.  Possible removal of NG tube today.  Therapy evaluated and recommends ARU as DC plan.  [facility] is a possible option as well.  Left message for Dr Rodríguez to alert of both options and requested an order be placed for his preference.  SARAH Uribe     Case Management   [phone]    11/15/2024  10:49 AM    
Goal is [facility].  Bed is available when medically stable.  No precert is needed.  SARAH Uribe     Case Management   [phone]    11/19/2024  8:37 AM    
Met with patient earlier today to discuss his plan for DC.  He prefers to go [facility] vs going to ARU.  He reports he had been to [facility] in the past and he loved it because he could play the piano.  Spoke with son, Mike, to review.  He will speak with his father as son wants him to go to [facility].    SARAH Uribe     Case Management   [phone]    11/19/2024  12:19 PM    
Met with patient to review DC plan.  He and son both agree on DC to [facility] for his discharge plan.    Case management has presented and reviewed IMM letter #2.   IMM Letter given to Patient/Family/Significant other/Guardian/POA/by:: presented to patient who verbalized understanding by sarah uribe   IMM Letter date given:: 11/20/24  IMM Letter time given:: 1540.   Patient and/or family/POA verbalized understanding of their medicare rights and appeal process if needed. Patient and/or family/POA has signed, initialed and placed the date and time on IMM letter #2 on the the appropriate lines. Copy of letter offered and they are aware that the original copy of IMM letter #2 is available prior to discharge from the paper chart on the unit.  Electronic documentation has been entered into epic for IMM letter #2 and original paper copy has been added to the paper chart at the nurses station.       SARAH Uribe     Case Management   [phone]    11/20/2024  3:41 PM      
New Appt for PCP made with Dr Alex  Nov 20, 2024 at 4:00 pm .    SARAH Uribe     Case Management   [phone]    11/11/2024  1:48 PM    
Rec'd call from son who states they both prefer [facility].    The Plan for Transition of Care is related to the following treatment goals: to continue his progress in personal care, ambulation and medication oversight in ARU setting.     The Patient and/or patient representative son  was provided with a choice of provider and agrees   with the discharge plan. [x] Yes [] No    Freedom of choice list was provided with basic dialogue that supports the patient's individualized plan of care/goals, treatment preferences and shares the quality data associated with the providers. [x] Yes [] No    Updated Chikis BAI, Admissions Coordinator of ARu.    SARAH Uribe     Case Management   [phone]    11/20/2024  9:47 AM    Updated Bedside RN, Kelly.    SARAH Uribe     Case Management   [phone]    11/20/2024  9:50 AM        
Rec'd order for ARU from Dr Rodríguez.  He will have impella removed on Monday.  Following for DC needs.    Returned to the room to discuss DC planning options of ARU vs SNF.  He is alert but was not tracking in the full conversation.  He agreed for me to call his son, Mike.    Call placed to Mike to discuss DC planning options.  Reviewed ARU vs SNF.  He is hopeful for ARU but wants to see how he is doing when he visits over the weekend.  Informed him I left CMS star rated lists in the room on the couch for his review.  Education given regarding CMS Star rating system.  Son will review over the weekend.    SARAH Uribe     Case Management   [phone]    11/15/2024  1:12 PM          
Rescheduled new pcp appt with Dr Alex for Monday, 12- at 2pm.  SARAH Uribe     Case Management   [phone]    11/15/2024  1:25 PM    
11/11/2024  1:39 PM

## 2024-11-21 NOTE — H&P
Department of Physical Medicine & Rehabilitation  History & Physical      Patient Identification:  Ad Lacey  : 1950  Admit date: 2024   Attending provider: Nicolasa Abreu MD        Primary care provider: Drea Frost APRN - CNP     Chief Complaint: weakness     History of Present Illness/Hospital Course:  Patient is a 75 yo M with pmh CAD (prior PCI of the mid LAD in ), PAD (right fem-popliteal bypass), paroxysmal Afib, presacral mass, anxiety who initially presented on 2024 with cardiac arrest. Patient reportedly had chest pain at home and EMS was called. Upon their arrival patient was still alert, however had Vfib arrest en route to hospital. Had ROSC after 3 shocks. He was intubated in the field. Post-arrest EKG with anterior ST  elevations. He was taken to cath lab and found to have 100% LAD occlusion, stent thrombosis. He underwent aspiration thrombectomy and IVUS guided PCI to mid LAD with TRISHA x 1. Also with severe LV dysfunction and EF 20% requiring placement of pLVAD. Later had placement of Impella 5.5 (- with Dr. Lopez). Repeat LHC ( with Dr. Rodríguez) showed widely patent LAD. He was extubated 11/10 but has had ongoing acute hypoxic respiratory failure requiring supplemental O2 due to pulmonary edema and pneumonia. Course has been complicated by anemia, ileus, encephalopathy.  Now presents to ARU with impaired mobility, self-care, and cognition below his baseline.  Currently, patient reports generalized weakness. He has shortness of breath with minimal activity. He has severe chest wall pain. Worse with movement/activity, cough, deep inhalation. Improves with rest and medication. He is agreeable to starting inpatient rehabilitation program.      Prior Level of Function:  Independent for mobility, ADLs, and IADLs     Current Level of Function:  CGA-modA for mobility  Up to maxA for ADLs      Pertinent Social History:  Support: lives with son  Home set-up: 2  MD Nicolasa        hydrALAZINE (APRESOLINE) tablet 10 mg  10 mg Oral Q6H PRN Nicolasa Abreu MD        polyethylene glycol (GLYCOLAX) packet 17 g  17 g Oral BID Nicolasa Abreu MD        [START ON 11/22/2024] pantoprazole (PROTONIX) tablet 40 mg  40 mg Oral QAM AC Nicolasa Abreu MD        oxyCODONE-acetaminophen (PERCOCET)  MG per tablet 1 tablet  1 tablet Oral Q4H PRN Nicolasa Abreu MD        sacubitril-valsartan (ENTRESTO) 24-26 MG per tablet 2 tablet  2 tablet Oral BID Nicolasa Abreu MD        [START ON 11/22/2024] spironolactone (ALDACTONE) tablet 25 mg  25 mg Oral Daily Nicolasa Abreu MD        ticagrelor (BRILINTA) tablet 90 mg  90 mg Oral BID Nicolasa Abreu MD        sodium chloride flush 0.9 % injection 5-40 mL  5-40 mL IntraVENous PRN Nicolasa Abreu MD        sodium chloride flush 0.9 % injection 5-40 mL  5-40 mL IntraVENous 2 times per day Nicolasa Abreu MD        [START ON 11/22/2024] apixaban (ELIQUIS) tablet 5 mg  5 mg Oral BID Nicolasa Abreu MD        acetaminophen (TYLENOL) tablet 650 mg  650 mg Oral Q6H PRN Nicolasa Abreu MD        magnesium hydroxide (MILK OF MAGNESIA) 400 MG/5ML suspension 30 mL  30 mL Oral Daily PRN Nicolasa Abreu MD        bisacodyl (DULCOLAX) suppository 10 mg  10 mg Rectal Daily PRN Nicolasa Abreu MD             REVIEW OF SYSTEMS:   CONSTITUTIONAL: negative for fevers, chills, diaphoresis, appetite change, night sweats, unexpected weight change, +fatigue  EYES: negative for blurred vision, eye discharge, visual disturbance and icterus  HEENT: negative for hearing loss, tinnitus, ear drainage, sinus pressure, nasal congestion, epistaxis and snoring  RESPIRATORY: Negative for hemoptysis, +cough, sputum production, shortness of breath  CARDIOVASCULAR: negative for chest pain, palpitations, exertional chest pressure/discomfort, syncope, edema  GASTROINTESTINAL: negative for nausea, vomiting, diarrhea, blood in

## 2024-11-21 NOTE — PROGRESS NOTES
4 Eyes Skin Assessment     NAME:  Ad Lacey  YOB: 1950  MEDICAL RECORD NUMBER:  [id number]    The patient is being assessed for  Admission    I agree that at least one RN has performed a thorough Head to Toe Skin Assessment on the patient. ALL assessment sites listed below have been assessed.      Areas assessed by both nurses: Fatimah and Bertha from CVICU    Head, Face, Ears, Shoulders, Back, Chest, Arms, Elbows, Hands, Sacrum. Buttock, Coccyx, Ischium, and Legs. Feet and Heels        Does the Patient have a Wound? Yes wound(s) were present on assessment. LDA wound assessment was Initiated and completed by RN. Surgical incisions.        Jalen Prevention initiated by RN: Yes  Wound Care Orders initiated by RN: No    Pressure Injury (Stage 3,4, Unstageable, DTI, NWPT, and Complex wounds) if present, place Wound referral order by RN under : No    New Ostomies, if present place, Ostomy referral order under : No     Nurse 1 eSignature: Electronically signed by FATIMAH THOMAS RN on 11/21/24 at 5:12 PM EST    **SHARE this note so that the co-signing nurse can place an eSignature**    Nurse 2 eSignature: Electronically signed by Bertha Wheat RN on 11/21/24 at 7:12 PM EST

## 2024-11-22 LAB
ANION GAP SERPL CALCULATED.3IONS-SCNC: 16 MMOL/L (ref 3–16)
BACTERIA URNS QL MICRO: NORMAL /HPF
BASOPHILS # BLD: 0.1 K/UL (ref 0–0.2)
BASOPHILS NFR BLD: 1 %
BILIRUB UR QL STRIP.AUTO: NEGATIVE
BUN SERPL-MCNC: 16 MG/DL (ref 7–20)
CALCIUM SERPL-MCNC: 10.1 MG/DL (ref 8.3–10.6)
CHLORIDE SERPL-SCNC: 102 MMOL/L (ref 99–110)
CLARITY UR: CLEAR
CO2 SERPL-SCNC: 21 MMOL/L (ref 21–32)
COLOR UR: YELLOW
CREAT SERPL-MCNC: 0.8 MG/DL (ref 0.8–1.3)
DEPRECATED RDW RBC AUTO: 15.2 % (ref 12.4–15.4)
EOSINOPHIL # BLD: 0.3 K/UL (ref 0–0.6)
EOSINOPHIL NFR BLD: 2.6 %
EPI CELLS #/AREA URNS AUTO: 1 /HPF (ref 0–5)
GFR SERPLBLD CREATININE-BSD FMLA CKD-EPI: >90 ML/MIN/{1.73_M2}
GLUCOSE SERPL-MCNC: 116 MG/DL (ref 70–99)
GLUCOSE UR STRIP.AUTO-MCNC: NEGATIVE MG/DL
HCT VFR BLD AUTO: 33.9 % (ref 40.5–52.5)
HGB BLD-MCNC: 11.3 G/DL (ref 13.5–17.5)
HGB UR QL STRIP.AUTO: NEGATIVE
HYALINE CASTS #/AREA URNS AUTO: 0 /LPF (ref 0–8)
KETONES UR STRIP.AUTO-MCNC: ABNORMAL MG/DL
LEUKOCYTE ESTERASE UR QL STRIP.AUTO: NEGATIVE
LYMPHOCYTES # BLD: 1.8 K/UL (ref 1–5.1)
LYMPHOCYTES NFR BLD: 14.6 %
MAGNESIUM SERPL-MCNC: 2.24 MG/DL (ref 1.8–2.4)
MCH RBC QN AUTO: 28.1 PG (ref 26–34)
MCHC RBC AUTO-ENTMCNC: 33.3 G/DL (ref 31–36)
MCV RBC AUTO: 84.5 FL (ref 80–100)
MONOCYTES # BLD: 0.5 K/UL (ref 0–1.3)
MONOCYTES NFR BLD: 4.4 %
NEUTROPHILS # BLD: 9.5 K/UL (ref 1.7–7.7)
NEUTROPHILS NFR BLD: 77.4 %
NITRITE UR QL STRIP.AUTO: NEGATIVE
PH UR STRIP.AUTO: 6 [PH] (ref 5–8)
PLATELET # BLD AUTO: 690 K/UL (ref 135–450)
PLATELET BLD QL SMEAR: ABNORMAL
PMV BLD AUTO: 7.2 FL (ref 5–10.5)
POLYCHROMASIA BLD QL SMEAR: ABNORMAL
POTASSIUM SERPL-SCNC: 3.6 MMOL/L (ref 3.5–5.1)
PREALB SERPL-MCNC: 24.3 MG/DL (ref 20–40)
PROT UR STRIP.AUTO-MCNC: 100 MG/DL
RBC # BLD AUTO: 4.02 M/UL (ref 4.2–5.9)
RBC CLUMPS #/AREA URNS AUTO: 1 /HPF (ref 0–4)
SLIDE REVIEW: ABNORMAL
SODIUM SERPL-SCNC: 139 MMOL/L (ref 136–145)
SP GR UR STRIP.AUTO: 1.02 (ref 1–1.03)
UA COMPLETE W REFLEX CULTURE PNL UR: ABNORMAL
UA DIPSTICK W REFLEX MICRO PNL UR: YES
URN SPEC COLLECT METH UR: ABNORMAL
UROBILINOGEN UR STRIP-ACNC: 2 E.U./DL
WBC # BLD AUTO: 12.2 K/UL (ref 4–11)
WBC #/AREA URNS AUTO: 1 /HPF (ref 0–5)

## 2024-11-22 PROCEDURE — 85025 COMPLETE CBC W/AUTO DIFF WBC: CPT

## 2024-11-22 PROCEDURE — 97166 OT EVAL MOD COMPLEX 45 MIN: CPT

## 2024-11-22 PROCEDURE — 97110 THERAPEUTIC EXERCISES: CPT

## 2024-11-22 PROCEDURE — 1280000000 HC REHAB R&B

## 2024-11-22 PROCEDURE — 6370000000 HC RX 637 (ALT 250 FOR IP): Performed by: PHYSICAL MEDICINE & REHABILITATION

## 2024-11-22 PROCEDURE — 97162 PT EVAL MOD COMPLEX 30 MIN: CPT

## 2024-11-22 PROCEDURE — 83735 ASSAY OF MAGNESIUM: CPT

## 2024-11-22 PROCEDURE — 81001 URINALYSIS AUTO W/SCOPE: CPT

## 2024-11-22 PROCEDURE — 92507 TX SP LANG VOICE COMM INDIV: CPT

## 2024-11-22 PROCEDURE — 84134 ASSAY OF PREALBUMIN: CPT

## 2024-11-22 PROCEDURE — 92523 SPEECH SOUND LANG COMPREHEN: CPT

## 2024-11-22 PROCEDURE — 97116 GAIT TRAINING THERAPY: CPT

## 2024-11-22 PROCEDURE — 80048 BASIC METABOLIC PNL TOTAL CA: CPT

## 2024-11-22 PROCEDURE — 97535 SELF CARE MNGMENT TRAINING: CPT

## 2024-11-22 PROCEDURE — 36415 COLL VENOUS BLD VENIPUNCTURE: CPT

## 2024-11-22 PROCEDURE — 97530 THERAPEUTIC ACTIVITIES: CPT

## 2024-11-22 PROCEDURE — 94760 N-INVAS EAR/PLS OXIMETRY 1: CPT

## 2024-11-22 RX ORDER — ACETAMINOPHEN 500 MG
1000 TABLET ORAL EVERY 8 HOURS SCHEDULED
Status: DISPENSED | OUTPATIENT
Start: 2024-11-22

## 2024-11-22 RX ORDER — OXYCODONE HYDROCHLORIDE 10 MG/1
10 TABLET ORAL EVERY 4 HOURS PRN
Status: ACTIVE | OUTPATIENT
Start: 2024-11-22

## 2024-11-22 RX ORDER — OXYCODONE HYDROCHLORIDE 5 MG/1
5 TABLET ORAL ONCE
Status: COMPLETED | OUTPATIENT
Start: 2024-11-22 | End: 2024-11-22

## 2024-11-22 RX ORDER — METHOCARBAMOL 500 MG/1
500 TABLET, FILM COATED ORAL EVERY 6 HOURS PRN
Status: DISPENSED | OUTPATIENT
Start: 2024-11-22

## 2024-11-22 RX ADMIN — Medication 2 CAPSULE: at 17:01

## 2024-11-22 RX ADMIN — SACUBITRIL AND VALSARTAN 2 TABLET: 24; 26 TABLET, FILM COATED ORAL at 21:34

## 2024-11-22 RX ADMIN — ATORVASTATIN CALCIUM 80 MG: 80 TABLET, FILM COATED ORAL at 21:35

## 2024-11-22 RX ADMIN — OXYCODONE HYDROCHLORIDE 15 MG: 10 TABLET ORAL at 21:35

## 2024-11-22 RX ADMIN — OXYCODONE HYDROCHLORIDE 15 MG: 10 TABLET ORAL at 16:59

## 2024-11-22 RX ADMIN — OXYCODONE AND ACETAMINOPHEN 1 TABLET: 325; 10 TABLET ORAL at 08:13

## 2024-11-22 RX ADMIN — APIXABAN 5 MG: 5 TABLET, FILM COATED ORAL at 21:36

## 2024-11-22 RX ADMIN — ACETAMINOPHEN 1000 MG: 500 TABLET ORAL at 21:35

## 2024-11-22 RX ADMIN — OXYCODONE HYDROCHLORIDE 15 MG: 10 TABLET ORAL at 12:48

## 2024-11-22 RX ADMIN — TICAGRELOR 90 MG: 90 TABLET ORAL at 21:36

## 2024-11-22 RX ADMIN — HYDRALAZINE HYDROCHLORIDE 50 MG: 50 TABLET ORAL at 08:13

## 2024-11-22 RX ADMIN — OXYCODONE 5 MG: 5 TABLET ORAL at 10:42

## 2024-11-22 RX ADMIN — POLYETHYLENE GLYCOL 3350 17 G: 17 POWDER, FOR SOLUTION ORAL at 21:36

## 2024-11-22 RX ADMIN — ASPIRIN 81 MG: 81 TABLET, CHEWABLE ORAL at 08:13

## 2024-11-22 RX ADMIN — CARVEDILOL 12.5 MG: 12.5 TABLET, FILM COATED ORAL at 17:01

## 2024-11-22 RX ADMIN — SACUBITRIL AND VALSARTAN 2 TABLET: 24; 26 TABLET, FILM COATED ORAL at 08:13

## 2024-11-22 RX ADMIN — ACETAMINOPHEN 1000 MG: 500 TABLET ORAL at 16:07

## 2024-11-22 RX ADMIN — SPIRONOLACTONE 25 MG: 25 TABLET ORAL at 08:13

## 2024-11-22 RX ADMIN — Medication 2 CAPSULE: at 08:12

## 2024-11-22 RX ADMIN — APIXABAN 5 MG: 5 TABLET, FILM COATED ORAL at 08:12

## 2024-11-22 RX ADMIN — PANTOPRAZOLE SODIUM 40 MG: 40 TABLET, DELAYED RELEASE ORAL at 08:12

## 2024-11-22 RX ADMIN — ALPRAZOLAM 0.5 MG: 0.5 TABLET ORAL at 01:05

## 2024-11-22 RX ADMIN — TICAGRELOR 90 MG: 90 TABLET ORAL at 08:12

## 2024-11-22 RX ADMIN — CARVEDILOL 12.5 MG: 12.5 TABLET, FILM COATED ORAL at 08:12

## 2024-11-22 RX ADMIN — OXYCODONE AND ACETAMINOPHEN 1 TABLET: 325; 10 TABLET ORAL at 03:31

## 2024-11-22 RX ADMIN — HYDRALAZINE HYDROCHLORIDE 50 MG: 50 TABLET ORAL at 21:35

## 2024-11-22 ASSESSMENT — PAIN DESCRIPTION - DESCRIPTORS
DESCRIPTORS: THROBBING
DESCRIPTORS: STABBING
DESCRIPTORS: ACHING;THROBBING
DESCRIPTORS: ACHING
DESCRIPTORS: THROBBING
DESCRIPTORS: STABBING
DESCRIPTORS: STABBING;TIGHTNESS

## 2024-11-22 ASSESSMENT — PAIN - FUNCTIONAL ASSESSMENT

## 2024-11-22 ASSESSMENT — PAIN DESCRIPTION - LOCATION
LOCATION: SHOULDER;CHEST
LOCATION: CHEST
LOCATION: CHEST;SHOULDER;GENERALIZED
LOCATION: SHOULDER;CHEST
LOCATION: SHOULDER;CHEST

## 2024-11-22 ASSESSMENT — PAIN SCALES - GENERAL
PAINLEVEL_OUTOF10: 0
PAINLEVEL_OUTOF10: 9
PAINLEVEL_OUTOF10: 10
PAINLEVEL_OUTOF10: 7
PAINLEVEL_OUTOF10: 9
PAINLEVEL_OUTOF10: 0
PAINLEVEL_OUTOF10: 7
PAINLEVEL_OUTOF10: 9
PAINLEVEL_OUTOF10: 10
PAINLEVEL_OUTOF10: 10
PAINLEVEL_OUTOF10: 7
PAINLEVEL_OUTOF10: 0
PAINLEVEL_OUTOF10: 8
PAINLEVEL_OUTOF10: 8
PAINLEVEL_OUTOF10: 10
PAINLEVEL_OUTOF10: 7

## 2024-11-22 ASSESSMENT — PAIN DESCRIPTION - ORIENTATION
ORIENTATION: RIGHT;ANTERIOR
ORIENTATION: RIGHT
ORIENTATION: RIGHT;LEFT
ORIENTATION: RIGHT
ORIENTATION: RIGHT;ANTERIOR

## 2024-11-22 ASSESSMENT — PAIN DESCRIPTION - PAIN TYPE: TYPE: ACUTE PAIN

## 2024-11-22 ASSESSMENT — PAIN DESCRIPTION - FREQUENCY: FREQUENCY: CONTINUOUS

## 2024-11-22 ASSESSMENT — PAIN DESCRIPTION - ONSET: ONSET: ON-GOING

## 2024-11-22 NOTE — PROGRESS NOTES
Patient asleep for the first time this shift. Lab on the floor to draw morning labs, requested timing to be changed to 8am. Lab placed order. Susan Haynes RN

## 2024-11-22 NOTE — PROGRESS NOTES
Panel    Collection Time: 11/22/24  8:41 AM   Result Value Ref Range    Sodium 139 136 - 145 mmol/L    Potassium 3.6 3.5 - 5.1 mmol/L    Chloride 102 99 - 110 mmol/L    CO2 21 21 - 32 mmol/L    Anion Gap 16 3 - 16    Glucose 116 (H) 70 - 99 mg/dL    BUN 16 7 - 20 mg/dL    Creatinine 0.8 0.8 - 1.3 mg/dL    Est, Glom Filt Rate >90 >60    Calcium 10.1 8.3 - 10.6 mg/dL   CBC with Auto Differential    Collection Time: 11/22/24  8:41 AM   Result Value Ref Range    WBC 12.2 (H) 4.0 - 11.0 K/uL    RBC 4.02 (L) 4.20 - 5.90 M/uL    Hemoglobin 11.3 (L) 13.5 - 17.5 g/dL    Hematocrit 33.9 (L) 40.5 - 52.5 %    MCV 84.5 80.0 - 100.0 fL    MCH 28.1 26.0 - 34.0 pg    MCHC 33.3 31.0 - 36.0 g/dL    RDW 15.2 12.4 - 15.4 %    Platelets 690 (H) 135 - 450 K/uL    MPV 7.2 5.0 - 10.5 fL    PLATELET SLIDE REVIEW Increased     SLIDE REVIEW see below     Neutrophils % 77.4 %    Lymphocytes % 14.6 %    Monocytes % 4.4 %    Eosinophils % 2.6 %    Basophils % 1.0 %    Neutrophils Absolute 9.5 (H) 1.7 - 7.7 K/uL    Lymphocytes Absolute 1.8 1.0 - 5.1 K/uL    Monocytes Absolute 0.5 0.0 - 1.3 K/uL    Eosinophils Absolute 0.3 0.0 - 0.6 K/uL    Basophils Absolute 0.1 0.0 - 0.2 K/uL    Polychromasia Occasional (A)    Magnesium    Collection Time: 11/22/24  8:41 AM   Result Value Ref Range    Magnesium 2.24 1.80 - 2.40 mg/dL       Therapy progress:  Physical therapy:  Bed Mobility:     Sit>supine:     Supine>sit:     Transfers:     Sit>stand:     Stand>sit:     Bed<>chair     Stand Pivot:     Lateral transfer:     Car transfer:     Ambulation:     Stairs:     Curb:     Wheelchair:       Occupational therapy:   Feeding     Grooming/Oral Hygiene     UE Bathing     LE Bathing     UE Dressing     LE Dressing     Putting On/Taking Off Footwear     Toileting       Speech therapy:    ADULT DIET; Regular; No Added Salt (3-4 gm)  ADULT ORAL NUTRITION SUPPLEMENT; Breakfast, Lunch, Dinner; Standard High Calorie/High Protein Oral Supplement        Body mass index  Dispo: home with son  Services: TBD  DME: TBD  ELOS: 10-14 days      Nicolasa Abreu MD 11/22/2024, 12:21 PM

## 2024-11-22 NOTE — PROGRESS NOTES
A complete drug regimen review was completed for this patient.     [x]  No clinically significant medication issue was identified    []   Yes, a clinically significant medication issue was identified     []  Adverse Drug Event:      []  Allergy:      []  Side Effect:      []  Ineffective Therapy:      []  Drug Interaction:     []  Duplicated Therapy:     []  Untreated Indication:      []  Non-adherence:     []  Other:         Electronically signed by Susan Haynes RN on 11/22/24 at 4:59 AM EST

## 2024-11-22 NOTE — CARE COORDINATION
Chart Reviewed.  Met with patient to welcome him to Acute REhab, complete assessment and inform him of weekly Team conferences on Tuesday to review his progress.                                 SOCIAL WORK ASSESSMENT      GOAL:   To return home with son stronger and more independent      HOME SITUATION:   Pt and son live in a townhouse with 2 + 1 steps to enter.  His only bathroom and his bedroom are on the second floor which would be up 9 then a landing and then 4 more steps.   He has laundry in the basement and he likes to play the organ down there which would also be 13 steps.   He reports he is retired, totally independent, but does not drive anymore due to neuropathy.   He does the meals, laundry, does his finances on line and oversees his own meds.   Not in a pill box.  He uses lyft or uber for any transportation as his son works full time.       Pcp:   The chart reflects Dr Frost but he has no idea who this is    Pharmacy:  [facility] in [place]        PRIOR LEVEL OF FUNCTIONING:       PERSONAL CARE:  IND                                                                         DRIVES:  no                                                                     FINANCES:  INd one line                                                                     MEALS:     Ind                                                                                              GROCERY SHOPS:  dep      DME CURRENTLY AT HOME:  cane, wh walker, Tube/Shower with chair, mount and a bar.       CURRENT HOME CARE/SERVICES:  none currently.  Informed him of possible post acute services such as home care or out patient to continue his progress.  He is open to what MD orders      PREFERRED HOME CARE:  [facility]      TEAM CONFERENCE DAY:  Tuesdays.  Informed him of weekly Team Conferences where Team will review barriers, progress, DME recs and DC date. This worker will udpate him weekly and plan for DC needs.       LSW informed patient of

## 2024-11-22 NOTE — CONSULTS
Comprehensive Nutrition Assessment    Type and Reason for Visit:  Consult    Nutrition Recommendations/Plan:   Continue current diet with Ensure TID.     Malnutrition Assessment:  Malnutrition Status:  Moderate malnutrition (11/22/24 0902)    Context:  Acute Illness       Nutrition Assessment:    RD consult for IP rehab. Pt. admitted for anoxic encephalopathy d/t cardiac arrest. Currently receiving a REJI diet. Diet acceptance is good so far. Ensure started TID. Will keep in place for now as Pt. when several days prior to admission with very poor intake. Pt. is reporting infrequent loost stools, this is not his normal but feels it is in part d/t his recent poor nutrition status. Provided appropriate foods to help with normalizing bowels. No other questions at the time of visit. Will montior diet acceptance and nutritional adequacy.    Nutrition Related Findings:    BUN 22. LBM 11/21. Skin w/ area to chest. Wound Type: Surgical Incision       Current Nutrition Intake & Therapies:    Average Meal Intake: %  Average Supplements Intake: Unable to assess  ADULT DIET; Regular; No Added Salt (3-4 gm)  ADULT ORAL NUTRITION SUPPLEMENT; Breakfast, Lunch, Dinner; Standard High Calorie/High Protein Oral Supplement    Anthropometric Measures:  Height: 172.7 cm (5' 7.99\")  Ideal Body Weight (IBW): 154 lbs (70 kg)       Current Body Weight: 74.2 kg (163 lb 9.3 oz), 106.2 % IBW.    Current BMI (kg/m2): 24.9                             BMI Categories: Normal Weight (BMI 22.0 to 24.9) age over 65    Estimated Daily Nutrient Needs:  Energy Requirements Based On: Kcal/kg  Weight Used for Energy Requirements: Current  Energy (kcal/day): 3740-9226 kcal (22-27 kcal/kg)  Weight Used for Protein Requirements: Current  Protein (g/day): 74-96 g (1-1.3g/kg)  Method Used for Fluid Requirements: 1 ml/kcal  Fluid (ml/day): Or per provider    Nutrition Diagnosis:   Increased nutrient needs related to increase demand for energy/nutrients as

## 2024-11-22 NOTE — CARE COORDINATION
Called placed to son, Mike, to request clothing be brought to hospital so that he will have clothes to work in therapy.  Son reports he is leaving for [place] tomorrow; will send in clothing with a friend but it might be sun/Monday.    SARAH Uribe     Case Management   [phone]    11/22/2024  11:08 AM

## 2024-11-22 NOTE — PROGRESS NOTES
Patient continuing to get more agitated, cursing about his pain and reporting he hasn't slept in days. Continues to report that he is going through \"opiate withdraw\" and that 10 mg of percocet is nothing \"I could take 10 of those\".  Call placed to Dr. Abreu and new orders received. Patient updated and medicated per orders. Patient placed in wheelchair and walked around unit and 1 to 1 care given for over and hour. 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 Will continue to provide emotional support as needed. Susan Haynes RN

## 2024-11-22 NOTE — PROGRESS NOTES
Patient constantly calling out for pain medication even after being medicated and reminded of frequency. Reports he is going through withdrawal from not getting IV dilaudid. Perfect serve sent to Dr. Abreu to make her aware of patients concerns and request adjustment to atarax order to allow it to be given for anxiety as well as itching. Orders received. Susan Haynes RN

## 2024-11-22 NOTE — PROGRESS NOTES
Ethnicity  \"Are you of , /a, or Serbian origin?\"  Check all that apply:  [x] A.  No, not of , /a, or Serbian Origin  [] B.  Yes, Brazilian, Brazilian American, Chicano/a  [] C.  Yes, Fijian  [] D.  Yes, Yeyo  [] E.  Yes, another , , or Serbian origin  [] X.  Patient unable to respond    Race  \"What is your race?\"  Check all that apply:  [x] A.  White  [] B.  Black or   [] C.   or   [] D.     [] E.  Chinese  [] F.  Swiss  [] G. Macedonian  [] H.  Tamazight  [] I.  Afghan  [] J.  Other   [] K.    [] L.  Kazakh or Emerald  [] M.  Bahamian  [] N.  Other   [] X.  Patient unable to respond    High Risk Drug Classes:  Use and Indication    Is taking: Check if the pt is taking any medications by pharmacological classification, not how it is used, in the following classes  Indication noted: If column 1 is checked, check if there is an indication noted for all meds in the drug class Is taking  (check all that apply) Indication noted (check all that apply)   Antipsychotic [] []   Anticoagulant [] []   Antibiotic [] []   Opioid [x] [x]   Antiplatelet [x] [x]   Hypoglycemic (including insulin) [] []   None of the above []     Special Treatments, Procedures, and Programs    Check all of the following treatments, procedures, and programs that apply on admission. On admission (check all that apply)   Cancer Treatments   A1. Chemotherapy []           A2. IV []           A3. Oral []           A10. Other []   B1. Radiation []   Respiratory Therapies   C1. Oxygen Therapy []           C2. Continuous []           C3. Intermittent []           C4. High-concentration []   D1. Suctioning []           D2. Scheduled []           D3. As needed []   E1. Tracheostomy Care []   F1. Invasive Mechanical Ventilator (ventilator or respirator) []   G1. Non-invasive Mechanical Ventilator []           G2. BiPAP []

## 2024-11-22 NOTE — PROGRESS NOTES
Occupational Therapy  Facility/Department: [address] [facility]  Rehabilitation Occupational Therapy Evaluation       Date: 24  Patient Name: Ad Lacey       Room: S0T-7139/3271-01  MRN: [id number]  Account: [id number]   : 1950  (74 y.o.) Gender: male     Referring Practitioner: Nicolasa Abreu MD  Diagnosis: anoxic encephalopathy d/t cardiac arrest  Additional Pertinent Hx: Per H&P \"Patient is a 73 yo M with pmh CAD (prior PCI of the mid LAD in ), PAD (right fem-popliteal bypass), paroxysmal Afib, presacral mass, anxiety who initially presented on 2024 with cardiac arrest. Patient reportedly had chest pain at home and EMS was called. Upon their arrival patient was still alert, however had Vfib arrest en route to hospital. Had ROSC after 3 shocks. He was intubated in the field. Post-arrest EKG with anterior ST  elevations. He was taken to cath lab and found to have 100% LAD occlusion, stent thrombosis. He underwent aspiration thrombectomy and IVUS guided PCI to mid LAD with TRISHA x 1. Also with severe LV dysfunction and EF 20% requiring placement of pLVAD. Later had placement of Impella 5.5 (- with Dr. Lopez). Repeat LHC ( with Dr. Rodríguez) showed widely patent LAD. He was extubated 11/10 but has had ongoing acute hypoxic respiratory failure requiring supplemental O2 due to pulmonary edema and pneumonia. Course has been complicated by anemia, ileus, encephalopathy.  Now presents to ARU with impaired mobility, self-care, and cognition below his baseline.  Currently, patient reports generalized weakness. He has shortness of breath with minimal activity. He has severe chest wall pain. Worse with movement/activity, cough, deep inhalation. Improves with rest and medication. He is agreeable to starting inpatient rehabilitation program.\"    Restrictions  Restrictions/Precautions: Fall Risk  Other position/activity restrictions: S/P R Axillary Impella.  Per Vascular 2024 :

## 2024-11-22 NOTE — PLAN OF CARE
Progressing  Flowsheets (Taken 11/22/2024 1440)  Nutrient intake appropriate for improving, restoring, or maintaining nutritional needs:   Assess nutritional status and recommend course of action   Monitor oral intake, labs, and treatment plans  11/22/2024 0903 by Sona Haas RD  Outcome: Progressing  Flowsheets (Taken 11/22/2024 0903)  Nutrient intake appropriate for improving, restoring, or maintaining nutritional needs:   Assess nutritional status and recommend course of action   Monitor oral intake, labs, and treatment plans   Recommend appropriate diets, oral nutritional supplements, and vitamin/mineral supplements   Order, calculate, and assess calorie counts as needed   Recommend, monitor, and adjust tube feedings and TPN/PPN based on assessed needs   Provide specific nutrition education to patient or family as appropriate     Problem: Cardiovascular - Adult  Goal: Maintains optimal cardiac output and hemodynamic stability  Outcome: Progressing  Flowsheets (Taken 11/22/2024 1441)  Maintains optimal cardiac output and hemodynamic stability: Monitor blood pressure and heart rate     Problem: Skin/Tissue Integrity - Adult  Goal: Skin integrity remains intact  Outcome: Progressing  Flowsheets (Taken 11/22/2024 1441)  Skin Integrity Remains Intact:   Assess vascular access sites hourly   Monitor for areas of redness and/or skin breakdown  Goal: Incisions, wounds, or drain sites healing without S/S of infection  Outcome: Progressing  Flowsheets (Taken 11/22/2024 1441)  Incisions, Wounds, or Drain Sites Healing Without Sign and Symptoms of Infection:   ADMISSION and DAILY: Assess and document risk factors for pressure ulcer development   TWICE DAILY: Assess and document skin integrity   TWICE DAILY: Assess and document dressing/incision, wound bed, drain sites and surrounding tissue     Problem: Musculoskeletal - Adult  Goal: Return mobility to safest level of function  Outcome: Progressing  Flowsheets  (Taken 11/22/2024 1441)  Return Mobility to Safest Level of Function:   Assess patient stability and activity tolerance for standing, transferring and ambulating with or without assistive devices   Assist with transfers and ambulation using safe patient handling equipment as needed   Instruct patient/family in ordered activity level  Goal: Maintain proper alignment of affected body part  Outcome: Progressing  Flowsheets (Taken 11/22/2024 1441)  Maintain proper alignment of affected body part: Support and protect limb and body alignment per provider's orders  Goal: Return ADL status to a safe level of function  Outcome: Progressing  Flowsheets (Taken 11/22/2024 1441)  Return ADL Status to a Safe Level of Function:   Administer medication as ordered   Assess activities of daily living deficits and provide assistive devices as needed     Problem: Metabolic/Fluid and Electrolytes - Adult  Goal: Electrolytes maintained within normal limits  Outcome: Progressing  Flowsheets (Taken 11/22/2024 1441)  Electrolytes maintained within normal limits: Monitor labs and assess patient for signs and symptoms of electrolyte imbalances     Problem: Hematologic - Adult  Goal: Maintains hematologic stability  Outcome: Progressing  Flowsheets (Taken 11/22/2024 1441)  Maintains hematologic stability: Assess for signs and symptoms of bleeding or hemorrhage

## 2024-11-22 NOTE — PLAN OF CARE
Problem: Pain  Goal: Verbalizes/displays adequate comfort level or baseline comfort level  Outcome: Progressing  Flowsheets  Taken 11/22/2024 0234 by Susan Haynes, RN  Verbalizes/displays adequate comfort level or baseline comfort level:   Assess pain using appropriate pain scale   Encourage patient to monitor pain and request assistance  Taken 11/21/2024 1730 by Fatimah Corbett, RN  Verbalizes/displays adequate comfort level or baseline comfort level:   Encourage patient to monitor pain and request assistance   Assess pain using appropriate pain scale   Administer analgesics based on type and severity of pain and evaluate response   Implement non-pharmacological measures as appropriate and evaluate response  Note: Able to rate pain using a 1-10 scale, medicated per prn orders, see MAR. Able to verbalize a reduction in pain and/or able to fall asleep and remain asleep without any s/s of pain      Problem: Skin/Tissue Integrity  Goal: Absence of new skin breakdown  Description: 1.  Monitor for areas of redness and/or skin breakdown  2.  Assess vascular access sites hourly  3.  Every 4-6 hours minimum:  Change oxygen saturation probe site  4.  Every 4-6 hours:  If on nasal continuous positive airway pressure, respiratory therapy assess nares and determine need for appliance change or resting period.  Outcome: Progressing  Note: Able to change positions in bed without assist, no evidence of skin breakdown noted. Waffle cushion in place to chair.

## 2024-11-22 NOTE — PROGRESS NOTES
Facility/Department: [address] [facility]  Initial Speech/Language/Cognitive Assessment    NAME: Ad Lacey  : 1950   MRN: [id number]  ADMISSION DATE: 2024  ADMITTING DIAGNOSIS: Encephalopathy  has Arthritis of left knee; Peripheral arterial disease (HCC); Numbness of right foot; Critical lower limb ischemia (HCC); Neuropathic pain; Chronic pain syndrome; Accidental drug overdose; Depression; Atrial fibrillation with RVR (HCC); Basal cell carcinoma (BCC); Coronary atherosclerosis; High cholesterol; Hypertension; Other intra-abdominal and pelvic swelling, mass and lump; Cardiopulmonary arrest; ST elevation myocardial infarction (STEMI) (HCC); Ischemic cardiomyopathy; Gram-negative pneumonia (HCC); Acute coronary syndrome (HCC); Cardiogenic shock; Moderate malnutrition (HCC); and Anoxic encephalopathy due to cardiac arrest (HCC) on their problem list.   has a past medical history of Anxiety, Basal cell carcinoma, CAD (coronary artery disease), Peripheral artery disease (HCC), and Presacral mass (2022).   has a past surgical history that includes hip surgery (Right, 2022); Cardiac catheterization (); femoral bypass (Right, 2022); Cardiac procedure (N/A, 2024); Cardiac procedure (N/A, 2024); Cardiac procedure (N/A, 2024); Cardiac procedure (N/A, 2024); Cardiac procedure (N/A, 2024); Cardiac procedure (N/A, 2024); and Carotid endarterectomy (N/A, 2024).  Allergies: documentation of 'no known allergies'  DATE ONSET: 2024    Date of Eval: 2024   Evaluating Therapist: GARIMA Daigle    Chart Review  MD History and Physical documentation revealed:   History of Present Illness/Hospital Course:  Patient is a 75 yo M with pmh CAD (prior PCI of the mid LAD in ), PAD (right fem-popliteal bypass), paroxysmal Afib, presacral mass, anxiety who initially presented on 2024 with cardiac arrest. Patient reportedly had chest pain at home and EMS    Patient/family involved in developing goals and treatment plan: yes    Subjective:   Previous level of function and limitations: History per chart review and pt self report. Conflicting information will need to confirm with family ed  Social/Functional History  Lives With: Son (son works full time and is unable to provide 24/7)  Type of Home: House ([facility]  Home Layout: Two level  Home Equipment: Cane;Walker - Rolling  ADL Assistance: Independent  Ambulation Assistance: Independent  Transfer Assistance: Independent  Active : No  Mode of Transportation: Car  Education: HS; [employer];  Occupation: Retired  Type of Occupation: Retired : [employer]; Played in a band.(organ; background vocals)  Leisure & Hobbies: magician; plays organ  Additional Comments: Son manages a Home [employer] Business.           Objective:  Assessment included OMSME; Voice; Cognitive-Linguistic Exam  Pain: denied  Vision  Vision: Impaired  Vision Exceptions: Wears glasses at all times (pt denies acute status change. Visual problems post surgery (plastic surgery) post skin cancer)  Hearing  Hearing: Within functional limits    Oral Motor   Labial:  (Left facial asym at rest. Observation of very slight left asym. during volitional rom tasks.  pt correlates with history of surgery (skin ca and then plastic surgery which involved removing cartilage from ear for nose reconstruction surgery))  Dentition: Edentulous  Lingual:  (midline protrusion/elevation and good lateralization rom)  Velum:  (very slight left asym otherwise good rom during phonation of /a/)  Gag:  (diminished)    Motor Speech  Intelligibility:  (Hoarse voice quality. Pt reports due to pain from having compressions. Phonemic distortions ;minimal but appears related to limited dentition and self reported rom/sensory deficits post plastic surgery after skin cancer was removed)  Compensatory Strategies for Motor Speech: Good response to trial of functional voice ex targeting

## 2024-11-22 NOTE — PROGRESS NOTES
Physical Therapy  Facility/Department: [facility]  Rehabilitation Physical Therapy Initial Assessment    NAME: Ad Lacey  : 1950 (74 y.o.)  MRN: [id number]  CODE STATUS: Full Code    Date of Service: 24 (PM session activities noted in bold font)      Past Medical History:   Diagnosis Date    Anxiety     Basal cell carcinoma     L side of nose    CAD (coronary artery disease)     Peripheral artery disease (HCC)     Presacral mass 2022     Past Surgical History:   Procedure Laterality Date    CARDIAC CATHETERIZATION      CARDIAC PROCEDURE N/A 2024    Left heart cath / coronary angiography performed by Dillon Rodríguez MD at [facility] CARDIAC CATH LAB    CARDIAC PROCEDURE N/A 2024    Percutaneous coronary intervention performed by Dillon Rodríguez MD at [facility] CARDIAC CATH LAB    CARDIAC PROCEDURE N/A 2024    Ventricular assist device (VAD) insertion performed by Dillon Rodríguez MD at [facility] CARDIAC CATH LAB    CARDIAC PROCEDURE N/A 2024    Ventricular assist device (VAD) insertion performed by Dillon Rodríguez MD at [facility] CARDIAC CATH LAB    CARDIAC PROCEDURE N/A 2024    Coronary angiography performed by Dillon Rodríguez MD at [facility] CARDIAC CATH LAB    CARDIAC PROCEDURE N/A 2024    [place] filippo  insertion performed by Dillon Rodríguez MD at [facility] CARDIAC CATH LAB    CAROTID ENDARTERECTOMY N/A 2024    IMPELLA EXPLANT performed by Benjamin Lopez MD at [facility] OR    FEMORAL BYPASS Right 2022    RIGHT FEMORAL POPLITEAL BYPASS performed by Olvin Iyer DO at [facility] OR    HIP SURGERY Right 2022    HIP PINNING performed by Alexx Monreal MD at [facility] OR       Chart Reviewed: Yes  Patient assessed for rehabilitation services?: Yes  Additional Pertinent Hx: 73 y/o male admit 2024 with Cardiopulm Arrest, Cardiogenic Shock, STEMI,  Ischemic Cardiomyopathy.   - 11/10/2024 Pt Intubated.2024 Cardiac Cath : Severe LV Dysfunction : EF <20%, Impella placed; LAD  discharge  PT D/C Equipment  Other: Will monitor for potential equipt needs.  PT Equipment Recommendations  Other: Will monitor for potential equipt needs.    CLINICAL IMPRESSION   73 y/o male admit 11/9/2024 with Cardiopulm Arrest, Cardiogenic Shock, STEMI, Ischemic Cardiomyopathy. 11/9 - 11/10/2024 Pt Intubated.11/9/2024 Cardiac Cath : Severe LV Dysfunction : EF <20%, Impella placed; LAD Thrombectomy/Stent. 11/12/2024 Impella Upgrade : R Axillary; removed 11/19/2024. 11/13/2024 GI Consult : Ileus; NG Tube placed/removed. 11/14/2024 Cardiac Cath: [facility], [place] [address]. PMH as noted including CAD/LAD Stent (2022), PAD, R Fem-Pop (7/2022), R Hip Fx/ORIF (4/2022).  PTA pt living with son in 2 story [facility] with few steps to enter; independent daily care and functional mobility.  Currently, pt with issues with fatigue/endurance and chronic pain.     Pt requiring SBA assist to/from eob (flat).  Completed several Transfers with Walker CGA. (Per Vasc : Wgt Bear for Transfers without restrictions).   Amb further functional distances (111') with  walker CGA; cues for safe transitional mvts.  Pt does 4 steps with B rails CGA but was then too fatigued to try curb step.  Pt will benefit from 2 weeks of high level therapy to increase endurance and safety with functional mobility before returning home with son who works.    GOALS  Patient Goals   Patient Goals : Return home with son.  Short Term Goals  Time Frame for Short Term Goals: 1 week  Short Term Goal 1: Bed Mob S  Short Term Goal 2: Transfers with wh walker SBA  Short Term Goal 3: Ambulation with  walker SBA/S x 150'  Short Term Goal 4: 4 steps B rails SBA  Long Term Goals  Time Frame for Long Term Goals : 2 weeks  Long Term Goal 1: all transfers including car modif I  Long Term Goal 2: amb 200' with LRAD modif I  Long Term Goal 3: 12 steps with 1 rail S to modif I  Long Term Goal 4: curb step with LAD S    PLAN OF CARE  Frequency: 1-2 treatment sessions per day,

## 2024-11-22 NOTE — PROGRESS NOTES
Physical Therapy  PT assessment begun in am, to be completed with goals in pm.  Full note to follow.  Second Session Therapy Time     Individual Co-treatment   Time In 1015     Time Out 1045     Minutes 30        Electronically signed by Eli An PT on 11/22/2024 at 12:02 PM

## 2024-11-22 NOTE — PROGRESS NOTES
Patient admitted to rehab with anoxic encephalopathy r/t cardiac arrest.  A/Ox4. Transfers with walker x1. Mobility restrictions: WBAT no lifting extentsion past 90 degrees to R arm. On regular no added salt diet, tolerating fair. Medications taken whole with pudding. On eliquis for DVT prophylaxis.  Skin: scattered bruising, incision to R chest. Oxygen: RA. LDA: NONE. Has been continent of bowel and continent of bladder. LBM 11/21. Chair/bed alarms in use and call light in reach. Will monitor for safety.

## 2024-11-22 NOTE — PLAN OF CARE
ARU PATIENT TREATMENT PLAN  [facility]   [address] [place]  [address]  [phone]    Ad Lacey    : 1950  [facility] #: [id number]  MRN: [id number]   PHYSICIAN:  Nicolasa Abreu MD  Primary Problem    Patient Active Problem List   Diagnosis    Arthritis of left knee    Peripheral arterial disease (HCC)    Numbness of right foot    Critical lower limb ischemia (HCC)    Neuropathic pain    Chronic pain syndrome    Accidental drug overdose    Depression    Atrial fibrillation with RVR (HCC)    Basal cell carcinoma (BCC)    Coronary atherosclerosis    High cholesterol    Hypertension    Other intra-abdominal and pelvic swelling, mass and lump    Cardiopulmonary arrest    ST elevation myocardial infarction (STEMI) (HCC)    Ischemic cardiomyopathy    Gram-negative pneumonia (HCC)    Acute coronary syndrome (HCC)    Cardiogenic shock    Moderate malnutrition (HCC)    Anoxic encephalopathy due to cardiac arrest (HCC)       Rehabilitation Diagnosis:     Anoxic encephalopathy due to cardiac arrest (HCC) [G93.1, I46.9]       ADMIT DATE:2024    Patient Goals: To return home    Admitting Impairments: generalized weakness, decreased balance, endurance, cognition      Anoxic encephalopathy due to cardiac arrest (V fib)   -Regulate sleep/wake. Emphasis on routine.   -PT/OT/SLP.      STEMI   -s/p aspiration thrombectomy and IVUS guided PCI to mid LAD with TRISHA x 1 ( with Dr. Rodríguez)  -continue ASA, ticagrelor, statin  -PT/OT     Severe ischemic cardiomyopathy, acute HFrEF  -EF 20% -->45%  -s/p pLVAD (), Impella 5.5 (-)  ---RUE restrictions per Vascular  -continue carvedilol, Entresto, spironolactone  -Daily wt  -PT/OT     Paroxysmal Atrial fibrillation  -Start Eliquis per Cardiology  -continue carvedilol     HTN  -continue carvedilol, hydralazine, Entresto, spironolactone     Emphysema  -Monitor respiratory response to therapies  -continue albuterol  shocks. He was intubated in the field. Post-arrest EKG with anterior ST  elevations. He was taken to cath lab and found to have 100% LAD occlusion, stent thrombosis. He underwent aspiration thrombectomy and IVUS guided PCI to mid LAD with TRISHA x 1. Also with severe LV dysfunction and EF 20% requiring placement of pLVAD. Later had placement of Impella 5.5 (11/12-11/19 with Dr. Lopez). Repeat LHC (11/14 with Dr. Rodríguez) showed widely patent LAD. He was extubated 11/10 but has had ongoing acute hypoxic respiratory failure requiring supplemental O2 due to pulmonary edema and pneumonia. Course has been complicated by anemia, ileus, encephalopathy.  Now presents to ARU with impaired mobility, self-care, and cognition below his baseline.         I have reviewed this initial plan of care and agree with its contents:    Title   Name    Date    Time    Physician: Nicolasa Abreu MD 11/22/2024, 4:04 PM      Case Mgmt:  SARAH Uribe  11-  11:01 am     OT: Electronically signed by Cory Lynn OTR/L [id number] on 11/22/24 at 3:52 PM EST      PT:Electronically signed by Eli An PT on 11/22/24 at 3:11 PM EST      ST:  Dai Reyes,MS,CCC,SLP [id number] Speech and Language Pathologist 11/22/2024 at 1404 pm      ARU Supervisor:Lisa Herman RN CRRN 11/22/2024    Other:

## 2024-11-23 LAB
BASOPHILS # BLD: 0.1 K/UL (ref 0–0.2)
BASOPHILS NFR BLD: 0.8 %
DEPRECATED RDW RBC AUTO: 15.3 % (ref 12.4–15.4)
EOSINOPHIL # BLD: 0.2 K/UL (ref 0–0.6)
EOSINOPHIL NFR BLD: 1.4 %
HCT VFR BLD AUTO: 31.7 % (ref 40.5–52.5)
HGB BLD-MCNC: 10.7 G/DL (ref 13.5–17.5)
LYMPHOCYTES # BLD: 1.9 K/UL (ref 1–5.1)
LYMPHOCYTES NFR BLD: 16.7 %
MCH RBC QN AUTO: 28.7 PG (ref 26–34)
MCHC RBC AUTO-ENTMCNC: 33.8 G/DL (ref 31–36)
MCV RBC AUTO: 84.9 FL (ref 80–100)
MONOCYTES # BLD: 0.7 K/UL (ref 0–1.3)
MONOCYTES NFR BLD: 6.1 %
NEUTROPHILS # BLD: 8.7 K/UL (ref 1.7–7.7)
NEUTROPHILS NFR BLD: 75 %
PLATELET # BLD AUTO: 726 K/UL (ref 135–450)
PMV BLD AUTO: 7 FL (ref 5–10.5)
RBC # BLD AUTO: 3.74 M/UL (ref 4.2–5.9)
WBC # BLD AUTO: 11.6 K/UL (ref 4–11)

## 2024-11-23 PROCEDURE — 92507 TX SP LANG VOICE COMM INDIV: CPT

## 2024-11-23 PROCEDURE — 36415 COLL VENOUS BLD VENIPUNCTURE: CPT

## 2024-11-23 PROCEDURE — 97530 THERAPEUTIC ACTIVITIES: CPT

## 2024-11-23 PROCEDURE — 97110 THERAPEUTIC EXERCISES: CPT

## 2024-11-23 PROCEDURE — 6370000000 HC RX 637 (ALT 250 FOR IP): Performed by: PHYSICAL MEDICINE & REHABILITATION

## 2024-11-23 PROCEDURE — 97116 GAIT TRAINING THERAPY: CPT

## 2024-11-23 PROCEDURE — 85025 COMPLETE CBC W/AUTO DIFF WBC: CPT

## 2024-11-23 PROCEDURE — 97535 SELF CARE MNGMENT TRAINING: CPT

## 2024-11-23 PROCEDURE — 1280000000 HC REHAB R&B

## 2024-11-23 PROCEDURE — 97129 THER IVNTJ 1ST 15 MIN: CPT

## 2024-11-23 RX ADMIN — OXYCODONE HYDROCHLORIDE 15 MG: 10 TABLET ORAL at 13:19

## 2024-11-23 RX ADMIN — PANTOPRAZOLE SODIUM 40 MG: 40 TABLET, DELAYED RELEASE ORAL at 05:49

## 2024-11-23 RX ADMIN — ASPIRIN 81 MG: 81 TABLET, CHEWABLE ORAL at 08:40

## 2024-11-23 RX ADMIN — OXYCODONE HYDROCHLORIDE 15 MG: 10 TABLET ORAL at 17:59

## 2024-11-23 RX ADMIN — Medication 2 CAPSULE: at 08:39

## 2024-11-23 RX ADMIN — ATORVASTATIN CALCIUM 80 MG: 80 TABLET, FILM COATED ORAL at 20:12

## 2024-11-23 RX ADMIN — OXYCODONE HYDROCHLORIDE 15 MG: 10 TABLET ORAL at 04:35

## 2024-11-23 RX ADMIN — TICAGRELOR 90 MG: 90 TABLET ORAL at 08:40

## 2024-11-23 RX ADMIN — METHOCARBAMOL TABLETS 500 MG: 500 TABLET, COATED ORAL at 20:12

## 2024-11-23 RX ADMIN — APIXABAN 5 MG: 5 TABLET, FILM COATED ORAL at 08:39

## 2024-11-23 RX ADMIN — METHOCARBAMOL TABLETS 500 MG: 500 TABLET, COATED ORAL at 11:06

## 2024-11-23 RX ADMIN — APIXABAN 5 MG: 5 TABLET, FILM COATED ORAL at 20:12

## 2024-11-23 RX ADMIN — ACETAMINOPHEN 1000 MG: 500 TABLET ORAL at 13:59

## 2024-11-23 RX ADMIN — CARVEDILOL 12.5 MG: 12.5 TABLET, FILM COATED ORAL at 08:39

## 2024-11-23 RX ADMIN — CARVEDILOL 12.5 MG: 12.5 TABLET, FILM COATED ORAL at 16:45

## 2024-11-23 RX ADMIN — TICAGRELOR 90 MG: 90 TABLET ORAL at 20:10

## 2024-11-23 RX ADMIN — OXYCODONE HYDROCHLORIDE 15 MG: 10 TABLET ORAL at 08:39

## 2024-11-23 RX ADMIN — Medication 2 CAPSULE: at 16:45

## 2024-11-23 RX ADMIN — ACETAMINOPHEN 1000 MG: 500 TABLET ORAL at 22:11

## 2024-11-23 RX ADMIN — OXYCODONE HYDROCHLORIDE 15 MG: 10 TABLET ORAL at 22:09

## 2024-11-23 RX ADMIN — ACETAMINOPHEN 1000 MG: 500 TABLET ORAL at 05:49

## 2024-11-23 ASSESSMENT — PAIN SCALES - GENERAL
PAINLEVEL_OUTOF10: 5
PAINLEVEL_OUTOF10: 4
PAINLEVEL_OUTOF10: 8
PAINLEVEL_OUTOF10: 7
PAINLEVEL_OUTOF10: 6
PAINLEVEL_OUTOF10: 3
PAINLEVEL_OUTOF10: 8
PAINLEVEL_OUTOF10: 9
PAINLEVEL_OUTOF10: 5
PAINLEVEL_OUTOF10: 8
PAINLEVEL_OUTOF10: 8
PAINLEVEL_OUTOF10: 9
PAINLEVEL_OUTOF10: 6

## 2024-11-23 ASSESSMENT — PAIN DESCRIPTION - ORIENTATION
ORIENTATION: ANTERIOR
ORIENTATION: RIGHT
ORIENTATION: MID;RIGHT
ORIENTATION: RIGHT
ORIENTATION: RIGHT;ANTERIOR
ORIENTATION: RIGHT
ORIENTATION: RIGHT

## 2024-11-23 ASSESSMENT — PAIN DESCRIPTION - LOCATION
LOCATION: GENERALIZED
LOCATION: CHEST;HIP
LOCATION: GENERALIZED
LOCATION: CHEST;GENERALIZED
LOCATION: CHEST;GENERALIZED
LOCATION: GENERALIZED;SHOULDER;CHEST
LOCATION: CHEST;GENERALIZED

## 2024-11-23 ASSESSMENT — PAIN DESCRIPTION - DESCRIPTORS
DESCRIPTORS: STABBING
DESCRIPTORS: SORE
DESCRIPTORS: ACHING
DESCRIPTORS: SORE
DESCRIPTORS: SORE
DESCRIPTORS: ACHING

## 2024-11-23 ASSESSMENT — PAIN SCALES - WONG BAKER: WONGBAKER_NUMERICALRESPONSE: NO HURT

## 2024-11-23 ASSESSMENT — PAIN DESCRIPTION - FREQUENCY: FREQUENCY: CONTINUOUS

## 2024-11-23 ASSESSMENT — PAIN DESCRIPTION - PAIN TYPE: TYPE: ACUTE PAIN

## 2024-11-23 ASSESSMENT — PAIN DESCRIPTION - ONSET: ONSET: ON-GOING

## 2024-11-23 NOTE — PROGRESS NOTES
Occupational Therapy  Facility/Department: [address] [facility]  Rehabilitation Occupational Therapy Daily Treatment Note    Date: 24  Patient Name: Ad Lacey       Room: H2R-7401/3271-01  MRN: [id number]  Account: [id number]   : 1950  (74 y.o.) Gender: male          Past Medical History:  has a past medical history of Anxiety, Basal cell carcinoma, CAD (coronary artery disease), Peripheral artery disease (HCC), and Presacral mass.  Past Surgical History:   has a past surgical history that includes hip surgery (Right, 2022); Cardiac catheterization (); femoral bypass (Right, 2022); Cardiac procedure (N/A, 2024); Cardiac procedure (N/A, 2024); Cardiac procedure (N/A, 2024); Cardiac procedure (N/A, 2024); Cardiac procedure (N/A, 2024); Cardiac procedure (N/A, 2024); and Carotid endarterectomy (N/A, 2024).    Restrictions  Restrictions/Precautions: Fall Risk  Other position/activity restrictions: S/P R Axillary Impella.  Per Vascular 2024 : R UE Restrictions in place x 30 days (until 2024) : No lifting >10 lb; Wgt Bear for Transfers; Do not Extend R Elbow Overhead (beyond) 90 degrees. No showers until 24.    Subjective  Subjective: Pt met in bed, in bed agreeable to sponge bath ADL session. Pt stating \"my whole body hurts\" with pain /10, in chest, from chest compression and also neuropathy pain in R hand, feet.  Restrictions/Precautions: Fall Risk             Objective     Cognition  Safety Judgement: Good awareness of safety precautions;Decreased awareness of need for assistance;Decreased awareness of need for safety  Insights: Decreased awareness of deficits  Cognition Comment: Diminished safety awareness/line awareness  Orientation  Overall Orientation Status: Within Functional Limits  Orientation Level: Oriented X4         ADL  Grooming/Oral Hygiene  Assistance Level: Set-up  Skilled Clinical Factors: seated from chair at sink  to rinse mouth with mouthwash.(is endentulous). Pt assisted to wash hair with shampoo cap and used L hand to comb hair.  Upper Extremity Bathing  Assistance Level: Minimal assistance;Verbal cues;Set-up  Skilled Clinical Factors: Pt sponge bathed from chair at sink, regarded precaution to not lift RUE. Pt's back washed.  Lower Extremity Bathing  Assistance Level: Minimal assistance;Verbal cues;Increased time to complete  Skilled Clinical Factors: Pt sat from chair at sink, crossed LE's with effort, or brought leg up to reach to feet, needing assist for thoroughness, drying. Pt stood to wash buttocks, needing assist to dry.  Upper Extremity Dressing  Assistance Level: Minimal assistance;Verbal cues;Increased time to complete  Skilled Clinical Factors: Pt assited to doff overhead shirt, to regard RUE precautions. Pt donned button down shirt, cues for techn on donning RUE first. Pt needing assist to button 1 button, increased time for the remainder buttons, c/o R index finger numbness (not new).  Lower Extremity Dressing  Assistance Level: Minimal assistance;Verbal cues;Increased time to complete  Skilled Clinical Factors: Pt assisted to doff/don briefs, pants.  Putting On/Taking Off Footwear  Assistance Level: Moderate assistance;Verbal cues;Increased time to complete  Skilled Clinical Factors: Pt assisted to doff footies. Pt Dep to don freddy hose and needing Min A to don footies.          Functional Mobility  Device: Rolling walker  Activity: To/From bathroom  Assistance Level: Contact guard assist  Skilled Clinical Factors: RW, amb in room, bed>BR, 10 ft,  BR to recliner,25ft,  recliner to bed, 15 ft. No overt LOB. Min impulsive.  Supine to Sit  Assistance Level: Minimal assistance;Moderate assistance  Skilled Clinical Factors: HOB slightly raised. Assist for trunk. Pt c/o chest pain (from chest compressions)  Transfers  Surface:  (bed, chairs with and without arms)  Additional Factors: Hand placement cues;Verbal

## 2024-11-23 NOTE — PLAN OF CARE
Problem: Discharge Planning  Goal: Discharge to home or other facility with appropriate resources  11/23/2024 0041 by Charisse Purvis RN  Outcome: Progressing  11/22/2024 1440 by Humberto Mueller RN  Outcome: Progressing  Flowsheets (Taken 11/22/2024 1440)  Discharge to home or other facility with appropriate resources:   Identify barriers to discharge with patient and caregiver   Identify discharge learning needs (meds, wound care, etc)     Problem: Safety - Medical Restraint  Goal: Remains free of injury from restraints (Restraint for Interference with Medical Device)  Description: INTERVENTIONS:  1. Determine that other, less restrictive measures have been tried or would not be effective before applying the restraint  2. Evaluate the patient's condition at the time of restraint application  3. Inform patient/family regarding the reason for restraint  4. Q2H: Monitor safety, psychosocial status, comfort, nutrition and hydration  Outcome: Progressing     Problem: Pain  Goal: Verbalizes/displays adequate comfort level or baseline comfort level  11/23/2024 0041 by Charisse Purvis RN  Outcome: Progressing  11/22/2024 1440 by Humberto Mueller RN  Outcome: Progressing  Flowsheets (Taken 11/22/2024 1440)  Verbalizes/displays adequate comfort level or baseline comfort level:   Encourage patient to monitor pain and request assistance   Administer analgesics based on type and severity of pain and evaluate response   Assess pain using appropriate pain scale   Implement non-pharmacological measures as appropriate and evaluate response   Notify Licensed Independent Practitioner if interventions unsuccessful or patient reports new pain     Problem: Safety - Adult  Goal: Free from fall injury  11/23/2024 0041 by Charisse Purvis RN  Outcome: Progressing  11/22/2024 1440 by Humberto Mueller, RN  Outcome: Progressing  Flowsheets (Taken 11/22/2024 1440)  Free From Fall Injury: Instruct family/caregiver on patient safety     Problem:  Skin/Tissue Integrity  Goal: Absence of new skin breakdown  Description: 1.  Monitor for areas of redness and/or skin breakdown  2.  Assess vascular access sites hourly  3.  Every 4-6 hours minimum:  Change oxygen saturation probe site  4.  Every 4-6 hours:  If on nasal continuous positive airway pressure, respiratory therapy assess nares and determine need for appliance change or resting period.  11/23/2024 0041 by Charisse Purvis RN  Outcome: Progressing  11/22/2024 1440 by Humberto Mueller RN  Outcome: Progressing     Problem: ABCDS Injury Assessment  Goal: Absence of physical injury  11/23/2024 0041 by Charisse Purvis RN  Outcome: Progressing  11/22/2024 1440 by Humberto Mueller RN  Outcome: Progressing  Flowsheets (Taken 11/22/2024 1440)  Absence of Physical Injury: Implement safety measures based on patient assessment     Problem: Nutrition Deficit:  Goal: Optimize nutritional status  11/23/2024 0041 by Charisse Purvis RN  Outcome: Progressing  11/22/2024 1440 by Humberto Mueller RN  Outcome: Progressing  Flowsheets (Taken 11/22/2024 1440)  Nutrient intake appropriate for improving, restoring, or maintaining nutritional needs:   Assess nutritional status and recommend course of action   Monitor oral intake, labs, and treatment plans     Problem: Cardiovascular - Adult  Goal: Maintains optimal cardiac output and hemodynamic stability  11/23/2024 0041 by Charisse Purvis RN  Outcome: Progressing  Flowsheets (Taken 11/22/2024 2000)  Maintains optimal cardiac output and hemodynamic stability: Monitor blood pressure and heart rate  11/22/2024 1441 by Humberto Mueller RN  Outcome: Progressing  Flowsheets (Taken 11/22/2024 1441)  Maintains optimal cardiac output and hemodynamic stability: Monitor blood pressure and heart rate     Problem: Skin/Tissue Integrity - Adult  Goal: Skin integrity remains intact  11/23/2024 0041 by Charisse Purvis RN  Outcome: Progressing  11/22/2024 1441 by Humberto Mueller RN  Outcome:

## 2024-11-23 NOTE — PROGRESS NOTES
Physical Therapy  Facility/Department: [address] [facility]  Rehabilitation Physical Therapy Treatment Note    NAME: Ad Lacey  : 1950 (74 y.o.)  MRN: [id number]  CODE STATUS: Full Code    Date of Service: 24       Restrictions:  Restrictions/Precautions: Fall Risk  Position Activity Restriction  Other position/activity restrictions: S/P R Axillary Impella.  Per Vascular 2024 : R UE Restrictions in place x 30 days (until 2024) : No lifting >10 lb; Wgt Bear for Transfers; Do not Extend R Elbow Overhead (beyond) 90 degrees. No showers until 24.     SUBJECTIVE  Subjective  Subjective: Pt agreeable to PT treatment.  Pain: Reports 10/10 pain in RLE and R hip, chest     OBJECTIVE  Cognition  Safety Judgement: Good awareness of safety precautions;Decreased awareness of need for assistance;Decreased awareness of need for safety  Insights: Decreased awareness of deficits  Cognition Comment: Diminished safety awareness/line awareness  Orientation  Overall Orientation Status: Within Functional Limits  Orientation Level: Oriented X4    Functional Mobility  Sit to Supine  Assistance Level: Contact guard assist  Skilled Clinical Factors: HOB elevated as at home, no bedrail  Supine to Sit  Assistance Level: Stand by assist  Skilled Clinical Factors: HOB elevated as at home, no bedrail  Scooting  Assistance Level: Stand by assist  Sit to Stand  Assistance Level: Stand by assist  Stand to Sit  Assistance Level: Stand by assist      Environmental Mobility  Ambulation  Surface: Level surface;Carpet  Device: Rolling walker  Distance: 120' x 2 with 1 surface transition and several turns per walk  Assistance Level: Contact guard assist;Stand by assist  Gait Deviations: Slow carly    PT Exercises  Exercise Treatment: supine AP, ankle circles, heel slides, sitting TKE, add sets, red theraband abduction x 15-20 as avelino  Dynamic Standing Balance Exercises: standing tossed balloon against wall and caught it  Goal 4: curb step with LAD S    PLAN OF CARE/SAFETY  Physical Therapy Plan  General Plan: 5-7 times per week  Days Per Week: 5 Days  Hours Per Day: 1.5 hours  Therapy Duration: 2 Weeks  Specific Instructions for Next Treatment: icrease tolerance to activity  Current Treatment Recommendations: Strengthening;Therapeutic activities;Balance training;Functional mobility training;Transfer training;Gait training;Stair training;Endurance training;Safety education & training;Patient/Caregiver education & training  Safety Devices  Type of Devices: Call light within reach;Nurse notified;Gait belt;Bed alarm in place;Left in bed    EDUCATION           Therapy Time   Individual Concurrent Group Co-treatment   Time In 1000         Time Out 1045         Minutes 45           Timed Code Treatment Minutes: 45 Minutes       Eli An, PT, 11/23/24 at 10:50 AM     PM session: pt seen bedside, had pain meds approx 10 min prior to therapy session, agreed to get up.  O: Sup to sit SBA with extra time, HB mildly raised as he has at home.  Sit to stand CGA, amb 150' with CGA with wh walker, 2 turns and 1 surface transition.  Seated rest break, then up and down 6\" curb with wh walker and cues for technique, CGA with no LOB.  Returned to room via w/c, stand pivot transfer to bed SBA, sit to sup SB.  Pt declined to sit up in recliner.  A/P: avelino well with good balance and better endurance, pt reports significant fatigue and pain.  Second Session Therapy Time     Individual Co-treatment   Time In 1330     Time Out 1400     Minutes 30          Electronically signed by Eli An PT on 11/23/2024 at 2:03 PM

## 2024-11-23 NOTE — PLAN OF CARE
breakdown  Description: 1.  Monitor for areas of redness and/or skin breakdown  2.  Assess vascular access sites hourly  3.  Every 4-6 hours minimum:  Change oxygen saturation probe site  4.  Every 4-6 hours:  If on nasal continuous positive airway pressure, respiratory therapy assess nares and determine need for appliance change or resting period.  11/23/2024 1214 by Humberto Mueller RN  Outcome: Progressing  11/23/2024 0041 by Charisse Purvis RN  Outcome: Progressing     Problem: ABCDS Injury Assessment  Goal: Absence of physical injury  11/23/2024 1214 by Humberto Mueller RN  Outcome: Progressing  Flowsheets (Taken 11/22/2024 1440)  Absence of Physical Injury: Implement safety measures based on patient assessment  11/23/2024 0041 by Charisse Purvis RN  Outcome: Progressing     Problem: Nutrition Deficit:  Goal: Optimize nutritional status  11/23/2024 1214 by Humberto Mueller RN  Outcome: Progressing  Flowsheets (Taken 11/23/2024 1214)  Nutrient intake appropriate for improving, restoring, or maintaining nutritional needs:   Assess nutritional status and recommend course of action   Monitor oral intake, labs, and treatment plans  11/23/2024 0041 by Charisse Purvis RN  Outcome: Progressing     Problem: Cardiovascular - Adult  Goal: Maintains optimal cardiac output and hemodynamic stability  11/23/2024 1214 by Humberto Mueller RN  Outcome: Progressing  Flowsheets (Taken 11/23/2024 1214)  Maintains optimal cardiac output and hemodynamic stability: Monitor blood pressure and heart rate  11/23/2024 0041 by Charisse Purvis RN  Outcome: Progressing  Flowsheets (Taken 11/22/2024 2000)  Maintains optimal cardiac output and hemodynamic stability: Monitor blood pressure and heart rate     Problem: Skin/Tissue Integrity - Adult  Goal: Skin integrity remains intact  11/23/2024 1214 by Humberto Mueller RN  Outcome: Progressing  Flowsheets (Taken 11/23/2024 1214)  Skin Integrity Remains Intact:   Monitor for areas of redness and/or skin  VERONIKA Mcqueen  Outcome: Progressing     Problem: Infection - Adult  Goal: Absence of infection at discharge  11/23/2024 0041 by Charisse Purvis RN  Outcome: Progressing  Goal: Absence of infection during hospitalization  11/23/2024 1214 by Humberto Mueller RN  Outcome: Progressing  Flowsheets (Taken 11/23/2024 1214)  Absence of infection during hospitalization:   Assess and monitor for signs and symptoms of infection   Monitor lab/diagnostic results   Monitor all insertion sites i.e., indwelling lines, tubes and drains   Instruct and encourage patient and family to use good hand hygiene technique   Administer medications as ordered  11/23/2024 0041 by Charisse Purvis RN  Outcome: Progressing     Problem: Metabolic/Fluid and Electrolytes - Adult  Goal: Electrolytes maintained within normal limits  11/23/2024 1214 by Humberto Mueller RN  Outcome: Progressing  Flowsheets (Taken 11/23/2024 1214)  Electrolytes maintained within normal limits: Monitor labs and assess patient for signs and symptoms of electrolyte imbalances  11/23/2024 0041 by Charisse Purvis RN  Outcome: Progressing     Problem: Hematologic - Adult  Goal: Maintains hematologic stability  11/23/2024 1214 by Humberto Mueller RN  Outcome: Progressing  Flowsheets (Taken 11/23/2024 1214)  Maintains hematologic stability: Assess for signs and symptoms of bleeding or hemorrhage  11/23/2024 0041 by Charisse Purvis RN  Outcome: Progressing

## 2024-11-23 NOTE — PROGRESS NOTES
Patient admitted to rehab with anoxic encephalopathy due to cardiac arrest.  A/Ox4. Transfers with walker x1. Mobility restrictions: WBAT no lifting >10lbs, no elbow extension > 90 degrees. On regular no added salt diet, tolerating fair. Medications taken whole with thins/pudding. On eliquis for DVT prophylaxis.  Skin: R chest impella site, scattered bruising, redness to rectum. Oxygen: RA. LDA: NONE. Has been continent of bowel and continent of bladder. LB 11/22. Chair/bed alarms in use and call light in reach. Will monitor for safety.

## 2024-11-23 NOTE — PROGRESS NOTES
ACUTE REHAB UNIT  SPEECH/LANGUAGE PATHOLOGY      [x] Daily  [] Weekly Care Conference Note  [] Discharge    Patient:Ad Lacey      :1950  MRN:[id number]  Rehab Dx/Hx: Anoxic encephalopathy due to cardiac arrest (HCC) [G93.1, I46.9]      Precautions: falls and S/P R Axillary Impella - per Vascular 2024: R UE Restrictions in place x 30 days (until 2024); No lifting >10 lb; Wgt Bear for Transfers; Do not Extend R Elbow Overhead (beyond) 90 degrees; No showers until 24  Home situation: admitted from home with son (son works full time and is unable to provide )     ST Dx: [] Aphasia  [] Dysarthria  [] Apraxia   [] Oropharyngeal dysphagia [x] Cognitive   Impairment  [x] Other: Voice    Initial Speech Therapy Assessment Diagnosis:   Speech Therapy Diagnosis  Cognitive Diagnosis: Pt demonstrated functional temporal / spatial orientation; VPS/reasoning and math language. Pt demonstrated functional focused and sustined attention for strutured tasks. Breakdowon wtih working memory nd recall of new information during higher attention tasks (divided attention etc). Pt can be impulsive at times; he can be distractible and vebose/redundant in conveying history information which can impact. He responds well to verbal cues for re-diect t otask/topic. Conflicting information regarding level of support for adv DL tasks. Pt does live with son. Will need to clarify. IF there is a concern for neuro cognitive deficis would suggest Neuro consult/diagnostic imaging  Speech Diagnosis: Motor speech appeared functioanl. Howvr wtih pt hoarse voice quality which he reports is since his admit wtih heart attack and need for chest compressions. Pt did respond well to functional voice ex targeting breth support, reduced laryngeal tensionand frontal focus wtih improved voice quality/pitchvariation. Will trial. If persient potential need for ENT  Communication Diagnosis: Pt demonstrated concrete and mild complex

## 2024-11-24 VITALS
HEIGHT: 68 IN | DIASTOLIC BLOOD PRESSURE: 81 MMHG | WEIGHT: 163.58 LBS | SYSTOLIC BLOOD PRESSURE: 130 MMHG | RESPIRATION RATE: 17 BRPM | OXYGEN SATURATION: 97 % | HEART RATE: 63 BPM | BODY MASS INDEX: 24.79 KG/M2 | TEMPERATURE: 97.8 F

## 2024-11-24 PROCEDURE — 6370000000 HC RX 637 (ALT 250 FOR IP): Performed by: PHYSICAL MEDICINE & REHABILITATION

## 2024-11-24 PROCEDURE — 94760 N-INVAS EAR/PLS OXIMETRY 1: CPT

## 2024-11-24 PROCEDURE — 1280000000 HC REHAB R&B

## 2024-11-24 PROCEDURE — 6370000000 HC RX 637 (ALT 250 FOR IP): Performed by: STUDENT IN AN ORGANIZED HEALTH CARE EDUCATION/TRAINING PROGRAM

## 2024-11-24 RX ORDER — FLUTICASONE PROPIONATE 50 MCG
2 SPRAY, SUSPENSION (ML) NASAL DAILY PRN
Status: DISPENSED | OUTPATIENT
Start: 2024-11-24

## 2024-11-24 RX ADMIN — SACUBITRIL AND VALSARTAN 2 TABLET: 24; 26 TABLET, FILM COATED ORAL at 08:09

## 2024-11-24 RX ADMIN — ACETAMINOPHEN 1000 MG: 500 TABLET ORAL at 13:57

## 2024-11-24 RX ADMIN — PANTOPRAZOLE SODIUM 40 MG: 40 TABLET, DELAYED RELEASE ORAL at 05:58

## 2024-11-24 RX ADMIN — APIXABAN 5 MG: 5 TABLET, FILM COATED ORAL at 20:00

## 2024-11-24 RX ADMIN — SPIRONOLACTONE 25 MG: 25 TABLET ORAL at 08:09

## 2024-11-24 RX ADMIN — METHOCARBAMOL TABLETS 500 MG: 500 TABLET, COATED ORAL at 08:07

## 2024-11-24 RX ADMIN — SACUBITRIL AND VALSARTAN 2 TABLET: 24; 26 TABLET, FILM COATED ORAL at 20:56

## 2024-11-24 RX ADMIN — TICAGRELOR 90 MG: 90 TABLET ORAL at 20:00

## 2024-11-24 RX ADMIN — HYDRALAZINE HYDROCHLORIDE 50 MG: 50 TABLET ORAL at 20:00

## 2024-11-24 RX ADMIN — CARVEDILOL 12.5 MG: 12.5 TABLET, FILM COATED ORAL at 08:08

## 2024-11-24 RX ADMIN — POLYETHYLENE GLYCOL 3350 17 G: 17 POWDER, FOR SOLUTION ORAL at 20:00

## 2024-11-24 RX ADMIN — OXYCODONE HYDROCHLORIDE 15 MG: 10 TABLET ORAL at 05:56

## 2024-11-24 RX ADMIN — TICAGRELOR 90 MG: 90 TABLET ORAL at 08:09

## 2024-11-24 RX ADMIN — FLUTICASONE PROPIONATE 2 SPRAY: 50 SPRAY, METERED NASAL at 15:16

## 2024-11-24 RX ADMIN — OXYCODONE HYDROCHLORIDE 15 MG: 10 TABLET ORAL at 13:58

## 2024-11-24 RX ADMIN — ACETAMINOPHEN 1000 MG: 500 TABLET ORAL at 22:03

## 2024-11-24 RX ADMIN — ALPRAZOLAM 0.5 MG: 0.5 TABLET ORAL at 00:14

## 2024-11-24 RX ADMIN — OXYCODONE HYDROCHLORIDE 15 MG: 10 TABLET ORAL at 10:04

## 2024-11-24 RX ADMIN — Medication 2 CAPSULE: at 17:45

## 2024-11-24 RX ADMIN — Medication 2 CAPSULE: at 08:08

## 2024-11-24 RX ADMIN — OXYCODONE HYDROCHLORIDE 15 MG: 10 TABLET ORAL at 17:52

## 2024-11-24 RX ADMIN — CARVEDILOL 12.5 MG: 12.5 TABLET, FILM COATED ORAL at 17:45

## 2024-11-24 RX ADMIN — ASPIRIN 81 MG: 81 TABLET, CHEWABLE ORAL at 08:08

## 2024-11-24 RX ADMIN — APIXABAN 5 MG: 5 TABLET, FILM COATED ORAL at 08:09

## 2024-11-24 RX ADMIN — ACETAMINOPHEN 1000 MG: 500 TABLET ORAL at 05:55

## 2024-11-24 RX ADMIN — METHOCARBAMOL TABLETS 500 MG: 500 TABLET, COATED ORAL at 20:00

## 2024-11-24 RX ADMIN — HYDRALAZINE HYDROCHLORIDE 50 MG: 50 TABLET ORAL at 08:08

## 2024-11-24 RX ADMIN — OXYCODONE HYDROCHLORIDE 15 MG: 10 TABLET ORAL at 02:03

## 2024-11-24 RX ADMIN — ATORVASTATIN CALCIUM 80 MG: 80 TABLET, FILM COATED ORAL at 20:00

## 2024-11-24 RX ADMIN — ALPRAZOLAM 0.5 MG: 0.5 TABLET ORAL at 20:00

## 2024-11-24 RX ADMIN — OXYCODONE HYDROCHLORIDE 15 MG: 10 TABLET ORAL at 22:02

## 2024-11-24 RX ADMIN — METHOCARBAMOL TABLETS 500 MG: 500 TABLET, COATED ORAL at 13:58

## 2024-11-24 ASSESSMENT — PAIN SCALES - GENERAL
PAINLEVEL_OUTOF10: 4
PAINLEVEL_OUTOF10: 9
PAINLEVEL_OUTOF10: 9
PAINLEVEL_OUTOF10: 10
PAINLEVEL_OUTOF10: 9
PAINLEVEL_OUTOF10: 5
PAINLEVEL_OUTOF10: 10
PAINLEVEL_OUTOF10: 7
PAINLEVEL_OUTOF10: 4
PAINLEVEL_OUTOF10: 4
PAINLEVEL_OUTOF10: 10
PAINLEVEL_OUTOF10: 3
PAINLEVEL_OUTOF10: 4

## 2024-11-24 ASSESSMENT — PAIN DESCRIPTION - LOCATION
LOCATION: CHEST
LOCATION: CHEST
LOCATION: CHEST;LEG;SHOULDER
LOCATION: CHEST
LOCATION: CHEST

## 2024-11-24 ASSESSMENT — PAIN DESCRIPTION - ONSET: ONSET: ON-GOING

## 2024-11-24 ASSESSMENT — PAIN - FUNCTIONAL ASSESSMENT
PAIN_FUNCTIONAL_ASSESSMENT: ACTIVITIES ARE NOT PREVENTED

## 2024-11-24 ASSESSMENT — PAIN DESCRIPTION - DESCRIPTORS
DESCRIPTORS: SORE
DESCRIPTORS: SORE
DESCRIPTORS: STABBING
DESCRIPTORS: SORE
DESCRIPTORS: ACHING;DISCOMFORT;SORE
DESCRIPTORS: SORE
DESCRIPTORS: THROBBING

## 2024-11-24 ASSESSMENT — PAIN DESCRIPTION - ORIENTATION
ORIENTATION: UPPER
ORIENTATION: UPPER
ORIENTATION: MID;LEFT
ORIENTATION: RIGHT
ORIENTATION: MID;RIGHT

## 2024-11-24 ASSESSMENT — PAIN SCALES - WONG BAKER: WONGBAKER_NUMERICALRESPONSE: NO HURT

## 2024-11-24 ASSESSMENT — PAIN DESCRIPTION - FREQUENCY: FREQUENCY: CONTINUOUS

## 2024-11-24 ASSESSMENT — PAIN DESCRIPTION - PAIN TYPE: TYPE: ACUTE PAIN

## 2024-11-24 NOTE — PROGRESS NOTES
Patient ambulated to bathroom then walked in hallway with RN assist, gaitbelt and walker. Patient assisted back to room. Refused to sit in chair for lunch. Patient assisted back to bed. Bed alarm is on. Call light in reach.

## 2024-11-24 NOTE — PROGRESS NOTES
This is a 74 y.o.  male admitted on 11/21/2024 with Anoxic encephalopathy due to cardiac arrest (HCC) [G93.1, I46.9]. A&O X 4.  Active bowel sounds. Last BM 11/22/24. Patient is continent of of bowel & bladder.  He is on a regular diet. Medications taken whole with thins. Skin: bruising right shoulder. Transfers with walker x 1. HS medication given. Tolerated well.  Call light and bedside table within reach. Patient instructed to call if he has any needs or changes.

## 2024-11-24 NOTE — PLAN OF CARE
Problem: Discharge Planning  Goal: Discharge to home or other facility with appropriate resources  11/24/2024 1208 by Debby Zafar RN  Outcome: Progressing  11/24/2024 0058 by Francesca Bautista RN  Outcome: Progressing     Problem: Safety - Medical Restraint  Goal: Remains free of injury from restraints (Restraint for Interference with Medical Device)  Description: INTERVENTIONS:  1. Determine that other, less restrictive measures have been tried or would not be effective before applying the restraint  2. Evaluate the patient's condition at the time of restraint application  3. Inform patient/family regarding the reason for restraint  4. Q2H: Monitor safety, psychosocial status, comfort, nutrition and hydration  11/24/2024 1208 by Debby Zafar RN  Outcome: Progressing  11/24/2024 0058 by Francesca Bautista RN  Outcome: Progressing     Problem: Pain  Goal: Verbalizes/displays adequate comfort level or baseline comfort level  11/24/2024 1208 by Debby Zafar RN  Outcome: Progressing  11/24/2024 0058 by Francesca Bautista RN  Outcome: Progressing     Problem: Skin/Tissue Integrity  Goal: Absence of new skin breakdown  Description: 1.  Monitor for areas of redness and/or skin breakdown  2.  Assess vascular access sites hourly  3.  Every 4-6 hours minimum:  Change oxygen saturation probe site  4.  Every 4-6 hours:  If on nasal continuous positive airway pressure, respiratory therapy assess nares and determine need for appliance change or resting period.  11/24/2024 0058 by Francesca Bautista RN  Outcome: Progressing     Problem: Metabolic/Fluid and Electrolytes - Adult  Goal: Electrolytes maintained within normal limits  11/24/2024 0058 by Francesca Bautista RN  Outcome: Progressing

## 2024-11-24 NOTE — PROGRESS NOTES
Attempted to get patient to chair for breakfast. Patient refused and states \"maybe later after pain medication. \" Will check back with patient later. Call light in reach.

## 2024-11-24 NOTE — PLAN OF CARE
Problem: Discharge Planning  Goal: Discharge to home or other facility with appropriate resources  11/24/2024 0058 by Francesca Bautista, RN  Outcome: Progressing     Problem: Safety - Medical Restraint  Goal: Remains free of injury from restraints (Restraint for Interference with Medical Device)  Description: INTERVENTIONS:  1. Determine that other, less restrictive measures have been tried or would not be effective before applying the restraint  2. Evaluate the patient's condition at the time of restraint application  3. Inform patient/family regarding the reason for restraint  4. Q2H: Monitor safety, psychosocial status, comfort, nutrition and hydration  Outcome: Progressing     Problem: Pain  Goal: Verbalizes/displays adequate comfort level or baseline comfort level  11/24/2024 0058 by Francesca Bautista, RN  Outcome: Progressing     Problem: Skin/Tissue Integrity  Goal: Absence of new skin breakdown  Description: 1.  Monitor for areas of redness and/or skin breakdown  2.  Assess vascular access sites hourly  3.  Every 4-6 hours minimum:  Change oxygen saturation probe site  4.  Every 4-6 hours:  If on nasal continuous positive airway pressure, respiratory therapy assess nares and determine need for appliance change or resting period.  11/24/2024 0058 by Francesca Bautista, RN  Outcome: Progressing     Problem: Metabolic/Fluid and Electrolytes - Adult  Goal: Electrolytes maintained within normal limits  11/24/2024 0058 by Francesca Bautista, RN  Outcome: Progressing

## 2024-11-24 NOTE — PROGRESS NOTES
K/uL    Lymphocytes Absolute 1.9 1.0 - 5.1 K/uL    Monocytes Absolute 0.7 0.0 - 1.3 K/uL    Eosinophils Absolute 0.2 0.0 - 0.6 K/uL    Basophils Absolute 0.1 0.0 - 0.2 K/uL       Therapy progress:  Physical therapy:  Bed Mobility:     Sit>supine:  Assistance Level: Contact guard assist  Skilled Clinical Factors: HOB elevated as at home, no bedrail  Supine>sit:  Assistance Level: Stand by assist  Skilled Clinical Factors: HOB elevated as at home, no bedrail  Transfers:     Sit>stand:  Assistance Level: Stand by assist  Stand>sit:  Assistance Level: Stand by assist  Bed<>chair     Stand Pivot:     Lateral transfer:     Car transfer:     Ambulation:  Surface: Level surface, Carpet  Device: Rolling walker  Distance: 120' x 2 with 1 surface transition and several turns per walk  Assistance Level: Contact guard assist, Stand by assist  Gait Deviations: Slow carly  Stairs:     Curb:     Wheelchair:       Occupational therapy:   Feeding     Grooming/Oral Hygiene  Assistance Level: Set-up  Skilled Clinical Factors: seated from chair at sink to rinse mouth with mouthwash.(is endentulous). Pt assisted to wash hair with shampoo cap and used L hand to comb hair.  UE Bathing  Assistance Level: Minimal assistance, Verbal cues, Set-up  Skilled Clinical Factors: Pt sponge bathed from chair at sink, regarded precaution to not lift RUE. Pt's back washed.  LE Bathing  Assistance Level: Minimal assistance, Verbal cues, Increased time to complete  Skilled Clinical Factors: Pt sat from chair at sink, crossed LE's with effort, or brought leg up to reach to feet, needing assist for thoroughness, drying. Pt stood to wash buttocks, needing assist to dry.  UE Dressing  Assistance Level: Minimal assistance, Verbal cues, Increased time to complete  Skilled Clinical Factors: Pt assited to doff overhead shirt, to regard RUE precautions. Pt donned button down shirt, cues for techn on donning RUE first. Pt needing assist to button 1 button,

## 2024-11-25 LAB
ANION GAP SERPL CALCULATED.3IONS-SCNC: 18 MMOL/L (ref 3–16)
BASOPHILS # BLD: 0.1 K/UL (ref 0–0.2)
BASOPHILS NFR BLD: 1.1 %
BUN SERPL-MCNC: 28 MG/DL (ref 7–20)
CALCIUM SERPL-MCNC: 9.2 MG/DL (ref 8.3–10.6)
CHLORIDE SERPL-SCNC: 106 MMOL/L (ref 99–110)
CO2 SERPL-SCNC: 19 MMOL/L (ref 21–32)
CREAT SERPL-MCNC: 0.9 MG/DL (ref 0.8–1.3)
DEPRECATED RDW RBC AUTO: 15.5 % (ref 12.4–15.4)
EOSINOPHIL # BLD: 0.3 K/UL (ref 0–0.6)
EOSINOPHIL NFR BLD: 2.7 %
GFR SERPLBLD CREATININE-BSD FMLA CKD-EPI: 89 ML/MIN/{1.73_M2}
GLUCOSE SERPL-MCNC: 113 MG/DL (ref 70–99)
HCT VFR BLD AUTO: 30.6 % (ref 40.5–52.5)
HGB BLD-MCNC: 10.6 G/DL (ref 13.5–17.5)
LYMPHOCYTES # BLD: 1.5 K/UL (ref 1–5.1)
LYMPHOCYTES NFR BLD: 14.5 %
MAGNESIUM SERPL-MCNC: 2.32 MG/DL (ref 1.8–2.4)
MCH RBC QN AUTO: 29.3 PG (ref 26–34)
MCHC RBC AUTO-ENTMCNC: 34.5 G/DL (ref 31–36)
MCV RBC AUTO: 85 FL (ref 80–100)
MONOCYTES # BLD: 0.7 K/UL (ref 0–1.3)
MONOCYTES NFR BLD: 6.5 %
NEUTROPHILS # BLD: 7.6 K/UL (ref 1.7–7.7)
NEUTROPHILS NFR BLD: 75.2 %
PLATELET # BLD AUTO: 779 K/UL (ref 135–450)
PMV BLD AUTO: 7 FL (ref 5–10.5)
POTASSIUM SERPL-SCNC: 3.6 MMOL/L (ref 3.5–5.1)
RBC # BLD AUTO: 3.6 M/UL (ref 4.2–5.9)
SODIUM SERPL-SCNC: 143 MMOL/L (ref 136–145)
WBC # BLD AUTO: 10.1 K/UL (ref 4–11)

## 2024-11-25 PROCEDURE — 97110 THERAPEUTIC EXERCISES: CPT

## 2024-11-25 PROCEDURE — 83735 ASSAY OF MAGNESIUM: CPT

## 2024-11-25 PROCEDURE — 36415 COLL VENOUS BLD VENIPUNCTURE: CPT

## 2024-11-25 PROCEDURE — 85025 COMPLETE CBC W/AUTO DIFF WBC: CPT

## 2024-11-25 PROCEDURE — 97129 THER IVNTJ 1ST 15 MIN: CPT

## 2024-11-25 PROCEDURE — 97530 THERAPEUTIC ACTIVITIES: CPT

## 2024-11-25 PROCEDURE — 1280000000 HC REHAB R&B

## 2024-11-25 PROCEDURE — 94760 N-INVAS EAR/PLS OXIMETRY 1: CPT

## 2024-11-25 PROCEDURE — 97535 SELF CARE MNGMENT TRAINING: CPT

## 2024-11-25 PROCEDURE — 92507 TX SP LANG VOICE COMM INDIV: CPT

## 2024-11-25 PROCEDURE — 80048 BASIC METABOLIC PNL TOTAL CA: CPT

## 2024-11-25 PROCEDURE — 6370000000 HC RX 637 (ALT 250 FOR IP): Performed by: PHYSICAL MEDICINE & REHABILITATION

## 2024-11-25 PROCEDURE — 97116 GAIT TRAINING THERAPY: CPT

## 2024-11-25 RX ORDER — DIAZEPAM 5 MG/1
5 TABLET ORAL NIGHTLY PRN
Status: DISCONTINUED | OUTPATIENT
Start: 2024-11-25 | End: 2024-11-27

## 2024-11-25 RX ORDER — HYDRALAZINE HYDROCHLORIDE 25 MG/1
25 TABLET, FILM COATED ORAL 2 TIMES DAILY
Status: DISCONTINUED | OUTPATIENT
Start: 2024-11-25 | End: 2024-11-27

## 2024-11-25 RX ADMIN — Medication 2 CAPSULE: at 17:23

## 2024-11-25 RX ADMIN — ASPIRIN 81 MG: 81 TABLET, CHEWABLE ORAL at 09:57

## 2024-11-25 RX ADMIN — PANTOPRAZOLE SODIUM 40 MG: 40 TABLET, DELAYED RELEASE ORAL at 06:08

## 2024-11-25 RX ADMIN — TICAGRELOR 90 MG: 90 TABLET ORAL at 19:43

## 2024-11-25 RX ADMIN — ACETAMINOPHEN 1000 MG: 500 TABLET ORAL at 13:45

## 2024-11-25 RX ADMIN — ATORVASTATIN CALCIUM 80 MG: 80 TABLET, FILM COATED ORAL at 19:44

## 2024-11-25 RX ADMIN — Medication 3 MG: at 01:26

## 2024-11-25 RX ADMIN — ACETAMINOPHEN 1000 MG: 500 TABLET ORAL at 06:07

## 2024-11-25 RX ADMIN — OXYCODONE HYDROCHLORIDE 15 MG: 10 TABLET ORAL at 19:43

## 2024-11-25 RX ADMIN — CARVEDILOL 12.5 MG: 12.5 TABLET, FILM COATED ORAL at 10:30

## 2024-11-25 RX ADMIN — OXYCODONE HYDROCHLORIDE 15 MG: 10 TABLET ORAL at 06:07

## 2024-11-25 RX ADMIN — Medication 2 CAPSULE: at 09:57

## 2024-11-25 RX ADMIN — SACUBITRIL AND VALSARTAN 2 TABLET: 24; 26 TABLET, FILM COATED ORAL at 19:43

## 2024-11-25 RX ADMIN — DIAZEPAM 5 MG: 5 TABLET ORAL at 19:44

## 2024-11-25 RX ADMIN — METHOCARBAMOL TABLETS 500 MG: 500 TABLET, COATED ORAL at 09:05

## 2024-11-25 RX ADMIN — POLYETHYLENE GLYCOL 3350 17 G: 17 POWDER, FOR SOLUTION ORAL at 09:57

## 2024-11-25 RX ADMIN — OXYCODONE HYDROCHLORIDE 15 MG: 10 TABLET ORAL at 02:01

## 2024-11-25 RX ADMIN — APIXABAN 5 MG: 5 TABLET, FILM COATED ORAL at 19:44

## 2024-11-25 RX ADMIN — APIXABAN 5 MG: 5 TABLET, FILM COATED ORAL at 09:57

## 2024-11-25 RX ADMIN — TICAGRELOR 90 MG: 90 TABLET ORAL at 09:56

## 2024-11-25 RX ADMIN — OXYCODONE HYDROCHLORIDE 15 MG: 10 TABLET ORAL at 14:26

## 2024-11-25 RX ADMIN — OXYCODONE HYDROCHLORIDE 15 MG: 10 TABLET ORAL at 10:20

## 2024-11-25 ASSESSMENT — PAIN DESCRIPTION - PAIN TYPE
TYPE: ACUTE PAIN
TYPE: ACUTE PAIN;CHRONIC PAIN
TYPE: ACUTE PAIN
TYPE: ACUTE PAIN;CHRONIC PAIN
TYPE: ACUTE PAIN

## 2024-11-25 ASSESSMENT — PAIN SCALES - GENERAL
PAINLEVEL_OUTOF10: 10
PAINLEVEL_OUTOF10: 7
PAINLEVEL_OUTOF10: 10
PAINLEVEL_OUTOF10: 9
PAINLEVEL_OUTOF10: 10
PAINLEVEL_OUTOF10: 10
PAINLEVEL_OUTOF10: 7
PAINLEVEL_OUTOF10: 9
PAINLEVEL_OUTOF10: 9
PAINLEVEL_OUTOF10: 10
PAINLEVEL_OUTOF10: 7
PAINLEVEL_OUTOF10: 7

## 2024-11-25 ASSESSMENT — PAIN DESCRIPTION - ONSET
ONSET: ON-GOING

## 2024-11-25 ASSESSMENT — PAIN - FUNCTIONAL ASSESSMENT
PAIN_FUNCTIONAL_ASSESSMENT: ACTIVITIES ARE NOT PREVENTED

## 2024-11-25 ASSESSMENT — PAIN DESCRIPTION - LOCATION: LOCATION: CHEST;HIP;LEG

## 2024-11-25 ASSESSMENT — PAIN DESCRIPTION - ORIENTATION
ORIENTATION: RIGHT;POSTERIOR
ORIENTATION: RIGHT;PROXIMAL
ORIENTATION: RIGHT;LEFT;MID;POSTERIOR
ORIENTATION: RIGHT;LEFT;MID;POSTERIOR
ORIENTATION: RIGHT
ORIENTATION: RIGHT;LEFT;MID;POSTERIOR
ORIENTATION: RIGHT

## 2024-11-25 ASSESSMENT — PAIN DESCRIPTION - FREQUENCY
FREQUENCY: CONTINUOUS

## 2024-11-25 ASSESSMENT — PAIN DESCRIPTION - DESCRIPTORS
DESCRIPTORS: ACHING;DISCOMFORT
DESCRIPTORS: ACHING;DISCOMFORT;SORE
DESCRIPTORS: ACHING;DISCOMFORT;SHARP

## 2024-11-25 NOTE — PLAN OF CARE
Problem: Discharge Planning  Goal: Discharge to home or other facility with appropriate resources  Outcome: Progressing     Problem: Safety - Medical Restraint  Goal: Remains free of injury from restraints (Restraint for Interference with Medical Device)  Description: INTERVENTIONS:  1. Determine that other, less restrictive measures have been tried or would not be effective before applying the restraint  2. Evaluate the patient's condition at the time of restraint application  3. Inform patient/family regarding the reason for restraint  4. Q2H: Monitor safety, psychosocial status, comfort, nutrition and hydration  Outcome: Progressing     Problem: Pain  Goal: Verbalizes/displays adequate comfort level or baseline comfort level  Outcome: Progressing     Problem: Safety - Adult  Goal: Free from fall injury  Outcome: Progressing     Problem: Skin/Tissue Integrity  Goal: Absence of new skin breakdown  Description: 1.  Monitor for areas of redness and/or skin breakdown  2.  Assess vascular access sites hourly  3.  Every 4-6 hours minimum:  Change oxygen saturation probe site  4.  Every 4-6 hours:  If on nasal continuous positive airway pressure, respiratory therapy assess nares and determine need for appliance change or resting period.  Outcome: Progressing     Problem: ABCDS Injury Assessment  Goal: Absence of physical injury  Outcome: Progressing     Problem: Nutrition Deficit:  Goal: Optimize nutritional status  Outcome: Progressing     Problem: Cardiovascular - Adult  Goal: Maintains optimal cardiac output and hemodynamic stability  Outcome: Progressing     Problem: Skin/Tissue Integrity - Adult  Goal: Skin integrity remains intact  Outcome: Progressing  Goal: Incisions, wounds, or drain sites healing without S/S of infection  Outcome: Progressing     Problem: Musculoskeletal - Adult  Goal: Return mobility to safest level of function  Outcome: Progressing  Goal: Maintain proper alignment of affected body

## 2024-11-25 NOTE — PROGRESS NOTES
Patient admitted to rehab with Anoxic Encephalopathy S/P cardiac arrest.  A/Ox4. Transfers with walker and gait belt. Mobility restrictions: WBAT, using abdominal binder to control pain. On General no added salt diet, tolerating well. Medications taken whole with thin liquids. On Eliquis and aspirin for DVT prophylaxis.  Skin: Bruising left groin, right chest. Staples to right chest . Oxygen: RA. LDA: none. Has been continent of bowel and bladder. LBM 11/25/2024. Chair/bed alarms in use and call light in reach. Will monitor for safety. Electronically signed by Kami Ortiz RN on 11/25/2024 at 5:58 PM

## 2024-11-25 NOTE — PLAN OF CARE
Problem: Pain  Goal: Verbalizes/displays adequate comfort level or baseline comfort level  11/24/2024 2118 by Georgia Santana RN  Outcome: Progressing  11/24/2024 1208 by Debby Zafar RN  Outcome: Progressing     Problem: Safety - Adult  Goal: Free from fall injury  Outcome: Progressing     Problem: ABCDS Injury Assessment  Goal: Absence of physical injury  Outcome: Progressing     Problem: Skin/Tissue Integrity  Goal: Absence of new skin breakdown  Description: 1.  Monitor for areas of redness and/or skin breakdown  2.  Assess vascular access sites hourly  3.  Every 4-6 hours minimum:  Change oxygen saturation probe site  4.  Every 4-6 hours:  If on nasal continuous positive airway pressure, respiratory therapy assess nares and determine need for appliance change or resting period.  Outcome: Progressing     Problem: Nutrition Deficit:  Goal: Optimize nutritional status  Outcome: Progressing     Problem: Cardiovascular - Adult  Goal: Maintains optimal cardiac output and hemodynamic stability  Outcome: Progressing     Problem: Musculoskeletal - Adult  Goal: Return mobility to safest level of function  Outcome: Progressing  Flowsheets (Taken 11/24/2024 0807 by Debby Zafar RN)  Return Mobility to Safest Level of Function: Assess patient stability and activity tolerance for standing, transferring and ambulating with or without assistive devices     Problem: Musculoskeletal - Adult  Goal: Maintain proper alignment of affected body part  Outcome: Progressing     Problem: Musculoskeletal - Adult  Goal: Return ADL status to a safe level of function  Outcome: Progressing     Problem: Discharge Planning  Goal: Discharge to home or other facility with appropriate resources  11/24/2024 2118 by Georgia Santana RN  Outcome: Progressing  11/24/2024 1208 by Debby Zafar RN  Outcome: Progressing

## 2024-11-25 NOTE — PROGRESS NOTES
ACUTE REHAB UNIT  SPEECH/LANGUAGE PATHOLOGY      [x] Daily  [x] Weekly Care Conference Note  [] Discharge    Patient:Ad Lacey      :1950  MRN:[id number]  Rehab Dx/Hx: Anoxic encephalopathy due to cardiac arrest (HCC) [G93.1, I46.9]      Precautions: falls and S/P R Axillary Impella - per Vascular 2024: R UE Restrictions in place x 30 days (until 2024); No lifting >10 lb; Wgt Bear for Transfers; Do not Extend R Elbow Overhead (beyond) 90 degrees; No showers until 24  Home situation: admitted from home with son (son works full time and is unable to provide )     ST Dx: [] Aphasia  [] Dysarthria  [] Apraxia   [] Oropharyngeal dysphagia [x] Cognitive   Impairment  [x] Other: Voice    Initial Speech Therapy Assessment Diagnosis:   Cognitive Diagnosis: Pt demonstrated functional temporal / spatial orientation; VPS/reasoning and math language. Pt demonstrated functional focused and sustained attention for structured tasks. Breakdown with working memory and recall of new information during higher attention tasks (divided attention etc). Pt can be impulsive at times; he can be distractible and verbose/redundant in conveying history information which can impact. He responds well to verbal cues for re-direct to task/topic. Conflicting information regarding level of support for adv DL tasks. Pt does live with son. Will need to clarify. IF there is a concern for neuro cognitive deficits would suggest Neuro consult/diagnostic imaging  Speech Diagnosis: Motor speech appeared functional. However with pt hoarse voice quality which he reports is since his admit with heart attack and need for chest compressions. Pt did respond well to functional voice ex targeting breath support, reduced laryngeal tension and frontal focus with improved voice quality/pitch variation. Will trial. If persistent potential need for ENT  Communication Diagnosis: Pt demonstrated concrete and mild complex functional

## 2024-11-25 NOTE — PROGRESS NOTES
Department of Physical Medicine & Rehabilitation  Progress Note    Patient Identification:  Ad Lacey  [id number]  : 1950  Admit date: 2024    Chief Complaint: Anoxic encephalopathy due to cardiac arrest (HCC)    Subjective:   Patient states that he is feeling well and denies any new onset complaints.     ROS: No f/c, n/v    Objective:  Patient Vitals for the past 24 hrs:   BP Temp Temp src Pulse Resp SpO2 Weight   24 1946 130/81 97.8 °F (36.6 °C) Oral 63 17 97 % --   24 1752 -- -- -- -- 17 -- --   24 1645 (!) 119/95 98 °F (36.7 °C) Oral 72 17 97 % --   24 1358 -- -- -- -- 16 -- --   24 1004 -- -- -- -- 16 -- --   24 0842 -- -- -- -- -- 96 % --   24 0807 115/83 97.9 °F (36.6 °C) Oral 70 18 96 % --   24 0626 -- -- -- -- 17 -- --   24 0457 -- -- -- -- -- -- 74.2 kg (163 lb 9.3 oz)   24 0233 -- -- -- -- 17 -- --   24 2239 -- -- -- -- 18 -- --     Const: Alert. No distress, pleasant.   HEENT: Normocephalic, atraumatic. Normal sclera/conjunctiva. MMM.   CV: Regular rate and rhythm.   Resp: No respiratory distress. Lungs CTAB.   Abd: Soft, nontender, nondistended, NABS+   Ext: No edema.   Neuro: Alert, oriented, impaired recall/insight  Psych: Cooperative, appropriate mood and affect    Laboratory data: Available via EMR.   Last 24 hour lab  No results found for this or any previous visit (from the past 24 hour(s)).      Therapy progress:  Physical therapy:  Bed Mobility:     Sit>supine:  Assistance Level: Contact guard assist  Skilled Clinical Factors: HOB elevated as at home, no bedrail  Supine>sit:  Assistance Level: Stand by assist  Skilled Clinical Factors: HOB elevated as at home, no bedrail  Transfers:     Sit>stand:  Assistance Level: Stand by assist  Stand>sit:  Assistance Level: Stand by assist  Bed<>chair     Stand Pivot:     Lateral transfer:     Car transfer:     Ambulation:  Surface: Level surface, Carpet  Device: Rolling  Subjective:      Patient ID: Aisha Lea is a 51 y.o. female.    Chief Complaint: Follow-up    Aisha is a 51 y.o. female who presents to the clinic for evaluation and treatment of high risk feet. Aisha has a past medical history of Anxiety, Depression, Diabetes mellitus type I, Hyperlipidemia, and Hypertension. The patient's chief complaint is right foot third toe ulcer she relates she first noticed the ulcer last week, she has been applying mupirocin to the ulcer. This patient has documented high risk feet requiring routine maintenance secondary to diabetes mellitis and those secondary complications of diabetes, as mentioned.     6/27/22: patient returns for follow up right toe ulcer, she stopped wearing the football wraps because the foot started cramping up on her. She also was in a diabetic shoes. New ulcer noted to the second toe.    PCP: Derrick Malik MD        Current shoe gear:  Affected Foot: Extra depth shoes     Unaffected Foot: Extra depth shoes    History of Trauma: negative  Sign of Infection: none    Hemoglobin A1C   Date Value Ref Range Status   12/12/2020 7.3 (H) 0.0 - 5.6 % Final     Comment:     Reference Interval:  5.0 - 5.6 Normal   5.7 - 6.4 High Risk   > 6.5 Diabetic    Hgb A1c results are standardized based on the (NGSP) National   Glycohemoglobin Standardization Program.    Hemoglobin A1C levels are related to mean serum/plasma glucose   during the preceding 2-3 months.        11/19/2019 7.1 (H) 4.0 - 5.6 % Final     Comment:     ADA Screening Guidelines:  5.7-6.4%  Consistent with prediabetes  >or=6.5%  Consistent with diabetes  High levels of fetal hemoglobin interfere with the HbA1C  assay. Heterozygous hemoglobin variants (HbS, HgC, etc)do  not significantly interfere with this assay.   However, presence of multiple variants may affect accuracy.         Review of Systems   Constitutional: Negative for chills and fever.   Cardiovascular: Negative for claudication and leg  swelling.   Respiratory: Negative for shortness of breath.    Skin: Positive for nail changes and poor wound healing. Negative for itching and rash.   Musculoskeletal: Positive for joint pain. Negative for muscle cramps, muscle weakness and myalgias.   Gastrointestinal: Negative for nausea and vomiting.   Neurological: Positive for numbness and paresthesias. Negative for focal weakness and loss of balance.           Objective:      Physical Exam  Constitutional:       General: She is not in acute distress.     Appearance: She is well-developed. She is not diaphoretic.   Cardiovascular:      Pulses:           Dorsalis pedis pulses are 2+ on the right side and 2+ on the left side.        Posterior tibial pulses are 2+ on the right side and 2+ on the left side.      Comments: < 3 sec capillary refill time to toes 1-5 bilateral. Toes and feet are warm to touch proximally with normal distal cooling b/l. There is some hair growth on the feet and toes b/l. There is no edema b/l. No spider veins or varicosities present b/l.     Musculoskeletal:      Comments: Bilateral severe hallux abductovalgus with hammertoe deformities non reducible (partially reducible right 2-3 toes) with very prominent plantar metatarsal heads bilateral    Previosu left 4th toe amputation    Equinus noted b/l ankles with < 10 deg DF noted. MMT 5/5 in DF/PF/Inv/Ev resistance with no reproduction of pain in any direction. Passive range of motion of ankle and pedal joints is painless b/l.    Pain on palpation plantar medial and central heel right foot. No pain with ROM or MMT. No pain with medial and lateral compression of heel.       Skin:     General: Skin is warm and dry.      Coloration: Skin is not pale.      Findings: Erythema and lesion present. No abrasion, bruising, burn, ecchymosis, laceration, petechiae or rash.      Nails: There is no clubbing.      Comments: Skin temperature, texture and turgor within normal limits.    Focal hyperkeratotic  lesions bilateral plantar second metatarsal heads, and right distal third toe and right plantar hallux and left sub 3 and sub 4 metatarsals. 6 lesions in all    Ulcer Location: Distal right third toe  Measurements:  0.4x0.3x0.1 cm  Periwound: Intact  Drainage: serosanguinous.  Pus: None.  Malodor: None.  Base:  Mostly fibrous.  Signs of infection: Erythema and edema improving on antibiotic     Neurological:      Mental Status: She is alert and oriented to person, place, and time.      Sensory: No sensory deficit.      Motor: No tremor, atrophy or abnormal muscle tone.      Comments: Negative tinel sign bilateral.   Psychiatric:         Behavior: Behavior normal.               Assessment:       Encounter Diagnoses   Name Primary?    Type 1 diabetes mellitus with diabetic autonomic (poly)neuropathy Yes    Hammer toes of both feet     Skin ulcer of third toe of right foot with fat layer exposed          Plan:       Aisha was seen today for follow-up.    Diagnoses and all orders for this visit:    Type 1 diabetes mellitus with diabetic autonomic (poly)neuropathy    Hammer toes of both feet    Skin ulcer of third toe of right foot with fat layer exposed      I counseled the patient on her conditions, their implications and medical management.    Shoe inspection. Diabetic Foot Education. Patient reminded of the importance of good nutrition and blood sugar control to help prevent podiatric complications of diabetes. Patient instructed on proper foot hygeine. We discussed wearing proper shoe gear, daily foot inspections, never walking without protective shoe gear, never putting sharp instruments to feet    Wound granular discussed better offloading by straightening the toes with a flexor tenotomy she was amendable to this     Surgeon: Anthony Waldron [id number]  Assistants: none  Procedure: percutaneous flexor tenotomy right 2-3 toes  Pre-op Dx: hammertoe/contracture   Postop Dx: same  Pathology: none  Anesthesia:  none  Hemostasis: none  EBL: < 1 mL  Materials: none  Injectibles:3 mL 1% plain lidocaine for digital nerve block per toe x2  Complications:none    Procedure:    Time out was called confirming the patient, toe and procedure. Skin was prepped with alcohol followed by digital nerve block with 3 mL 1% plain lidocaine. Betadine prep of the right foot was completed. Sterile drapping applied. 18 gauge needle was entered percutaneously through plantar medial aspect of DIPJ and use to release the long flexor tendon while the toe was extended first of the right second toe followed by the right third toe. The contracture deformity reduced. Site was cleansed with NS. Applied xeroform, sterile gauze, cast padding and coban.     The dressing is to remain clean, dry and intact for 3 days then patient may cleanse the surgical site with dial soap and water and apply triple antibiotic ointment and band aid daily.    Continue offloading with postop shoe.      RTC 1 week for wound check.        Anthony Waldron DPM

## 2024-11-25 NOTE — PROGRESS NOTES
Occupational Therapy  Facility/Department: [address] [facility]  Rehabilitation Occupational Therapy Daily Treatment Note    Date: 24  Patient Name: Ad Lacey       Room: D1B-1726/3271-01  MRN: [id number]  Account: [id number]   : 1950  (74 y.o.) Gender: male            PM session: Pt refused to attend therapy in the gym, stating \"I'm too weak.\" Seen bedside per pt's request. He showed OT cell ph pics of his \"Beatrice's procedure\" on his nose. Now he wears nasal strips for his deviated septum. Pt states he hasn't had a PCP in \"many years\"--discussed importance of seeing a PCP yearly for monitoring medications, vital signs & labs. Discussed his medication management--his son sets up his wkly pill organizer, states he takes \"10-12 pills, some twice a day.\" Pt reports suffering from \"anxiety\" and takes \"Valium\" --has chronic pain from \"neuropathy\" and \"hasn't found relief\" with \"Percocet.\" He is able to get from supine<>sit on EOB w/ SBA used rail in L hand. Pt seated on EOB to order tomorrow's meals. Discussed stress relief activities including playing his organ. Pt reports increased pain @chest, may benefit from abdominal binder to support his trunk<>rib areas. RN notified. Tx time: 40 minutes, cont w/ POC Vandana Blankenship, OTR/L, CNS #[id number]         Past Medical History:  has a past medical history of Anxiety, Basal cell carcinoma, CAD (coronary artery disease), Peripheral artery disease (HCC), and Presacral mass.  Past Surgical History:   has a past surgical history that includes hip surgery (Right, 2022); Cardiac catheterization (); femoral bypass (Right, 2022); Cardiac procedure (N/A, 2024); Cardiac procedure (N/A, 2024); Cardiac procedure (N/A, 2024); Cardiac procedure (N/A, 2024); Cardiac procedure (N/A, 2024); Cardiac procedure (N/A, 2024); and Carotid endarterectomy (N/A, 2024).    Restrictions  Restrictions/Precautions: Fall Risk  Other

## 2024-11-25 NOTE — PROGRESS NOTES
assistance, Verbal cues, Set-up  Skilled Clinical Factors: Pt sponge bathed from chair at sink, regarded precaution to not lift RUE. Pt's back washed.  LE Bathing  Assistance Level: Minimal assistance, Verbal cues, Increased time to complete  Skilled Clinical Factors: Pt sat from chair at sink, crossed LE's with effort, or brought leg up to reach to feet, needing assist for thoroughness, drying. Pt stood to wash buttocks, needing assist to dry.  UE Dressing  Assistance Level: Minimal assistance, Verbal cues, Increased time to complete  Skilled Clinical Factors: Pt assited to doff overhead shirt, to regard RUE precautions. Pt donned button down shirt, cues for techn on donning RUE first. Pt needing assist to button 1 button, increased time for the remainder buttons, c/o R index finger numbness (not new).  LE Dressing  Assistance Level: Minimal assistance, Verbal cues, Increased time to complete  Skilled Clinical Factors: Pt assisted to doff/don briefs, pants.  Putting On/Taking Off Footwear  Assistance Level: Moderate assistance, Verbal cues, Increased time to complete  Skilled Clinical Factors: Pt assisted to doff footies. Pt Dep to don freddy hose and needing Min A to don footies.  Toileting       Speech therapy:    ADULT DIET; Regular; No Added Salt (3-4 gm)  ADULT ORAL NUTRITION SUPPLEMENT; Breakfast, Lunch, Dinner; Standard High Calorie/High Protein Oral Supplement        Body mass index is 24.54 kg/m².    Rehabilitation Diagnosis:   Brain, 2.1, Non-Traumatic        Assessment and Plan:     Impairments: generalized weakness, decreased balance, endurance, cognition      Anoxic encephalopathy due to cardiac arrest (V fib)   -Regulate sleep/wake. Emphasis on routine.   -PT/OT/SLP.      STEMI   -s/p aspiration thrombectomy and IVUS guided PCI to mid LAD with TRISHA x 1 (11/9 with Dr. Rodríguez)  -continue ASA, ticagrelor, statin  -PT/OT     Severe ischemic cardiomyopathy, acute HFrEF  -EF 20% -->45%  -s/p pLVAD (11/9),

## 2024-11-25 NOTE — PROGRESS NOTES
Physical Therapy  Facility/Department: [address] [facility]  Rehabilitation Physical Therapy Treatment Note    NAME: Ad Lacey  : 1950 (74 y.o.)  MRN: [id number]  CODE STATUS: Full Code    Date of Service: 24       Restrictions:  Restrictions/Precautions: Fall Risk  Position Activity Restriction  Other position/activity restrictions: S/P R Axillary Impella.  Per Vascular 2024 : R UE Restrictions in place x 30 days (until 2024) : No lifting >10 lb; Wgt Bear for Transfers; Do not Extend R Elbow Overhead (beyond) 90 degrees. No showers until 24.  Pt recalls precautions.     SUBJECTIVE  Subjective  Subjective: Pt agreeable to PT treatment in room, sys he is in too much pain to go to unit  Pain: Reports 10/10 pain in RLE and R hip, chest,back     OBJECTIVE     Functional Mobility  Bed Mobility  Overall Assistance Level: Supervision  Roll Left  Assistance Level: Supervision  Roll Right  Assistance Level: Supervision  Sit to Supine  Assistance Level: Supervision  Skilled Clinical Factors: HOB elevated as at home, no bedrail  Supine to Sit  Assistance Level: Supervision  Skilled Clinical Factors: HOB elevated as at home, no bedrail  Balance  Sitting Balance: Independent  Standing Balance: Supervision  Transfers  Surface: From bed;To bed;Raised toilet Seat  Sit to Stand  Assistance Level: Supervision  Stand to Sit  Assistance Level: Supervision  Skilled Clinical Factors: toilet transfer also S      Environmental Mobility  Ambulation  Surface: Level surface  Device: Rolling walker (some with no AD)  Distance: 150' wth several turns and 2 doorways  Assistance Level: Stand by assist  Gait Deviations: Slow carly    PT Exercises  Exercise Treatment: supine AP, ankle circles, heel slides, sitting TKE, add sets, resisted abduction and resisted hamstring curls x 15-20 as avelino      ASSESSMENT/PROGRESS TOWARDS GOALS       Assessment  Assessment: 73 y/o male admit 2024 with Cardiopulm Arrest,

## 2024-11-25 NOTE — PROGRESS NOTES
Shift assessment done. All night time medications given per MAR. Patient took all his oral medications whole with water, tolerated well. All fall precautions implemented. All needs attended. Electronically signed by Georgia Santana RN on 11/24/2024 at 9:20 PM

## 2024-11-26 PROCEDURE — 6370000000 HC RX 637 (ALT 250 FOR IP): Performed by: PHYSICAL MEDICINE & REHABILITATION

## 2024-11-26 PROCEDURE — 97129 THER IVNTJ 1ST 15 MIN: CPT

## 2024-11-26 PROCEDURE — 97530 THERAPEUTIC ACTIVITIES: CPT

## 2024-11-26 PROCEDURE — 94760 N-INVAS EAR/PLS OXIMETRY 1: CPT

## 2024-11-26 PROCEDURE — 97110 THERAPEUTIC EXERCISES: CPT

## 2024-11-26 PROCEDURE — 97535 SELF CARE MNGMENT TRAINING: CPT

## 2024-11-26 PROCEDURE — 92507 TX SP LANG VOICE COMM INDIV: CPT

## 2024-11-26 PROCEDURE — 97116 GAIT TRAINING THERAPY: CPT

## 2024-11-26 PROCEDURE — 1280000000 HC REHAB R&B

## 2024-11-26 RX ADMIN — OXYCODONE HYDROCHLORIDE 15 MG: 10 TABLET ORAL at 00:03

## 2024-11-26 RX ADMIN — ACETAMINOPHEN 1000 MG: 500 TABLET ORAL at 21:44

## 2024-11-26 RX ADMIN — DIAZEPAM 5 MG: 5 TABLET ORAL at 21:44

## 2024-11-26 RX ADMIN — HYDRALAZINE HYDROCHLORIDE 25 MG: 25 TABLET ORAL at 21:44

## 2024-11-26 RX ADMIN — OXYCODONE HYDROCHLORIDE 15 MG: 10 TABLET ORAL at 09:11

## 2024-11-26 RX ADMIN — OXYCODONE HYDROCHLORIDE 15 MG: 10 TABLET ORAL at 05:11

## 2024-11-26 RX ADMIN — PANTOPRAZOLE SODIUM 40 MG: 40 TABLET, DELAYED RELEASE ORAL at 05:12

## 2024-11-26 RX ADMIN — METHOCARBAMOL TABLETS 500 MG: 500 TABLET, COATED ORAL at 17:26

## 2024-11-26 RX ADMIN — ACETAMINOPHEN 1000 MG: 500 TABLET ORAL at 13:19

## 2024-11-26 RX ADMIN — OXYCODONE HYDROCHLORIDE 15 MG: 10 TABLET ORAL at 13:19

## 2024-11-26 RX ADMIN — OXYCODONE HYDROCHLORIDE 15 MG: 10 TABLET ORAL at 17:26

## 2024-11-26 RX ADMIN — SACUBITRIL AND VALSARTAN 1 TABLET: 24; 26 TABLET, FILM COATED ORAL at 21:44

## 2024-11-26 RX ADMIN — TICAGRELOR 90 MG: 90 TABLET ORAL at 09:00

## 2024-11-26 RX ADMIN — TICAGRELOR 90 MG: 90 TABLET ORAL at 21:44

## 2024-11-26 RX ADMIN — APIXABAN 5 MG: 5 TABLET, FILM COATED ORAL at 09:01

## 2024-11-26 RX ADMIN — METHOCARBAMOL TABLETS 500 MG: 500 TABLET, COATED ORAL at 09:01

## 2024-11-26 RX ADMIN — Medication 3 MG: at 21:45

## 2024-11-26 RX ADMIN — CARVEDILOL 12.5 MG: 12.5 TABLET, FILM COATED ORAL at 09:01

## 2024-11-26 RX ADMIN — ASPIRIN 81 MG: 81 TABLET, CHEWABLE ORAL at 09:01

## 2024-11-26 RX ADMIN — ATORVASTATIN CALCIUM 80 MG: 80 TABLET, FILM COATED ORAL at 21:44

## 2024-11-26 RX ADMIN — CARVEDILOL 12.5 MG: 12.5 TABLET, FILM COATED ORAL at 17:26

## 2024-11-26 RX ADMIN — POLYETHYLENE GLYCOL 3350 17 G: 17 POWDER, FOR SOLUTION ORAL at 21:44

## 2024-11-26 RX ADMIN — Medication 2 CAPSULE: at 17:26

## 2024-11-26 RX ADMIN — ACETAMINOPHEN 1000 MG: 500 TABLET ORAL at 05:11

## 2024-11-26 RX ADMIN — Medication 2 CAPSULE: at 09:01

## 2024-11-26 RX ADMIN — APIXABAN 5 MG: 5 TABLET, FILM COATED ORAL at 21:44

## 2024-11-26 RX ADMIN — OXYCODONE HYDROCHLORIDE 15 MG: 10 TABLET ORAL at 21:45

## 2024-11-26 ASSESSMENT — PAIN - FUNCTIONAL ASSESSMENT
PAIN_FUNCTIONAL_ASSESSMENT: PREVENTS OR INTERFERES SOME ACTIVE ACTIVITIES AND ADLS
PAIN_FUNCTIONAL_ASSESSMENT: ACTIVITIES ARE NOT PREVENTED

## 2024-11-26 ASSESSMENT — PAIN SCALES - GENERAL
PAINLEVEL_OUTOF10: 9
PAINLEVEL_OUTOF10: 10
PAINLEVEL_OUTOF10: 8
PAINLEVEL_OUTOF10: 0
PAINLEVEL_OUTOF10: 10
PAINLEVEL_OUTOF10: 10
PAINLEVEL_OUTOF10: 9
PAINLEVEL_OUTOF10: 9
PAINLEVEL_OUTOF10: 1

## 2024-11-26 ASSESSMENT — PAIN DESCRIPTION - LOCATION
LOCATION: CHEST;LEG;FOOT
LOCATION: CHEST;LEG;HIP
LOCATION: CHEST
LOCATION: CHEST;LEG;HIP

## 2024-11-26 ASSESSMENT — PAIN DESCRIPTION - ORIENTATION
ORIENTATION: RIGHT
ORIENTATION: LEFT

## 2024-11-26 ASSESSMENT — PAIN DESCRIPTION - DESCRIPTORS
DESCRIPTORS: ACHING;DISCOMFORT;SORE
DESCRIPTORS: ACHING;DISCOMFORT
DESCRIPTORS: ACHING;DISCOMFORT
DESCRIPTORS: ACHING
DESCRIPTORS: ACHING;DISCOMFORT
DESCRIPTORS: ACHING;DISCOMFORT

## 2024-11-26 ASSESSMENT — PAIN DESCRIPTION - ONSET
ONSET: ON-GOING

## 2024-11-26 ASSESSMENT — PAIN DESCRIPTION - FREQUENCY
FREQUENCY: CONTINUOUS

## 2024-11-26 ASSESSMENT — PAIN DESCRIPTION - PAIN TYPE
TYPE: ACUTE PAIN

## 2024-11-26 NOTE — PROGRESS NOTES
buttons (now wearing abdominal binder--needed A to doff/don)--has R UE restrictions thru 12/20/24  LE Dressing  Assistance Level: Verbal cues, Increased time to complete, Contact guard assist  Skilled Clinical Factors: able to doff/don brief & hosp pants w/ tactile cues, set up & extra time, mild unsteadiness & fatigue noted  Putting On/Taking Off Footwear  Assistance Level: Stand by assist, Verbal cues, Set-up  Skilled Clinical Factors: declined to wear freddy hose; he asked OT to put on his socks, w/ prompting--pt  able to doff/don non skid socks w/ SBA, set up & cues to initiate, slow to cross legs or bend over to reach feet  Toileting       Speech therapy:    ADULT DIET; Regular; No Added Salt (3-4 gm)  ADULT ORAL NUTRITION SUPPLEMENT; Breakfast, Lunch, Dinner; Standard High Calorie/High Protein Oral Supplement        Body mass index is 24.27 kg/m².    Rehabilitation Diagnosis:   Brain, 2.1, Non-Traumatic        Assessment and Plan:     Impairments: generalized weakness, decreased balance, endurance, cognition      Anoxic encephalopathy due to cardiac arrest (V fib)   -Regulate sleep/wake. Emphasis on routine.   -PT/OT/SLP.      STEMI   -s/p aspiration thrombectomy and IVUS guided PCI to mid LAD with TRISHA x 1 (11/9 with Dr. Rodríguez)  -continue ASA, ticagrelor, statin  -PT/OT     Severe ischemic cardiomyopathy, acute HFrEF  -EF 20% -->45%  -s/p pLVAD (11/9), Impella 5.5 (11/12-11/19)  ---RUE restrictions per Vascular  -continue carvedilol, Entresto  -Holding spironolactone due to soft BP and rising BUN/Cr  -Daily wt  -PT/OT     Paroxysmal Atrial fibrillation  -Start Eliquis per Cardiology  -continue carvedilol     HTN  -continue carvedilol, hydralazine (decreased dose -- has been held past 24 hours), Entresto (decrease dose)  -Holding spironolactone  ----monitor response, remains slightly soft     Emphysema  -Monitor respiratory response to therapies  -continue albuterol prn     Pneumonia (multiple organisms, gram

## 2024-11-26 NOTE — PLAN OF CARE
Problem: Pain  Goal: Verbalizes/displays adequate comfort level or baseline comfort level  11/25/2024 2049 by Georgia Santana RN  Outcome: Progressing  11/25/2024 1800 by Kami Ortiz RN  Outcome: Progressing     Problem: Safety - Adult  Goal: Free from fall injury  11/25/2024 2049 by Georgia Santana RN  Outcome: Progressing  11/25/2024 1800 by Kami Ortiz RN  Outcome: Progressing     Problem: ABCDS Injury Assessment  Goal: Absence of physical injury  11/25/2024 2049 by Georgia Santana RN  Outcome: Progressing  11/25/2024 1800 by Kami Ortiz RN  Outcome: Progressing     Problem: Skin/Tissue Integrity  Goal: Absence of new skin breakdown  Description: 1.  Monitor for areas of redness and/or skin breakdown  2.  Assess vascular access sites hourly  3.  Every 4-6 hours minimum:  Change oxygen saturation probe site  4.  Every 4-6 hours:  If on nasal continuous positive airway pressure, respiratory therapy assess nares and determine need for appliance change or resting period.  11/25/2024 2049 by Georgia Santana RN  Outcome: Progressing  11/25/2024 1800 by Kami Ortiz RN  Outcome: Progressing     Problem: Nutrition Deficit:  Goal: Optimize nutritional status  11/25/2024 2049 by Georgia Santana RN  Outcome: Progressing  11/25/2024 1800 by Kami Ortiz RN  Outcome: Progressing     Problem: Cardiovascular - Adult  Goal: Maintains optimal cardiac output and hemodynamic stability  11/25/2024 2049 by Georgia Santana RN  Outcome: Progressing  11/25/2024 1800 by Kami Ortiz RN  Outcome: Progressing     Problem: Musculoskeletal - Adult  Goal: Return mobility to safest level of function  11/25/2024 2049 by Georgia Santana RN  Outcome: Progressing  11/25/2024 1800 by Kami Ortiz RN  Outcome: Progressing     Problem: Musculoskeletal - Adult  Goal: Maintain proper alignment of affected body part  11/25/2024 2049 by Georgia Santana RN  Outcome: Progressing  11/25/2024 1800 by

## 2024-11-26 NOTE — PROGRESS NOTES
ACUTE REHAB UNIT  SPEECH/LANGUAGE PATHOLOGY      [x] Daily  [] Weekly Care Conference Note  [] Discharge    Patient:Ad Lacey      :1950  MRN:[id number]  Rehab Dx/Hx: Anoxic encephalopathy due to cardiac arrest (HCC) [G93.1, I46.9]      Precautions: falls and S/P R Axillary Impella - per Vascular 2024: R UE Restrictions in place x 30 days (until 2024); No lifting >10 lb; Wgt Bear for Transfers; Do not Extend R Elbow Overhead (beyond) 90 degrees; No showers until 24  Home situation: admitted from home with son (son works full time and is unable to provide )     ST Dx: [] Aphasia  [] Dysarthria  [] Apraxia   [] Oropharyngeal dysphagia [x] Cognitive   Impairment  [x] Other: Voice    Initial Speech Therapy Assessment Diagnosis:   Cognitive Diagnosis: Pt demonstrated functional temporal / spatial orientation; VPS/reasoning and math language. Pt demonstrated functional focused and sustained attention for structured tasks. Breakdown with working memory and recall of new information during higher attention tasks (divided attention etc). Pt can be impulsive at times; he can be distractible and verbose/redundant in conveying history information which can impact. He responds well to verbal cues for re-direct to task/topic. Conflicting information regarding level of support for adv DL tasks. Pt does live with son. Will need to clarify. IF there is a concern for neuro cognitive deficits would suggest Neuro consult/diagnostic imaging  Speech Diagnosis: Motor speech appeared functional. However with pt hoarse voice quality which he reports is since his admit with heart attack and need for chest compressions. Pt did respond well to functional voice ex targeting breath support, reduced laryngeal tension and frontal focus with improved voice quality/pitch variation. Will trial. If persistent potential need for ENT  Communication Diagnosis: Pt demonstrated concrete and mild complex functional

## 2024-11-26 NOTE — PROGRESS NOTES
Yes  Type of devices: Gait belt;Call light within reach;Chair alarm in place;Left in chair    Patient Education  Education  Education Given To: Patient  Education Provided: Energy Conservation;Fall Prevention Strategies  Education Provided Comments: reviewed purpose of sitting for ECT and reducing fall risk during ADLs  Education Method: Demonstration;Verbal  Barriers to Learning: None  Education Outcome: Verbalized understanding;Demonstrated understanding;Continued education needed  Skilled Clinical Factors: slow processing, forgetful    Plan  Occupational Therapy Plan  Times Per Week: 5-6  Therapy Duration: 1 Week  Specific Instructions for Next Treatment: pt will benefit from home OT services upon DC  Current Treatment Recommendations: Strengthening;ROM;Balance training;Functional mobility training;Endurance training;Safety education & training;Self-Care / ADL  Additional Comments: pt reports his son is currently out of town    Goals  Patient Goals   Patient goals : to return home  Short Term Goals  Time Frame for Short Term Goals: 7 days  Short Term Goal 1: pt will complete toileting w/ CGA  Short Term Goal 2: pt will complete LB dressing w/ CGA  Short Term Goal 3: pt will complete sponge bathing w/ CGA  Short Term Goal 4: pt will complete fxl ADL transfers w/ supervision  Short Term Goal 5: pt will complete BUE HEP as tolerated to address strength/endurance required for ADLs/fxl ADL transfers  Long Term Goals  Time Frame for Long Term Goals : 14 days  Long Term Goal 1: pt will complete toileting w/ Mod I  Long Term Goal 2: pt will complete LB dressing w/ Mod I  Long Term Goal 3: pt will complete showering w/ s/u-supervision  Long Term Goal 4: pt will complete fxl ADL transfers w/ Mod I  Long Term Goal 5: pt will tolerate standing for ~6 min w/ Mod I while completing fxl task to improve endurance required for ADLs  Long Term Goal 6: further assess cognition to address potential safety concerns w/

## 2024-11-26 NOTE — PROGRESS NOTES
Patient admitted to rehab with anoxic encephalopathy due to cardiac arrest.  A/Ox4, forgetful. Transfers with walker x1. Mobility restrictions: WBAT. On regular, no added salt diet, tolerating well. Medications taken whole with thins. On eliquis, aspirin for DVT prophylaxis.  Skin: incision to chest. Oxygen: RA. LDA: none. Has been continent of bowel and continent of bladder. LBM 11/25/24. Chair/bed alarms in use and call light in reach. Will monitor for safety. Electronically signed by URIEL THOMAS RN on 11/26/2024 at 1:49 PM

## 2024-11-26 NOTE — PROGRESS NOTES
Shift assessment done. All night time medications given per MAR. Patient took all his oral medications whole with water, tolerated well. All fall precautions implemented. All needs attended. Electronically signed by Georgia Santana RN on 11/25/2024 at 8:50 PM

## 2024-11-26 NOTE — PROGRESS NOTES
with pain, pt called RN for pain meds at end of session.  Second Session Therapy Time     Individual Co-treatment   Time In 1230     Time Out 1310     Minutes 40        Electronically signed by Eli An PT on 12/2/2024 at 8:36 AM

## 2024-11-27 LAB
AMPHETAMINES UR QL SCN>1000 NG/ML: ABNORMAL
ANION GAP SERPL CALCULATED.3IONS-SCNC: 13 MMOL/L (ref 3–16)
BARBITURATES UR QL SCN>200 NG/ML: ABNORMAL
BASOPHILS # BLD: 0.1 K/UL (ref 0–0.2)
BASOPHILS NFR BLD: 1.7 %
BENZODIAZ UR QL SCN>200 NG/ML: POSITIVE
BUN SERPL-MCNC: 34 MG/DL (ref 7–20)
CALCIUM SERPL-MCNC: 9.3 MG/DL (ref 8.3–10.6)
CANNABINOIDS UR QL SCN>50 NG/ML: POSITIVE
CHLORIDE SERPL-SCNC: 107 MMOL/L (ref 99–110)
CO2 SERPL-SCNC: 20 MMOL/L (ref 21–32)
COCAINE UR QL SCN: ABNORMAL
CREAT SERPL-MCNC: 1.1 MG/DL (ref 0.8–1.3)
DEPRECATED RDW RBC AUTO: 16.1 % (ref 12.4–15.4)
DRUG SCREEN COMMENT UR-IMP: ABNORMAL
EOSINOPHIL # BLD: 0.3 K/UL (ref 0–0.6)
EOSINOPHIL NFR BLD: 4.7 %
FENTANYL SCREEN, URINE: ABNORMAL
GFR SERPLBLD CREATININE-BSD FMLA CKD-EPI: 70 ML/MIN/{1.73_M2}
GLUCOSE SERPL-MCNC: 99 MG/DL (ref 70–99)
HCT VFR BLD AUTO: 28.8 % (ref 40.5–52.5)
HGB BLD-MCNC: 9.6 G/DL (ref 13.5–17.5)
LYMPHOCYTES # BLD: 2 K/UL (ref 1–5.1)
LYMPHOCYTES NFR BLD: 29.5 %
MAGNESIUM SERPL-MCNC: 2.26 MG/DL (ref 1.8–2.4)
MCH RBC QN AUTO: 28.5 PG (ref 26–34)
MCHC RBC AUTO-ENTMCNC: 33.3 G/DL (ref 31–36)
MCV RBC AUTO: 85.5 FL (ref 80–100)
METHADONE UR QL SCN>300 NG/ML: ABNORMAL
MONOCYTES # BLD: 0.6 K/UL (ref 0–1.3)
MONOCYTES NFR BLD: 8.4 %
NEUTROPHILS # BLD: 3.7 K/UL (ref 1.7–7.7)
NEUTROPHILS NFR BLD: 55.7 %
OPIATES UR QL SCN>300 NG/ML: ABNORMAL
OXYCODONE UR QL SCN: POSITIVE
PCP UR QL SCN>25 NG/ML: ABNORMAL
PH UR STRIP: 5.5 [PH]
PLATELET # BLD AUTO: 776 K/UL (ref 135–450)
PMV BLD AUTO: 7 FL (ref 5–10.5)
POTASSIUM SERPL-SCNC: 3.8 MMOL/L (ref 3.5–5.1)
RBC # BLD AUTO: 3.37 M/UL (ref 4.2–5.9)
SODIUM SERPL-SCNC: 140 MMOL/L (ref 136–145)
WBC # BLD AUTO: 6.7 K/UL (ref 4–11)

## 2024-11-27 PROCEDURE — 97530 THERAPEUTIC ACTIVITIES: CPT

## 2024-11-27 PROCEDURE — 92507 TX SP LANG VOICE COMM INDIV: CPT

## 2024-11-27 PROCEDURE — 83735 ASSAY OF MAGNESIUM: CPT

## 2024-11-27 PROCEDURE — 85025 COMPLETE CBC W/AUTO DIFF WBC: CPT

## 2024-11-27 PROCEDURE — 94760 N-INVAS EAR/PLS OXIMETRY 1: CPT

## 2024-11-27 PROCEDURE — 80048 BASIC METABOLIC PNL TOTAL CA: CPT

## 2024-11-27 PROCEDURE — 1280000000 HC REHAB R&B

## 2024-11-27 PROCEDURE — 97110 THERAPEUTIC EXERCISES: CPT

## 2024-11-27 PROCEDURE — 36415 COLL VENOUS BLD VENIPUNCTURE: CPT

## 2024-11-27 PROCEDURE — 80307 DRUG TEST PRSMV CHEM ANLYZR: CPT

## 2024-11-27 PROCEDURE — 6370000000 HC RX 637 (ALT 250 FOR IP): Performed by: PHYSICAL MEDICINE & REHABILITATION

## 2024-11-27 PROCEDURE — 97129 THER IVNTJ 1ST 15 MIN: CPT

## 2024-11-27 PROCEDURE — 97116 GAIT TRAINING THERAPY: CPT

## 2024-11-27 RX ORDER — DIAZEPAM 5 MG/1
5 TABLET ORAL EVERY 12 HOURS PRN
Status: DISCONTINUED | OUTPATIENT
Start: 2024-11-27 | End: 2024-12-03 | Stop reason: HOSPADM

## 2024-11-27 RX ADMIN — CARVEDILOL 12.5 MG: 12.5 TABLET, FILM COATED ORAL at 08:34

## 2024-11-27 RX ADMIN — APIXABAN 5 MG: 5 TABLET, FILM COATED ORAL at 19:27

## 2024-11-27 RX ADMIN — DIAZEPAM 5 MG: 5 TABLET ORAL at 11:30

## 2024-11-27 RX ADMIN — METHOCARBAMOL TABLETS 500 MG: 500 TABLET, COATED ORAL at 09:55

## 2024-11-27 RX ADMIN — OXYCODONE HYDROCHLORIDE 15 MG: 10 TABLET ORAL at 05:40

## 2024-11-27 RX ADMIN — Medication 2 CAPSULE: at 18:10

## 2024-11-27 RX ADMIN — OXYCODONE HYDROCHLORIDE 15 MG: 10 TABLET ORAL at 19:50

## 2024-11-27 RX ADMIN — OXYCODONE HYDROCHLORIDE 15 MG: 10 TABLET ORAL at 13:58

## 2024-11-27 RX ADMIN — OXYCODONE HYDROCHLORIDE 15 MG: 10 TABLET ORAL at 01:52

## 2024-11-27 RX ADMIN — CARVEDILOL 12.5 MG: 12.5 TABLET, FILM COATED ORAL at 18:10

## 2024-11-27 RX ADMIN — OXYCODONE HYDROCHLORIDE 15 MG: 10 TABLET ORAL at 09:55

## 2024-11-27 RX ADMIN — ATORVASTATIN CALCIUM 80 MG: 80 TABLET, FILM COATED ORAL at 19:27

## 2024-11-27 RX ADMIN — TICAGRELOR 90 MG: 90 TABLET ORAL at 08:35

## 2024-11-27 RX ADMIN — APIXABAN 5 MG: 5 TABLET, FILM COATED ORAL at 08:35

## 2024-11-27 RX ADMIN — ACETAMINOPHEN 1000 MG: 500 TABLET ORAL at 13:58

## 2024-11-27 RX ADMIN — OXYCODONE HYDROCHLORIDE 15 MG: 10 TABLET ORAL at 23:52

## 2024-11-27 RX ADMIN — PANTOPRAZOLE SODIUM 40 MG: 40 TABLET, DELAYED RELEASE ORAL at 05:57

## 2024-11-27 RX ADMIN — Medication 2 CAPSULE: at 08:34

## 2024-11-27 RX ADMIN — METHOCARBAMOL TABLETS 500 MG: 500 TABLET, COATED ORAL at 01:52

## 2024-11-27 RX ADMIN — TICAGRELOR 90 MG: 90 TABLET ORAL at 19:27

## 2024-11-27 RX ADMIN — ASPIRIN 81 MG: 81 TABLET, CHEWABLE ORAL at 08:34

## 2024-11-27 RX ADMIN — ACETAMINOPHEN 1000 MG: 500 TABLET ORAL at 05:40

## 2024-11-27 ASSESSMENT — PAIN DESCRIPTION - FREQUENCY
FREQUENCY: CONTINUOUS

## 2024-11-27 ASSESSMENT — PAIN - FUNCTIONAL ASSESSMENT
PAIN_FUNCTIONAL_ASSESSMENT: PREVENTS OR INTERFERES SOME ACTIVE ACTIVITIES AND ADLS
PAIN_FUNCTIONAL_ASSESSMENT: ACTIVITIES ARE NOT PREVENTED
PAIN_FUNCTIONAL_ASSESSMENT: PREVENTS OR INTERFERES SOME ACTIVE ACTIVITIES AND ADLS
PAIN_FUNCTIONAL_ASSESSMENT: PREVENTS OR INTERFERES SOME ACTIVE ACTIVITIES AND ADLS
PAIN_FUNCTIONAL_ASSESSMENT: ACTIVITIES ARE NOT PREVENTED

## 2024-11-27 ASSESSMENT — PAIN DESCRIPTION - LOCATION
LOCATION: CHEST
LOCATION: GENERALIZED
LOCATION: CHEST

## 2024-11-27 ASSESSMENT — PAIN SCALES - GENERAL
PAINLEVEL_OUTOF10: 9
PAINLEVEL_OUTOF10: 10
PAINLEVEL_OUTOF10: 0
PAINLEVEL_OUTOF10: 0
PAINLEVEL_OUTOF10: 10
PAINLEVEL_OUTOF10: 0
PAINLEVEL_OUTOF10: 9
PAINLEVEL_OUTOF10: 0
PAINLEVEL_OUTOF10: 9
PAINLEVEL_OUTOF10: 8

## 2024-11-27 ASSESSMENT — PAIN DESCRIPTION - ORIENTATION
ORIENTATION: ANTERIOR;POSTERIOR
ORIENTATION: MID

## 2024-11-27 ASSESSMENT — PAIN DESCRIPTION - ONSET
ONSET: ON-GOING

## 2024-11-27 ASSESSMENT — PAIN DESCRIPTION - PAIN TYPE
TYPE: ACUTE PAIN

## 2024-11-27 ASSESSMENT — PAIN DESCRIPTION - DESCRIPTORS
DESCRIPTORS: ACHING
DESCRIPTORS: ACHING;DISCOMFORT
DESCRIPTORS: ACHING
DESCRIPTORS: ACHING;DISCOMFORT;STABBING

## 2024-11-27 NOTE — PROGRESS NOTES
Patient admitted to rehab with anoxic encephalopathy.  A/Ox4, forgetful. Transfers with walker x1. Mobility restrictions: WBAT. On regular, no added salt diet, tolerating well. Medications taken whole with thins. On eliquis, aspirin for DVT prophylaxis.  Skin: incision to right chest. Oxygen: RA. LDA: none. Has been continent of bowel and continent of bladder. LBM 11/25/24. Chair/bed alarms in use and call light in reach. Will monitor for safety. Electronically signed by URIEL THOMAS RN on 11/27/2024 at 10:47 AM

## 2024-11-27 NOTE — PROGRESS NOTES
Physical Therapy  Facility/Department: [address] REHAB  Rehabilitation Physical Therapy Treatment Note    NAME: Ad Lacey  : 1950 (74 y.o.)  MRN: [id number]  CODE STATUS: Full Code    Date of Service: 24       Restrictions:  Restrictions/Precautions: Fall Risk  Position Activity Restriction  Other position/activity restrictions: S/P R Axillary Impella.  Per Vascular 2024 : R UE Restrictions in place x 30 days (until 2024) : No lifting >10 lb; Wgt Bear for Transfers; Do not Extend R Elbow Overhead (beyond) 90 degrees. No showers until 24.  Pt recalls precautions.     SUBJECTIVE  Subjective  Subjective: Presents with hoarse voice - notes that he needs pain medication before therapy. RN aware of schedule for next dose.  Pain: Reports 10/10 pain in RLE and R hip, chest, back.    OBJECTIVE  Cognition  Following Commands: Follows one step commands with repetition  Attention Span: Difficulty dividing attention  Safety Judgement: Decreased awareness of need for assistance;Decreased awareness of need for safety  Insights: Decreased awareness of deficits  Initiation: Requires cues for some  Cognition Comment: pt w/ self-limiting behaviors, had chronic pain issues PTA  Orientation  Overall Orientation Status: Within Functional Limits  Orientation Level: Oriented X4    Functional Mobility  Transfers  Surface: Wheelchair;To chair with arms;From chair with arms  Device: Walker  Sit to Stand  Assistance Level: Supervision  Skilled Clinical Factors: extra time required  Stand to Sit  Assistance Level: Supervision  Bed To/From Chair  Technique: Stand step  Assistance Level: Supervision  Skilled Clinical Factors: w/c <> recliner with RW      Environmental Mobility  Ambulation  Surface: Level surface  Device: Rolling walker  Distance: short distances in the gym + 160' with two turns  Additional Factors: Increased time to complete  Assistance Level: Supervision  Gait Deviations: Slow

## 2024-11-27 NOTE — PLAN OF CARE
Problem: Discharge Planning  Goal: Discharge to home or other facility with appropriate resources  11/27/2024 1030 by Fatimah Corbett RN  Outcome: Progressing  Flowsheets (Taken 11/27/2024 0818)  Discharge to home or other facility with appropriate resources:   Identify barriers to discharge with patient and caregiver   Arrange for needed discharge resources and transportation as appropriate   Identify discharge learning needs (meds, wound care, etc)   Refer to discharge planning if patient needs post-hospital services based on physician order or complex needs related to functional status, cognitive ability or social support system     Problem: Pain  Goal: Verbalizes/displays adequate comfort level or baseline comfort level  11/27/2024 1030 by Fatimah Corbett RN  Outcome: Progressing  Flowsheets  Taken 11/27/2024 0955  Verbalizes/displays adequate comfort level or baseline comfort level:   Encourage patient to monitor pain and request assistance   Assess pain using appropriate pain scale   Administer analgesics based on type and severity of pain and evaluate response   Implement non-pharmacological measures as appropriate and evaluate response    Problem: Safety - Adult  Goal: Free from fall injury  11/27/2024 1030 by Fatimah Corbett RN  Outcome: Progressing  Flowsheets (Taken 11/27/2024 0819)  Free From Fall Injury: Instruct family/caregiver on patient safety     Problem: Skin/Tissue Integrity  Goal: Absence of new skin breakdown  Description: 1.  Monitor for areas of redness and/or skin breakdown  2.  Assess vascular access sites hourly  3.  Every 4-6 hours minimum:  Change oxygen saturation probe site  4.  Every 4-6 hours:  If on nasal continuous positive airway pressure, respiratory therapy assess nares and determine need for appliance change or resting period.  Outcome: Progressing     Problem: ABCDS Injury Assessment  Goal: Absence of physical injury  Outcome: Progressing  Flowsheets (Taken 11/27/2024

## 2024-11-27 NOTE — PROGRESS NOTES
ACUTE REHAB UNIT  SPEECH/LANGUAGE PATHOLOGY      [x] Daily  [] Weekly Care Conference Note  [] Discharge    Patient:Ad Lacey      :1950  MRN:[id number]  Rehab Dx/Hx: Anoxic encephalopathy due to cardiac arrest (HCC) [G93.1, I46.9]      Precautions: falls and S/P R Axillary Impella - per Vascular 2024: R UE Restrictions in place x 30 days (until 2024); No lifting >10 lb; Wgt Bear for Transfers; Do not Extend R Elbow Overhead (beyond) 90 degrees; No showers until 24  Home situation: admitted from home with son (son works full time and is unable to provide )     ST Dx: [] Aphasia  [] Dysarthria  [] Apraxia   [] Oropharyngeal dysphagia [x] Cognitive   Impairment  [x] Other: Voice    Initial Speech Therapy Assessment Diagnosis:   Cognitive Diagnosis: Pt demonstrated functional temporal / spatial orientation; VPS/reasoning and math language. Pt demonstrated functional focused and sustained attention for structured tasks. Breakdown with working memory and recall of new information during higher attention tasks (divided attention etc). Pt can be impulsive at times; he can be distractible and verbose/redundant in conveying history information which can impact. He responds well to verbal cues for re-direct to task/topic. Conflicting information regarding level of support for adv DL tasks. Pt does live with son. Will need to clarify. IF there is a concern for neuro cognitive deficits would suggest Neuro consult/diagnostic imaging  Speech Diagnosis: Motor speech appeared functional. However with pt hoarse voice quality which he reports is since his admit with heart attack and need for chest compressions. Pt did respond well to functional voice ex targeting breath support, reduced laryngeal tension and frontal focus with improved voice quality/pitch variation. Will trial. If persistent potential need for ENT  Communication Diagnosis: Pt demonstrated concrete and mild complex functional

## 2024-11-27 NOTE — PROGRESS NOTES
Occupational Therapy  Facility/Department: [address] REHAB  Rehabilitation Occupational Therapy Daily Treatment Note    Date: 24  Patient Name: Ad Lacey       Room: K7Q-5338/3271-01  MRN: [id number]  Account: [id number]   : 1950  (74 y.o.) Gender: male         PM session: met in therapy gym, he is more alert; agreeable for car & couch transfers. He did not use AD to walk @ 5 ft from w/c<>car, practiced stepping in but needed CGA; held onto car door which moves. Pt used overhead bar for support. Did not report any pain; gave suggestion to sit & swing legs in next time and not hold onto car door for safety. Pt ambualted @ 7 ft no AD from w/c<>couch w/ CGA; was able to get from sit to stand from low surface @ 18\" using L armrest w/ close SB/CGA; tactile cues to stand erect. Pt verbose and showing OT pics of his organ playing and magic tricks--both these activities \"relax me.\" Pt taken back to his room and completed w/c to bed t/f w/ close SBA, no AD; able to get from sit to supine w/ MI using rail. Pt had a timer set on his phone for when next pain medication is due; used call light to contact RN. Tx time: 45 min, cont w/ POC Vandana Blankenship, OTR/L, CNS #[id number]            Past Medical History:  has a past medical history of Anxiety, Basal cell carcinoma, CAD (coronary artery disease), Peripheral artery disease (HCC), and Presacral mass.  Past Surgical History:   has a past surgical history that includes hip surgery (Right, 2022); Cardiac catheterization (); femoral bypass (Right, 2022); Cardiac procedure (N/A, 2024); Cardiac procedure (N/A, 2024); Cardiac procedure (N/A, 2024); Cardiac procedure (N/A, 2024); Cardiac procedure (N/A, 2024); Cardiac procedure (N/A, 2024); and Carotid endarterectomy (N/A, 2024).    Restrictions  Restrictions/Precautions: Fall Risk  Other position/activity restrictions: S/P R Axillary Impella.  Per Vascular

## 2024-11-27 NOTE — CARE COORDINATION
Late entry.  Team Conference held on Tuesday, 11-  Team reviewed barriers:  pain, limited endurance, anxiety, self limiting behaviors.  Pt is working in all three therapies.  Team recommends continued stay on Rehab to further his progress.  Team recommends partnering with meds and finances at home for safety.  DME recs:    4 wh walker, TSF.  Discharge planned for Tuesday, 12-3-2024 to home with home care orders for SN/PT/OT.    Met with patient yesterday to review.  He reports his son is to return from vacation on Saturday, 12-1-2024.  He reports his son will obtain the 4 wh walker for him.  He wants to use [facility] for home care orders.  Pt agrees with this plan.    The Plan for Transition of Care is related to the following treatment goals: to continue his progress in personal care and ambulation needs in home setting.     The Patient  was provided with a choice of provider and agrees   with the discharge plan. [x] Yes [] No    Freedom of choice list was provided with basic dialogue that supports the patient's individualized plan of care/goals, treatment preferences and shares the quality data associated with the providers. [x] Yes [] No    SARAH Uribe     Case Management   [phone]    11/27/2024  10:26 AM

## 2024-11-27 NOTE — PROGRESS NOTES
Department of Physical Medicine & Rehabilitation  Progress Note    Patient Identification:  Ad Lacey  [id number]  : 1950  Admit date: 2024    Chief Complaint: Anoxic encephalopathy due to cardiac arrest (HCC)    Subjective:   No acute events overnight.   Patient seen this afternoon working with PT in the gym. Continues to focus on chest wall pain. We discussed concerns for side effects with increasing pain medication/muscle relaxer further, particularly given his soft BPs. He also reports significant anxiety. Will change Valium to BID prn. He previously reported was taking up to 20 mg at home but today states he had taken up to 30 mg in the past. Unclear frequency of use at home when I asked about this.   Labs reviewed.     ROS: No f/c, n/v    Objective:  Patient Vitals for the past 24 hrs:   BP Temp Temp src Pulse Resp SpO2 Weight   24 0955 -- -- -- -- 18 -- --   24 0818 102/66 97.4 °F (36.3 °C) Oral 73 16 95 % --   24 0745 -- -- -- -- 16 97 % --   24 0544 -- -- -- -- -- -- 72.5 kg (159 lb 13.3 oz)   24 0222 -- -- -- -- 16 -- --   24 0152 -- -- -- -- 16 -- --   24 2215 -- -- -- -- 16 -- --   24 2144 -- -- -- -- 16 -- --   24 1945 107/71 97.4 °F (36.3 °C) Oral 65 16 97 % --   24 1726 -- -- -- -- 16 -- --   24 1319 -- -- -- -- 16 -- --     Const: Alert. No distress, pleasant.   HEENT: Normocephalic, atraumatic. Normal sclera/conjunctiva. MMM.   CV: Regular rate and rhythm.   Resp: No respiratory distress. Lungs CTAB.   Abd: Soft, nontender, nondistended, NABS+   Ext: No edema.   Neuro: Alert, oriented, impaired recall/insight  Psych: Cooperative, appropriate mood and affect    Laboratory data: Available via EMR.   Last 24 hour lab  Recent Results (from the past 24 hour(s))   Basic Metabolic Panel    Collection Time: 24  5:37 AM   Result Value Ref Range    Sodium 140 136 - 145 mmol/L    Potassium 3.8 3.5 - 5.1 mmol/L

## 2024-11-27 NOTE — PROGRESS NOTES
This RN was ambulating another patient in the hallway when this patient called out to use the restroom and move to the chair. This RN asked another nurse if they would do that and they agreed. When I was heading back to the other patient's room when the walk was complete the other RN asked me to come in and there I discovered the patient sitting on the toilet and he had a bag of pills in his fist. Patient would not give them to us and we explained that he cannot have any pills in his possession during his stay. He refused to give them to us. He said that his friend dropped them on the floor and he was holding them. Charge nurse called and she came in and again tried to persuade him to release them and he refused. Called supervisor who advised me to call security. Security came up and explained that pharmacy would hold them until he was discharged and he said he did not believe them. We were eventually able to retrieve the pills and they were sent down to pharmacy, copies of receipts placed into chart and given to patient. Informed patient that he is not to have any pills brought up to him. MD notified, urine drug screen ordered. Care ongoing.

## 2024-11-28 PROCEDURE — 1280000000 HC REHAB R&B

## 2024-11-28 PROCEDURE — 94760 N-INVAS EAR/PLS OXIMETRY 1: CPT

## 2024-11-28 PROCEDURE — 6370000000 HC RX 637 (ALT 250 FOR IP): Performed by: PHYSICAL MEDICINE & REHABILITATION

## 2024-11-28 RX ADMIN — Medication 2 CAPSULE: at 08:14

## 2024-11-28 RX ADMIN — ACETAMINOPHEN 1000 MG: 500 TABLET ORAL at 21:27

## 2024-11-28 RX ADMIN — TICAGRELOR 90 MG: 90 TABLET ORAL at 21:27

## 2024-11-28 RX ADMIN — APIXABAN 5 MG: 5 TABLET, FILM COATED ORAL at 21:27

## 2024-11-28 RX ADMIN — OXYCODONE HYDROCHLORIDE 15 MG: 10 TABLET ORAL at 18:36

## 2024-11-28 RX ADMIN — OXYCODONE HYDROCHLORIDE 15 MG: 10 TABLET ORAL at 14:26

## 2024-11-28 RX ADMIN — METHOCARBAMOL TABLETS 500 MG: 500 TABLET, COATED ORAL at 13:29

## 2024-11-28 RX ADMIN — APIXABAN 5 MG: 5 TABLET, FILM COATED ORAL at 08:14

## 2024-11-28 RX ADMIN — ACETAMINOPHEN 1000 MG: 500 TABLET ORAL at 13:28

## 2024-11-28 RX ADMIN — CARVEDILOL 12.5 MG: 12.5 TABLET, FILM COATED ORAL at 16:33

## 2024-11-28 RX ADMIN — OXYCODONE HYDROCHLORIDE 15 MG: 10 TABLET ORAL at 03:57

## 2024-11-28 RX ADMIN — METHOCARBAMOL TABLETS 500 MG: 500 TABLET, COATED ORAL at 19:51

## 2024-11-28 RX ADMIN — OXYCODONE HYDROCHLORIDE 15 MG: 10 TABLET ORAL at 08:15

## 2024-11-28 RX ADMIN — ASPIRIN 81 MG: 81 TABLET, CHEWABLE ORAL at 08:14

## 2024-11-28 RX ADMIN — ATORVASTATIN CALCIUM 80 MG: 80 TABLET, FILM COATED ORAL at 21:27

## 2024-11-28 RX ADMIN — Medication 2 CAPSULE: at 16:32

## 2024-11-28 RX ADMIN — ACETAMINOPHEN 1000 MG: 500 TABLET ORAL at 05:55

## 2024-11-28 RX ADMIN — OXYCODONE HYDROCHLORIDE 15 MG: 10 TABLET ORAL at 22:55

## 2024-11-28 RX ADMIN — CARVEDILOL 12.5 MG: 12.5 TABLET, FILM COATED ORAL at 08:14

## 2024-11-28 RX ADMIN — DIAZEPAM 5 MG: 5 TABLET ORAL at 16:33

## 2024-11-28 RX ADMIN — METHOCARBAMOL TABLETS 500 MG: 500 TABLET, COATED ORAL at 05:55

## 2024-11-28 RX ADMIN — PANTOPRAZOLE SODIUM 40 MG: 40 TABLET, DELAYED RELEASE ORAL at 05:55

## 2024-11-28 RX ADMIN — TICAGRELOR 90 MG: 90 TABLET ORAL at 08:14

## 2024-11-28 ASSESSMENT — PAIN DESCRIPTION - ORIENTATION
ORIENTATION: RIGHT;ANTERIOR;POSTERIOR
ORIENTATION: RIGHT
ORIENTATION: MID;LEFT
ORIENTATION: MID;RIGHT
ORIENTATION: RIGHT;ANTERIOR;POSTERIOR
ORIENTATION: ANTERIOR;POSTERIOR;RIGHT

## 2024-11-28 ASSESSMENT — PAIN - FUNCTIONAL ASSESSMENT

## 2024-11-28 ASSESSMENT — PAIN SCALES - GENERAL
PAINLEVEL_OUTOF10: 10
PAINLEVEL_OUTOF10: 9
PAINLEVEL_OUTOF10: 8
PAINLEVEL_OUTOF10: 10
PAINLEVEL_OUTOF10: 8
PAINLEVEL_OUTOF10: 10
PAINLEVEL_OUTOF10: 8
PAINLEVEL_OUTOF10: 10
PAINLEVEL_OUTOF10: 0
PAINLEVEL_OUTOF10: 10
PAINLEVEL_OUTOF10: 8
PAINLEVEL_OUTOF10: 10
PAINLEVEL_OUTOF10: 8
PAINLEVEL_OUTOF10: 10
PAINLEVEL_OUTOF10: 10
PAINLEVEL_OUTOF10: 5
PAINLEVEL_OUTOF10: 10

## 2024-11-28 ASSESSMENT — PAIN DESCRIPTION - LOCATION
LOCATION: GENERALIZED;CHEST
LOCATION: CHEST;GENERALIZED
LOCATION: GENERALIZED

## 2024-11-28 ASSESSMENT — PAIN DESCRIPTION - DESCRIPTORS
DESCRIPTORS: STABBING
DESCRIPTORS: STABBING
DESCRIPTORS: SPASM;STABBING
DESCRIPTORS: ACHING;DISCOMFORT
DESCRIPTORS: STABBING

## 2024-11-28 ASSESSMENT — PAIN DESCRIPTION - FREQUENCY: FREQUENCY: CONTINUOUS

## 2024-11-28 ASSESSMENT — PAIN SCALES - WONG BAKER: WONGBAKER_NUMERICALRESPONSE: NO HURT

## 2024-11-28 ASSESSMENT — PAIN DESCRIPTION - ONSET: ONSET: ON-GOING

## 2024-11-28 NOTE — PROGRESS NOTES
Patient admitted to rehab with anoxic encephalopathy r/t cardiac arrest.  A/Ox4. Transfers with walker x1. Mobility restrictions: RUE precautions no lifting greater than 10lbs no extension past 90degrees. On regular no added salt diet, tolerating well. Medications taken whole with thins. On eliquis for DVT prophylaxis.  Skin: scattered bruising, incision to R chest, blanchable redness to buttock with zinc being applied. Oxygen: RA. LDA: none. Has been continent of bowel and continent of bladder. LB 11/27. Chair/bed alarms in use and call light in reach. Will monitor for safety.

## 2024-11-28 NOTE — PLAN OF CARE
Problem: Discharge Planning  Goal: Discharge to home or other facility with appropriate resources  11/27/2024 2321 by Miguel Angel Mota RN  Discharge to home or other facility with appropriate resources:   Identify barriers to discharge with patient and caregiver   Arrange for needed discharge resources and transportation as appropriate   Identify discharge learning needs (meds, wound care, etc)   Refer to discharge planning if patient needs post-hospital services based on physician order or complex needs related to functional status, cognitive ability or social support system     Problem: Pain  Goal: Verbalizes/displays adequate comfort level or baseline comfort level  Outcome: Progressing  Flowsheets  Verbalizes/displays adequate comfort level or baseline comfort level:   Encourage patient to monitor pain and request assistance   Assess pain using appropriate pain scale   Administer analgesics based on type and severity of pain and evaluate response   Implement non-pharmacological measures as appropriate and evaluate response  Taken 11/27/2024 0818  Verbalizes/displays adequate comfort level or baseline comfort level:   Encourage patient to monitor pain and request assistance   Assess pain using appropriate pain scale   Administer analgesics based on type and severity of pain and evaluate response   Implement non-pharmacological measures as appropriate and evaluate response     Problem: Safety - Adult  Goal: Free from fall injury  Outcome: Progressing  Free From Fall Injury: Instruct family/caregiver on patient safety

## 2024-11-28 NOTE — PROGRESS NOTES
Patient admitted to rehab with anoxic encephalopathy.  A/Ox4, forgetful. Transfers with walker x1. Mobility restrictions: WBAT. On regular, no added salt diet, tolerating well. Medications taken whole with thins. On eliquis, aspirin for DVT prophylaxis.  Skin: incision to right chest. Oxygen: RA. LDA: none. Has been continent of bowel and continent of bladder. LBM 11/27/24. Chair/bed alarms in use and call light in reach.   Patient asked for Pain medication around the clock, every four hours and called for it each time. Care on going.

## 2024-11-28 NOTE — PLAN OF CARE
Problem: Pain  Goal: Verbalizes/displays adequate comfort level or baseline comfort level  Outcome: Progressing  Flowsheets (Taken 11/28/2024 1039)  Verbalizes/displays adequate comfort level or baseline comfort level:   Encourage patient to monitor pain and request assistance   Administer analgesics based on type and severity of pain and evaluate response   Assess pain using appropriate pain scale   Implement non-pharmacological measures as appropriate and evaluate response     Problem: Safety - Adult  Goal: Free from fall injury  Outcome: Progressing  Flowsheets  Taken 11/28/2024 1039 by Humberto Mueller, RN  Free From Fall Injury: Instruct family/caregiver on patient safety  Taken 11/27/2024 2318 by Miguel Angel Mota, RN  Free From Fall Injury: Instruct family/caregiver on patient safety     Problem: Skin/Tissue Integrity  Goal: Absence of new skin breakdown  Description: 1.  Monitor for areas of redness and/or skin breakdown  2.  Assess vascular access sites hourly  3.  Every 4-6 hours minimum:  Change oxygen saturation probe site  4.  Every 4-6 hours:  If on nasal continuous positive airway pressure, respiratory therapy assess nares and determine need for appliance change or resting period.  Outcome: Progressing     Problem: ABCDS Injury Assessment  Goal: Absence of physical injury  Outcome: Progressing  Flowsheets (Taken 11/27/2024 2318 by Miguel Angel Mota, RN)  Absence of Physical Injury: Implement safety measures based on patient assessment     Problem: Nutrition Deficit:  Goal: Optimize nutritional status  Outcome: Progressing  Flowsheets (Taken 11/28/2024 1039)  Nutrient intake appropriate for improving, restoring, or maintaining nutritional needs:   Assess nutritional status and recommend course of action   Monitor oral intake, labs, and treatment plans     Problem: Skin/Tissue Integrity - Adult  Goal: Skin integrity remains intact  Outcome: Progressing  Flowsheets  Taken 11/28/2024 1039 by Humberto Mueller,

## 2024-11-29 LAB
ANION GAP SERPL CALCULATED.3IONS-SCNC: 23 MMOL/L (ref 3–16)
BASOPHILS # BLD: 0.1 K/UL (ref 0–0.2)
BASOPHILS NFR BLD: 1.4 %
BUN SERPL-MCNC: 31 MG/DL (ref 7–20)
CALCIUM SERPL-MCNC: 9.3 MG/DL (ref 8.3–10.6)
CHLORIDE SERPL-SCNC: 104 MMOL/L (ref 99–110)
CO2 SERPL-SCNC: 20 MMOL/L (ref 21–32)
CREAT SERPL-MCNC: 1 MG/DL (ref 0.8–1.3)
DEPRECATED RDW RBC AUTO: 16.4 % (ref 12.4–15.4)
EOSINOPHIL # BLD: 0.3 K/UL (ref 0–0.6)
EOSINOPHIL NFR BLD: 3.8 %
GFR SERPLBLD CREATININE-BSD FMLA CKD-EPI: 79 ML/MIN/{1.73_M2}
GLUCOSE SERPL-MCNC: 105 MG/DL (ref 70–99)
HCT VFR BLD AUTO: 28.2 % (ref 40.5–52.5)
HGB BLD-MCNC: 9.5 G/DL (ref 13.5–17.5)
LYMPHOCYTES # BLD: 2.3 K/UL (ref 1–5.1)
LYMPHOCYTES NFR BLD: 32.3 %
MAGNESIUM SERPL-MCNC: 2.25 MG/DL (ref 1.8–2.4)
MCH RBC QN AUTO: 28.9 PG (ref 26–34)
MCHC RBC AUTO-ENTMCNC: 33.6 G/DL (ref 31–36)
MCV RBC AUTO: 85.9 FL (ref 80–100)
MONOCYTES # BLD: 0.6 K/UL (ref 0–1.3)
MONOCYTES NFR BLD: 7.9 %
NEUTROPHILS # BLD: 3.9 K/UL (ref 1.7–7.7)
NEUTROPHILS NFR BLD: 54.6 %
PLATELET # BLD AUTO: 751 K/UL (ref 135–450)
PMV BLD AUTO: 6.9 FL (ref 5–10.5)
POTASSIUM SERPL-SCNC: 3.9 MMOL/L (ref 3.5–5.1)
RBC # BLD AUTO: 3.28 M/UL (ref 4.2–5.9)
SODIUM SERPL-SCNC: 147 MMOL/L (ref 136–145)
WBC # BLD AUTO: 7.2 K/UL (ref 4–11)

## 2024-11-29 PROCEDURE — 80048 BASIC METABOLIC PNL TOTAL CA: CPT

## 2024-11-29 PROCEDURE — 92507 TX SP LANG VOICE COMM INDIV: CPT

## 2024-11-29 PROCEDURE — 36415 COLL VENOUS BLD VENIPUNCTURE: CPT

## 2024-11-29 PROCEDURE — 83735 ASSAY OF MAGNESIUM: CPT

## 2024-11-29 PROCEDURE — 97129 THER IVNTJ 1ST 15 MIN: CPT

## 2024-11-29 PROCEDURE — 97530 THERAPEUTIC ACTIVITIES: CPT

## 2024-11-29 PROCEDURE — 97110 THERAPEUTIC EXERCISES: CPT

## 2024-11-29 PROCEDURE — 94760 N-INVAS EAR/PLS OXIMETRY 1: CPT

## 2024-11-29 PROCEDURE — 97116 GAIT TRAINING THERAPY: CPT

## 2024-11-29 PROCEDURE — 97535 SELF CARE MNGMENT TRAINING: CPT

## 2024-11-29 PROCEDURE — 6370000000 HC RX 637 (ALT 250 FOR IP): Performed by: PHYSICAL MEDICINE & REHABILITATION

## 2024-11-29 PROCEDURE — 85025 COMPLETE CBC W/AUTO DIFF WBC: CPT

## 2024-11-29 PROCEDURE — 1280000000 HC REHAB R&B

## 2024-11-29 RX ADMIN — POLYETHYLENE GLYCOL 3350 17 G: 17 POWDER, FOR SOLUTION ORAL at 09:06

## 2024-11-29 RX ADMIN — METHOCARBAMOL TABLETS 500 MG: 500 TABLET, COATED ORAL at 12:02

## 2024-11-29 RX ADMIN — METHOCARBAMOL TABLETS 500 MG: 500 TABLET, COATED ORAL at 19:57

## 2024-11-29 RX ADMIN — OXYCODONE HYDROCHLORIDE 15 MG: 10 TABLET ORAL at 03:08

## 2024-11-29 RX ADMIN — DIAZEPAM 5 MG: 5 TABLET ORAL at 09:06

## 2024-11-29 RX ADMIN — ASPIRIN 81 MG: 81 TABLET, CHEWABLE ORAL at 07:17

## 2024-11-29 RX ADMIN — ATORVASTATIN CALCIUM 80 MG: 80 TABLET, FILM COATED ORAL at 20:15

## 2024-11-29 RX ADMIN — APIXABAN 5 MG: 5 TABLET, FILM COATED ORAL at 20:15

## 2024-11-29 RX ADMIN — OXYCODONE HYDROCHLORIDE 15 MG: 10 TABLET ORAL at 11:26

## 2024-11-29 RX ADMIN — METHOCARBAMOL TABLETS 500 MG: 500 TABLET, COATED ORAL at 05:59

## 2024-11-29 RX ADMIN — OXYCODONE HYDROCHLORIDE 15 MG: 10 TABLET ORAL at 15:40

## 2024-11-29 RX ADMIN — OXYCODONE HYDROCHLORIDE 15 MG: 10 TABLET ORAL at 19:57

## 2024-11-29 RX ADMIN — DIAZEPAM 5 MG: 5 TABLET ORAL at 21:35

## 2024-11-29 RX ADMIN — ACETAMINOPHEN 1000 MG: 500 TABLET ORAL at 21:35

## 2024-11-29 RX ADMIN — Medication 2 CAPSULE: at 15:39

## 2024-11-29 RX ADMIN — ACETAMINOPHEN 1000 MG: 500 TABLET ORAL at 05:59

## 2024-11-29 RX ADMIN — TICAGRELOR 90 MG: 90 TABLET ORAL at 20:15

## 2024-11-29 RX ADMIN — TICAGRELOR 90 MG: 90 TABLET ORAL at 07:17

## 2024-11-29 RX ADMIN — Medication 2 CAPSULE: at 07:17

## 2024-11-29 RX ADMIN — PANTOPRAZOLE SODIUM 40 MG: 40 TABLET, DELAYED RELEASE ORAL at 05:58

## 2024-11-29 RX ADMIN — SACUBITRIL AND VALSARTAN 1 TABLET: 24; 26 TABLET, FILM COATED ORAL at 20:15

## 2024-11-29 RX ADMIN — APIXABAN 5 MG: 5 TABLET, FILM COATED ORAL at 07:17

## 2024-11-29 RX ADMIN — OXYCODONE HYDROCHLORIDE 15 MG: 10 TABLET ORAL at 07:17

## 2024-11-29 RX ADMIN — FLUTICASONE PROPIONATE 2 SPRAY: 50 SPRAY, METERED NASAL at 12:24

## 2024-11-29 ASSESSMENT — PAIN SCALES - GENERAL
PAINLEVEL_OUTOF10: 10
PAINLEVEL_OUTOF10: 10
PAINLEVEL_OUTOF10: 7
PAINLEVEL_OUTOF10: 10

## 2024-11-29 ASSESSMENT — PAIN SCALES - WONG BAKER
WONGBAKER_NUMERICALRESPONSE: HURTS WHOLE LOT
WONGBAKER_NUMERICALRESPONSE: NO HURT

## 2024-11-29 ASSESSMENT — PAIN - FUNCTIONAL ASSESSMENT
PAIN_FUNCTIONAL_ASSESSMENT: ACTIVITIES ARE NOT PREVENTED

## 2024-11-29 ASSESSMENT — PAIN DESCRIPTION - DESCRIPTORS
DESCRIPTORS: ACHING
DESCRIPTORS: ACHING;DISCOMFORT
DESCRIPTORS: ACHING
DESCRIPTORS: ACHING;DISCOMFORT
DESCRIPTORS: ACHING
DESCRIPTORS: ACHING;DISCOMFORT

## 2024-11-29 ASSESSMENT — PAIN DESCRIPTION - ORIENTATION
ORIENTATION: LOWER
ORIENTATION: MID
ORIENTATION: RIGHT;LOWER

## 2024-11-29 ASSESSMENT — PAIN DESCRIPTION - LOCATION
LOCATION: GENERALIZED
LOCATION: GENERALIZED
LOCATION: RIB CAGE;HIP;BACK
LOCATION: GENERALIZED
LOCATION: CHEST

## 2024-11-29 NOTE — PROGRESS NOTES
Patient admitted to rehab with anoxic encephalopathy.  A/Ox4, forgetful. Transfers with walker x1. Mobility restrictions: WBAT. On regular, no added salt diet, tolerating well. Medications taken whole with thins. On eliquis, aspirin for DVT prophylaxis.  Skin: incision to right chest. Oxygen: RA. LDA: none. Has been continent of bowel and continent of bladder. LBM 11/28/24. Chair/bed alarms in use and call light in reach.   Patient asked for Pain medication around the clock, every four hours and called for it each time. Care on going.

## 2024-11-29 NOTE — PROGRESS NOTES
Mobility  Overall Assistance Level: Modified Independent  Roll Right  Assistance Level: Modified independent  Skilled Clinical Factors: semi reclined bed, used rail in L hand only  Sit to Supine  Assistance Level: Modified independent  Skilled Clinical Factors: semi reclined bed, used rail  Supine to Sit  Assistance Level: Modified independent  Skilled Clinical Factors: HOB raised, used rail in L hand only  Transfers  Surface: To bed;Wheelchair;From bed;Standard toilet  Additional Factors: Hand placement cues;Verbal cues  Device: Walker  Sit to Stand  Assistance Level: Stand by assist  Skilled Clinical Factors: hand placement cues  Stand to Sit  Assistance Level: Stand by assist  Skilled Clinical Factors: forgetful of safe hand placement  Bed To/From Chair  Assistance Level: Stand by assist  Skilled Clinical Factors: used RW to/from bed and into w/c  Stand Pivot  Assistance Level: Stand by assist  Skilled Clinical Factors: shower chair into w/c using GBs         Assessment  Assessment  Assessment: Pt has met 5 out of 12 goals w/ OT intervention. He initially reported 10/10 chest pain but decreased to 7/10 after shower. He completed bed mobility w/ MI using rail; he used RW from bed into bathrm & completed shower & toilet t/f w/ SBA; cues to use GBs when available; he completed toileting tasks w/ SBA, he squats to clean pati areas; cues to keep 1 hand on Gb to reduce fall risk; he completed shower level bathing w/ SBA using shower chair & GBs; stood w/ SBA to clean pati areas, 1 hand to GB. He needed extra time & cues to doff/don shirt, pants & brief. Pt reports feeling \"energized\" after shower. Miguel Angel BANDA in to remove bandage from R upper chest area. He continued to make good progress & would benefit from cont OT/PT to address strength, balance & endurance to return home; his son is currently out of town. Pt shown TSF but he is not convinced he needs them. Pt's son owns a [employer] company & will be able to have home OT/PT

## 2024-11-29 NOTE — PLAN OF CARE
Problem: Discharge Planning  Goal: Discharge to home or other facility with appropriate resources  Outcome: Progressing     Problem: Pain  Goal: Verbalizes/displays adequate comfort level or baseline comfort level  11/29/2024 0019 by Francesca Bautista, RN  Outcome: Progressing     Problem: Skin/Tissue Integrity  Goal: Absence of new skin breakdown  Description: 1.  Monitor for areas of redness and/or skin breakdown  2.  Assess vascular access sites hourly  3.  Every 4-6 hours minimum:  Change oxygen saturation probe site  4.  Every 4-6 hours:  If on nasal continuous positive airway pressure, respiratory therapy assess nares and determine need for appliance change or resting period.  11/29/2024 0019 by Francesca Bautista, RN  Outcome: Progressing     Problem: Skin/Tissue Integrity - Adult  Goal: Incisions, wounds, or drain sites healing without S/S of infection  11/29/2024 0019 by Francesca Bautista, RN  Outcome: Progressing

## 2024-11-29 NOTE — PROGRESS NOTES
Energy Requirements: Current  Energy (kcal/day): 3922-0064 kcal using (22-27 kcal/kg/day)  Weight Used for Protein Requirements: Current  Protein (g/day): 74-96g using (1-1.3 g/kg)  Method Used for Fluid Requirements: 1 ml/kcal  Fluid (ml/day): Or per provider    Nutrition Diagnosis:   Increased nutrient needs related to increase demand for energy/nutrients as evidenced by rehab for strength and conditioning    Nutrition Interventions:   Food and/or Nutrient Delivery: Continue Current Diet, Continue Oral Nutrition Supplement  Nutrition Education/Counseling: No recommendation at this time  Coordination of Nutrition Care: Continue to monitor while inpatient       Goals:  Goals: PO intake 75% or greater, by next RD assessment  Type of Goal: Continue current goal  Previous Goal Met: New Goal    Nutrition Monitoring and Evaluation:   Behavioral-Environmental Outcomes: None Identified  Food/Nutrient Intake Outcomes: Food and Nutrient Intake, Supplement Intake  Physical Signs/Symptoms Outcomes: Biochemical Data, Weight    Discharge Planning:    Too soon to determine     Ignacia Cool RD  Contact: [phone]

## 2024-11-29 NOTE — PROGRESS NOTES
Time   Individual Co-treatment   Time In 1400     Time Out 1445     Minutes 45         Electronically signed by Cm Robbins, PT on 11/29/2024 at 2:42 PM      Therapy Time   Individual Concurrent Group Co-treatment   Time In 1045         Time Out 1130         Minutes 45           Timed Code Treatment Minutes: 45 Minutes       Cm Robbins, PT, 11/29/24 at 2:42 PM

## 2024-11-29 NOTE — PROGRESS NOTES
Department of Physical Medicine & Rehabilitation  Progress Note    Patient Identification:  Ad Lacey  [id number]  : 1950  Admit date: 2024    Chief Complaint: Anoxic encephalopathy due to cardiac arrest (HCC)    Subjective:   No acute events overnight.   Notified by RN on evening of  that patient was found with bag of pills in his possession. He reported they were Klonopin. Initially refused to give to RN staff. Security was called and medication was taken. UDS was obtained and was + for benzodiazepine and cannabinoids.   Patient seen this afternoon working with OT. Continues to report severe chest wall pain. However is making steady functional progress. I discussed with him why taking medication from outside hospital is extremely dangerous and he verbalized his understanding.   Labs reviewed.     ROS: No f/c, n/v    Objective:  Patient Vitals for the past 24 hrs:   BP Temp Temp src Pulse Resp SpO2 Height Weight   24 1156 -- -- -- -- 16 -- -- --   24 1126 -- -- -- -- 16 -- -- --   24 1015 -- -- -- -- -- -- 1.727 m (5' 7.99\") --   24 0751 -- -- -- -- -- 95 % -- --   24 0747 -- -- -- -- 18 -- -- --   24 0716 114/75 97.4 °F (36.3 °C) Oral 62 18 96 % -- --   24 0416 -- -- -- -- -- -- -- 74.2 kg (163 lb 9.3 oz)   24 0338 -- -- -- -- 16 -- -- --   24 0308 -- -- -- -- 16 -- -- --   24 0307 104/67 97.6 °F (36.4 °C) Oral 65 -- 96 % -- --   24 2325 -- -- -- -- 16 -- -- --   248 (!) 102/57 -- -- 64 -- -- -- --   24 1906 113/63 97.7 °F (36.5 °C) Oral 70 17 96 % -- --   24 1906 -- -- -- -- 16 -- -- --   24 1836 -- -- -- -- 16 -- -- --   24 1833 126/79 -- -- -- -- -- -- --   24 1627 111/61 97.8 °F (36.6 °C) Oral 67 16 98 % -- --   24 1456 -- -- -- -- 16 -- -- --   24 1426 -- -- -- -- 16 -- -- --   24 1325 (!) 119/59 -- -- -- -- -- -- --     Const: Alert. No distress, pleasant.

## 2024-11-29 NOTE — PLAN OF CARE
Problem: Discharge Planning  Goal: Discharge to home or other facility with appropriate resources  11/29/2024 0837 by Miguel Angel Mota RN  Flowsheets (Taken 11/27/2024 1930)  Discharge to home or other facility with appropriate resources: Identify barriers to discharge with patient and caregiver     Problem: Pain  Goal: Verbalizes/displays adequate comfort level or baseline comfort level  11/29/2024 0837 by Miguel Angel Mota RN  Verbalizes/displays adequate comfort level or baseline comfort level:   Encourage patient to monitor pain and request assistance   Administer analgesics based on type and severity of pain and evaluate response   Assess pain using appropriate pain scale   Implement non-pharmacological measures as appropriate and evaluate response     Problem: Safety - Adult  Goal: Free from fall injury  Flowsheets (Taken 11/29/2024 0836)  Free From Fall Injury: Instruct family/caregiver on patient safety      admission

## 2024-11-29 NOTE — PROGRESS NOTES
receptive language and verbal expressive language skills     Date of Admit: 11/21/2024  Room #: X8E-9161/3271-01  Date: 11/29/2024       Current functional status (updated daily):         Current Diet Order:ADULT DIET; Regular; No Added Salt (3-4 gm)  ADULT ORAL NUTRITION SUPPLEMENT; Breakfast, Lunch, Dinner; Standard High Calorie/High Protein Oral Supplement   Behavior: [x] Alert  [x] Cooperative  [x]  Pleasant  [] Confused  [] Agitated  [] Uncooperative  [] Distractible [] Motivated  [] Self-Limiting [] Anxious  [] Other:  Endurance:  [x] Adequate for participation in SLP sessions  [] Reduced overall  [] Lethargic  [] Other:  Safety: [] No concerns at this time  [] Reduced insight into deficits  []  Reduced safety awareness [] Not following call light procedures  [] Unable to Assess  [] Other:    Interventions used during Rehab Stay:   [x] Speech/Language Treatment  [] Instruction in HEP [] Group [] Dysphagia Treatment [x] Cognitive Treatment   [] Other:    Barriers toward progress: Pain, Anxiety, and Long standing deficits           Date: 11/29/2024      Tx session 1 Tx session 2   Total Timed Code Min SLP Individual Minutes  Time In: 0830  Time Out: 0900  Minutes: 30  Coded treatment time  15 SLP Individual Minutes  Time In:   Time Out:   Minutes:   Coded treatment time     Group Treatment Minutes 0 0   Co-Treat Minutes 0 0   Variance/Reason:  NA    Pain Per patient: always a 10  Whole body    Pain Intervention [] RN notified  [] Repositioned  [] Intervention offered and patient declined  [] N/A  [x] Other: reports pain medication and repositioning of no benefit [] RN notified  [] Repositioned  [] Intervention offered and patient declined  [] N/A  [] Other:   Subjective     Accepted and tolerated tx at bedside. Tolerated session well.    Objective:  Goals     Pt goal is to get home as quickly as possible    Therapy working with patient toward goal. Patient very motivated to return home ASAP.      Goal 1: Pt will

## 2024-11-29 NOTE — PROGRESS NOTES
This is a 74 y.o.  male admitted on 11/21/2024 with Anoxic encephalopathy due to cardiac arrest (HCC) [G93.1, I46.9]. A&O X 4. Patient reported generalized pain of 10/10, pain medication given as ordered, see MAR. He is constantly on pain medication  Active bowel sounds. Last BM 11/28/24. Patient is continent of of bowel & bladder.  He is on a regular diet. Medications taken whole with thins. Skin: incision right chest. Transfers with walker x 1. HS medications given. Tolerated well.  Call light and bedside table within reach. Patient instructed to call if he has any needs or changes.

## 2024-11-29 NOTE — PROGRESS NOTES
Patient incision dressing changed today on  his chest. There are 11 staples on top and 3 in right belong it covered with Mapilex. Incision looks clean, dry and intact. Care on going.

## 2024-11-30 PROCEDURE — 94760 N-INVAS EAR/PLS OXIMETRY 1: CPT

## 2024-11-30 PROCEDURE — 6370000000 HC RX 637 (ALT 250 FOR IP): Performed by: PHYSICAL MEDICINE & REHABILITATION

## 2024-11-30 PROCEDURE — 1280000000 HC REHAB R&B

## 2024-11-30 RX ADMIN — OXYCODONE HYDROCHLORIDE 15 MG: 10 TABLET ORAL at 12:54

## 2024-11-30 RX ADMIN — Medication 3 MG: at 20:19

## 2024-11-30 RX ADMIN — DIAZEPAM 5 MG: 5 TABLET ORAL at 09:44

## 2024-11-30 RX ADMIN — DIAZEPAM 5 MG: 5 TABLET ORAL at 20:20

## 2024-11-30 RX ADMIN — APIXABAN 5 MG: 5 TABLET, FILM COATED ORAL at 20:56

## 2024-11-30 RX ADMIN — PANTOPRAZOLE SODIUM 40 MG: 40 TABLET, DELAYED RELEASE ORAL at 05:26

## 2024-11-30 RX ADMIN — METHOCARBAMOL TABLETS 500 MG: 500 TABLET, COATED ORAL at 02:04

## 2024-11-30 RX ADMIN — ACETAMINOPHEN 1000 MG: 500 TABLET ORAL at 14:47

## 2024-11-30 RX ADMIN — OXYCODONE HYDROCHLORIDE 15 MG: 10 TABLET ORAL at 16:57

## 2024-11-30 RX ADMIN — OXYCODONE HYDROCHLORIDE 15 MG: 10 TABLET ORAL at 08:44

## 2024-11-30 RX ADMIN — TICAGRELOR 90 MG: 90 TABLET ORAL at 20:19

## 2024-11-30 RX ADMIN — ATORVASTATIN CALCIUM 80 MG: 80 TABLET, FILM COATED ORAL at 20:19

## 2024-11-30 RX ADMIN — ASPIRIN 81 MG: 81 TABLET, CHEWABLE ORAL at 08:35

## 2024-11-30 RX ADMIN — METHOCARBAMOL TABLETS 500 MG: 500 TABLET, COATED ORAL at 08:36

## 2024-11-30 RX ADMIN — Medication 2 CAPSULE: at 08:45

## 2024-11-30 RX ADMIN — TICAGRELOR 90 MG: 90 TABLET ORAL at 08:36

## 2024-11-30 RX ADMIN — Medication 2 CAPSULE: at 16:57

## 2024-11-30 RX ADMIN — CARVEDILOL 12.5 MG: 12.5 TABLET, FILM COATED ORAL at 08:35

## 2024-11-30 RX ADMIN — CARVEDILOL 12.5 MG: 12.5 TABLET, FILM COATED ORAL at 16:59

## 2024-11-30 RX ADMIN — OXYCODONE HYDROCHLORIDE 15 MG: 10 TABLET ORAL at 20:27

## 2024-11-30 RX ADMIN — ACETAMINOPHEN 1000 MG: 500 TABLET ORAL at 23:53

## 2024-11-30 RX ADMIN — OXYCODONE HYDROCHLORIDE 15 MG: 10 TABLET ORAL at 00:10

## 2024-11-30 RX ADMIN — APIXABAN 5 MG: 5 TABLET, FILM COATED ORAL at 08:35

## 2024-11-30 RX ADMIN — METHOCARBAMOL TABLETS 500 MG: 500 TABLET, COATED ORAL at 14:50

## 2024-11-30 RX ADMIN — ACETAMINOPHEN 1000 MG: 500 TABLET ORAL at 05:26

## 2024-11-30 RX ADMIN — OXYCODONE HYDROCHLORIDE 15 MG: 10 TABLET ORAL at 04:08

## 2024-11-30 ASSESSMENT — PAIN SCALES - GENERAL
PAINLEVEL_OUTOF10: 9
PAINLEVEL_OUTOF10: 9
PAINLEVEL_OUTOF10: 4
PAINLEVEL_OUTOF10: 10
PAINLEVEL_OUTOF10: 10
PAINLEVEL_OUTOF10: 0
PAINLEVEL_OUTOF10: 10
PAINLEVEL_OUTOF10: 0
PAINLEVEL_OUTOF10: 0
PAINLEVEL_OUTOF10: 9
PAINLEVEL_OUTOF10: 10
PAINLEVEL_OUTOF10: 4
PAINLEVEL_OUTOF10: 9
PAINLEVEL_OUTOF10: 9
PAINLEVEL_OUTOF10: 8

## 2024-11-30 ASSESSMENT — PAIN DESCRIPTION - DESCRIPTORS
DESCRIPTORS: ACHING
DESCRIPTORS: STABBING
DESCRIPTORS: SPASM;ACHING
DESCRIPTORS: ACHING;STABBING
DESCRIPTORS: ACHING
DESCRIPTORS: ACHING
DESCRIPTORS: STABBING

## 2024-11-30 ASSESSMENT — PAIN SCALES - WONG BAKER
WONGBAKER_NUMERICALRESPONSE: NO HURT
WONGBAKER_NUMERICALRESPONSE: HURTS A LITTLE BIT
WONGBAKER_NUMERICALRESPONSE: NO HURT
WONGBAKER_NUMERICALRESPONSE: HURTS A LITTLE BIT

## 2024-11-30 ASSESSMENT — PAIN - FUNCTIONAL ASSESSMENT
PAIN_FUNCTIONAL_ASSESSMENT: ACTIVITIES ARE NOT PREVENTED
PAIN_FUNCTIONAL_ASSESSMENT: PREVENTS OR INTERFERES SOME ACTIVE ACTIVITIES AND ADLS
PAIN_FUNCTIONAL_ASSESSMENT: PREVENTS OR INTERFERES WITH MANY ACTIVE NOT PASSIVE ACTIVITIES
PAIN_FUNCTIONAL_ASSESSMENT: ACTIVITIES ARE NOT PREVENTED

## 2024-11-30 ASSESSMENT — PAIN DESCRIPTION - ORIENTATION
ORIENTATION: RIGHT;MID
ORIENTATION: RIGHT;LEFT
ORIENTATION: RIGHT
ORIENTATION: RIGHT;LEFT
ORIENTATION: MID;RIGHT
ORIENTATION: RIGHT
ORIENTATION: LEFT;RIGHT
ORIENTATION: RIGHT

## 2024-11-30 ASSESSMENT — PAIN DESCRIPTION - FREQUENCY
FREQUENCY: CONTINUOUS

## 2024-11-30 ASSESSMENT — PAIN DESCRIPTION - LOCATION
LOCATION: CHEST;HIP;SHOULDER
LOCATION: CHEST
LOCATION: CHEST
LOCATION: ARM
LOCATION: CHEST
LOCATION: CHEST;HIP;LEG
LOCATION: CHEST;HIP
LOCATION: CHEST

## 2024-11-30 ASSESSMENT — PAIN DESCRIPTION - ONSET
ONSET: ON-GOING

## 2024-11-30 ASSESSMENT — PAIN DESCRIPTION - PAIN TYPE
TYPE: ACUTE PAIN

## 2024-11-30 NOTE — PROGRESS NOTES
This is a 74 y.o.  male admitted on 11/21/2024 with Anoxic encephalopathy due to cardiac arrest (HCC) [G93.1, I46.9]. A&O X 4. Patient reported generalized pain of 10/10, pain medication given as ordered, see MAR. He is constantly on pain medication. Active bowel sounds. Last BM 11/29/24. Patient is continent of of bowel & bladder.  He is on a regular diet. Medications taken whole with thins. Skin: incision right chest. Transfers with walker x 1. HS medications given. Tolerated well.  Call light and bedside table within reach. Patient instructed to call if he has any needs or changes.

## 2024-11-30 NOTE — PLAN OF CARE
Problem: Safety - Medical Restraint  Goal: Remains free of injury from restraints (Restraint for Interference with Medical Device)  Description: INTERVENTIONS:  1. Determine that other, less restrictive measures have been tried or would not be effective before applying the restraint  2. Evaluate the patient's condition at the time of restraint application  3. Inform patient/family regarding the reason for restraint  4. Q2H: Monitor safety, psychosocial status, comfort, nutrition and hydration  Outcome: Progressing     Problem: Pain  Goal: Verbalizes/displays adequate comfort level or baseline comfort level  Outcome: Progressing     Problem: Skin/Tissue Integrity  Goal: Absence of new skin breakdown  Description: 1.  Monitor for areas of redness and/or skin breakdown  2.  Assess vascular access sites hourly  3.  Every 4-6 hours minimum:  Change oxygen saturation probe site  4.  Every 4-6 hours:  If on nasal continuous positive airway pressure, respiratory therapy assess nares and determine need for appliance change or resting period.  Outcome: Progressing     Problem: Skin/Tissue Integrity - Adult  Goal: Incisions, wounds, or drain sites healing without S/S of infection  Outcome: Progressing     Problem: Metabolic/Fluid and Electrolytes - Adult  Goal: Electrolytes maintained within normal limits  Outcome: Progressing

## 2024-11-30 NOTE — PROGRESS NOTES
Patient admitted to rehab with anoxic encephalopathy r/t cardiac arrest.  A/Ox4. Transfers with one assist with gait belt and front wheeled walker. Mobility restrictions: no lifting greater than 10 pounds no extension past 90 degrees. On regular no added salt diet, tolerating well. Medications taken whole with fluids or pudding. On eliquis for DVT prophylaxis.  Skin: scattered bruising, incision to right chest and buttocks pink. Oxygen: room air. LDA: none. Has been continent of bowel and  of bladder. LBM 11/29. Chair/bed alarms in use and call light in reach. Will monitor for safety.

## 2024-11-30 NOTE — PLAN OF CARE
Problem: Discharge Planning  Goal: Discharge to home or other facility with appropriate resources  Outcome: Progressing  Flowsheets (Taken 11/30/2024 1506)  Discharge to home or other facility with appropriate resources: Identify barriers to discharge with patient and caregiver     Problem: Pain  Goal: Verbalizes/displays adequate comfort level or baseline comfort level  Outcome: Progressing  Flowsheets (Taken 11/30/2024 1506)  Verbalizes/displays adequate comfort level or baseline comfort level:   Encourage patient to monitor pain and request assistance   Administer analgesics based on type and severity of pain and evaluate response   Consider cultural and social influences on pain and pain management   Assess pain using appropriate pain scale   Implement non-pharmacological measures as appropriate and evaluate response   Notify Licensed Independent Practitioner if interventions unsuccessful or patient reports new pain     Problem: Safety - Adult  Goal: Free from fall injury  Outcome: Progressing  Flowsheets (Taken 11/30/2024 1506)  Free From Fall Injury: Instruct family/caregiver on patient safety     Problem: Skin/Tissue Integrity  Goal: Absence of new skin breakdown  Description: 1.  Monitor for areas of redness and/or skin breakdown  2.  Assess vascular access sites hourly  3.  Every 4-6 hours minimum:  Change oxygen saturation probe site  4.  Every 4-6 hours:  If on nasal continuous positive airway pressure, respiratory therapy assess nares and determine need for appliance change or resting period.  Outcome: Progressing  Note: Pt is at risk for impaired skin integrity. Assess skin every shift and prn. Turn every 2 hours. Keep heels off bed. Keep skin clean and dry.     Problem: ABCDS Injury Assessment  Goal: Absence of physical injury  Outcome: Progressing  Flowsheets  Taken 11/30/2024 1506  Absence of Physical Injury: Implement safety measures based on patient assessment  Taken 11/30/2024 1504  Absence of

## 2024-11-30 NOTE — PLAN OF CARE
Problem: Discharge Planning  Goal: Discharge to home or other facility with appropriate resources  11/30/2024 1523 by Karen Farooq, RN  Outcome: Progressing

## 2024-12-01 PROCEDURE — 97530 THERAPEUTIC ACTIVITIES: CPT

## 2024-12-01 PROCEDURE — 92507 TX SP LANG VOICE COMM INDIV: CPT

## 2024-12-01 PROCEDURE — 6370000000 HC RX 637 (ALT 250 FOR IP): Performed by: PHYSICAL MEDICINE & REHABILITATION

## 2024-12-01 PROCEDURE — 94760 N-INVAS EAR/PLS OXIMETRY 1: CPT

## 2024-12-01 PROCEDURE — 97129 THER IVNTJ 1ST 15 MIN: CPT

## 2024-12-01 PROCEDURE — 97535 SELF CARE MNGMENT TRAINING: CPT

## 2024-12-01 PROCEDURE — 1280000000 HC REHAB R&B

## 2024-12-01 RX ADMIN — ACETAMINOPHEN 1000 MG: 500 TABLET ORAL at 05:19

## 2024-12-01 RX ADMIN — ATORVASTATIN CALCIUM 80 MG: 80 TABLET, FILM COATED ORAL at 21:12

## 2024-12-01 RX ADMIN — Medication 2 CAPSULE: at 08:00

## 2024-12-01 RX ADMIN — SACUBITRIL AND VALSARTAN 1 TABLET: 24; 26 TABLET, FILM COATED ORAL at 08:00

## 2024-12-01 RX ADMIN — OXYCODONE HYDROCHLORIDE 15 MG: 10 TABLET ORAL at 21:12

## 2024-12-01 RX ADMIN — POLYETHYLENE GLYCOL 3350 17 G: 17 POWDER, FOR SOLUTION ORAL at 08:00

## 2024-12-01 RX ADMIN — OXYCODONE HYDROCHLORIDE 15 MG: 10 TABLET ORAL at 17:15

## 2024-12-01 RX ADMIN — DIAZEPAM 5 MG: 5 TABLET ORAL at 22:12

## 2024-12-01 RX ADMIN — CARVEDILOL 12.5 MG: 12.5 TABLET, FILM COATED ORAL at 17:15

## 2024-12-01 RX ADMIN — DIAZEPAM 5 MG: 5 TABLET ORAL at 10:19

## 2024-12-01 RX ADMIN — Medication 2 CAPSULE: at 17:15

## 2024-12-01 RX ADMIN — OXYCODONE HYDROCHLORIDE 15 MG: 10 TABLET ORAL at 09:06

## 2024-12-01 RX ADMIN — OXYCODONE HYDROCHLORIDE 15 MG: 10 TABLET ORAL at 00:01

## 2024-12-01 RX ADMIN — APIXABAN 5 MG: 5 TABLET, FILM COATED ORAL at 08:00

## 2024-12-01 RX ADMIN — APIXABAN 5 MG: 5 TABLET, FILM COATED ORAL at 21:12

## 2024-12-01 RX ADMIN — OXYCODONE HYDROCHLORIDE 15 MG: 10 TABLET ORAL at 04:51

## 2024-12-01 RX ADMIN — ACETAMINOPHEN 1000 MG: 500 TABLET ORAL at 13:10

## 2024-12-01 RX ADMIN — OXYCODONE HYDROCHLORIDE 15 MG: 10 TABLET ORAL at 13:10

## 2024-12-01 RX ADMIN — METHOCARBAMOL TABLETS 500 MG: 500 TABLET, COATED ORAL at 01:51

## 2024-12-01 RX ADMIN — TICAGRELOR 90 MG: 90 TABLET ORAL at 21:12

## 2024-12-01 RX ADMIN — METHOCARBAMOL TABLETS 500 MG: 500 TABLET, COATED ORAL at 14:15

## 2024-12-01 RX ADMIN — METHOCARBAMOL TABLETS 500 MG: 500 TABLET, COATED ORAL at 19:52

## 2024-12-01 RX ADMIN — METHOCARBAMOL TABLETS 500 MG: 500 TABLET, COATED ORAL at 08:01

## 2024-12-01 RX ADMIN — CARVEDILOL 12.5 MG: 12.5 TABLET, FILM COATED ORAL at 08:00

## 2024-12-01 RX ADMIN — PANTOPRAZOLE SODIUM 40 MG: 40 TABLET, DELAYED RELEASE ORAL at 05:21

## 2024-12-01 RX ADMIN — ACETAMINOPHEN 1000 MG: 500 TABLET ORAL at 21:12

## 2024-12-01 RX ADMIN — TICAGRELOR 90 MG: 90 TABLET ORAL at 08:00

## 2024-12-01 RX ADMIN — SACUBITRIL AND VALSARTAN 1 TABLET: 24; 26 TABLET, FILM COATED ORAL at 21:12

## 2024-12-01 RX ADMIN — ASPIRIN 81 MG: 81 TABLET, CHEWABLE ORAL at 08:00

## 2024-12-01 ASSESSMENT — PAIN DESCRIPTION - ONSET
ONSET: ON-GOING

## 2024-12-01 ASSESSMENT — PAIN DESCRIPTION - DESCRIPTORS
DESCRIPTORS: STABBING
DESCRIPTORS: ACHING;DISCOMFORT;SORE
DESCRIPTORS: STABBING
DESCRIPTORS: SPASM
DESCRIPTORS: ACHING;DISCOMFORT;SORE
DESCRIPTORS: STABBING
DESCRIPTORS: ACHING

## 2024-12-01 ASSESSMENT — PAIN SCALES - WONG BAKER
WONGBAKER_NUMERICALRESPONSE: NO HURT
WONGBAKER_NUMERICALRESPONSE: HURTS A LITTLE BIT

## 2024-12-01 ASSESSMENT — PAIN - FUNCTIONAL ASSESSMENT
PAIN_FUNCTIONAL_ASSESSMENT: ACTIVITIES ARE NOT PREVENTED
PAIN_FUNCTIONAL_ASSESSMENT: ACTIVITIES ARE NOT PREVENTED
PAIN_FUNCTIONAL_ASSESSMENT: PREVENTS OR INTERFERES WITH MANY ACTIVE NOT PASSIVE ACTIVITIES
PAIN_FUNCTIONAL_ASSESSMENT: ACTIVITIES ARE NOT PREVENTED
PAIN_FUNCTIONAL_ASSESSMENT: PREVENTS OR INTERFERES SOME ACTIVE ACTIVITIES AND ADLS

## 2024-12-01 ASSESSMENT — PAIN DESCRIPTION - PAIN TYPE
TYPE: ACUTE PAIN

## 2024-12-01 ASSESSMENT — PAIN DESCRIPTION - LOCATION
LOCATION: CHEST;HIP
LOCATION: CHEST
LOCATION: SHOULDER;HIP
LOCATION: CHEST;HIP
LOCATION: HIP;SHOULDER;CHEST
LOCATION: CHEST
LOCATION: CHEST
LOCATION: CHEST;HIP
LOCATION: CHEST;HIP

## 2024-12-01 ASSESSMENT — PAIN SCALES - GENERAL
PAINLEVEL_OUTOF10: 10
PAINLEVEL_OUTOF10: 9
PAINLEVEL_OUTOF10: 10
PAINLEVEL_OUTOF10: 0
PAINLEVEL_OUTOF10: 9
PAINLEVEL_OUTOF10: 0
PAINLEVEL_OUTOF10: 8
PAINLEVEL_OUTOF10: 10
PAINLEVEL_OUTOF10: 10
PAINLEVEL_OUTOF10: 0
PAINLEVEL_OUTOF10: 9
PAINLEVEL_OUTOF10: 0
PAINLEVEL_OUTOF10: 10
PAINLEVEL_OUTOF10: 9
PAINLEVEL_OUTOF10: 10
PAINLEVEL_OUTOF10: 0
PAINLEVEL_OUTOF10: 9

## 2024-12-01 ASSESSMENT — PAIN DESCRIPTION - ORIENTATION
ORIENTATION: MID;RIGHT
ORIENTATION: RIGHT

## 2024-12-01 ASSESSMENT — PAIN DESCRIPTION - FREQUENCY
FREQUENCY: CONTINUOUS

## 2024-12-01 NOTE — PLAN OF CARE
Problem: Discharge Planning  Goal: Discharge to home or other facility with appropriate resources  12/1/2024 0145 by Peggy Arevalo RN  Outcome: Progressing  11/30/2024 1523 by Karen Farooq RN  Outcome: Progressing  11/30/2024 1506 by Sindy Soriano RN  Outcome: Progressing  Flowsheets (Taken 11/30/2024 1506)  Discharge to home or other facility with appropriate resources: Identify barriers to discharge with patient and caregiver     Problem: Safety - Medical Restraint  Goal: Remains free of injury from restraints (Restraint for Interference with Medical Device)  Description: INTERVENTIONS:  1. Determine that other, less restrictive measures have been tried or would not be effective before applying the restraint  2. Evaluate the patient's condition at the time of restraint application  3. Inform patient/family regarding the reason for restraint  4. Q2H: Monitor safety, psychosocial status, comfort, nutrition and hydration  12/1/2024 0145 by Peggy Arevalo RN  Outcome: Progressing  11/30/2024 1523 by Karen Farooq RN  Outcome: Progressing     Problem: Pain  Goal: Verbalizes/displays adequate comfort level or baseline comfort level  12/1/2024 0145 by Peggy Arevalo RN  Outcome: Progressing  11/30/2024 1523 by Karen Farooq RN  Outcome: Progressing  11/30/2024 1506 by Sindy Soriano RN  Outcome: Progressing  Flowsheets (Taken 11/30/2024 1506)  Verbalizes/displays adequate comfort level or baseline comfort level:   Encourage patient to monitor pain and request assistance   Administer analgesics based on type and severity of pain and evaluate response   Consider cultural and social influences on pain and pain management   Assess pain using appropriate pain scale   Implement non-pharmacological measures as appropriate and evaluate response   Notify Licensed Independent Practitioner if interventions unsuccessful or patient reports new pain     Problem: Safety - Adult  Goal: Free from fall injury  12/1/2024

## 2024-12-01 NOTE — PLAN OF CARE
Problem: Discharge Planning  Goal: Discharge to home or other facility with appropriate resources  12/1/2024 0826 by Sindy Soriano RN  Outcome: Progressing  Flowsheets (Taken 12/1/2024 0826)  Discharge to home or other facility with appropriate resources: Identify barriers to discharge with patient and caregiver     Problem: Safety - Medical Restraint  Goal: Remains free of injury from restraints (Restraint for Interference with Medical Device)  Description: INTERVENTIONS:  1. Determine that other, less restrictive measures have been tried or would not be effective before applying the restraint  2. Evaluate the patient's condition at the time of restraint application  3. Inform patient/family regarding the reason for restraint  4. Q2H: Monitor safety, psychosocial status, comfort, nutrition and hydration  12/1/2024 0826 by Sindy Soriano RN  Outcome: Progressing     Problem: Pain  Goal: Verbalizes/displays adequate comfort level or baseline comfort level  12/1/2024 0826 by Sindy Soriano RN  Outcome: Progressing  Flowsheets (Taken 12/1/2024 0826)  Verbalizes/displays adequate comfort level or baseline comfort level:   Encourage patient to monitor pain and request assistance   Administer analgesics based on type and severity of pain and evaluate response   Consider cultural and social influences on pain and pain management   Assess pain using appropriate pain scale   Implement non-pharmacological measures as appropriate and evaluate response   Notify Licensed Independent Practitioner if interventions unsuccessful or patient reports new pain     Problem: Safety - Adult  Goal: Free from fall injury  12/1/2024 0826 by Sindy Soriano RN  Flowsheets (Taken 12/1/2024 0826)  Free From Fall Injury: Instruct family/caregiver on patient safety     Problem: Skin/Tissue Integrity  Goal: Absence of new skin breakdown  Description: 1.  Monitor for areas of redness and/or skin breakdown  2.  Assess vascular

## 2024-12-01 NOTE — PROGRESS NOTES
Patient admitted to rehab with anoxic encephalopathy r/t cardiac arrest.  A/Ox4. Transfers with one assist with gait belt and front wheeled walker. Mobility restrictions: no lifting greater than 10 pounds no extension past 90 degrees. On regular no added salt diet, tolerating well. Medications taken whole with fluids or pudding. Medicated with prn medications for pain and anxiety.  Sets his alarm on his phone to request when due.  On eliquis for DVT prophylaxis.  Skin: scattered bruising, incision to right chest and buttocks pink. Oxygen: room air. LDA: none. Has been continent of bowel and  of bladder. LBM 11/29. Chair/bed alarms in use and call light in reach. Will monitor for safety.

## 2024-12-01 NOTE — FLOWSHEET NOTE
Patient admitted to ARU with dx of anoxic encephalopathy r/t cardiac arrest on 11/21.     A/Ox4. Transfers with one assist using gait belt and front wheeled walker. Mobility restrictions: no lifting greater than 10 pounds no extension past 90 degrees. On regular REJI. No BP on LUE d/t pain and s/p Impella placement. Medications taken whole with fluids or pudding. On Eliquis for DVT prophylaxis.  Skin: scattered bruising, incision to right chest, covered with dry dressing. Oxygen: on room air. LDA: none. Has been continent of bowel and of bladder,uses urinal . LBM 11/29. Chair/bed alarms in use and call light within reach.      Is on routine Tylenol extra strength as well as prn Robaxin and Roxicodone for pain. He takes prn Valium every 12 hours for his anxiety.

## 2024-12-01 NOTE — PROGRESS NOTES
Mr. Javier Valles is a 89 year old male for whom GI is consulted with concern for GI bleeding. He has a PMH significant for atrial fibrillation (on Apixaban), CAD (status post PCI in 11/2013), ischemic cardiomyopathy with HFpEF (based on ECHO from 02/2021), pulmonary hypertension, and sick sinus syndrome (status post pacemaker).     He reports onset of multiple episodes of rectal bleeding on 12/28 associated with onset of nausea after leaving an ophthalmology appointment. He is unsure of whether bleeding was associated with passage of stool or just blood. He denies symptom association with abdominal pain, nausea, vomiting, dizziness, lightheadedness, or preceding NSAID usage. He denies prior abdominal surgeries. He reports his last colonoscopy was >10 years ago, but does not recall results.     Hospital Course: On arrival, vital signs normal on room air. Labs notable for anemia (Hgb 8.9 from 9.2 on 11/10), thrombocytopenia, and elevated BUN (35). CTA A/P was without active contrast extravasation, but did suggest underlying diverticulosis. He was admitted to hospital medicine and GI consulted.     Most Recent Endoscopic Procedures:   -Colonoscopy in 12/2005 showing sigmoid diverticulosis, internal hemorrhoids, and internal hemorrhoids (see [facility] documents).    Occupational Therapy  Pt declining OT treatment at this time, due to wanting to wait for pain meds (9:30).  Will re-attempt as schedule permits.  Bebeto SLOAN/INDRA,[phone]

## 2024-12-01 NOTE — PROGRESS NOTES
home ASAP.      Goal 1: Pt will demonstrate improved voice quality via functional voice ex and emphasis on breath support/control for phonation at 4-6 syllable utterance level and >7 second phonation without hoarse quality   Continued breath support, vocal function, and resonant voice ex for voice support and reduced vocal tension: min cues for precision; needs reinforcement for consistency -- good recall of strategies but cues for carryover    Sustained phonation: consistently 6-7 seconds    Continued hoarse vocal quality: min-mod cues for frontal focus in connected speech; intermittent cues for sustained phonation     Reinforced benefit for routine IS room use.      Goal 2: Pt will demonstrate recall of learned precautions/restrictions;recall of daily events and new information with set up and use of compensatory strategies PRN   IND for recall for RUE restrictions: R elbow; <10 lb; ability to shower now    Set-up for recall for daily events     Independent for recall for SLP goals/objectives and rationale.     IND recall of DC date      Goal 3: Pt will demonstrate functional concrete executive function incorporating new learning with set up and intermittent reminders.   External assist for mental flexibility for insight to precaution beneftis for balance.    Perseverative and verbose re: verbal reasoning on chronic ailments and current pain limitations       Other areas targeted: NA    Education:   Completed on progress toward goals, recs, and current plan. Verbalizes understanding.    Safety Devices: [x] Call light within reach  [] Chair alarm activated  [x] Bed alarm activated  [] Other: [] Call light within reach  [] Chair alarm activated  [] Bed alarm activated  [] Other:    Progress Assessment: 11/23/2024: continues to decline swallow eval; cog and voice tx initiated  11/25/2024 continued voice/cog tx; emerging insight  11/26/2024 reduced mental flexibility re: pain management; anxiety appears to be an

## 2024-12-01 NOTE — PROGRESS NOTES
Physical Therapy  Attempt    Pt refusing to get OOB until at least 9:30 (pain medication).      Variance 90 min.     Electronically signed by Armen Arora, PT [id number] on 12/1/2024 at 8:10 AM

## 2024-12-01 NOTE — PROGRESS NOTES
Occupational Therapy  Facility/Department: [address] REHAB  Rehabilitation Occupational Therapy Daily Treatment Note    Date: 24  Patient Name: Ad Lacey       Room: T6N-5131/3271-01  MRN: [id number]  Account: [id number]   : 1950  (74 y.o.) Gender: male          Past Medical History:  has a past medical history of Anxiety, Basal cell carcinoma, CAD (coronary artery disease), Peripheral artery disease (HCC), and Presacral mass.  Past Surgical History:   has a past surgical history that includes hip surgery (Right, 2022); Cardiac catheterization (); femoral bypass (Right, 2022); Cardiac procedure (N/A, 2024); Cardiac procedure (N/A, 2024); Cardiac procedure (N/A, 2024); Cardiac procedure (N/A, 2024); Cardiac procedure (N/A, 2024); Cardiac procedure (N/A, 2024); and Carotid endarterectomy (N/A, 2024).    Restrictions  Restrictions/Precautions: Fall Risk  Other position/activity restrictions: S/P R Axillary Impella.  Per Vascular 2024 : R UE Restrictions in place x 30 days (until 2024) : No lifting >10 lb; Wgt Bear for Transfers; Do not Extend R Elbow Overhead (beyond) 90 degrees.  Abdominal binder.    Subjective  Subjective: Pt met BS, in bed. Pt c/o pain, 9/10, reporting feeling slightly better since had pain meds.  Restrictions/Precautions: Fall Risk             Objective     Cognition  Cognition Comment: pt w/ self-limiting behaviors, had chronic pain issues PTA, pupils constricted, decreased voice quality  Orientation  Overall Orientation Status: Within Functional Limits         ADL  Grooming/Oral Hygiene  Assistance Level: Independent  Skilled Clinical Factors: Sat in shower to wash face. Pt sat at sink to complete hair care(drying, combing hair), use razor to shave.  Upper Extremity Bathing  Assistance Level: Supervision  Skilled Clinical Factors: seated on shower chair, Pt able to manage setting temp, use of HHSH. Pt's

## 2024-12-02 LAB
ANION GAP SERPL CALCULATED.3IONS-SCNC: 11 MMOL/L (ref 3–16)
BASOPHILS # BLD: 0.1 K/UL (ref 0–0.2)
BASOPHILS NFR BLD: 1.3 %
BUN SERPL-MCNC: 21 MG/DL (ref 7–20)
CALCIUM SERPL-MCNC: 10 MG/DL (ref 8.3–10.6)
CHLORIDE SERPL-SCNC: 107 MMOL/L (ref 99–110)
CO2 SERPL-SCNC: 23 MMOL/L (ref 21–32)
CREAT SERPL-MCNC: 0.9 MG/DL (ref 0.8–1.3)
DEPRECATED RDW RBC AUTO: 15.9 % (ref 12.4–15.4)
EOSINOPHIL # BLD: 0.3 K/UL (ref 0–0.6)
EOSINOPHIL NFR BLD: 4.2 %
GFR SERPLBLD CREATININE-BSD FMLA CKD-EPI: 89 ML/MIN/{1.73_M2}
GLUCOSE SERPL-MCNC: 101 MG/DL (ref 70–99)
HCT VFR BLD AUTO: 31.3 % (ref 40.5–52.5)
HGB BLD-MCNC: 10.3 G/DL (ref 13.5–17.5)
LYMPHOCYTES # BLD: 2.3 K/UL (ref 1–5.1)
LYMPHOCYTES NFR BLD: 31.3 %
MAGNESIUM SERPL-MCNC: 2.17 MG/DL (ref 1.8–2.4)
MCH RBC QN AUTO: 28.5 PG (ref 26–34)
MCHC RBC AUTO-ENTMCNC: 33 G/DL (ref 31–36)
MCV RBC AUTO: 86.4 FL (ref 80–100)
MONOCYTES # BLD: 0.6 K/UL (ref 0–1.3)
MONOCYTES NFR BLD: 8.1 %
NEUTROPHILS # BLD: 4 K/UL (ref 1.7–7.7)
NEUTROPHILS NFR BLD: 55.1 %
PLATELET # BLD AUTO: 617 K/UL (ref 135–450)
PMV BLD AUTO: 7.2 FL (ref 5–10.5)
POTASSIUM SERPL-SCNC: 4.2 MMOL/L (ref 3.5–5.1)
RBC # BLD AUTO: 3.62 M/UL (ref 4.2–5.9)
SODIUM SERPL-SCNC: 141 MMOL/L (ref 136–145)
WBC # BLD AUTO: 7.3 K/UL (ref 4–11)

## 2024-12-02 PROCEDURE — 1280000000 HC REHAB R&B

## 2024-12-02 PROCEDURE — 36415 COLL VENOUS BLD VENIPUNCTURE: CPT

## 2024-12-02 PROCEDURE — 97110 THERAPEUTIC EXERCISES: CPT

## 2024-12-02 PROCEDURE — 85025 COMPLETE CBC W/AUTO DIFF WBC: CPT

## 2024-12-02 PROCEDURE — 6370000000 HC RX 637 (ALT 250 FOR IP): Performed by: PHYSICAL MEDICINE & REHABILITATION

## 2024-12-02 PROCEDURE — 83735 ASSAY OF MAGNESIUM: CPT

## 2024-12-02 PROCEDURE — 92507 TX SP LANG VOICE COMM INDIV: CPT

## 2024-12-02 PROCEDURE — 97530 THERAPEUTIC ACTIVITIES: CPT

## 2024-12-02 PROCEDURE — 97129 THER IVNTJ 1ST 15 MIN: CPT

## 2024-12-02 PROCEDURE — 94760 N-INVAS EAR/PLS OXIMETRY 1: CPT

## 2024-12-02 PROCEDURE — 97116 GAIT TRAINING THERAPY: CPT

## 2024-12-02 PROCEDURE — 80048 BASIC METABOLIC PNL TOTAL CA: CPT

## 2024-12-02 PROCEDURE — 97535 SELF CARE MNGMENT TRAINING: CPT

## 2024-12-02 RX ORDER — ASPIRIN 81 MG/1
81 TABLET, CHEWABLE ORAL DAILY
Qty: 30 TABLET | Refills: 0 | Status: SHIPPED | OUTPATIENT
Start: 2024-12-02 | End: 2024-12-04 | Stop reason: SDUPTHER

## 2024-12-02 RX ORDER — CARVEDILOL 12.5 MG/1
12.5 TABLET ORAL 2 TIMES DAILY WITH MEALS
Qty: 60 TABLET | Refills: 0 | Status: SHIPPED | OUTPATIENT
Start: 2024-12-02 | End: 2024-12-04 | Stop reason: SDUPTHER

## 2024-12-02 RX ORDER — LIDOCAINE 4 G/G
2 PATCH TOPICAL EVERY 24 HOURS
COMMUNITY
Start: 2024-12-02

## 2024-12-02 RX ORDER — PANTOPRAZOLE SODIUM 40 MG/1
40 TABLET, DELAYED RELEASE ORAL
Qty: 30 TABLET | Refills: 0 | Status: SHIPPED | OUTPATIENT
Start: 2024-12-03 | End: 2024-12-04 | Stop reason: SDUPTHER

## 2024-12-02 RX ORDER — METHOCARBAMOL 500 MG/1
500 TABLET, FILM COATED ORAL EVERY 6 HOURS PRN
Qty: 30 TABLET | Refills: 0 | Status: SHIPPED | OUTPATIENT
Start: 2024-12-02 | End: 2024-12-04 | Stop reason: SDUPTHER

## 2024-12-02 RX ORDER — OXYCODONE HYDROCHLORIDE 5 MG/1
5-10 TABLET ORAL EVERY 6 HOURS PRN
Qty: 24 TABLET | Refills: 0 | Status: SHIPPED | OUTPATIENT
Start: 2024-12-02 | End: 2024-12-04 | Stop reason: SDUPTHER

## 2024-12-02 RX ORDER — ATORVASTATIN CALCIUM 80 MG/1
80 TABLET, FILM COATED ORAL DAILY
Qty: 30 TABLET | Refills: 0 | Status: SHIPPED | OUTPATIENT
Start: 2024-12-02 | End: 2024-12-04 | Stop reason: SDUPTHER

## 2024-12-02 RX ORDER — DIAZEPAM 5 MG/1
5 TABLET ORAL NIGHTLY PRN
Qty: 7 TABLET | Refills: 0 | Status: SHIPPED | OUTPATIENT
Start: 2024-12-02 | End: 2024-12-04 | Stop reason: SDUPTHER

## 2024-12-02 RX ADMIN — SACUBITRIL AND VALSARTAN 1 TABLET: 24; 26 TABLET, FILM COATED ORAL at 08:13

## 2024-12-02 RX ADMIN — CARVEDILOL 12.5 MG: 12.5 TABLET, FILM COATED ORAL at 08:13

## 2024-12-02 RX ADMIN — PANTOPRAZOLE SODIUM 40 MG: 40 TABLET, DELAYED RELEASE ORAL at 05:24

## 2024-12-02 RX ADMIN — ATORVASTATIN CALCIUM 80 MG: 80 TABLET, FILM COATED ORAL at 20:33

## 2024-12-02 RX ADMIN — CARVEDILOL 12.5 MG: 12.5 TABLET, FILM COATED ORAL at 16:53

## 2024-12-02 RX ADMIN — APIXABAN 5 MG: 5 TABLET, FILM COATED ORAL at 08:13

## 2024-12-02 RX ADMIN — METHOCARBAMOL TABLETS 500 MG: 500 TABLET, COATED ORAL at 14:22

## 2024-12-02 RX ADMIN — TICAGRELOR 90 MG: 90 TABLET ORAL at 08:13

## 2024-12-02 RX ADMIN — OXYCODONE HYDROCHLORIDE 15 MG: 10 TABLET ORAL at 05:24

## 2024-12-02 RX ADMIN — DIAZEPAM 5 MG: 5 TABLET ORAL at 08:17

## 2024-12-02 RX ADMIN — TICAGRELOR 90 MG: 90 TABLET ORAL at 20:33

## 2024-12-02 RX ADMIN — SACUBITRIL AND VALSARTAN 1 TABLET: 24; 26 TABLET, FILM COATED ORAL at 20:33

## 2024-12-02 RX ADMIN — METHOCARBAMOL TABLETS 500 MG: 500 TABLET, COATED ORAL at 20:33

## 2024-12-02 RX ADMIN — OXYCODONE HYDROCHLORIDE 15 MG: 10 TABLET ORAL at 17:15

## 2024-12-02 RX ADMIN — OXYCODONE HYDROCHLORIDE 15 MG: 10 TABLET ORAL at 01:27

## 2024-12-02 RX ADMIN — ACETAMINOPHEN 1000 MG: 500 TABLET ORAL at 13:17

## 2024-12-02 RX ADMIN — METHOCARBAMOL TABLETS 500 MG: 500 TABLET, COATED ORAL at 08:17

## 2024-12-02 RX ADMIN — OXYCODONE HYDROCHLORIDE 15 MG: 10 TABLET ORAL at 09:24

## 2024-12-02 RX ADMIN — ACETAMINOPHEN 1000 MG: 500 TABLET ORAL at 21:21

## 2024-12-02 RX ADMIN — Medication 2 CAPSULE: at 08:13

## 2024-12-02 RX ADMIN — APIXABAN 5 MG: 5 TABLET, FILM COATED ORAL at 20:33

## 2024-12-02 RX ADMIN — METHOCARBAMOL TABLETS 500 MG: 500 TABLET, COATED ORAL at 01:53

## 2024-12-02 RX ADMIN — Medication 2 CAPSULE: at 16:53

## 2024-12-02 RX ADMIN — OXYCODONE HYDROCHLORIDE 15 MG: 10 TABLET ORAL at 21:19

## 2024-12-02 RX ADMIN — ACETAMINOPHEN 1000 MG: 500 TABLET ORAL at 05:25

## 2024-12-02 RX ADMIN — ASPIRIN 81 MG: 81 TABLET, CHEWABLE ORAL at 08:13

## 2024-12-02 RX ADMIN — DIAZEPAM 5 MG: 5 TABLET ORAL at 21:21

## 2024-12-02 RX ADMIN — OXYCODONE HYDROCHLORIDE 15 MG: 10 TABLET ORAL at 13:18

## 2024-12-02 RX ADMIN — FLUTICASONE PROPIONATE 2 SPRAY: 50 SPRAY, METERED NASAL at 15:09

## 2024-12-02 ASSESSMENT — PAIN SCALES - GENERAL
PAINLEVEL_OUTOF10: 10
PAINLEVEL_OUTOF10: 9
PAINLEVEL_OUTOF10: 10

## 2024-12-02 ASSESSMENT — PAIN DESCRIPTION - LOCATION
LOCATION: CHEST;HIP
LOCATION: CHEST
LOCATION: BACK
LOCATION: CHEST
LOCATION: RIB CAGE;HIP
LOCATION: CHEST

## 2024-12-02 ASSESSMENT — PAIN DESCRIPTION - DESCRIPTORS
DESCRIPTORS: ACHING
DESCRIPTORS: ACHING;DISCOMFORT;SORE
DESCRIPTORS: ACHING;DISCOMFORT;SORE
DESCRIPTORS: ACHING

## 2024-12-02 ASSESSMENT — PAIN DESCRIPTION - PAIN TYPE
TYPE: ACUTE PAIN;CHRONIC PAIN;SURGICAL PAIN

## 2024-12-02 ASSESSMENT — PAIN DESCRIPTION - ORIENTATION
ORIENTATION: RIGHT;ANTERIOR;POSTERIOR
ORIENTATION: RIGHT;LEFT;POSTERIOR
ORIENTATION: RIGHT
ORIENTATION: MID;RIGHT
ORIENTATION: RIGHT;LEFT;POSTERIOR;ANTERIOR
ORIENTATION: RIGHT;LEFT;ANTERIOR;POSTERIOR

## 2024-12-02 ASSESSMENT — PAIN - FUNCTIONAL ASSESSMENT
PAIN_FUNCTIONAL_ASSESSMENT: PREVENTS OR INTERFERES SOME ACTIVE ACTIVITIES AND ADLS
PAIN_FUNCTIONAL_ASSESSMENT: ACTIVITIES ARE NOT PREVENTED
PAIN_FUNCTIONAL_ASSESSMENT: ACTIVITIES ARE NOT PREVENTED

## 2024-12-02 ASSESSMENT — PAIN SCALES - WONG BAKER: WONGBAKER_NUMERICALRESPONSE: NO HURT

## 2024-12-02 NOTE — DISCHARGE INSTR - COC
Continuity of Care Form    Patient Name: Ad Lacey   :  1950  MRN:  [id number]    Admit date:  2024  Discharge date:  2024    Code Status Order: Full Code   Advance Directives:   Advance Care Flowsheet Documentation             Admitting Physician:  Nicolasa Abreu MD  PCP: Drea Frost, APRN - CNP    Discharging Nurse: ROSE BANDA  Discharging Hospital Unit/Room#: G3B-0817/3271-01  Discharging Unit Phone Number: [phone]    Emergency Contact:   Extended Emergency Contact Information  Primary Emergency Contact: Jacobo Lacey  Home Phone: [phone]  Work Phone: [phone]  Relation: Child    Past Surgical History:  Past Surgical History:   Procedure Laterality Date    CARDIAC CATHETERIZATION      CARDIAC PROCEDURE N/A 2024    Left heart cath / coronary angiography performed by Dillon Rodríguez MD at [facility] CARDIAC CATH LAB    CARDIAC PROCEDURE N/A 2024    Percutaneous coronary intervention performed by Dillon Rodríguez MD at [facility] CARDIAC CATH LAB    CARDIAC PROCEDURE N/A 2024    Ventricular assist device (VAD) insertion performed by Dillon Rodríguez MD at [facility] CARDIAC CATH LAB    CARDIAC PROCEDURE N/A 2024    Ventricular assist device (VAD) insertion performed by Dillon Rodríguez MD at [facility] CARDIAC CATH LAB    CARDIAC PROCEDURE N/A 2024    Coronary angiography performed by Dillon Rodríguez MD at [facility] CARDIAC CATH LAB    CARDIAC PROCEDURE N/A 2024    [place] filippo  insertion performed by Dillon Rodríguez MD at [facility] CARDIAC CATH LAB    CAROTID ENDARTERECTOMY N/A 2024    IMPELLA EXPLANT performed by Benjamin Lopez MD at [facility] OR    FEMORAL BYPASS Right 2022    RIGHT FEMORAL POPLITEAL BYPASS performed by Olvin Iyer DO at [facility] OR    HIP SURGERY Right 2022    HIP PINNING performed by Alexx Monreal MD at [facility] OR       Immunization History:   Immunization History   Administered Date(s) Administered    COVID-19, PFIZER GRAY top, DO NOT Dilute, (age

## 2024-12-02 NOTE — PROGRESS NOTES
Occupational Therapy  Facility/Department: [address] REHAB  Rehabilitation Occupational Therapy Daily AM and PM Treatment Note  Discharge Summary    Date: 24  Patient Name: Ad Lacey       Room: T3Z-8180/3271-01  MRN: [id number]  Account: [id number]   : 1950  (74 y.o.) Gender: male       Past Medical History:  has a past medical history of Anxiety, Basal cell carcinoma, CAD (coronary artery disease), Peripheral artery disease (HCC), and Presacral mass.  Past Surgical History:   has a past surgical history that includes hip surgery (Right, 2022); Cardiac catheterization (); femoral bypass (Right, 2022); Cardiac procedure (N/A, 2024); Cardiac procedure (N/A, 2024); Cardiac procedure (N/A, 2024); Cardiac procedure (N/A, 2024); Cardiac procedure (N/A, 2024); Cardiac procedure (N/A, 2024); and Carotid endarterectomy (N/A, 2024).    Restrictions  Restrictions/Precautions: Fall Risk  Other position/activity restrictions: S/P R Axillary Impella.  Per Vascular 2024 : R UE Restrictions in place x 30 days (until 2024) : No lifting >10 lb; Wgt Bear for Transfers; Do not Extend R Elbow Overhead (beyond) 90 degrees.  Abdominal binder.    Subjective  Subjective: Pt met in room, VERONIKA Spencer present to distribute medications. Reports 10/10 pain in chest.  Restrictions/Precautions: Fall Risk    Objective     Cognition  Following Commands: Follows one step commands with repetition  Attention Span: Difficulty dividing attention  Safety Judgement: Decreased awareness of need for assistance;Decreased awareness of need for safety  Insights: Decreased awareness of deficits  Initiation: Requires cues for some  Cognition Comment: pt w/ self-limiting behaviors, had chronic pain issues PTA, pupils constricted, decreased voice quality  Orientation  Overall Orientation Status: Within Functional Limits  Orientation Level: Oriented X4         ADL  Grooming/Oral

## 2024-12-02 NOTE — DISCHARGE INSTRUCTIONS
As  instructed in therapy and rehab unit, not to lift more than 10 lbs right arm, monitor incision. Monitor CHF.

## 2024-12-02 NOTE — CARE COORDINATION
SOCIAL WORK DISCHARGE SUMMARY        DATE OF DISCHARGE:   Tuesday, 12-3-2024      LOCATION:    Home      Discharging to Facility/ Agency   Name: [facility]  Address:   [address] [place] [address], [place] [id number]  Phone:  [phone]  Fax:  [phone]          TIME:      10 am   Friend        PHARMACY:          DME:    none      Transportation Question  \"NO\"      IMM:   12-2-2024    Referrals made:  Home Care.    SARAH Uribe     Case Management   [phone]    12/2/2024  4:33 PM

## 2024-12-02 NOTE — PLAN OF CARE
Problem: Discharge Planning  Goal: Discharge to home or other facility with appropriate resources  Outcome: Progressing     Problem: Pain  Goal: Verbalizes/displays adequate comfort level or baseline comfort level  Outcome: Not Progressing     Problem: Safety - Adult  Goal: Free from fall injury  Outcome: Progressing     Problem: Skin/Tissue Integrity  Goal: Absence of new skin breakdown  Description: 1.  Monitor for areas of redness and/or skin breakdown  2.  Assess vascular access sites hourly  3.  Every 4-6 hours minimum:  Change oxygen saturation probe site  4.  Every 4-6 hours:  If on nasal continuous positive airway pressure, respiratory therapy assess nares and determine need for appliance change or resting period.  Outcome: Progressing     Problem: ABCDS Injury Assessment  Goal: Absence of physical injury  Outcome: Progressing     Problem: Pain  Goal: Verbalizes/displays adequate comfort level or baseline comfort level  Outcome: Not Progressing

## 2024-12-02 NOTE — PROGRESS NOTES
Medicated with Valium and Robaxin, states taking Valium for 50 years for anxiety, focused on pain medication also, reminded several times when next dose due, OT here. Patient states pain is always a 10.

## 2024-12-02 NOTE — PROGRESS NOTES
ACUTE REHAB UNIT  SPEECH/LANGUAGE PATHOLOGY      [x] Daily  [x] Weekly Care Conference Note  [x] Discharge    Patient:Ad Lacey      :1950  MRN:[id number]  Rehab Dx/Hx: Anoxic encephalopathy due to cardiac arrest (HCC) [G93.1, I46.9]      Precautions: falls and S/P R Axillary Impella - per Vascular 2024: R UE Restrictions in place x 30 days (until 2024); No lifting >10 lb; Wgt Bear for Transfers; Do not Extend R Elbow Overhead (beyond) 90 degrees; No showers until 24  Home situation: admitted from home with son (son works full time and is unable to provide )     ST Dx: [] Aphasia  [] Dysarthria  [] Apraxia   [] Oropharyngeal dysphagia [x] Cognitive   Impairment  [x] Other: Voice    Initial Speech Therapy Assessment Diagnosis:   Cognitive Diagnosis: Pt demonstrated functional temporal / spatial orientation; VPS/reasoning and math language. Pt demonstrated functional focused and sustained attention for structured tasks. Breakdown with working memory and recall of new information during higher attention tasks (divided attention etc). Pt can be impulsive at times; he can be distractible and verbose/redundant in conveying history information which can impact. He responds well to verbal cues for re-direct to task/topic. Conflicting information regarding level of support for adv DL tasks. Pt does live with son. Will need to clarify. IF there is a concern for neuro cognitive deficits would suggest Neuro consult/diagnostic imaging  Speech Diagnosis: Motor speech appeared functional. However with pt hoarse voice quality which he reports is since his admit with heart attack and need for chest compressions. Pt did respond well to functional voice ex targeting breath support, reduced laryngeal tension and frontal focus with improved voice quality/pitch variation. Will trial. If persistent potential need for ENT  Communication Diagnosis: Pt demonstrated concrete and mild complex functional

## 2024-12-02 NOTE — PROGRESS NOTES
Department of Physical Medicine & Rehabilitation  Progress Note    Patient Identification:  Ad Lacey  [id number]  : 1950  Admit date: 2024    Chief Complaint: Anoxic encephalopathy due to cardiac arrest (HCC)    Subjective:   No acute events overnight.   Patient seen this am resting in bed. Continues to report severe pain along his chest wall. Overall stable since admission. Otherwise, he reports feeling well and looking forward to discharge home tomorrow. We discussed plans for home care, medications, and follow-ups.  Labs reviewed.     ROS: No f/c, n/v    Objective:  Patient Vitals for the past 24 hrs:   BP Temp Temp src Pulse Resp SpO2 Weight   24 0924 -- -- -- -- 17 -- --   24 0845 -- -- -- -- -- 96 % --   24 0810 (!) 140/75 98.1 °F (36.7 °C) Oral 62 16 95 % --   24 0530 -- -- -- -- -- -- 72.5 kg (159 lb 13.3 oz)   24 1938 110/68 97.6 °F (36.4 °C) Oral 60 16 96 % --   24 1745 -- -- -- -- 18 -- --   24 1715 133/76 -- -- 84 18 -- --   24 1340 -- -- -- -- 18 -- --   24 1310 -- -- -- -- 18 -- --     Const: Alert. No distress, pleasant.   HEENT: Normocephalic, atraumatic. Normal sclera/conjunctiva. MMM.   CV: Regular rate and rhythm.   Resp: No respiratory distress. Lungs CTAB.   Abd: Soft, nontender, nondistended, NABS+   Ext: No edema.   Neuro: Alert, oriented, impaired recall/insight improving  Psych: Cooperative, appropriate mood and affect    Laboratory data: Available via EMR.   Last 24 hour lab  Recent Results (from the past 24 hour(s))   Basic Metabolic Panel    Collection Time: 24  5:18 AM   Result Value Ref Range    Sodium 141 136 - 145 mmol/L    Potassium 4.2 3.5 - 5.1 mmol/L    Chloride 107 99 - 110 mmol/L    CO2 23 21 - 32 mmol/L    Anion Gap 11 3 - 16    Glucose 101 (H) 70 - 99 mg/dL    BUN 21 (H) 7 - 20 mg/dL    Creatinine 0.9 0.8 - 1.3 mg/dL    Est, Glom Filt Rate 89 >60    Calcium 10.0 8.3 - 10.6 mg/dL   CBC with Auto

## 2024-12-02 NOTE — PROGRESS NOTES
States medications does not do anything. Requesting Oxycodone 20 mg, explained will need to speak to MD, did take 15 mg.

## 2024-12-02 NOTE — PLAN OF CARE
Progressing  12/2/2024 0203 by Rosibel Sherman RN  Outcome: Progressing     Problem: ABCDS Injury Assessment  Goal: Absence of physical injury  12/2/2024 0914 by Amanda Damon RN  Outcome: Progressing  Flowsheets (Taken 12/2/2024 0850)  Absence of Physical Injury: Implement safety measures based on patient assessment  12/2/2024 0203 by Rosibel Sherman RN  Outcome: Progressing     Problem: Nutrition Deficit:  Goal: Optimize nutritional status  Outcome: Progressing     Problem: Cardiovascular - Adult  Goal: Maintains optimal cardiac output and hemodynamic stability  Outcome: Progressing  Flowsheets (Taken 12/2/2024 0811)  Maintains optimal cardiac output and hemodynamic stability:   Monitor blood pressure and heart rate   Monitor urine output and notify Licensed Independent Practitioner for values outside of normal range   Assess for signs of decreased cardiac output     Problem: Skin/Tissue Integrity - Adult  Goal: Skin integrity remains intact  Outcome: Progressing  Flowsheets  Taken 12/2/2024 0850  Skin Integrity Remains Intact: Monitor for areas of redness and/or skin breakdown  Taken 12/2/2024 0811  Skin Integrity Remains Intact: Monitor for areas of redness and/or skin breakdown  Goal: Incisions, wounds, or drain sites healing without S/S of infection  Outcome: Progressing  Flowsheets  Taken 12/2/2024 0850  Incisions, Wounds, or Drain Sites Healing Without Sign and Symptoms of Infection:   ADMISSION and DAILY: Assess and document risk factors for pressure ulcer development   TWICE DAILY: Assess and document skin integrity   TWICE DAILY: Assess and document dressing/incision, wound bed, drain sites and surrounding tissue   Implement wound care per orders   Initiate pressure ulcer prevention bundle as indicated  Taken 12/2/2024 0811  Incisions, Wounds, or Drain Sites Healing Without Sign and Symptoms of Infection:   ADMISSION and DAILY: Assess and document risk factors for pressure ulcer

## 2024-12-02 NOTE — PROGRESS NOTES
Patient admitted to rehab with anoxic encephalopathy, post cardiac arrest.  A/Ox4. Transfers with walker. Mobility restrictions: not to lift more than 10 lbs right arm. On REJI diet, tolerating well. Medications taken whole. On Eliquis for DVT prophylaxis, made aware will have different medication at dc  Skin: monitor incision right chest. Has been continent of bladder. LBM 12/01/2024.Bed alarms in use and call light in reach. Will monitor for safety. Declines chair for meals. Remains focused on time for pain medication.

## 2024-12-02 NOTE — CARE COORDINATION
Met with patient to review DC for tomorrow.  Reviewed home care orders for SN/PT/OT.  He wants to use the agency his son works for which is [facility].  The Plan for Transition of Care is related to the following treatment goals: to continue his progress in personal care and ambulation needs in Home setting.     The Patient  was provided with a choice of provider and agrees   with the discharge plan. [x] Yes [] No    Freedom of choice list was provided with basic dialogue that supports the patient's individualized plan of care/goals, treatment preferences and shares the quality data associated with the providers. [x] Yes [] No    He is interested in obtaining a Male PCP, over 50 years of age and close [place] [id number].  At his bedside I went to [facility] and found Dr Alex but no appointments are until 12-15.   I informed him he would need a pcp closer to his discharge in order to obtain home care.  He prefers I speak with his son about it and he states can get him an appt.    Reviewed DME recommendations for 4 wh rollator, TSF.  He reports he already has the Rollator at home and he does not need nor want the TSF.      His ride will pick him up tomorrow exactly at 10 am.  He wants to leave ASAP at 10 am.    He has no concerns for DC tomorrow.    IMM letter presented.    SARAH Uribe     Case Management   [phone]    12/2/2024  4:17 PM

## 2024-12-02 NOTE — PROGRESS NOTES
- 10 reps  - Standing March with Counter Support  - 1 x daily - 7 x weekly - 2 sets - 10 reps  - Standing Hip Abduction with Counter Support  - 1 x daily - 7 x weekly - 2 sets - 10 reps  - Standing Hip Extension with Counter Support  - 1 x daily - 7 x weekly - 2 sets - 10 reps  - Standing Knee Flexion AROM with Chair Support  - 1 x daily - 7 x weekly - 2 sets - 10 reps  - Mini Squat with Counter Support  - 1 x daily - 7 x weekly - 2 sets - 10 reps  Pt completed with minimal cues and occasional rest breaks.  Returned to room, transfer back to bed I with no AD.  Pt with no concerns or needs voiced about discharge.  Pt has met all goals, safe to discharge home with home health PT, HEP, and no further equipment needs.  Second Session Therapy Time     Individual Co-treatment   Time In 1350     Time Out 1420     Minutes 30        Electronically signed by Eli An PT on 12/2/2024 at 2:20 PM

## 2024-12-02 NOTE — PROGRESS NOTES
Rosalie from Dr. Rodríguez's office called and informed should call Dr. Lopez regarding staples, office closed today.

## 2024-12-02 NOTE — PROGRESS NOTES
Pt calling for  pain medication 15 mins before its due to remind this writer.  Pt reports that he sets his phone to all the due times for muscle relaxer, pain medication, and Valium. Explained to the pt that this writer is aware of when medications are due. Pt reports 10/10 pain. Asked if his pain is ever less than a 10 and if pain medication is effective and he stated, \" no, the pain medication doesn't do a damn thing.\" Pt is observed lying in bed watching TV and using his phone. Showing this writer pics of his grandson and son. Medicated with Roxicodone 15 mg per prn orders. Pt admitted to ARU with anoxic encephalopathy after cardiac arrest. C/o chest soreness from CPR. Lungs CTA. No sob or cough.On RA.Belly round and soft with active BS. LBM today. Continent B and B. No edema noted. R chest SI well-approximated with staples and SMITHA. Call light in reach.  Bed alarm on.

## 2024-12-02 NOTE — PATIENT CARE CONFERENCE
[facility]  Inpatient Rehabilitation  Weekly Team Conference Note      Date: 2024  Patient Name:  Ad Lacey    MRN: [id number]  : 1950  Gender: Male  Physician: Dr. Abreu  Diagnosis: Anoxic encephalopathy due to cardiac arrest (HCC) [G93.1, I46.9]    CASE MANAGEMENT  Assessment: Goal is home, agreeable to home care with [facility]      PHYSICAL THERAPY    Bed Mobility:  Overall Assistance Level: Supervision  Sit>supine:  Assistance Level: Supervision  Skilled Clinical Factors: HOB elevated as at home, no bedrail  Supine>sit:  Assistance Level: Supervision  Skilled Clinical Factors: HOB elevated as at home, no bedrail    Transfers:  Surface: From bed, To bed, Raised toilet Seat  Sit>stand:  Assistance Level: Supervision  Stand>sit:  Assistance Level: Supervision  Skilled Clinical Factors: toilet transfer also S  Car transfer:  Assistance Level: Supervision    Ambulation:  Surface: Level surface  Device: Rolling walker (some with no AD)  Distance: 150' wth several turns and 2 doorways with wh walker, 10' with no D and smaller steps, 50' with rollator and S to SBA  Assistance Level: S  Gait Deviations: Slow carly    Stairs:  Stair Height: 6''  Device: Bilateral handrails  Number of Stairs: 12  Assistance Level: Stand by assist  Skilled Clinical Factors: up with the L, down with the R    Curb:  Curb Height: 6''  Device: Rolling walker  Assistance Level: Stand by assist    Assessment:  Assessment: 73 y/o male admit 2024 with Cardiopulm Arrest, Cardiogenic Shock, STEMI, Ischemic Cardiomyopathy.  - 11/10/2024 Pt Intubated.2024 Cardiac Cath : Severe LV Dysfunction : EF <20%, Impella placed; LAD Thrombectomy/Stent. 2024 Impella Upgrade : R Axillary; removed 2024. 2024 GI Consult : Ileus; NG Tube placed/removed. 2024 Cardiac Cath: [facility]. PMH as noted including CAD/LAD Stent (), PAD, R Fem-Pop (2022), R Hip Fx/ORIF 
outcomes/Interventions:   Decreased insight, pain in chest      Interdisciplinary Individualized Plan of Care Review:    Continue Current Plan of Care: Yes    Modifications:_____________________________    Special Needs in the Upcoming Week :    [] Family/Caregiver Education  [] Home visit  []Therapeutic Pass   [] Consults:_______    [] Other;_______    Patient Rehab Team Goals for the Upcoming Week:  1. Pt will complete toileting Mod I  2. Pt will amb in room mod I  3.           Team Members Present at Conference:  Physician:Dr. Brady KAUR  : LC UribeW    Occupational Therapist: Faina Noble, OTR/L  Physical Therapist:Tiat Soler, [facility] CNS [id number]  Speech Therapist: Dai Reyes,MS,CCC,SLP [id number]  Speech and Language Pathologist    ARU Supervisor:Lisa Herman RN CRRN  Dietician: Sona Haas RD, LD   Psychologist:  Other:      I attest to leading the interdisciplinary team meeting, required attendees are present as listed above. I approve the established interdisciplinary plan of care as documented within the medical record of Ad Lacey.    MD: Nicolasa Abreu MD 12/3/2024, 6:13 PM

## 2024-12-03 VITALS
HEART RATE: 76 BPM | WEIGHT: 158.95 LBS | OXYGEN SATURATION: 98 % | TEMPERATURE: 97.3 F | BODY MASS INDEX: 24.09 KG/M2 | HEIGHT: 68 IN | DIASTOLIC BLOOD PRESSURE: 71 MMHG | SYSTOLIC BLOOD PRESSURE: 117 MMHG | RESPIRATION RATE: 16 BRPM

## 2024-12-03 PROCEDURE — 6370000000 HC RX 637 (ALT 250 FOR IP): Performed by: PHYSICAL MEDICINE & REHABILITATION

## 2024-12-03 PROCEDURE — 94760 N-INVAS EAR/PLS OXIMETRY 1: CPT

## 2024-12-03 RX ADMIN — OXYCODONE HYDROCHLORIDE 15 MG: 10 TABLET ORAL at 09:23

## 2024-12-03 RX ADMIN — CARVEDILOL 12.5 MG: 12.5 TABLET, FILM COATED ORAL at 09:24

## 2024-12-03 RX ADMIN — OXYCODONE HYDROCHLORIDE 15 MG: 10 TABLET ORAL at 01:20

## 2024-12-03 RX ADMIN — METHOCARBAMOL TABLETS 500 MG: 500 TABLET, COATED ORAL at 08:55

## 2024-12-03 RX ADMIN — SACUBITRIL AND VALSARTAN 1 TABLET: 24; 26 TABLET, FILM COATED ORAL at 09:23

## 2024-12-03 RX ADMIN — ASPIRIN 81 MG: 81 TABLET, CHEWABLE ORAL at 09:24

## 2024-12-03 RX ADMIN — METHOCARBAMOL TABLETS 500 MG: 500 TABLET, COATED ORAL at 02:32

## 2024-12-03 RX ADMIN — ACETAMINOPHEN 1000 MG: 500 TABLET ORAL at 05:24

## 2024-12-03 RX ADMIN — TICAGRELOR 90 MG: 90 TABLET ORAL at 09:23

## 2024-12-03 RX ADMIN — Medication 2 CAPSULE: at 09:23

## 2024-12-03 RX ADMIN — OXYCODONE HYDROCHLORIDE 15 MG: 10 TABLET ORAL at 05:24

## 2024-12-03 RX ADMIN — PANTOPRAZOLE SODIUM 40 MG: 40 TABLET, DELAYED RELEASE ORAL at 05:24

## 2024-12-03 ASSESSMENT — PAIN DESCRIPTION - ORIENTATION
ORIENTATION: RIGHT
ORIENTATION: RIGHT

## 2024-12-03 ASSESSMENT — PAIN DESCRIPTION - DESCRIPTORS
DESCRIPTORS: STABBING
DESCRIPTORS: ACHING
DESCRIPTORS: ACHING

## 2024-12-03 ASSESSMENT — PAIN DESCRIPTION - LOCATION
LOCATION: CHEST;GENERALIZED
LOCATION: BACK;CHEST

## 2024-12-03 ASSESSMENT — PAIN SCALES - GENERAL
PAINLEVEL_OUTOF10: 9
PAINLEVEL_OUTOF10: 8
PAINLEVEL_OUTOF10: 10

## 2024-12-03 ASSESSMENT — PAIN SCALES - WONG BAKER
WONGBAKER_NUMERICALRESPONSE: NO HURT

## 2024-12-03 ASSESSMENT — PAIN - FUNCTIONAL ASSESSMENT
PAIN_FUNCTIONAL_ASSESSMENT: ACTIVITIES ARE NOT PREVENTED

## 2024-12-03 NOTE — PLAN OF CARE
Problem: Pain  Goal: Verbalizes/displays adequate comfort level or baseline comfort level  Outcome: Progressing     Problem: Safety - Adult  Goal: Free from fall injury  Outcome: Progressing     Problem: Skin/Tissue Integrity  Goal: Absence of new skin breakdown  Description: 1.  Monitor for areas of redness and/or skin breakdown  2.  Assess vascular access sites hourly  3.  Every 4-6 hours minimum:  Change oxygen saturation probe site  4.  Every 4-6 hours:  If on nasal continuous positive airway pressure, respiratory therapy assess nares and determine need for appliance change or resting period.  Outcome: Progressing     Problem: Cardiovascular - Adult  Goal: Maintains optimal cardiac output and hemodynamic stability  Outcome: Progressing     Problem: Skin/Tissue Integrity - Adult  Goal: Incisions, wounds, or drain sites healing without S/S of infection  Outcome: Progressing

## 2024-12-03 NOTE — PROGRESS NOTES
Patient admitted to rehab with anoxic encephalopathy r/t cardiac arrest.  A/Ox4. Transfers with walker x1. Mobility restrictions: RUE no lifting greater than 10lbs no extension past 90 degree. On regular REJI diet, tolerating well. Medications taken whole with thins. On xarelto for DVT prophylaxis.  Skin: blanchable redness to buttocks, incision to R chest with staples SMITHA- called dollin office regarding order to remove waiting for response back. Oxygen: RA. LDA: NONE. Has been continent of bowel and continent of bladder. LBM 12/1. Chair/bed alarms in use and call light in reach. Will monitor for safety.

## 2024-12-03 NOTE — PROGRESS NOTES
This is a 74 y.o.  male admitted on 11/21/2024 with Anoxic encephalopathy due to cardiac arrest (HCC) [G93.1, I46.9]. A&O X 4. Patient reported pain of 10/10 to chest wall, pain medication given as ordered, see MAR. He is constantly on pain medication. Active bowel sounds. Last BM 12/1/24. Patient is continent of of bowel & bladder.  He is on a regular diet. Medications taken whole with thins. Skin: incision right chest. Transfers with walker x 1. HS medications given. Tolerated well.  Call light and bedside table within reach. Patient instructed to call if needs anything.

## 2024-12-03 NOTE — PLAN OF CARE
Problem: Discharge Planning  Goal: Discharge to home or other facility with appropriate resources  12/3/2024 1009 by Humberto Mueller RN  Outcome: Adequate for Discharge  12/3/2024 1008 by Humberto Mueller RN  Outcome: Progressing  Flowsheets (Taken 12/3/2024 1008)  Discharge to home or other facility with appropriate resources:   Identify barriers to discharge with patient and caregiver   Identify discharge learning needs (meds, wound care, etc)   Arrange for needed discharge resources and transportation as appropriate     Problem: Pain  Goal: Verbalizes/displays adequate comfort level or baseline comfort level  12/3/2024 1009 by Humberto Mueller RN  Outcome: Adequate for Discharge  12/3/2024 1008 by Humberto Mueller RN  Outcome: Progressing  Flowsheets (Taken 12/3/2024 1008)  Verbalizes/displays adequate comfort level or baseline comfort level:   Encourage patient to monitor pain and request assistance   Assess pain using appropriate pain scale   Implement non-pharmacological measures as appropriate and evaluate response   Administer analgesics based on type and severity of pain and evaluate response  12/3/2024 0048 by Francesca Bautista RN  Outcome: Progressing     Problem: Safety - Adult  Goal: Free from fall injury  12/3/2024 1009 by Humberto Mueller RN  Outcome: Adequate for Discharge  12/3/2024 1008 by Humberto Mueller RN  Outcome: Progressing  Flowsheets (Taken 12/3/2024 1008)  Free From Fall Injury: Instruct family/caregiver on patient safety  12/3/2024 0048 by Francesca Bautista RN  Outcome: Progressing     Problem: Skin/Tissue Integrity  Goal: Absence of new skin breakdown  Description: 1.  Monitor for areas of redness and/or skin breakdown  2.  Assess vascular access sites hourly  3.  Every 4-6 hours minimum:  Change oxygen saturation probe site  4.  Every 4-6 hours:  If on nasal continuous positive airway pressure, respiratory therapy assess nares and determine need for appliance change or resting

## 2024-12-03 NOTE — PROGRESS NOTES
CLINICAL PHARMACY NOTE: MEDS TO BEDS    Total # of Prescriptions Filled: 10   The following medications were delivered to the patient:  Current Discharge Medication List        START taking these medications    Details   oxyCODONE HCl (ROXICODONE) 5 MG immediate release tablet Take 1-2 tablets by mouth every 6 hours as needed for Pain for up to 3 days. Max Daily Amount: 40 mg  Qty: 24 tablet, Refills: 0    Comments: Reduce doses taken as pain becomes manageable  Associated Diagnoses: Acute coronary syndrome (HCC); Cardiopulmonary arrest; Peripheral arterial disease (HCC); Chronic pain syndrome      diazePAM (VALIUM) 5 MG tablet Take 1 tablet by mouth nightly as needed for Anxiety or Sleep for up to 7 days. Max Daily Amount: 5 mg  Qty: 7 tablet, Refills: 0    Associated Diagnoses: Anxiety      lidocaine 4 % external patch Place 2 patches onto the skin every 24 hours      pantoprazole (PROTONIX) 40 MG tablet Take 1 tablet by mouth every morning (before breakfast)  Qty: 30 tablet, Refills: 0      methocarbamol (ROBAXIN) 500 MG tablet Take 1 tablet by mouth every 6 hours as needed (muscle spasms)  Qty: 30 tablet, Refills: 0      rivaroxaban (XARELTO) 20 MG TABS tablet Take 1 tablet by mouth daily  Qty: 30 tablet, Refills: 0         Aspirin 81mg chew  Entresto 24-26mg  Atorvastain 80mg  Carvedilol 12.5mg  Brilinta 90mg      Additional Documentation:  All medications delivered at bedside

## 2024-12-03 NOTE — PROGRESS NOTES
Provision of Current Reconciled Medication List to Subsequent Provider at Discharge  []No, current reconciled medication list not provided to the subsequent provider.  [x]Yes, current reconciled medication list provided to the subsequent provider. (**Select route of transmission below**)   [x] Via Electronic Health Record   []Via Health Information Exchange Organization  [] Verbal (e.g. in person, telephone, video conferencing)  []Paper-based (e.g. fax, copies, printouts)   []Other Methods (e.g. texting, email, CDs)    Provision of Current Reconciled Medication List to Patient at Discharge  []No, current reconciled medication list not provided to the patient, family and/or caregiver.   [x]Yes, current reconciled medication list provided to the patient, family and/or caregiver.  (**Select route of transmission below**)   [] Via Electronic Health Record (e.g., electronic access to patient portal)   [] Via Health Information Exchange Organization  [x] Verbal (e.g. in person, telephone, video conferencing)  [x]Paper-based (e.g. fax, copies, printouts)   []Other Methods (e.g. texting, email, CDs)    High Risk Drug Classes:  Use and Indication    Is taking: Check if the pt is taking any medications by pharmacological classification, not how it is used, in the following classes  Indication noted: If column 1 is checked, check if there is an indication noted for all meds in the drug class Is taking  (check all that apply) Indication noted (check all that apply)   Antipsychotic [] []   Anticoagulant [x] [x]   Antibiotic [] []   Opioid [x] [x]   Antiplatelet [x] [x]   Hypoglycemic (including insulin) [] []   None of the above []     Special Treatments, Procedures, and Programs    Check all of the following treatments, procedures, and programs that apply at discharge. At Discharge (check all that apply)   Cancer Treatments   A1. Chemotherapy []           A2. IV []           A3. Oral []           A10. Other []   B1. Radiation

## 2024-12-03 NOTE — CARE COORDINATION
Per patient request for PCP, he wants a male MD, over 50 years of age.    Worked with [employer] portal to find an MD meeting these descriptions for an appt tomorrow.    SARAH Uribe     Case Management   [phone]    12/3/2024  8:56 AM      Dr Adan Pulido, DO  Wed 12-4-2024  2:15 pm   [place] [phone]  [phone]    SARAH Uribe     Case Management   [phone]    12/3/2024  8:57 AM

## 2024-12-03 NOTE — PLAN OF CARE
Amanda Damon RN)  Absence of Physical Injury: Implement safety measures based on patient assessment     Problem: Nutrition Deficit:  Goal: Optimize nutritional status  Outcome: Progressing  Flowsheets (Taken 12/3/2024 1008)  Nutrient intake appropriate for improving, restoring, or maintaining nutritional needs:   Assess nutritional status and recommend course of action   Monitor oral intake, labs, and treatment plans     Problem: Cardiovascular - Adult  Goal: Maintains optimal cardiac output and hemodynamic stability  12/3/2024 1008 by Humberto Mueller RN  Outcome: Progressing  Flowsheets (Taken 12/3/2024 1008)  Maintains optimal cardiac output and hemodynamic stability: Monitor blood pressure and heart rate  12/3/2024 0048 by Franecsca Bautista RN  Outcome: Progressing     Problem: Skin/Tissue Integrity - Adult  Goal: Skin integrity remains intact  Outcome: Progressing  Flowsheets (Taken 12/3/2024 1008)  Skin Integrity Remains Intact:   Monitor for areas of redness and/or skin breakdown   Assess vascular access sites hourly  Goal: Incisions, wounds, or drain sites healing without S/S of infection  12/3/2024 1008 by Humberto Mueller RN  Flowsheets (Taken 12/3/2024 1008)  Incisions, Wounds, or Drain Sites Healing Without Sign and Symptoms of Infection:   ADMISSION and DAILY: Assess and document risk factors for pressure ulcer development   TWICE DAILY: Assess and document skin integrity   Implement wound care per orders   TWICE DAILY: Assess and document dressing/incision, wound bed, drain sites and surrounding tissue  12/3/2024 0048 by Francesca Bautista RN  Outcome: Progressing     Problem: Musculoskeletal - Adult  Goal: Return mobility to safest level of function  Outcome: Progressing  Flowsheets (Taken 12/3/2024 1008)  Return Mobility to Safest Level of Function:   Assess patient stability and activity tolerance for standing, transferring and ambulating with or without assistive devices   Assist with

## 2024-12-03 NOTE — CARE COORDINATION
Per request of pt, Cancelled appt with Dr Alex as PCP.    SARAH Uribe     Case Management   [phone]    12/3/2024  11:34 AM

## 2024-12-03 NOTE — PROGRESS NOTES
1155 again called Dr Lopez office, spoke to angela regarding staple removal. She states that Jane isnt answering her phone. Informed her of pt eagerness to get home. Angela states \"most of the time staples are removed in office.\" Pt does not want to wait for callback from  regarding staple removal as he has follow up with Dr Lopez on the 6th that he states \"I promise you I will be at that appointment and all the others that we went over.\" 1241 pt discharged home with \[facility]\"" home care. Pt home medications brought back and with pt at MD. Pt prescribed medications delivered to pt room and with pt at MD.DC instructions reviewed with pt including follow up and appropriate medication administration. Pt verbalized understanding. Other pt belongings including shaver, phone, , clothes with pt at MD. Pt also given stent placement ID card where he placed in phone wallet before dc. Pt transported outside to be picked up by friend Ranjit via staff assist.

## 2024-12-04 ENCOUNTER — OFFICE VISIT (OUTPATIENT)
Dept: FAMILY MEDICINE CLINIC | Age: 74
End: 2024-12-04
Payer: MEDICARE

## 2024-12-04 VITALS
OXYGEN SATURATION: 99 % | HEART RATE: 93 BPM | SYSTOLIC BLOOD PRESSURE: 114 MMHG | RESPIRATION RATE: 20 BRPM | DIASTOLIC BLOOD PRESSURE: 80 MMHG

## 2024-12-04 DIAGNOSIS — F41.9 ANXIETY: ICD-10-CM

## 2024-12-04 DIAGNOSIS — I24.9 ACUTE CORONARY SYNDROME (HCC): ICD-10-CM

## 2024-12-04 DIAGNOSIS — G89.4 CHRONIC PAIN SYNDROME: ICD-10-CM

## 2024-12-04 DIAGNOSIS — I73.9 PERIPHERAL ARTERIAL DISEASE (HCC): ICD-10-CM

## 2024-12-04 DIAGNOSIS — I46.9 CARDIOPULMONARY ARREST: ICD-10-CM

## 2024-12-04 PROCEDURE — 1125F AMNT PAIN NOTED PAIN PRSNT: CPT | Performed by: FAMILY MEDICINE

## 2024-12-04 PROCEDURE — 1111F DSCHRG MED/CURRENT MED MERGE: CPT | Performed by: FAMILY MEDICINE

## 2024-12-04 PROCEDURE — 3017F COLORECTAL CA SCREEN DOC REV: CPT | Performed by: FAMILY MEDICINE

## 2024-12-04 PROCEDURE — 3079F DIAST BP 80-89 MM HG: CPT | Performed by: FAMILY MEDICINE

## 2024-12-04 PROCEDURE — G8484 FLU IMMUNIZE NO ADMIN: HCPCS | Performed by: FAMILY MEDICINE

## 2024-12-04 PROCEDURE — 1123F ACP DISCUSS/DSCN MKR DOCD: CPT | Performed by: FAMILY MEDICINE

## 2024-12-04 PROCEDURE — 99204 OFFICE O/P NEW MOD 45 MIN: CPT | Performed by: FAMILY MEDICINE

## 2024-12-04 PROCEDURE — G8427 DOCREV CUR MEDS BY ELIG CLIN: HCPCS | Performed by: FAMILY MEDICINE

## 2024-12-04 PROCEDURE — 1036F TOBACCO NON-USER: CPT | Performed by: FAMILY MEDICINE

## 2024-12-04 PROCEDURE — G8420 CALC BMI NORM PARAMETERS: HCPCS | Performed by: FAMILY MEDICINE

## 2024-12-04 PROCEDURE — 3074F SYST BP LT 130 MM HG: CPT | Performed by: FAMILY MEDICINE

## 2024-12-04 RX ORDER — METHOCARBAMOL 500 MG/1
500 TABLET, FILM COATED ORAL EVERY 6 HOURS PRN
Qty: 90 TABLET | Refills: 2 | Status: SHIPPED | OUTPATIENT
Start: 2024-12-04 | End: 2025-03-04

## 2024-12-04 RX ORDER — OXYCODONE HYDROCHLORIDE 10 MG/1
10 TABLET ORAL EVERY 6 HOURS PRN
Qty: 120 TABLET | Refills: 0 | Status: SHIPPED | OUTPATIENT
Start: 2024-12-04 | End: 2025-01-03

## 2024-12-04 RX ORDER — ATORVASTATIN CALCIUM 80 MG/1
80 TABLET, FILM COATED ORAL DAILY
Qty: 90 TABLET | Refills: 1 | Status: SHIPPED | OUTPATIENT
Start: 2024-12-04

## 2024-12-04 RX ORDER — CARVEDILOL 12.5 MG/1
12.5 TABLET ORAL 2 TIMES DAILY WITH MEALS
Qty: 180 TABLET | Refills: 1 | Status: SHIPPED | OUTPATIENT
Start: 2024-12-04

## 2024-12-04 RX ORDER — PANTOPRAZOLE SODIUM 40 MG/1
40 TABLET, DELAYED RELEASE ORAL
Qty: 90 TABLET | Refills: 1 | Status: SHIPPED | OUTPATIENT
Start: 2024-12-04

## 2024-12-04 RX ORDER — ASPIRIN 81 MG/1
81 TABLET, CHEWABLE ORAL DAILY
Qty: 90 TABLET | Refills: 1 | Status: SHIPPED | OUTPATIENT
Start: 2024-12-04

## 2024-12-04 RX ORDER — DIAZEPAM 5 MG/1
5 TABLET ORAL NIGHTLY PRN
Qty: 30 TABLET | Refills: 0 | Status: SHIPPED | OUTPATIENT
Start: 2024-12-04 | End: 2025-01-03

## 2024-12-04 SDOH — ECONOMIC STABILITY: FOOD INSECURITY: WITHIN THE PAST 12 MONTHS, THE FOOD YOU BOUGHT JUST DIDN'T LAST AND YOU DIDN'T HAVE MONEY TO GET MORE.: NEVER TRUE

## 2024-12-04 SDOH — ECONOMIC STABILITY: INCOME INSECURITY: HOW HARD IS IT FOR YOU TO PAY FOR THE VERY BASICS LIKE FOOD, HOUSING, MEDICAL CARE, AND HEATING?: SOMEWHAT HARD

## 2024-12-04 SDOH — ECONOMIC STABILITY: FOOD INSECURITY: WITHIN THE PAST 12 MONTHS, YOU WORRIED THAT YOUR FOOD WOULD RUN OUT BEFORE YOU GOT MONEY TO BUY MORE.: NEVER TRUE

## 2024-12-04 ASSESSMENT — PATIENT HEALTH QUESTIONNAIRE - PHQ9
3. TROUBLE FALLING OR STAYING ASLEEP: SEVERAL DAYS
SUM OF ALL RESPONSES TO PHQ QUESTIONS 1-9: 9
SUM OF ALL RESPONSES TO PHQ QUESTIONS 1-9: 9
1. LITTLE INTEREST OR PLEASURE IN DOING THINGS: NEARLY EVERY DAY
10. IF YOU CHECKED OFF ANY PROBLEMS, HOW DIFFICULT HAVE THESE PROBLEMS MADE IT FOR YOU TO DO YOUR WORK, TAKE CARE OF THINGS AT HOME, OR GET ALONG WITH OTHER PEOPLE: NOT DIFFICULT AT ALL
SUM OF ALL RESPONSES TO PHQ9 QUESTIONS 1 & 2: 5
7. TROUBLE CONCENTRATING ON THINGS, SUCH AS READING THE NEWSPAPER OR WATCHING TELEVISION: NOT AT ALL
4. FEELING TIRED OR HAVING LITTLE ENERGY: MORE THAN HALF THE DAYS
8. MOVING OR SPEAKING SO SLOWLY THAT OTHER PEOPLE COULD HAVE NOTICED. OR THE OPPOSITE, BEING SO FIGETY OR RESTLESS THAT YOU HAVE BEEN MOVING AROUND A LOT MORE THAN USUAL: NOT AT ALL
SUM OF ALL RESPONSES TO PHQ QUESTIONS 1-9: 9
SUM OF ALL RESPONSES TO PHQ QUESTIONS 1-9: 9
9. THOUGHTS THAT YOU WOULD BE BETTER OFF DEAD, OR OF HURTING YOURSELF: NOT AT ALL
6. FEELING BAD ABOUT YOURSELF - OR THAT YOU ARE A FAILURE OR HAVE LET YOURSELF OR YOUR FAMILY DOWN: SEVERAL DAYS
5. POOR APPETITE OR OVEREATING: NOT AT ALL
2. FEELING DOWN, DEPRESSED OR HOPELESS: MORE THAN HALF THE DAYS

## 2024-12-04 NOTE — DISCHARGE SUMMARY
Physical Medicine & Rehabilitation  Discharge Summary     Patient Identification:  Ad Lacey  : 1950  Admit date: 2024  Discharge date: 12/3/2024   Attending provider: Nicolasa Abreu MD        Primary care provider: Drea Frost, APRN - CNP     Discharge Diagnoses:   Patient Active Problem List   Diagnosis    Arthritis of left knee    Peripheral arterial disease (HCC)    Numbness of right foot    Critical lower limb ischemia (HCC)    Neuropathic pain    Chronic pain syndrome    Accidental drug overdose    Depression    Atrial fibrillation with RVR (HCC)    Basal cell carcinoma (BCC)    Coronary atherosclerosis    High cholesterol    Hypertension    Other intra-abdominal and pelvic swelling, mass and lump    Cardiopulmonary arrest    ST elevation myocardial infarction (STEMI) (HCC)    Ischemic cardiomyopathy    Gram-negative pneumonia (HCC)    Acute coronary syndrome (HCC)    Cardiogenic shock    Moderate malnutrition (HCC)    Anoxic encephalopathy due to cardiac arrest (HCC)       History of Present Illness/Acute Hospital Course:  Patient is a 75 yo M with pmh CAD (prior PCI of the mid LAD in ), PAD (right fem-popliteal bypass), paroxysmal Afib, presacral mass, anxiety who initially presented on 2024 with cardiac arrest. Patient reportedly had chest pain at home and EMS was called. Upon their arrival patient was still alert, however had Vfib arrest en route to hospital. Had ROSC after 3 shocks. He was intubated in the field. Post-arrest EKG with anterior ST  elevations. He was taken to cath lab and found to have 100% LAD occlusion, stent thrombosis. He underwent aspiration thrombectomy and IVUS guided PCI to mid LAD with TRISHA x 1. Also with severe LV dysfunction and EF 20% requiring placement of pLVAD. Later had placement of Impella 5.5 (- with Dr. Lopez). Repeat LHC ( with Dr. Rodríguez) showed widely patent LAD. He was extubated 11/10 but has had ongoing acute hypoxic

## 2024-12-05 ENCOUNTER — TELEPHONE (OUTPATIENT)
Dept: FAMILY MEDICINE CLINIC | Age: 74
End: 2024-12-05

## 2024-12-05 ASSESSMENT — ENCOUNTER SYMPTOMS
SHORTNESS OF BREATH: 1
SORE THROAT: 0
SINUS PAIN: 0
CHEST TIGHTNESS: 0
BLOOD IN STOOL: 0
ABDOMINAL PAIN: 0
BACK PAIN: 0
CONSTIPATION: 0
SINUS PRESSURE: 0
VOMITING: 0
COUGH: 0
RHINORRHEA: 0
WHEEZING: 0
DIARRHEA: 0
NAUSEA: 0

## 2024-12-05 NOTE — PROGRESS NOTES
Take 1 tablet by mouth every morning (before breakfast) 90 tablet 1    rivaroxaban (XARELTO) 20 MG TABS tablet Take 1 tablet by mouth daily 90 tablet 1    sacubitril-valsartan (ENTRESTO) 24-26 MG per tablet Take 1 tablet by mouth 2 times daily 180 tablet 1    ticagrelor (BRILINTA) 90 MG TABS tablet Take 1 tablet by mouth 2 times daily 180 tablet 1    oxyCODONE (OXY-IR) 10 MG immediate release tablet Take 1 tablet by mouth every 6 hours as needed for Pain for up to 30 days. Max Daily Amount: 40 mg 120 tablet 0    lidocaine 4 % external patch Place 2 patches onto the skin every 24 hours       No current facility-administered medications for this visit.       Review of Systems:  Constitutional: No unanticipated weight loss. There's been no change in energy level, sleep pattern, or activity level. No fevers, chills.   Eyes: No visual changes or diplopia. No scleral icterus.  ENT: No Headaches, hearing loss or vertigo. No mouth sores or sore throat.  Cardiovascular: as reviewed in HPI  Respiratory: No cough or wheezing, no sputum production. No hemoptysis.     Gastrointestinal: No abdominal pain, appetite loss, blood in stools. No change in bowel or bladder habits.  Genitourinary: No dysuria, trouble voiding, or hematuria.  Musculoskeletal:  No gait disturbance, no joint complaints.  Integumentary: No rash or pruritis.  Neurological: No headache, diplopia, change in muscle strength, numbness or tingling.   Psychiatric: No anxiety or depression.  Endocrine: No temperature intolerance. No excessive thirst, fluid intake, or urination. No tremor.  Hematologic/Lymphatic: No abnormal bruising or bleeding, blood clots or swollen lymph nodes.  Allergic/Immunologic: No nasal congestion or hives.    Physical Exam:   BP (!) 94/58   Pulse 68   Ht 1.727 m (5' 8\")   SpO2 98%   BMI 24.17 kg/m²   Wt Readings from Last 3 Encounters:   12/03/24 72.1 kg (158 lb 15.2 oz)   11/21/24 74.2 kg (163 lb 9.3 oz)   03/20/24 83 kg (183 lb)

## 2024-12-05 NOTE — TELEPHONE ENCOUNTER
Shabana is asking if provider really wants pt to take both of the following medications?    Please advise:    rivaroxaban (XARELTO) 20 MG TABS tablet     ticagrelor (BRILINTA) 90 MG TABS tablet

## 2024-12-06 ENCOUNTER — TELEPHONE (OUTPATIENT)
Dept: FAMILY MEDICINE CLINIC | Age: 74
End: 2024-12-06

## 2024-12-06 NOTE — ASSESSMENT & PLAN NOTE
Continue Eliquis and Brilinta    Orders:    oxyCODONE (OXY-IR) 10 MG immediate release tablet; Take 1 tablet by mouth every 6 hours as needed for Pain for up to 30 days. Max Daily Amount: 40 mg

## 2024-12-06 NOTE — ASSESSMENT & PLAN NOTE
Patient have follow-up with cardiology and vascular surgery.    Orders:    oxyCODONE (OXY-IR) 10 MG immediate release tablet; Take 1 tablet by mouth every 6 hours as needed for Pain for up to 30 days. Max Daily Amount: 40 mg

## 2024-12-06 NOTE — PROGRESS NOTES
Ad Lacey (:  1950) is a 74 y.o. male,New patient, here for evaluation of the following chief complaint(s):  Follow-Up from Hospital and New Patient         Assessment & Plan  Acute coronary syndrome (HCC)   Acute condition, recurrent, patient continue follow-up with cardiology and vascular surgery.  Refill on heart related medications today.  Removed staples from right anterior chest wall with 3 from lower incision near the axilla and 12 staples from the midclavicular line incision.    Orders:    oxyCODONE (OXY-IR) 10 MG immediate release tablet; Take 1 tablet by mouth every 6 hours as needed for Pain for up to 30 days. Max Daily Amount: 40 mg    Anxiety   Chronic, at goal (stable), continue current treatment plan    Orders:    diazePAM (VALIUM) 5 MG tablet; Take 1 tablet by mouth nightly as needed for Anxiety or Sleep for up to 30 days. Max Daily Amount: 5 mg    Cardiopulmonary arrest    Patient have follow-up with cardiology and vascular surgery.    Orders:    oxyCODONE (OXY-IR) 10 MG immediate release tablet; Take 1 tablet by mouth every 6 hours as needed for Pain for up to 30 days. Max Daily Amount: 40 mg    Peripheral arterial disease (HCC)    Continue Eliquis and Brilinta    Orders:    oxyCODONE (OXY-IR) 10 MG immediate release tablet; Take 1 tablet by mouth every 6 hours as needed for Pain for up to 30 days. Max Daily Amount: 40 mg    Chronic pain syndrome    Increased pain medication dosing today.  Will follow-up within the next several weeks to review tolerance and effectiveness.    Orders:    oxyCODONE (OXY-IR) 10 MG immediate release tablet; Take 1 tablet by mouth every 6 hours as needed for Pain for up to 30 days. Max Daily Amount: 40 mg      Return in about 4 weeks (around 2025).       Subjective   HPI  Patient is here for establishment of care.  Patient was recently hospitalized after prolonged course after development of chest pain resulting in V-fib cardiac arrest status post

## 2024-12-06 NOTE — ASSESSMENT & PLAN NOTE
Acute condition, recurrent, patient continue follow-up with cardiology and vascular surgery.  Refill on heart related medications today.  Removed staples from right anterior chest wall with 3 from lower incision near the axilla and 12 staples from the midclavicular line incision.    Orders:    oxyCODONE (OXY-IR) 10 MG immediate release tablet; Take 1 tablet by mouth every 6 hours as needed for Pain for up to 30 days. Max Daily Amount: 40 mg

## 2024-12-06 NOTE — TELEPHONE ENCOUNTER
Dawn from [facility] called and needs clarification on if patient is to be taking Amlodipine 10 mg daily ??    It isn't on his meds list but he has the medication     His BP 86/56  And after having him sit and had it rechecked 103/70    Please call Dawn at [phone] on her secure line

## 2024-12-06 NOTE — ASSESSMENT & PLAN NOTE
Increased pain medication dosing today.  Will follow-up within the next several weeks to review tolerance and effectiveness.    Orders:    oxyCODONE (OXY-IR) 10 MG immediate release tablet; Take 1 tablet by mouth every 6 hours as needed for Pain for up to 30 days. Max Daily Amount: 40 mg

## 2024-12-09 ENCOUNTER — OFFICE VISIT (OUTPATIENT)
Dept: CARDIOLOGY CLINIC | Age: 74
End: 2024-12-09
Payer: MEDICARE

## 2024-12-09 VITALS
HEART RATE: 68 BPM | BODY MASS INDEX: 24.17 KG/M2 | DIASTOLIC BLOOD PRESSURE: 58 MMHG | SYSTOLIC BLOOD PRESSURE: 94 MMHG | HEIGHT: 68 IN | OXYGEN SATURATION: 98 %

## 2024-12-09 DIAGNOSIS — I21.3 ST ELEVATION MYOCARDIAL INFARCTION (STEMI), UNSPECIFIED ARTERY (HCC): ICD-10-CM

## 2024-12-09 DIAGNOSIS — I25.10 CORONARY ARTERY DISEASE INVOLVING NATIVE CORONARY ARTERY OF NATIVE HEART WITHOUT ANGINA PECTORIS: Primary | ICD-10-CM

## 2024-12-09 PROCEDURE — G8484 FLU IMMUNIZE NO ADMIN: HCPCS | Performed by: INTERNAL MEDICINE

## 2024-12-09 PROCEDURE — 3074F SYST BP LT 130 MM HG: CPT | Performed by: INTERNAL MEDICINE

## 2024-12-09 PROCEDURE — 1036F TOBACCO NON-USER: CPT | Performed by: INTERNAL MEDICINE

## 2024-12-09 PROCEDURE — 3078F DIAST BP <80 MM HG: CPT | Performed by: INTERNAL MEDICINE

## 2024-12-09 PROCEDURE — G8420 CALC BMI NORM PARAMETERS: HCPCS | Performed by: INTERNAL MEDICINE

## 2024-12-09 PROCEDURE — 1123F ACP DISCUSS/DSCN MKR DOCD: CPT | Performed by: INTERNAL MEDICINE

## 2024-12-09 PROCEDURE — G8427 DOCREV CUR MEDS BY ELIG CLIN: HCPCS | Performed by: INTERNAL MEDICINE

## 2024-12-09 PROCEDURE — 1159F MED LIST DOCD IN RCRD: CPT | Performed by: INTERNAL MEDICINE

## 2024-12-09 PROCEDURE — 99214 OFFICE O/P EST MOD 30 MIN: CPT | Performed by: INTERNAL MEDICINE

## 2024-12-09 PROCEDURE — 1111F DSCHRG MED/CURRENT MED MERGE: CPT | Performed by: INTERNAL MEDICINE

## 2024-12-09 PROCEDURE — 3017F COLORECTAL CA SCREEN DOC REV: CPT | Performed by: INTERNAL MEDICINE

## 2024-12-09 NOTE — TELEPHONE ENCOUNTER
Please let home health know that the patient is not to be taking amlodipine.  He has had issues with low blood pressure since his hospitalization.    Adan Pulido, DO

## 2024-12-20 ENCOUNTER — TELEPHONE (OUTPATIENT)
Dept: FAMILY MEDICINE CLINIC | Age: 74
End: 2024-12-20

## 2024-12-20 DIAGNOSIS — I46.9 CARDIOPULMONARY ARREST: ICD-10-CM

## 2024-12-20 DIAGNOSIS — I24.9 ACUTE CORONARY SYNDROME (HCC): ICD-10-CM

## 2024-12-20 DIAGNOSIS — I73.9 PERIPHERAL ARTERIAL DISEASE (HCC): ICD-10-CM

## 2024-12-20 DIAGNOSIS — G89.4 CHRONIC PAIN SYNDROME: ICD-10-CM

## 2024-12-20 RX ORDER — OXYCODONE HYDROCHLORIDE 20 MG/1
20 TABLET ORAL EVERY 6 HOURS PRN
Qty: 120 TABLET | Refills: 0 | Status: SHIPPED | OUTPATIENT
Start: 2024-12-20 | End: 2025-01-19

## 2024-12-20 NOTE — TELEPHONE ENCOUNTER
Dawn from [facility] called because she stated that the medication that Dr Pulido prescribed to the pt is not strong enough to help pt with his pain    Dawn would like a stronger medication sent to the pharmacy to help with pt pain    Dawn stated that she pt is struggling with pain when trying to do any kind of activities    Dawn stated that due to the pt pain he is declining some when it comes to being able to do any kind of daily living activities

## 2024-12-21 NOTE — TELEPHONE ENCOUNTER
Adjusted pain medication dose and if not effective will switch to different pain medication for patient. Please call Dawn from [facility] to update that we have sent in a new prescription for pain control.     Adan Pulido, DO

## 2024-12-30 ENCOUNTER — TELEPHONE (OUTPATIENT)
Dept: FAMILY MEDICINE CLINIC | Age: 74
End: 2024-12-30

## 2024-12-30 DIAGNOSIS — F41.9 ANXIETY: ICD-10-CM

## 2024-12-30 NOTE — TELEPHONE ENCOUNTER
Patient called and would like to have his       diazePAM (VALIUM) 5 MG tablet [[id number]]     Increased to 10 mg tablets 2 times a day.     He said when he takes 10 mg in the morning he can still go to work     Patient needs to have sent to [facility] on [address]     Patient is going out of town to [place] and needs to have filled before he leaves  but he is unsure when he is flying yet     Pharm dru on [facility] [address]

## 2024-12-31 RX ORDER — DIAZEPAM 10 MG/1
10 TABLET ORAL 2 TIMES DAILY
Qty: 60 TABLET | Refills: 0 | Status: SHIPPED | OUTPATIENT
Start: 2024-12-31 | End: 2025-01-30

## 2024-12-31 NOTE — TELEPHONE ENCOUNTER
Prescription sent to patient pharmacy.  Please call patient and have him scheduled for an appointment to follow-up effectiveness of his medication.    Adan Pulido, DO

## 2025-01-07 ENCOUNTER — TELEPHONE (OUTPATIENT)
Dept: FAMILY MEDICINE CLINIC | Age: 75
End: 2025-01-07

## 2025-01-07 NOTE — TELEPHONE ENCOUNTER
Dawn from [facility] called  and requested cheaper options for these prescriptions.    rivaroxaban (XARELTO) 20 MG TABS tablet [[id number]]     ticagrelor (BRILINTA) 90 MG TABS tablet [[id number]]       sacubitril-valsartan (ENTRESTO) 24-26 MG per tablet [[id number]]     Please call and advise   [phone] on her secure line.

## 2025-01-09 NOTE — TELEPHONE ENCOUNTER
Dawn from [facility] called back requesting a response to the encounter on 1/7/25 from Doctor Tess.      Please call and advise   [phone] on her secure line.

## 2025-01-13 NOTE — TELEPHONE ENCOUNTER
Could you please look into [facility] Medication Eligibility as soon as possible. They have been a big help with our patient's.     Has he already used the saving's card with 14 day/30 day free?     Can our pharmacy help him out?

## 2025-01-13 NOTE — TELEPHONE ENCOUNTER
Noted. Yes, sorry. Forgot about [facility]. Keep working on everything and update VERONIKA Granados as needed.
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
## 2025-01-13 NOTE — TELEPHONE ENCOUNTER
Dawn from [facility] called to request alternate meds for the following:   -rivaroxaban (XARELTO) 20 MG TABS tablet   -sacubitril-valsartan (ENTRESTO) 24-26 MG per tablet   -ticagrelor (BRILINTA) 90 MG TABS tablet     Dawn relayed Herb's medications are costing $6K out of pocket and has been without his meds for 1 week and counting, previously contacted and awaited a response from PCP. No response to date.     Dawn's callback: [phone]

## 2025-01-13 NOTE — TELEPHONE ENCOUNTER
Attempted to reach [facility], pharmacy is currently closed- will try to contact at another time during business hours.    Called and spoke to Dawn from [facility], patient has already used savings card. Patient resides with his son and does not believe he will be eligible for patient assistance. Information for [facility] pharmacies was given, offered to set aside samples for Entresto and a $10 Xarelto copay card in the meantime     Samples:  4 bottles/ 112 tablets  Entresto 24-26 mg  LOT# [id number]  EXP 11/26

## 2025-01-15 DIAGNOSIS — F41.9 ANXIETY: ICD-10-CM

## 2025-01-15 DIAGNOSIS — G89.4 CHRONIC PAIN SYNDROME: ICD-10-CM

## 2025-01-15 DIAGNOSIS — I46.9 CARDIOPULMONARY ARREST: ICD-10-CM

## 2025-01-15 DIAGNOSIS — I73.9 PERIPHERAL ARTERIAL DISEASE (HCC): ICD-10-CM

## 2025-01-15 DIAGNOSIS — I24.9 ACUTE CORONARY SYNDROME (HCC): ICD-10-CM

## 2025-01-15 RX ORDER — OXYCODONE HYDROCHLORIDE 20 MG/1
20 TABLET ORAL EVERY 6 HOURS PRN
Qty: 120 TABLET | Refills: 0 | Status: SHIPPED | OUTPATIENT
Start: 2025-01-15 | End: 2025-02-14

## 2025-01-15 RX ORDER — DIAZEPAM 10 MG/1
10 TABLET ORAL 2 TIMES DAILY
Qty: 60 TABLET | Refills: 0 | Status: SHIPPED | OUTPATIENT
Start: 2025-01-15 | End: 2025-02-14

## 2025-01-15 NOTE — TELEPHONE ENCOUNTER
Lov 12/4/24  Lrf  60 0 12/3/24   120 0 12/20/24 Medication:   Requested Prescriptions     Pending Prescriptions Disp Refills    diazePAM (VALIUM) 10 MG tablet 60 tablet 0     Sig: Take 1 tablet by mouth 2 times daily for 30 days. Max Daily Amount: 20 mg    oxyCODONE (ROXICODONE) 20 MG immediate release tablet 120 tablet 0     Sig: Take 1 tablet by mouth every 6 hours as needed for Pain for up to 30 days. Max Daily Amount: 80 mg        Last Filled:      Patient Phone Number: [phone] (home)     Last appt: 12/4/2024   Next appt: Visit date not found    Last OARRS:       12/2/2024    10:29 AM   RX Monitoring   Acute Pain Prescriptions Severe pain not adequately treated with lower dose.

## 2025-01-15 NOTE — TELEPHONE ENCOUNTER
oxyCODONE HCl (ROXICODONE) 20 MG immediate release tablet       diazePAM (VALIUM) 10 MG tablet - pt said he tried to  the last prescription that was called in but it was too early for him to  from the pharmacy so he needs a new prescription. Pt will be out on Sunday for the valium    Pt said he need more oxycodone called in because he is in pain from his neuropathy and rib pain.   Please call  in a refill on below [facility] #[id number] [place], [place] - [address] - [place] [phone] - F [phone]

## 2025-01-15 NOTE — TELEPHONE ENCOUNTER
Refill for requested medication sent to the pharmacy.  Reviewed PDMP and appropriate but too early to fill at this time.  Patient can  when time is due.    Adan Pulido, DO

## 2025-01-16 ENCOUNTER — TELEPHONE (OUTPATIENT)
Dept: FAMILY MEDICINE CLINIC | Age: 75
End: 2025-01-16

## 2025-01-16 NOTE — TELEPHONE ENCOUNTER
oxyCODONE (ROXICODONE) 20 MG immediate release tablet      Shabana Zaidi    Pharmacy told pt they can't fill this request until January 26th unless they get an ok from from Dr Pulido.    Pt said he needs this filled today. He is afraid he will go into withdrawals.    Advise pt.

## 2025-01-17 NOTE — TELEPHONE ENCOUNTER
Spoke with [facility] pharmacist and they will not fill these prescriptions early.     Patient told pharm techs with his non stop calling yesterday that he was addicted to his pain medications.     Any Questions please call Deirdre at [facility] if anymore information is needed.       Patient has called non stop to our office and to "[employer] all day yesterday and again this morning 3 times.

## 2025-01-17 NOTE — TELEPHONE ENCOUNTER
I called pharm to try and get this refilled early and they said they will not be refilling this early even with the drs approval. Do you know if there is a certain reason he needs this refilled early ?  Maybe we can send it some where else if you think he needs this refilled early?

## 2025-01-17 NOTE — TELEPHONE ENCOUNTER
Pt called again and said that the pharmacy is still reviewing the prescription and would like the ma to call again to make sure that the pharmacy knows it is okay to refill.     If this was already done then great, the patient will just have to wait until the pharmacy gets if filled.

## 2025-01-21 NOTE — TELEPHONE ENCOUNTER
Please print Rx for Brilinta 90 mg BID, Entresto 24-26 mg BID, Xarelto 20 mg for Dr. Rodríguez to sign tomorrow and I will fax to [facility]

## 2025-01-21 NOTE — TELEPHONE ENCOUNTER
Dawn from [facility] called back with account info for [facility]       Patient ID: [id number]

## 2025-02-05 ENCOUNTER — TELEMEDICINE (OUTPATIENT)
Dept: FAMILY MEDICINE CLINIC | Age: 75
End: 2025-02-05

## 2025-02-05 DIAGNOSIS — I73.9 PERIPHERAL ARTERIAL DISEASE (HCC): ICD-10-CM

## 2025-02-05 DIAGNOSIS — I24.9 ACUTE CORONARY SYNDROME (HCC): ICD-10-CM

## 2025-02-05 DIAGNOSIS — R19.09 PRESACRAL MASS: Primary | ICD-10-CM

## 2025-02-05 DIAGNOSIS — G89.4 CHRONIC PAIN SYNDROME: ICD-10-CM

## 2025-02-05 DIAGNOSIS — F41.9 ANXIETY: ICD-10-CM

## 2025-02-05 RX ORDER — HYDROMORPHONE HYDROCHLORIDE 2 MG/1
2 TABLET ORAL EVERY 6 HOURS PRN
Qty: 120 TABLET | Refills: 0 | Status: SHIPPED | OUTPATIENT
Start: 2025-02-05 | End: 2025-03-07

## 2025-02-05 RX ORDER — DIAZEPAM 10 MG/1
10 TABLET ORAL 2 TIMES DAILY
Qty: 60 TABLET | Refills: 0 | Status: SHIPPED | OUTPATIENT
Start: 2025-02-05 | End: 2025-03-07

## 2025-02-05 RX ORDER — OXYCODONE HYDROCHLORIDE 20 MG/1
20 TABLET ORAL EVERY 6 HOURS PRN
Qty: 120 TABLET | Refills: 0 | Status: CANCELLED | OUTPATIENT
Start: 2025-02-05 | End: 2025-03-07

## 2025-02-05 ASSESSMENT — PATIENT HEALTH QUESTIONNAIRE - PHQ9
7. TROUBLE CONCENTRATING ON THINGS, SUCH AS READING THE NEWSPAPER OR WATCHING TELEVISION: NOT AT ALL
SUM OF ALL RESPONSES TO PHQ9 QUESTIONS 1 & 2: 0
SUM OF ALL RESPONSES TO PHQ QUESTIONS 1-9: 0
4. FEELING TIRED OR HAVING LITTLE ENERGY: NOT AT ALL
5. POOR APPETITE OR OVEREATING: NOT AT ALL
9. THOUGHTS THAT YOU WOULD BE BETTER OFF DEAD, OR OF HURTING YOURSELF: NOT AT ALL
SUM OF ALL RESPONSES TO PHQ QUESTIONS 1-9: 0
8. MOVING OR SPEAKING SO SLOWLY THAT OTHER PEOPLE COULD HAVE NOTICED. OR THE OPPOSITE, BEING SO FIGETY OR RESTLESS THAT YOU HAVE BEEN MOVING AROUND A LOT MORE THAN USUAL: NOT AT ALL
3. TROUBLE FALLING OR STAYING ASLEEP: NOT AT ALL
10. IF YOU CHECKED OFF ANY PROBLEMS, HOW DIFFICULT HAVE THESE PROBLEMS MADE IT FOR YOU TO DO YOUR WORK, TAKE CARE OF THINGS AT HOME, OR GET ALONG WITH OTHER PEOPLE: NOT DIFFICULT AT ALL
1. LITTLE INTEREST OR PLEASURE IN DOING THINGS: NOT AT ALL
2. FEELING DOWN, DEPRESSED OR HOPELESS: NOT AT ALL
SUM OF ALL RESPONSES TO PHQ QUESTIONS 1-9: 0
6. FEELING BAD ABOUT YOURSELF - OR THAT YOU ARE A FAILURE OR HAVE LET YOURSELF OR YOUR FAMILY DOWN: NOT AT ALL
SUM OF ALL RESPONSES TO PHQ QUESTIONS 1-9: 0

## 2025-02-05 ASSESSMENT — ENCOUNTER SYMPTOMS
NAUSEA: 0
BACK PAIN: 1
SORE THROAT: 0
COUGH: 0
SHORTNESS OF BREATH: 0
VOMITING: 0
BLOOD IN STOOL: 0
ABDOMINAL PAIN: 0
CONSTIPATION: 0
WHEEZING: 0
DIARRHEA: 0
RHINORRHEA: 0

## 2025-02-06 ENCOUNTER — TELEPHONE (OUTPATIENT)
Dept: FAMILY MEDICINE CLINIC | Age: 75
End: 2025-02-06

## 2025-02-06 NOTE — ASSESSMENT & PLAN NOTE
Chronic, not at goal (unstable), Zyvox codon.  Start hydromorphone every 6 hours as needed for pain control.  Changing dose of opioid due to suspected tolerance.    Orders:    HYDROmorphone (DILAUDID) 2 MG tablet; Take 1 tablet by mouth every 6 hours as needed for Pain for up to 30 days. Max Daily Amount: 8 mg

## 2025-02-06 NOTE — PROGRESS NOTES
Ad Lacey, was evaluated through a synchronous (real-time) audio-video encounter. The patient (or guardian if applicable) is aware that this is a billable service, which includes applicable co-pays. This Virtual Visit was conducted with patient's (and/or legal guardian's) consent. Patient identification was verified, and a caregiver was present when appropriate.   The patient was located at Home: [address] [place] [address]  Provider was located at Facility (Appt Dept): [facility] [address] [phone]  Confirm you are appropriately licensed, registered, or certified to deliver care in the state where the patient is located as indicated above. If you are not or unsure, please re-schedule the visit: Yes, I confirm.     Ad Lacey (:  1950) is a Established patient, presenting virtually for evaluation of the following:      Below is the assessment and plan developed based on review of pertinent history, physical exam, labs, studies, and medications.     Assessment & Plan  Acute coronary syndrome (HCC)   Chronic, at goal (stable), continue current treatment plan         Peripheral arterial disease (HCC)   Chronic, at goal (stable), continue current treatment plan    Orders:    HYDROmorphone (DILAUDID) 2 MG tablet; Take 1 tablet by mouth every 6 hours as needed for Pain for up to 30 days. Max Daily Amount: 8 mg    Chronic pain syndrome   Chronic, not at goal (unstable), Zyvox codon.  Start hydromorphone every 6 hours as needed for pain control.  Changing dose of opioid due to suspected tolerance.    Orders:    HYDROmorphone (DILAUDID) 2 MG tablet; Take 1 tablet by mouth every 6 hours as needed for Pain for up to 30 days. Max Daily Amount: 8 mg    Anxiety    Refill diazepam 2 times daily.  Chronic use in nature.    Orders:    diazePAM (VALIUM) 10 MG tablet; Take 1 tablet by mouth 2 times daily for 30 days. Max Daily Amount: 20 mg    Presacral mass    Reorder MRI pelvis to

## 2025-02-06 NOTE — ASSESSMENT & PLAN NOTE
Chronic, at goal (stable), continue current treatment plan    Orders:    HYDROmorphone (DILAUDID) 2 MG tablet; Take 1 tablet by mouth every 6 hours as needed for Pain for up to 30 days. Max Daily Amount: 8 mg

## 2025-02-06 NOTE — TELEPHONE ENCOUNTER
Patient called.    Pharmacy advised the patient on Rx for HYDROmorphone (DILAUDID) 2 MG tablet [[id number] can not be filled until 2/26 or 2/27.  If sooner the Doctor will need to write a note or call to approve early fill.    Patient said he is in extreme pain and can not wait until 2/26 or 2/27.    Pharm   [facility] #[id number] [facility] [address] - [place] [phone] - F [phone]     LOV 2-5-25

## 2025-02-07 NOTE — TELEPHONE ENCOUNTER
Patient called and said he is a lot of pain, and he said the pain in his leg. Is unbearable he said     He also stated he might have to go to the ED if nothing can be done today.

## 2025-02-10 ENCOUNTER — TELEPHONE (OUTPATIENT)
Dept: FAMILY MEDICINE CLINIC | Age: 75
End: 2025-02-10

## 2025-02-10 ENCOUNTER — TELEPHONE (OUTPATIENT)
Dept: SURGERY | Age: 75
End: 2025-02-10

## 2025-02-10 NOTE — TELEPHONE ENCOUNTER
Patient called and is ready to schedule surgery of presacral mass resection,    Please call: [phone]

## 2025-02-10 NOTE — TELEPHONE ENCOUNTER
Patient is asking Dr. Pulido opinion re: MRI  Patient is afraid to have MRI done.  He spoke with [facility] maura and was informed that [facility] is the only place that will anesthetize you  Patient is asking Dr. Pulido opinion on doing that  He spoke with Dr. Wing that states the mas is the size of a fist and patient needs MRI done prior to any surgery  Contact patient

## 2025-02-11 DIAGNOSIS — R19.09 PRESACRAL MASS: Primary | ICD-10-CM

## 2025-02-11 NOTE — TELEPHONE ENCOUNTER
Patient states he is returning a missed call about surgery scheduling.     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 He needs something around 2:00 pm.     Patient can be reached at [phone].

## 2025-02-12 ENCOUNTER — TELEPHONE (OUTPATIENT)
Dept: FAMILY MEDICINE CLINIC | Age: 75
End: 2025-02-12

## 2025-02-12 DIAGNOSIS — R19.09 PRESACRAL MASS: Primary | ICD-10-CM

## 2025-02-12 NOTE — TELEPHONE ENCOUNTER
Patient calling to check making appointment for sick visit. Per patient he will call back once he find date  and tie that works.

## 2025-02-18 ENCOUNTER — TELEPHONE (OUTPATIENT)
Dept: FAMILY MEDICINE CLINIC | Age: 75
End: 2025-02-18

## 2025-02-18 NOTE — TELEPHONE ENCOUNTER
Spoke to the patient to have him scheduled for the H&P he stated he had and MI 3 wks ago has a cyst  A  Attempt to schedule the patient for and appt with PCP    R  The patient wants to wait to schedule the appt for an H&P once he is back in [place] he is currently in [place] and is awre he needs an H&P prior to the MRI  
wo contrast and also keep the sedation on the request?     Please and thank you!     -Teresa Abrams  [phone]

## 2025-02-20 NOTE — TELEPHONE ENCOUNTER
Called patient to inform him that Per [facility] wanted him to stay on oxycodone 5 and it has to last him 30 days since he has been discharge from the practice. hide

## 2025-02-24 DIAGNOSIS — G89.4 CHRONIC PAIN SYNDROME: ICD-10-CM

## 2025-02-24 DIAGNOSIS — R19.09 PRESACRAL MASS: ICD-10-CM

## 2025-02-24 DIAGNOSIS — I73.9 PERIPHERAL ARTERIAL DISEASE: ICD-10-CM

## 2025-02-24 DIAGNOSIS — F41.9 ANXIETY: ICD-10-CM

## 2025-02-24 RX ORDER — DIAZEPAM 10 MG/1
10 TABLET ORAL 2 TIMES DAILY
Qty: 60 TABLET | Refills: 0 | Status: SHIPPED | OUTPATIENT
Start: 2025-02-24 | End: 2025-03-26

## 2025-02-24 RX ORDER — HYDROMORPHONE HYDROCHLORIDE 2 MG/1
2 TABLET ORAL EVERY 6 HOURS PRN
Qty: 120 TABLET | Refills: 0 | Status: SHIPPED | OUTPATIENT
Start: 2025-02-24 | End: 2025-03-26

## 2025-02-24 NOTE — TELEPHONE ENCOUNTER
Patient called and said he needs to have his       diazePAM (VALIUM) 10 MG tablet [[id number]]   And his   HYDROmorphone (DILAUDID) 2 MG tablet [[id number]]     Resent  because the prescription has  and he never picked up the 2025    Please resend

## 2025-02-24 NOTE — TELEPHONE ENCOUNTER
Lov 2/5/25   Lrf 60 0 2/5/25   120 0 2/5/25 Medication:   Requested Prescriptions     Pending Prescriptions Disp Refills    diazePAM (VALIUM) 10 MG tablet 60 tablet 0     Sig: Take 1 tablet by mouth 2 times daily for 30 days. Max Daily Amount: 20 mg    HYDROmorphone (DILAUDID) 2 MG tablet 120 tablet 0     Sig: Take 1 tablet by mouth every 6 hours as needed for Pain for up to 30 days. Max Daily Amount: 8 mg        Last Filled:      Patient Phone Number: [phone] (home)     Last appt: 2/5/2025   Next appt: Visit date not found    Last OARRS:       12/2/2024    10:29 AM   RX Monitoring   Acute Pain Prescriptions Severe pain not adequately treated with lower dose.

## 2025-03-03 ENCOUNTER — TELEPHONE (OUTPATIENT)
Dept: FAMILY MEDICINE CLINIC | Age: 75
End: 2025-03-03

## 2025-03-03 ENCOUNTER — OFFICE VISIT (OUTPATIENT)
Dept: FAMILY MEDICINE CLINIC | Age: 75
End: 2025-03-03
Payer: MEDICARE

## 2025-03-03 VITALS
OXYGEN SATURATION: 98 % | BODY MASS INDEX: 22.89 KG/M2 | HEART RATE: 78 BPM | SYSTOLIC BLOOD PRESSURE: 205 MMHG | DIASTOLIC BLOOD PRESSURE: 112 MMHG | WEIGHT: 155 LBS

## 2025-03-03 DIAGNOSIS — I46.9 CARDIOPULMONARY ARREST (HCC): ICD-10-CM

## 2025-03-03 DIAGNOSIS — I73.9 PERIPHERAL ARTERIAL DISEASE: ICD-10-CM

## 2025-03-03 DIAGNOSIS — G89.4 CHRONIC PAIN SYNDROME: ICD-10-CM

## 2025-03-03 DIAGNOSIS — I24.9 ACUTE CORONARY SYNDROME (HCC): ICD-10-CM

## 2025-03-03 DIAGNOSIS — R19.09 PRESACRAL MASS: Primary | ICD-10-CM

## 2025-03-03 PROCEDURE — 3017F COLORECTAL CA SCREEN DOC REV: CPT | Performed by: FAMILY MEDICINE

## 2025-03-03 PROCEDURE — G8427 DOCREV CUR MEDS BY ELIG CLIN: HCPCS | Performed by: FAMILY MEDICINE

## 2025-03-03 PROCEDURE — 1159F MED LIST DOCD IN RCRD: CPT | Performed by: FAMILY MEDICINE

## 2025-03-03 PROCEDURE — 99213 OFFICE O/P EST LOW 20 MIN: CPT | Performed by: FAMILY MEDICINE

## 2025-03-03 PROCEDURE — 1123F ACP DISCUSS/DSCN MKR DOCD: CPT | Performed by: FAMILY MEDICINE

## 2025-03-03 PROCEDURE — 3077F SYST BP >= 140 MM HG: CPT | Performed by: FAMILY MEDICINE

## 2025-03-03 PROCEDURE — 3080F DIAST BP >= 90 MM HG: CPT | Performed by: FAMILY MEDICINE

## 2025-03-03 PROCEDURE — G8420 CALC BMI NORM PARAMETERS: HCPCS | Performed by: FAMILY MEDICINE

## 2025-03-03 PROCEDURE — 1036F TOBACCO NON-USER: CPT | Performed by: FAMILY MEDICINE

## 2025-03-03 PROCEDURE — 1160F RVW MEDS BY RX/DR IN RCRD: CPT | Performed by: FAMILY MEDICINE

## 2025-03-03 RX ORDER — OXYCODONE HYDROCHLORIDE 20 MG/1
20 TABLET ORAL EVERY 6 HOURS PRN
Qty: 120 TABLET | Refills: 0 | Status: CANCELLED | OUTPATIENT
Start: 2025-03-03 | End: 2025-04-02

## 2025-03-03 RX ORDER — HYDROMORPHONE HYDROCHLORIDE 4 MG/1
4 TABLET ORAL 3 TIMES DAILY PRN
Qty: 90 TABLET | Refills: 0 | Status: SHIPPED | OUTPATIENT
Start: 2025-03-03 | End: 2025-04-02

## 2025-03-03 RX ORDER — MORPHINE SULFATE 15 MG/1
15 TABLET, FILM COATED, EXTENDED RELEASE ORAL 2 TIMES DAILY
Qty: 60 TABLET | Refills: 0 | Status: SHIPPED | OUTPATIENT
Start: 2025-03-03 | End: 2025-04-02

## 2025-03-03 ASSESSMENT — ENCOUNTER SYMPTOMS
COUGH: 0
DIARRHEA: 0
CONSTIPATION: 0
ABDOMINAL PAIN: 0
RHINORRHEA: 0
SHORTNESS OF BREATH: 0
WHEEZING: 0
BLOOD IN STOOL: 0
NAUSEA: 0
SORE THROAT: 0
VOMITING: 0
BACK PAIN: 1

## 2025-03-03 NOTE — TELEPHONE ENCOUNTER
Shabana called and advised pt has duplicate prescriptions listed one is a 2mG and the other is 4MG     HYDROmorphone (DILAUDID) 2 MG tablet [[id number]]  DISCONTINUED   HYDROmorphone (DILAUDID) 4 MG tablet [[id number]]     Pharmacy wanted clarification if he's supposed to have 2 sent in and or taking both. One was sent on the 2/24/25 and another sent 3/3/25.    Please advise Vaibhav  [phone]

## 2025-03-03 NOTE — ASSESSMENT & PLAN NOTE
Add morphine extended release twice daily.  I just hydromorphone to 3 times daily breakthrough pain as needed.    Orders:    HYDROmorphone (DILAUDID) 4 MG tablet; Take 1 tablet by mouth 3 times daily as needed for Pain for up to 30 days. Max Daily Amount: 12 mg    morphine (MS CONTIN) 15 MG extended release tablet; Take 1 tablet by mouth 2 times daily for 30 days. Max Daily Amount: 30 mg

## 2025-03-03 NOTE — ASSESSMENT & PLAN NOTE
Continue further evaluation of lower extremity pain secondary to presacral mass.  Continue on Xarelto and Brilinta as prescribed.    Orders:    HYDROmorphone (DILAUDID) 4 MG tablet; Take 1 tablet by mouth 3 times daily as needed for Pain for up to 30 days. Max Daily Amount: 12 mg    morphine (MS CONTIN) 15 MG extended release tablet; Take 1 tablet by mouth 2 times daily for 30 days. Max Daily Amount: 30 mg

## 2025-03-03 NOTE — PROGRESS NOTES
Ad Lacey (:  1950) is a 74 y.o. male,Established patient, here for evaluation of the following chief complaint(s):  Annual Exam         Assessment & Plan  Presacral mass    Pending sedation MRI to further evaluate sacral mass.  Believe patient to be appropriate risk for procedural sedation.    Orders:    HYDROmorphone (DILAUDID) 4 MG tablet; Take 1 tablet by mouth 3 times daily as needed for Pain for up to 30 days. Max Daily Amount: 12 mg    morphine (MS CONTIN) 15 MG extended release tablet; Take 1 tablet by mouth 2 times daily for 30 days. Max Daily Amount: 30 mg    Acute coronary syndrome (HCC)  Continue on current therapies.  Chronic with no changes today.         Cardiopulmonary arrest (HCC)    Patient is doing well with recovery from cardiac arrest.  Patient to continue on atorvastatin, carvedilol, Entresto, Brilinta.         Chronic pain syndrome    Add morphine extended release twice daily.  I just hydromorphone to 3 times daily breakthrough pain as needed.    Orders:    HYDROmorphone (DILAUDID) 4 MG tablet; Take 1 tablet by mouth 3 times daily as needed for Pain for up to 30 days. Max Daily Amount: 12 mg    morphine (MS CONTIN) 15 MG extended release tablet; Take 1 tablet by mouth 2 times daily for 30 days. Max Daily Amount: 30 mg    Peripheral arterial disease    Continue further evaluation of lower extremity pain secondary to presacral mass.  Continue on Xarelto and Brilinta as prescribed.    Orders:    HYDROmorphone (DILAUDID) 4 MG tablet; Take 1 tablet by mouth 3 times daily as needed for Pain for up to 30 days. Max Daily Amount: 12 mg    morphine (MS CONTIN) 15 MG extended release tablet; Take 1 tablet by mouth 2 times daily for 30 days. Max Daily Amount: 30 mg      Return in about 4 weeks (around 3/31/2025) for chronic pain and sacral mass.       Subjective   HPI  Patient is here for preanesthetic evaluation for MRI.  Patient has a sacral mass is identified in the past.  Unsure if

## 2025-03-03 NOTE — ASSESSMENT & PLAN NOTE
Patient is doing well with recovery from cardiac arrest.  Patient to continue on atorvastatin, carvedilol, Entresto, Brilinta.

## 2025-03-04 ENCOUNTER — TELEPHONE (OUTPATIENT)
Dept: FAMILY MEDICINE CLINIC | Age: 75
End: 2025-03-04

## 2025-03-04 NOTE — TELEPHONE ENCOUNTER
Called and spoke with pharmacy as documented in another counter.  Advised the pharmacy did not fill the prescription for the hydromorphone 4 mg due to the discrepancy in his 2 mg hydromorphone previous dose from 2/24/2025.  Will continue to reevaluate his chronic pain medication regimen.

## 2025-03-04 NOTE — TELEPHONE ENCOUNTER
Tried to call the pharm. And isn't connecting to clarify patient pain medications .     Please call patient's son soon as medications are cleared to be filled from the pharm.     Patient son is on HIPAA  [facility] [phone]

## 2025-03-04 NOTE — TELEPHONE ENCOUNTER
Called and spoke with pharmacy directly regards to patient's pain medication.  Has been reported the patient is completely out of the hydromorphone 2 mg dosing that was prescribed and filled on 2/24/2025.  Patient was dispensed 120 tablets at that time.  Is reported that the medication is all gone now.  Pharmacy states that he should have approximately 88 pills left at this time.  Due to this discrepancy I advised the pharmacy to not fill his new prescription for the 4 mg hydromorphone that I discussed with him in the last visit.  Will need to have further discussions with the patient about following to the adherence of the pain medication directions and continue to reevaluate his chronic pain management regimen.    Adan Pulido, DO

## 2025-03-04 NOTE — TELEPHONE ENCOUNTER
Patient son was on the call with the pharmacy  [facility] as well. The pharmacist is still hesitant on filling the medication due to patient doesn't have any of the 2 mg left from the 24th of Feb.     Ph[facility] needs to have a call back from Tess Patricio to clarify

## 2025-03-04 NOTE — TELEPHONE ENCOUNTER
Please call pharmacy to give ok to fill med for pt. Pt called again saying his pharmacy will not fill his medication.

## 2025-03-18 DIAGNOSIS — F41.9 ANXIETY: ICD-10-CM

## 2025-03-18 RX ORDER — DIAZEPAM 10 MG/1
10 TABLET ORAL 2 TIMES DAILY
Qty: 60 TABLET | Refills: 2 | Status: SHIPPED | OUTPATIENT
Start: 2025-04-05 | End: 2025-07-04

## 2025-03-18 NOTE — TELEPHONE ENCOUNTER
Refill sent to the patient's pharmacy with 2 refills start day of 4/5/2025 due to the most recent prescription fill date of 3/5/2025.  Unable to fill early.    Adan Pulido, DO

## 2025-03-18 NOTE — TELEPHONE ENCOUNTER
Medication and Quantity requested: diazePAM (VALIUM) 10 MG tablet    Can pt get a couple of of refills for the above medication so he doesn't  have to keep calling in?      Last Visit  3/3/2025    Pharmacy and phone number updated in [facility]:  yes        [facility] #[id number] [facility] [address] - [place] [phone] - F [phone]

## 2025-03-21 ENCOUNTER — HOSPITAL ENCOUNTER (OUTPATIENT)
Dept: MRI IMAGING | Age: 75
Discharge: HOME | End: 2025-03-21
Attending: FAMILY MEDICINE

## 2025-03-21 VITALS
DIASTOLIC BLOOD PRESSURE: 88 MMHG | HEIGHT: 68 IN | HEART RATE: 71 BPM | WEIGHT: 155 LBS | SYSTOLIC BLOOD PRESSURE: 190 MMHG | BODY MASS INDEX: 23.49 KG/M2 | RESPIRATION RATE: 18 BRPM | OXYGEN SATURATION: 99 % | TEMPERATURE: 98 F

## 2025-03-21 DIAGNOSIS — R19.09 PRESACRAL MASS: ICD-10-CM

## 2025-03-21 LAB
EKG ATRIAL RATE: 66 BPM
EKG DIAGNOSIS: NORMAL
EKG P AXIS: 64 DEGREES
EKG P-R INTERVAL: 144 MS
EKG Q-T INTERVAL: 408 MS
EKG QRS DURATION: 84 MS
EKG QTC CALCULATION (BAZETT): 427 MS
EKG R AXIS: -13 DEGREES
EKG T AXIS: 94 DEGREES
EKG VENTRICULAR RATE: 66 BPM

## 2025-03-21 RX ORDER — SODIUM CHLORIDE 0.9 % (FLUSH) 0.9 %
5-40 SYRINGE (ML) INJECTION EVERY 12 HOURS SCHEDULED
OUTPATIENT
Start: 2025-03-21

## 2025-03-21 RX ORDER — SODIUM CHLORIDE 0.9 % (FLUSH) 0.9 %
5-40 SYRINGE (ML) INJECTION EVERY 12 HOURS SCHEDULED
Status: DISCONTINUED | OUTPATIENT
Start: 2025-03-21 | End: 2025-03-22 | Stop reason: HOSPADM

## 2025-03-21 RX ORDER — SODIUM CHLORIDE 9 MG/ML
INJECTION, SOLUTION INTRAVENOUS PRN
OUTPATIENT
Start: 2025-03-21

## 2025-03-21 RX ORDER — ONDANSETRON 2 MG/ML
4 INJECTION INTRAMUSCULAR; INTRAVENOUS
OUTPATIENT
Start: 2025-03-21

## 2025-03-21 RX ORDER — SODIUM CHLORIDE 0.9 % (FLUSH) 0.9 %
5-40 SYRINGE (ML) INJECTION PRN
Status: DISCONTINUED | OUTPATIENT
Start: 2025-03-21 | End: 2025-03-22 | Stop reason: HOSPADM

## 2025-03-21 RX ORDER — LIDOCAINE HYDROCHLORIDE 10 MG/ML
1 INJECTION, SOLUTION EPIDURAL; INFILTRATION; INTRACAUDAL; PERINEURAL
Status: DISCONTINUED | OUTPATIENT
Start: 2025-03-21 | End: 2025-03-22 | Stop reason: HOSPADM

## 2025-03-21 RX ORDER — NALOXONE HYDROCHLORIDE 0.4 MG/ML
INJECTION, SOLUTION INTRAMUSCULAR; INTRAVENOUS; SUBCUTANEOUS PRN
OUTPATIENT
Start: 2025-03-21

## 2025-03-21 RX ORDER — SODIUM CHLORIDE, SODIUM LACTATE, POTASSIUM CHLORIDE, CALCIUM CHLORIDE 600; 310; 30; 20 MG/100ML; MG/100ML; MG/100ML; MG/100ML
INJECTION, SOLUTION INTRAVENOUS CONTINUOUS
Status: DISCONTINUED | OUTPATIENT
Start: 2025-03-21 | End: 2025-03-22 | Stop reason: HOSPADM

## 2025-03-21 RX ORDER — SODIUM CHLORIDE 0.9 % (FLUSH) 0.9 %
5-40 SYRINGE (ML) INJECTION PRN
OUTPATIENT
Start: 2025-03-21

## 2025-03-21 ASSESSMENT — PAIN SCALES - GENERAL: PAINLEVEL_OUTOF10: 0

## 2025-03-24 DIAGNOSIS — G89.4 CHRONIC PAIN SYNDROME: ICD-10-CM

## 2025-03-24 DIAGNOSIS — R19.09 PRESACRAL MASS: ICD-10-CM

## 2025-03-24 DIAGNOSIS — I73.9 PERIPHERAL ARTERIAL DISEASE: ICD-10-CM

## 2025-03-24 NOTE — TELEPHONE ENCOUNTER
Medication and Quantity requested:     Last Visit  Hydromorphone (DILAUDID)4MG Tablet 90 tab      morphine (MS CONTIN) 15 MG extended release tablet  60 tab      Last visit 03/03/2025         Pharmacy and phone number updated in [facility]:  yes    Please send to [facility] #[id number] - [place], [place] - [address] - P [phone] - F [phone]

## 2025-03-25 DIAGNOSIS — I73.9 PERIPHERAL ARTERIAL DISEASE: ICD-10-CM

## 2025-03-25 DIAGNOSIS — R19.09 PRESACRAL MASS: ICD-10-CM

## 2025-03-25 DIAGNOSIS — G89.4 CHRONIC PAIN SYNDROME: ICD-10-CM

## 2025-03-25 RX ORDER — HYDROMORPHONE HYDROCHLORIDE 4 MG/1
4 TABLET ORAL 3 TIMES DAILY PRN
Qty: 90 TABLET | Refills: 0 | Status: SHIPPED | OUTPATIENT
Start: 2025-03-25 | End: 2025-04-24

## 2025-03-25 RX ORDER — MORPHINE SULFATE 15 MG/1
15 TABLET, FILM COATED, EXTENDED RELEASE ORAL 2 TIMES DAILY
Qty: 60 TABLET | Refills: 0 | Status: SHIPPED | OUTPATIENT
Start: 2025-03-25 | End: 2025-04-24

## 2025-03-25 NOTE — TELEPHONE ENCOUNTER
Patient called and said if Dr. Pulido calls pharm they will fill early . He is due tomorrow but needs today he said.

## 2025-03-25 NOTE — TELEPHONE ENCOUNTER
Refill for chronic pain medication sent to the pharmacy.  PDMP reviewed and appropriate for refill time.    Adan Pulido, DO

## 2025-03-26 NOTE — PROGRESS NOTES
Interventional Cardiology Consultation     Ad Lacey  1950    PCP: Adan Pulido DO  Referring Physician: Adan Pulido DO  Reason for Referral: hospital follow up   Chief Complaint: \"I am good\"  Chief Complaint   Patient presents with    Follow-up     No cc       Subjective:   History of Present Illness: The patient is 74 y.o. male with a past medical history significant for CAD s/p PCI LAD 6/2022, PAD s/p fem-pop bypass by Dr. Iyer 7/2022, and pAF. Patient recently admitted to  11/9/2024 with chest pain. EKG confirmed STEMI. EF 20-25%. Patient was taken to the cath lab and found to have 100% occluded LAD with reduced LV function. S/p aspiration thrombectomy, and IVUS guided PCI mid LAD X 1 TRISHA, s/p pLVAD 11/14/2024. Impella successfully explanted 11/19/24. Repeat echo showed improved LV function, EF 45-50%. Patient here today for follow up. Using a walker to aid in ambulation. Reports intermittent numbness/tingling of his right lower extremity. Relates this to his neuropathy. States he has been completing physical therapy and working on being more active. He states that he does need a right hip replacement. He denies any new or worsening symptoms. He denies chest pain, PND, orthopnea, dyspnea at rest, palpitations, syncope or edema.             Past Medical History:   Diagnosis Date    Acute coronary syndrome ([facility])     Anxiety     Basal cell carcinoma     L side of nose    CAD (coronary artery disease)     Cardiopulmonary arrest ([facility])     NOVEMBER 9,2024 (HOSPITALIZED UNTIL DECEMBER 3)    Cataract     RIGHT EYE 2025    CHF (congestive heart failure) ([facility])     Chronic pain syndrome     Does mobilize using cane     Engages in vaping     Hx of fall     \"couple of years ago\"    Hx of smoking     Peripheral artery disease     rt leg    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    Uses walker     Wears glasses      Past Surgical History:   Procedure Laterality Date    CARDIAC CATHETERIZATION

## 2025-03-31 ENCOUNTER — OFFICE VISIT (OUTPATIENT)
Dept: CARDIOLOGY CLINIC | Age: 75
End: 2025-03-31
Payer: MEDICARE

## 2025-03-31 VITALS
HEIGHT: 68 IN | DIASTOLIC BLOOD PRESSURE: 98 MMHG | HEART RATE: 71 BPM | WEIGHT: 157 LBS | BODY MASS INDEX: 23.79 KG/M2 | OXYGEN SATURATION: 96 % | SYSTOLIC BLOOD PRESSURE: 164 MMHG

## 2025-03-31 DIAGNOSIS — I25.10 CORONARY ARTERY DISEASE INVOLVING NATIVE CORONARY ARTERY OF NATIVE HEART WITHOUT ANGINA PECTORIS: Primary | ICD-10-CM

## 2025-03-31 DIAGNOSIS — I73.9 PAD (PERIPHERAL ARTERY DISEASE): ICD-10-CM

## 2025-03-31 DIAGNOSIS — I25.10 CORONARY ARTERY DISEASE INVOLVING NATIVE CORONARY ARTERY OF NATIVE HEART WITHOUT ANGINA PECTORIS: ICD-10-CM

## 2025-03-31 DIAGNOSIS — I48.0 PAF (PAROXYSMAL ATRIAL FIBRILLATION) (HCC): ICD-10-CM

## 2025-03-31 PROCEDURE — 1123F ACP DISCUSS/DSCN MKR DOCD: CPT | Performed by: INTERNAL MEDICINE

## 2025-03-31 PROCEDURE — 93000 ELECTROCARDIOGRAM COMPLETE: CPT | Performed by: INTERNAL MEDICINE

## 2025-03-31 PROCEDURE — 3077F SYST BP >= 140 MM HG: CPT | Performed by: INTERNAL MEDICINE

## 2025-03-31 PROCEDURE — G8427 DOCREV CUR MEDS BY ELIG CLIN: HCPCS | Performed by: INTERNAL MEDICINE

## 2025-03-31 PROCEDURE — G2211 COMPLEX E/M VISIT ADD ON: HCPCS | Performed by: INTERNAL MEDICINE

## 2025-03-31 PROCEDURE — 99214 OFFICE O/P EST MOD 30 MIN: CPT | Performed by: INTERNAL MEDICINE

## 2025-03-31 PROCEDURE — 3080F DIAST BP >= 90 MM HG: CPT | Performed by: INTERNAL MEDICINE

## 2025-03-31 PROCEDURE — 1159F MED LIST DOCD IN RCRD: CPT | Performed by: INTERNAL MEDICINE

## 2025-03-31 PROCEDURE — G8420 CALC BMI NORM PARAMETERS: HCPCS | Performed by: INTERNAL MEDICINE

## 2025-03-31 PROCEDURE — 3017F COLORECTAL CA SCREEN DOC REV: CPT | Performed by: INTERNAL MEDICINE

## 2025-03-31 PROCEDURE — 1036F TOBACCO NON-USER: CPT | Performed by: INTERNAL MEDICINE

## 2025-03-31 RX ORDER — CARVEDILOL 25 MG/1
25 TABLET ORAL 2 TIMES DAILY WITH MEALS
Qty: 90 TABLET | Refills: 3 | Status: SHIPPED | OUTPATIENT
Start: 2025-03-31 | End: 2025-04-01

## 2025-03-31 NOTE — PATIENT INSTRUCTIONS
INCREASE carvedilol (Coreg) to 25 mg 2x daily  INCREASE Entresto to 49-51 mg 2x daily     STOP ticagrelor (Brilinta) in MAY of 2025   The patient is a 75y Female complaining of

## 2025-04-01 RX ORDER — CARVEDILOL 25 MG/1
25 TABLET ORAL 2 TIMES DAILY WITH MEALS
Qty: 180 TABLET | Refills: 1 | Status: SHIPPED | OUTPATIENT
Start: 2025-04-01

## 2025-04-09 ENCOUNTER — OFFICE VISIT (OUTPATIENT)
Dept: FAMILY MEDICINE CLINIC | Age: 75
End: 2025-04-09
Payer: MEDICARE

## 2025-04-09 VITALS
WEIGHT: 155 LBS | RESPIRATION RATE: 18 BRPM | DIASTOLIC BLOOD PRESSURE: 80 MMHG | BODY MASS INDEX: 23.49 KG/M2 | SYSTOLIC BLOOD PRESSURE: 138 MMHG | HEIGHT: 68 IN | HEART RATE: 61 BPM | OXYGEN SATURATION: 98 %

## 2025-04-09 DIAGNOSIS — G89.4 CHRONIC PAIN SYNDROME: ICD-10-CM

## 2025-04-09 DIAGNOSIS — R19.09 PRESACRAL MASS: ICD-10-CM

## 2025-04-09 DIAGNOSIS — I73.9 PERIPHERAL ARTERIAL DISEASE: ICD-10-CM

## 2025-04-09 PROCEDURE — 1123F ACP DISCUSS/DSCN MKR DOCD: CPT | Performed by: FAMILY MEDICINE

## 2025-04-09 PROCEDURE — 1036F TOBACCO NON-USER: CPT | Performed by: FAMILY MEDICINE

## 2025-04-09 PROCEDURE — 3075F SYST BP GE 130 - 139MM HG: CPT | Performed by: FAMILY MEDICINE

## 2025-04-09 PROCEDURE — G8427 DOCREV CUR MEDS BY ELIG CLIN: HCPCS | Performed by: FAMILY MEDICINE

## 2025-04-09 PROCEDURE — 1160F RVW MEDS BY RX/DR IN RCRD: CPT | Performed by: FAMILY MEDICINE

## 2025-04-09 PROCEDURE — 1159F MED LIST DOCD IN RCRD: CPT | Performed by: FAMILY MEDICINE

## 2025-04-09 PROCEDURE — 99213 OFFICE O/P EST LOW 20 MIN: CPT | Performed by: FAMILY MEDICINE

## 2025-04-09 PROCEDURE — G8420 CALC BMI NORM PARAMETERS: HCPCS | Performed by: FAMILY MEDICINE

## 2025-04-09 PROCEDURE — 3017F COLORECTAL CA SCREEN DOC REV: CPT | Performed by: FAMILY MEDICINE

## 2025-04-09 PROCEDURE — 3079F DIAST BP 80-89 MM HG: CPT | Performed by: FAMILY MEDICINE

## 2025-04-09 RX ORDER — MORPHINE SULFATE 30 MG/1
30 TABLET, FILM COATED, EXTENDED RELEASE ORAL 2 TIMES DAILY
Qty: 60 TABLET | Refills: 0 | Status: SHIPPED | OUTPATIENT
Start: 2025-04-09 | End: 2025-05-09

## 2025-04-09 ASSESSMENT — ENCOUNTER SYMPTOMS
BLOOD IN STOOL: 0
RHINORRHEA: 0
SORE THROAT: 0
VOMITING: 0
COUGH: 0
CONSTIPATION: 0
BACK PAIN: 1
SHORTNESS OF BREATH: 0
WHEEZING: 0
NAUSEA: 0
ABDOMINAL PAIN: 0
DIARRHEA: 0

## 2025-04-09 NOTE — ASSESSMENT & PLAN NOTE
Continue to monitor for symptoms.  Increase pain medication of extended release morphine.  Will keep short acting breakthrough pain and Valium the same at this time and continue to reevaluate.    Orders:    morphine (MS CONTIN) 30 MG extended release tablet; Take 1 tablet by mouth 2 times daily for 30 days. Max Daily Amount: 60 mg

## 2025-04-09 NOTE — PROGRESS NOTES
Ad Lacey (:  1950) is a 74 y.o. male,Established patient, here for evaluation of the following chief complaint(s):  Follow-up         Assessment & Plan  Peripheral arterial disease  Continue to monitor for symptoms.  Increase pain medication of extended release morphine.  Will keep short acting breakthrough pain and Valium the same at this time and continue to reevaluate.    Orders:    morphine (MS CONTIN) 30 MG extended release tablet; Take 1 tablet by mouth 2 times daily for 30 days. Max Daily Amount: 60 mg    Chronic pain syndrome    Change in pain medication as above.    Orders:    morphine (MS CONTIN) 30 MG extended release tablet; Take 1 tablet by mouth 2 times daily for 30 days. Max Daily Amount: 60 mg    Presacral mass    Postpone MRI evaluation based on cardiology recommendations at this time.    Orders:    morphine (MS CONTIN) 30 MG extended release tablet; Take 1 tablet by mouth 2 times daily for 30 days. Max Daily Amount: 60 mg      Return in about 3 months (around 2025) for chronic leg pain.       Subjective   HPI  Patient is here for follow-up of chronic back pain and lower extremity pain with mononeuropathy of the right lower extremity.  Patient had MRI canceled due to cardiology concern.  They are recommending follow-up with it 6 months in October with cardiology to reevaluate cardiac status.  Patient states that addition of long-acting morphine does not seem to be lasting quite as long and does not get him significant pain relief.  He denies any adverse effects from taking this medication.  He is using hydromorphone as breakthrough pain.  And using Valium twice daily.  No issues or concerns about breathing status or increased somnolence at this time.  Patient continues to have pain down the right lower extremity with neuropathy type sensation.  He did see cardiology recently and they made changes to blood pressure medications as well as Entresto.    Review of Systems

## 2025-04-09 NOTE — ASSESSMENT & PLAN NOTE
Change in pain medication as above.    Orders:    morphine (MS CONTIN) 30 MG extended release tablet; Take 1 tablet by mouth 2 times daily for 30 days. Max Daily Amount: 60 mg

## 2025-04-10 DIAGNOSIS — I73.9 PERIPHERAL ARTERIAL DISEASE: ICD-10-CM

## 2025-04-10 DIAGNOSIS — G89.4 CHRONIC PAIN SYNDROME: ICD-10-CM

## 2025-04-10 DIAGNOSIS — R19.09 PRESACRAL MASS: ICD-10-CM

## 2025-04-10 RX ORDER — HYDROMORPHONE HYDROCHLORIDE 4 MG/1
4 TABLET ORAL 3 TIMES DAILY PRN
Qty: 90 TABLET | Refills: 0 | Status: SHIPPED | OUTPATIENT
Start: 2025-04-10 | End: 2025-05-10

## 2025-04-10 NOTE — TELEPHONE ENCOUNTER
Medication:   Requested Prescriptions     Pending Prescriptions Disp Refills    HYDROmorphone (DILAUDID) 4 MG tablet 90 tablet 0     Sig: Take 1 tablet by mouth 3 times daily as needed for Pain for up to 30 days. Max Daily Amount: 12 mg        Last Filled:  03/25/2025    Patient Phone Number: [phone] (home)     Last appt: 4/9/2025   Next appt: Visit date not found    Last OARRS:       12/2/2024    10:29 AM   RX Monitoring   Acute Pain Prescriptions Severe pain not adequately treated with lower dose.

## 2025-04-11 ENCOUNTER — TELEPHONE (OUTPATIENT)
Dept: VASCULAR SURGERY | Age: 75
End: 2025-04-11

## 2025-04-11 ENCOUNTER — HOSPITAL ENCOUNTER (OUTPATIENT)
Dept: VASCULAR LAB | Age: 75
Discharge: HOME | End: 2025-04-13
Attending: INTERNAL MEDICINE
Payer: MEDICARE

## 2025-04-11 DIAGNOSIS — I73.9 PAD (PERIPHERAL ARTERY DISEASE): ICD-10-CM

## 2025-04-11 LAB
VAS RIGHT ARTERIAL PROX ANASTOMOSIS PSV: 176 CM/S
VAS RIGHT ATA MID PSV: 64.8 CM/S
VAS RIGHT CFA DIST PSV: 181.9 CM/S
VAS RIGHT CFA PROX PSV: 236.4 CM/S
VAS RIGHT DIST OUTFLOW PSV: 49 CM/S
VAS RIGHT GRAFT 1: NORMAL
VAS RIGHT INFLOW ARTERY PSV: 181 CM/S
VAS RIGHT MID OUTFLOW PSV: 36 CM/S
VAS RIGHT OUTFLOW VESSEL PSV: 198 CM/S
VAS RIGHT PERONEAL MID PSV: 40.3 CM/S
VAS RIGHT PFA PROX PSV: 169 CM/S
VAS RIGHT PROX OUTFLOW PSV: 41 CM/S
VAS RIGHT PTA MID PSV: 0 CM/S
VAS RIGHT VENOUS DIST ANASTOMOSIS PSV: 464 CM/S

## 2025-04-11 PROCEDURE — 93926 LOWER EXTREMITY STUDY: CPT | Performed by: INTERNAL MEDICINE

## 2025-04-11 PROCEDURE — 93926 LOWER EXTREMITY STUDY: CPT

## 2025-04-11 NOTE — TELEPHONE ENCOUNTER
Patient had a vascular scan today. It showed some narrowing of the bypass graft. Dr. Lopez would like to see the patient in the office to discuss. He has seen Dr. Iyer in the past.   Patient is going to [facility]. He will be down there for a few weeks. He is unsure when he will be home. He will call and make an appt when he returns.   Vascular team aware.

## 2025-04-19 ENCOUNTER — RESULTS FOLLOW-UP (OUTPATIENT)
Dept: CARDIOLOGY CLINIC | Age: 75
End: 2025-04-19

## 2025-04-19 NOTE — RESULT ENCOUNTER NOTE
Previous patient of Dr. Iyer with prior fem/pop bypass. Arterial doppler with high velocities at the touchdown. Please arrange for consultation with Dr. Lopez Vascular Sx

## 2025-04-21 ENCOUNTER — TELEPHONE (OUTPATIENT)
Dept: FAMILY MEDICINE CLINIC | Age: 75
End: 2025-04-21

## 2025-04-21 ENCOUNTER — TELEPHONE (OUTPATIENT)
Dept: CARDIOLOGY CLINIC | Age: 75
End: 2025-04-21

## 2025-04-21 DIAGNOSIS — I73.9 CLAUDICATION: ICD-10-CM

## 2025-04-21 DIAGNOSIS — Z98.890 HISTORY OF FEMOROPOPLITEAL BYPASS: ICD-10-CM

## 2025-04-21 DIAGNOSIS — G89.4 CHRONIC PAIN SYNDROME: ICD-10-CM

## 2025-04-21 DIAGNOSIS — I73.9 PERIPHERAL ARTERIAL DISEASE: ICD-10-CM

## 2025-04-21 DIAGNOSIS — R19.09 PRESACRAL MASS: ICD-10-CM

## 2025-04-21 DIAGNOSIS — I73.9 PERIPHERAL ARTERIAL DISEASE: Primary | ICD-10-CM

## 2025-04-21 RX ORDER — HYDROMORPHONE HYDROCHLORIDE 4 MG/1
4 TABLET ORAL EVERY 6 HOURS PRN
Qty: 120 TABLET | Refills: 0 | Status: SHIPPED | OUTPATIENT
Start: 2025-04-21 | End: 2025-05-21

## 2025-04-21 NOTE — TELEPHONE ENCOUNTER
Called patient to discuss his questions as well as the results of recent arterial doppler.   Per Dr. Rodríguez, doppler showed high velocities at the touch down. Patient with prior fem-pop bypass per Dr. Iyer. Needs referral back to see vascular sx, Dr. Lopez.   Referral placed and patient given their office number, [phone]  Patient has not consulted with ortho regarding hip pain, however states he was doing research on it. Advised that he contact our office for cardiac clearance should he proceed with replacement.   Patient v/u to all.

## 2025-04-21 NOTE — TELEPHONE ENCOUNTER
Stevie would like to know if Anne would OK him for cyst removal and hip replacement if he does epidural plus local anesthesia with the arterial incision?    Please advise    Stevie can be reached at [phone]

## 2025-04-21 NOTE — TELEPHONE ENCOUNTER
Pt called and expressed he waits to take medication listed until he \"goes though withdraws\".    Pt explained he wakes up every night and needs to take one more dosage and he stated he would be ok.  HYDROmorphone (DILAUDID) 4 MG tablet [[id number]]     Pt stated the pain is in his right leg and hip and was told it could be neurotrophy.     [facility] #[id number] [place], [place] - [address] [phone] - F [phone]     Please advise  [phone]

## 2025-04-23 ENCOUNTER — TELEPHONE (OUTPATIENT)
Dept: FAMILY MEDICINE CLINIC | Age: 75
End: 2025-04-23

## 2025-04-23 DIAGNOSIS — G89.4 CHRONIC PAIN SYNDROME: ICD-10-CM

## 2025-04-23 DIAGNOSIS — R19.09 PRESACRAL MASS: ICD-10-CM

## 2025-04-23 DIAGNOSIS — I73.9 PERIPHERAL ARTERIAL DISEASE: ICD-10-CM

## 2025-04-23 RX ORDER — MORPHINE SULFATE 15 MG/1
30 TABLET, FILM COATED, EXTENDED RELEASE ORAL 2 TIMES DAILY
Qty: 120 TABLET | Refills: 0 | Status: SHIPPED | OUTPATIENT
Start: 2025-04-23 | End: 2025-05-23

## 2025-04-23 NOTE — TELEPHONE ENCOUNTER
Medication and Quantity requested: morphine (MS CONTIN) 30 MG extended release tablet      Pt called and said his pharmacy is out of the 30 mg but they do have the 15 mg in stock if dr. Pulido wants to call that in to the below pharmacy    Last Visit  4/9/2025    Pharmacy and phone number updated in [facility]:  yes      [facility] #[id number] [facility] [address] - [place] [phone] - F [phone]

## 2025-04-23 NOTE — TELEPHONE ENCOUNTER
Changed prescription to 15 mg extended release morphine 2 tablets twice daily.    Adan Pulido, DO

## 2025-04-28 RX ORDER — METHOCARBAMOL 500 MG/1
TABLET, FILM COATED ORAL
Qty: 90 TABLET | Refills: 2 | Status: SHIPPED | OUTPATIENT
Start: 2025-04-28

## 2025-04-28 NOTE — TELEPHONE ENCOUNTER
Medication:   Requested Prescriptions     Pending Prescriptions Disp Refills    methocarbamol (ROBAXIN) 500 MG tablet [Pharmacy Med Name: METHOCARBAMOL 500MG TABLETS] 90 tablet 2     Sig: TAKE 1 TABLET BY MOUTH EVERY 6 HOURS AS NEEDED FOR MUSCLE SPASMS        Last Filled:  12/04/2024    Patient Phone Number: [phone] (home)     Last appt: 4/9/2025   Next appt: Visit date not found    Last OARRS:       12/2/2024    10:29 AM   RX Monitoring   Acute Pain Prescriptions Severe pain not adequately treated with lower dose.

## 2025-04-28 NOTE — TELEPHONE ENCOUNTER
Pt called and advised that the pain medication prescribed does not hit the amount of pain he is in.    He was given all the information per Dr. Pulido and has asked if an epidural would be option in the near future.     Pt was advised to  prescription listed.     Please advise pt   [phone]

## 2025-04-28 NOTE — TELEPHONE ENCOUNTER
Unsure of patient's request.   Dr. Rodríguez did not say that he could not be under general.   Per telephone encounter 4/21/25, patient was to consult ortho for hip pain and forward cardiac clearance to our office.   Up to patient his treatment plan whether it be injections or surgery, however patient will still need to have formal cardiac clearance request sent to our office for Dr. Rodríguez review  Thanks

## 2025-04-30 ENCOUNTER — TELEPHONE (OUTPATIENT)
Dept: CARDIOLOGY CLINIC | Age: 75
End: 2025-04-30

## 2025-04-30 ENCOUNTER — TELEPHONE (OUTPATIENT)
Dept: FAMILY MEDICINE CLINIC | Age: 75
End: 2025-04-30

## 2025-04-30 NOTE — TELEPHONE ENCOUNTER
Patient  seen a cardiologist  to have a mass removed at [facility] Dr. Dillon Rodríguez  and would like to know if our provider could contact him to see if he could have a epidural instead of anthesia?please call at [phone]

## 2025-04-30 NOTE — TELEPHONE ENCOUNTER
Called spoke with patient is aware to contact the surgeon who would be doing the procedure on him.     Patient states understanding.   States he see's multiple physicians and will contact his PCP on who he is to contact.

## 2025-04-30 NOTE — TELEPHONE ENCOUNTER
Pt called and said he just needs advice if he can get that mass removed in his right leg to help with his overall hip pain and leg pain that he has been having. Pt said he was going to call just cardio dr as well to see what he recommends.    Please advise

## 2025-04-30 NOTE — TELEPHONE ENCOUNTER
Stevie relayed having a cyst on his right leg. He is wanting Dr. Rodríguez to advised and give recs on if he can proceed with removal under local anesthesia instead of general anesthesia?     Patient does not have anything scheduled at this time, just wanting advisement from Anne.     Please advise    Stevie's callback: [phone]

## 2025-05-01 NOTE — TELEPHONE ENCOUNTER
Patient called and was checking on the status on this encounter. Looks like there is another encounter the same in chart.

## 2025-05-02 NOTE — TELEPHONE ENCOUNTER
Called and left a message with Dr. Rodríguez's staff that it would be okay from my standpoint if the patient pursued a epidural for the possible procedure rather than general anesthesia.    Adan Pulido, DO

## 2025-05-05 ENCOUNTER — TELEPHONE (OUTPATIENT)
Dept: FAMILY MEDICINE CLINIC | Age: 75
End: 2025-05-05

## 2025-05-14 ENCOUNTER — OFFICE VISIT (OUTPATIENT)
Dept: FAMILY MEDICINE CLINIC | Age: 75
End: 2025-05-14
Payer: MEDICARE

## 2025-05-14 VITALS
BODY MASS INDEX: 23.34 KG/M2 | DIASTOLIC BLOOD PRESSURE: 82 MMHG | WEIGHT: 154 LBS | HEART RATE: 84 BPM | HEIGHT: 68 IN | OXYGEN SATURATION: 95 % | SYSTOLIC BLOOD PRESSURE: 134 MMHG

## 2025-05-14 DIAGNOSIS — R19.09 PRESACRAL MASS: ICD-10-CM

## 2025-05-14 DIAGNOSIS — I73.9 PERIPHERAL ARTERIAL DISEASE: ICD-10-CM

## 2025-05-14 DIAGNOSIS — G89.4 CHRONIC PAIN SYNDROME: ICD-10-CM

## 2025-05-14 PROCEDURE — 1123F ACP DISCUSS/DSCN MKR DOCD: CPT | Performed by: FAMILY MEDICINE

## 2025-05-14 PROCEDURE — 3079F DIAST BP 80-89 MM HG: CPT | Performed by: FAMILY MEDICINE

## 2025-05-14 PROCEDURE — 3017F COLORECTAL CA SCREEN DOC REV: CPT | Performed by: FAMILY MEDICINE

## 2025-05-14 PROCEDURE — 99213 OFFICE O/P EST LOW 20 MIN: CPT | Performed by: FAMILY MEDICINE

## 2025-05-14 PROCEDURE — G8420 CALC BMI NORM PARAMETERS: HCPCS | Performed by: FAMILY MEDICINE

## 2025-05-14 PROCEDURE — 1036F TOBACCO NON-USER: CPT | Performed by: FAMILY MEDICINE

## 2025-05-14 PROCEDURE — 3075F SYST BP GE 130 - 139MM HG: CPT | Performed by: FAMILY MEDICINE

## 2025-05-14 PROCEDURE — G8427 DOCREV CUR MEDS BY ELIG CLIN: HCPCS | Performed by: FAMILY MEDICINE

## 2025-05-14 RX ORDER — HYDROMORPHONE HYDROCHLORIDE 4 MG/1
4 TABLET ORAL EVERY 4 HOURS PRN
Qty: 150 TABLET | Refills: 0 | Status: SHIPPED | OUTPATIENT
Start: 2025-05-14 | End: 2025-06-13

## 2025-05-14 RX ORDER — MORPHINE SULFATE 60 MG/1
60 TABLET, FILM COATED, EXTENDED RELEASE ORAL 2 TIMES DAILY
Qty: 60 TABLET | Refills: 0 | Status: SHIPPED | OUTPATIENT
Start: 2025-05-14 | End: 2025-06-13

## 2025-05-14 NOTE — PATIENT INSTRUCTIONS
Consider Dr. Schaefer for pain management. He could offer Epidural injections, spinal cord stimulator, or other minimally invasive procedures to help with pain control.

## 2025-05-15 ASSESSMENT — ENCOUNTER SYMPTOMS
BACK PAIN: 1
DIARRHEA: 0
CONSTIPATION: 0
ABDOMINAL PAIN: 0
COUGH: 0
WHEEZING: 0
BLOOD IN STOOL: 0
NAUSEA: 0
RHINORRHEA: 0
VOMITING: 0
SHORTNESS OF BREATH: 0
SORE THROAT: 0

## 2025-05-15 NOTE — PROGRESS NOTES
Ad Lacey (:  1950) is a 74 y.o. male,Established patient, here for evaluation of the following chief complaint(s):  Pain (Pt states he have been in severe pain and no medication isn't working )         Assessment & Plan  Peripheral arterial disease  Add additional dose of hydromorphone daily.  Patient can have up to 5 doses per day at 4 mg every 4 hours as needed.    Orders:    HYDROmorphone (DILAUDID) 4 MG tablet; Take 1 tablet by mouth every 4 hours as needed for Pain for up to 30 days. Max Daily Amount: 24 mg    Chronic pain syndrome    Increase extended release morphine dose today.  Continue twice daily dosing.  Patient to consider referral to pain management for possible epidural, spinal cord stimulator or other minimally invasive procedures that can help with pain control.  Patient does sound interested in this option.    Orders:    morphine (MS CONTIN) 60 MG extended release tablet; Take 1 tablet by mouth 2 times daily for 30 days. Max Daily Amount: 120 mg    HYDROmorphone (DILAUDID) 4 MG tablet; Take 1 tablet by mouth every 4 hours as needed for Pain for up to 30 days. Max Daily Amount: 24 mg    Presacral mass    Pain control as above and may pursue pain management options rather than interventional biopsies or potential removal of mass given patient's cardiac risk factors.    Orders:    morphine (MS CONTIN) 60 MG extended release tablet; Take 1 tablet by mouth 2 times daily for 30 days. Max Daily Amount: 120 mg    HYDROmorphone (DILAUDID) 4 MG tablet; Take 1 tablet by mouth every 4 hours as needed for Pain for up to 30 days. Max Daily Amount: 24 mg      Return in about 3 months (around 2025) for back pain management.       Subjective   Pain  Associated symptoms include arthralgias, numbness and weakness. Pertinent negatives include no abdominal pain, chest pain, chills, coughing, fatigue, fever, headaches, joint swelling, nausea, rash, sore throat or vomiting.     Patient is here

## 2025-05-15 NOTE — ASSESSMENT & PLAN NOTE
Add additional dose of hydromorphone daily.  Patient can have up to 5 doses per day at 4 mg every 4 hours as needed.    Orders:    HYDROmorphone (DILAUDID) 4 MG tablet; Take 1 tablet by mouth every 4 hours as needed for Pain for up to 30 days. Max Daily Amount: 24 mg

## 2025-05-15 NOTE — ASSESSMENT & PLAN NOTE
Increase extended release morphine dose today.  Continue twice daily dosing.  Patient to consider referral to pain management for possible epidural, spinal cord stimulator or other minimally invasive procedures that can help with pain control.  Patient does sound interested in this option.    Orders:    morphine (MS CONTIN) 60 MG extended release tablet; Take 1 tablet by mouth 2 times daily for 30 days. Max Daily Amount: 120 mg    HYDROmorphone (DILAUDID) 4 MG tablet; Take 1 tablet by mouth every 4 hours as needed for Pain for up to 30 days. Max Daily Amount: 24 mg

## 2025-05-23 ENCOUNTER — TELEPHONE (OUTPATIENT)
Dept: FAMILY MEDICINE CLINIC | Age: 75
End: 2025-05-23

## 2025-05-23 NOTE — TELEPHONE ENCOUNTER
Medication and Quantity requested:      diazePAM (VALIUM) 10 MG tablet [[id number]]       Last Visit    05/14/2025    Pharm   [facility] [address]

## 2025-05-26 ENCOUNTER — APPOINTMENT (OUTPATIENT)
Dept: GENERAL RADIOLOGY | Age: 75
End: 2025-05-26
Payer: MEDICARE

## 2025-05-26 ENCOUNTER — HOSPITAL ENCOUNTER (EMERGENCY)
Age: 75
Discharge: HOME | End: 2025-05-26
Attending: EMERGENCY MEDICINE
Payer: MEDICARE

## 2025-05-26 VITALS
HEART RATE: 57 BPM | RESPIRATION RATE: 19 BRPM | DIASTOLIC BLOOD PRESSURE: 80 MMHG | TEMPERATURE: 98.2 F | HEIGHT: 68 IN | WEIGHT: 160 LBS | SYSTOLIC BLOOD PRESSURE: 162 MMHG | BODY MASS INDEX: 24.25 KG/M2 | OXYGEN SATURATION: 100 %

## 2025-05-26 DIAGNOSIS — G89.29 OTHER CHRONIC PAIN: Primary | ICD-10-CM

## 2025-05-26 DIAGNOSIS — R07.9 CHEST PAIN, UNSPECIFIED TYPE: ICD-10-CM

## 2025-05-26 LAB
ALBUMIN SERPL-MCNC: 4.1 G/DL (ref 3.4–5)
ALBUMIN/GLOB SERPL: 1.6 {RATIO} (ref 1.1–2.2)
ALP SERPL-CCNC: 147 U/L (ref 40–129)
ALT SERPL-CCNC: 19 U/L (ref 10–40)
ANION GAP SERPL CALCULATED.3IONS-SCNC: 12 MMOL/L (ref 3–16)
AST SERPL-CCNC: 20 U/L (ref 15–37)
BACTERIA URNS QL MICRO: ABNORMAL /HPF
BASOPHILS # BLD: 0.1 K/UL (ref 0–0.2)
BASOPHILS NFR BLD: 1.1 %
BILIRUB SERPL-MCNC: 0.7 MG/DL (ref 0–1)
BILIRUB UR QL STRIP.AUTO: NEGATIVE
BUN SERPL-MCNC: 13 MG/DL (ref 7–20)
CALCIUM SERPL-MCNC: 9.2 MG/DL (ref 8.3–10.6)
CHLORIDE SERPL-SCNC: 104 MMOL/L (ref 99–110)
CLARITY UR: CLEAR
CO2 SERPL-SCNC: 25 MMOL/L (ref 21–32)
COLOR UR: YELLOW
CREAT SERPL-MCNC: 1 MG/DL (ref 0.8–1.3)
DEPRECATED RDW RBC AUTO: 16.6 % (ref 12.4–15.4)
EOSINOPHIL # BLD: 0.4 K/UL (ref 0–0.6)
EOSINOPHIL NFR BLD: 6.8 %
EPI CELLS #/AREA URNS AUTO: 0 /HPF (ref 0–5)
GFR SERPLBLD CREATININE-BSD FMLA CKD-EPI: 79 ML/MIN/{1.73_M2}
GLUCOSE SERPL-MCNC: 105 MG/DL (ref 70–99)
GLUCOSE UR STRIP.AUTO-MCNC: NEGATIVE MG/DL
HCT VFR BLD AUTO: 33 % (ref 40.5–52.5)
HGB BLD-MCNC: 11.4 G/DL (ref 13.5–17.5)
HGB UR QL STRIP.AUTO: NEGATIVE
HYALINE CASTS #/AREA URNS AUTO: 0 /LPF (ref 0–8)
KETONES UR STRIP.AUTO-MCNC: 15 MG/DL
LEUKOCYTE ESTERASE UR QL STRIP.AUTO: NEGATIVE
LYMPHOCYTES # BLD: 1.9 K/UL (ref 1–5.1)
LYMPHOCYTES NFR BLD: 28.1 %
MCH RBC QN AUTO: 28.4 PG (ref 26–34)
MCHC RBC AUTO-ENTMCNC: 34.5 G/DL (ref 31–36)
MCV RBC AUTO: 82.2 FL (ref 80–100)
MONOCYTES # BLD: 0.5 K/UL (ref 0–1.3)
MONOCYTES NFR BLD: 8 %
NEUTROPHILS # BLD: 3.7 K/UL (ref 1.7–7.7)
NEUTROPHILS NFR BLD: 56 %
NITRITE UR QL STRIP.AUTO: NEGATIVE
PH UR STRIP.AUTO: 7 [PH] (ref 5–8)
PLATELET # BLD AUTO: 328 K/UL (ref 135–450)
PMV BLD AUTO: 7.1 FL (ref 5–10.5)
POTASSIUM SERPL-SCNC: 4.1 MMOL/L (ref 3.5–5.1)
PROT SERPL-MCNC: 6.7 G/DL (ref 6.4–8.2)
PROT UR STRIP.AUTO-MCNC: ABNORMAL MG/DL
RBC # BLD AUTO: 4.01 M/UL (ref 4.2–5.9)
RBC CLUMPS #/AREA URNS AUTO: 1 /HPF (ref 0–4)
SODIUM SERPL-SCNC: 141 MMOL/L (ref 136–145)
SP GR UR STRIP.AUTO: 1.02 (ref 1–1.03)
TROPONIN, HIGH SENSITIVITY: 20 NG/L (ref 0–22)
TROPONIN, HIGH SENSITIVITY: 24 NG/L (ref 0–22)
UA DIPSTICK W REFLEX MICRO PNL UR: YES
URN SPEC COLLECT METH UR: ABNORMAL
UROBILINOGEN UR STRIP-ACNC: 2 E.U./DL
WBC # BLD AUTO: 6.6 K/UL (ref 4–11)
WBC #/AREA URNS AUTO: 0 /HPF (ref 0–5)

## 2025-05-26 PROCEDURE — 96374 THER/PROPH/DIAG INJ IV PUSH: CPT

## 2025-05-26 PROCEDURE — 99285 EMERGENCY DEPT VISIT HI MDM: CPT

## 2025-05-26 PROCEDURE — 81001 URINALYSIS AUTO W/SCOPE: CPT

## 2025-05-26 PROCEDURE — 6360000002 HC RX W HCPCS: Performed by: EMERGENCY MEDICINE

## 2025-05-26 PROCEDURE — 85025 COMPLETE CBC W/AUTO DIFF WBC: CPT

## 2025-05-26 PROCEDURE — 93005 ELECTROCARDIOGRAM TRACING: CPT | Performed by: EMERGENCY MEDICINE

## 2025-05-26 PROCEDURE — 84484 ASSAY OF TROPONIN QUANT: CPT

## 2025-05-26 PROCEDURE — 71045 X-RAY EXAM CHEST 1 VIEW: CPT

## 2025-05-26 PROCEDURE — 80053 COMPREHEN METABOLIC PANEL: CPT

## 2025-05-26 RX ADMIN — HYDROMORPHONE HYDROCHLORIDE 1 MG: 1 INJECTION, SOLUTION INTRAMUSCULAR; INTRAVENOUS; SUBCUTANEOUS at 14:43

## 2025-05-26 ASSESSMENT — PAIN DESCRIPTION - LOCATION
LOCATION: LEG

## 2025-05-26 ASSESSMENT — PAIN DESCRIPTION - ORIENTATION
ORIENTATION: RIGHT

## 2025-05-26 ASSESSMENT — PAIN SCALES - GENERAL
PAINLEVEL_OUTOF10: 10

## 2025-05-26 ASSESSMENT — PAIN - FUNCTIONAL ASSESSMENT
PAIN_FUNCTIONAL_ASSESSMENT: PREVENTS OR INTERFERES SOME ACTIVE ACTIVITIES AND ADLS
PAIN_FUNCTIONAL_ASSESSMENT: 0-10

## 2025-05-26 ASSESSMENT — PAIN DESCRIPTION - DESCRIPTORS
DESCRIPTORS: NUMBNESS;BURNING
DESCRIPTORS: NUMBNESS;BURNING;ACHING
DESCRIPTORS: GNAWING;THROBBING

## 2025-05-26 ASSESSMENT — PAIN DESCRIPTION - FREQUENCY: FREQUENCY: CONTINUOUS

## 2025-05-26 ASSESSMENT — PAIN DESCRIPTION - ONSET: ONSET: PROGRESSIVE

## 2025-05-26 ASSESSMENT — PAIN DESCRIPTION - PAIN TYPE: TYPE: ACUTE PAIN

## 2025-05-26 NOTE — ED TRIAGE NOTES
Pt arrived from home via private vehicle c/o chronic pain in the right leg and left flank pain after losing balance and falling into an open door 3 days ago. Pt states he did not fall to the ground. Hypertensive at this time, other VS stable. A&Ox4.

## 2025-05-26 NOTE — ED NOTES

## 2025-05-26 NOTE — ED PROVIDER NOTES
05/26/2025 03:49:33 PM     Eleuterio Villalobos MD    Note: This chart was created using voice recognition dictation software. Efforts were made by me to ensure accuracy, however some errors may be present due to limitations of this technology and occasionally words are not transcribed correctly.       Eleuterio Villalobos MD  05/26/25 [id number]

## 2025-05-27 ENCOUNTER — TELEPHONE (OUTPATIENT)
Dept: FAMILY MEDICINE CLINIC | Age: 75
End: 2025-05-27

## 2025-05-27 DIAGNOSIS — F41.9 ANXIETY: ICD-10-CM

## 2025-05-27 RX ORDER — DIAZEPAM 10 MG/1
10 TABLET ORAL 2 TIMES DAILY
Qty: 60 TABLET | Refills: 2 | Status: SHIPPED | OUTPATIENT
Start: 2025-05-27 | End: 2025-08-25

## 2025-05-27 NOTE — TELEPHONE ENCOUNTER
Patient called and needs to see  next week at 2pm if possible. Is there anyway to squeeze him in, he is coming in from out of town.    Please call patient back to inform    Patient also needs prescription for valium called in to below pharmacy    Medication  diazePAM (VALIUM) 10 MG tablet [[id number]]  diazePAM (VALIUM) 10 MG tablet      [facility] #[id number] [facility] [address] - [place] [phone] - F [phone]

## 2025-05-27 NOTE — TELEPHONE ENCOUNTER
Medication sent to the patient pharmacy.  Can someone check to see if the patient could come in on Friday 6/6 at 4 PM?  It looks like the best place to potentially add him for next week.    Adan Pulido, DO

## 2025-05-28 LAB
EKG ATRIAL RATE: 60 BPM
EKG DIAGNOSIS: NORMAL
EKG P AXIS: 54 DEGREES
EKG P-R INTERVAL: 150 MS
EKG Q-T INTERVAL: 410 MS
EKG QRS DURATION: 86 MS
EKG QTC CALCULATION (BAZETT): 410 MS
EKG R AXIS: 87 DEGREES
EKG T AXIS: 43 DEGREES
EKG VENTRICULAR RATE: 60 BPM

## 2025-05-28 PROCEDURE — 93010 ELECTROCARDIOGRAM REPORT: CPT | Performed by: STUDENT IN AN ORGANIZED HEALTH CARE EDUCATION/TRAINING PROGRAM

## 2025-06-02 ENCOUNTER — OFFICE VISIT (OUTPATIENT)
Dept: VASCULAR SURGERY | Age: 75
End: 2025-06-02
Payer: MEDICARE

## 2025-06-02 ENCOUNTER — PREP FOR PROCEDURE (OUTPATIENT)
Dept: VASCULAR SURGERY | Age: 75
End: 2025-06-02

## 2025-06-02 VITALS — BODY MASS INDEX: 24.25 KG/M2 | HEIGHT: 68 IN | WEIGHT: 160 LBS

## 2025-06-02 DIAGNOSIS — I73.9 PERIPHERAL ARTERIAL DISEASE: Primary | ICD-10-CM

## 2025-06-02 DIAGNOSIS — Z95.828 S/P FEMORAL-POPLITEAL BYPASS SURGERY: ICD-10-CM

## 2025-06-02 PROCEDURE — 3017F COLORECTAL CA SCREEN DOC REV: CPT | Performed by: SURGERY

## 2025-06-02 PROCEDURE — 99215 OFFICE O/P EST HI 40 MIN: CPT | Performed by: SURGERY

## 2025-06-02 PROCEDURE — 1036F TOBACCO NON-USER: CPT | Performed by: SURGERY

## 2025-06-02 PROCEDURE — 1159F MED LIST DOCD IN RCRD: CPT | Performed by: SURGERY

## 2025-06-02 PROCEDURE — G8427 DOCREV CUR MEDS BY ELIG CLIN: HCPCS | Performed by: SURGERY

## 2025-06-02 PROCEDURE — G8420 CALC BMI NORM PARAMETERS: HCPCS | Performed by: SURGERY

## 2025-06-02 PROCEDURE — 1123F ACP DISCUSS/DSCN MKR DOCD: CPT | Performed by: SURGERY

## 2025-06-02 RX ORDER — SODIUM CHLORIDE 9 MG/ML
INJECTION, SOLUTION INTRAVENOUS PRN
OUTPATIENT
Start: 2025-06-02

## 2025-06-02 RX ORDER — SODIUM CHLORIDE 0.9 % (FLUSH) 0.9 %
5-40 SYRINGE (ML) INJECTION PRN
OUTPATIENT
Start: 2025-06-02

## 2025-06-02 RX ORDER — SODIUM CHLORIDE 0.9 % (FLUSH) 0.9 %
5-40 SYRINGE (ML) INJECTION EVERY 12 HOURS SCHEDULED
OUTPATIENT
Start: 2025-06-02

## 2025-06-02 NOTE — PROGRESS NOTES
[facility]  Benjamin Lopez MD, RPVI      Chief Complaint:   Chief Complaint   Patient presents with    Follow-up     Discuss surgery        follow up of a pre-existing problem    HPI  Ad Lacey is a 74 y.o. male who is being seen for follow-up PAD status post right femoropopliteal bypass.  Patient also had 3% cardiac arrest requiring Impella 5.5.  Patient has recovered well since his cardiac surgery he underwent surveillance arterial duplex that showed stenosis and low velocities within his bypass as noted below in the duplex study.  Patient does endorse significant claudication symptoms.  He also endorses generalized weakness secondary to his coronary event.  Patient recently returned from vacation to [place] presents for follow-up.  He has otherwise been well.    PMH  Past Medical History:   Diagnosis Date    Acute coronary syndrome (HCC)     Anxiety     Basal cell carcinoma     L side of nose    CAD (coronary artery disease)     Cardiopulmonary arrest ([facility])     NOVEMBER 9,2024 (HOSPITALIZED UNTIL DECEMBER 3)    Cataract     RIGHT EYE 2025    CHF (congestive heart failure) ([facility])     Chronic pain syndrome     Does mobilize using cane     Engages in vaping     Hx of fall     \"couple of years ago\"    Hx of smoking     Peripheral artery disease     rt leg    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    Uses walker     Wears glasses        PSH    Past Surgical History:   Procedure Laterality Date    CARDIAC CATHETERIZATION  2022    CARDIAC PROCEDURE N/A 11/09/2024    Left heart cath / coronary angiography performed by Dillon Rodríguez MD at [facility] CARDIAC CATH LAB    CARDIAC PROCEDURE N/A 11/09/2024    Percutaneous coronary intervention performed by Dillon Rodríguez MD at [facility] CARDIAC CATH LAB    CARDIAC PROCEDURE N/A 11/09/2024    Ventricular assist device (VAD) insertion performed by Dillon Rodríguez MD at [facility] CARDIAC CATH LAB    CARDIAC PROCEDURE N/A 11/12/2024    Ventricular assist device (VAD)

## 2025-06-03 ENCOUNTER — TELEPHONE (OUTPATIENT)
Dept: CARDIOLOGY CLINIC | Age: 75
End: 2025-06-03

## 2025-06-03 NOTE — TELEPHONE ENCOUNTER
Per Dr. Rodríguez, okay for patient to under go procedure for his cyst.   It is okay to hold Xarelto for 48 hours prior to removal if necessary and resume when surgically cleared.   Patient would need a stress test for risk stratification due to cardiac history prior to hip surgery, however can prepare formal cardiac clearance at that time.

## 2025-06-03 NOTE — TELEPHONE ENCOUNTER
Spoke with pt and he reports that he has a possible cyst the size of his \"fist\" on his leg and he would like to have it removed. He states that he could have it removed with an epidural. Pt states that he was told to never be anesthestized after he had \" a heart attack\". Pt also reports that he broke his hip a couple years ago and he would like to know if he could have his hip replacement now since it has been some time since his heart attack.    Please advise. Thanks

## 2025-06-03 NOTE — TELEPHONE ENCOUNTER
Stevie called the office wanting to know if Dr. Rodríguez would allow him to get the mass removed from his leg with just an epidural?     He relayed that Dr. Rodríguez advised him not to get the surgery due to the risk of him going under anesthesia after he had a heart attack.    Please advise.    Stevie's callback:  [phone]

## 2025-06-10 ENCOUNTER — TELEMEDICINE (OUTPATIENT)
Dept: FAMILY MEDICINE CLINIC | Age: 75
End: 2025-06-10
Payer: MEDICARE

## 2025-06-10 ENCOUNTER — TELEPHONE (OUTPATIENT)
Dept: FAMILY MEDICINE CLINIC | Age: 75
End: 2025-06-10

## 2025-06-10 DIAGNOSIS — R19.09 PRESACRAL MASS: ICD-10-CM

## 2025-06-10 DIAGNOSIS — G89.4 CHRONIC PAIN SYNDROME: Primary | ICD-10-CM

## 2025-06-10 PROCEDURE — 1160F RVW MEDS BY RX/DR IN RCRD: CPT | Performed by: FAMILY MEDICINE

## 2025-06-10 PROCEDURE — 1123F ACP DISCUSS/DSCN MKR DOCD: CPT | Performed by: FAMILY MEDICINE

## 2025-06-10 PROCEDURE — 3017F COLORECTAL CA SCREEN DOC REV: CPT | Performed by: FAMILY MEDICINE

## 2025-06-10 PROCEDURE — G8427 DOCREV CUR MEDS BY ELIG CLIN: HCPCS | Performed by: FAMILY MEDICINE

## 2025-06-10 PROCEDURE — 1159F MED LIST DOCD IN RCRD: CPT | Performed by: FAMILY MEDICINE

## 2025-06-10 PROCEDURE — 99213 OFFICE O/P EST LOW 20 MIN: CPT | Performed by: FAMILY MEDICINE

## 2025-06-10 RX ORDER — OXYCODONE HCL 20 MG/1
20 TABLET, FILM COATED, EXTENDED RELEASE ORAL 2 TIMES DAILY
Qty: 60 TABLET | Refills: 0 | Status: SHIPPED | OUTPATIENT
Start: 2025-06-10 | End: 2025-07-10

## 2025-06-10 ASSESSMENT — ENCOUNTER SYMPTOMS
COUGH: 0
NAUSEA: 0
BACK PAIN: 1
WHEEZING: 0
SORE THROAT: 0
ABDOMINAL PAIN: 0
SHORTNESS OF BREATH: 0
BLOOD IN STOOL: 0
CONSTIPATION: 0
DIARRHEA: 0
VOMITING: 0
RHINORRHEA: 0

## 2025-06-10 NOTE — PROGRESS NOTES
Normocephalic, atraumatic  [] Abnormal -   [x] Mouth/Throat: Mucous membranes are moist    External Ears [x] Normal  [] Abnormal -    Neck: [x] No visualized mass [] Abnormal -     Pulmonary/Chest: [x] Respiratory effort normal   [x] No visualized signs of difficulty breathing or respiratory distress        [] Abnormal -      Musculoskeletal:   [x] Normal gait with no signs of ataxia         [x] Normal range of motion of neck        [] Abnormal -     Neurological:        [x] No Facial Asymmetry (Cranial nerve 7 motor function) (limited exam due to video visit)          [x] No gaze palsy        [] Abnormal -          Skin:        [x] No significant exanthematous lesions or discoloration noted on facial skin         [] Abnormal -            Psychiatric:       [x] Normal Affect [] Abnormal -        [x] No Hallucinations    Other pertinent observable physical exam findings:-             --Adan Pulido, DO

## 2025-06-10 NOTE — ASSESSMENT & PLAN NOTE
Stop morphine extended release.  Start oxycodone extended release twice daily.  Continue with current dosing of breakthrough pain hydromorphone with 5 doses per day.  Will reevaluate as needed    Orders:    oxyCODONE (OXYCONTIN) 20 MG extended release tablet; Take 1 tablet by mouth 2 times daily for 30 days. Intended supply: 30 days Max Daily Amount: 40 mg

## 2025-06-10 NOTE — TELEPHONE ENCOUNTER
Pt called and forgot to ask  if he would be okay with writing an order for the patient to get an epidural for a possible nerve block in his broken hip?    Please advise

## 2025-06-11 ENCOUNTER — TELEPHONE (OUTPATIENT)
Dept: FAMILY MEDICINE CLINIC | Age: 75
End: 2025-06-11

## 2025-06-11 NOTE — TELEPHONE ENCOUNTER
oxyCODONE (OXYCONTIN) 20 MG extended release tablet [[id number]]     Patient called to see if he can either get an alternative medication for pain, he stated that the pharmacy is charging 650.00 for the medication and he cannot afford that. He wanted to see if its a generic brand for a cheaper price.    Please advise.   herb- [phone].

## 2025-06-12 NOTE — TELEPHONE ENCOUNTER
Patient called back and stated the pharmacy advised him they still have not gotten medication listed.    oxyCODONE (OXYCONTIN) 20 MG extended release tablet [[id number]     Patient stated medication costs $650 and he cannot finically afford it.     Please advise.  [phone]    [facility] #[id number] - [place], [place] - [address] - P [phone] - F [phone]  [address] [facility] [address]  Phone: [phone]  Fax: [phone]

## 2025-06-12 NOTE — TELEPHONE ENCOUNTER
Please advise; thank you!    Patient Phone Number: [phone] (home)     Last appt: 6/10/2025   Next appt: Visit date not found

## 2025-06-16 ENCOUNTER — TELEPHONE (OUTPATIENT)
Dept: FAMILY MEDICINE CLINIC | Age: 75
End: 2025-06-16

## 2025-06-16 DIAGNOSIS — G89.4 CHRONIC PAIN SYNDROME: ICD-10-CM

## 2025-06-16 DIAGNOSIS — R19.09 PRESACRAL MASS: ICD-10-CM

## 2025-06-16 RX ORDER — OXYCODONE HCL 20 MG/1
20 TABLET, FILM COATED, EXTENDED RELEASE ORAL EVERY 12 HOURS
Qty: 60 EACH | Refills: 0 | Status: CANCELLED | OUTPATIENT
Start: 2025-06-16 | End: 2025-07-16

## 2025-06-16 RX ORDER — OXYCODONE HCL 10 MG/1
20 TABLET, FILM COATED, EXTENDED RELEASE ORAL 2 TIMES DAILY
Qty: 120 TABLET | Refills: 0 | Status: SHIPPED | OUTPATIENT
Start: 2025-06-16 | End: 2025-07-16

## 2025-06-16 NOTE — TELEPHONE ENCOUNTER
Please let patient know that change his pain medication to oxycodone extended release 10 mg 2 tablets twice daily as recommended by the pharmacy.  If that ends up being too expensive his best option would be to work with morphine extended release that is a much more affordable.  Thank you.    Adan Pulido, DO

## 2025-06-16 NOTE — TELEPHONE ENCOUNTER
Dawn called and advised that the medication listed has some issues at pharmacy.    Medication is costing patient too much money a month and asked if we could change the dose/ quantity of medication to help.     oxyCODONE (OXYCONTIN) 20 MG extended release tablet [[id number]] $700 a month out of pocket.    Pharmacy stated prescription is not ready and the order has not been sent and asked if Dr. Pulido could call for a verbal order instead. [phone]    Dawn has asked if prescription could be be switched to 10MG ER 2 tablets twice daily     Please call and advise Dawn on secure line  [phone]

## 2025-06-17 ENCOUNTER — TELEPHONE (OUTPATIENT)
Dept: FAMILY MEDICINE CLINIC | Age: 75
End: 2025-06-17

## 2025-06-18 ENCOUNTER — HOSPITAL ENCOUNTER (EMERGENCY)
Age: 75
Discharge: ANOTHER HOSPITAL | End: 2025-06-19
Attending: EMERGENCY MEDICINE
Payer: MEDICARE

## 2025-06-18 ENCOUNTER — APPOINTMENT (OUTPATIENT)
Dept: GENERAL RADIOLOGY | Age: 75
End: 2025-06-18
Payer: MEDICARE

## 2025-06-18 ENCOUNTER — APPOINTMENT (OUTPATIENT)
Dept: CT IMAGING | Age: 75
End: 2025-06-18
Payer: MEDICARE
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
## 2025-06-18 DIAGNOSIS — I60.9 SUBARACHNOID BLEED (HCC): ICD-10-CM

## 2025-06-18 DIAGNOSIS — S06.5XAA SUBDURAL HEMATOMA (HCC): Primary | ICD-10-CM

## 2025-06-18 LAB
ALBUMIN SERPL-MCNC: 4.4 G/DL (ref 3.4–5)
ALBUMIN/GLOB SERPL: 1.8 {RATIO} (ref 1.1–2.2)
ALP SERPL-CCNC: 111 U/L (ref 40–129)
ALT SERPL-CCNC: 20 U/L (ref 10–40)
ANION GAP SERPL CALCULATED.3IONS-SCNC: 14 MMOL/L (ref 3–16)
AST SERPL-CCNC: 45 U/L (ref 15–37)
BASOPHILS # BLD: 0.1 K/UL (ref 0–0.2)
BASOPHILS NFR BLD: 0.5 %
BILIRUB SERPL-MCNC: 0.7 MG/DL (ref 0–1)
BUN SERPL-MCNC: 28 MG/DL (ref 7–20)
CALCIUM SERPL-MCNC: 9.6 MG/DL (ref 8.3–10.6)
CHLORIDE SERPL-SCNC: 110 MMOL/L (ref 99–110)
CO2 SERPL-SCNC: 23 MMOL/L (ref 21–32)
CREAT SERPL-MCNC: 1.6 MG/DL (ref 0.8–1.3)
DEPRECATED RDW RBC AUTO: 14.8 % (ref 12.4–15.4)
EOSINOPHIL # BLD: 0 K/UL (ref 0–0.6)
EOSINOPHIL NFR BLD: 0.2 %
GFR SERPLBLD CREATININE-BSD FMLA CKD-EPI: 45 ML/MIN/{1.73_M2}
GLUCOSE SERPL-MCNC: 109 MG/DL (ref 70–99)
HCT VFR BLD AUTO: 34.9 % (ref 40.5–52.5)
HGB BLD-MCNC: 11.3 G/DL (ref 13.5–17.5)
LYMPHOCYTES # BLD: 1.7 K/UL (ref 1–5.1)
LYMPHOCYTES NFR BLD: 12.8 %
MCH RBC QN AUTO: 27.7 PG (ref 26–34)
MCHC RBC AUTO-ENTMCNC: 32.6 G/DL (ref 31–36)
MCV RBC AUTO: 85.1 FL (ref 80–100)
MONOCYTES # BLD: 0.9 K/UL (ref 0–1.3)
MONOCYTES NFR BLD: 7 %
NEUTROPHILS # BLD: 10.4 K/UL (ref 1.7–7.7)
NEUTROPHILS NFR BLD: 79.5 %
PLATELET # BLD AUTO: 405 K/UL (ref 135–450)
PMV BLD AUTO: 7.1 FL (ref 5–10.5)
POTASSIUM SERPL-SCNC: 3.8 MMOL/L (ref 3.5–5.1)
PROT SERPL-MCNC: 6.9 G/DL (ref 6.4–8.2)
RBC # BLD AUTO: 4.1 M/UL (ref 4.2–5.9)
SODIUM SERPL-SCNC: 147 MMOL/L (ref 136–145)
TROPONIN, HIGH SENSITIVITY: 27 NG/L (ref 0–22)
WBC # BLD AUTO: 13.1 K/UL (ref 4–11)

## 2025-06-18 PROCEDURE — 99285 EMERGENCY DEPT VISIT HI MDM: CPT

## 2025-06-18 PROCEDURE — 70450 CT HEAD/BRAIN W/O DYE: CPT

## 2025-06-18 PROCEDURE — 72125 CT NECK SPINE W/O DYE: CPT

## 2025-06-18 PROCEDURE — 71260 CT THORAX DX C+: CPT

## 2025-06-18 PROCEDURE — 6360000004 HC RX CONTRAST MEDICATION: Performed by: EMERGENCY MEDICINE

## 2025-06-18 PROCEDURE — 70486 CT MAXILLOFACIAL W/O DYE: CPT

## 2025-06-18 PROCEDURE — 80053 COMPREHEN METABOLIC PANEL: CPT

## 2025-06-18 PROCEDURE — 73130 X-RAY EXAM OF HAND: CPT

## 2025-06-18 PROCEDURE — 84484 ASSAY OF TROPONIN QUANT: CPT

## 2025-06-18 PROCEDURE — 85025 COMPLETE CBC W/AUTO DIFF WBC: CPT

## 2025-06-18 RX ORDER — IOPAMIDOL 755 MG/ML
75 INJECTION, SOLUTION INTRAVASCULAR
Status: COMPLETED | OUTPATIENT
Start: 2025-06-18 | End: 2025-06-18

## 2025-06-18 RX ADMIN — IOPAMIDOL 75 ML: 755 INJECTION, SOLUTION INTRAVENOUS at 23:45

## 2025-06-18 ASSESSMENT — PAIN SCALES - GENERAL: PAINLEVEL_OUTOF10: 9

## 2025-06-18 ASSESSMENT — PAIN DESCRIPTION - DESCRIPTORS: DESCRIPTORS: ACHING

## 2025-06-18 ASSESSMENT — PAIN DESCRIPTION - ORIENTATION: ORIENTATION: LEFT;RIGHT

## 2025-06-18 ASSESSMENT — PAIN DESCRIPTION - LOCATION: LOCATION: LEG

## 2025-06-18 ASSESSMENT — PAIN DESCRIPTION - PAIN TYPE: TYPE: ACUTE PAIN

## 2025-06-18 ASSESSMENT — PAIN - FUNCTIONAL ASSESSMENT: PAIN_FUNCTIONAL_ASSESSMENT: 0-10

## 2025-06-19 ENCOUNTER — APPOINTMENT (OUTPATIENT)
Dept: CT IMAGING | Age: 75
DRG: 082 | End: 2025-06-19
Attending: STUDENT IN AN ORGANIZED HEALTH CARE EDUCATION/TRAINING PROGRAM
Payer: MEDICARE

## 2025-06-19 ENCOUNTER — APPOINTMENT (OUTPATIENT)
Dept: ULTRASOUND IMAGING | Age: 75
DRG: 082 | End: 2025-06-19
Attending: STUDENT IN AN ORGANIZED HEALTH CARE EDUCATION/TRAINING PROGRAM
Payer: MEDICARE

## 2025-06-19 ENCOUNTER — HOSPITAL ENCOUNTER (INPATIENT)
Age: 75
LOS: 1 days | Discharge: NURSING, REHAB OR LONG-TERM CARE | DRG: 082 | End: 2025-06-20
Attending: STUDENT IN AN ORGANIZED HEALTH CARE EDUCATION/TRAINING PROGRAM | Admitting: INTERNAL MEDICINE
Payer: MEDICARE

## 2025-06-19 VITALS
WEIGHT: 143.74 LBS | BODY MASS INDEX: 21.78 KG/M2 | DIASTOLIC BLOOD PRESSURE: 83 MMHG | OXYGEN SATURATION: 90 % | SYSTOLIC BLOOD PRESSURE: 127 MMHG | RESPIRATION RATE: 19 BRPM | HEIGHT: 68 IN | HEART RATE: 95 BPM | TEMPERATURE: 98 F

## 2025-06-19 PROBLEM — S06.5XAA SUBDURAL HEMATOMA (HCC): Status: ACTIVE | Noted: 2025-06-19

## 2025-06-19 LAB
ALBUMIN SERPL-MCNC: 4.2 G/DL (ref 3.4–5)
AMPHETAMINES UR QL SCN>1000 NG/ML: ABNORMAL
ANION GAP SERPL CALCULATED.3IONS-SCNC: 19 MMOL/L (ref 3–16)
ANTI-XA UNFRAC HEPARIN: <0.1 IU/ML (ref 0.3–0.7)
APTT BLD: 25.6 SEC (ref 22.8–35.8)
BARBITURATES UR QL SCN>200 NG/ML: ABNORMAL
BASOPHILS # BLD: 0 K/UL (ref 0–0.2)
BASOPHILS NFR BLD: 0.3 %
BENZODIAZ UR QL SCN>200 NG/ML: POSITIVE
BILIRUB UR QL STRIP.AUTO: NEGATIVE
BUN SERPL-MCNC: 24 MG/DL (ref 7–20)
CALCIUM SERPL-MCNC: 9 MG/DL (ref 8.3–10.6)
CANNABINOIDS UR QL SCN>50 NG/ML: POSITIVE
CHLORIDE SERPL-SCNC: 109 MMOL/L (ref 99–110)
CK SERPL-CCNC: 3060 U/L (ref 39–308)
CK SERPL-CCNC: 4458 U/L (ref 39–308)
CLARITY UR: CLEAR
CLOSURE TME BLD-IMP: ABNORMAL
CLOSURE TME COLL+ADP BLD: >300 SEC (ref 56–110)
CLOSURE TME COLL+EPINEP BLD: 130 SEC (ref 86–194)
CO2 SERPL-SCNC: 19 MMOL/L (ref 21–32)
COCAINE UR QL SCN: ABNORMAL
COLOR UR: YELLOW
CREAT SERPL-MCNC: 1.3 MG/DL (ref 0.8–1.3)
CREAT UR-MCNC: 91.2 MG/DL (ref 39–259)
DEPRECATED RDW RBC AUTO: 15 % (ref 12.4–15.4)
DRUG SCREEN COMMENT UR-IMP: ABNORMAL
EKG ATRIAL RATE: 84 BPM
EKG DIAGNOSIS: NORMAL
EKG P AXIS: 58 DEGREES
EKG P-R INTERVAL: 138 MS
EKG Q-T INTERVAL: 402 MS
EKG QRS DURATION: 86 MS
EKG QTC CALCULATION (BAZETT): 475 MS
EKG R AXIS: -7 DEGREES
EKG T AXIS: 48 DEGREES
EKG VENTRICULAR RATE: 84 BPM
EOSINOPHIL # BLD: 0 K/UL (ref 0–0.6)
EOSINOPHIL NFR BLD: 0 %
ETHANOLAMINE SERPL-MCNC: NORMAL MG/DL (ref 0–0.08)
FENTANYL SCREEN, URINE: ABNORMAL
FIBRINOGEN PPP-MCNC: 235 MG/DL (ref 220–516)
GFR SERPLBLD CREATININE-BSD FMLA CKD-EPI: 57 ML/MIN/{1.73_M2}
GLUCOSE SERPL-MCNC: 104 MG/DL (ref 70–99)
GLUCOSE UR STRIP.AUTO-MCNC: NEGATIVE MG/DL
HCT VFR BLD AUTO: 29.8 % (ref 40.5–52.5)
HGB BLD-MCNC: 10.2 G/DL (ref 13.5–17.5)
HGB UR QL STRIP.AUTO: ABNORMAL
INR PPP: 1.04 (ref 0.86–1.14)
INR PPP: 1.15 (ref 0.86–1.14)
KETONES UR STRIP.AUTO-MCNC: 40 MG/DL
LACTATE BLDV-SCNC: 0.6 MMOL/L (ref 0.4–2)
LEUKOCYTE ESTERASE UR QL STRIP.AUTO: NEGATIVE
LYMPHOCYTES # BLD: 1.9 K/UL (ref 1–5.1)
LYMPHOCYTES NFR BLD: 17.5 %
MAGNESIUM SERPL-MCNC: 2.12 MG/DL (ref 1.8–2.4)
MAGNESIUM SERPL-MCNC: 2.22 MG/DL (ref 1.8–2.4)
MCH RBC QN AUTO: 28.5 PG (ref 26–34)
MCHC RBC AUTO-ENTMCNC: 34.2 G/DL (ref 31–36)
MCV RBC AUTO: 83.4 FL (ref 80–100)
METHADONE UR QL SCN>300 NG/ML: ABNORMAL
MONOCYTES # BLD: 1 K/UL (ref 0–1.3)
MONOCYTES NFR BLD: 9.1 %
NEUTROPHILS # BLD: 7.7 K/UL (ref 1.7–7.7)
NEUTROPHILS NFR BLD: 73.1 %
NITRITE UR QL STRIP.AUTO: NEGATIVE
OPIATES UR QL SCN>300 NG/ML: POSITIVE
OXYCODONE UR QL SCN: ABNORMAL
PCP UR QL SCN>25 NG/ML: ABNORMAL
PH UR STRIP.AUTO: 6 [PH] (ref 5–8)
PH UR STRIP: 6 [PH]
PHOSPHATE SERPL-MCNC: 4.7 MG/DL (ref 2.5–4.9)
PLATELET # BLD AUTO: 331 K/UL (ref 135–450)
PMV BLD AUTO: 7.2 FL (ref 5–10.5)
POTASSIUM SERPL-SCNC: 3.4 MMOL/L (ref 3.5–5.1)
PROT UR STRIP.AUTO-MCNC: ABNORMAL MG/DL
PROTHROMBIN TIME: 13.9 SEC (ref 12.1–14.9)
PROTHROMBIN TIME: 15 SEC (ref 12.1–14.9)
RBC # BLD AUTO: 3.57 M/UL (ref 4.2–5.9)
RBC #/AREA URNS HPF: NORMAL /HPF (ref 0–4)
SODIUM SERPL-SCNC: 147 MMOL/L (ref 136–145)
SODIUM UR-SCNC: 113 MMOL/L
SP GR UR STRIP.AUTO: 1.02 (ref 1–1.03)
TROPONIN, HIGH SENSITIVITY: 24 NG/L (ref 0–22)
UA COMPLETE W REFLEX CULTURE PNL UR: ABNORMAL
UA DIPSTICK W REFLEX MICRO PNL UR: YES
URN SPEC COLLECT METH UR: ABNORMAL
UROBILINOGEN UR STRIP-ACNC: 0.2 E.U./DL
WBC # BLD AUTO: 10.6 K/UL (ref 4–11)
WBC #/AREA URNS HPF: NORMAL /HPF (ref 0–5)

## 2025-06-19 PROCEDURE — 6370000000 HC RX 637 (ALT 250 FOR IP): Performed by: INTERNAL MEDICINE

## 2025-06-19 PROCEDURE — 85025 COMPLETE CBC W/AUTO DIFF WBC: CPT

## 2025-06-19 PROCEDURE — 6360000002 HC RX W HCPCS: Performed by: STUDENT IN AN ORGANIZED HEALTH CARE EDUCATION/TRAINING PROGRAM

## 2025-06-19 PROCEDURE — 82570 ASSAY OF URINE CREATININE: CPT

## 2025-06-19 PROCEDURE — 80069 RENAL FUNCTION PANEL: CPT

## 2025-06-19 PROCEDURE — 97163 PT EVAL HIGH COMPLEX 45 MIN: CPT

## 2025-06-19 PROCEDURE — 81001 URINALYSIS AUTO W/SCOPE: CPT

## 2025-06-19 PROCEDURE — 84300 ASSAY OF URINE SODIUM: CPT

## 2025-06-19 PROCEDURE — 6360000002 HC RX W HCPCS

## 2025-06-19 PROCEDURE — 99222 1ST HOSP IP/OBS MODERATE 55: CPT | Performed by: NURSE PRACTITIONER

## 2025-06-19 PROCEDURE — 85520 HEPARIN ASSAY: CPT

## 2025-06-19 PROCEDURE — 6360000004 HC RX CONTRAST MEDICATION

## 2025-06-19 PROCEDURE — 82550 ASSAY OF CK (CPK): CPT

## 2025-06-19 PROCEDURE — 85576 BLOOD PLATELET AGGREGATION: CPT

## 2025-06-19 PROCEDURE — 84484 ASSAY OF TROPONIN QUANT: CPT

## 2025-06-19 PROCEDURE — 83735 ASSAY OF MAGNESIUM: CPT

## 2025-06-19 PROCEDURE — 85384 FIBRINOGEN ACTIVITY: CPT

## 2025-06-19 PROCEDURE — 2580000003 HC RX 258

## 2025-06-19 PROCEDURE — 99291 CRITICAL CARE FIRST HOUR: CPT

## 2025-06-19 PROCEDURE — 80061 LIPID PANEL: CPT

## 2025-06-19 PROCEDURE — 96365 THER/PROPH/DIAG IV INF INIT: CPT

## 2025-06-19 PROCEDURE — 82077 ASSAY SPEC XCP UR&BREATH IA: CPT

## 2025-06-19 PROCEDURE — 96375 TX/PRO/DX INJ NEW DRUG ADDON: CPT

## 2025-06-19 PROCEDURE — 85610 PROTHROMBIN TIME: CPT

## 2025-06-19 PROCEDURE — 80307 DRUG TEST PRSMV CHEM ANLYZR: CPT

## 2025-06-19 PROCEDURE — 97166 OT EVAL MOD COMPLEX 45 MIN: CPT

## 2025-06-19 PROCEDURE — 70450 CT HEAD/BRAIN W/O DYE: CPT

## 2025-06-19 PROCEDURE — 92610 EVALUATE SWALLOWING FUNCTION: CPT

## 2025-06-19 PROCEDURE — 93010 ELECTROCARDIOGRAM REPORT: CPT | Performed by: INTERNAL MEDICINE

## 2025-06-19 PROCEDURE — 2580000003 HC RX 258: Performed by: EMERGENCY MEDICINE

## 2025-06-19 PROCEDURE — 2000000000 HC ICU R&B

## 2025-06-19 PROCEDURE — 2500000003 HC RX 250 WO HCPCS: Performed by: STUDENT IN AN ORGANIZED HEALTH CARE EDUCATION/TRAINING PROGRAM

## 2025-06-19 PROCEDURE — 93005 ELECTROCARDIOGRAM TRACING: CPT | Performed by: EMERGENCY MEDICINE

## 2025-06-19 PROCEDURE — 99223 1ST HOSP IP/OBS HIGH 75: CPT | Performed by: INTERNAL MEDICINE

## 2025-06-19 PROCEDURE — 76770 US EXAM ABDO BACK WALL COMP: CPT

## 2025-06-19 PROCEDURE — 97530 THERAPEUTIC ACTIVITIES: CPT

## 2025-06-19 PROCEDURE — 92526 ORAL FUNCTION THERAPY: CPT

## 2025-06-19 PROCEDURE — 2500000003 HC RX 250 WO HCPCS

## 2025-06-19 PROCEDURE — 83036 HEMOGLOBIN GLYCOSYLATED A1C: CPT

## 2025-06-19 PROCEDURE — 92523 SPEECH SOUND LANG COMPREHEN: CPT

## 2025-06-19 PROCEDURE — 36415 COLL VENOUS BLD VENIPUNCTURE: CPT

## 2025-06-19 PROCEDURE — 6360000002 HC RX W HCPCS: Performed by: EMERGENCY MEDICINE

## 2025-06-19 PROCEDURE — 83605 ASSAY OF LACTIC ACID: CPT

## 2025-06-19 PROCEDURE — 85730 THROMBOPLASTIN TIME PARTIAL: CPT

## 2025-06-19 PROCEDURE — 6370000000 HC RX 637 (ALT 250 FOR IP): Performed by: STUDENT IN AN ORGANIZED HEALTH CARE EDUCATION/TRAINING PROGRAM

## 2025-06-19 PROCEDURE — 51798 US URINE CAPACITY MEASURE: CPT

## 2025-06-19 PROCEDURE — 70498 CT ANGIOGRAPHY NECK: CPT

## 2025-06-19 PROCEDURE — 6370000000 HC RX 637 (ALT 250 FOR IP)

## 2025-06-19 RX ORDER — SODIUM CHLORIDE 0.9 % (FLUSH) 0.9 %
5-40 SYRINGE (ML) INJECTION PRN
Status: DISCONTINUED | OUTPATIENT
Start: 2025-06-19 | End: 2025-06-20 | Stop reason: HOSPADM

## 2025-06-19 RX ORDER — LABETALOL HYDROCHLORIDE 5 MG/ML
10 INJECTION, SOLUTION INTRAVENOUS EVERY 4 HOURS PRN
Status: DISCONTINUED | OUTPATIENT
Start: 2025-06-19 | End: 2025-06-20 | Stop reason: HOSPADM

## 2025-06-19 RX ORDER — SODIUM CHLORIDE 9 MG/ML
INJECTION, SOLUTION INTRAVENOUS CONTINUOUS
Status: ACTIVE | OUTPATIENT
Start: 2025-06-19 | End: 2025-06-19

## 2025-06-19 RX ORDER — SODIUM CHLORIDE 0.9 % (FLUSH) 0.9 %
5-40 SYRINGE (ML) INJECTION EVERY 12 HOURS SCHEDULED
Status: DISCONTINUED | OUTPATIENT
Start: 2025-06-19 | End: 2025-06-20 | Stop reason: HOSPADM

## 2025-06-19 RX ORDER — ACETAMINOPHEN 650 MG/1
650 SUPPOSITORY RECTAL EVERY 6 HOURS PRN
Status: DISCONTINUED | OUTPATIENT
Start: 2025-06-19 | End: 2025-06-20 | Stop reason: HOSPADM

## 2025-06-19 RX ORDER — GAUZE BANDAGE 2" X 2"
100 BANDAGE TOPICAL DAILY
Status: DISCONTINUED | OUTPATIENT
Start: 2025-06-19 | End: 2025-06-20 | Stop reason: HOSPADM

## 2025-06-19 RX ORDER — LORAZEPAM 1 MG/1
4 TABLET ORAL
Status: DISCONTINUED | OUTPATIENT
Start: 2025-06-19 | End: 2025-06-19

## 2025-06-19 RX ORDER — SODIUM CHLORIDE 9 MG/ML
50 INJECTION, SOLUTION INTRAVENOUS ONCE
Status: COMPLETED | OUTPATIENT
Start: 2025-06-19 | End: 2025-06-19

## 2025-06-19 RX ORDER — ACETAMINOPHEN 325 MG/1
650 TABLET ORAL EVERY 6 HOURS PRN
Status: DISCONTINUED | OUTPATIENT
Start: 2025-06-19 | End: 2025-06-20 | Stop reason: HOSPADM

## 2025-06-19 RX ORDER — NICOTINE 21 MG/24HR
1 PATCH, TRANSDERMAL 24 HOURS TRANSDERMAL DAILY
Status: DISCONTINUED | OUTPATIENT
Start: 2025-06-19 | End: 2025-06-20 | Stop reason: HOSPADM

## 2025-06-19 RX ORDER — ASPIRIN 81 MG/1
81 TABLET ORAL DAILY
Status: ON HOLD | COMMUNITY
End: 2025-06-20 | Stop reason: HOSPADM

## 2025-06-19 RX ORDER — LORAZEPAM 1 MG/1
1 TABLET ORAL
Status: DISCONTINUED | OUTPATIENT
Start: 2025-06-19 | End: 2025-06-19

## 2025-06-19 RX ORDER — METHOCARBAMOL 500 MG/1
500 TABLET, FILM COATED ORAL 3 TIMES DAILY
Status: ON HOLD | COMMUNITY
End: 2025-06-20 | Stop reason: HOSPADM

## 2025-06-19 RX ORDER — HYDROMORPHONE HYDROCHLORIDE 4 MG/1
4 TABLET ORAL EVERY 4 HOURS PRN
COMMUNITY

## 2025-06-19 RX ORDER — AMLODIPINE BESYLATE 5 MG/1
5 TABLET ORAL DAILY
Status: DISCONTINUED | OUTPATIENT
Start: 2025-06-19 | End: 2025-06-20 | Stop reason: HOSPADM

## 2025-06-19 RX ORDER — HYDROXYZINE PAMOATE 25 MG/1
25 CAPSULE ORAL 3 TIMES DAILY PRN
Status: ON HOLD | COMMUNITY
End: 2025-06-20 | Stop reason: HOSPADM

## 2025-06-19 RX ORDER — MORPHINE SULFATE 60 MG/1
60 TABLET, FILM COATED, EXTENDED RELEASE ORAL 2 TIMES DAILY
COMMUNITY

## 2025-06-19 RX ORDER — ATORVASTATIN CALCIUM 80 MG/1
80 TABLET, FILM COATED ORAL NIGHTLY
Status: DISCONTINUED | OUTPATIENT
Start: 2025-06-19 | End: 2025-06-20 | Stop reason: HOSPADM

## 2025-06-19 RX ORDER — LORAZEPAM 1 MG/1
2 TABLET ORAL
Status: DISCONTINUED | OUTPATIENT
Start: 2025-06-19 | End: 2025-06-19

## 2025-06-19 RX ORDER — PANTOPRAZOLE SODIUM 40 MG/1
40 TABLET, DELAYED RELEASE ORAL DAILY
Status: DISCONTINUED | OUTPATIENT
Start: 2025-06-19 | End: 2025-06-20 | Stop reason: HOSPADM

## 2025-06-19 RX ORDER — CARVEDILOL 12.5 MG/1
12.5 TABLET ORAL 2 TIMES DAILY WITH MEALS
COMMUNITY

## 2025-06-19 RX ORDER — LORAZEPAM 2 MG/ML
3 INJECTION INTRAMUSCULAR
Status: DISCONTINUED | OUTPATIENT
Start: 2025-06-19 | End: 2025-06-19

## 2025-06-19 RX ORDER — SODIUM CHLORIDE 9 MG/ML
INJECTION, SOLUTION INTRAVENOUS PRN
Status: DISCONTINUED | OUTPATIENT
Start: 2025-06-19 | End: 2025-06-20 | Stop reason: HOSPADM

## 2025-06-19 RX ORDER — HYDROMORPHONE HYDROCHLORIDE 2 MG/1
4 TABLET ORAL EVERY 4 HOURS PRN
Refills: 0 | Status: DISCONTINUED | OUTPATIENT
Start: 2025-06-19 | End: 2025-06-20 | Stop reason: HOSPADM

## 2025-06-19 RX ORDER — ONDANSETRON 4 MG/1
4 TABLET, ORALLY DISINTEGRATING ORAL EVERY 8 HOURS PRN
Status: DISCONTINUED | OUTPATIENT
Start: 2025-06-19 | End: 2025-06-20 | Stop reason: HOSPADM

## 2025-06-19 RX ORDER — METHOCARBAMOL 500 MG/1
500 TABLET, FILM COATED ORAL 3 TIMES DAILY
Status: DISCONTINUED | OUTPATIENT
Start: 2025-06-19 | End: 2025-06-19

## 2025-06-19 RX ORDER — LORAZEPAM 2 MG/ML
2 INJECTION INTRAMUSCULAR
Status: DISCONTINUED | OUTPATIENT
Start: 2025-06-19 | End: 2025-06-19

## 2025-06-19 RX ORDER — LORAZEPAM 1 MG/1
3 TABLET ORAL
Status: DISCONTINUED | OUTPATIENT
Start: 2025-06-19 | End: 2025-06-19

## 2025-06-19 RX ORDER — DIAZEPAM 10 MG/1
10 TABLET ORAL 2 TIMES DAILY
COMMUNITY

## 2025-06-19 RX ORDER — PANTOPRAZOLE SODIUM 40 MG/1
40 TABLET, DELAYED RELEASE ORAL DAILY
COMMUNITY

## 2025-06-19 RX ORDER — LORAZEPAM 2 MG/ML
4 INJECTION INTRAMUSCULAR
Status: DISCONTINUED | OUTPATIENT
Start: 2025-06-19 | End: 2025-06-19

## 2025-06-19 RX ORDER — LABETALOL HYDROCHLORIDE 5 MG/ML
10 INJECTION, SOLUTION INTRAVENOUS EVERY 4 HOURS PRN
Status: DISCONTINUED | OUTPATIENT
Start: 2025-06-19 | End: 2025-06-19

## 2025-06-19 RX ORDER — ATORVASTATIN CALCIUM 80 MG/1
80 TABLET, FILM COATED ORAL DAILY
COMMUNITY

## 2025-06-19 RX ORDER — CARVEDILOL 6.25 MG/1
12.5 TABLET ORAL 2 TIMES DAILY WITH MEALS
Status: DISCONTINUED | OUTPATIENT
Start: 2025-06-19 | End: 2025-06-20 | Stop reason: HOSPADM

## 2025-06-19 RX ORDER — LEVETIRACETAM 500 MG/5ML
500 INJECTION, SOLUTION, CONCENTRATE INTRAVENOUS EVERY 12 HOURS
Status: DISCONTINUED | OUTPATIENT
Start: 2025-06-19 | End: 2025-06-20 | Stop reason: HOSPADM

## 2025-06-19 RX ORDER — CASTOR OIL AND BALSAM, PERU 788; 87 MG/G; MG/G
OINTMENT TOPICAL 2 TIMES DAILY
Status: DISCONTINUED | OUTPATIENT
Start: 2025-06-19 | End: 2025-06-20 | Stop reason: HOSPADM

## 2025-06-19 RX ORDER — LEVETIRACETAM 500 MG/5ML
1000 INJECTION, SOLUTION, CONCENTRATE INTRAVENOUS ONCE
Status: COMPLETED | OUTPATIENT
Start: 2025-06-19 | End: 2025-06-19

## 2025-06-19 RX ORDER — IOPAMIDOL 755 MG/ML
75 INJECTION, SOLUTION INTRAVASCULAR
Status: COMPLETED | OUTPATIENT
Start: 2025-06-19 | End: 2025-06-19

## 2025-06-19 RX ORDER — LORAZEPAM 2 MG/ML
1 INJECTION INTRAMUSCULAR
Status: DISCONTINUED | OUTPATIENT
Start: 2025-06-19 | End: 2025-06-19

## 2025-06-19 RX ORDER — DIAZEPAM 10 MG/1
10 TABLET ORAL EVERY 12 HOURS PRN
Status: DISCONTINUED | OUTPATIENT
Start: 2025-06-19 | End: 2025-06-20 | Stop reason: HOSPADM

## 2025-06-19 RX ORDER — DEXAMETHASONE SODIUM PHOSPHATE 4 MG/ML
10 INJECTION, SOLUTION INTRA-ARTICULAR; INTRALESIONAL; INTRAMUSCULAR; INTRAVENOUS; SOFT TISSUE ONCE
Status: DISCONTINUED | OUTPATIENT
Start: 2025-06-19 | End: 2025-06-19

## 2025-06-19 RX ORDER — NICARDIPINE HYDROCHLORIDE 0.1 MG/ML
.5-15 INJECTION INTRAVENOUS CONTINUOUS
Status: DISCONTINUED | OUTPATIENT
Start: 2025-06-19 | End: 2025-06-19 | Stop reason: HOSPADM

## 2025-06-19 RX ORDER — OXYCODONE AND ACETAMINOPHEN 5; 325 MG/1; MG/1
1 TABLET ORAL EVERY 4 HOURS PRN
Refills: 0 | Status: DISCONTINUED | OUTPATIENT
Start: 2025-06-19 | End: 2025-06-19

## 2025-06-19 RX ORDER — ONDANSETRON 2 MG/ML
4 INJECTION INTRAMUSCULAR; INTRAVENOUS EVERY 6 HOURS PRN
Status: DISCONTINUED | OUTPATIENT
Start: 2025-06-19 | End: 2025-06-20 | Stop reason: HOSPADM

## 2025-06-19 RX ADMIN — SODIUM CHLORIDE, PRESERVATIVE FREE 10 ML: 5 INJECTION INTRAVENOUS at 20:23

## 2025-06-19 RX ADMIN — IOPAMIDOL 75 ML: 755 INJECTION, SOLUTION INTRAVENOUS at 05:41

## 2025-06-19 RX ADMIN — LEVETIRACETAM 500 MG: 100 INJECTION INTRAVENOUS at 04:14

## 2025-06-19 RX ADMIN — AMLODIPINE BESYLATE 5 MG: 5 TABLET ORAL at 10:38

## 2025-06-19 RX ADMIN — SODIUM CHLORIDE 50 ML: 9 INJECTION, SOLUTION INTRAVENOUS at 01:20

## 2025-06-19 RX ADMIN — LORAZEPAM 2 MG: 2 INJECTION INTRAMUSCULAR; INTRAVENOUS at 09:06

## 2025-06-19 RX ADMIN — OXYCODONE AND ACETAMINOPHEN 1 TABLET: 5; 325 TABLET ORAL at 10:37

## 2025-06-19 RX ADMIN — OXYCODONE AND ACETAMINOPHEN 1 TABLET: 5; 325 TABLET ORAL at 15:23

## 2025-06-19 RX ADMIN — NICARDIPINE HYDROCHLORIDE 2.5 MG/HR: 25 INJECTION INTRAVENOUS at 04:08

## 2025-06-19 RX ADMIN — CARVEDILOL 12.5 MG: 6.25 TABLET, FILM COATED ORAL at 18:15

## 2025-06-19 RX ADMIN — LEVETIRACETAM 500 MG: 100 INJECTION INTRAVENOUS at 15:15

## 2025-06-19 RX ADMIN — ACETAMINOPHEN 650 MG: 325 TABLET ORAL at 06:39

## 2025-06-19 RX ADMIN — SODIUM CHLORIDE: 0.9 INJECTION, SOLUTION INTRAVENOUS at 06:36

## 2025-06-19 RX ADMIN — SODIUM CHLORIDE, PRESERVATIVE FREE 40 MG: 5 INJECTION INTRAVENOUS at 08:56

## 2025-06-19 RX ADMIN — DIAZEPAM 10 MG: 10 TABLET ORAL at 20:20

## 2025-06-19 RX ADMIN — PROTHROMBIN COMPLEX CONCENTRATE (HUMAN) 2000 UNITS: 25.5; 16.5; 24; 22; 22; 26 POWDER, FOR SOLUTION INTRAVENOUS at 01:20

## 2025-06-19 RX ADMIN — DESMOPRESSIN ACETATE 26.08 MCG: 4 SOLUTION INTRAVENOUS at 01:21

## 2025-06-19 RX ADMIN — SODIUM CHLORIDE, PRESERVATIVE FREE 10 ML: 5 INJECTION INTRAVENOUS at 08:56

## 2025-06-19 RX ADMIN — NICARDIPINE HYDROCHLORIDE 5 MG/HR: 0.1 INJECTION INTRAVENOUS at 00:38

## 2025-06-19 RX ADMIN — Medication 100 MG: at 08:56

## 2025-06-19 RX ADMIN — LABETALOL HYDROCHLORIDE 10 MG: 5 INJECTION, SOLUTION INTRAVENOUS at 12:46

## 2025-06-19 RX ADMIN — ATORVASTATIN CALCIUM 80 MG: 80 TABLET, FILM COATED ORAL at 20:20

## 2025-06-19 RX ADMIN — LEVETIRACETAM 1000 MG: 100 INJECTION INTRAVENOUS at 00:38

## 2025-06-19 RX ADMIN — Medication: at 08:57

## 2025-06-19 RX ADMIN — HYDROMORPHONE HYDROCHLORIDE 4 MG: 2 TABLET ORAL at 20:20

## 2025-06-19 ASSESSMENT — PAIN SCALES - GENERAL
PAINLEVEL_OUTOF10: 10
PAINLEVEL_OUTOF10: 8
PAINLEVEL_OUTOF10: 10
PAINLEVEL_OUTOF10: 10

## 2025-06-19 ASSESSMENT — PAIN DESCRIPTION - LOCATION
LOCATION: HIP
LOCATION: GENERALIZED;LEG

## 2025-06-19 ASSESSMENT — ENCOUNTER SYMPTOMS
SORE THROAT: 0
ABDOMINAL PAIN: 0
RESPIRATORY NEGATIVE: 1
ALLERGIC/IMMUNOLOGIC NEGATIVE: 1
GASTROINTESTINAL NEGATIVE: 1
EYES NEGATIVE: 1
SHORTNESS OF BREATH: 0
COUGH: 0
NAUSEA: 0
VOMITING: 0
COLOR CHANGE: 0

## 2025-06-19 ASSESSMENT — PAIN DESCRIPTION - DESCRIPTORS
DESCRIPTORS: ACHING
DESCRIPTORS: ACHING;DISCOMFORT

## 2025-06-19 ASSESSMENT — PAIN DESCRIPTION - ORIENTATION
ORIENTATION: RIGHT
ORIENTATION: RIGHT

## 2025-06-19 NOTE — CONSULTS
NEUROSURGERY CONSULT NOTE    KRUPA HERNANDEZ  [id number]   1950 6/19/2025    Requesting physician: Lorenzo Pedraza MD    Reason for consultation: Traumatic SAH and SDH    History of present illness: Patient is a 74 y.o. male  with past medical history Acute Coronary Syndrome, Anxiety, CHF, Chronic Pain Syndrome, HTN, HLD, CAD, PAD, Basal Cell Carcinoma (nose), and Right Hip fixation for fracture. Patient presented on 6/18/2025 to [facility] ED s/p unwitnessed fall. Patient is not a proper historian do information provided by previous providers notes. Patient woke up in his normal state of health yesterday, and took a pain pill due to having some right hip and right lower extremity leg pain. Patient's son reports he found patient at the bottom of a flight of stairs. Patient was somnolent on arrival and reportedly took 2 Valium prior to arrival. Patient did open eyes to voice but could not provide sustained history. Patient did report some pain in his head, face, bilateral hands, left ribs, left hip. Denied any dizziness, lightheadedness, numbness, paresthesias, diplopia, blurred vision, vision loss. CT Head was obtained, and showed multicompartment intracranial hemorrhage with right subdural hematoma measuring 8 to 10 mm in thickness and resulting in 9 mm of right-to-left midline shift, moderate amount of subarachnoid hemorrhage in the left frontal lobe.  Patient was pan scanned with CT cervical, thoracic, lumbar spine as well as CT chest abdomen pelvis and facial bones, all of which were without acute traumatic injury or osseous abnormality. Per chart review from a cardiology note dated from March 2025 (in chart marked for merger) the patient was supposed to be on Xarelto, aspirin and Brilinta, with patient stopping Brilinta in May 2025.  Patient was given 1 g Keppra, DDAVP, BALFAXAR, and started on a nicardipine infusion. Patient transferred to [facility] ICU for neurosurgical evaluation.    Patient's Home Pain Meds per  management of hemorrhage, appreciate recs  - Follow up head CT stable. No further imaging unless there is a decline in neurologic  - Keep Plt >100k & INR <1.4  - Hold all full dose anticoagulation & antiplatelet for 2 weeks  Seizure prophylaxis: Keppra 500mg BID x7 days  Pain: Managed by medical team. See home pain meds at top of note  Advance diet / activity per primary team  SCDs for DVT prophylaxis  Thank you for consult. Will follow inpatient.  Please call with any questions or decline in neurological status    DISPO: Remain inpatient from neurosurgery standpoint. Dispo timing to be determined by primary team once patient is medically stable for discharge.    Patient was seen and examined with Dr. Kuhn who agrees with above assessment and plan.     Electronically signed by: TALITA Valero CNP, TALITA-CNP, 6/19/2025 10:54 AM  [phone]      Critical Care:  Due to the immediate potential for life-threatening deterioration due to neurological failure, I spent 60 minutes providing critical care.  This time excludes time spent performing procedures but includes time spent on direct patient care, history retrieval, review of the chart, and discussions with patient, family, and consultant(s).

## 2025-06-19 NOTE — PROGRESS NOTES
4 Eyes Skin Assessment     NAME:  Ad Lacey  YOB: 1950  MEDICAL RECORD NUMBER:  [id number]    The patient is being assessed for  Admission    I agree that at least one RN has performed a thorough Head to Toe Skin Assessment on the patient. ALL assessment sites listed below have been assessed.      Areas assessed by both nurses:    Head, Face, Ears, Shoulders, Back, Chest, Arms, Elbows, Hands, Sacrum. Buttock, Coccyx, Ischium, and Legs. Feet and Heels        Does the Patient have a Wound? Yes wound(s) were present on assessment. LDA wound assessment was Initiated and completed by RN   NBR right elbow, left shoulder, right little finger  Blanchable redness coccyx, left elbow,   Ecchymosis scattered both hands, left hip, and buttocks  Swelling and ecchymosis right and left thumb       Jalen Prevention initiated by RN: Yes  Wound Care Orders initiated by RN: Yes    Pressure Injury (Stage 3,4, Unstageable, DTI, NWPT, and Complex wounds) if present, place Wound referral order by RN under : No    New Ostomies, if present place, Ostomy referral order under : No     Nurse 1 eSignature: Electronically signed by Laverne Torre RN on 6/19/25 at 7:38 AM EDT    **SHARE this note so that the co-signing nurse can place an eSignature**    Nurse 2 eSignature: Electronically signed by Maxi Galo RN on 6/19/25 at 8:14 AM EDT

## 2025-06-19 NOTE — PROGRESS NOTES
Consulted for non-blanchable redness of elbows and left shoulder. Venelex ordered for treatment. Jalen order set in place. Wound Care to sign off; re-consult for changes or deterioration.

## 2025-06-19 NOTE — CONSULTS
Patient in room 9 going to [facility] ICU. Patient will be going to room 4514, report number is [phone].Dr. Tovar as accepting. [employer] will arrive to pic up patient at 0130.

## 2025-06-19 NOTE — PROGRESS NOTES
4 Eyes Skin Assessment     NAME:  Ad Lacey  YOB: 1950  MEDICAL RECORD NUMBER:  [id number]    The patient is being assessed for  Admission    I agree that at least one RN has performed a thorough Head to Toe Skin Assessment on the patient. ALL assessment sites listed below have been assessed.      Areas assessed by both nurses:    Head, Face, Ears, Shoulders, Back, Chest, Arms, Elbows, Hands, Sacrum. Buttock, Coccyx, Ischium, and Legs. Feet and Heels        Does the Patient have a Wound? No noted wound(s)  Non blanchable redness Right elbow, NBR right little finger.   Blanchable redness left elbow and coccyx, scattered bruising left hip/ buttocks and sacrum  Jalen Prevention initiated by RN: Yes  Wound Care Orders initiated by RN: Yes    Pressure Injury (Stage 3,4, Unstageable, DTI, NWPT, and Complex wounds) if present, place Wound referral order by RN under :     New Ostomies, if present place, Ostomy referral order under : No     Nurse 1 eSignature: Electronically signed by Laverne Torre RN on 6/19/25 at 5:04 AM EDT    **SHARE this note so that the co-signing nurse can place an eSignature**    Nurse 2 eSignature: Electronically signed by Nicolas Hastings RN on 6/19/25 at 5:16 AM EDT

## 2025-06-19 NOTE — CONSULTS
Clinical Pharmacy Consult Note  Medication History     Admit Date: 6/19/2025    Pharmacy consulted to verify home medication list by Dr. Reeves.    List of current medications patient is taking is complete. Home Medication list in Epic updated to reflect changes noted below.    Source of information: [facility] ([place], technician), VERONIKA Seymour (home health nurse, phone # [phone])    Patient's home pharmacy: No Pharmacies Listed    Changes made to medication list:   Medications removed: (include reason, ex: therapy completed, patient no longer taking, etc.)  none  Medications added:   Aspirin 81 mg  Atorvastatin 80 mg  Carvedilol 12.5 mg  Clopidogrel 75 mg  Diazepam 10 mg  Hydromorphone 4 mg  Hydroxyzine 25 mg  Methocarbamol 500 mg   Morphine ER 60 mg  Pantoprazole 40 mg  Medication doses adjusted:   None   Other notes:   Patient is supposed to switch from morphine to oxycodone CR 20 mg BID, Rx filled but not yet picked up  Per VERONIKA Seymour, patient struggles with compliance  Pt supposed to be taking Xarelto, Entresto, Brilinta but is unable to afford and has not yet sent money order to obtain from [facility]  Is taking clopidogrel in place of Brilinta but Xarelto was not replaced with Eliquis or warfarin. Pt would like to \"finish out the Plavix and go from there\" despite Dawn's education regarding mechanisms and places in therapy  Pt was supposed to switch from morphine ER to oxycodone ER (20 mg BID) due to lack of efficacy but has had issues obtaining from the pharmacy. Dawn unsure if patient has been taking morphine for the past week d/t thinking he may have been taking more than prescribed but pt, while altered, stated he believes he took it yesterday  Pt supposed to be taking hydromorphone 4 mg Q4h prn and RN states he takes it around the clock. Pt told MD that he has been out of it for ~1 week d/t \"increasing his dose at home\", unclear if he still has any at home  Taking hydroxyzine prn very

## 2025-06-19 NOTE — PLAN OF CARE
Problem: Safety - Adult  Goal: Free from fall injury  Outcome: Progressing     Problem: Skin/Tissue Integrity  Goal: Skin integrity remains intact  Description: 1.  Monitor for areas of redness and/or skin breakdown  2.  Assess vascular access sites hourly  3.  Every 4-6 hours minimum:  Change oxygen saturation probe site  4.  Every 4-6 hours:  If on nasal continuous positive airway pressure, respiratory therapy assess nares and determine need for appliance change or resting period  Outcome: Progressing     Problem: Neurosensory - Adult  Goal: Achieves stable or improved neurological status  Outcome: Progressing  Goal: Absence of seizures  Outcome: Progressing  Goal: Achieves maximal functionality and self care  Outcome: Progressing     Problem: Metabolic/Fluid and Electrolytes - Adult  Goal: Electrolytes maintained within normal limits  Outcome: Progressing  Goal: Hemodynamic stability and optimal renal function maintained  Outcome: Progressing  Goal: Glucose maintained within prescribed range  Outcome: Progressing

## 2025-06-19 NOTE — CONSULTS
ICU CONSULT       Hospital Day:   ICU Day:                                                          Code:Full Code  Admit Date: 6/19/2025  PCP: No primary care provider on file.                                  CC: fall     HISTORY OF PRESENT ILLNESS:     Interval history :  Complains of chronic right hip pain. No acute overnight events. Remains on cardene drip 2.5. will restart oral meds. Stable CTH.         HPI:   Patient is a 74 y.o. male with PMHx of pAfib, CAD c/b STEMI s/p TRISHA, HFimpEF, and PAD s/p fem-pop-bypass who presented to [facility] ED after being found down.     Patient was unable to recall the event. He appeared slightly confused initially and was unable to correctly state year but was oriented to place and person and was eventually able to state correct year. Patient endorsed having fallen near the stairs but was unable to recall any preceding details. Patient was found by the son who returned from work at 5:30pm. Son stated that he last talked to the patient at 2pm and on the patient was immediately responsive/arousable though having some confused and slurred speech. Son stated that patient's walker was also found tipped over in front of him and ornaments near the stairs were also found fallen in the vicinity. Son states that patient had been having difficulty with RLE pain and had taken some old gabapentin that morning.      Brief ED course:  Afebrile, HDS, hypertensive to -180s, satting on room air. Noted to have confused and slurred speech but otherwise neurologically intact.     Cr 1.6  Troponin 27, -> 24  WBC 13.1  Hgb 11.3, MCV 85.1     CT Head showing multi-compartment ICH, moderate SAH, and R SDH w/ 9mm midline shift     Given DDAVP 0.4mcg/kg, Keppra 1g, Balfaxar 2000u, and started on Cardene prior to transfer to [facility]     Brief social hx:  Lives at home with son. States being able to do ADLs and IADLs. Ambulates with walker/cane. Son notes that patient has no hx of cognitive  aspirin given ICH. Pt scheduled to stop Brillinta in 5/2025  - hold home statin given NPO     PAD s/p fem-pop bypass (2022)  Noted to have recent hx of claudication symptoms. Recently seen by [facility] outpatient (6/2025) after doppler was concerning for impending graft failure. Pending further eval OP angiography. PT and DP dopplerable bilaterally at admission  - management as above.     DVT PPx: SCDs  Diet:  Diet NPO   Code status:  Full Code      ELOS: 5  Barriers to discharge: ICH  Disposition  Preadmission: home  Current: ICU  Upon discharge:  TBD          Code Status:Full Code  FEN: Diet NPO   PPX:  SCDs  DISPO: TBD    This patient has been staffed and discussed with Dr Szymanski.   -----------------------------  ANAM MALDONADO MD, PGY-1  Internal Medicine Resident  6/19/2025  8:27 AM       Patient was seen, examined and discussed with Dr. Maldonado. I agree with the history of present illness, past medical/surgical histories, family history, social history, medication list and allergies as listed. The review of systems is as noted above. My physical exam confirms the findings listed  Chart was reviewed including labs, CT scan, EKG and medical records confirm the findings noted     SDH after a fall.  9mm midline shift.  He is alert and answering questions appropriately.    Hypertension:  appears to be poor compliant with BP meds    Start amlodipine and coreg.  Consider changing amlodipine to ACEI or ARB when renal function stabilize.    Wean off cardene

## 2025-06-19 NOTE — PLAN OF CARE
Problem: Discharge Planning  Goal: Discharge to home or other facility with appropriate resources  Outcome: Progressing     Problem: Safety - Adult  Goal: Free from fall injury  6/19/2025 1533 by Maxi Galo RN  Outcome: Progressing     Problem: Skin/Tissue Integrity  Goal: Skin integrity remains intact  Description: 1.  Monitor for areas of redness and/or skin breakdown  2.  Assess vascular access sites hourly  3.  Every 4-6 hours minimum:  Change oxygen saturation probe site  4.  Every 4-6 hours:  If on nasal continuous positive airway pressure, respiratory therapy assess nares and determine need for appliance change or resting period  6/19/2025 1533 by Maxi Galo RN  Outcome: Progressing  Flowsheets  Taken 6/19/2025 1200  Skin Integrity Remains Intact: Monitor for areas of redness and/or skin breakdown  Taken 6/19/2025 0800  Skin Integrity Remains Intact: Monitor for areas of redness and/or skin breakdown     Problem: Neurosensory - Adult  Goal: Achieves stable or improved neurological status  6/19/2025 1533 by Maxi Galo RN  Outcome: Progressing     Problem: Neurosensory - Adult  Goal: Absence of seizures  6/19/2025 1533 by Maxi Galo RN  Outcome: Progressing     Problem: Metabolic/Fluid and Electrolytes - Adult  Goal: Electrolytes maintained within normal limits  6/19/2025 1533 by Maxi Galo RN  Outcome: Progressing     Problem: Metabolic/Fluid and Electrolytes - Adult  Goal: Hemodynamic stability and optimal renal function maintained  6/19/2025 1533 by Maxi Galo RN  Outcome: Progressing     Problem: ABCDS Injury Assessment  Goal: Absence of physical injury  Outcome: Progressing     Problem: Seizure Precautions  Goal: Remains free of injury related to seizures activity  Outcome: Progressing  Flowsheets (Taken 6/19/2025 1200)  Remains free of injury related to seizure activity: Monitor patient for seizure activity, document and report duration and description of seizure to Licensed Independent

## 2025-06-19 NOTE — PROGRESS NOTES
Pt c/o right hip pain, says it is broken. Pt had broken hip with pin fixation 1 year ago. He says it still hurts. Given PRN Percocet. Pt says he wants the morphine and dilaudid that he was getting from his doctor. Pt educated that hospitalist has ordered Percocet. Pills taken whole with water. Therapy at bedside.

## 2025-06-19 NOTE — ED PROVIDER NOTES
PHYSICAL EXAM :  ED Triage Vitals [06/18/25 2212]   BP Systolic BP Percentile Diastolic BP Percentile Temp Temp Source Pulse Respirations SpO2   (!) 177/101 -- -- 98 °F (36.7 °C) Oral 80 18 98 %      Height Weight - Scale         1.727 m (5' 8\") 65.2 kg (143 lb 11.8 oz)            GENERAL APPEARANCE: Awake and alert. Cooperative. No acute distress.  HEAD: Normocephalic.  Some facial swelling and old scars from previous traumatic facial injury   EYES: PERRL. EOM's grossly intact.   ENT: Mucous membranes are dry, blood from left nare   NECK: In c-collar supple, trachea midline.   HEART: RRR. Normal S1, S2. No murmurs, rubs or gallops.   LUNGS: Respirations unlabored. CTAB. Good air exchange. No wheezes, rales, or rhonchi.  Speaking comfortably in full sentences.  Left chest wall tenderness  ABDOMEN: Soft. Non-distended. Non-tender. No guarding or rebound. Normal Bowel sounds.  Left hip with tenderness to palpation  EXTREMITIES: No peripheral edema. MAEE. No acute deformities.  Bruising over bilateral hands and thenar eminence  SKIN: Warm and dry. No acute rashes.   NEUROLOGICAL: Alert slightly confused slurred speech. CN II-XII intact. No gross facial drooping.  Strength 5/5 in all extremities.  Sensation intact.  No focal deficits  PSYCHIATRIC: Normal mood and affect.    DIAGNOSTIC RESULTS     LABS:   Labs Reviewed   CBC WITH AUTO DIFFERENTIAL - Abnormal; Notable for the following components:       Result Value    WBC 13.1 (*)     RBC 4.10 (*)     Hemoglobin 11.3 (*)     Hematocrit 34.9 (*)     Neutrophils Absolute 10.4 (*)     All other components within normal limits   COMPREHENSIVE METABOLIC PANEL W/ REFLEX TO MG FOR LOW K - Abnormal; Notable for the following components:    Sodium 147 (*)     Glucose 109 (*)     BUN 28 (*)     Creatinine 1.6 (*)     Est, Glom Filt Rate 45 (*)     AST 45 (*)     All other components within normal limits   TROPONIN - Abnormal; Notable for the following components:

## 2025-06-19 NOTE — CARE COORDINATION
Case Management Assessment  Initial Evaluation    Date/Time of Evaluation: 6/19/2025 2:44 PM  Assessment Completed by: Adry Grayson RN    If patient is discharged prior to next notation, then this note serves as note for discharge by case management.    Patient Name: Ad Lacey                   YOB: 1950  Diagnosis: Subdural hematoma (HCC) [S06.5XAA]                   Date / Time: 6/19/2025  2:47 AM    Patient Admission Status: Inpatient   Readmission Risk (Low < 19, Mod (19-27), High > 27): Readmission Risk Score: 12    Current PCP: No primary care provider on file.  PCP verified by CM? No    Chart Reviewed: Yes      History Provided by: Patient, Medical Record  Patient Orientation: Alert and Oriented, Person, Place    Patient Cognition: Short Term Memory Deficit    Hospitalization in the last 30 days (Readmission):  No    If yes, Readmission Assessment in CM Navigator will be completed.    Advance Directives:      Code Status: Full Code   Patient's Primary Decision Maker is: Legal Next of Kin      Discharge Planning:    Patient lives with:   Type of Home:    Primary Care Giver: Self  Patient Support Systems include: Children   Current Financial resources:    Current community resources:    Current services prior to admission:              Current DME:              Type of Home Care services:       ADLS  Prior functional level: Assistance with the following:, Shopping, Housework, Cooking, Mobility  Current functional level: Assistance with the following:, Mobility, Shopping, Housework, Cooking, Bathing    PT AM-PAC: 15 /24  OT AM-PAC: 16 /24    Family can provide assistance at DC: No  Would you like Case Management to discuss the discharge plan with any other family members/significant others, and if so, who? Yes (son)  Plans to Return to Present Housing: Unknown at present  Other Identified Issues/Barriers to RETURNING to current housing: SDH  Potential Assistance needed at discharge:

## 2025-06-19 NOTE — PROGRESS NOTES
Physical Therapy  Facility/Department: [facility] ICU  Physical Therapy Initial Assessment    Name: Ad Lacey  : 1950  MRN: [id number]  Date of Service: 2025    Discharge Recommendations:  IP Rehab, Subacute/Skilled Nursing Facility   PT Equipment Recommendations  Equipment Needed: No    Assessment  Assessment: Pt from home, admitted with SDH after falling down flight of steps.  Pt unable to recall details of fall.  Pt is easily agitated and impulsive, mobilizing without warning.  Pt currently needing Min A x1-2 persons for safety in seated and standing balance, transfers and gait.  Pt demonstrates poor safety awareness and insight to situation/deficits.  No focal neuro deficits noted on PT exam.  Pt returned to supine at end of therapy due to risk of getting up unassisted from chair.  Question capabale assist from son at home?  Pt may benefit from continued PT at d/c to increase functional independence if agreeable.  IF pt returns home, recommend 24hr capable assist and home PT to ensure safety.  Treatment Diagnosis: Decreased gait associated with SDH.  Decision Making: High Complexity  Requires PT Follow-Up: Yes    Plan  General Plan: 5-7 times per week  Current Treatment Recommendations: Strengthening, Functional mobility training, Transfer training, Gait training, Neuromuscular re-education, Safety education & training, Patient/Caregiver education & training, Co-Treatment    Safety Devices  Type of Devices: Gait belt, Telesitter in use, Call light within reach, Left in bed, Bed alarm in place, All fall risk precautions in place, Patient at risk for falls, Nurse notified    Restrictions  Other Position/Activity Restrictions: Up with assist     Subjective  Chart Reviewed: Yes  Additional Pertinent Hx: Pt to [facility] ED  after falling down flight of steps.  Head CT (+) multi-compartment ICH, moderate SA and R SDH with 9mm midline shift.  Transferred to [facility].  Neurosurgery consult pending.  PMH:   chair using your arms?: A Little  How much help is needed walking in hospital room?: A Lot  How much help is needed climbing 3-5 steps with a railing?: A Lot  AM-PAC Inpatient Mobility Raw Score : 15  AM-PAC Inpatient T-Scale Score : 39.45  Mobility Inpatient CMS 0-100% Score: 57.7  Mobility Inpatient CMS G-Code Modifier : CK      Goals  Short Term Goals  Time Frame for Short Term Goals: Discharge  Short Term Goal 1: Supine <> sit supervision  Short Term Goal 2: sit <> stand supervision  Short Term Goal 3: ambulate 150ft with rolling walker SBA  Patient Goals   Patient Goals : To return home       Education  Patient Education  Education Given To: Patient  Education Provided: Role of Therapy;Plan of Care;Transfer Training;Equipment;Fall Prevention Strategies  Education Provided Comments: Activity promotion with RN staff  Education Method: Verbal  Barriers to Learning: Cognition  Education Outcome: Continued education needed      Therapy Time   Individual Concurrent Group Co-treatment   Time In 1030         Time Out 1123         Minutes 53         Timed Code Treatment Minutes:  40  Total Treatment Minutes:  53      Francesca Valerio, PT

## 2025-06-19 NOTE — H&P
Internal Medicine H&P    Date:   2025   Patient:  Ad Lacey   :   1950     CC:  No chief complaint on file.       Source of HPI: patient and son    Subjective   HPI:   Patient is a 74 y.o. male with PMHx of pAfib, CAD c/b STEMI s/p TRISHA, HFimpEF, and PAD s/p fem-pop-bypass who presented to [facility] ED after being found down.    Patient was unable to recall the event. He appeared slightly confused initially and was unable to correctly state year but was oriented to place and person and was eventually able to state correct year. Patient endorsed having fallen near the stairs but was unable to recall any preceding details. Patient was found by the son who returned from work at 5:30pm. Son stated that he last talked to the patient at 2pm and on the patient was immediately responsive/arousable though having some confused and slurred speech. Son stated that patient's walker was also found tipped over in front of him and ornaments near the stairs were also found fallen in the vicinity. Son states that patient had been having difficulty with RLE pain and had taken some old gabapentin that morning.     Brief ED course:  Afebrile, HDS, hypertensive to -180s, satting on room air. Noted to have confused and slurred speech but otherwise neurologically intact.    Cr 1.6  Troponin 27, pending repeat  WBC 13.1  Hgb 11.3, MCV 85.1    CT Head showing multi-compartment ICH, moderate SAH, and R SDH w/ 9mm midline shift    Given DDAVP 0.4mcg/kg, Keppra 1g, Balfaxar 2000u, and started on Cardene prior to transfer to [facility]    Brief social hx:  Lives at home with son. States being able to do ADLs and IADLs. Ambulates with walker/cane. Son notes that patient has no hx of cognitive disability    Tobacco: currently vapes, 40 pack year smoking history  Alcohol: occasional  Other drugs: occasional marijuana    PMHx:  No past medical history on file.    PSurgHx:  No past surgical history on file.

## 2025-06-19 NOTE — PROGRESS NOTES
Occupational Therapy  Facility/Department: [facility] ICU  Occupational Therapy Initial Assessment/Treatment     Name: Ad Lacey  : 1950  MRN: [id number]  Date of Service: 2025    Discharge Recommendations:  Subacute/Skilled Nursing Facility, 24 hour supervision or assist          Patient Diagnosis(es): There were no encounter diagnoses.  Past Medical History:  has no past medical history on file.  Past Surgical History:  has no past surgical history on file.    Treatment Diagnosis: decreased independence with ADLs/IADLs      Assessment  Assessment: 74 y.o. male s/p a fall down a flight of stairs. Pt reports being independent with ADLs/IADLs using RW prior to incident. Pt currently requires min x2 to complete functional mobility and transfers due to generalized weakness. Pt presented impulsive and agitated throughout session. Pt would benefit from continued skilled IP OT to maximize inde with ADLs/IADLs. Recommend discharge to ARU vs SNF. If pt discharges home recommend with 24 hr assist and home health OT.  Treatment Diagnosis: decreased indep with ADLs/IADLs  Prognosis: Fair  Decision Making: Medium Complexity  REQUIRES OT FOLLOW-UP: Yes  Activity Tolerance  Activity Tolerance: Treatment limited secondary to decreased cognition;Patient limited by fatigue;Treatment limited secondary to agitation;Patient limited by pain     Plan  Occupational Therapy Plan  Times Per Week: 2-5x/week    Restrictions  Restrictions/Precautions  Restrictions/Precautions: NPO, Fall Risk, Seizure  Activity Level: Up with Assist  Required Braces or Orthoses?: No  Position Activity Restriction  Other Position/Activity Restrictions: Up with assist    Subjective  General  Chart Reviewed: Yes  Patient assessed for rehabilitation services?: Yes  Additional Pertinent Hx: pAfib, CAD c/b STEMI s/p TRISHA, HFimpEF, and PAD  Response to previous treatment: Patient with no complaints from previous session  Family / Caregiver Present:  No  Referring Practitioner: Afshan Reeves MD  Diagnosis: s/p fall  Subjective  Subjective: upon arrival pt supine in bed receiving pain medicine from RN. Agreeable to session. During session patient reports he is by himself at home 99% of the day.     Social/Functional History  Social/Functional History  Lives With: Son  Type of Home: House  Home Layout: Two level, Bed/Bath upstairs, 1/2 bath on main level  Home Access: Stairs to enter with rails  Entrance Stairs - Number of Steps: \"a couple steps\"  Bathroom Shower/Tub: Tub/Shower unit  Bathroom Toilet: Standard  Bathroom Equipment: Grab bars in shower, Shower chair, Hand-held shower (sink by toilet)  Home Equipment: Walker - Rolling, Cane - Quad  Has the patient had two or more falls in the past year or any fall with injury in the past year?: Yes  Receives Help From: Family (son but is  by himself during the day)  Prior Level of Assist for ADLs: Independent (\"it is hard but i do it\")  Prior Level of Assist for Homemaking: Independent (\"it is hard but i do it\")  Prior Level of Assist for Ambulation: Independent household ambulator, with or without device (w/ RW)  Prior Level of Assist for Transfers: Independent  Active : No  Type of Occupation: [employer]  Leisure & Hobbies: watch TV  Additional Comments: \"I'm at home by myself 99% of the time.\"       Safety Devices  Type of Devices: Gait belt;Telesitter in use;Call light within reach;Left in bed;Bed alarm in place;All fall risk precautions in place;Patient at risk for falls;Nurse notified  Balance  Sitting: Impaired  Sitting - Static: Poor (constant support)  Transfer Training  Transfer Training: Yes  Overall Level of Assistance: Minimal assistance  Interventions: Safety awareness training;Verbal cues;Tactile cues  Sit to Stand: Minimal assistance;2 Person assistance  Stand to Sit: Minimal assistance;2 Person assistance  Bed to Chair: Minimal assistance     AROM: Generally decreased, functional  ADL  Feeding:

## 2025-06-19 NOTE — PROGRESS NOTES
Speech Language Pathology  Facility/Department:[facility] ICU  Dysphagia Eval/tx- speech/lang/cog eval    Name: Ad Lacey  : 1950  MRN: [id number]                                                     Patient Diagnosis(es):   Patient Active Problem List    Diagnosis Date Noted    Subdural hematoma (HCC) 2025     No past medical history on file.  No past surgical history on file.    Reason for Referral:  Ad Lacey  was referred for a Speech Therapy evaluation to assess swallow function and/or communication.    History of Present Illness  Per MD notes:  \" Patient is a 74 y.o. male with PMHx of pAfib, CAD c/b STEMI s/p TRISHA, HFimpEF, and PAD s/p fem-pop-bypass who presented to [facility] ED after being found down.  Patient was unable to recall the event. He appeared slightly confused initially and was unable to correctly state year but was oriented to place and person and was eventually able to state correct year. Patient endorsed having fallen near the stairs but was unable to recall any preceding details. Patient was found by the son who returned from work at 5:30pm. Son stated that he last talked to the patient at 2pm and on the patient was immediately responsive/arousable though having some confused and slurred speech. Son stated that patient's walker was also found tipped over in front of him and ornaments near the stairs were also found fallen in the vicinity. Son states that patient had been having difficulty with RLE pain and had taken some old gabapentin that morning.  \"    Imaging  CTA HEAD NECK W CONTRAST   Final Result      1.  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 This all may be secondary to vasospasm from multifocal intracranial   hemorrhage and multifocal subarachnoid hemorrhage. No discrete aneurysm.      Electronically signed by Sergio Wagner      CT head without contrast   Final Result      Stable subarachnoid and subdural

## 2025-06-19 NOTE — ED NOTES
Report given to  [facility] paramedic, all questions  addressed at this time.

## 2025-06-19 NOTE — CONSULTS
NEUROCRITICAL CARE CONSULT NOTE     Patient: Ad Lacey MRN: [id number]    YOB: 1950  Age: 74 y.o.  Sex: male   Unit: [facility] ICU TOWER  Room/Bed: 4514/4514-01 Location: [facility]    Date of Consultation: 6/19/2025  Date of Admission: 6/19/2025  2:47 AM ( LOS: 0 days )  Primary Care Physician: No primary care provider on file.   Consult Requested By: Lorenzo Pedraza MD    Reason for Consult: SDH    IMPRESSION & RECOMMENDATIONS     IMPRESSION:  74 y.o. y/o male with history significant for CAD s/p PCI LAD 6/2022, PAD s/p right fem-pop bypass by Dr. Iyer 7/2022, hx of STEMI s/p aspiration thrombectomy, and IVUS guided PCI mid LAD X 1 TRISHA, s/p pLVAD 11/14/2024, CHF, pAF, and chronic pain syndrome who presents to [facility] as a transfer from [facility], with cc of fall down 15 stairs, and was found to have multicompartment intracranial hemorrhage with right subdural hematoma measuring 8 to 10 mm in thickness and resulting in 9 mm of right-to-left midline shift and a moderate amount of subarachnoid hemorrhage in the left frontal lobe.    ICH SCORE:    Component SCORE   GCS 0 - GCS 13-15   ICH Volume 0 - <30ml   Intraventricular Hemorrhage 0 - NO   Infratentorial Origin 0 - NO   Age 0 - < 79 y/o   TOTALS POINTS: 0   ICH score completed 6/19/25 at 03: 22 AM EDT      RECOMMENDATIONS:   NEURO:  NSGY consulted, appreciate recs  Neurologic Exams Q1H  NIHSS on admission & Qshift  Cerebral Edema  Avoid hypotonic fluids  HOB >30  Avoid IJ unless cleared by NCC team    DIAGNOSTIC IMAGING  Head CT - f/u scan in 6 hours (ordered)  Obtain CTA head and neck w/ contrast (ordered)  MRI brain w/ & w/o when able.    ICU MANAGEMENT  Airway Management  Supplemental O2 to maintain SaO2 > 95%  Aspiration Precautions (HOB elevated, Up for meals, mouth care)  Intubate for respiratory distress, GCS < 8, inability to protect airway  Prefer RSI Protocol for intubation  Titrate ventilator to maintain PaO2 >100

## 2025-06-20 ENCOUNTER — HOSPITAL ENCOUNTER (INPATIENT)
Age: 75
LOS: 1 days | Discharge: ANOTHER HOSPITAL | End: 2025-06-21
Attending: PHYSICAL MEDICINE & REHABILITATION | Admitting: PHYSICAL MEDICINE & REHABILITATION
Payer: MEDICARE

## 2025-06-20 VITALS
HEIGHT: 68 IN | WEIGHT: 132.28 LBS | DIASTOLIC BLOOD PRESSURE: 65 MMHG | TEMPERATURE: 98.1 F | SYSTOLIC BLOOD PRESSURE: 103 MMHG | HEART RATE: 69 BPM | BODY MASS INDEX: 20.05 KG/M2 | OXYGEN SATURATION: 98 % | RESPIRATION RATE: 18 BRPM

## 2025-06-20 PROBLEM — S06.5XAA SDH (SUBDURAL HEMATOMA) (HCC): Status: ACTIVE | Noted: 2025-06-20

## 2025-06-20 PROBLEM — E43 SEVERE MALNUTRITION: Chronic | Status: ACTIVE | Noted: 2025-06-20

## 2025-06-20 LAB
ALBUMIN SERPL-MCNC: 4 G/DL (ref 3.4–5)
ANION GAP SERPL CALCULATED.3IONS-SCNC: 12 MMOL/L (ref 3–16)
BASOPHILS # BLD: 0.1 K/UL (ref 0–0.2)
BASOPHILS NFR BLD: 0.3 %
BUN SERPL-MCNC: 22 MG/DL (ref 7–20)
CALCIUM SERPL-MCNC: 9.2 MG/DL (ref 8.3–10.6)
CHLORIDE SERPL-SCNC: 111 MMOL/L (ref 99–110)
CHOLEST SERPL-MCNC: 110 MG/DL (ref 0–199)
CK SERPL-CCNC: 4493 U/L (ref 39–308)
CO2 SERPL-SCNC: 21 MMOL/L (ref 21–32)
CREAT SERPL-MCNC: 0.9 MG/DL (ref 0.8–1.3)
DEPRECATED RDW RBC AUTO: 15.2 % (ref 12.4–15.4)
EOSINOPHIL # BLD: 0.1 K/UL (ref 0–0.6)
EOSINOPHIL NFR BLD: 0.8 %
EST. AVERAGE GLUCOSE BLD GHB EST-MCNC: 122.6 MG/DL
GFR SERPLBLD CREATININE-BSD FMLA CKD-EPI: 89 ML/MIN/{1.73_M2}
GLUCOSE SERPL-MCNC: 93 MG/DL (ref 70–99)
HBA1C MFR BLD: 5.9 %
HCT VFR BLD AUTO: 27.8 % (ref 40.5–52.5)
HDLC SERPL-MCNC: 43 MG/DL (ref 40–60)
HGB BLD-MCNC: 9.3 G/DL (ref 13.5–17.5)
LDLC SERPL CALC-MCNC: 40 MG/DL
LYMPHOCYTES # BLD: 2 K/UL (ref 1–5.1)
LYMPHOCYTES NFR BLD: 13.2 %
MCH RBC QN AUTO: 28.6 PG (ref 26–34)
MCHC RBC AUTO-ENTMCNC: 33.6 G/DL (ref 31–36)
MCV RBC AUTO: 85.1 FL (ref 80–100)
MONOCYTES # BLD: 1.2 K/UL (ref 0–1.3)
MONOCYTES NFR BLD: 8 %
NEUTROPHILS # BLD: 11.7 K/UL (ref 1.7–7.7)
NEUTROPHILS NFR BLD: 77.7 %
PHOSPHATE SERPL-MCNC: 2.1 MG/DL (ref 2.5–4.9)
PLATELET # BLD AUTO: 300 K/UL (ref 135–450)
PMV BLD AUTO: 7.4 FL (ref 5–10.5)
POTASSIUM SERPL-SCNC: 3.7 MMOL/L (ref 3.5–5.1)
RBC # BLD AUTO: 3.26 M/UL (ref 4.2–5.9)
SODIUM SERPL-SCNC: 144 MMOL/L (ref 136–145)
TRIGL SERPL-MCNC: 133 MG/DL (ref 0–150)
VLDLC SERPL CALC-MCNC: 27 MG/DL
WBC # BLD AUTO: 15 K/UL (ref 4–11)

## 2025-06-20 PROCEDURE — 97535 SELF CARE MNGMENT TRAINING: CPT

## 2025-06-20 PROCEDURE — 97116 GAIT TRAINING THERAPY: CPT

## 2025-06-20 PROCEDURE — 2500000003 HC RX 250 WO HCPCS: Performed by: STUDENT IN AN ORGANIZED HEALTH CARE EDUCATION/TRAINING PROGRAM

## 2025-06-20 PROCEDURE — 97530 THERAPEUTIC ACTIVITIES: CPT

## 2025-06-20 PROCEDURE — 6370000000 HC RX 637 (ALT 250 FOR IP): Performed by: PHYSICAL MEDICINE & REHABILITATION

## 2025-06-20 PROCEDURE — 6370000000 HC RX 637 (ALT 250 FOR IP): Performed by: STUDENT IN AN ORGANIZED HEALTH CARE EDUCATION/TRAINING PROGRAM

## 2025-06-20 PROCEDURE — 99233 SBSQ HOSP IP/OBS HIGH 50: CPT | Performed by: INTERNAL MEDICINE

## 2025-06-20 PROCEDURE — 85025 COMPLETE CBC W/AUTO DIFF WBC: CPT

## 2025-06-20 PROCEDURE — 80069 RENAL FUNCTION PANEL: CPT

## 2025-06-20 PROCEDURE — 82550 ASSAY OF CK (CPK): CPT

## 2025-06-20 PROCEDURE — 2500000003 HC RX 250 WO HCPCS: Performed by: PHYSICAL MEDICINE & REHABILITATION

## 2025-06-20 PROCEDURE — 97130 THER IVNTJ EA ADDL 15 MIN: CPT

## 2025-06-20 PROCEDURE — 1280000000 HC REHAB R&B

## 2025-06-20 PROCEDURE — 6360000002 HC RX W HCPCS

## 2025-06-20 PROCEDURE — 6370000000 HC RX 637 (ALT 250 FOR IP)

## 2025-06-20 PROCEDURE — 2500000003 HC RX 250 WO HCPCS

## 2025-06-20 PROCEDURE — 36415 COLL VENOUS BLD VENIPUNCTURE: CPT

## 2025-06-20 PROCEDURE — 2580000003 HC RX 258: Performed by: STUDENT IN AN ORGANIZED HEALTH CARE EDUCATION/TRAINING PROGRAM

## 2025-06-20 PROCEDURE — 92526 ORAL FUNCTION THERAPY: CPT

## 2025-06-20 RX ORDER — ONDANSETRON 2 MG/ML
4 INJECTION INTRAMUSCULAR; INTRAVENOUS EVERY 6 HOURS PRN
Status: CANCELLED | OUTPATIENT
Start: 2025-06-20

## 2025-06-20 RX ORDER — ONDANSETRON 4 MG/1
4 TABLET, ORALLY DISINTEGRATING ORAL EVERY 8 HOURS PRN
Status: DISCONTINUED | OUTPATIENT
Start: 2025-06-20 | End: 2025-06-21 | Stop reason: HOSPADM

## 2025-06-20 RX ORDER — BISACODYL 5 MG/1
5 TABLET, DELAYED RELEASE ORAL DAILY
Status: CANCELLED | OUTPATIENT
Start: 2025-06-20

## 2025-06-20 RX ORDER — ONDANSETRON 2 MG/ML
4 INJECTION INTRAMUSCULAR; INTRAVENOUS EVERY 6 HOURS PRN
Status: DISCONTINUED | OUTPATIENT
Start: 2025-06-20 | End: 2025-06-21 | Stop reason: HOSPADM

## 2025-06-20 RX ORDER — THIAMINE MONONITRATE (VIT B1) 100 MG
100 TABLET ORAL DAILY
Qty: 30 TABLET | Refills: 0 | Status: SHIPPED | OUTPATIENT
Start: 2025-06-21

## 2025-06-20 RX ORDER — DIAZEPAM 5 MG/1
10 TABLET ORAL 2 TIMES DAILY PRN
Status: DISCONTINUED | OUTPATIENT
Start: 2025-06-20 | End: 2025-06-21

## 2025-06-20 RX ORDER — SENNA AND DOCUSATE SODIUM 50; 8.6 MG/1; MG/1
2 TABLET, FILM COATED ORAL DAILY PRN
Qty: 30 TABLET | Refills: 0 | Status: SHIPPED | OUTPATIENT
Start: 2025-06-20

## 2025-06-20 RX ORDER — SENNA AND DOCUSATE SODIUM 50; 8.6 MG/1; MG/1
2 TABLET, FILM COATED ORAL DAILY PRN
Status: DISCONTINUED | OUTPATIENT
Start: 2025-06-20 | End: 2025-06-20 | Stop reason: HOSPADM

## 2025-06-20 RX ORDER — SODIUM CHLORIDE 0.9 % (FLUSH) 0.9 %
5-40 SYRINGE (ML) INJECTION EVERY 12 HOURS SCHEDULED
Status: CANCELLED | OUTPATIENT
Start: 2025-06-20

## 2025-06-20 RX ORDER — CARVEDILOL 12.5 MG/1
12.5 TABLET ORAL 2 TIMES DAILY WITH MEALS
Status: DISCONTINUED | OUTPATIENT
Start: 2025-06-20 | End: 2025-06-21 | Stop reason: HOSPADM

## 2025-06-20 RX ORDER — AMLODIPINE BESYLATE 5 MG/1
5 TABLET ORAL DAILY
Status: DISCONTINUED | OUTPATIENT
Start: 2025-06-21 | End: 2025-06-21 | Stop reason: HOSPADM

## 2025-06-20 RX ORDER — POLYETHYLENE GLYCOL 3350 17 G/17G
17 POWDER, FOR SOLUTION ORAL DAILY PRN
Status: CANCELLED | OUTPATIENT
Start: 2025-06-20

## 2025-06-20 RX ORDER — ATORVASTATIN CALCIUM 80 MG/1
80 TABLET, FILM COATED ORAL NIGHTLY
Status: DISCONTINUED | OUTPATIENT
Start: 2025-06-20 | End: 2025-06-21 | Stop reason: HOSPADM

## 2025-06-20 RX ORDER — SODIUM CHLORIDE, SODIUM LACTATE, POTASSIUM CHLORIDE, CALCIUM CHLORIDE 600; 310; 30; 20 MG/100ML; MG/100ML; MG/100ML; MG/100ML
INJECTION, SOLUTION INTRAVENOUS CONTINUOUS
Status: DISCONTINUED | OUTPATIENT
Start: 2025-06-20 | End: 2025-06-20

## 2025-06-20 RX ORDER — SODIUM CHLORIDE, SODIUM LACTATE, POTASSIUM CHLORIDE, CALCIUM CHLORIDE 600; 310; 30; 20 MG/100ML; MG/100ML; MG/100ML; MG/100ML
INJECTION, SOLUTION INTRAVENOUS CONTINUOUS
Status: CANCELLED | OUTPATIENT
Start: 2025-06-20

## 2025-06-20 RX ORDER — NICOTINE 21 MG/24HR
1 PATCH, TRANSDERMAL 24 HOURS TRANSDERMAL DAILY
Status: DISCONTINUED | OUTPATIENT
Start: 2025-06-21 | End: 2025-06-21

## 2025-06-20 RX ORDER — ACETAMINOPHEN 325 MG/1
650 TABLET ORAL EVERY 4 HOURS PRN
Status: DISCONTINUED | OUTPATIENT
Start: 2025-06-20 | End: 2025-06-21 | Stop reason: HOSPADM

## 2025-06-20 RX ORDER — SODIUM CHLORIDE, SODIUM LACTATE, POTASSIUM CHLORIDE, CALCIUM CHLORIDE 600; 310; 30; 20 MG/100ML; MG/100ML; MG/100ML; MG/100ML
INJECTION, SOLUTION INTRAVENOUS CONTINUOUS
Status: DISCONTINUED | OUTPATIENT
Start: 2025-06-20 | End: 2025-06-20 | Stop reason: HOSPADM

## 2025-06-20 RX ORDER — SODIUM CHLORIDE 0.9 % (FLUSH) 0.9 %
5-40 SYRINGE (ML) INJECTION PRN
Status: DISCONTINUED | OUTPATIENT
Start: 2025-06-20 | End: 2025-06-21 | Stop reason: HOSPADM

## 2025-06-20 RX ORDER — GAUZE BANDAGE 2" X 2"
100 BANDAGE TOPICAL DAILY
Status: DISCONTINUED | OUTPATIENT
Start: 2025-06-21 | End: 2025-06-21 | Stop reason: HOSPADM

## 2025-06-20 RX ORDER — CARVEDILOL 6.25 MG/1
12.5 TABLET ORAL 2 TIMES DAILY WITH MEALS
Status: CANCELLED | OUTPATIENT
Start: 2025-06-20

## 2025-06-20 RX ORDER — ACETAMINOPHEN 325 MG/1
650 TABLET ORAL EVERY 4 HOURS PRN
Status: CANCELLED | OUTPATIENT
Start: 2025-06-20

## 2025-06-20 RX ORDER — HYDROMORPHONE HYDROCHLORIDE 2 MG/1
4 TABLET ORAL EVERY 4 HOURS PRN
Refills: 0 | Status: CANCELLED | OUTPATIENT
Start: 2025-06-20

## 2025-06-20 RX ORDER — LEVETIRACETAM 500 MG/1
500 TABLET ORAL 2 TIMES DAILY
Status: DISCONTINUED | OUTPATIENT
Start: 2025-06-20 | End: 2025-06-21 | Stop reason: HOSPADM

## 2025-06-20 RX ORDER — MORPHINE SULFATE 15 MG/1
60 TABLET, FILM COATED, EXTENDED RELEASE ORAL EVERY 12 HOURS SCHEDULED
Refills: 0 | Status: DISCONTINUED | OUTPATIENT
Start: 2025-06-20 | End: 2025-06-20 | Stop reason: HOSPADM

## 2025-06-20 RX ORDER — HYDROMORPHONE HYDROCHLORIDE 4 MG/1
4 TABLET ORAL EVERY 4 HOURS PRN
Refills: 0 | Status: DISCONTINUED | OUTPATIENT
Start: 2025-06-20 | End: 2025-06-21 | Stop reason: HOSPADM

## 2025-06-20 RX ORDER — CASTOR OIL AND BALSAM, PERU 788; 87 MG/G; MG/G
OINTMENT TOPICAL 2 TIMES DAILY
Status: DISCONTINUED | OUTPATIENT
Start: 2025-06-20 | End: 2025-06-21 | Stop reason: HOSPADM

## 2025-06-20 RX ORDER — AMLODIPINE BESYLATE 5 MG/1
5 TABLET ORAL DAILY
Status: CANCELLED | OUTPATIENT
Start: 2025-06-21

## 2025-06-20 RX ORDER — SODIUM CHLORIDE 0.9 % (FLUSH) 0.9 %
5-40 SYRINGE (ML) INJECTION EVERY 12 HOURS SCHEDULED
Status: DISCONTINUED | OUTPATIENT
Start: 2025-06-20 | End: 2025-06-21 | Stop reason: HOSPADM

## 2025-06-20 RX ORDER — CASTOR OIL AND BALSAM, PERU 788; 87 MG/G; MG/G
OINTMENT TOPICAL 2 TIMES DAILY
Status: CANCELLED | OUTPATIENT
Start: 2025-06-20

## 2025-06-20 RX ORDER — LEVETIRACETAM 500 MG/1
500 TABLET ORAL 2 TIMES DAILY
Qty: 60 TABLET | Refills: 3 | Status: ON HOLD | OUTPATIENT
Start: 2025-06-20 | End: 2025-06-23

## 2025-06-20 RX ORDER — AMLODIPINE BESYLATE 5 MG/1
5 TABLET ORAL DAILY
Qty: 30 TABLET | Refills: 3 | Status: SHIPPED | OUTPATIENT
Start: 2025-06-21

## 2025-06-20 RX ORDER — SODIUM CHLORIDE 0.9 % (FLUSH) 0.9 %
5-40 SYRINGE (ML) INJECTION PRN
Status: CANCELLED | OUTPATIENT
Start: 2025-06-20

## 2025-06-20 RX ORDER — GAUZE BANDAGE 2" X 2"
100 BANDAGE TOPICAL DAILY
Status: CANCELLED | OUTPATIENT
Start: 2025-06-21

## 2025-06-20 RX ORDER — POLYETHYLENE GLYCOL 3350 17 G/17G
17 POWDER, FOR SOLUTION ORAL DAILY PRN
Status: DISCONTINUED | OUTPATIENT
Start: 2025-06-20 | End: 2025-06-21 | Stop reason: HOSPADM

## 2025-06-20 RX ORDER — PANTOPRAZOLE SODIUM 40 MG/1
40 TABLET, DELAYED RELEASE ORAL DAILY
Status: CANCELLED | OUTPATIENT
Start: 2025-06-21

## 2025-06-20 RX ORDER — NICOTINE 21 MG/24HR
1 PATCH, TRANSDERMAL 24 HOURS TRANSDERMAL DAILY
Status: CANCELLED | OUTPATIENT
Start: 2025-06-21

## 2025-06-20 RX ORDER — ONDANSETRON 4 MG/1
4 TABLET, ORALLY DISINTEGRATING ORAL EVERY 8 HOURS PRN
Status: CANCELLED | OUTPATIENT
Start: 2025-06-20

## 2025-06-20 RX ORDER — BISACODYL 5 MG/1
5 TABLET, DELAYED RELEASE ORAL DAILY
Status: DISCONTINUED | OUTPATIENT
Start: 2025-06-20 | End: 2025-06-21 | Stop reason: HOSPADM

## 2025-06-20 RX ORDER — ATORVASTATIN CALCIUM 80 MG/1
80 TABLET, FILM COATED ORAL NIGHTLY
Status: CANCELLED | OUTPATIENT
Start: 2025-06-20

## 2025-06-20 RX ORDER — LEVETIRACETAM 500 MG/1
500 TABLET ORAL 2 TIMES DAILY
Status: CANCELLED | OUTPATIENT
Start: 2025-06-20

## 2025-06-20 RX ORDER — PANTOPRAZOLE SODIUM 40 MG/1
40 TABLET, DELAYED RELEASE ORAL DAILY
Status: DISCONTINUED | OUTPATIENT
Start: 2025-06-21 | End: 2025-06-21 | Stop reason: HOSPADM

## 2025-06-20 RX ADMIN — LEVETIRACETAM 500 MG: 500 TABLET, FILM COATED ORAL at 21:28

## 2025-06-20 RX ADMIN — SODIUM CHLORIDE, PRESERVATIVE FREE 10 ML: 5 INJECTION INTRAVENOUS at 09:52

## 2025-06-20 RX ADMIN — DIAZEPAM 10 MG: 5 TABLET ORAL at 21:28

## 2025-06-20 RX ADMIN — PANTOPRAZOLE SODIUM 40 MG: 40 TABLET, DELAYED RELEASE ORAL at 09:37

## 2025-06-20 RX ADMIN — Medication 100 MG: at 09:37

## 2025-06-20 RX ADMIN — CARVEDILOL 12.5 MG: 6.25 TABLET, FILM COATED ORAL at 09:37

## 2025-06-20 RX ADMIN — ACETAMINOPHEN 650 MG: 325 TABLET ORAL at 09:37

## 2025-06-20 RX ADMIN — DIAZEPAM 10 MG: 10 TABLET ORAL at 11:02

## 2025-06-20 RX ADMIN — HYDROMORPHONE HYDROCHLORIDE 4 MG: 4 TABLET ORAL at 21:28

## 2025-06-20 RX ADMIN — Medication: at 09:54

## 2025-06-20 RX ADMIN — MORPHINE SULFATE 60 MG: 15 TABLET, FILM COATED, EXTENDED RELEASE ORAL at 11:02

## 2025-06-20 RX ADMIN — ACETAMINOPHEN 650 MG: 325 TABLET ORAL at 15:27

## 2025-06-20 RX ADMIN — SODIUM CHLORIDE, SODIUM LACTATE, POTASSIUM CHLORIDE, AND CALCIUM CHLORIDE: .6; .31; .03; .02 INJECTION, SOLUTION INTRAVENOUS at 10:07

## 2025-06-20 RX ADMIN — HYDROMORPHONE HYDROCHLORIDE 4 MG: 2 TABLET ORAL at 05:17

## 2025-06-20 RX ADMIN — LEVETIRACETAM 500 MG: 100 INJECTION INTRAVENOUS at 04:15

## 2025-06-20 RX ADMIN — HYDROMORPHONE HYDROCHLORIDE 4 MG: 2 TABLET ORAL at 13:29

## 2025-06-20 RX ADMIN — Medication: at 21:27

## 2025-06-20 RX ADMIN — SODIUM CHLORIDE, PRESERVATIVE FREE 10 ML: 5 INJECTION INTRAVENOUS at 21:29

## 2025-06-20 RX ADMIN — ATORVASTATIN CALCIUM 80 MG: 80 TABLET, FILM COATED ORAL at 21:28

## 2025-06-20 RX ADMIN — HYDROMORPHONE HYDROCHLORIDE 4 MG: 2 TABLET ORAL at 00:21

## 2025-06-20 RX ADMIN — HYDROMORPHONE HYDROCHLORIDE 4 MG: 4 TABLET ORAL at 17:35

## 2025-06-20 RX ADMIN — HYDROMORPHONE HYDROCHLORIDE 4 MG: 2 TABLET ORAL at 09:37

## 2025-06-20 RX ADMIN — AMLODIPINE BESYLATE 5 MG: 5 TABLET ORAL at 09:37

## 2025-06-20 ASSESSMENT — PAIN - FUNCTIONAL ASSESSMENT
PAIN_FUNCTIONAL_ASSESSMENT: ACTIVITIES ARE NOT PREVENTED

## 2025-06-20 ASSESSMENT — PAIN DESCRIPTION - DESCRIPTORS
DESCRIPTORS: ACHING;DISCOMFORT
DESCRIPTORS: ACHING;SORE
DESCRIPTORS: DISCOMFORT
DESCRIPTORS: DISCOMFORT;ACHING
DESCRIPTORS: ACHING
DESCRIPTORS: DISCOMFORT;ACHING
DESCRIPTORS: DISCOMFORT;ACHING

## 2025-06-20 ASSESSMENT — PAIN DESCRIPTION - PAIN TYPE
TYPE: CHRONIC PAIN

## 2025-06-20 ASSESSMENT — PAIN DESCRIPTION - LOCATION
LOCATION: LEG
LOCATION: HIP;LEG
LOCATION: LEG
LOCATION: LEG
LOCATION: HIP

## 2025-06-20 ASSESSMENT — PAIN DESCRIPTION - ONSET
ONSET: ON-GOING

## 2025-06-20 ASSESSMENT — ENCOUNTER SYMPTOMS
GASTROINTESTINAL NEGATIVE: 1
RESPIRATORY NEGATIVE: 1
EYES NEGATIVE: 1

## 2025-06-20 ASSESSMENT — PAIN SCALES - GENERAL
PAINLEVEL_OUTOF10: 0
PAINLEVEL_OUTOF10: 10
PAINLEVEL_OUTOF10: 8
PAINLEVEL_OUTOF10: 10
PAINLEVEL_OUTOF10: 10
PAINLEVEL_OUTOF10: 9
PAINLEVEL_OUTOF10: 10
PAINLEVEL_OUTOF10: 10
PAINLEVEL_OUTOF10: 0
PAINLEVEL_OUTOF10: 8
PAINLEVEL_OUTOF10: 10
PAINLEVEL_OUTOF10: 0
PAINLEVEL_OUTOF10: 10

## 2025-06-20 ASSESSMENT — PAIN DESCRIPTION - ORIENTATION
ORIENTATION: RIGHT

## 2025-06-20 ASSESSMENT — PAIN DESCRIPTION - FREQUENCY
FREQUENCY: CONTINUOUS

## 2025-06-20 NOTE — PROGRESS NOTES
[facility] - Acute Rehab Unit   After review, this patient is felt to be:       [x]  Dr. Trivedi states this patient is appropriate for rehab.     []  Not appropriate for Acute Inpatient Rehab    []  Referral received and ARU reviewing patient      Can admit to [facility] ARU when medically ready.   Will notify CM/SW with further updates. Thank you for the referral.    Leesa GIBBS, OTR/L  Clinical Liaison- [facility]   (P): [phone]  (F): [phone]

## 2025-06-20 NOTE — CONSULTS
Ad Lacey  6/20/2025  [id number]    Chief Complaint: Subdural hematoma (HCC)    Subjective:   This is a 74-year-old male with a past medical history including:  A-fib, CAD, NSTEMI, heart failure, PAD status post femoropopliteal bypass  Who came into the hospital after being found down at home.  Patient was confused at this time.  Imaging showed right subdural hematoma secondary to fall repeat CTh stable patient was placed on Keppra 500 mg twice daily for 7 days.  Patient is a nonsurgical candidate.  Goal BP less than 160.  Patient continues to be limited in functional mobility and ADLs.  He is interested in coming to the acute rehab unit for functional mobility and retraining before discharge home.  ROS: No CP, SOB, dyspnea    Objective:  Patient Vitals for the past 24 hrs:   BP Temp Temp src Pulse Resp SpO2   06/20/25 0800 (!) 148/71 98.2 °F (36.8 °C) Oral 74 21 --   06/20/25 0700 (!) 163/121 -- -- 83 18 --   06/20/25 0600 (!) 142/83 -- -- 76 27 98 %   06/20/25 0547 -- -- -- -- 18 --   06/20/25 0500 (!) 147/64 -- -- 77 21 --   06/20/25 0400 125/78 97.5 °F (36.4 °C) Temporal 91 12 98 %   06/20/25 0300 118/74 -- -- 65 18 --   06/20/25 0200 114/82 -- -- 74 16 97 %   06/20/25 0100 115/74 -- -- 80 19 --   06/20/25 0051 -- -- -- -- 18 --   06/20/25 0000 131/74 97.4 °F (36.3 °C) Temporal 67 14 97 %   06/19/25 2300 (!) 144/86 -- -- 74 23 --   06/19/25 2200 132/62 -- -- 71 14 98 %   06/19/25 2100 (!) 113/95 -- -- 61 23 92 %   06/19/25 2050 -- -- -- -- 18 --   06/19/25 2000 125/69 97.3 °F (36.3 °C) Temporal 61 16 97 %   06/19/25 1900 115/72 -- -- 57 20 99 %   06/19/25 1837 124/89 -- -- 69 13 98 %   06/19/25 1822 -- -- -- 68 14 --   06/19/25 1800 -- -- -- 74 17 96 %   06/19/25 1700 120/69 -- -- 58 21 97 %   06/19/25 1600 119/71 97.2 °F (36.2 °C) Temporal 55 21 96 %   06/19/25 1553 -- -- -- 59 16 100 %   06/19/25 1523 -- -- -- 65 18 100 %   06/19/25 1500 122/60 -- -- 58 24 98 %   06/19/25 1425 113/72 -- -- 66 19 --

## 2025-06-20 NOTE — PROGRESS NOTES
ARU PATIENT TREATMENT PLAN  [id number] [facility] [place] [address]  [phone]    Ad Lacey    : 1950  [id number]  MRN: [id number]  PHYSICIAN:  Hugh Trivedi DO  Primary Problem    Patient Active Problem List   Diagnosis    Subdural hematoma (HCC)    Severe malnutrition    SDH (subdural hematoma) (HCC)       Rehabilitation Diagnosis:  {ambiguous abbreviation:[id number]::\"Stroke, 1.1, Left Body (R Brain)\",\"Stroke, 1.2, Right Body (L Brain)\",\"Stroke, 1.3, Bilateral Involvement\",\"Stroke, 1.4, No Paresis\",\"Stroke, 1.9, Other Stroke\",\"Brain, 2.1, Non-Traumatic\",\"Brain, 2.21, Traumatic, open injury\",\"2.22, Traumatic, closed injury\",\"Neurologic, 3.1, Multiple Sclerosis\",\"Neurologic, 3.2, Parkinsonism\",\"Neurologic, 3.3, Polyneuropathy\",\"Neurologic, 3.4, Guillain-Manchester Syndrome\",\"Neurologic, 3.5, Cerebral Palsy\",\"Neurologic, 3.8, Neuromuscular Disorders, e.g. Critical Illness Myopathy, Other Myopathy\",\"Neurologic, 3.9, Other Neurologic, Other Demyelinating disease of CNS\",\"Spinal Cord Dysfunction - Non-traumatic, 4.110, Paraplegia, unspecified\",\"Spinal Cord Dysfunction - Non-traumatic, 4.111, Paraplegia, incomplete\",\"Spinal Cord Dysfunction - Non-traumatic, 4.112, Paraplegia, complete\",\"Spinal Cord Dysfunction - Non-traumatic, 4.120, Quadriplegia, unspecified\",\"Spinal Cord Dysfunction - Non-traumatic, 4.1211, Quadriplegia, Incomplete C1-4\",\"Spinal Cord Dysfunction - Non-traumatic, 4.1212, Quadriplegia, Incomplete C5-8\",\"Spinal Cord Dysfunction - Non-traumatic, 4.1221, Quadriplegia, Complete C1-4\",\"Spinal Cord Dysfunction - Non-traumatic, 4.1222, Quadriplegia, Complete C5-8\",\"Spinal Cord Dysfunction - Non-traumatic, 4.130, Other NTSCI\",\"Spinal Cord Dysfunction - Traumatic, 4.210, Paraplegia, Unspecified\",\"Spinal Cord Dysfunction - Traumatic, 4.211, Paraplegia, Incomplete\",\"Spinal Cord Dysfunction - Traumatic, 4.212, Paraplegia, Complete\",\"Spinal Cord

## 2025-06-20 NOTE — PLAN OF CARE
Brief note:  74M with PMH of CAD S/P TRISHA on Plavix who had an unwitnessed fall at the [facility] facility.  Patient was a poor historian, unclear recent of fall.  He denies dizziness, chest pain, shortness of breath, weakness, prior to incident.  CT head showed subdural hematoma and multicompartment ICH.  As well as subdural hemorrhage. CTA was show stenosis and basilar artery and left M2  - Reportedly, patient supposed to take Xarelto aspirin and Brilinta per cardiology's notes.  However, patient stated he is only taking aspirin and Plavix.  Unclear to last dose.  aXa level was negative.  ADP was elevated.    Reports 10/10 generalized chronic pain  Denies all other complaints at time of exam  MRI imaging pending     PHYSICAL EXAM:  Vitals:    06/19/25 1800 06/19/25 1822 06/19/25 1837 06/19/25 2000   BP:   124/89 125/69   Pulse: 74 68 69 61   Resp: 17 14 13 16   Temp:    97.3 °F (36.3 °C)   TempSrc:    Temporal   SpO2: 96%  98% 97%   Weight:       Height:             General: Alert, no distress, well-nourished  Neurologic  Mental status:   orientation to person, place, time, situation   Attention intact as able to attend well to the exam     Language fluent in conversation   Comprehension intact; follows simple commands    GCS: 15  4 - Opens eyes on own  5 - Alert and oriented  6 - Follows simple motor commands      Cranial nerves:   CN2: Visual fields full w/o extinction on confrontational testing   CN 3,4,6: Pupils equal and reactive to light, extraocular muscles intact  CN5: Facial sensation symmetric   CN7: Face symmetric  CN8: Hearing symmetric to spoken voice  CN9: Palate elevated symmetrically  CN11: Traps full strength on shoulder shrug  CN12: Tongue midline with protrusion    Motor Exam:   R  L    Deltoid 5  5   Biceps 4 4   Triceps 4 4   Wrist extension  5 5   Interossei 5 5      R  L    Hip flexion  4  5   Hip extension  4 5   Knee flexion  4 5   Knee extension  4 5   Ankle dorsiflexion  4 5   Ankle  plantar flexion  4 5       Sensory: light touch intact and symmetric in all 4 extremities.  No sensory extinction on bilateral simultaneous stimulation  Cerebellar/coordination: finger nose finger normal without ataxia  Tone: normal in all 4 extremities  Gait: Deferred for safety    OTHER SYSTEMS:  Cardiovascular: Warm, appears well perfused   Respiratory: Easy, non-labored respiratory pattern   Abdominal: Abdomen is without distention   Extremities: Upper and lower extremities are atraumatic in appearance without deformity. No swelling or erythema.

## 2025-06-20 NOTE — PLAN OF CARE
Problem: Pain  Goal: Verbalizes/displays adequate comfort level or baseline comfort level  6/19/2025 2331 by Angelica Matson RN  Outcome: Not Progressing  Flowsheets  Taken 6/19/2025 2331 by Angelica Matson RN  Verbalizes/displays adequate comfort level or baseline comfort level:   Encourage patient to monitor pain and request assistance   Administer analgesics based on type and severity of pain and evaluate response   Assess pain using appropriate pain scale   Implement non-pharmacological measures as appropriate and evaluate response  Taken 6/19/2025 1600 by Maxi Galo RN  Verbalizes/displays adequate comfort level or baseline comfort level: Assess pain using appropriate pain scale  6/19/2025 1533 by Maxi Galo RN  Outcome: Progressing  Flowsheets (Taken 6/19/2025 1200)  Verbalizes/displays adequate comfort level or baseline comfort level: Assess pain using appropriate pain scale       Problem: ABCDS Injury Assessment  Goal: Absence of physical injury  6/19/2025 2331 by Angelica Matson RN  Outcome: Progressing  Flowsheets (Taken 6/19/2025 2331)  Absence of Physical Injury: Implement safety measures based on patient assessment  6/19/2025 1533 by Maxi Galo RN  Outcome: Progressing

## 2025-06-20 NOTE — CARE COORDINATION
PATH-7 Caregiver Assessment Tool    N/A - pt agreeable to ARU at DC, CM will re-evaluate if DCP changes prior to DC.    Case Management recommendations from information gathered from PATH-7 Caregiver Assessment include: None- no additional needs identified at this time    Thank you  Liliya Ibrahim RN, BSN,   ICU   [phone]

## 2025-06-20 NOTE — PROGRESS NOTES
Occupational Therapy  Facility/Department: [facility] ICU  Daily Treatment Note  NAME: Ad Lacey  : 1950  MRN: [id number]    Date of Service: 2025    Discharge Recommendations:  Subacute/Skilled Nursing Facility  OT Equipment Recommendations  Equipment Needed: No    Patient Diagnosis(es): There were no encounter diagnoses.     Assessment   Assessment: Pt is 74y M from home w/ son. Pt is IND at basleine w/ BADL and fxl mobility (RW at  baseline). Pt is hospitalized s/p fall at home w/ resulting SDH. Pt is unaware of how fall occurred, or how long he was \"out for\". Pt presently Antony w/ fxl mobility w/ RW - though had one LOB which required ModA to correct. Pt declined ADLs in session, thgou anticipate pt to be Antony for LB ADLs and transfers for ADLs. Pt functioning below baseline, and may benefit from cont'd skilled OT while inpt, and after acute d/c, to maximize safety / IND / fxl act avelino and decreasing burden of care needed for ADL and fxl mobility tasks. Safety concerns w/ d/c to home d/t decreased insight - Will follow as inpt per POC.  Activity Tolerance: Patient tolerated treatment well  Discharge Recommendations: Subacute/Skilled Nursing Facility  Equipment Needed: No     Plan  Occupational Therapy Plan  Times Per Week: 2-5x/week    Restrictions  Full code; up with assistance    Subjective  Subjective  Subjective: Pt semi supine on approach, agreed to tx  Pain: not reported in session  Orientation  Overall Orientation Status: Within Functional Limits  Pain: pt denies pain  Cognition  Arousal/Alertness: Appears intact  Following Commands: Appears intact  Attention Span: Attends with cues to redirect  Memory: Decreased recall of recent events;Decreased short term memory  Safety Judgement: Decreased awareness of need for assistance;Decreased awareness of need for safety  Problem Solving: Decreased awareness of errors  Cognition Comment: Pt tangential t/o session - decreased awareness / insight into  Inpatient Daily Activity Raw Score: 18  AM-PAC Inpatient ADL T-Scale Score : 38.66  ADL Inpatient CMS 0-100% Score: 46.65  ADL Inpatient CMS G-Code Modifier : CK    This note will serve as discharge if patient discharges prior to next treatment.     Therapy Time   Individual Concurrent Group Co-treatment   Time In 1319     1252   Time Out 1330     1319   Minutes 11     27   Timed Code Treatment Minutes: 38 Minutes     Marilu Bettencourt, MOT-OTR/L [id number]

## 2025-06-20 NOTE — PROGRESS NOTES
Hospital Medicine Progress Note      Date of Admission: 6/19/2025  Hospital Day: 2    Chief Admission Complaint:  fall     Subjective:  Patient is without complaints. No adverse interval events. T/f to floor. CK seems to be at plateau. Continue fluids and repeat this evening. If down trending then can go to ARU.       Presenting Admission History:       Patient is a 74 y.o. male with PMHx of pAfib, CAD c/b STEMI s/p TRISHA, HFimpEF, and PAD s/p fem-pop-bypass who presented to [facility] ED after being found down.     Patient was unable to recall the event. He appeared slightly confused initially and was unable to correctly state year but was oriented to place and person and was eventually able to state correct year. Patient endorsed having fallen near the stairs but was unable to recall any preceding details. Patient was found by the son who returned from work at 5:30pm. Son stated that he last talked to the patient at 2pm and on the patient was immediately responsive/arousable though having some confused and slurred speech. Son stated that patient's walker was also found tipped over in front of him and ornaments near the stairs were also found fallen in the vicinity. Son states that patient had been having difficulty with RLE pain and had taken some old gabapentin that morning.     Assessment/Plan:      Current Principal Problem:  Subdural hematoma (HCC)    R subdural hematoma, SAH 2/2 fall  Patient presenting after being found down at the bottom of stairs noted to have multi-compartment ICH, R SDH, and SAH. No neurologic deficits apart from slightly confused and slurred speech in ED. DDAVP and Balfaxar given in ED given pt was supposed to be on both Xarelto and aspirin per chart review though it was unclear if he had been adhering to his regimen. Confusion and slurred speech appearing improved followed transfer to [facility]. Fall appears to be mechanical given hx of RLE pain/numbness 2/2 to PAD vs polypharmacy given pt  Full Code     Physical Exam Performed:      General: NAD  Eyes: EOMI  ENT: neck supple  Cardiovascular: Regular rate.  Respiratory: Clear to auscultation  Gastrointestinal: Soft, non tender  Genitourinary: no suprapubic tenderness  Musculoskeletal: No edema  Skin: warm, dry  Neuro: Alert.  Psych: Mood appropriate.     /72   Pulse 64   Temp 98.1 °F (36.7 °C) (Oral)   Resp 18   Ht 1.727 m (5' 7.99\")   Wt 60 kg (132 lb 4.4 oz)   SpO2 98%   BMI 20.12 kg/m²     Diet: ADULT DIET; Regular  ADULT ORAL NUTRITION SUPPLEMENT; Breakfast, Dinner; Standard High Calorie/High Protein Oral Supplement  DVT Prophylaxis: []PPx LMWH  []SQ Heparin  []IPC/SCDs  []Eliquis  []Xarelto  []Coumadin  []Other -      Code status: Full Code  PT/OT Eval Status:   []NOT yet ordered  []Ordered and Pending   []Seen with Recommendations for:  []Home independently  []Home w/ assist  []HHC  []SNF  []Acute Rehab    Anticipated Discharge Day/Date:  tomorrow    Anticipated Discharge Location: []Home  []HHC  []SNF  []Acute Rehab  []ECF  []LTAC  []Hospice  []Other -      Consults:      IP CONSULT TO NEUROSURGERY  IP CONSULT TO NEUROCRITICAL CARE  IP CONSULT TO CRITICAL CARE  IP CONSULT TO CASE MANAGEMENT  IP CONSULT TO PHARMACY  IP CONSULT TO SOCIAL WORK  IP CONSULT TO PHYSICAL MEDICINE REHAB  IP CONSULT TO CASE MANAGEMENT  IP CONSULT TO DIETITIAN      This patient has a high likelihood of being discharged tomorrow and is appropriate for A1 Discharge Unit in AM pending clinical course overnight: []Yes  []No    ------------------------------------------------------------------------------------------------------------------------------------------------------------------------    MDM - level 3  [] High (any 2):    A. Problems (any 1):  [] Acute/Chronic Illness/injury posing threat to life or bodily function:    [] Severe exacerbation of chronic illness:    ---------------------------------------------------------------------  B. Risk of Treatment

## 2025-06-20 NOTE — PROGRESS NOTES
Speech Language Pathology  Facility/Department:[facility] ICU  Dysphagia / speech/lang/cog tx notes    Name: Ad Lacey  : 1950  MRN: [id number]                                                     Patient Diagnosis(es):   Patient Active Problem List    Diagnosis Date Noted    Subdural hematoma (HCC) 2025     No past medical history on file.  No past surgical history on file.    History of Present Illness  Per MD notes:  \" Patient is a 74 y.o. male with PMHx of pAfib, CAD c/b STEMI s/p TRISHA, HFimpEF, and PAD s/p fem-pop-bypass who presented to [facility] ED after being found down.  Patient was unable to recall the event. He appeared slightly confused initially and was unable to correctly state year but was oriented to place and person and was eventually able to state correct year. Patient endorsed having fallen near the stairs but was unable to recall any preceding details. Patient was found by the son who returned from work at 5:30pm. Son stated that he last talked to the patient at 2pm and on the patient was immediately responsive/arousable though having some confused and slurred speech. Son stated that patient's walker was also found tipped over in front of him and ornaments near the stairs were also found fallen in the vicinity. Son states that patient had been having difficulty with RLE pain and had taken some old gabapentin that morning.  \"    Imaging  US RENAL COMPLETE   Final Result      Bilateral renal cysts with no gross hydronephrosis.      Electronically signed by Blanco Lopez MD      CTA HEAD NECK W CONTRAST   Final Result      1.  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 This all may be secondary to vasospasm from multifocal intracranial   hemorrhage and multifocal subarachnoid hemorrhage. No discrete aneurysm.      Electronically signed by Sergio Wagner      CT head without contrast   Final Result      Stable subarachnoid and subdural  recommendation  6/19: Educated pt to purpose of visit, s/s of aspiration, concern if aspiration occurs and rationale for NPO, oral care with ice chips, sips of water today.  Explained will plan to re-assess next session, with possibility of MBS study and rationale.  Pt stated partial comprehension   Cont goal  6/20: reviewed education including rationale for diet recs, strategies to improve safety of swallow. Instructed to notify staff if any signs of aspiration emerge and concern if aspiration occurs. Pt stated comprehension  Cont goal    Goal 2: Patient will participate in repeat bedside swallowing assessment.  6/20: pt much more alert, oriented fully.  Pt ate sandwich brought in by family last night before RN could stop him.  Pt reportedly did well.  Pt re-assessed with puree, thin liquids including 3 ounces of uninterrupted swallows and solid textures. Pt also took multiple medications whole followed by water adequately. No overt signs of aspiration emerged this date. Swallow was not audible and extra swallows were not observed, as during initial session.  Pt demonstrated adequate mastication with solid textures, no oral residue/pocketing.  Goal met    New goal:  Goal 3: Pt will consume PO safely without signs or symptoms of aspiration       Speech Goals:  The pt will be seen  3-4 x wk to address the following goals  1- the patient will name 3 strategies for improved speech intelligibility  6/19: educated pt to strategies for improved speech intelligibility through verbal instructions and demonstration. Pt stated comprehension but will benefit from cont education  Cont goal  6/20: pt did not recall strategies from initial session. Speech much improved, pt states baseline. Reviewed strategies for improved intelligibility/articulatory precision to utilize as needed. Pt stated comprehension  Cont goal    2- The patient will use strategies for improved speech intelligibility with mod cues in conversation  6/20  as

## 2025-06-20 NOTE — PROGRESS NOTES
NEUROSURGERY PROGRESS NOTE    6/20/2025 9:58 PM                               Ad Lacey                      LOS: 1 day       Subjective:  No acute events overnight. Patient has no headache and neuro exam remains stable.       Physical Exam:  Patient seen and examined    Vitals:    06/20/25 1500   BP: 103/65   Pulse: 69   Resp:    Temp:    SpO2:      GCS:  4 - Opens eyes on own  4 - Seems confused, disoriented  6 - Follows simple motor commands  General: Well developed. Alert and cooperative in no acute distress.     HENT: atraumatic, neck supple  Eyes: Optic discs: Not tested  Pulmonary: unlabored respiratory effort  Cardiovascular:  Warm well perfused. No peripheral edema  Gastrointestinal: abdomen soft, NT, ND     Neurological:  Mental Status: Awake, alert, oriented x 3, disoriented to situation  Attention: Intact  Language: No aphasia or dysarthria noted  Sensation: Intact to all extremities to light touch  Coordination: Intact     Cranial Nerves:  II: Visual acuity not tested, denies new visual changes / diplopia  III, IV, VI: PERRL, 3 mm bilaterally, EOMI, no nystagmus noted  V: Facial sensation intact bilaterally to touch  VII: Face symmetric  VIII: Hearing intact bilaterally to spoken voice  IX: Palate movement equal bilaterally  XI: Shoulder shrug equal bilaterally  XII: Tongue midline     Musculoskeletal:   Gait: Not tested   Assist devices: None   Tone: Normal  Motor strength: RLE exam limited 2/2 Right hip pain    Right  Left      Right  Left    Deltoid  4 4   Hip Flex  3 4+   Biceps  4 4   Knee Extensors  3 4+   Triceps  4 4   Knee Flexors  3 4+   Wrist Ext  4+ 4+   Ankle Dorsiflex.  4+ 4+   Wrist Flex  4+ 4+   Ankle Plantarflex.  4+ 4+   Handgrip  4+ 4+   Ext Varinder Longus  4+ 4+   Thumb Ext  4+ 4+              Radiological Findings:  CT HEAD WO CONTRAST, CT FACIAL BONES WO CONTRAST, & CT CERVICAL SPINE WO CONTRAST  Result Date: 6/19/2025  1. Multi compartment intracranial hemorrhage with right

## 2025-06-20 NOTE — DISCHARGE INSTR - COC
Continuity of Care Form    Patient Name: Ad Lacey   :  1950  MRN:  [id number]    Admit date:  2025  Discharge date:  ***    Code Status Order: Full Code   Advance Directives:     Admitting Physician:  No admitting provider for patient encounter.  PCP: No primary care provider on file.    Discharging Nurse: ***  Discharging Hospital Unit/Room#: 4514/4514-01  Discharging Unit Phone Number: ***    Emergency Contact:   Extended Emergency Contact Information  Primary Emergency Contact: Jacobo Lacey  Mobile Phone: [phone]  Relation: Child  Preferred language: English   needed? No  Secondary Emergency Contact: Franklyn LARIOS  Mobile Phone: [phone]  Relation: Friend    Past Surgical History:  No past surgical history on file.    Immunization History:   Immunization History   Administered Date(s) Administered    COVID-19, PFIZER GRAY top, DO NOT Dilute, (age 12 y+), IM, 30 mcg/0.3 mL 2022    COVID-19, PFIZER PURPLE top, DILUTE for use, (age 12 y+), 30mcg/0.3mL 2022       Active Problems:  Patient Active Problem List   Diagnosis Code    Subdural hematoma (HCC) S06.5XAA    Severe malnutrition E43       Isolation/Infection:   Isolation            No Isolation          Patient Infection Status    None to display         Nurse Assessment:  Last Vital Signs: /71   Pulse 66   Temp 98.1 °F (36.7 °C) (Oral)   Resp 18   Ht 1.727 m (5' 7.99\")   Wt 60 kg (132 lb 4.4 oz)   SpO2 98%   BMI 20.12 kg/m²     Last documented pain score (0-10 scale): Pain Level: 10  Last Weight:   Wt Readings from Last 1 Encounters:   25 60 kg (132 lb 4.4 oz)     Mental Status:  {IP PT MENTAL STATUS:}    IV Access:  {[facility] IV ACCESS:[id number]}    Nursing Mobility/ADLs:  Walking   {CHP DME ADLs:051313289}  Transfer  {CHP DME ADLs:822636729}  Bathing  {CHP DME ADLs:943946832}  Dressing  {CHP DME ADLs:103500681}  Toileting  {CHP DME ADLs:132152080}  Feeding  {CHP DME ADLs:106247407}  Med

## 2025-06-20 NOTE — PROGRESS NOTES
Comprehensive Nutrition Assessment    RECOMMENDATIONS:  PO Diet: Regular, monitor and encourage intake  Nutrition Supplement: Start Ensure Plus HP BID  Nutrition Education: No recommendation at this time     NUTRITION ASSESSMENT:   Nutritional summary & status: Patient admitted after fall on stairs resulting in SDH and currently in ICU. Pt agitated this am, currently NPO, OK for diet advancement per MD as he passed bedside swallow. Pt with DARLINE on admit, LR running and discussed scheduling bowel regimen with resident. Patient with significant fat and muscle wasting noted, regular diet ordered and RD will add oral nutritional supplement as well.   Admission // PMH: SDH // CAD, AFib, HF, PAD    MALNUTRITION ASSESSMENT  Context of Malnutrition: Acute Illness   Malnutrition Status: Severe malnutrition  Findings of the 6 clinical characteristics of malnutrition (Minimum of 2 out of 6 clinical characteristics is required to make the diagnosis of moderate or severe Protein Calorie Malnutrition based on AND/ASPEN Guidelines):  Energy Intake:  Mild decrease in energy intake  Weight Loss:  Unable to assess (Limited hx)     Body Fat Loss:  Moderate body fat loss Triceps, Fat Overlying Ribs   Muscle Mass Loss:  Moderate muscle mass loss Clavicles (pectoralis & deltoids), Calf (gastrocnemius)  Fluid Accumulation:  No fluid accumulation     Strength:  Not Performed    NUTRITION DIAGNOSIS   Inadequate oral intake related to acute injury/trauma, cognitive or neurological impairment as evidenced by NPO or clear liquid status due to medical condition    Nutrition Monitoring and Evaluation:   Food/Nutrient Intake Outcomes:  Food and Nutrient Intake, Supplement Intake  Physical Signs/Symptoms Outcomes:  Biochemical Data, Chewing or Swallowing, GI Status, Meal Time Behavior, Nutrition Focused Physical Findings, Skin     OBJECTIVE DATA: Significant to nutrition assessment  Nutrition Related Findings: No BM, Phos 2.1-

## 2025-06-20 NOTE — PROGRESS NOTES
Physician Progress Note      PATIENT:               KRUPA HERNANDEZ  CSN #:                  [id number]  :                       1950  ADMIT DATE:       2025 2:47 AM  DISCH DATE:  RESPONDING  PROVIDER #:        Kaylee Dennison NP          QUERY TEXT:    Dear Kaylee Dennison, APRN-CNP,  This patient with noted R subdural hematoma and subarachnoid hemorrhage 2/2 to   a fall down stairs. CT showed \"approximately 9 mm of right to left midline   shift\"  Based on your medical judgment, please clarify these findings and document if   any of the following are being evaluated and/or treated:    The clinical indicators include:   ED s/p fall found at bottom of 15 step flight of stairs  AMS, \"somnolent on arrival\", \"slightly confused, slurred speech\"  Hx A-fib, CAD, STEMI, HFimpEF, PAD, on ASA and Plavix  CT head-intracranial hemorrhage R subdural hematoma, R to L midline shift,   moderate amount SAH  tx Keppra, DDAVP, Balfaxar, dexamethasome, Cardene gtt, Neuro checks, Neuro   consult, MRI, CTA, repeat CT  Options provided:  -- Brain compression as evidenced by midline shift on CT scan  -- Reported midline shift on CT scan not clinically significant  -- Other - I will add my own diagnosis  -- Disagree - Not applicable / Not valid  -- Disagree - Clinically unable to determine / Unknown  -- Refer to Clinical Documentation Reviewer    PROVIDER RESPONSE TEXT:    This patient has brain compression as evidenced by midline shift on CT scan.    Query created by: Bridget Deal on 2025 3:17 PM      Electronically signed by:  Kaylee Dennison NP 2025 11:45 PM

## 2025-06-20 NOTE — PLAN OF CARE
Problem: Discharge Planning  Goal: Discharge to home or other facility with appropriate resources  Outcome: Adequate for Discharge     Problem: Safety - Adult  Goal: Free from fall injury  Outcome: Adequate for Discharge     Problem: Skin/Tissue Integrity  Goal: Skin integrity remains intact  Description: 1.  Monitor for areas of redness and/or skin breakdown  2.  Assess vascular access sites hourly  3.  Every 4-6 hours minimum:  Change oxygen saturation probe site  4.  Every 4-6 hours:  If on nasal continuous positive airway pressure, respiratory therapy assess nares and determine need for appliance change or resting period  Outcome: Adequate for Discharge     Problem: Neurosensory - Adult  Goal: Achieves stable or improved neurological status  Outcome: Adequate for Discharge  Goal: Absence of seizures  Outcome: Adequate for Discharge  Goal: Achieves maximal functionality and self care  Outcome: Adequate for Discharge     Problem: Metabolic/Fluid and Electrolytes - Adult  Goal: Electrolytes maintained within normal limits  Outcome: Adequate for Discharge  Goal: Hemodynamic stability and optimal renal function maintained  Outcome: Adequate for Discharge  Goal: Glucose maintained within prescribed range  Outcome: Adequate for Discharge     Problem: ABCDS Injury Assessment  Goal: Absence of physical injury  Outcome: Adequate for Discharge     Problem: Seizure Precautions  Goal: Remains free of injury related to seizures activity  Outcome: Adequate for Discharge     Problem: Pain  Goal: Verbalizes/displays adequate comfort level or baseline comfort level  Outcome: Adequate for Discharge     Problem: Skin/Tissue Integrity - Adult  Goal: Skin integrity remains intact  Description: 1.  Monitor for areas of redness and/or skin breakdown  2.  Assess vascular access sites hourly  3.  Every 4-6 hours minimum:  Change oxygen saturation probe site  4.  Every 4-6 hours:  If on nasal continuous positive airway pressure,

## 2025-06-20 NOTE — PROGRESS NOTES
Neuro exam unchanged overnight.  Complaints overnight of inadequate pain management. All PRN pain medications given.  Frequently attempting to get out of bed and setting off bed alarm. All fall precautions in place.  Pt becomes agitated and argumentative with education and redirection.  Plan for MRI today. Pt states he is claustrophobic and doesn't like the idea of getting an MRI. MD aware.  VERONIKA Dominguez

## 2025-06-20 NOTE — PROGRESS NOTES
ARU Admission Assessment    Ethnicity  \"Are you of , /a, or [place] origin?\"  Check all that apply:  [x] A.  No, not of , /a, or [place] Origin  [] B.  Yes, Prydeinig, [place] American, Chicano/a  [] C.  Yes, Sierra Leonean  [] D.  Yes, Yeyo  [] E.  Yes, another , , or [place] origin  [] X.  Patient unable to respond  [] Y.  Patient declines to respond    Race  \"What is your race?\"  Check all that apply:  [x] A.  White  [] B.  Black or   [] C.   or   [] D.   Malaysian  [] E.  Chinese  [] F.  Sudanese  [] G. Sierra Leonean  [] H.  Albanian  [] I.  Korean  [] J.  Other   [] K.    [] L.  Bulgarian or Emerald  [] M.  Costa Rican  [] N.  Other   [] X.  Patient unable to respond  [] Y.  Patient declines to respond  [] Z.  None of the above    Language  A.  \"What is your preferred language?\"   english    B.  \"Do you need or want an  to communicate with a doctor or health care staff?\"  Check only one:  [x] 0.  No  [] 1.  Yes  [] 9.  Unable to determine    Transportation  \"Has lack of transportation kept you from medical appointments, meetings, work, or from getting things needed for daily living?\"Check all that apply:  [] A.  Yes, it has kept me from medical appointments or from getting my medications  [] B.  Yes, it has kept me from non-medical meetings, appointments, work, or from getting things that I need  [x] C.  No  [] X.  Patient unable to respond  [] Y.  Patient declines to respond    Hearing  Ability to hear (with hearing aid or hearing appliances if normally used)  [x]  0.  Adequate - no difficulty in normal conversation, social interaction, listening to TV  []  1.  Minimal difficulty - difficulty in some environments (e.g. when person speaks softly or setting is noisy)  []  2.  Moderate difficulty - speaker has to increase volume and speak distinctly   []  3.  Highly impaired - absence of

## 2025-06-20 NOTE — PROGRESS NOTES
Physical Therapy  Facility/Department: [facility] ICU  Daily Treatment Note  NAME: Ad Lacey  : 1950  MRN: [id number]    Date of Service: 2025    Discharge Recommendations:  IP Rehab, Subacute/Skilled Nursing Facility   PT Equipment Recommendations  Equipment Needed: No    Patient Diagnosis(es): There were no encounter diagnoses.    Assessment  Assessment: Pt progressing with functional mobility. However, he remains below his baseline & a high fall risk. There are safety concerns for pt to go home upon D/C. Recommend further inpt PT. Will follow per plan of care.  Activity Tolerance: Patient tolerated treatment well  Equipment Needed: No    Plan  Physical Therapy Plan  Current Treatment Recommendations: Strengthening;Functional mobility training;Transfer training;Gait training;Neuromuscular re-education;Safety education & training;Patient/Caregiver education & training;Co-Treatment    Restrictions  Restrictions/Precautions  Restrictions/Precautions: NPO, Fall Risk, Seizure  Activity Level: Up with Assist  Required Braces or Orthoses?: No  Position Activity Restriction  Other Position/Activity Restrictions: Up with assist     Subjective   Subjective  Subjective: Pt supine in bed & agreeable to PT.  Pain: pt denies pain    Objective     Bed Mobility Training  Bed Mobility Training: Yes  Supine to Sit: Contact guard assistance (HOB elevated)  Scooting: Contact guard assistance (seated)  Balance  Sitting - Static:  (SBA at EOB)  Standing: Impaired  Standing - Static:  (CGA with RW)  Standing - Dynamic:  (min to mod A with RW)  Transfer Training  Transfer Training: Yes  Interventions: Verbal cues;Safety awareness training  Sit to Stand: Minimal assistance  Stand to Sit: Minimal assistance  Stand Pivot Transfers: Minimal assistance (bed->chair with RW)  Gait  Gait Training: Yes  Overall Level of Assistance: Minimal assistance (pt had LOB x 1 requiring mod A to recover)  Interventions: Verbal cues (cues for

## 2025-06-20 NOTE — PLAN OF CARE
Problem: Discharge Planning  Goal: Discharge to home or other facility with appropriate resources  Outcome: Progressing  Flowsheets (Taken 6/20/2025 1859)  Discharge to home or other facility with appropriate resources: Identify barriers to discharge with patient and caregiver     Problem: Seizure Precautions  Goal: Remains free of injury related to seizures activity  Outcome: Progressing  Flowsheets (Taken 6/20/2025 1859)  Remains free of injury related to seizure activity:   Maintain airway, patient safety  and administer oxygen as ordered   Monitor patient for seizure activity, document and report duration and description of seizure to Licensed Independent Practitioner   If seizure occurs, turn patient to side and suction secretions as needed     Problem: Safety - Adult  Goal: Free from fall injury  Outcome: Progressing  Flowsheets (Taken 6/20/2025 1859)  Free From Fall Injury:   Instruct family/caregiver on patient safety   Based on caregiver fall risk screen, instruct family/caregiver to ask for assistance with transferring infant if caregiver noted to have fall risk factors     Problem: Pain  Goal: Verbalizes/displays adequate comfort level or baseline comfort level  Outcome: Progressing  Flowsheets (Taken 6/20/2025 1859)  Verbalizes/displays adequate comfort level or baseline comfort level:   Encourage patient to monitor pain and request assistance   Assess pain using appropriate pain scale   Administer analgesics based on type and severity of pain and evaluate response   Implement non-pharmacological measures as appropriate and evaluate response     Problem: Skin/Tissue Integrity  Goal: Skin integrity remains intact  Description: 1.  Monitor for areas of redness and/or skin breakdown  2.  Assess vascular access sites hourly  3.  Every 4-6 hours minimum:  Change oxygen saturation probe site  4.  Every 4-6 hours:  If on nasal continuous positive airway pressure, respiratory therapy assess nares and determine

## 2025-06-20 NOTE — PROGRESS NOTES
ICU Progress Note    Admit Date: 6/19/2025  Day: 2  Vent Day: NA  IV Access:Peripheral  IV Fluids:None  Vasopressors:None                Antibiotics: NA  Diet: Diet NPO    CC: fall    Interval history:   Asking for more pain meds consistently. Doesn't seem to be in pain. Vitals are normal and stable. Was unable to tolerated MRI yesterday. Plan for MRI with ativan today.     HPI:     Patient is a 74 y.o. male with PMHx of pAfib, CAD c/b STEMI s/p TRISHA, HFimpEF, and PAD s/p fem-pop-bypass who presented to [facility] ED after being found down.     Patient was unable to recall the event. He appeared slightly confused initially and was unable to correctly state year but was oriented to place and person and was eventually able to state correct year. Patient endorsed having fallen near the stairs but was unable to recall any preceding details. Patient was found by the son who returned from work at 5:30pm. Son stated that he last talked to the patient at 2pm and on the patient was immediately responsive/arousable though having some confused and slurred speech. Son stated that patient's walker was also found tipped over in front of him and ornaments near the stairs were also found fallen in the vicinity. Son states that patient had been having difficulty with RLE pain and had taken some old gabapentin that morning.      Brief ED course:  Afebrile, HDS, hypertensive to -180s, satting on room air. Noted to have confused and slurred speech but otherwise neurologically intact.     Cr 1.6  Troponin 27, -> 24  WBC 13.1  Hgb 11.3, MCV 85.1     CT Head showing multi-compartment ICH, moderate SAH, and R SDH w/ 9mm midline shift     Given DDAVP 0.4mcg/kg, Keppra 1g, Balfaxar 2000u, and started on Cardene prior to transfer to [facility]     Brief social hx:  Lives at home with son. States being able to do ADLs and IADLs. Ambulates with walker/cane. Son notes that patient has no hx of cognitive disability     Tobacco: currently

## 2025-06-20 NOTE — PROGRESS NOTES
ARU PATIENT TREATMENT PLAN  [id number] [facility] [place] [address]  [phone]    Ad Deepthi    : 1950  [id number]  MRN: [id number]  PHYSICIAN:  Hugh Trivedi DO  Primary Problem    Patient Active Problem List   Diagnosis    Subdural hematoma (HCC)    Severe malnutrition    SDH (subdural hematoma) (HCC)       Rehabilitation Diagnosis:  2.22, Traumatic, closed injury  ADMIT DATE:2025    Patient Goals: I want to get everything wrong with me repaired so I can go home  Admitting Impairments: vision  Activity Restrictions: none  Participation Limitations: none   CARE PLAN   NURSING:  Ad Lacey while on this unit will:  [x] Be continent of bowel and bladder     [x] Have an adequate number of bowel movements  [] Urinate with no urinary retention >300ml in bladder  [] Complete bladder protocol with silveira removal  [] Maintain O2 SATs at ___%  [x] Have pain managed while on ARU       [x] Be pain free by discharge   [x] Have no skin breakdown while on ARU  [x] Have improved skin integrity via wound measurements  [x] Have no signs/symptoms of infection at the wound site  [x] Be free from injury during hospitalization   [x] Complete education with patient/family with understanding demonstrated for:  [] Adjustment   [] Other:     Nursing Interventions will include:  [x] bowel/bladder training   [x] education for medical assistive devices   [] medication education   [] O2 saturation management   [] energy conservation   [x] stress management techniques   [x] fall prevention   [x] alarms protocol   [] seating and positioning   [] skin/wound care   [x] pressure relief instruction   [] dressing changes     [] infection protection   [] DVT prophylaxis  [x] assistance with in room safety with transfers to bed, toilet, wheelchair, shower   [x] bathroom activities and hygiene  [] Other:    Patient/Caregiver Education for:  [] Disease/sustained  Fracture/Amputation\",\"Other Multiple Trauma (multi-system no BI or SCI\",\"15.0, Developmental Disability\",\"16.0, Debility (non-cardiac, non-pulmonary\"}  ADMIT DATE:6/20/2025    Patient Goals: I want to get everything wrong with me repaired so I can go home  Admitting Impairments: vision  Activity Restrictions: none  Participation Limitations: none   CARE PLAN   NURSING:  Ad Lacey while on this unit will:  [x] Be continent of bowel and bladder     [x] Have an adequate number of bowel movements  [] Urinate with no urinary retention >300ml in bladder  [] Complete bladder protocol with silveira removal  [] Maintain O2 SATs at ___%  [x] Have pain managed while on ARU       [x] Be pain free by discharge   [x] Have no skin breakdown while on ARU  [x] Have improved skin integrity via wound measurements  [x] Have no signs/symptoms of infection at the wound site  [x] Be free from injury during hospitalization   [x] Complete education with patient/family with understanding demonstrated for:  [] Adjustment   [] Other:     Nursing Interventions will include:  [x] bowel/bladder training   [x] education for medical assistive devices   [] medication education   [] O2 saturation management   [] energy conservation   [x] stress management techniques   [x] fall prevention   [x] alarms protocol   [] seating and positioning   [] skin/wound care   [x] pressure relief instruction   [] dressing changes     [] infection protection   [] DVT prophylaxis  [x] assistance with in room safety with transfers to bed, toilet, wheelchair, shower   [x] bathroom activities and hygiene  [] Other:    Patient/Caregiver Education for:  [] Disease/sustained injury/management     [x] Medication Use  [] Surgical intervention  [x] Safety  [] Body mechanics and or joint protection  [x] Health maintenance     [] Other:     PHYSICAL THERAPY:  Goals:                  Short Term Goals  Time Frame for Short Term Goals: 10 days  Short Term Goal 1: pt will perf

## 2025-06-20 NOTE — CARE COORDINATION
Discharge Planning:    CM spoke with pt at bedside- he is agreeable to ARU placement with preference of [facility] ARU. Per clinical liaison, Leesa, they can accept pt as soon as today without the need for precert, if medically ready. CM informed MD- states not ready today, likely DC over weekend. Plan for ARU admission Sunday if medically ready and bed available, however they cannot take Saturday admissions.    Thank you  Cat Patrice BANDA, BSN,   ICU   [phone]

## 2025-06-20 NOTE — PROGRESS NOTES
NURSING ASSESSMENT: ARU ADMISSION  [facility]    Patient:Ad Lacey     Rehab Dx/Hx: SDH (subdural hematoma) (HCC) [S06.5XAA]   :1950  MRN:[id number]  Date of Admit: 2025  Room #: 3110/3110-01    Subjective:   Patient admitted to [id number]@ from 4514 via wheelchair.   Alert and oriented x4.  Oriented to room and call light system. Oriented to rehab routine and therapy schedules. Informed about care conferences and ordering of meals.      Is this a Stroke Patient?  []  Yes.   If yes, was the PATH-s assessment given to the patient/family?  []  Yes     []   No     [x]   No      Drug / Medication Review:   Medications were reviewed by RN at time of admission  [x]  No potential or actual clinically significant medication issues were noted.      []   Yes, a clinically significant medication issue was identified                 []  Adverse Drug Event:                  []  Allergy:                  []  Side Effect:                  []  Ineffective Therapy:                  []  Drug Interaction:                 []  Duplicated Therapy:                 []  Untreated Indication:                  []  Non-adherence:                 []  Other:                  Nursing/Pharmacy contacted the physician:     Date:              Time:                  Actions recommended by physician were completed:   Date :            Time:    4 Eyes Skin Assessment   The patient is being assessed for: Admission     I agree that 2 RN's have performed a thorough Head to Toe Skin Assessment on the patient. ALL assessment sites listed below have been assessed.  Right pinky finger bruise, bilateral bruising to elbows, scattered bruising, hematoma to right hand, blanchable redness to coccyx. Bruise to left shoulder, right hip bruising.      Areas assessed by both nurses:   [x]   Head, Face, and Ears   [x]   Shoulders, Back, and Chest, Abdomen  [x]   Arms, Elbows, and Hands   [x]   Coccyx, Sacrum, and Ischium  [x]    Legs, Feet, and Heel     Does the Patient have Skin Breakdown?   / No         Jalen Prevention initiated:  Yes /  Wound Care Orders initiated:   / Not Applicable      WO nurse consulted for Pressure Injury (Stage 3,4, Unstageable, DTI, NWPT, Complex wounds)and New or Established Ostomies:   / Not Applicable    Primary Nurse eSignature: Nuha Adame RN    Co-signer eSignature:  Electronically signed by Марина Jama RN on 6/20/2025 at 7:39 PM

## 2025-06-21 ENCOUNTER — APPOINTMENT (OUTPATIENT)
Dept: CT IMAGING | Age: 75
End: 2025-06-21
Attending: PHYSICAL MEDICINE & REHABILITATION
Payer: MEDICARE

## 2025-06-21 ENCOUNTER — HOSPITAL ENCOUNTER (INPATIENT)
Age: 75
LOS: 4 days | Discharge: NURSING, REHAB OR LONG-TERM CARE | DRG: 087 | End: 2025-06-25
Attending: INTERNAL MEDICINE | Admitting: INTERNAL MEDICINE
Payer: MEDICARE

## 2025-06-21 ENCOUNTER — APPOINTMENT (OUTPATIENT)
Dept: CT IMAGING | Age: 75
DRG: 087 | End: 2025-06-21
Attending: INTERNAL MEDICINE
Payer: MEDICARE

## 2025-06-21 VITALS
HEART RATE: 70 BPM | TEMPERATURE: 98.6 F | BODY MASS INDEX: 20.31 KG/M2 | RESPIRATION RATE: 17 BRPM | WEIGHT: 134.04 LBS | WEIGHT: 134.04 LBS | TEMPERATURE: 98.6 F | HEART RATE: 70 BPM | DIASTOLIC BLOOD PRESSURE: 77 MMHG | SYSTOLIC BLOOD PRESSURE: 147 MMHG | DIASTOLIC BLOOD PRESSURE: 77 MMHG | BODY MASS INDEX: 20.31 KG/M2 | OXYGEN SATURATION: 94 % | SYSTOLIC BLOOD PRESSURE: 147 MMHG | RESPIRATION RATE: 17 BRPM | HEIGHT: 68 IN | HEIGHT: 68 IN | OXYGEN SATURATION: 94 %

## 2025-06-21 PROBLEM — I61.9 BRAIN BLEED (HCC): Status: ACTIVE | Noted: 2025-06-21

## 2025-06-21 LAB
CK SERPL-CCNC: 1895 U/L (ref 39–308)
CK SERPL-CCNC: 1895 U/L (ref 39–308)
GLUCOSE BLD-MCNC: 118 MG/DL (ref 70–99)
GLUCOSE BLD-MCNC: 118 MG/DL (ref 70–99)
PERFORMED ON: ABNORMAL
PERFORMED ON: ABNORMAL

## 2025-06-21 PROCEDURE — 85730 THROMBOPLASTIN TIME PARTIAL: CPT

## 2025-06-21 PROCEDURE — 70450 CT HEAD/BRAIN W/O DYE: CPT

## 2025-06-21 PROCEDURE — 80048 BASIC METABOLIC PNL TOTAL CA: CPT

## 2025-06-21 PROCEDURE — 2500000003 HC RX 250 WO HCPCS: Performed by: PHYSICAL MEDICINE & REHABILITATION

## 2025-06-21 PROCEDURE — 82550 ASSAY OF CK (CPK): CPT

## 2025-06-21 PROCEDURE — 2000000000 HC ICU R&B

## 2025-06-21 PROCEDURE — 85025 COMPLETE CBC W/AUTO DIFF WBC: CPT

## 2025-06-21 PROCEDURE — 36415 COLL VENOUS BLD VENIPUNCTURE: CPT

## 2025-06-21 PROCEDURE — 97162 PT EVAL MOD COMPLEX 30 MIN: CPT

## 2025-06-21 PROCEDURE — 92523 SPEECH SOUND LANG COMPREHEN: CPT

## 2025-06-21 PROCEDURE — 72125 CT NECK SPINE W/O DYE: CPT

## 2025-06-21 PROCEDURE — 97530 THERAPEUTIC ACTIVITIES: CPT

## 2025-06-21 PROCEDURE — 6370000000 HC RX 637 (ALT 250 FOR IP): Performed by: PHYSICAL MEDICINE & REHABILITATION

## 2025-06-21 PROCEDURE — 6370000000 HC RX 637 (ALT 250 FOR IP)

## 2025-06-21 PROCEDURE — 97166 OT EVAL MOD COMPLEX 45 MIN: CPT

## 2025-06-21 PROCEDURE — 99291 CRITICAL CARE FIRST HOUR: CPT

## 2025-06-21 PROCEDURE — 97535 SELF CARE MNGMENT TRAINING: CPT

## 2025-06-21 PROCEDURE — 92610 EVALUATE SWALLOWING FUNCTION: CPT

## 2025-06-21 PROCEDURE — 6370000000 HC RX 637 (ALT 250 FOR IP): Performed by: INTERNAL MEDICINE

## 2025-06-21 PROCEDURE — 97116 GAIT TRAINING THERAPY: CPT

## 2025-06-21 PROCEDURE — 83735 ASSAY OF MAGNESIUM: CPT

## 2025-06-21 PROCEDURE — 2500000003 HC RX 250 WO HCPCS

## 2025-06-21 RX ORDER — ATORVASTATIN CALCIUM 80 MG/1
80 TABLET, FILM COATED ORAL DAILY
Status: DISCONTINUED | OUTPATIENT
Start: 2025-06-22 | End: 2025-06-25 | Stop reason: HOSPADM

## 2025-06-21 RX ORDER — DIAZEPAM 5 MG/1
5 TABLET ORAL 2 TIMES DAILY PRN
Status: DISCONTINUED | OUTPATIENT
Start: 2025-06-21 | End: 2025-06-21 | Stop reason: HOSPADM

## 2025-06-21 RX ORDER — LABETALOL HYDROCHLORIDE 5 MG/ML
10 INJECTION, SOLUTION INTRAVENOUS EVERY 4 HOURS PRN
Status: DISCONTINUED | OUTPATIENT
Start: 2025-06-21 | End: 2025-06-25 | Stop reason: HOSPADM

## 2025-06-21 RX ORDER — ACETAMINOPHEN 325 MG/1
650 TABLET ORAL EVERY 4 HOURS PRN
Status: DISCONTINUED | OUTPATIENT
Start: 2025-06-21 | End: 2025-06-21 | Stop reason: SDUPTHER

## 2025-06-21 RX ORDER — CARVEDILOL 12.5 MG/1
12.5 TABLET ORAL 2 TIMES DAILY WITH MEALS
Status: DISCONTINUED | OUTPATIENT
Start: 2025-06-22 | End: 2025-06-25 | Stop reason: HOSPADM

## 2025-06-21 RX ORDER — NICOTINE 21 MG/24HR
1 PATCH, TRANSDERMAL 24 HOURS TRANSDERMAL DAILY
Status: DISCONTINUED | OUTPATIENT
Start: 2025-06-21 | End: 2025-06-21 | Stop reason: HOSPADM

## 2025-06-21 RX ORDER — OXYCODONE HYDROCHLORIDE 5 MG/1
5 TABLET ORAL EVERY 4 HOURS PRN
Refills: 0 | Status: DISCONTINUED | OUTPATIENT
Start: 2025-06-21 | End: 2025-06-23 | Stop reason: ALTCHOICE

## 2025-06-21 RX ORDER — SODIUM CHLORIDE 0.9 % (FLUSH) 0.9 %
5-40 SYRINGE (ML) INJECTION EVERY 12 HOURS SCHEDULED
Status: DISCONTINUED | OUTPATIENT
Start: 2025-06-21 | End: 2025-06-25 | Stop reason: HOSPADM

## 2025-06-21 RX ORDER — LEVETIRACETAM 500 MG/1
500 TABLET ORAL 2 TIMES DAILY
Status: DISCONTINUED | OUTPATIENT
Start: 2025-06-21 | End: 2025-06-25 | Stop reason: HOSPADM

## 2025-06-21 RX ORDER — SENNA AND DOCUSATE SODIUM 50; 8.6 MG/1; MG/1
2 TABLET, FILM COATED ORAL DAILY PRN
Status: DISCONTINUED | OUTPATIENT
Start: 2025-06-21 | End: 2025-06-25 | Stop reason: HOSPADM

## 2025-06-21 RX ORDER — SODIUM CHLORIDE 9 MG/ML
INJECTION, SOLUTION INTRAVENOUS PRN
Status: DISCONTINUED | OUTPATIENT
Start: 2025-06-21 | End: 2025-06-25 | Stop reason: HOSPADM

## 2025-06-21 RX ORDER — TRAZODONE HYDROCHLORIDE 50 MG/1
50 TABLET ORAL NIGHTLY PRN
Status: DISCONTINUED | OUTPATIENT
Start: 2025-06-21 | End: 2025-06-21 | Stop reason: HOSPADM

## 2025-06-21 RX ORDER — GAUZE BANDAGE 2" X 2"
100 BANDAGE TOPICAL DAILY
Status: DISCONTINUED | OUTPATIENT
Start: 2025-06-22 | End: 2025-06-25 | Stop reason: HOSPADM

## 2025-06-21 RX ORDER — ONDANSETRON 4 MG/1
4 TABLET, ORALLY DISINTEGRATING ORAL EVERY 8 HOURS PRN
Status: DISCONTINUED | OUTPATIENT
Start: 2025-06-21 | End: 2025-06-25 | Stop reason: HOSPADM

## 2025-06-21 RX ORDER — CASTOR OIL AND BALSAM, PERU 788; 87 MG/G; MG/G
OINTMENT TOPICAL 2 TIMES DAILY
Status: DISCONTINUED | OUTPATIENT
Start: 2025-06-21 | End: 2025-06-25 | Stop reason: HOSPADM

## 2025-06-21 RX ORDER — PANTOPRAZOLE SODIUM 40 MG/1
40 TABLET, DELAYED RELEASE ORAL DAILY
Status: DISCONTINUED | OUTPATIENT
Start: 2025-06-22 | End: 2025-06-25 | Stop reason: HOSPADM

## 2025-06-21 RX ORDER — ACETAMINOPHEN 325 MG/1
650 TABLET ORAL EVERY 6 HOURS PRN
Status: DISCONTINUED | OUTPATIENT
Start: 2025-06-21 | End: 2025-06-25 | Stop reason: HOSPADM

## 2025-06-21 RX ORDER — SODIUM CHLORIDE 0.9 % (FLUSH) 0.9 %
5-40 SYRINGE (ML) INJECTION PRN
Status: DISCONTINUED | OUTPATIENT
Start: 2025-06-21 | End: 2025-06-25 | Stop reason: HOSPADM

## 2025-06-21 RX ORDER — DIAZEPAM 5 MG/1
10 TABLET ORAL 2 TIMES DAILY PRN
Status: DISCONTINUED | OUTPATIENT
Start: 2025-06-21 | End: 2025-06-25 | Stop reason: HOSPADM

## 2025-06-21 RX ORDER — AMLODIPINE BESYLATE 5 MG/1
5 TABLET ORAL DAILY
Status: DISCONTINUED | OUTPATIENT
Start: 2025-06-22 | End: 2025-06-25 | Stop reason: HOSPADM

## 2025-06-21 RX ORDER — ONDANSETRON 2 MG/ML
4 INJECTION INTRAMUSCULAR; INTRAVENOUS EVERY 6 HOURS PRN
Status: DISCONTINUED | OUTPATIENT
Start: 2025-06-21 | End: 2025-06-25 | Stop reason: HOSPADM

## 2025-06-21 RX ORDER — ACETAMINOPHEN 650 MG/1
650 SUPPOSITORY RECTAL EVERY 6 HOURS PRN
Status: DISCONTINUED | OUTPATIENT
Start: 2025-06-21 | End: 2025-06-25 | Stop reason: HOSPADM

## 2025-06-21 RX ADMIN — LEVETIRACETAM 500 MG: 500 TABLET, FILM COATED ORAL at 22:04

## 2025-06-21 RX ADMIN — HYDROMORPHONE HYDROCHLORIDE 4 MG: 4 TABLET ORAL at 01:46

## 2025-06-21 RX ADMIN — SODIUM CHLORIDE, PRESERVATIVE FREE 10 ML: 5 INJECTION INTRAVENOUS at 21:03

## 2025-06-21 RX ADMIN — OXYCODONE 5 MG: 5 TABLET ORAL at 21:01

## 2025-06-21 RX ADMIN — Medication 5 MG: at 10:56

## 2025-06-21 RX ADMIN — Medication: at 22:08

## 2025-06-21 RX ADMIN — SODIUM CHLORIDE, PRESERVATIVE FREE 10 ML: 5 INJECTION INTRAVENOUS at 18:05

## 2025-06-21 RX ADMIN — ACETAMINOPHEN 650 MG: 325 TABLET ORAL at 22:03

## 2025-06-21 RX ADMIN — HYDROMORPHONE HYDROCHLORIDE 4 MG: 4 TABLET ORAL at 08:05

## 2025-06-21 RX ADMIN — CARVEDILOL 12.5 MG: 12.5 TABLET, FILM COATED ORAL at 17:28

## 2025-06-21 RX ADMIN — THIAMINE HCL TAB 100 MG 100 MG: 100 TAB at 10:58

## 2025-06-21 RX ADMIN — AMLODIPINE BESYLATE 5 MG: 5 TABLET ORAL at 10:55

## 2025-06-21 RX ADMIN — ACETAMINOPHEN 650 MG: 325 TABLET ORAL at 12:01

## 2025-06-21 RX ADMIN — CARVEDILOL 12.5 MG: 12.5 TABLET, FILM COATED ORAL at 11:06

## 2025-06-21 RX ADMIN — PANTOPRAZOLE SODIUM 40 MG: 40 TABLET, DELAYED RELEASE ORAL at 10:57

## 2025-06-21 RX ADMIN — LEVETIRACETAM 500 MG: 500 TABLET, FILM COATED ORAL at 10:57

## 2025-06-21 RX ADMIN — Medication: at 11:09

## 2025-06-21 ASSESSMENT — PAIN DESCRIPTION - FREQUENCY
FREQUENCY: CONTINUOUS
FREQUENCY: CONTINUOUS

## 2025-06-21 ASSESSMENT — PAIN DESCRIPTION - LOCATION
LOCATION: GENERALIZED
LOCATION_2: HIP
LOCATION: HEAD
LOCATION: HIP

## 2025-06-21 ASSESSMENT — PAIN DESCRIPTION - DESCRIPTORS
DESCRIPTORS: ACHING;CRUSHING
DESCRIPTORS: ACHING;CRUSHING
DESCRIPTORS: ACHING;SORE
DESCRIPTORS: ACHING
DESCRIPTORS_2: DISCOMFORT
DESCRIPTORS: DISCOMFORT

## 2025-06-21 ASSESSMENT — PAIN SCALES - GENERAL
PAINLEVEL_OUTOF10: 3
PAINLEVEL_OUTOF10: 10
PAINLEVEL_OUTOF10: 4
PAINLEVEL_OUTOF10: 0
PAINLEVEL_OUTOF10: 6
PAINLEVEL_OUTOF10: 7
PAINLEVEL_OUTOF10: 0
PAINLEVEL_OUTOF10: 9
PAINLEVEL_OUTOF10: 10

## 2025-06-21 ASSESSMENT — PAIN DESCRIPTION - ORIENTATION
ORIENTATION: RIGHT;LEFT;MID
ORIENTATION: RIGHT
ORIENTATION_2: RIGHT
ORIENTATION: RIGHT;LEFT;MID

## 2025-06-21 ASSESSMENT — PAIN DESCRIPTION - PAIN TYPE
TYPE: CHRONIC PAIN
TYPE: ACUTE PAIN
TYPE: CHRONIC PAIN

## 2025-06-21 ASSESSMENT — PAIN - FUNCTIONAL ASSESSMENT
PAIN_FUNCTIONAL_ASSESSMENT: ACTIVITIES ARE NOT PREVENTED
PAIN_FUNCTIONAL_ASSESSMENT: PREVENTS OR INTERFERES WITH MANY ACTIVE NOT PASSIVE ACTIVITIES
PAIN_FUNCTIONAL_ASSESSMENT: ACTIVITIES ARE NOT PREVENTED
PAIN_FUNCTIONAL_ASSESSMENT: PREVENTS OR INTERFERES WITH MANY ACTIVE NOT PASSIVE ACTIVITIES
PAIN_FUNCTIONAL_ASSESSMENT: ACTIVITIES ARE NOT PREVENTED
PAIN_FUNCTIONAL_ASSESSMENT_SITE2: PREVENTS OR INTERFERES SOME ACTIVE ACTIVITIES AND ADLS

## 2025-06-21 ASSESSMENT — PAIN DESCRIPTION - ONSET
ONSET: ON-GOING
ONSET: ON-GOING

## 2025-06-21 ASSESSMENT — PAIN DESCRIPTION - DIRECTION
RADIATING_TOWARDS: GENERALIZED
RADIATING_TOWARDS: RIGHT HIP

## 2025-06-21 ASSESSMENT — PAIN DESCRIPTION - INTENSITY: RATING_2: 9

## 2025-06-21 NOTE — PROGRESS NOTES
[facility] - Inpatient Rehabilitation Department   Phone: [phone]    Speech Language Pathology Evaluation    [x] Cognitive-Linguistic Evaluation                   [x] Clinical Swallow Evaluation      Patient: Ad Lacey   : 1950   MRN: [id number]   Date of Service:  2025  Admitting Diagnosis: SDH (subdural hematoma) (HCC)  Current Admission Summary: Per MD notes: \"This is a 74-year-old male with a past medical history including:  A-fib, CAD, NSTEMI, heart failure, PAD status post femoropopliteal bypass  Who came into the hospital after being found down at home.  Patient was confused at this time.  Imaging showed right subdural hematoma secondary to fall repeat CTh stable patient was placed on Keppra 500 mg twice daily for 7 days.  Patient is a nonsurgical candidate.  Goal BP less than 160.  Patient continues to be limited in functional mobility and ADLs.    Today the patient is a little disoriented as he received Dilaudid and Valium overnight.  He is unsure if he is able to participate in therapy.  Patient is asking for his vape pen\"  Past Medical History:  has no past medical history on file.  Past Surgical History:  has no past surgical history on file.  Recent Imaging:   CT HEAD WO CONTRAST, CT FACIAL BONES WO CONTRAST, & CT CERVICAL SPINE WO CONTRAST  Result Date: 2025  1. Multi compartment intracranial hemorrhage with right subdural hematoma measuring 8-10 mm in thickness and resulting in 9 mm of midline shift from right to left.  2. Moderate amount of subarachnoid hemorrhage raises the possibility of underlying aneurysm.  3. No acute traumatic injury of the facial bones.  4. No acute osseous abnormality identified in the cervical spine.      CT head without contrast  Result Date: 2025  Stable subarachnoid and subdural intracranial hemorrhage with approximately 9 mm of right to left midline shift.      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 This all may be secondary to vasospasm from multifocal intracranial hemorrhage and multifocal subarachnoid hemorrhage. No discrete aneurysm.     Instrumental Swallow Study: None completed, not warranted  Precautions/Restrictions: Fall Risk, Confusion, Left Visual Inattention      Pre-Admission Information   Living Status: Pt reports he and son live together. Son owns a home care company. Pt does 100% of cooking and cleaning Ind.  Medication Management: :  []Primary   [x]Secondary []Comment: Son has a home health nurse come in 1x/week to fill pt's pill box. Pt Ind with taking pills from pill box.     Finance Management: [x]Primary   []Secondary []Comment:   Active :   [x]Yes         []No  Education: [employer] diploma plus [employer]  Occupation: Retired [employer]  Hobbies/Leisure: Unsure  Hearing:    WFL  Vision:    Suspect left visual deficits- left visual attention deficits noted      Subjective  General: Pt asleep in bed upon arrival. Reporting and perseverating on headache pain, though nurse administered Tylenol during session. Pt agreeable to ST, though resistant and complained re: inability to complete tasks at times.  Pain: Reported pain, 5/10 , headache, reported to nurse and nurse administered Tylenol  Safety Interventions: patient left in bed, bed alarm in place, and call light within reach      Dysphagia Bedside Swallow Evaluation     Prior Dysphagia History: IP BSE completed 6/19/25 recommending continue NPO until mental status improved. Pt seen 6/20 and upgraded to regular and thins with no overt s/s aspiration noted.   Patient Complaint: pt with no c/o dysphagia  Patient Positioning:   Upright in bed   Respiratory Status:   Room air  Pre-Evaluation Consistency Recommendation:   Regular texture diet  with Thin liquids    Dentition:   Edentulous   Oral Hygiene:   Clean   Baseline Vocal Quality:   normal   Volitional Cough:

## 2025-06-21 NOTE — H&P
°C) Oral 67 16 95 % -- --   06/20/25 1805 -- -- -- -- 16 -- -- --   06/20/25 1735 107/68 -- -- 66 16 -- -- --   06/20/25 1630 -- -- -- -- -- -- 1.727 m (5' 7.99\") 60.8 kg (134 lb 0.6 oz)   06/20/25 1618 107/68 98 °F (36.7 °C) Oral 66 16 -- -- --   06/20/25 1600 107/68 98 °F (36.7 °C) Oral 66 16 97 % -- --       Gen: No distress, pleasant.   HEENT: Normocephalic, atraumatic.   CV: No audible murmurs, well perfused extremities  Resp: No respiratory distress. No increased WOB  Abd: Soft, nontender nondistended  Ext: No edema.   Neuro: Alert, not oriented, appropriately interactive.  Agitated    Lab Results   Component Value Date    WBC 15.0 (H) 06/20/2025    HGB 9.3 (L) 06/20/2025    HCT 27.8 (L) 06/20/2025    MCV 85.1 06/20/2025     06/20/2025     Lab Results   Component Value Date    INR 1.04 06/19/2025    INR 1.15 (H) 06/19/2025    PROTIME 13.9 06/19/2025    PROTIME 15.0 (H) 06/19/2025     Lab Results   Component Value Date    CREATININE 0.9 06/20/2025    BUN 22 (H) 06/20/2025     06/20/2025    K 3.7 06/20/2025     (H) 06/20/2025    CO2 21 06/20/2025     Lab Results   Component Value Date    ALT 20 06/18/2025    AST 45 (H) 06/18/2025    ALKPHOS 111 06/18/2025    BILITOT 0.7 06/18/2025         No orders to display             The above laboratory data have been reviewed.   The above imaging data have been reviewed.   The above medical testing have been reviewed.     Body mass index is 20.39 kg/m².    POST ADMISSION PHYSICIAN EVALUATION  The patient has agreed to being admitted to our comprehensive inpatient rehabilitation facility and can tolerate the intensity of service consisting of at least:  --180 minutes of therapy a day, 5 out of 7 days a week.  OR  --15 hours of intensive therapy within a 7 consecutive day period.     The patient/family has a good understanding of our discharge process and will benefit from an interdisciplinary inpatient rehabilitation program. The patient has potential to  make improvement and is in need of at least two of the following multidisciplinary therapies including but not limited to physical, occupational, respiratory, and speech, nutritional services, wound care, and prosthetics and orthotics. Given the patients complex condition and risk of further medical complications, rehabilitation services cannot be safely provided at a lower level of care such as a skilled nursing facility. All of the goals listed below were reviewed with the patient and he/she is in agreement.    I have compared the patients medical and functional status at the time of the preadmission screening and the same on this date, and there are no significant changes.    By signing this document, I acknowledge that I have personally performed a full physical examination on this patient within 24 hours of admission to this inpatient rehabilitation facility and have determined the patient to be able to tolerate the above course of treatment at an intensive level for a reasonable period of time. I will be completing a detailed individualized  Plan of Care for this patient by day four of the patients stay based upon the Preadmission Screen, this Post-Admission Evaluation, and the therapy evaluations.     Barriers: Decreased functional mobility, medical comorbidities  Services Required: PT, OT, SLP  Goals: mod I  Prognosis: Good  Anticipated Dispo: home  ELOS: TBD    Rehabilitation Diagnosis:   2.22, Traumatic, closed injury      Assessment and Plan:  Right subdural hematoma  -No neurosurgical intervention  -Hold full dose anticoagulation and antiplatelets for 2 weeks  - PT/OT/SLP  - Keppra 500 mg twice daily  - Systolic blood pressure goal less than 160  - Home pain regimen     DARLINE  - Monitor labs    Tobacco abuse  - Nicotine patch daily    Hypertension  - Continue Norvasc, Coreg    HLD  - Lipitor 80 mg nightly    Chronic pain patient  - Patient is a longtime user of Dilaudid/Valium    Impairments: Decreased

## 2025-06-21 NOTE — PLAN OF CARE
Problem: Safety - Adult  Goal: Free from fall injury  Outcome: Not Progressing.      Problem: Pain  Goal: Verbalizes/displays adequate comfort level or baseline comfort level  Outcome: Not Progressing. No pain behaviors observed, but rates pain 9-10/10.   Dilaudid 4 mg administered at 0805. Reports medication ineffective.   Flowsheets (Taken 6/21/2025 0805)  Verbalizes/displays adequate comfort level or baseline comfort level:   Encourage patient to monitor pain and request assistance   Assess pain using appropriate pain scale   Administer analgesics based on type and severity of pain and evaluate response   Implement non-pharmacological measures as appropriate and evaluate response   Consider cultural and social influences on pain and pain management   Notify Licensed Independent Practitioner if interventions unsuccessful or patient reports new pain     Problem: Skin/Tissue Integrity  Goal: Skin integrity remains intact  Description: 1.  Monitor for areas of redness and/or skin breakdown  2.  Assess vascular access sites hourly  3.  Every 4-6 hours minimum:  Change oxygen saturation probe site  4.  Every 4-6 hours:  If on nasal continuous positive airway pressure, respiratory therapy assess nares and determine need for appliance change or resting period  Outcome: Progressing. No new skin concerns. Pressure relieving measures in place to prevent DTI's.     Problem: Nutrition Deficit:  Goal: Optimize nutritional status  Outcome: Not Progressing. Appetite and fluid intake, poor.

## 2025-06-21 NOTE — PROGRESS NOTES
Occupational Therapy  Facility/Department: [facility] ACUTE REHAB UNIT  Occupational Therapy Initial Assessment/Treatment     Name: Ad Lacey  : 1950  MRN: [id number]  Date of Service: 2025    Discharge Recommendations:  24 hour supervision or assist, Home with Home health OT  OT Equipment Recommendations  Equipment Needed: No       Patient Diagnosis(es): There were no encounter diagnoses.  Past Medical History:  has no past medical history on file.  Past Surgical History:  has no past surgical history on file.    Treatment Diagnosis: Decreased ADL status and functional mobility 2/2 SDH      Assessment  Performance deficits / Impairments: Decreased functional mobility ;Decreased cognition;Decreased high-level IADLs;Decreased ADL status;Decreased endurance;Decreased strength;Decreased balance;Decreased safe awareness  Assessment: Pt is a 75 yo male who presents to [facility] 2/2 fall resulting in SDH. Prior to admission pt reported he was independent w/ ADLs and functional mobility w/ use of RW. Today pt was very focused on his pain and pain medication regimen and pt required max encouragement for participation in therapy. Pt is functioning below his baseline and required max A for LE dressing, min A for functional transfers, and min A for functional mobility w/ use of RW. Pt demonstrated impaired cognition today w/ decreased safety awareness and impulsivity. Pt benefits from ongoing OT to maximize functional independence  Treatment Diagnosis: Decreased ADL status and functional mobility 2/2 SDH  Prognosis: Fair  Decision Making: Medium Complexity  REQUIRES OT FOLLOW-UP: Yes  Activity Tolerance  Activity Tolerance: Patient limited by pain;Treatment limited secondary to decreased cognition     Plan  Occupational Therapy Plan  Times Per Week: 5x a week for 60 mins daily  Current Treatment Recommendations: Strengthening, Balance training, Equipment evaluation, education, & procurement, Functional mobility training,

## 2025-06-21 NOTE — PROGRESS NOTES
A&O. Slurred speech. Rambling. Difficulty focusing. Follows commands, poor attention. Rates pain 10/10 on right side of body. States he has been living with broken right hip for three years. Unable to find info in chart r/t hip fracture. Requested pain medication 2 hrs before due time.  Prn Valium given for anxiety per pt request. VSS. Assessment complete. Using call light appropriately. Safety measures in place.

## 2025-06-21 NOTE — PROGRESS NOTES
Patient walked out of his room, looking for the restroom and ended up in room 3111. Patient confused and disoriented, did not know where the restroom was. Patient had a gown on and, his brief and pants where down to his ankles. Patient assisted to his room to use the restroom. PCA came down to assist as well.   Placed call to supervisor to get sitter.

## 2025-06-21 NOTE — CONSULTS
Comprehensive Nutrition Assessment    RECOMMENDATIONS:  PO Diet: Regular  Nutrition Supplement: Continue Ensure +HP TID  Nutrition Education: Education/Counseling not appropriate     NUTRITION ASSESSMENT:   Nutritional summary & status: New ARU admit. Admitted after fall and subsequent SDH. Pt not fully oriented this AM.  RD assessed pt yesterday when admitted to ICU. On a regular diet per SLP. Pt severely malnourished, so will continue to follow closely. No verbal nutrition interventions given current mentation. High protein supplements ordered TID to help meet energy/protein needs.     Admission // PMH: SDH // CAD, AFIB, HF, PAD    MALNUTRITION ASSESSMENT  Context of Malnutrition: Acute Illness   Malnutrition Status: Severe malnutrition  Findings of the 6 clinical characteristics of malnutrition (Minimum of 2 out of 6 clinical characteristics is required to make the diagnosis of moderate or severe Protein Calorie Malnutrition based on AND/ASPEN Guidelines):  Energy Intake:  Mild decrease in energy intake  Weight Loss:  Unable to assess (limited wt hx)     Body Fat Loss:  Moderate body fat loss Triceps, Fat Overlying Ribs   Muscle Mass Loss:  Moderate muscle mass loss Clavicles (pectoralis & deltoids), Calf (gastrocnemius)      NUTRITION DIAGNOSIS   Severe malnutrition, in context of acute illness or injury related to inadequate protein-energy intake as evidenced by criteria as identified in malnutrition assessment    Nutrition Monitoring and Evaluation:   Food/Nutrient Intake Outcomes:  Food and Nutrient Intake, Supplement Intake  Physical Signs/Symptoms Outcomes:  Biochemical Data, Constipation, Meal Time Behavior, Nutrition Focused Physical Findings, Skin, Weight     OBJECTIVE DATA: Significant to nutrition assessment  Nutrition Related Findings: labs reviewed, BM PTA  Wounds: None  Nutrition Goals: PO intake 50% or greater, within 2 days     CURRENT NUTRITION THERAPIES  ADULT DIET; Regular  ADULT ORAL

## 2025-06-21 NOTE — PROGRESS NOTES
Patient is disoriented. Alert to self and place. Slurred speech. Rates pain 10/10 continuously. Insists on receiving prn pain medication every 4 hours. Requests that this RN wake him up to receive pain med. Told him no, he can call when he wakes up. Sets his alarm to wake up and call out for pain medication. Unable to use call light. Has kicked his legs off the side of the bed twice, setting the bed alarm off. States that he is addicted to narcotics and its because of hospital stays and is the doctors fault. Incontinent of urine, requiring a complete bed change, pati care and gown change. Up to BR. Assist x1 with GB and walker. Gait unsteady at times. Video monitoring present. Bed alarm on, reiterated importance of using call light. Safety measures in place.

## 2025-06-21 NOTE — PROGRESS NOTES
Physical Therapy  Facility/Department: [facility] ACUTE REHAB UNIT  Rehabilitation Physical Therapy Initial Assessment    NAME: Ad Lacey  : 1950 (74 y.o.)  MRN: [id number]  CODE STATUS: Full Code    Date of Service: 25      No past medical history on file.  No past surgical history on file.    Chart Reviewed: Yes  Patient assessed for rehabilitation services?: Yes  Additional Pertinent Hx: Pt to [facility] ED  after falling down flight of steps.  Head CT (+) multi-compartment ICH, moderate SA and R SDH with 9mm midline shift.  Transferred to [facility].  Neurosurgery consult pending.  PMH:  CAD s/p PCI LAD, PAD s/p right fem-pop bypass, STEMI s/p aspiration thrombectomy, and IVUS guided PCI mid LAD X 1 TRISHA, s/p pLVAD, CHF, pAF, and chronic pain syndrome  Family/Caregiver Present: No  Referring Practitioner: Hugh Trivedi DO  Referral Date : 25  Diagnosis: SDH    Restrictions:  Position Activity Restriction  Other Position/Activity Restrictions: Up with assist     SUBJECTIVE  Subjective: pt supine in bed on entry, agreeable to PT session after multiple attempts/coaxing/redirection. Pt perseverating on pain (10/10 headache) throughout session, RN aware and reminded pt several times he's already received pain medication. \"I bet if I was her dad she'd care. They haven't given me shit.\"        Prior Level of Function:  Social/Functional History  Lives With: Son  Type of Home: House  Home Layout: Two level, Bed/Bath upstairs, 1/2 bath on main level, Laundry in basement  Home Access: Stairs to enter with rails  Entrance Stairs - Number of Steps: 3  Bathroom Shower/Tub: Tub/Shower unit  Bathroom Toilet: Standard  Bathroom Equipment: Grab bars in shower, Shower chair, Hand-held shower (sink by toilet)  Home Equipment: Walker - Rolling, Cane - Quad  Has the patient had two or more falls in the past year or any fall with injury in the past year?: Yes  Prior Level of Assist for ADLs: Independent  Prior Level of

## 2025-06-21 NOTE — PROGRESS NOTES
Patient transferred to room 3105 after CT scan. Sitter remains at bedside. Patient sleeping at this time. No acute concerns observed.

## 2025-06-21 NOTE — PLAN OF CARE
Problem: Discharge Planning  Goal: Discharge to home or other facility with appropriate resources  6/21/2025 0027 by Sakina Osman, RN  Outcome: Progressing  Flowsheets (Taken 6/20/2025 2214)  Discharge to home or other facility with appropriate resources: Identify barriers to discharge with patient and caregiver     Problem: Seizure Precautions  Goal: Remains free of injury related to seizures activity  Outcome: Progressing  Flowsheets (Taken 6/20/2025 1859)  Remains free of injury related to seizure activity:   Maintain airway, patient safety  and administer oxygen as ordered   Monitor patient for seizure activity, document and report duration and description of seizure to Licensed Independent Practitioner   If seizure occurs, turn patient to side and suction secretions as needed     Problem: Safety - Adult  Goal: Free from fall injury  6/21/2025 0027 by Sakina Osman, RN  Outcome: Progressing  Flowsheets (Taken 6/21/2025 0017)  Free From Fall Injury: Instruct family/caregiver on patient safety  Outcome: Progressing  Flowsheets (Taken 6/20/2025 1859)  Free From Fall Injury:   Instruct family/caregiver on patient safety   Based on caregiver fall risk screen, instruct family/caregiver to ask for assistance with transferring infant if caregiver noted to have fall risk factors

## 2025-06-22 ENCOUNTER — APPOINTMENT (OUTPATIENT)
Dept: MRI IMAGING | Age: 75
DRG: 087 | End: 2025-06-22
Attending: INTERNAL MEDICINE
Payer: MEDICARE

## 2025-06-22 ENCOUNTER — APPOINTMENT (OUTPATIENT)
Dept: CT IMAGING | Age: 75
DRG: 087 | End: 2025-06-22
Attending: INTERNAL MEDICINE
Payer: MEDICARE

## 2025-06-22 ENCOUNTER — APPOINTMENT (OUTPATIENT)
Dept: GENERAL RADIOLOGY | Age: 75
DRG: 087 | End: 2025-06-22
Attending: INTERNAL MEDICINE
Payer: MEDICARE

## 2025-06-22 LAB
ANION GAP SERPL CALCULATED.3IONS-SCNC: 12 MMOL/L (ref 3–16)
ANION GAP SERPL CALCULATED.3IONS-SCNC: 14 MMOL/L (ref 3–16)
APTT BLD: 31.4 SEC (ref 22.8–35.8)
BASOPHILS # BLD: 0 K/UL (ref 0–0.2)
BASOPHILS # BLD: 0 K/UL (ref 0–0.2)
BASOPHILS NFR BLD: 0.5 %
BASOPHILS NFR BLD: 0.5 %
BUN SERPL-MCNC: 15 MG/DL (ref 7–20)
BUN SERPL-MCNC: 15 MG/DL (ref 7–20)
CALCIUM SERPL-MCNC: 8.5 MG/DL (ref 8.3–10.6)
CALCIUM SERPL-MCNC: 8.7 MG/DL (ref 8.3–10.6)
CHLORIDE SERPL-SCNC: 102 MMOL/L (ref 99–110)
CHLORIDE SERPL-SCNC: 102 MMOL/L (ref 99–110)
CO2 SERPL-SCNC: 22 MMOL/L (ref 21–32)
CO2 SERPL-SCNC: 23 MMOL/L (ref 21–32)
CREAT SERPL-MCNC: 0.8 MG/DL (ref 0.8–1.3)
CREAT SERPL-MCNC: 0.8 MG/DL (ref 0.8–1.3)
DEPRECATED RDW RBC AUTO: 14.7 % (ref 12.4–15.4)
DEPRECATED RDW RBC AUTO: 15.1 % (ref 12.4–15.4)
EOSINOPHIL # BLD: 0 K/UL (ref 0–0.6)
EOSINOPHIL # BLD: 0.1 K/UL (ref 0–0.6)
EOSINOPHIL NFR BLD: 0.5 %
EOSINOPHIL NFR BLD: 0.6 %
GFR SERPLBLD CREATININE-BSD FMLA CKD-EPI: >90 ML/MIN/{1.73_M2}
GFR SERPLBLD CREATININE-BSD FMLA CKD-EPI: >90 ML/MIN/{1.73_M2}
GLUCOSE SERPL-MCNC: 100 MG/DL (ref 70–99)
GLUCOSE SERPL-MCNC: 108 MG/DL (ref 70–99)
HCT VFR BLD AUTO: 24.8 % (ref 40.5–52.5)
HCT VFR BLD AUTO: 25.7 % (ref 40.5–52.5)
HGB BLD-MCNC: 8.6 G/DL (ref 13.5–17.5)
HGB BLD-MCNC: 8.8 G/DL (ref 13.5–17.5)
LYMPHOCYTES # BLD: 1.5 K/UL (ref 1–5.1)
LYMPHOCYTES # BLD: 1.6 K/UL (ref 1–5.1)
LYMPHOCYTES NFR BLD: 16.7 %
LYMPHOCYTES NFR BLD: 17.9 %
MAGNESIUM SERPL-MCNC: 1.74 MG/DL (ref 1.8–2.4)
MCH RBC QN AUTO: 28.3 PG (ref 26–34)
MCH RBC QN AUTO: 28.7 PG (ref 26–34)
MCHC RBC AUTO-ENTMCNC: 34.2 G/DL (ref 31–36)
MCHC RBC AUTO-ENTMCNC: 34.7 G/DL (ref 31–36)
MCV RBC AUTO: 82.5 FL (ref 80–100)
MCV RBC AUTO: 82.6 FL (ref 80–100)
MONOCYTES # BLD: 0.7 K/UL (ref 0–1.3)
MONOCYTES # BLD: 0.7 K/UL (ref 0–1.3)
MONOCYTES NFR BLD: 7.6 %
MONOCYTES NFR BLD: 8 %
NEUTROPHILS # BLD: 6.7 K/UL (ref 1.7–7.7)
NEUTROPHILS # BLD: 6.7 K/UL (ref 1.7–7.7)
NEUTROPHILS NFR BLD: 73 %
NEUTROPHILS NFR BLD: 74.7 %
PLATELET # BLD AUTO: 298 K/UL (ref 135–450)
PLATELET # BLD AUTO: 304 K/UL (ref 135–450)
PMV BLD AUTO: 7.6 FL (ref 5–10.5)
PMV BLD AUTO: 7.6 FL (ref 5–10.5)
POTASSIUM SERPL-SCNC: 3 MMOL/L (ref 3.5–5.1)
POTASSIUM SERPL-SCNC: 3.4 MMOL/L (ref 3.5–5.1)
RBC # BLD AUTO: 3.01 M/UL (ref 4.2–5.9)
RBC # BLD AUTO: 3.11 M/UL (ref 4.2–5.9)
SODIUM SERPL-SCNC: 137 MMOL/L (ref 136–145)
SODIUM SERPL-SCNC: 138 MMOL/L (ref 136–145)
WBC # BLD AUTO: 8.9 K/UL (ref 4–11)
WBC # BLD AUTO: 9.2 K/UL (ref 4–11)

## 2025-06-22 PROCEDURE — 97530 THERAPEUTIC ACTIVITIES: CPT

## 2025-06-22 PROCEDURE — 70450 CT HEAD/BRAIN W/O DYE: CPT

## 2025-06-22 PROCEDURE — 71045 X-RAY EXAM CHEST 1 VIEW: CPT

## 2025-06-22 PROCEDURE — 97116 GAIT TRAINING THERAPY: CPT

## 2025-06-22 PROCEDURE — 99222 1ST HOSP IP/OBS MODERATE 55: CPT | Performed by: INTERNAL MEDICINE

## 2025-06-22 PROCEDURE — 2060000000 HC ICU INTERMEDIATE R&B

## 2025-06-22 PROCEDURE — 6360000002 HC RX W HCPCS

## 2025-06-22 PROCEDURE — 70553 MRI BRAIN STEM W/O & W/DYE: CPT

## 2025-06-22 PROCEDURE — 92610 EVALUATE SWALLOWING FUNCTION: CPT

## 2025-06-22 PROCEDURE — 6360000004 HC RX CONTRAST MEDICATION: Performed by: STUDENT IN AN ORGANIZED HEALTH CARE EDUCATION/TRAINING PROGRAM

## 2025-06-22 PROCEDURE — 6370000000 HC RX 637 (ALT 250 FOR IP)

## 2025-06-22 PROCEDURE — A9579 GAD-BASE MR CONTRAST NOS,1ML: HCPCS | Performed by: STUDENT IN AN ORGANIZED HEALTH CARE EDUCATION/TRAINING PROGRAM

## 2025-06-22 PROCEDURE — 36592 COLLECT BLOOD FROM PICC: CPT

## 2025-06-22 PROCEDURE — 80048 BASIC METABOLIC PNL TOTAL CA: CPT

## 2025-06-22 PROCEDURE — 97162 PT EVAL MOD COMPLEX 30 MIN: CPT

## 2025-06-22 PROCEDURE — 85025 COMPLETE CBC W/AUTO DIFF WBC: CPT

## 2025-06-22 PROCEDURE — 36415 COLL VENOUS BLD VENIPUNCTURE: CPT

## 2025-06-22 PROCEDURE — 99233 SBSQ HOSP IP/OBS HIGH 50: CPT | Performed by: STUDENT IN AN ORGANIZED HEALTH CARE EDUCATION/TRAINING PROGRAM

## 2025-06-22 PROCEDURE — 97166 OT EVAL MOD COMPLEX 45 MIN: CPT

## 2025-06-22 PROCEDURE — 97535 SELF CARE MNGMENT TRAINING: CPT

## 2025-06-22 PROCEDURE — 2500000003 HC RX 250 WO HCPCS

## 2025-06-22 RX ORDER — HYDROMORPHONE HYDROCHLORIDE 1 MG/ML
0.25 INJECTION, SOLUTION INTRAMUSCULAR; INTRAVENOUS; SUBCUTANEOUS ONCE
Status: COMPLETED | OUTPATIENT
Start: 2025-06-22 | End: 2025-06-22

## 2025-06-22 RX ORDER — MAGNESIUM SULFATE IN WATER 40 MG/ML
2000 INJECTION, SOLUTION INTRAVENOUS ONCE
Status: COMPLETED | OUTPATIENT
Start: 2025-06-22 | End: 2025-06-22

## 2025-06-22 RX ORDER — POTASSIUM CHLORIDE 1500 MG/1
40 TABLET, EXTENDED RELEASE ORAL ONCE
Status: COMPLETED | OUTPATIENT
Start: 2025-06-22 | End: 2025-06-22

## 2025-06-22 RX ORDER — GADOTERIDOL 279.3 MG/ML
13 INJECTION INTRAVENOUS
Status: COMPLETED | OUTPATIENT
Start: 2025-06-22 | End: 2025-06-22

## 2025-06-22 RX ORDER — HYDROMORPHONE HYDROCHLORIDE 1 MG/ML
0.5 INJECTION, SOLUTION INTRAMUSCULAR; INTRAVENOUS; SUBCUTANEOUS ONCE
Status: COMPLETED | OUTPATIENT
Start: 2025-06-22 | End: 2025-06-22

## 2025-06-22 RX ORDER — NALOXONE HYDROCHLORIDE 0.4 MG/ML
0.4 INJECTION, SOLUTION INTRAMUSCULAR; INTRAVENOUS; SUBCUTANEOUS PRN
Status: DISCONTINUED | OUTPATIENT
Start: 2025-06-22 | End: 2025-06-25 | Stop reason: HOSPADM

## 2025-06-22 RX ORDER — HYDROMORPHONE HYDROCHLORIDE 2 MG/1
4 TABLET ORAL EVERY 4 HOURS PRN
Refills: 0 | Status: DISCONTINUED | OUTPATIENT
Start: 2025-06-22 | End: 2025-06-25 | Stop reason: HOSPADM

## 2025-06-22 RX ORDER — MORPHINE SULFATE 30 MG/1
60 TABLET, FILM COATED, EXTENDED RELEASE ORAL 2 TIMES DAILY
Refills: 0 | Status: DISCONTINUED | OUTPATIENT
Start: 2025-06-22 | End: 2025-06-25 | Stop reason: HOSPADM

## 2025-06-22 RX ORDER — HYDRALAZINE HYDROCHLORIDE 20 MG/ML
10 INJECTION INTRAMUSCULAR; INTRAVENOUS EVERY 4 HOURS PRN
Status: DISCONTINUED | OUTPATIENT
Start: 2025-06-22 | End: 2025-06-25 | Stop reason: HOSPADM

## 2025-06-22 RX ADMIN — Medication: at 07:41

## 2025-06-22 RX ADMIN — HYDROMORPHONE HYDROCHLORIDE 0.25 MG: 1 INJECTION, SOLUTION INTRAMUSCULAR; INTRAVENOUS; SUBCUTANEOUS at 04:32

## 2025-06-22 RX ADMIN — Medication: at 20:56

## 2025-06-22 RX ADMIN — LABETALOL HYDROCHLORIDE 10 MG: 5 INJECTION, SOLUTION INTRAVENOUS at 22:32

## 2025-06-22 RX ADMIN — MORPHINE SULFATE 60 MG: 30 TABLET, FILM COATED, EXTENDED RELEASE ORAL at 07:41

## 2025-06-22 RX ADMIN — AMLODIPINE BESYLATE 5 MG: 5 TABLET ORAL at 07:41

## 2025-06-22 RX ADMIN — CARVEDILOL 12.5 MG: 6.25 TABLET, FILM COATED ORAL at 07:41

## 2025-06-22 RX ADMIN — PANTOPRAZOLE SODIUM 40 MG: 40 TABLET, DELAYED RELEASE ORAL at 07:41

## 2025-06-22 RX ADMIN — DIAZEPAM 10 MG: 10 TABLET ORAL at 14:24

## 2025-06-22 RX ADMIN — HYDROMORPHONE HYDROCHLORIDE 4 MG: 2 TABLET ORAL at 17:51

## 2025-06-22 RX ADMIN — OXYCODONE 5 MG: 5 TABLET ORAL at 00:10

## 2025-06-22 RX ADMIN — LEVETIRACETAM 500 MG: 500 TABLET, FILM COATED ORAL at 07:41

## 2025-06-22 RX ADMIN — HYDROMORPHONE HYDROCHLORIDE 0.5 MG: 1 INJECTION, SOLUTION INTRAMUSCULAR; INTRAVENOUS; SUBCUTANEOUS at 06:13

## 2025-06-22 RX ADMIN — SODIUM CHLORIDE, PRESERVATIVE FREE 10 ML: 5 INJECTION INTRAVENOUS at 07:41

## 2025-06-22 RX ADMIN — Medication 100 MG: at 07:41

## 2025-06-22 RX ADMIN — HYDROMORPHONE HYDROCHLORIDE 4 MG: 2 TABLET ORAL at 13:24

## 2025-06-22 RX ADMIN — ATORVASTATIN CALCIUM 80 MG: 80 TABLET, FILM COATED ORAL at 07:41

## 2025-06-22 RX ADMIN — MORPHINE SULFATE 60 MG: 30 TABLET, FILM COATED, EXTENDED RELEASE ORAL at 20:32

## 2025-06-22 RX ADMIN — MAGNESIUM SULFATE HEPTAHYDRATE 2000 MG: 40 INJECTION, SOLUTION INTRAVENOUS at 02:50

## 2025-06-22 RX ADMIN — LEVETIRACETAM 500 MG: 500 TABLET, FILM COATED ORAL at 20:33

## 2025-06-22 RX ADMIN — POTASSIUM CHLORIDE 40 MEQ: 1500 TABLET, EXTENDED RELEASE ORAL at 02:51

## 2025-06-22 RX ADMIN — DIAZEPAM 10 MG: 10 TABLET ORAL at 02:51

## 2025-06-22 RX ADMIN — GADOTERIDOL 13 ML: 279.3 INJECTION, SOLUTION INTRAVENOUS at 15:16

## 2025-06-22 RX ADMIN — HYDROMORPHONE HYDROCHLORIDE 4 MG: 2 TABLET ORAL at 09:05

## 2025-06-22 RX ADMIN — CARVEDILOL 12.5 MG: 6.25 TABLET, FILM COATED ORAL at 17:51

## 2025-06-22 ASSESSMENT — PAIN DESCRIPTION - DESCRIPTORS
DESCRIPTORS: ACHING;SORE
DESCRIPTORS: ACHING;SORE
DESCRIPTORS_2: ACHING;DISCOMFORT
DESCRIPTORS: ACHING;SORE
DESCRIPTORS_2: DISCOMFORT
DESCRIPTORS: ACHING;SORE
DESCRIPTORS: ACHING;CRUSHING
DESCRIPTORS: ACHING
DESCRIPTORS: ACHING;SORE
DESCRIPTORS: ACHING;SORE
DESCRIPTORS: ACHING;CRUSHING

## 2025-06-22 ASSESSMENT — PAIN DESCRIPTION - PAIN TYPE
TYPE: ACUTE PAIN;CHRONIC PAIN
TYPE: CHRONIC PAIN

## 2025-06-22 ASSESSMENT — PAIN - FUNCTIONAL ASSESSMENT
PAIN_FUNCTIONAL_ASSESSMENT: PREVENTS OR INTERFERES WITH ALL ACTIVE AND SOME PASSIVE ACTIVITIES
PAIN_FUNCTIONAL_ASSESSMENT: PREVENTS OR INTERFERES SOME ACTIVE ACTIVITIES AND ADLS
PAIN_FUNCTIONAL_ASSESSMENT_SITE2: PREVENTS OR INTERFERES SOME ACTIVE ACTIVITIES AND ADLS
PAIN_FUNCTIONAL_ASSESSMENT_SITE2: PREVENTS OR INTERFERES SOME ACTIVE ACTIVITIES AND ADLS
PAIN_FUNCTIONAL_ASSESSMENT: PREVENTS OR INTERFERES SOME ACTIVE ACTIVITIES AND ADLS
PAIN_FUNCTIONAL_ASSESSMENT: PREVENTS OR INTERFERES SOME ACTIVE ACTIVITIES AND ADLS
PAIN_FUNCTIONAL_ASSESSMENT: ACTIVITIES ARE NOT PREVENTED
PAIN_FUNCTIONAL_ASSESSMENT: PREVENTS OR INTERFERES SOME ACTIVE ACTIVITIES AND ADLS

## 2025-06-22 ASSESSMENT — PAIN DESCRIPTION - LOCATION
LOCATION: HEAD;HIP
LOCATION: HIP;HEAD
LOCATION_2: HIP
LOCATION_2: HIP
LOCATION: HEAD
LOCATION: HEAD;HIP
LOCATION: HEAD
LOCATION: HEAD;HIP
LOCATION: HIP;HEAD
LOCATION: HIP;HEAD
LOCATION: HEAD

## 2025-06-22 ASSESSMENT — PAIN SCALES - GENERAL
PAINLEVEL_OUTOF10: 2
PAINLEVEL_OUTOF10: 8
PAINLEVEL_OUTOF10: 8
PAINLEVEL_OUTOF10: 7
PAINLEVEL_OUTOF10: 2
PAINLEVEL_OUTOF10: 8
PAINLEVEL_OUTOF10: 10
PAINLEVEL_OUTOF10: 5
PAINLEVEL_OUTOF10: 9
PAINLEVEL_OUTOF10: 5
PAINLEVEL_OUTOF10: 10
PAINLEVEL_OUTOF10: 8
PAINLEVEL_OUTOF10: 2
PAINLEVEL_OUTOF10: 9
PAINLEVEL_OUTOF10: 8

## 2025-06-22 ASSESSMENT — PAIN DESCRIPTION - ORIENTATION
ORIENTATION: LEFT;RIGHT;MID
ORIENTATION: OTHER (COMMENT)
ORIENTATION: RIGHT;LEFT;MID
ORIENTATION: RIGHT;LEFT;MID
ORIENTATION: OTHER (COMMENT)
ORIENTATION: OTHER (COMMENT)
ORIENTATION_2: RIGHT
ORIENTATION_2: RIGHT
ORIENTATION: MID;LEFT;RIGHT

## 2025-06-22 ASSESSMENT — PAIN DESCRIPTION - INTENSITY
RATING_2: 2
RATING_2: 5
RATING_2: 9
RATING_2: 8
RATING_2: 2

## 2025-06-22 ASSESSMENT — PAIN DESCRIPTION - ONSET
ONSET: ON-GOING
ONSET: ON-GOING

## 2025-06-22 ASSESSMENT — PAIN DESCRIPTION - DIRECTION: RADIATING_TOWARDS: GENERALIZED

## 2025-06-22 ASSESSMENT — PAIN DESCRIPTION - FREQUENCY
FREQUENCY: CONTINUOUS
FREQUENCY: CONTINUOUS

## 2025-06-22 NOTE — PROGRESS NOTES
Speech-Language Pathology  HOLD Note    Orders received and chart reviewed. Pt with current neurosurgery consult. Perfect Serve message sent to neurosurgery MD for PO clearance. Will re-attempt as able and as schedule allows pending PO status.         Sabina Barclay MA, CCC-SLP  [id number]  Speech-Language Pathologist  Pg. #[phone]

## 2025-06-22 NOTE — PROGRESS NOTES
V2.0    [facility] Progress Note      Name:  Ad Lacey /Age/Sex: 1950  (74 y.o. male)   MRN & CSN:  [id number] & [id number] Encounter Date/Time: 2025 8:51 AM EDT   Location:  [facility]/4516-01 PCP: No primary care provider on file.     Attending:Lora Doss MD       Hospital Day: 2    HPI:    Assessment and Recommendations     Hospital course:    Ad Lacey is a 74 y.o. male with pmh of A-fib, coronary artery disease s/p TRISHA placement, heart failure improved EF, peripheral artery disease s/p femoropopliteal bypass who presents with new L-sided SDH (subdural hematoma) (HCC).  Patient was discharged on  acute rehab.  Was admitted with right subdural hematoma with 9 mm right-to-left shift with moderate subarachnoid hemorrhage.  At that admission was treated with DDAVP and Balfaxar and nicardipine drip.  At acute rehab patient had a fall and was found to have new left-sided subdural hematoma.      Plan:     Left traumatic subdural hematoma with previous right subdural hematoma and scattered subarachnoid hemorrhage  - Neurosurgery and neurocritical care consulted  - Continue neurochecks  - Hold all anticoagulation  - Continue Keppra    Coronary artery disease s/p PCI  - Hold antiplatelet medication  -STEMI in  requiring thrombectomy and TRISHA to LAD patient was in cardiogenic shock and required Impella at that time    Peripheral vascular disease status post femoropopliteal bypass  - Continue statin    Paroxysmal A-fib  - Hold all anticoagulation  - Continue Coreg    Chronic pain syndrome with hip pain  - Continue MS Contin 60 mg p.o. and Dilaudid 4 mg every 4 hours as needed    Anxiety  - Patient on Valium 10 mg twice daily          I spent > 55  minutes in the care of this patient.  Over 50% of that time was in face-to-face counseling regarding disease process, diagnostic testing, preventative measures, and answering patient and family questions.     Diet Diet NPO   DVT Prophylaxis []  Interpretation of tests (any 1)  [] Rhythm Strip (Telemetry) personally reviewed and interpreted as documented above    [] Imaging personally reviewed and interpreted, includes:    [x] Discussion (any 1)  [x] Discussed the discharge plan in detail with case management including timing/barriers to discharge, need for support services and/or placement decision   [] Discussed management of the case with:     Subjective:     Chief Complaint: Fall    Ad Lacey is a 74 y.o. male who presents with fall    Patient feels okay.  Denies any focal weakness.  States he is weak complains of a lot of hip pain      Review of Systems:      Pertinent positives and negatives discussed in HPI    Objective:     Intake/Output Summary (Last 24 hours) at 6/22/2025 0851  Last data filed at 6/22/2025 0600  Gross per 24 hour   Intake 179 ml   Output 570 ml   Net -391 ml      Vitals:   Vitals:    06/22/25 0700 06/22/25 0715 06/22/25 0720 06/22/25 0800   BP: (!) 139/105   138/74   Pulse: 64 64  59   Resp: 14 19  16   Temp:    98.1 °F (36.7 °C)   TempSrc:    Temporal   SpO2: 95% 97%  96%   Weight:   60.8 kg (134 lb 0.6 oz)    Height:   1.727 m (5' 7.99\")          Physical Exam:      General: NAD  Eyes: EOMI  ENT: neck supple  Cardiovascular: Regular rate.  Respiratory: Clear to auscultation  Gastrointestinal: Soft, non tender  Genitourinary: no suprapubic tenderness  Musculoskeletal: Hip movement limited due to pain  Skin: Bruising f+  Neuro: Alert.  Psych: Mood appropriate.         Medications:   Medications:    morphine  60 mg Oral BID    sodium chloride flush  5-40 mL IntraVENous 2 times per day    amLODIPine  5 mg Oral Daily    atorvastatin  80 mg Oral Daily    carvedilol  12.5 mg Oral BID WC    levETIRAcetam  500 mg Oral BID    pantoprazole  40 mg Oral Daily    vitamin B-1  100 mg Oral Daily    balsum peru-castor oil   Topical BID      Infusions:    sodium chloride       PRN Meds: naloxone, 0.4 mg, PRN  hydrALAZINE, 10 mg, Q4H

## 2025-06-22 NOTE — DISCHARGE SUMMARY
Physical Medicine & Rehabilitation  Discharge Summary     Patient Identification:  Ad Lacey  : 1950  Admit date: 2025  Discharge date: 2025  Attending provider: No att. providers found        Primary care provider: No primary care provider on file.     Discharge Diagnoses:   Patient Active Problem List   Diagnosis    Subdural hematoma (HCC)    Severe malnutrition    SDH (subdural hematoma) (HCC)    Brain bleed (HCC)       Discharge Functional Status:      Physical therapy:  Supine to Sit: Supervision or touching assistance  Sit to Supine: Supervision or touching assistance      Sit to Stand: Partial/moderate assistance  Chair/Bed to Chair Transfer: Partial/moderate assistance  Car Transfer:       Ambulation 10 ft: Partial/moderate assistance  Ambulation 50 ft:    Ambulation 150 ft:    Stairs - 1 Step: Partial/moderate assistance  Stairs - 4 Step:    Stairs - 12 Step:      Occupational therapy:  Eating: Setup or clean-up assistance  Oral Hygiene:    Bathing: Partial/moderate assistance  Upper Body Dressing:    Lower Body Dressing: Substantial/maximal assistance     Toilet Transfer:    Toilet Hygiene:      Speech therapy:         Inpatient Rehabilitation Course:   Ad Lacey is a 74 y.o. male admitted to inpatient rehabilitation on 2025 s/p SDH (subdural hematoma) (HCC).  The patient participated in an aggressive multidisciplinary inpatient rehabilitation program involving 3 hours of therapy per day, at least 5 days per week.     On the morning of 2025 the patient had a fall and CT scan showed new bleed.  Patient was immediately transferred off to acute care for more intensive care.  CTh showed left-sided subdural hematoma along with previous right-sided subdural hematoma and scattered subarachnoid hemorrhage.  Patient reports that he got out of bed without assistance and suddenly felt lightheaded and fell.  Neurology recommends hourly neurochecks and patient was returned to the

## 2025-06-22 NOTE — CONSULTS
ICU CONSULT       Hospital Day:   ICU Day:                                                          Code:Full Code  Admit Date: 6/21/2025  PCP: No primary care provider on file.                                  CC: fall    HISTORY OF PRESENT ILLNESS:       Patient is a 74 y.o. male with PMHx of pAfib, CAD c/b STEMI s/p TRISHA, HFimpEF, and PAD s/p fem-pop-bypass who was admitted for left-sided subdural hematoma.     Patient was recently discharged from ICU to acute rehab at [facility] after he was admitted for traumatic right-sided subdural hematoma with a right to left-sided midline shift that remained stable without any clinical manifestations.  On the morning of 6/21/2025 patient had a fall in ARU, and post fall CTh showed new left-sided SDH along with previous right-sided SDH and scattered SAH.  He reports that he got out of his bed and reached for his walker to go to the bathroom. He felt sudden lightheadedness and fell.     Neurology was consulted and recommended hourly neurochecks for the patient so he was transferred back to ICU from ARU for closer monitoring overnight.     Patient wished to remain full code.    Interval history :   No acute overnight events. Stable morning CT. No neurological deficits. Complains of severe left sided headache with no n/v or visual disturbance     PAST HISTORY:   No past medical history on file.    No past surgical history on file.    SocialHistory:   The patient lives at home    Family History:  No family history on file.    MEDICATIONS:     No current facility-administered medications on file prior to encounter.     Current Outpatient Medications on File Prior to Encounter   Medication Sig Dispense Refill    amLODIPine (NORVASC) 5 MG tablet Take 1 tablet by mouth daily 30 tablet 3    levETIRAcetam (KEPPRA) 500 MG tablet Take 1 tablet by mouth 2 times daily 60 tablet 3    sennosides-docusate sodium (SENOKOT-S) 8.6-50 MG tablet Take 2 tablets by mouth daily as needed  Given that patient was on anticoagulation/antithrombotic and presented with falls, could be concerning for internal bleeding.  Patient does not have melena/hematochezia.  No hematoma on on physical exam on his thighs bilaterally and abdominal soft and nondistended,  Plan:  - Will monitor H&H daily          Chronic Medical Conditions:     HTN: Continue home meds  CAD: Had STEMI e cardiogenic shock in 11/24 s/p stents. Will hold off AT due to brain bleed.  PAD: s/p fem pop bypass. Will monitor peripheries.  Anxiety: Continue home diazepam           Glycemic goal:  140-180 mg/dL  LDAs:    Infusions: no continuous infusions  Abx: N/A   Diet:  Diet NPO  GI PPx:  Protonix 40 mg IV qd  Bowel Regimen: N/A  DVT PPx: SCDs      Code Status:Full Code  FEN: Diet NPO   PPX:  SCDs  DISPO: TBD    This patient has been staffed and discussed with Dr Lala.   -----------------------------  ANAM MALDONADO MD, PGY-1  Internal Medicine Resident  6/22/2025  7:05 AM    Patient seen, examined and discussed with the resident and I agree with the assessment and plan.  Briefly, this is a 74 y.o. male with traumatic SDH, again.    Patient was just in the ICU for a right sided SDH related to an outpatient fall.  He was found after an unwitnessed fall in the ARU and repeat CT showed a new, left sided SDH.  He was transferred to ICU for frequent neurochecks and serial imaging.  He was stable overnight, although he continues to lack insight into his disease and the pitfalls of his chronic narcotic needs.    Neurosurgery examined patient this morning, and with a stable scan he Can transfer out of ICU today to .      Dylan Lala MD

## 2025-06-22 NOTE — PROGRESS NOTES
Dr. Abreu made aware of CT results. Instructions given to reach out to Neuro team. This nurse called neuro team to inform them of CT results.   Decision made to call rapid response. Rapid response team down to see patient.  Reached out to Dr. Abreu for discharge orders.   Discharge order put in per Dr. Abreu.   Patient went down to CT.

## 2025-06-22 NOTE — PLAN OF CARE
Problem: Discharge Planning  Goal: Discharge to home or other facility with appropriate resources  Outcome: Progressing  Flowsheets (Taken 6/22/2025 0800)  Discharge to home or other facility with appropriate resources:   Identify barriers to discharge with patient and caregiver   Arrange for needed discharge resources and transportation as appropriate   Identify discharge learning needs (meds, wound care, etc)   Refer to discharge planning if patient needs post-hospital services based on physician order or complex needs related to functional status, cognitive ability or social support system     Problem: Seizure Precautions  Goal: Remains free of injury related to seizures activity  Outcome: Progressing     Problem: Safety - Adult  Goal: Free from fall injury  Outcome: Progressing  Flowsheets (Taken 6/22/2025 0800)  Free From Fall Injury:   Instruct family/caregiver on patient safety   Based on caregiver fall risk screen, instruct family/caregiver to ask for assistance with transferring infant if caregiver noted to have fall risk factors     Problem: Pain  Goal: Verbalizes/displays adequate comfort level or baseline comfort level  Outcome: Progressing  Flowsheets (Taken 6/22/2025 0800)  Verbalizes/displays adequate comfort level or baseline comfort level:   Encourage patient to monitor pain and request assistance   Assess pain using appropriate pain scale     Problem: Skin/Tissue Integrity  Goal: Skin integrity remains intact  Description: 1.  Monitor for areas of redness and/or skin breakdown  2.  Assess vascular access sites hourly  3.  Every 4-6 hours minimum:  Change oxygen saturation probe site  4.  Every 4-6 hours:  If on nasal continuous positive airway pressure, respiratory therapy assess nares and determine need for appliance change or resting period  Outcome: Progressing  Flowsheets (Taken 6/22/2025 0800)  Skin Integrity Remains Intact:   Monitor for areas of redness and/or skin breakdown   Every 4-6

## 2025-06-22 NOTE — PROGRESS NOTES
Speech Language Pathology  Facility/Department:[facility] ICU  Clinical Swallow Evaluation/Discharge  Name: Ad Lacey  : 1950  MRN: [id number]                                                       Patient Diagnosis(es):   Patient Active Problem List    Diagnosis Date Noted    Brain bleed (HCC) 2025    Severe malnutrition 2025    SDH (subdural hematoma) (HCC) 2025    Subdural hematoma (HCC) 2025       No past medical history on file.  No past surgical history on file.    Reason for Referral:  Ad Lacey  was referred for a Speech Therapy evaluation to assess swallow function and/or communication.    History of Present Illness  Per MD notes:  \"Patient is a 74 y.o. male with PMHx of pAfib, CAD c/b STEMI s/p TRISHA, HFimpEF, and PAD s/p fem-pop-bypass who was admitted for left-sided subdural hematoma.     Patient was recently discharged from ICU to acute rehab at [facility] after he was admitted for traumatic right-sided subdural hematoma with a right to left-sided midline shift that remained stable without any clinical manifestation.  On the morning of 2025 patient had a fall, and post fall CTh showed new left-sided SDH along with previous right-sided SDH and scattered SAH.  Neurology wanted to give another neurocheck for the patient so he was transferred back to ICU from ARU for closer monitoring overnight.     Patient wished to remain full code.\"    Imaging  XR CHEST PORTABLE   Final Result   1. No acute disease.      Electronically signed by Vega Louise MD      CT HEAD WO CONTRAST   Final Result   1. Stable right hemispheric subdural hematoma with mild right to left midline shift.   2. Stable left frontal subdural hematoma.   3. Stable subarachnoid hemorrhage along the left sylvian fissure and anterior temporal region and along the medial frontal lobes.      Electronically signed by Vega Louise MD          Date of onset: 25    Current Diet:  Diet

## 2025-06-22 NOTE — PROGRESS NOTES
Physical Therapy  Facility/Department: [facility] ICU  Physical Therapy Initial Assessment and treatment    Name: Ad Lacey  : 1950  MRN: [id number]  Date of Service: 2025    Discharge Recommendations:  IP Rehab   PT Equipment Recommendations  Equipment Needed: No  Other: pt owns RW    At baseline this patient was mod I with functional mobility & ADL's. The current hospitalization has resulted in the patient now being limited with all aspects of mobility, negatively impacting the patient's functional independence, ability to safely access their home environment, and ability to complete valued daily tasks and life roles. Ad Lacey is motivated for recovery and demonstrates the ability to tolerate inpatient rehabilitation 3 hours/day, 5 days/week for optimal return to functional independence, quality of life, and decreased caregiver burden.    Patient Diagnosis(es): There were no encounter diagnoses.  Past Medical History:  has no past medical history on file.  Past Surgical History:  has no past surgical history on file.    Assessment  Body Structures, Functions, Activity Limitations Requiring Skilled Therapeutic Intervention: Decreased functional mobility   Assessment: Pt presents to back to acute care from ARU with SDH after falling in unit and having prior SDH from falling down stairs.  Pt is easily agitated and impulsive, perseverating on pain mgmt plan throughout session and difficult to redirect to therapy. Pt currently requires CGA for all transfers and short distances of ambulation with RW. Pt reports he is typically IND using RW at baseline. Pt would benefit from further IP PT to maximize mobility and reduce fall risk prior to DC home.  Treatment Diagnosis: impaired mobility  Therapy Prognosis: Good  Decision Making: Medium Complexity  Requires PT Follow-Up: Yes  Activity Tolerance  Activity Tolerance: Patient tolerated evaluation without incident;Patient limited by fatigue;Patient limited

## 2025-06-22 NOTE — CONSULTS
Neurosurgery Consult:    Patient Name: Ad Lacey YOB: 1950   Sex: Male Age: 74 yrs     Medical Record Number: [id number] Acct Number: [id number]   Room Number: 4516/4516-01 Hospital Day: Hospital Day: 2     Requesting physician: Stephen Chacon MD    Reason for consultation: SDH    History of present illness: Patient is a 74 y.o. male who  has no past medical history on file.  Takes plavix.  He presented after a fall here at [facility].  He had been admitted to rehab following a R SDH which was stable.  Head CT after the recent fall showed new L SDH.  Repeat CT stable.  Pt. Reports he feels ok.     ROS:   Denies fever or recent illness.   Denies chest pain  Denies shortness of breath  Denies changes in bowel or bladder function    VITAL SIGNS   /66   Pulse 68   Temp 98.1 °F (36.7 °C) (Temporal)   Resp 14   Ht 1.727 m (5' 7.99\")   Wt 60.8 kg (134 lb 0.6 oz)   SpO2 100%   BMI 20.39 kg/m²    Height Height: 172.7 cm (5' 7.99\")   Weight Weight - Scale: 60.8 kg (134 lb 0.6 oz)        Allergies No Known Allergies   NPO Status ADULT DIET; Regular; Low Sodium (2 gm)   Isolation No active isolations     MEDICAL HISTORY   Past Medical History   No past medical history on file.   Surgical History    has no past surgical history on file.   Social History   Social History     Occupational History    Not on file   Tobacco Use    Smoking status: Every Day     Current packs/day: 1.00     Average packs/day: 1 pack/day for 50.0 years (50.0 ttl pk-yrs)     Types: Cigarettes    Smokeless tobacco: Never   Vaping Use    Vaping status: Every Day    Substances: Nicotine   Substance and Sexual Activity    Alcohol use: Not Currently    Drug use: Yes     Types: Marijuana (Weed), Opiates , Benzodiazepines (Downers/Zannies)    Sexual activity: Not on file        The medical history was obtained from the patient & the medical records. The nursing notes, primary physician's notes, and old charts  rise  EXTREMITIES: Extremities WWP  SKIN: Warm and dry  NEURO: A&Ox4, FC - appendicular   CN II-XII grossly intact: no facial droop, EOMI, tongue midline, voice wnl,  hearing intact   HERNANDES spontaneously, FROM, No pronator drift   Tone normal throughout   Sensation intact to light touch throughout   Gait exam deferred 2/2 patient condition    IMAGING   I personally reviewed the patient's imaging which consists of head CT x 3 which shows prior R sided SDH and tSAH as well as now new L convexity SDH stable on repeat imaging    ASSESSMENT & PLAN   75yo M with prior R SDH And tSAH now with new L SDH, GCS 15    Plan:   No further imaging at this time.  Hold all anticoag/antiplatelets x 2 weeks  Continue Keprpa  Ok to transfer to floor.      Thank you for the consultation.    Bria Moyer MD    [facility]  [address]  [place], [place] [address] [phone] (office)

## 2025-06-22 NOTE — PROGRESS NOTES
Patient admitted into ICU room 3921. Skin assessed and CHG bath given. Safety precautions in place. Sitter at bedside. Call light use explained and bed alarm on. Patient alert and oriented. Following commands strongly in all four extremities. No neuro deficits at this time. Complaining of a headache. NCC and ICU team at bedside.

## 2025-06-22 NOTE — PROGRESS NOTES
Occupational Therapy  Facility/Department: [facility] ICU  Occupational Therapy Initial Assessment/tx    Name: Ad Lacey  : 1950  MRN: [id number]  Date of Service: 2025    Discharge Recommendations:  IP Rehab  OT Equipment Recommendations  Equipment Needed: No   At baseline this patient was indep with functional mobility & ADL's. The current hospitalization has resulted in the patient now being limited with all aspects of mobility, negatively impacting the patient's functional independence, ability to safely access their home environment, and ability to complete valued daily tasks and life roles. Ad Lacey is motivated for recovery and demonstrates the ability to tolerate inpatient rehabilitation 3 hours/day, 5 days/week for optimal return to functional independence, quality of life, and decreased caregiver burden.     Patient Diagnosis(es): There were no encounter diagnoses.  Past Medical History:  has no past medical history on file.  Past Surgical History:  has no past surgical history on file.           Assessment  Assessment: Pt with diagnosis of SDH, discharged from ARU after falling.  He needs A of 1 for functional transfers/moblity with rolling walker.  Secondary to pt declining because of R thigh pain, he needs max A donning brief/hospital pants.  Pt has decreased insight into deficits, and decreased safety awareness.  Despite higher AMPAC score, feel patient would benefit from intensive inpatient rehab.  Recommend ARU at discharge to maximize functional status.  Prognosis: Good  Decision Making: Medium Complexity  REQUIRES OT FOLLOW-UP: Yes  Activity Tolerance  Activity Tolerance: Patient Tolerated treatment well;Patient limited by fatigue     Plan  Occupational Therapy Plan  Times Per Week: 5-7  Current Treatment Recommendations: Balance training, Functional mobility training, Endurance training, Safety education & training, Self-Care /

## 2025-06-22 NOTE — H&P
ICU HISTORY & PHYSICAL       Admit Date:  6/21/2025                            Hospital Day: 1  ICU Day: 1      CC: Headache and fall    History obtained from:  chart review and the patient    SUBJECTIVE   HPI:    Patient is a 74 y.o. male with PMHx of pAfib, CAD c/b STEMI s/p TRISHA, HFimpEF, and PAD s/p fem-pop-bypass who was admitted for left-sided subdural hematoma.    Patient was recently discharged from ICU to acute rehab at [facility] after he was admitted for traumatic right-sided subdural hematoma with a right to left-sided midline shift that remained stable without any clinical manifestation.  On the morning of 6/21/2025 patient had a fall, and post fall CTh showed new left-sided SDH along with previous right-sided SDH and scattered SAH.  Neurology wanted to give another neurocheck for the patient so he was transferred back to ICU from ARU for closer monitoring overnight.    Patient wished to remain full code.    Allergies: No Known Allergies    Review of Systems   Constitutional:  Positive for fatigue.   Respiratory:  Negative for cough, choking, chest tightness and shortness of breath.    Cardiovascular: Negative.    Gastrointestinal: Negative.    Genitourinary: Negative.    Musculoskeletal:         Right sided hip pain   Neurological:  Positive for headaches. Negative for dizziness, tremors, seizures, facial asymmetry and weakness.         OBJECTIVE   MEDICATION:  Home meds:  Prior to Admission medications    Medication Sig Start Date End Date Taking? Authorizing Provider   amLODIPine (NORVASC) 5 MG tablet Take 1 tablet by mouth daily 6/21/25   Ervin Jiménez MD   levETIRAcetam (KEPPRA) 500 MG tablet Take 1 tablet by mouth 2 times daily 6/20/25   Ervin Jiménez MD   sennosides-docusate sodium (SENOKOT-S) 8.6-50 MG tablet Take 2 tablets by mouth daily as needed for Constipation 6/20/25   Ervin Jiménez MD   thiamine mononitrate (THIAMINE) 100 MG tablet Take 1 tablet by mouth daily 6/21/25   Tino          General: Normal range of motion.      Right lower leg: No edema.      Left lower leg: No edema.   Skin:     General: Skin is warm.      Capillary Refill: Capillary refill takes less than 2 seconds.   Neurological:      General: No focal deficit present.      Mental Status: He is alert and oriented to person, place, and time. Mental status is at baseline.           LABS:  ABGs:  No results for input(s): \"PHART\", \"WAE1RMP\", \"PO2ART\" in the last 72 hours.  VBGs:  No results for input(s): \"PHVEN\", \"XFX5QIG\", \"PO2VEN\" in the last 72 hours.  Recent Labs     25  0527 25  0529   WBC 13.1* 10.6 15.0*   HGB 11.3* 10.2* 9.3*   HCT 34.9* 29.8* 27.8*    331 300       Recent Labs     25  0526 25  0529   * 147* 144   K 3.8 3.4* 3.7    109 111*   CO2 23 19* 21   BUN 28* 24* 22*   CREATININE 1.6* 1.3 0.9   GLUCOSE 109* 104* 93   PHOS  --  4.7 2.1*     Recent Labs     25   AST 45*   ALT 20   BILITOT 0.7   ALKPHOS 111     No results for input(s): \"URICACID\" in the last 72 hours.  Recent Labs     25  0526   TROPHS 27* 24*     No results for input(s): \"PROBNP\" in the last 72 hours.  Recent Labs     25  0035 25  0527   INR 1.15* 1.04     No results for input(s): \"SEDRATE\" in the last 72 hours.  No results for input(s): \"CRP\" in the last 72 hours.  No results for input(s): \"LACTATE\" in the last 72 hours.  No results for input(s): \"LACTATESEP\" in the last 72 hours.      UA/micro:   Recent Labs     25  0500   COLORU Yellow   PHUR 6.0  6.0   WBCUA 0-2   RBCUA 0-2   CLARITYU Clear   LEUKOCYTESUR Negative   UROBILINOGEN 0.2   BILIRUBINUR Negative   BLOODU MODERATE*   GLUCOSEU Negative       Micro:   Results       ** No results found for the last 336 hours. **             RADIOLOGY:  CT HEAD WO CONTRAST    (Results Pending)       EK25  NSR  LAD likely due to HTN  No ECTOR  No arrhythmia    Echo:

## 2025-06-22 NOTE — PROGRESS NOTES
Neurocritical Care Progress Note    Patient: Ad Lacey MRN: [id number]    YOB: 1950  Age: 74 y.o.  Sex: male   Unit: [facility] ICU TOWER Room/Bed: 4516/4516-01 Location: [facility]'s Date: 6/22/2025  Date of Admission: 6/21/2025  8:00 PM  Admitting Physician: FRANCES SALES    Primary Care Physician: No primary care provider on file.          LOS: 1 day      ASSESSMENT & RECOMMENDATIONS     Assessment  74M with PMH of CAD on Plavix, had recent traumatic L SDH and R temp SDH who was recently here for fall. CTA at the time show basilar artery stenosis, who was discharged to ARU on 6/20. He hit his head last night and stated he feel dizziness at the time without weakness. CT shows improved SDH.   I suspect that he had an orthostatic hypotension while standing up and causing an near syncope event in the setting of BA stenosis. Low suspicion for stroke, but will get a MRI to r/o     Recommendations  Neuro q4  MRI Brain  Please check orthostatic hypotension   PT/OT  SBP < 160  Hold all anticoagulation & antiplatelet for 2 weeks  Seizure prophylaxis: Keppra 500mg BID x7 days      SUBJECTIVE     Chart reviewed, events noted, pt seen & examined. No acute events overnight. Afebrile.     Review of Systems  No changes since pt last seen other than those noted above.    PFSH  No change since the original history and note.     OBJECTIVE     Patient Vitals for the past 24 hrs:   BP Temp Temp src Pulse Resp SpO2 Height Weight   06/22/25 1000 (!) 114/55 -- -- 61 18 -- -- --   06/22/25 0935 -- -- -- -- 12 -- -- --   06/22/25 0905 -- -- -- -- 12 -- -- --   06/22/25 0900 (!) 154/82 -- -- 65 16 100 % -- --   06/22/25 0811 -- -- -- -- 17 -- -- --   06/22/25 0800 138/74 98.1 °F (36.7 °C) Temporal 59 16 96 % -- --   06/22/25 0720 -- -- -- -- -- -- 1.727 m (5' 7.99\") 60.8 kg (134 lb 0.6 oz)   06/22/25 0715 -- -- -- 64 19 97 % -- --   06/22/25 0700 (!) 139/105 -- -- 64 14 95 % -- --  Value Ref Range    Sodium 138 136 - 145 mmol/L    Potassium 3.4 (L) 3.5 - 5.1 mmol/L    Chloride 102 99 - 110 mmol/L    CO2 22 21 - 32 mmol/L    Anion Gap 14 3 - 16    Glucose 100 (H) 70 - 99 mg/dL    BUN 15 7 - 20 mg/dL    Creatinine 0.8 0.8 - 1.3 mg/dL    Est, Glom Filt Rate >90 >60    Calcium 8.7 8.3 - 10.6 mg/dL   CBC with Auto Differential    Collection Time: 06/22/25  5:07 AM   Result Value Ref Range    WBC 9.2 4.0 - 11.0 K/uL    RBC 3.01 (L) 4.20 - 5.90 M/uL    Hemoglobin 8.6 (L) 13.5 - 17.5 g/dL    Hematocrit 24.8 (L) 40.5 - 52.5 %    MCV 82.5 80.0 - 100.0 fL    MCH 28.7 26.0 - 34.0 pg    MCHC 34.7 31.0 - 36.0 g/dL    RDW 14.7 12.4 - 15.4 %    Platelets 304 135 - 450 K/uL    MPV 7.6 5.0 - 10.5 fL    Neutrophils % 73.0 %    Lymphocytes % 17.9 %    Monocytes % 8.0 %    Eosinophils % 0.6 %    Basophils % 0.5 %    Neutrophils Absolute 6.7 1.7 - 7.7 K/uL    Lymphocytes Absolute 1.6 1.0 - 5.1 K/uL    Monocytes Absolute 0.7 0.0 - 1.3 K/uL    Eosinophils Absolute 0.1 0.0 - 0.6 K/uL    Basophils Absolute 0.0 0.0 - 0.2 K/uL       Scheduled Meds:   morphine  60 mg Oral BID    potassium bicarb-citric acid  40 mEq Oral Once    sodium chloride flush  5-40 mL IntraVENous 2 times per day    amLODIPine  5 mg Oral Daily    atorvastatin  80 mg Oral Daily    carvedilol  12.5 mg Oral BID     levETIRAcetam  500 mg Oral BID    pantoprazole  40 mg Oral Daily    vitamin B-1  100 mg Oral Daily    balsum peru-castor oil   Topical BID       Continuous Infusions:  sodium chloride        PRN Meds:  naloxone, 0.4 mg, PRN  hydrALAZINE, 10 mg, Q4H PRN  HYDROmorphone, 4 mg, Q4H PRN  labetalol, 10 mg, Q4H PRN  oxyCODONE, 5 mg, Q4H PRN  sodium chloride flush, 5-40 mL, PRN  sodium chloride, , PRN  acetaminophen, 650 mg, Q6H PRN   Or  acetaminophen, 650 mg, Q6H PRN  ondansetron, 4 mg, Q8H PRN   Or  ondansetron, 4 mg, Q6H PRN  diazePAM, 10 mg, BID PRN  sennosides-docusate sodium, 2 tablet, Daily PRN                Discussed at length with

## 2025-06-22 NOTE — PROGRESS NOTES
4 Eyes Skin Assessment     NAME:  Ad Lacey  YOB: 1950  MEDICAL RECORD NUMBER:  [id number]    The patient is being assessed for  Admission    I agree that at least one RN has performed a thorough Head to Toe Skin Assessment on the patient. ALL assessment sites listed below have been assessed.      Areas assessed by both nurses:    Blanchable Redness sacrum  Ecchymosis bilateral hands   Scattered bruising/abrasions  Blanchable Redness bilateral elbows    Head, Face, Ears, Shoulders, Back, Chest, Arms, Elbows, Hands, Sacrum. Buttock, Coccyx, Ischium, Legs. Feet and Heels, and Under Medical Devices         Does the Patient have a Wound? No noted wound(s)       Jalen Prevention initiated by RN: No  Wound Care Orders initiated by RN: No    Pressure Injury (Stage 3,4, Unstageable, DTI, NWPT, and Complex wounds) if present, place Wound referral order by RN under : No    New Ostomies, if present place, Ostomy referral order under : No     Nurse 1 eSignature: Electronically signed by Sabina Peña RN on 6/21/25 at 8:26 PM EDT    **SHARE this note so that the co-signing nurse can place an eSignature**    Nurse 2 eSignature: Electronically signed by Blake Figueroa RN on 6/21/25 at 9:26 PM EDT

## 2025-06-22 NOTE — PROGRESS NOTES
Patient found laying on the floor when this nurse arrived to his room. Emergency button activated for assistance. Assessed for injuries, none noted at this time. Gait belt placed around patient's waist.  X3 staff needed to safely assist patient off floor and to w/c. When this nurse asked patient where he was going, he stated that he was trying to get to the restroom. Patient assisted to restroom X1, using w/c, then assisted to bed. No injuries noted. Vitals stable.

## 2025-06-22 NOTE — CONSULTS
NEUROCRITICAL CARE CONSULT NOTE     Patient: Ad Lacey MRN: [id number]    YOB: 1950  Age: 74 y.o.  Sex: male   Unit: [facility] ICU TOWER  Room/Bed: 4516/4516-01 Location: [facility]    Date of Consultation: 6/21/2025  Date of Admission: 6/21/2025  8:00 PM ( LOS: 0 days )  Primary Care Physician: No primary care provider on file.   Consult Requested By: Stephen Chacon, *    Reason for Consult: SDH    IMPRESSION & RECOMMENDATIONS     IMPRESSION:  dA Lacey is a 73 yo patient who presents for a L sided traumatic SDH. Previous R sided SDH and scattered SAH remain stable. Patient remains oriented with no focal deficits on exam. Patient does endorse headache.     RECOMMENDATIONS:  Q1h neuro checks  Repeat CT head in AM  SBP goal <160mmHg  Keep Plt >100k & INR <1.4  Hold all anticoagulation & antiplatelet for 2 weeks  Seizure prophylaxis: Keppra 500mg BID x7 days  DVT Prophylaxis: SCD's    Management and plan discussed with:   Bedside nurse  Dr. Vivi Jimenez, APRN - CNP   NEUROCRITICAL CARE  6/21/2025 8:39 PM  [facility]: [facility] NEUROCRITICAL CARE      History of Present Illness     Ad Lacey is a 74 y.o. y/o male with history significant for CAD s/p PCI (6/2022), PAD s/p R fem-pop bypass (7/2022), STEMI, pLVAD 11/2024, CHF, pAF, and chronic pain syndrome.     Per chart review, patient previously admitted from [facility] to [facility] ICU s/p fall down 15stairs with multi-compartment ICH with R sided SDH. Patient was discharged to [facility] ARU on 6/20 in stable condition with BP under control. Patient reportedly fell on 6/21 and hit his head with no LOC. CT head done showing improvement in R to L midline shift, stable R SDH, scattered SAH that are similar to previous studies, and a new L sided SDH. Patient states he needed to go to the bathroom and attempted to get out of bed to go when he fell. Patient re-admitted to [facility] ICU for further evaluation and treatment.  06/19/2025 10:49 PM    TRIG 133 06/19/2025 10:49 PM     Lab Results   Component Value Date/Time    LACTA 0.6 06/19/2025 09:44 AM          CURRENT SCHEDULED MEDICATIONS   Inpatient Medications      Infusions      Antibiotics   Recent Abx Admin        No antibiotic orders with administrations found.                     Physical Exam   PHYSICAL EXAM:  There were no vitals filed for this visit.      General: Alert, no distress, well-nourished  Neurologic  Mental status:   Oriented x4, follows all commands, clear/appropriate speech    Cranial nerves:   CN2: Visual fields full w/o extinction on confrontational testing   CN 3,4,6: Pupils equal and reactive to light, extraocular muscles intact  CN5: Facial sensation symmetric   CN7: Face symmetric  CN8: Hearing symmetric to spoken voice  CN9: Palate elevated symmetrically  CN11: Traps full strength on shoulder shrug  CN12: Tongue midline with protrusion    Motor Exam:  5/5 strength throughout    Sensory: light touch intact and symmetric in all 4 extremities.  No sensory extinction on bilateral simultaneous stimulation  Cerebellar/coordination: finger nose finger normal without ataxia  Tone: normal in all 4 extremities  Gait: defer 2/2 patient safety         Critical Care:  Due to the immediate potential for life-threatening deterioration due to neurological failure, I spent 48 minutes providing critical care.  This time excludes time spent performing procedures but includes time spent on direct patient care, history retrieval, review of the chart, and discussions with patient, family, and consultant(s).

## 2025-06-23 LAB
ANION GAP SERPL CALCULATED.3IONS-SCNC: 14 MMOL/L (ref 3–16)
BASOPHILS # BLD: 0 K/UL (ref 0–0.2)
BASOPHILS NFR BLD: 0.5 %
BUN SERPL-MCNC: 15 MG/DL (ref 7–20)
CALCIUM SERPL-MCNC: 8.9 MG/DL (ref 8.3–10.6)
CHLORIDE SERPL-SCNC: 99 MMOL/L (ref 99–110)
CO2 SERPL-SCNC: 22 MMOL/L (ref 21–32)
CREAT SERPL-MCNC: 0.7 MG/DL (ref 0.8–1.3)
DEPRECATED RDW RBC AUTO: 14.7 % (ref 12.4–15.4)
EOSINOPHIL # BLD: 0.1 K/UL (ref 0–0.6)
EOSINOPHIL NFR BLD: 1.3 %
GFR SERPLBLD CREATININE-BSD FMLA CKD-EPI: >90 ML/MIN/{1.73_M2}
GLUCOSE SERPL-MCNC: 96 MG/DL (ref 70–99)
HCT VFR BLD AUTO: 25.4 % (ref 40.5–52.5)
HGB BLD-MCNC: 8.7 G/DL (ref 13.5–17.5)
LYMPHOCYTES # BLD: 1.9 K/UL (ref 1–5.1)
LYMPHOCYTES NFR BLD: 20.5 %
MCH RBC QN AUTO: 28.4 PG (ref 26–34)
MCHC RBC AUTO-ENTMCNC: 34.2 G/DL (ref 31–36)
MCV RBC AUTO: 83 FL (ref 80–100)
MONOCYTES # BLD: 0.8 K/UL (ref 0–1.3)
MONOCYTES NFR BLD: 8.2 %
NEUTROPHILS # BLD: 6.5 K/UL (ref 1.7–7.7)
NEUTROPHILS NFR BLD: 69.5 %
PLATELET # BLD AUTO: 370 K/UL (ref 135–450)
PMV BLD AUTO: 6.9 FL (ref 5–10.5)
POTASSIUM SERPL-SCNC: 3.5 MMOL/L (ref 3.5–5.1)
RBC # BLD AUTO: 3.06 M/UL (ref 4.2–5.9)
SODIUM SERPL-SCNC: 135 MMOL/L (ref 136–145)
WBC # BLD AUTO: 9.4 K/UL (ref 4–11)

## 2025-06-23 PROCEDURE — 92523 SPEECH SOUND LANG COMPREHEN: CPT

## 2025-06-23 PROCEDURE — 85025 COMPLETE CBC W/AUTO DIFF WBC: CPT

## 2025-06-23 PROCEDURE — 6370000000 HC RX 637 (ALT 250 FOR IP)

## 2025-06-23 PROCEDURE — 2060000000 HC ICU INTERMEDIATE R&B

## 2025-06-23 PROCEDURE — 80048 BASIC METABOLIC PNL TOTAL CA: CPT

## 2025-06-23 PROCEDURE — 99231 SBSQ HOSP IP/OBS SF/LOW 25: CPT | Performed by: NURSE PRACTITIONER

## 2025-06-23 PROCEDURE — 36415 COLL VENOUS BLD VENIPUNCTURE: CPT

## 2025-06-23 PROCEDURE — 2500000003 HC RX 250 WO HCPCS

## 2025-06-23 RX ORDER — LEVETIRACETAM 500 MG/1
500 TABLET ORAL 2 TIMES DAILY
Qty: 20 TABLET | Refills: 0 | Status: SHIPPED | OUTPATIENT
Start: 2025-06-23 | End: 2025-07-03

## 2025-06-23 RX ADMIN — SODIUM CHLORIDE, PRESERVATIVE FREE 10 ML: 5 INJECTION INTRAVENOUS at 08:19

## 2025-06-23 RX ADMIN — HYDROMORPHONE HYDROCHLORIDE 4 MG: 2 TABLET ORAL at 19:57

## 2025-06-23 RX ADMIN — MORPHINE SULFATE 60 MG: 30 TABLET, FILM COATED, EXTENDED RELEASE ORAL at 08:16

## 2025-06-23 RX ADMIN — LEVETIRACETAM 500 MG: 500 TABLET, FILM COATED ORAL at 08:30

## 2025-06-23 RX ADMIN — ACETAMINOPHEN 650 MG: 325 TABLET ORAL at 15:37

## 2025-06-23 RX ADMIN — DIAZEPAM 10 MG: 10 TABLET ORAL at 05:23

## 2025-06-23 RX ADMIN — SODIUM CHLORIDE, PRESERVATIVE FREE 10 ML: 5 INJECTION INTRAVENOUS at 21:21

## 2025-06-23 RX ADMIN — HYDROMORPHONE HYDROCHLORIDE 4 MG: 2 TABLET ORAL at 01:22

## 2025-06-23 RX ADMIN — CARVEDILOL 12.5 MG: 6.25 TABLET, FILM COATED ORAL at 08:17

## 2025-06-23 RX ADMIN — Medication 100 MG: at 08:18

## 2025-06-23 RX ADMIN — ATORVASTATIN CALCIUM 80 MG: 80 TABLET, FILM COATED ORAL at 08:18

## 2025-06-23 RX ADMIN — Medication: at 21:21

## 2025-06-23 RX ADMIN — MORPHINE SULFATE 60 MG: 30 TABLET, FILM COATED, EXTENDED RELEASE ORAL at 21:18

## 2025-06-23 RX ADMIN — HYDROMORPHONE HYDROCHLORIDE 4 MG: 2 TABLET ORAL at 12:07

## 2025-06-23 RX ADMIN — HYDROMORPHONE HYDROCHLORIDE 4 MG: 2 TABLET ORAL at 05:23

## 2025-06-23 RX ADMIN — Medication: at 08:19

## 2025-06-23 RX ADMIN — PANTOPRAZOLE SODIUM 40 MG: 40 TABLET, DELAYED RELEASE ORAL at 08:16

## 2025-06-23 RX ADMIN — HYDROMORPHONE HYDROCHLORIDE 4 MG: 2 TABLET ORAL at 16:05

## 2025-06-23 RX ADMIN — AMLODIPINE BESYLATE 5 MG: 5 TABLET ORAL at 08:16

## 2025-06-23 RX ADMIN — CARVEDILOL 12.5 MG: 6.25 TABLET, FILM COATED ORAL at 15:37

## 2025-06-23 RX ADMIN — LEVETIRACETAM 500 MG: 500 TABLET, FILM COATED ORAL at 21:18

## 2025-06-23 ASSESSMENT — PAIN SCALES - GENERAL
PAINLEVEL_OUTOF10: 10
PAINLEVEL_OUTOF10: 2
PAINLEVEL_OUTOF10: 5
PAINLEVEL_OUTOF10: 5
PAINLEVEL_OUTOF10: 2
PAINLEVEL_OUTOF10: 10
PAINLEVEL_OUTOF10: 6
PAINLEVEL_OUTOF10: 10
PAINLEVEL_OUTOF10: 5
PAINLEVEL_OUTOF10: 2
PAINLEVEL_OUTOF10: 10

## 2025-06-23 ASSESSMENT — PAIN DESCRIPTION - LOCATION
LOCATION: BACK
LOCATION: HIP
LOCATION: FOOT;HIP;LEG
LOCATION: BACK
LOCATION: BACK;HIP

## 2025-06-23 ASSESSMENT — PAIN DESCRIPTION - ORIENTATION
ORIENTATION: RIGHT

## 2025-06-23 ASSESSMENT — PAIN DESCRIPTION - INTENSITY: RATING_2: 2

## 2025-06-23 ASSESSMENT — PAIN DESCRIPTION - DESCRIPTORS: DESCRIPTORS: SHARP

## 2025-06-23 NOTE — PROGRESS NOTES
V2.0    [facility] Progress Note      Name:  Ad Lacey /Age/Sex: 1950  (74 y.o. male)   MRN & CSN:  [id number] & [id number] Encounter Date/Time: 2025 8:51 AM EDT   Location:  [facility]/4516-01 PCP: No primary care provider on file.     Attending:Lora Doss MD       Hospital Day: 3    HPI:    Assessment and Recommendations     Hospital course:    Ad Lacey is a 74 y.o. male with pmh of A-fib, coronary artery disease s/p TRISHA placement, heart failure improved EF, peripheral artery disease s/p femoropopliteal bypass who presents with new L-sided SDH (subdural hematoma) (HCC).  Patient was discharged on  acute rehab.  Was admitted with right subdural hematoma with 9 mm right-to-left shift with moderate subarachnoid hemorrhage.  At that admission was treated with DDAVP and Balfaxar and nicardipine drip.  At acute rehab patient had a fall and was found to have new left-sided subdural hematoma.  Patient was monitored in the ICU.  Neurosurgery was consulted.  Stability CT unchanged.  Recommend to continue Keppra and no anticoagulation for 2 weeks.  Patient will need outpatient CT head in 6 weeks and follow-up with neurosurgery      Plan:     Left traumatic subdural hematoma with previous right subdural hematoma and scattered subarachnoid hemorrhage-no further workup needed discussed with neurosurgery  - Neurosurgery and neurocritical care consulted  - Continue neurochecks  - Hold all anticoagulation  - Continue Keppra  - MRI brain shows no acute ischemia multifocal subarachnoid hemorrhage.  Right greater than left subdural hematoma with stable 6 mm midline shift    Coronary artery disease s/p PCI  - Hold antiplatelet medication  -STEMI in  requiring thrombectomy and TRISHA to LAD patient was in cardiogenic shock and required Impella at that time    Peripheral vascular disease status post femoropopliteal bypass  - Continue statin    Paroxysmal A-fib  - Hold all anticoagulation  - Continue  partial opacification the ethmoid air cells. The orbits and osseous structures are unremarkable. Intracranially, there is a moderate-sized hemispheric right-sided subdural hematoma. The overall size of the hematoma does not appear to significantly changed. Maximum thickness approximately 16 mm. There is right to left midline shift of 5 mm. No significant change in the interval. Anterior left frontal subdural hematoma appears similar to prior exam. Subarachnoid hemorrhage along the sylvian fissure and along the medial frontal lobes appears not appreciably changed. No geographic region of hypodensity to suggest infarct.     1. Stable right hemispheric subdural hematoma with mild right to left midline shift. 2. Stable left frontal subdural hematoma. 3. Stable subarachnoid hemorrhage along the left sylvian fissure and anterior temporal region and along the medial frontal lobes. Electronically signed by Vega Louise MD    CT HEAD WO CONTRAST  Result Date: 6/21/2025  EXAM: CT HEAD WO CONTRAST INDICATION: Subdural hematoma 6 hr post stability scan; COMPARISON: CT 3 hours prior TECHNIQUE: Axial CT imaging of the head. Axial images and multiplanar reformatted images reviewed.  Individualized dose optimization technique was used in order to meet ALARA standards for radiation dose reduction.  In addition to vendor specific dose reduction algorithms, the dose reduction techniques vary based on the specific scanner utilized but frequently include automated exposure control, adjustment of the mA and/or kV according to patient size, and use of iterative reconstruction technique. CONTRAST: None. FINDINGS: Right hemispheric subdural hematoma measuring up to 1.5 cm in width, unchanged from prior. Mild mass effect and right-to-left midline shift up to 5 mm, similar prior. Left frontal convexity subdural hematoma measuring 9 mm in thickness, unchanged. Scattered subarachnoid hemorrhage is unchanged. Age appropriate ventricular and  posteroinferior to cecum. Evidence of minimal stool and gas in the right colon.  Empty transverse colon.  Empty descending colon.  Empty proximal sigmoid colon.  Small to moderate amount of stool in the distal sigmoid colon and rectum.  No diverticulosis coli.  No evidence of colitis. Pelvis: No acute process in the pelvis.  No pelvic hemorrhage.  At the posterior aspect and right superolateral aspect of rectum there is a precoccygeal prominent cyst measuring 5.9 cm transversely, 5.0 cm AP and 5.6 cm craniocaudad.  This cyst appears to have benign characteristics with homogeneous hypodensity and well-defined outline. Peritoneum/Retroperitoneum: No evidence of periaortic or mesenteric pathologic lymphadenopathy.  No ascites.  No intraperitoneal or retroperitoneal hemorrhage.  No evidence of aortic dissection.  Distal abdominal aorta is minimally ectatic with a diameter of 2.4 cm.  All mesenteric arteries and bilateral renal arteries and their major branches are normally patent without significant stenosis. Bones/Soft Tissues: No evidence of acute fracture or dislocation in the lumbar spine.  Mild L4 anterolisthesis due to advanced facet osteoarthritis. In pelvic bones and joints and bilateral hip joints, no evidence of fracture or osseous malalignment.  Note is made of advanced osteoarthritis at the right hip joint. No evidence of inguinal, femoral or ventral hernia.  No acute process in the soft tissues of abdominal walls, pelvic walls, soft tissues of back and the soft tissues of the gluteal areas. UNENHANCED CT SCAN OF THORACIC SPINE BONES/ALIGNMENT: Minimal loss of heights of T1 and C7 vertebral bodies, with mild sclerotic changes without definable fracture.  In rest of thoracic spine no fracture or compression or malalignment. DEGENERATIVE CHANGES: Mild multilevel degenerative disc disease in thoracic spine without spinal stenosis. SOFT TISSUES: No paraspinal mass or abnormal fluid collection identified.

## 2025-06-23 NOTE — PLAN OF CARE
Patient A&Ox4. NIHSS 0, GCS 15. Patient still complains of R hip pain. Appetite decreased. Sitter at bedside, patient made no attempts of getting out of bed by himself.      Problem: Discharge Planning  Goal: Discharge to home or other facility with appropriate resources  Outcome: Adequate for Discharge  Flowsheets (Taken 6/23/2025 0800)  Discharge to home or other facility with appropriate resources:   Identify barriers to discharge with patient and caregiver   Arrange for needed discharge resources and transportation as appropriate   Identify discharge learning needs (meds, wound care, etc)   Arrange for interpreters to assist at discharge as needed   Refer to discharge planning if patient needs post-hospital services based on physician order or complex needs related to functional status, cognitive ability or social support system     Problem: Seizure Precautions  Goal: Remains free of injury related to seizures activity  6/23/2025 1323 by Manuel Santana RN  Outcome: Adequate for Discharge  6/23/2025 0244 by Reji Gandara RN  Outcome: Progressing     Problem: Safety - Adult  Goal: Free from fall injury  6/23/2025 1323 by Manuel Santana RN  Outcome: Adequate for Discharge  Flowsheets (Taken 6/23/2025 0800)  Free From Fall Injury:   Instruct family/caregiver on patient safety   Based on caregiver fall risk screen, instruct family/caregiver to ask for assistance with transferring infant if caregiver noted to have fall risk factors  6/23/2025 0244 by Reji Gandara RN  Outcome: Progressing     Problem: Skin/Tissue Integrity  Goal: Skin integrity remains intact  Description: 1.  Monitor for areas of redness and/or skin breakdown  2.  Assess vascular access sites hourly  3.  Every 4-6 hours minimum:  Change oxygen saturation probe site  4.  Every 4-6 hours:  If on nasal continuous positive airway pressure, respiratory therapy assess nares and determine need for appliance change or resting period  6/23/2025 1323  by Manuel Santana RN  Outcome: Adequate for Discharge  Flowsheets (Taken 6/23/2025 0800)  Skin Integrity Remains Intact:   Monitor for areas of redness and/or skin breakdown   Every 4-6 hours minimum:  Change oxygen saturation probe site   Every 4-6 hours:  If on nasal continuous positive airway pressure, assess nares and determine need for appliance change or resting period   Turn and reposition as indicated   Assess need for specialty bed   Positioning devices   Monitor skin under medical devices   Check visual cues for pain  6/23/2025 0244 by Reji Gandara RN  Outcome: Progressing     Problem: ABCDS Injury Assessment  Goal: Absence of physical injury  6/23/2025 1323 by Manuel Santana RN  Outcome: Adequate for Discharge  6/23/2025 0244 by Reji Gandara RN  Outcome: Progressing     Problem: Pain  Goal: Verbalizes/displays adequate comfort level or baseline comfort level  6/23/2025 1323 by Manuel Santana RN  Outcome: Not Progressing  Flowsheets (Taken 6/23/2025 0800)  Verbalizes/displays adequate comfort level or baseline comfort level:   Encourage patient to monitor pain and request assistance   Assess pain using appropriate pain scale   Administer analgesics based on type and severity of pain and evaluate response   Implement non-pharmacological measures as appropriate and evaluate response   Consider cultural and social influences on pain and pain management   Notify Licensed Independent Practitioner if interventions unsuccessful or patient reports new pain  6/23/2025 0244 by Reji Gandara RN  Outcome: Progressing     Problem: Pain  Goal: Verbalizes/displays adequate comfort level or baseline comfort level  6/23/2025 1323 by Manuel Santana RN  Outcome: Not Progressing  Flowsheets (Taken 6/23/2025 0800)  Verbalizes/displays adequate comfort level or baseline comfort level:   Encourage patient to monitor pain and request assistance   Assess pain using appropriate pain scale   Administer analgesics  based on type and severity of pain and evaluate response   Implement non-pharmacological measures as appropriate and evaluate response   Consider cultural and social influences on pain and pain management   Notify Licensed Independent Practitioner if interventions unsuccessful or patient reports new pain  6/23/2025 0244 by Reji Gandara, RN  Outcome: Progressing

## 2025-06-23 NOTE — PLAN OF CARE
Problem: Seizure Precautions  Goal: Remains free of injury related to seizures activity  Outcome: Progressing     Problem: Safety - Adult  Goal: Free from fall injury  Outcome: Progressing     Problem: Pain  Goal: Verbalizes/displays adequate comfort level or baseline comfort level  Outcome: Progressing     Problem: Skin/Tissue Integrity  Goal: Skin integrity remains intact  Description: 1.  Monitor for areas of redness and/or skin breakdown  2.  Assess vascular access sites hourly  3.  Every 4-6 hours minimum:  Change oxygen saturation probe site  4.  Every 4-6 hours:  If on nasal continuous positive airway pressure, respiratory therapy assess nares and determine need for appliance change or resting period  Outcome: Progressing     Problem: ABCDS Injury Assessment  Goal: Absence of physical injury  Outcome: Progressing

## 2025-06-23 NOTE — PROGRESS NOTES
Speech Language Pathology  Facility/Department:[facility] ICU  Cognitive-Communication Evaluation    Name: Ad Lacey  : 1950  MRN: [id number]                                                       Patient Diagnosis(es):   Patient Active Problem List    Diagnosis Date Noted    Brain bleed (HCC) 2025    Severe malnutrition 2025    SDH (subdural hematoma) (HCC) 2025    Subdural hematoma (HCC) 2025       No past medical history on file.  No past surgical history on file.    Reason for Referral:  Ad Lacey  was referred for a Speech Therapy evaluation to assess swallow function and/or communication.    History of Present Illness  Per MD notes:  \"Patient is a 74 y.o. male with PMHx of pAfib, CAD c/b STEMI s/p TRISHA, HFimpEF, and PAD s/p fem-pop-bypass who was admitted for left-sided subdural hematoma.     Patient was recently discharged from ICU to acute rehab at [facility] after he was admitted for traumatic right-sided subdural hematoma with a right to left-sided midline shift that remained stable without any clinical manifestation.  On the morning of 2025 patient had a fall, and post fall CTh showed new left-sided SDH along with previous right-sided SDH and scattered SAH.  Neurology wanted to give another neurocheck for the patient so he was transferred back to ICU from ARU for closer monitoring overnight.     Patient wished to remain full code.\"    Imaging  MRI BRAIN W WO CONTRAST   Final Result      1.  No evidence of acute ischemia.   2.  Stable right greater than left bilateral subdural hematomas.   3.  Stable multifocal subarachnoid hemorrhage.   4.  Stable 6 mm of right-to-left midline shift.         Electronically signed by Barry Zuñiga MD      CT HEAD WO CONTRAST   Final Result   1. Stable right hemispheric subdural hematoma with mild right to left midline shift.   2. Stable left frontal subdural hematoma.   3. Stable subarachnoid hemorrhage along the left  evaluation.      Instrumental assessment results  Not indicated at this time     Speech, Language, Cognitive Evaluation 6/23/25  Ongoing cognitive-communication impairment, with specific areas of difficulty including immediate and delayed recall, math calculations, sustained attention, and divergent naming (per partial administration of SLUMS; incomplete d/t pt's persistent c/o headache). No aphasia or dysarthria appreciated. Recommend ongoing speech therapy to further assess/address.    Prognosis:  Prognosis for improvement: Fair  Barriers to reach goals: medical dx, insight    Treatment:  Speech Goals:  Goal 1: Patient will complete graded recall tasks using implementation of strategies with 70% accuracy.  Goal 2: Patient will complete problem solving tasks with 70% accuracy.  Goal 3: Patient will complete tasks involving sustained attention with min cues.  Goal 4: Patient will participate in further assessment of cognitive-linguistic skills as appropriate.      Education  Provided education to patient re: role of SLP, purpose of visit, and recommendations; pt indicated some comprehension    Pt goal: Did not state  Pt discharge goal: Did not state    Total treatment time: 20' speech/lang/cog eval    Plan continue per POC  Diet Recommendations:   Regular texture solids / thin liquids / meds PO    Discharge Plan:  ongoing speech therapy for cognitive-linguistic impairment  Discussed with RNCharo.  Needs within reach.    Electronically Signed by:  GARIAM De La Rosa [id number]  Phone #[phone]    This document will serve as a discharge summary if pt discharges before next treatment.

## 2025-06-23 NOTE — PROGRESS NOTES
[facility] - Acute Rehab Unit   After review, this patient is felt to be:       []  Dr. Trivedi states this patient is appropriate for rehab.     [x]  Not appropriate for Acute Inpatient Rehab    []  Referral received and ARU reviewing patient      Patient is not appropriate for ARU level of care. Therapy has changed recommendations to SNF level of care. Defer to SNF level of care at this time.   Will notify CM/SW with further updates. Thank you for the referral.    Leesa GIBBS OTR/L  Clinical Liaison- [facility]   (P): [phone]  (F): [phone]

## 2025-06-23 NOTE — PROGRESS NOTES
NEUROSURGERY PROGRESS NOTE    6/23/2025 10:27 AM                               Ad Lacey                      LOS: 2 days     Subjective:  No acute events overnight. Patient's neuro exam remains stable.       Physical Exam:  Patient seen and examined    Vitals:    06/23/25 0813   BP:    Pulse: 65   Resp: 15   Temp:    SpO2: 96%     GCS:  4 - Opens eyes on own  4 - Seems confused, disoriented  6 - Follows simple motor commands  General: Well developed. Alert and cooperative in no acute distress.     HENT: atraumatic, neck supple  Eyes: Optic discs: Not tested  Pulmonary: unlabored respiratory effort  Cardiovascular:  Warm well perfused. No peripheral edema  Gastrointestinal: abdomen soft, NT, ND     Neurological:  Mental Status: Awake, alert, oriented x 3, disoriented to situation  Attention: Intact  Language: No aphasia or dysarthria noted  Sensation: Intact to all extremities to light touch  Coordination: Intact     Cranial Nerves:  II: Visual acuity not tested, denies new visual changes / diplopia  III, IV, VI: PERRL, 3 mm bilaterally, EOMI, no nystagmus noted  V: Facial sensation intact bilaterally to touch  VII: Face symmetric  VIII: Hearing intact bilaterally to spoken voice  IX: Palate movement equal bilaterally  XI: Shoulder shrug equal bilaterally  XII: Tongue midline     Musculoskeletal:   Gait: Not tested   Assist devices: None   Tone: Normal  Motor strength: RLE exam limited 2/2 Right hip pain    Right  Left      Right  Left    Deltoid  4 4   Hip Flex  3 4+   Biceps  4 4   Knee Extensors  3 4+   Triceps  4 4   Knee Flexors  3 4+   Wrist Ext  4+ 4+   Ankle Dorsiflex.  4+ 4+   Wrist Flex  4+ 4+   Ankle Plantarflex.  4+ 4+   Handgrip  4+ 4+   Ext Varinder Longus  4+ 4+   Thumb Ext  4+ 4+              Radiological Findings:  CT HEAD WO CONTRAST, CT FACIAL BONES WO CONTRAST, & CT CERVICAL SPINE WO CONTRAST  Result Date: 6/19/2025  1. Multi compartment intracranial hemorrhage with right subdural hematoma  measuring 8-10 mm in thickness and resulting in 9 mm of midline shift from right to left.  2. Moderate amount of subarachnoid hemorrhage raises the possibility of underlying aneurysm.  3. No acute traumatic injury of the facial bones.  4. No acute osseous abnormality identified in the cervical spine.      CT head without contrast  Result Date: 6/19/2025  Stable subarachnoid and subdural intracranial hemorrhage with approximately 9 mm of right to left midline shift.      CTA HEAD NECK W CONTRAST  Result Date: 6/19/2025  Focal severe stenoses of the basilar artery with mild stenosis of the left M2 branch. This all may be secondary to vasospasm from multifocal intracranial hemorrhage and multifocal subarachnoid hemorrhage. No discrete aneurysm.     CT CERVICAL SPINE WO CONTRAST  Result Date: 6/21/2025  1. No acute fracture  2. Cervical degenerative changes  3. Emphysema    CT HEAD WO CONTRAST  Result Date: 6/21/2025  Slight interval improvement of right to left midline shift. The right-sided subdural hematoma appears approximately stable, however there is a new left-sided subdural hematoma as described. Scattered subarachnoid hemorrhages as before.    CT HEAD WO CONTRAST  Result Date: 6/21/2025  Unchanged right hemispheric subdural hematoma with mild right to left midline shift. Unchanged left frontal subdural hematoma and scattered subarachnoid hemorrhage.     CT HEAD WO CONTRAST  Result Date: 6/22/2025  1. Stable right hemispheric subdural hematoma with mild right to left midline shift.  2. Stable left frontal subdural hematoma.  3. Stable subarachnoid hemorrhage along the left sylvian fissure and anterior temporal region and along the medial frontal lobes.    MRI BRAIN W WO CONTRAST  Result Date: 6/22/2025  1.  No evidence of acute ischemia.  2.  Stable right greater than left bilateral subdural hematomas.  3.  Stable multifocal subarachnoid hemorrhage.  4.  Stable 6 mm of right-to-left midline

## 2025-06-23 NOTE — CARE COORDINATION
1100:  FANY spoke with bedside RN- pt does currently have a 1:1 sitter d/t high fall risk and frequent falls. Pt will have to remain sitter free for at least 24hrs before ARU can admit him. Bedside RN made aware. CM updated attending.      Case Management Assessment  Initial Evaluation    Date/Time of Evaluation: 6/23/2025 9:07 AM  Assessment Completed by: Irene Ibrahim    If patient is discharged prior to next notation, then this note serves as note for discharge by case management.    Patient Name: Ad Lacey                   YOB: 1950  Diagnosis: SDH (subdural hematoma) (HCC) [S06.5XAA]  Brain bleed (HCC) [I61.9]                   Date / Time: 6/21/2025  8:00 PM    Patient Admission Status: Inpatient   Readmission Risk (Low < 19, Mod (19-27), High > 27): Readmission Risk Score: 17    Current PCP: No primary care provider on file.  PCP verified by CM? No    Chart Reviewed: Yes      History Provided by: Medical Record  Patient Orientation: Alert and Oriented    Patient Cognition: Alert    Hospitalization in the last 30 days (Readmission):  Yes    If yes, Readmission Assessment in CM Navigator will be completed.  Readmission Assessment  Number of Days since last admission?: 1-7 days  Previous Disposition: Acute Rehab  Who is being Interviewed: Patient  What was the patient's/caregiver's perception as to why they think they needed to return back to the hospital?: Other (Comment) (new symptoms- CT revealed new L SDH)  Did you visit your Primary Care Physician after you left the hospital, before you returned this time?: No  Why weren't you able to visit your PCP?: Other (Comment) (in ARU)  Did you see a specialist, such as Cardiac, Pulmonary, Orthopedic Physician, etc. after you left the hospital?: No  Who advised the patient to return to the hospital?: Physician, Acute Rehab  Does the patient report anything that got in the way of taking their medications?: No  In our efforts to provide the best

## 2025-06-23 NOTE — PROGRESS NOTES
Physical /Occupational Therapy    Attempted therapy session, pt in bed with eyes closed continually saying \"I can't do it, I'm too weak and in too much pain\". Pt educated on importance of OOB mobility to build strength and therapist offered to have 2nd person present to assist, pt continuing to decline all participation despite max encouragement. Will follow up per POC as treatment schedule allows.     Jeniffer Greenfield, PT, DPT   Tia Castillo  MS, OTR/L #[id number]

## 2025-06-23 NOTE — DISCHARGE SUMMARY
V2.0  Discharge Summary    Name:  Ad Lacey /Age/Sex: 1950 (74 y.o. male)   Admit Date: 2025  Discharge Date: 25    MRN & CSN:  [id number] & [id number] Encounter Date and Time 25 12:31 PM EDT    Attending:  Lora Doss MD Discharging Provider: Lora Doss MD       Hospital Course:     Hospital course:     Ad Lacey is a 74 y.o. male with pmh of A-fib, coronary artery disease s/p TRISHA placement, heart failure improved EF, peripheral artery disease s/p femoropopliteal bypass who presents with new L-sided SDH (subdural hematoma) (HCC).  Patient was discharged on  acute rehab.  Was admitted with right subdural hematoma with 9 mm right-to-left shift with moderate subarachnoid hemorrhage.  At that admission was treated with DDAVP and Balfaxar and nicardipine drip.  At acute rehab patient had a fall and was found to have new left-sided subdural hematoma.  Patient was monitored in the ICU.  Neurosurgery was consulted.  Stability CT unchanged.  Recommend to continue Keppra and no anticoagulation for 2 weeks.  Patient will need outpatient CT head in 6 weeks and follow-up with neurosurgery        Plan:      Left traumatic subdural hematoma with previous right subdural hematoma and scattered subarachnoid hemorrhage-no further workup needed discussed with neurosurgery  - Neurosurgery and neurocritical care consulted  - Hold all anticoagulation  - Continue Keppra, lasted 7/3  - MRI brain shows no acute ischemia multifocal subarachnoid hemorrhage.  Right greater than left subdural hematoma with stable 6 mm midline shift     Coronary artery disease s/p PCI  - Hold antiplatelet medication  -STEMI in  requiring thrombectomy and TRISHA to LAD patient was in cardiogenic shock and required Impella at that time     Peripheral vascular disease status post femoropopliteal bypass  - Continue statin     Paroxysmal A-fib  - Hold all anticoagulation  - Continue Coreg     Chronic pain  pelvis.  No pelvic hemorrhage.  At the posterior aspect and right superolateral aspect of rectum there is a precoccygeal prominent cyst measuring 5.9 cm transversely, 5.0 cm AP and 5.6 cm craniocaudad.  This cyst appears to have benign characteristics with homogeneous hypodensity and well-defined outline. Peritoneum/Retroperitoneum: No evidence of periaortic or mesenteric pathologic lymphadenopathy.  No ascites.  No intraperitoneal or retroperitoneal hemorrhage.  No evidence of aortic dissection.  Distal abdominal aorta is minimally ectatic with a diameter of 2.4 cm.  All mesenteric arteries and bilateral renal arteries and their major branches are normally patent without significant stenosis. Bones/Soft Tissues: No evidence of acute fracture or dislocation in the lumbar spine.  Mild L4 anterolisthesis due to advanced facet osteoarthritis. In pelvic bones and joints and bilateral hip joints, no evidence of fracture or osseous malalignment.  Note is made of advanced osteoarthritis at the right hip joint. No evidence of inguinal, femoral or ventral hernia.  No acute process in the soft tissues of abdominal walls, pelvic walls, soft tissues of back and the soft tissues of the gluteal areas. UNENHANCED CT SCAN OF THORACIC SPINE BONES/ALIGNMENT: Minimal loss of heights of T1 and C7 vertebral bodies, with mild sclerotic changes without definable fracture.  In rest of thoracic spine no fracture or compression or malalignment. DEGENERATIVE CHANGES: Mild multilevel degenerative disc disease in thoracic spine without spinal stenosis. SOFT TISSUES: No paraspinal mass or abnormal fluid collection identified. UNENHANCED CT SCAN OF LUMBAR SPINE BONES/ALIGNMENT: No acute fracture or osseous malalignment in the lumbar spine.  Mild L4 anterolisthesis due to advanced facet osteoarthritis without pars defect.  Moderate hyperlordosis of lumbosacral spine.  In the visualized upper sacrum there is no fracture or malalignment.  lung fields are clear.  No evidence of pleural effusion, pleural thickening, pneumothorax or pneumomediastinum. Upper Abdomen: As described below. Soft Tissues/Bones: No evidence of fracture in bony thorax including thoracic spine, ribs and rest of thoracic bony structure. In the soft tissues of chest wall there is no acute process. CONTRAST ENHANCED CT SCAN OF ABDOMEN AND PELVIS Lower chest: Base of lungs shows mild basilar atelectatic changes.  No pleural effusion. No hiatal hernia.  No pneumoperitoneum. Organs: No demonstrable abnormality in liver,, gallbladder or in spleen. Normal adrenal glands bilaterally.  In both kidneys multiple renal cysts noted.  In the left renal cortex there is dominant renal cyst with a diameter of 3.7 cm.  No recommendation for further follow-up imaging of renal cysts. Moderate to markedly distended bladder without stone and without acute process. GI/Bowel: No diagnostic finding in stomach.  Loops of small bowel are not abnormally distended or dilated.  Normal terminal ileum.  Normal appendix posteroinferior to cecum. Evidence of minimal stool and gas in the right colon.  Empty transverse colon.  Empty descending colon.  Empty proximal sigmoid colon.  Small to moderate amount of stool in the distal sigmoid colon and rectum.  No diverticulosis coli.  No evidence of colitis. Pelvis: No acute process in the pelvis.  No pelvic hemorrhage.  At the posterior aspect and right superolateral aspect of rectum there is a precoccygeal prominent cyst measuring 5.9 cm transversely, 5.0 cm AP and 5.6 cm craniocaudad.  This cyst appears to have benign characteristics with homogeneous hypodensity and well-defined outline. Peritoneum/Retroperitoneum: No evidence of periaortic or mesenteric pathologic lymphadenopathy.  No ascites.  No intraperitoneal or retroperitoneal hemorrhage.  No evidence of aortic dissection.  Distal abdominal aorta is minimally ectatic with a diameter of 2.4 cm.  All mesenteric

## 2025-06-24 LAB
ANION GAP SERPL CALCULATED.3IONS-SCNC: 19 MMOL/L (ref 3–16)
BASOPHILS # BLD: 0 K/UL (ref 0–0.2)
BASOPHILS NFR BLD: 0.4 %
BUN SERPL-MCNC: 16 MG/DL (ref 7–20)
CALCIUM SERPL-MCNC: 9.1 MG/DL (ref 8.3–10.6)
CHLORIDE SERPL-SCNC: 95 MMOL/L (ref 99–110)
CO2 SERPL-SCNC: 22 MMOL/L (ref 21–32)
CREAT SERPL-MCNC: 0.6 MG/DL (ref 0.8–1.3)
DEPRECATED RDW RBC AUTO: 14.6 % (ref 12.4–15.4)
EKG ATRIAL RATE: 61 BPM
EKG DIAGNOSIS: NORMAL
EKG P AXIS: 70 DEGREES
EKG P-R INTERVAL: 126 MS
EKG Q-T INTERVAL: 426 MS
EKG QRS DURATION: 88 MS
EKG QTC CALCULATION (BAZETT): 428 MS
EKG R AXIS: 66 DEGREES
EKG T AXIS: 67 DEGREES
EKG VENTRICULAR RATE: 61 BPM
EOSINOPHIL # BLD: 0.1 K/UL (ref 0–0.6)
EOSINOPHIL NFR BLD: 0.7 %
GFR SERPLBLD CREATININE-BSD FMLA CKD-EPI: >90 ML/MIN/{1.73_M2}
GLUCOSE SERPL-MCNC: 100 MG/DL (ref 70–99)
HCT VFR BLD AUTO: 27.9 % (ref 40.5–52.5)
HGB BLD-MCNC: 9.5 G/DL (ref 13.5–17.5)
LYMPHOCYTES # BLD: 1.1 K/UL (ref 1–5.1)
LYMPHOCYTES NFR BLD: 10 %
MCH RBC QN AUTO: 28.2 PG (ref 26–34)
MCHC RBC AUTO-ENTMCNC: 34 G/DL (ref 31–36)
MCV RBC AUTO: 83.1 FL (ref 80–100)
MONOCYTES # BLD: 0.9 K/UL (ref 0–1.3)
MONOCYTES NFR BLD: 8.3 %
NEUTROPHILS # BLD: 8.5 K/UL (ref 1.7–7.7)
NEUTROPHILS NFR BLD: 80.6 %
PLATELET # BLD AUTO: 498 K/UL (ref 135–450)
PMV BLD AUTO: 7.4 FL (ref 5–10.5)
POTASSIUM SERPL-SCNC: 3.7 MMOL/L (ref 3.5–5.1)
RBC # BLD AUTO: 3.35 M/UL (ref 4.2–5.9)
SODIUM SERPL-SCNC: 136 MMOL/L (ref 136–145)
WBC # BLD AUTO: 10.5 K/UL (ref 4–11)

## 2025-06-24 PROCEDURE — 6370000000 HC RX 637 (ALT 250 FOR IP)

## 2025-06-24 PROCEDURE — 36415 COLL VENOUS BLD VENIPUNCTURE: CPT

## 2025-06-24 PROCEDURE — 80048 BASIC METABOLIC PNL TOTAL CA: CPT

## 2025-06-24 PROCEDURE — 93005 ELECTROCARDIOGRAM TRACING: CPT | Performed by: STUDENT IN AN ORGANIZED HEALTH CARE EDUCATION/TRAINING PROGRAM

## 2025-06-24 PROCEDURE — 6360000002 HC RX W HCPCS

## 2025-06-24 PROCEDURE — 2060000000 HC ICU INTERMEDIATE R&B

## 2025-06-24 PROCEDURE — 2500000003 HC RX 250 WO HCPCS

## 2025-06-24 PROCEDURE — 97530 THERAPEUTIC ACTIVITIES: CPT

## 2025-06-24 PROCEDURE — 97535 SELF CARE MNGMENT TRAINING: CPT

## 2025-06-24 PROCEDURE — 93010 ELECTROCARDIOGRAM REPORT: CPT | Performed by: INTERNAL MEDICINE

## 2025-06-24 PROCEDURE — 85025 COMPLETE CBC W/AUTO DIFF WBC: CPT

## 2025-06-24 RX ADMIN — Medication 100 MG: at 07:59

## 2025-06-24 RX ADMIN — DIAZEPAM 10 MG: 10 TABLET ORAL at 03:36

## 2025-06-24 RX ADMIN — SODIUM CHLORIDE, PRESERVATIVE FREE 10 ML: 5 INJECTION INTRAVENOUS at 21:28

## 2025-06-24 RX ADMIN — Medication: at 21:27

## 2025-06-24 RX ADMIN — HYDROMORPHONE HYDROCHLORIDE 4 MG: 2 TABLET ORAL at 09:59

## 2025-06-24 RX ADMIN — LEVETIRACETAM 500 MG: 500 TABLET, FILM COATED ORAL at 08:30

## 2025-06-24 RX ADMIN — AMLODIPINE BESYLATE 5 MG: 5 TABLET ORAL at 07:59

## 2025-06-24 RX ADMIN — ONDANSETRON 4 MG: 2 INJECTION, SOLUTION INTRAMUSCULAR; INTRAVENOUS at 09:58

## 2025-06-24 RX ADMIN — MORPHINE SULFATE 60 MG: 30 TABLET, FILM COATED, EXTENDED RELEASE ORAL at 21:25

## 2025-06-24 RX ADMIN — MORPHINE SULFATE 60 MG: 30 TABLET, FILM COATED, EXTENDED RELEASE ORAL at 07:59

## 2025-06-24 RX ADMIN — ATORVASTATIN CALCIUM 80 MG: 80 TABLET, FILM COATED ORAL at 07:59

## 2025-06-24 RX ADMIN — SODIUM CHLORIDE, PRESERVATIVE FREE 10 ML: 5 INJECTION INTRAVENOUS at 08:01

## 2025-06-24 RX ADMIN — PANTOPRAZOLE SODIUM 40 MG: 40 TABLET, DELAYED RELEASE ORAL at 07:59

## 2025-06-24 RX ADMIN — HYDROMORPHONE HYDROCHLORIDE 4 MG: 2 TABLET ORAL at 04:59

## 2025-06-24 RX ADMIN — ACETAMINOPHEN 650 MG: 325 TABLET ORAL at 03:36

## 2025-06-24 RX ADMIN — LEVETIRACETAM 500 MG: 500 TABLET, FILM COATED ORAL at 21:25

## 2025-06-24 RX ADMIN — HYDROMORPHONE HYDROCHLORIDE 4 MG: 2 TABLET ORAL at 14:24

## 2025-06-24 RX ADMIN — HYDROMORPHONE HYDROCHLORIDE 4 MG: 2 TABLET ORAL at 19:56

## 2025-06-24 RX ADMIN — HYDROMORPHONE HYDROCHLORIDE 4 MG: 2 TABLET ORAL at 00:25

## 2025-06-24 RX ADMIN — HYDRALAZINE HYDROCHLORIDE 10 MG: 20 INJECTION INTRAMUSCULAR; INTRAVENOUS at 06:59

## 2025-06-24 ASSESSMENT — PAIN DESCRIPTION - LOCATION
LOCATION: BACK
LOCATION: HEAD
LOCATION: HEAD
LOCATION: BACK
LOCATION: HIP
LOCATION: BACK
LOCATION: HEAD

## 2025-06-24 ASSESSMENT — PAIN SCALES - GENERAL
PAINLEVEL_OUTOF10: 10
PAINLEVEL_OUTOF10: 10
PAINLEVEL_OUTOF10: 6
PAINLEVEL_OUTOF10: 10
PAINLEVEL_OUTOF10: 7
PAINLEVEL_OUTOF10: 9
PAINLEVEL_OUTOF10: 10
PAINLEVEL_OUTOF10: 7
PAINLEVEL_OUTOF10: 10
PAINLEVEL_OUTOF10: 0
PAINLEVEL_OUTOF10: 10

## 2025-06-24 ASSESSMENT — PAIN DESCRIPTION - DESCRIPTORS
DESCRIPTORS: ACHING

## 2025-06-24 ASSESSMENT — PAIN - FUNCTIONAL ASSESSMENT: PAIN_FUNCTIONAL_ASSESSMENT: PREVENTS OR INTERFERES WITH ALL ACTIVE AND SOME PASSIVE ACTIVITIES

## 2025-06-24 ASSESSMENT — PAIN DESCRIPTION - ORIENTATION
ORIENTATION: ANTERIOR
ORIENTATION: ANTERIOR

## 2025-06-24 NOTE — PLAN OF CARE
Problem: Discharge Planning  Goal: Discharge to home or other facility with appropriate resources  6/23/2025 2153 by Iza Guerrero RN  Outcome: Progressing  6/23/2025 1323 by Manuel Santana RN  Outcome: Adequate for Discharge  Flowsheets (Taken 6/23/2025 0800)  Discharge to home or other facility with appropriate resources:   Identify barriers to discharge with patient and caregiver   Arrange for needed discharge resources and transportation as appropriate   Identify discharge learning needs (meds, wound care, etc)   Arrange for interpreters to assist at discharge as needed   Refer to discharge planning if patient needs post-hospital services based on physician order or complex needs related to functional status, cognitive ability or social support system     Problem: Seizure Precautions  Goal: Remains free of injury related to seizures activity  6/23/2025 2153 by Iza Guerrero RN  Outcome: Progressing  6/23/2025 1323 by Manuel Santana RN  Outcome: Adequate for Discharge  Flowsheets (Taken 6/23/2025 0800)  Remains free of injury related to seizure activity:   Maintain airway, patient safety  and administer oxygen as ordered   Monitor patient for seizure activity, document and report duration and description of seizure to Licensed Independent Practitioner   If seizure occurs, turn patient to side and suction secretions as needed   Instruct patient/family to notify RN of any seizure activity   Instruct patient/family to call for assistance with activity based on assessment     Problem: Safety - Adult  Goal: Free from fall injury  6/23/2025 2153 by Iza Guerrero RN  Outcome: Progressing  6/23/2025 1323 by Manuel Santana RN  Outcome: Adequate for Discharge  Flowsheets (Taken 6/23/2025 0800)  Free From Fall Injury:   Instruct family/caregiver on patient safety   Based on caregiver fall risk screen, instruct family/caregiver to ask for assistance with transferring infant if caregiver noted to have fall  Manuel Santana, VERONIKA  Outcome: Adequate for Discharge     Problem: Pain  Goal: Verbalizes/displays adequate comfort level or baseline comfort level  6/23/2025 2153 by Iza Guerrero RN  Outcome: Progressing  6/23/2025 1323 by Manuel Santana RN  Outcome: Not Progressing  Flowsheets (Taken 6/23/2025 0800)  Verbalizes/displays adequate comfort level or baseline comfort level:   Encourage patient to monitor pain and request assistance   Assess pain using appropriate pain scale   Administer analgesics based on type and severity of pain and evaluate response   Implement non-pharmacological measures as appropriate and evaluate response   Consider cultural and social influences on pain and pain management   Notify Licensed Independent Practitioner if interventions unsuccessful or patient reports new pain

## 2025-06-24 NOTE — CARE COORDINATION
CM following for discharge planning.     CM spoke with son Jacobo to inform him that [facility] ARU may not be able to take the patient back.   Jacobo would like Dr Trivedi to call him between 11-12pm. CM notified ARU liaison.     Referral to [facility] sent as back up plan. No precert needed.     Adry Grayson RN, BSN,   Case Management Department  [phone]

## 2025-06-24 NOTE — PLAN OF CARE
Problem: Discharge Planning  Goal: Discharge to home or other facility with appropriate resources  6/23/2025 2153 by Iza Guerrero RN  Outcome: Progressing     Problem: Seizure Precautions  Goal: Remains free of injury related to seizures activity  6/24/2025 1039 by Tasha Latif RN  Outcome: Progressing  6/23/2025 2153 by Iza Guerrero RN  Outcome: Progressing     Problem: Safety - Adult  Goal: Free from fall injury  6/24/2025 1039 by Tasha Latif RN  Outcome: Progressing  6/23/2025 2153 by Iza Guerrero RN  Outcome: Progressing     Problem: Pain  Goal: Verbalizes/displays adequate comfort level or baseline comfort level  6/23/2025 2153 by Iza Guerrero RN  Outcome: Progressing     Problem: Skin/Tissue Integrity  Goal: Skin integrity remains intact  Description: 1.  Monitor for areas of redness and/or skin breakdown  2.  Assess vascular access sites hourly  3.  Every 4-6 hours minimum:  Change oxygen saturation probe site  4.  Every 4-6 hours:  If on nasal continuous positive airway pressure, respiratory therapy assess nares and determine need for appliance change or resting period  6/24/2025 1039 by Tasha Latif RN  Outcome: Progressing  Flowsheets (Taken 6/24/2025 0800)  Skin Integrity Remains Intact: Monitor for areas of redness and/or skin breakdown  6/23/2025 2153 by Iza Guerrero RN  Outcome: Progressing     Problem: ABCDS Injury Assessment  Goal: Absence of physical injury  6/23/2025 2153 by Iza Guerrero RN  Outcome: Progressing     Problem: Skin/Tissue Integrity - Adult  Goal: Skin integrity remains intact  Description: 1.  Monitor for areas of redness and/or skin breakdown  2.  Assess vascular access sites hourly  3.  Every 4-6 hours minimum:  Change oxygen saturation probe site  4.  Every 4-6 hours:  If on nasal continuous positive airway pressure, respiratory therapy assess nares and determine need for appliance change or resting period  6/24/2025 1039 by Tasha Latif  RN  Outcome: Progressing  Flowsheets (Taken 6/24/2025 0800)  Skin Integrity Remains Intact: Monitor for areas of redness and/or skin breakdown  6/23/2025 2153 by Iza Guerrero, RN  Outcome: Progressing

## 2025-06-24 NOTE — PROGRESS NOTES
Current NIHSS 0    Nursing Core Measures for Stroke:   [x]   Education template documentation (STROKE/TIA). Select only risk factors that are applicable to patient when selecting risk factors.  [x]   Care Plan template documentation (Physiologic Instability - Neurosensory). Selecting this will add care plan rows to the flowsheet under the Neuro section of Head to Toe.  [x]   Verified Swallow Screen completed prior to PO intake of food, drink, medications.          Please verify correct medication route prior to administration for intubated patients, patients who can not swallow or have alternative routes of intake (NG, OG, AR), etc  [x]   VTE Prophylaxis: SCDs ordered/addressed; SCDs: On           (As a reminder, ASA, Plavix, and TPA/TNK are not VTE prophylaxis.)    Reviewed the Following Education with Patient and/or Family:   - Personalized risk factors for patient, along with changes, modifications that will help prevent stroke.  - Signs and Symptoms of Stroke: (Facial droop, weakness/numbness especially on one side, speech difficulty, sudden confusion, sudden loss of vision, sudden severe headache, sudden loss of balance or having difficulty walking, syncope, or seizure)  - How to activate EMS (911)   - Importance of Follow Up Appointments at Discharge   - Importance of Compliance with Medications Prescribed at Discharge  - Available community resources and stroke advocacy groups if needed    Patient and/or family member: education needs reinforcement.     Stroke Education booklet given to patient/family (or verified, if given already), which reviews above information. yes         Electronically signed by Iza Guerrero RN on 6/23/2025 at 9:59 PM

## 2025-06-24 NOTE — PROGRESS NOTES
Occupational Therapy  Facility/Department: [facility] ICU  Daily Treatment Note  NAME: Ad Lacey  : 1950  MRN: [id number]    Date of Service: 2025    Discharge Recommendations:  Subacute/Skilled Nursing Facility  OT Equipment Recommendations  Equipment Needed: No  Other: defer      Patient Diagnosis(es): There were no encounter diagnoses.     Assessment   Assessment: Pt tolerated session poorly this date requiring max encouragement for participation and pt perseverating on pain and weakness. Pt req min A for STS and static standing at RW and mod A for chair to bed transfer. Pt demonstrates significant gross debilitation, impaired insight and impaired balance impacting ADL performance. Pt will require ongoing skilled OT to maximize functional independence as pt is a high fall risk. Cont per OT POC.  Activity Tolerance: Patient limited by fatigue;Patient limited by pain;Patient limited by endurance  Discharge Recommendations: Subacute/Skilled Nursing Facility  Equipment Needed: No  Other: defer     Plan  Occupational Therapy Plan  Times Per Week: 5-7  Current Treatment Recommendations: Balance training;Functional mobility training;Endurance training;Safety education & training;Self-Care / ADL    Restrictions   Restrictions/Precautions  Activity Level: Up as Tolerated, Up with Assist  Position Activity Restriction  Other Position/Activity Restrictions: Up with assist    Subjective  Patient assessed for rehabilitation services?: Yes  Additional Pertinent Hx: This is a 74-year-old male with a past medical history including:  A-fib, CAD, NSTEMI, heart failure, PAD status post femoropopliteal bypass  Who came into the hospital after being found down at home.  Patient was confused at this time.  Imaging showed right subdural hematoma  Family / Caregiver Present: No  Referring Practitioner: Hugh Trivedi DO  Diagnosis: s/p fall  Subjective  Subjective: Pt reclined in chair upon arrival, attempting to refuse

## 2025-06-25 VITALS
DIASTOLIC BLOOD PRESSURE: 83 MMHG | WEIGHT: 145.94 LBS | HEIGHT: 68 IN | SYSTOLIC BLOOD PRESSURE: 162 MMHG | TEMPERATURE: 97.3 F | RESPIRATION RATE: 18 BRPM | OXYGEN SATURATION: 99 % | HEART RATE: 65 BPM | BODY MASS INDEX: 22.12 KG/M2

## 2025-06-25 LAB
BASOPHILS # BLD: 0 K/UL (ref 0–0.2)
BASOPHILS NFR BLD: 0.1 %
DEPRECATED RDW RBC AUTO: 14.5 % (ref 12.4–15.4)
EOSINOPHIL # BLD: 0 K/UL (ref 0–0.6)
EOSINOPHIL NFR BLD: 0.1 %
HCT VFR BLD AUTO: 28.6 % (ref 40.5–52.5)
HGB BLD-MCNC: 9.9 G/DL (ref 13.5–17.5)
LYMPHOCYTES # BLD: 1.1 K/UL (ref 1–5.1)
LYMPHOCYTES NFR BLD: 8.3 %
MCH RBC QN AUTO: 28.4 PG (ref 26–34)
MCHC RBC AUTO-ENTMCNC: 34.7 G/DL (ref 31–36)
MCV RBC AUTO: 81.8 FL (ref 80–100)
MONOCYTES # BLD: 1.2 K/UL (ref 0–1.3)
MONOCYTES NFR BLD: 8.9 %
NEUTROPHILS # BLD: 10.9 K/UL (ref 1.7–7.7)
NEUTROPHILS NFR BLD: 82.6 %
PLATELET # BLD AUTO: 598 K/UL (ref 135–450)
PMV BLD AUTO: 7.2 FL (ref 5–10.5)
RBC # BLD AUTO: 3.49 M/UL (ref 4.2–5.9)
WBC # BLD AUTO: 13.2 K/UL (ref 4–11)

## 2025-06-25 PROCEDURE — 97530 THERAPEUTIC ACTIVITIES: CPT

## 2025-06-25 PROCEDURE — 36415 COLL VENOUS BLD VENIPUNCTURE: CPT

## 2025-06-25 PROCEDURE — 6370000000 HC RX 637 (ALT 250 FOR IP)

## 2025-06-25 PROCEDURE — 85025 COMPLETE CBC W/AUTO DIFF WBC: CPT

## 2025-06-25 PROCEDURE — 6360000002 HC RX W HCPCS

## 2025-06-25 RX ADMIN — HYDRALAZINE HYDROCHLORIDE 10 MG: 20 INJECTION INTRAMUSCULAR; INTRAVENOUS at 04:45

## 2025-06-25 RX ADMIN — Medication 100 MG: at 08:55

## 2025-06-25 RX ADMIN — HYDRALAZINE HYDROCHLORIDE 10 MG: 20 INJECTION INTRAMUSCULAR; INTRAVENOUS at 12:01

## 2025-06-25 RX ADMIN — DIAZEPAM 10 MG: 10 TABLET ORAL at 04:44

## 2025-06-25 RX ADMIN — HYDROMORPHONE HYDROCHLORIDE 4 MG: 2 TABLET ORAL at 03:23

## 2025-06-25 RX ADMIN — HYDROMORPHONE HYDROCHLORIDE 4 MG: 2 TABLET ORAL at 11:54

## 2025-06-25 RX ADMIN — Medication: at 12:00

## 2025-06-25 RX ADMIN — ONDANSETRON 4 MG: 4 TABLET, ORALLY DISINTEGRATING ORAL at 10:32

## 2025-06-25 RX ADMIN — MORPHINE SULFATE 60 MG: 30 TABLET, FILM COATED, EXTENDED RELEASE ORAL at 08:55

## 2025-06-25 RX ADMIN — ATORVASTATIN CALCIUM 80 MG: 80 TABLET, FILM COATED ORAL at 08:55

## 2025-06-25 RX ADMIN — HYDROMORPHONE HYDROCHLORIDE 4 MG: 2 TABLET ORAL at 07:35

## 2025-06-25 RX ADMIN — LEVETIRACETAM 500 MG: 500 TABLET, FILM COATED ORAL at 08:56

## 2025-06-25 RX ADMIN — ACETAMINOPHEN 650 MG: 325 TABLET ORAL at 00:23

## 2025-06-25 RX ADMIN — PANTOPRAZOLE SODIUM 40 MG: 40 TABLET, DELAYED RELEASE ORAL at 08:55

## 2025-06-25 RX ADMIN — AMLODIPINE BESYLATE 5 MG: 5 TABLET ORAL at 08:55

## 2025-06-25 ASSESSMENT — PAIN - FUNCTIONAL ASSESSMENT
PAIN_FUNCTIONAL_ASSESSMENT: PREVENTS OR INTERFERES SOME ACTIVE ACTIVITIES AND ADLS
PAIN_FUNCTIONAL_ASSESSMENT: PREVENTS OR INTERFERES SOME ACTIVE ACTIVITIES AND ADLS

## 2025-06-25 ASSESSMENT — PAIN DESCRIPTION - DESCRIPTORS
DESCRIPTORS: SHARP
DESCRIPTORS: ACHING

## 2025-06-25 ASSESSMENT — PAIN SCALES - GENERAL
PAINLEVEL_OUTOF10: 6
PAINLEVEL_OUTOF10: 9
PAINLEVEL_OUTOF10: 10
PAINLEVEL_OUTOF10: 8
PAINLEVEL_OUTOF10: 7

## 2025-06-25 ASSESSMENT — PAIN DESCRIPTION - LOCATION
LOCATION: HIP
LOCATION: BACK;HIP;HEAD

## 2025-06-25 ASSESSMENT — PAIN DESCRIPTION - PAIN TYPE
TYPE: CHRONIC PAIN
TYPE: CHRONIC PAIN

## 2025-06-25 ASSESSMENT — PAIN DESCRIPTION - ONSET
ONSET: ON-GOING
ONSET: ON-GOING

## 2025-06-25 ASSESSMENT — PAIN DESCRIPTION - ORIENTATION
ORIENTATION: RIGHT;MID
ORIENTATION: RIGHT

## 2025-06-25 ASSESSMENT — PAIN DESCRIPTION - FREQUENCY
FREQUENCY: CONTINUOUS
FREQUENCY: CONTINUOUS

## 2025-06-25 NOTE — PLAN OF CARE
Problem: Discharge Planning  Goal: Discharge to home or other facility with appropriate resources  Outcome: Progressing     Problem: Seizure Precautions  Goal: Remains free of injury related to seizures activity  Outcome: Progressing     Problem: Safety - Adult  Goal: Free from fall injury  Outcome: Progressing     Problem: Skin/Tissue Integrity  Goal: Skin integrity remains intact  Description: 1.  Monitor for areas of redness and/or skin breakdown  2.  Assess vascular access sites hourly  3.  Every 4-6 hours minimum:  Change oxygen saturation probe site  4.  Every 4-6 hours:  If on nasal continuous positive airway pressure, respiratory therapy assess nares and determine need for appliance change or resting period  Outcome: Progressing     Problem: ABCDS Injury Assessment  Goal: Absence of physical injury  Outcome: Progressing     Problem: Cardiovascular - Adult  Goal: Maintains optimal cardiac output and hemodynamic stability  Outcome: Progressing     Problem: Skin/Tissue Integrity - Adult  Goal: Skin integrity remains intact  Description: 1.  Monitor for areas of redness and/or skin breakdown  2.  Assess vascular access sites hourly  3.  Every 4-6 hours minimum:  Change oxygen saturation probe site  4.  Every 4-6 hours:  If on nasal continuous positive airway pressure, respiratory therapy assess nares and determine need for appliance change or resting period  Outcome: Progressing     Problem: Spiritual Care  Goal: Pt/Family able to move forward in process of forgiving self, others, and/or higher power  Description: INTERVENTIONS:  1. Assist patient/family to overcome blocks to healing by use of spiritual practices (prayer, meditation, guided imagery, reiki, breath work, etc).  2. De-myth guilt and help patient/family identify possible irrational spiritual/cultural beliefs and values.  3. Explore possibilities of making amends & reconciliation with self, others, and/or a greater power.  4. Guide patient/family  in identifying painful feelings of guilt.  5. Help patient/famiy explore and identify spiritual beliefs, cultural understandings or values that may help or hinder letting go of issue.  6. Help patient/family explore feelings of anger, bitterness, resentment.  7. Help patient/family identify and examine the situation in which these feelings are experienced.  8. Help patient/family identify destructive displacement of feelings onto other individuals.  9. Invite use of sacraments/rituals/ceremonies as appropriate (e.g. - confession, anointing, smudging).  10. Refer patient/family to formal counseling and/or to sarah community for further support work.  Outcome: Progressing     Problem: Pain  Goal: Verbalizes/displays adequate comfort level or baseline comfort level  Outcome: Not Progressing

## 2025-06-25 NOTE — PLAN OF CARE
Problem: Discharge Planning  Goal: Discharge to home or other facility with appropriate resources  6/25/2025 1022 by Comfort Gonzalez RN  Outcome: Progressing  Flowsheets  Taken 6/25/2025 1022 by Comfort Gonzalez RN  Discharge to home or other facility with appropriate resources:   Identify barriers to discharge with patient and caregiver   Identify discharge learning needs (meds, wound care, etc)   Refer to discharge planning if patient needs post-hospital services based on physician order or complex needs related to functional status, cognitive ability or social support system   Arrange for needed discharge resources and transportation as appropriate  Taken 6/25/2025 0430 by Kendy Mccoy RN  Discharge to home or other facility with appropriate resources:   Identify barriers to discharge with patient and caregiver   Arrange for needed discharge resources and transportation as appropriate   Identify discharge learning needs (meds, wound care, etc)  Note: Patient updated and educated on barriers to discharge and plan of care.      Problem: Seizure Precautions  Goal: Remains free of injury related to seizures activity  6/25/2025 1022 by Comfort Gonzalez RN  Outcome: Progressing  Flowsheets (Taken 6/25/2025 1022)  Remains free of injury related to seizure activity:   Monitor patient for seizure activity, document and report duration and description of seizure to Licensed Independent Practitioner   Instruct patient/family to notify RN of any seizure activity     Problem: Safety - Adult  Goal: Free from fall injury  6/25/2025 1022 by Comfort Gonzalez RN  Outcome: Progressing  Flowsheets (Taken 6/25/2025 1022)  Free From Fall Injury:   Instruct family/caregiver on patient safety   Based on caregiver fall risk screen, instruct family/caregiver to ask for assistance with transferring infant if caregiver noted to have fall risk factors     Problem: Pain  Goal: Verbalizes/displays adequate comfort level or baseline  comfort level  6/25/2025 1022 by Comfort Gonzalez, RN  Outcome: Progressing  Flowsheets (Taken 6/25/2025 1022)  Verbalizes/displays adequate comfort level or baseline comfort level:   Encourage patient to monitor pain and request assistance   Administer analgesics based on type and severity of pain and evaluate response   Assess pain using appropriate pain scale   Implement non-pharmacological measures as appropriate and evaluate response   Notify Licensed Independent Practitioner if interventions unsuccessful or patient reports new pain  Note: Pain currently managed per MAR and non-pharm measures such as rest and repositioning to reduce pain and promote comfort.    6/25/2025 0143 by Iza Guerrero, RN  Outcome: Not Progressing

## 2025-06-25 NOTE — PROGRESS NOTES
Physical Therapy  Daily Treatment Note    Discharge Recommendation:  Skilled Nursing Facility  Equipment Needs:  Defer to next level of care    Assessment:  Pt needing heavy encouragement and frequent breaks to get OOB to chair. Required increased assist for transfers today and only tolerated a few steps to chair. Pt limited by weakness, decreased balance, pain. Would benefit from continued IP PT at D/C to maximize function and independence prior to returning home. Plan is for SNF.     Chart Reviewed: Yes   Restrictions/Precautions: NPO, Fall Risk, Seizure Other Position/Activity Restrictions: up with A   Additional Pertinent Hx: This is a 74-year-old male with a past medical history including:  A-fib, CAD, NSTEMI, heart failure, PAD status post femoropopliteal bypass  Who came into the hospital after being found down at home.  Patient was confused at this time.  Imaging showed right subdural hematoma. Pt went to ARU and then fell there with imaging showing new left-sided subdural hematoma.      Diagnosis: SDH   Treatment Diagnosis: impaired mobility    Subjective: Pt in bed initially. Agreeable to working with PT after some encouragement.   \"I'm in so much pain. I can hardly move.\" \"I have so much going on right now.\"  When pt was encouraged to eat, he stated \"I can't. I'll throw up.\" \"You might as well ask me to jump out of a window and then be able to walk across the street.\" \"I haven't eaten in days.\"    Pain: 10/10, B legs, R hip, low back, headache. RN aware and has been addressing with pain meds. Ice packs to low and forehead.     Objective:    Bed mobility  Supine to sit: Mod assist x 1, HOB up partially with use of railing. Extra time required. Pt taking several resting breaks before completing task.   Scooting: Min assist to EOB    Transfers  Sit to stand: Mod assist x 1 from bed  Stand to sit: Mod assist x 1 into chair  Bed > chair: Mod assist x 1 with walker    Ambulation  Assistance Level: Mod assist x

## 2025-06-25 NOTE — PROGRESS NOTES
Patient is A&Ox4. VSS this shift with exception of elevated BP managed by MAR. Patient has endorsed pain to all over managed fairly well per MAR and non-pharm measures. Patient is tolerating PO diet. Tolerating ambulation x2 assist with stand pivot. Voiding via BRP. Patient updated on plan of care. Fall and safety precautions in place, call light within reach.

## 2025-06-25 NOTE — PROGRESS NOTES
S: Patient seen and evaluated at bedside. Has been already discharged     O:   Vitals:    06/24/25 1730 06/24/25 1800 06/24/25 1900 06/24/25 2000   BP:  (!) 146/65 (!) 168/89    Pulse: 60 63 68    Resp: 13 13 19    Temp:  98.8 °F (37.1 °C)  98.7 °F (37.1 °C)   TempSrc:  Oral  Temporal   SpO2:       Weight:       Height:         A/P: 73yo male with left traumatic SDH not amenable to surgery that has been already discharged.     CAD  SDH  Chronic pain syndrome  LUCIA  pAfib  Essential HTN

## 2025-06-25 NOTE — PROGRESS NOTES
Occupational Therapy  Facility/Department: [facility] ORTHO/NEURO  Occupational Therapy Treatment Note     Name: Ad Lacey  : 1950  MRN: [id number]  Date of Service: 2025    Discharge Recommendations:  Subacute/Skilled Nursing Facility  OT Equipment Recommendations  Equipment Needed: No  Other: defer to next care facility     Treatment Diagnosis: Decreased ADL status , decreased cognition and functional mobility 2/2 SDH    Assessment  Performance deficits / Impairments: Decreased functional mobility ;Decreased cognition;Decreased high-level IADLs;Decreased ADL status;Decreased endurance;Decreased strength;Decreased safe awareness  Assessment: Pt continues with ongoing deficits. requiring increased assist for transfers / ADLs.   Pt continues to functional well below stated baseline level. Pt is limited by ongoing pain / fatigue and decreased alertness.  Pt would benefit from ongoing inpt OT at d/c to maximize functional level.  Will follow as inpt.  Treatment Diagnosis: Decreased ADL status , decreased cognition and functional mobility 2/2 SDH  REQUIRES OT FOLLOW-UP: Yes  Activity Tolerance  Activity Tolerance: Patient limited by pain;Patient limited by fatigue  Activity Tolerance Comments: Pt needing increased time / encouragement this date. Pt kept eyes closed throughout session     Plan  Occupational Therapy Plan  Times Per Week: 5-7  Times Per Day: Once a day  Current Treatment Recommendations: Balance training, Functional mobility training, Endurance training, Safety education & training, Self-Care / ADL    Restrictions  Restrictions/Precautions  Restrictions/Precautions: NPO, Fall Risk, Seizure  Activity Level: Up as Tolerated, Up with Assist  Required Braces or Orthoses?: No  Position Activity Restriction  Other Position/Activity Restrictions: up with A    Subjective  General  Chart Reviewed: Yes  Patient assessed for rehabilitation services?: Yes  Additional Pertinent Hx: This is a 74-year-old

## 2025-06-25 NOTE — PROGRESS NOTES
Ad Lacey  6/25/2025  [id number]    Chief Complaint: SDH (subdural hematoma) (HCC)    Subjective:   This is a 74-year-old male with a past medical history including:  A-fib, CAD, NSTEMI, heart failure, PAD status post femoropopliteal bypass  Who came into the hospital after being found down at home.  Patient was confused at this time.  Imaging showed right subdural hematoma secondary to fall repeat CTh stable patient was placed on Keppra 500 mg twice daily for 7 days.  Patient is a nonsurgical candidate.  Goal BP less than 160.  Patient continues to be limited in functional mobility and ADLs.        Interval history: Patient was previously on the rehab unit but discharged back to acute care post fall with rebleed.  Patient continues to be noncompliant with some tasks.  Therapy reevaluation recommended skilled nursing facility.  Will defer to case management.    ROS: No CP, SOB, dyspnea    Objective:  Patient Vitals for the past 24 hrs:   BP Temp Temp src Pulse Resp SpO2   06/25/25 0820 (!) 160/84 97.7 °F (36.5 °C) Oral 64 18 99 %   06/25/25 0735 -- -- -- -- 16 --   06/25/25 0426 (!) 169/70 97.5 °F (36.4 °C) Oral 59 20 100 %   06/25/25 0400 -- 97.5 °F (36.4 °C) Oral -- -- --   06/25/25 0000 -- 98.5 °F (36.9 °C) Oral -- -- --   06/24/25 2200 -- -- -- 80 21 --   06/24/25 2100 -- -- -- 99 18 --   06/24/25 2000 (!) 173/139 98.7 °F (37.1 °C) Temporal 58 12 98 %   06/24/25 1900 (!) 168/89 -- -- 68 19 (!) 82 %   06/24/25 1800 (!) 146/65 98.8 °F (37.1 °C) Oral 63 13 --   06/24/25 1730 -- -- -- 60 13 --   06/24/25 1700 -- -- -- 69 13 --   06/24/25 1630 -- -- -- 57 14 --   06/24/25 1600 -- -- -- 66 10 --   06/24/25 1536 (!) 151/68 -- -- (!) 48 14 --   06/24/25 1530 -- -- -- 55 12 --   06/24/25 1515 -- -- -- (!) 49 15 --   06/24/25 1500 -- -- -- 59 12 --   06/24/25 1445 -- -- -- 52 11 --   06/24/25 1430 -- -- -- 61 14 --   06/24/25 1424 (!) 152/80 -- -- 58 16 --   06/24/25 1415 -- -- -- 54 14 --   06/24/25 1400 135/65 --  Defer to case management for SNF recommendations.    Thank you for this consult. Please contact me with any questions or concerns.      CLIFFORD [email or web address]  PM&R  6/25/2025  9:51 AM      * This document was created using dictation software.  While all precautions were taken to ensure accuracy, errors may have occurred.  Please disregard any typographical errors.

## 2025-06-25 NOTE — CARE COORDINATION
8:51 AM  Chart Review:  Pt is from home with son. Pt does not drive.   SHYANNE noted that pt is no longer appropriate for ARU. Referral was made to [facility] for SNF by RN CM yesterday via [facility]. SHYANNE will follow up.    10:15 AM  SHYANNE met with Angelica @ [facility] in person. Agnelica stated she completed an onsite visit with pt and they are able to accept. SHYANNE updated that pt has a DC order in and will DC today. Angelica in agreement.

## 2025-06-25 NOTE — DISCHARGE INSTR - COC
Continuity of Care Form    Patient Name: Ad Lacey   :  1950  MRN:  [id number]    Admit date:  2025  Discharge date:  ***    Code Status Order: Full Code   Advance Directives:     Admitting Physician:  Stephen Chacon MD  PCP: No primary care provider on file.    Discharging Nurse: ***  Discharging Hospital Unit/Room#: 5510/5510-01  Discharging Unit Phone Number: ***    Emergency Contact:   Extended Emergency Contact Information  Primary Emergency Contact: Jacobo Lacey  Mobile Phone: [phone]  Relation: Child  Preferred language: English   needed? No  Secondary Emergency Contact: Franklyn LARIOS  Mobile Phone: [phone]  Relation: Friend    Past Surgical History:  No past surgical history on file.    Immunization History:   Immunization History   Administered Date(s) Administered    COVID-19, PFIZER GRAY top, DO NOT Dilute, (age 12 y+), IM, 30 mcg/0.3 mL 2022    COVID-19, PFIZER PURPLE top, DILUTE for use, (age 12 y+), 30mcg/0.3mL 2022       Active Problems:  Patient Active Problem List   Diagnosis Code    Subdural hematoma (HCC) S06.5XAA    Severe malnutrition E43    SDH (subdural hematoma) (HCC) S06.5XAA    Brain bleed (HCC) I61.9       Isolation/Infection:   Isolation            No Isolation          Patient Infection Status    None to display         Nurse Assessment:  Last Vital Signs: BP (!) 160/84   Pulse 64   Temp 97.7 °F (36.5 °C) (Oral)   Resp 18   Ht 1.727 m (5' 7.99\")   Wt 66.2 kg (145 lb 15.1 oz)   SpO2 99%   BMI 22.20 kg/m²     Last documented pain score (0-10 scale): Pain Level: 8  Last Weight:   Wt Readings from Last 1 Encounters:   25 66.2 kg (145 lb 15.1 oz)     Mental Status:  {IP PT MENTAL STATUS:}    IV Access:  { ARTUR IV ACCESS:136652679}    Nursing Mobility/ADLs:  Walking   {CHP DME ADLs:153724931}  Transfer  {CHP DME ADLs:068026002}  Bathing  {CHP DME ADLs:972601949}  Dressing  {CHP DME ADLs:806020169}  Toileting  {CHP DME

## 2025-06-25 NOTE — PROGRESS NOTES
Pt admitted to the unit from ICU. VS taken and recorded. Spo2 maintained at RA. 4 eyes assessment completed. Medication given per MAR. All fall precautions in place, call light within reach, free from fall injury. Plan of care ongoing.

## 2025-06-25 NOTE — PROGRESS NOTES
Current NIHSS 0    Nursing Core Measures for Stroke:   [x]   Education template documentation (STROKE/TIA). Select only risk factors that are applicable to patient when selecting risk factors.  [x]   Care Plan template documentation (Physiologic Instability - Neurosensory). Selecting this will add care plan rows to the flowsheet under the Neuro section of Head to Toe.  [x]   Verified Swallow Screen completed prior to PO intake of food, drink, medications.          Please verify correct medication route prior to administration for intubated patients, patients who can not swallow or have alternative routes of intake (NG, OG, MI), etc  []   VTE Prophylaxis: SCDs ordered/addressed; SCDs: On           (As a reminder, ASA, Plavix, and TPA/TNK are not VTE prophylaxis.)    Reviewed the Following Education with Patient and/or Family:   - Personalized risk factors for patient, along with changes, modifications that will help prevent stroke.  - Signs and Symptoms of Stroke: (Facial droop, weakness/numbness especially on one side, speech difficulty, sudden confusion, sudden loss of vision, sudden severe headache, sudden loss of balance or having difficulty walking, syncope, or seizure)  - How to activate EMS (911)   - Importance of Follow Up Appointments at Discharge   - Importance of Compliance with Medications Prescribed at Discharge  - Available community resources and stroke advocacy groups if needed    Patient and/or family member: verbalized understanding.     Stroke Education booklet given to patient/family (or verified, if given already), which reviews above information. N/A         Electronically signed by Kendy Mccoy RN on 6/25/2025 at 6:23 AM

## 2025-06-25 NOTE — PROGRESS NOTES
4 Eyes Skin Assessment     NAME:  Ad Lacey  YOB: 1950  MEDICAL RECORD NUMBER:  [id number]    The patient is being assessed for  Admission    I agree that at least one RN has performed a thorough Head to Toe Skin Assessment on the patient. ALL assessment sites listed below have been assessed.      Areas assessed by both nurses:    Head, Face, Ears, Shoulders, Back, Chest, Arms, Elbows, Hands, Sacrum. Buttock, Coccyx, Ischium, Legs. Feet and Heels, and Under Medical Devices   Stage to the coccyx and R side of buttocks.   Blanchable redness to the both heels  Abrasion to the R and L elbow   Bruises to the R hip, both hands and scattered.   Skin tag to the mid back       Does the Patient have a Wound? No noted wound(s)       Jalen Prevention initiated by RN: Yes  Wound Care Orders initiated by RN: No    Pressure Injury (Stage 3,4, Unstageable, DTI, NWPT, and Complex wounds) if present, place Wound referral order by RN under : No    New Ostomies, if present place, Ostomy referral order under : No     Nurse 1 eSignature: Electronically signed by Kendy Mccoy RN on 6/25/25 at 5:29 AM EDT    **SHARE this note so that the co-signing nurse can place an eSignature**    Nurse 2 eSignature: Electronically signed by CRISTY BRUNNER RN on 6/25/25 at 6:00 AM EDT

## 2025-06-25 NOTE — CARE COORDINATION
Case Management Assessment            Discharge Note                    Date / Time of Note: 6/25/2025 11:26 AM                  Discharge Note Completed by: XAVI Lezama    Patient Name: Ad Lacey   YOB: 1950  Diagnosis: SDH (subdural hematoma) (HCC) [S06.5XAA]  Brain bleed (HCC) [I61.9]   Date / Time: 6/21/2025  8:00 PM    Current PCP: No primary care provider on file.  Clinic patient: No    Hospitalization in the last 30 days: Yes  Readmission Assessment  Number of Days since last admission?: 1-7 days  Previous Disposition: Acute Rehab  Who is being Interviewed: Patient  What was the patient's/caregiver's perception as to why they think they needed to return back to the hospital?: Other (Comment) (new symptoms- CT revealed new L SDH)  Did you visit your Primary Care Physician after you left the hospital, before you returned this time?: No  Why weren't you able to visit your PCP?: Other (Comment) (in ARU)  Did you see a specialist, such as Cardiac, Pulmonary, Orthopedic Physician, etc. after you left the hospital?: No  Who advised the patient to return to the hospital?: Physician, Acute Rehab  Does the patient report anything that got in the way of taking their medications?: No  In our efforts to provide the best possible care to you and others like you, can you think of anything that we could have done to help you after you left the hospital the first time, so that you might not have needed to return so soon?: Other (Comment) (no. n/a)    Advance Directives:  Code Status: Full Code  [place] DNR form completed and on chart: No    Financial:  Payor: MEDICARE / Plan: MEDICARE PART A AND B / Product Type: *No Product type* /      Pharmacy:    [id number] [place], [place] - [address] - P [phone] - F [phone] [address]  [facility] [address]  Phone: [phone] Fax: [phone]      Assistance purchasing medications?:    Assistance

## 2025-06-26 NOTE — PROGRESS NOTES
= 12-14 days (nearly every day)  **Leave blank if 'No Reponse'**      Enter scores in boxes    Column 1 Column 2   Little interest or pleasure in doing things   9    Feeling down, depressed, or hopeless   9    **If either A or B in column 2 is coded “2” or “3”, CONTINUE asking the questions below.  If not, END the interview.**     Trouble falling or staying asleep, or sleeping too much       Feeling tired or having little energy       Poor appetite or overeating       Feeling bad about yourself - or that you are a failure or have let yourself or your family down       Trouble concentrating on things, such as reading the newspaper or watching television       Moving or speaking so slowly that other people could have noticed.  Or the opposite- being so fidgety or restless that you have been moving around a lot more than usual.       Thoughts that you would be better off dead, or of hurting yourself in some way.       Total Severity: Add scores for all frequency responses in column 2 (possible score 0-27, or enter 99 if unable to complete (if symptom frequency (column 2) is blank for 3 or more items).        Social Isolation  \"How often do you feel lonely or isolated from those around you?\"  [] 0.  Never  [] 1.  Rarely  [] 2.  Sometimes  [] 3.  Often  [] 4.  Always  [x] 7.  Patient declines to respond  [] 8.  Patient unable to respond    Pain Effect on Sleep  \"Over the past 5 days, how much of the time has pain made it hard for you to sleep at night?\"  []  0.  Does not apply - I have not had any pain or hurting in the past 5 days  []  1.  Rarely or not at all  []  2.  Occasionally  []  3.  Frequently  []  4.  Almost constantly  [x]  8.  Unable to answer    **If the patient answers \"0.  Does not apply\" to this question, skip the next two \"Pain Effect...\" questions**    Pain Interference with Therapy Activities  \"Over the past 5 days, how often have you limited your participation in rehabilitation therapy sessions due to

## 2025-07-18 ENCOUNTER — TRANSCRIBE ORDERS (OUTPATIENT)
Dept: ADMINISTRATIVE | Age: 75
End: 2025-07-18

## 2025-07-18 DIAGNOSIS — I62.00 SUBDURAL HEMORRHAGE (HCC): Primary | ICD-10-CM

## 2025-08-01 ENCOUNTER — TELEPHONE (OUTPATIENT)
Age: 75
End: 2025-08-01

## 2025-08-01 DIAGNOSIS — G89.4 CHRONIC PAIN SYNDROME: Primary | ICD-10-CM

## 2025-08-01 DIAGNOSIS — R19.09 PRESACRAL MASS: ICD-10-CM

## 2025-08-01 DIAGNOSIS — F41.9 ANXIETY: ICD-10-CM

## 2025-08-01 RX ORDER — DIAZEPAM 10 MG/1
10 TABLET ORAL 2 TIMES DAILY
Qty: 60 TABLET | Refills: 0 | Status: SHIPPED | OUTPATIENT
Start: 2025-08-01 | End: 2025-10-30

## 2025-08-01 RX ORDER — HYDROMORPHONE HYDROCHLORIDE 4 MG/1
4 TABLET ORAL EVERY 4 HOURS PRN
Qty: 120 TABLET | Refills: 0 | Status: SHIPPED | OUTPATIENT
Start: 2025-08-01 | End: 2025-08-31

## 2025-08-01 RX ORDER — MORPHINE SULFATE 60 MG/1
60 CAPSULE, EXTENDED RELEASE ORAL DAILY
Qty: 30 CAPSULE | Refills: 0 | Status: SHIPPED | OUTPATIENT
Start: 2025-08-01 | End: 2025-08-31

## 2025-08-01 NOTE — TELEPHONE ENCOUNTER
Patient called requesting hydromorphone     refill to be called in.  Patient states he was     just discharged from a nursing home and will     run out of this medication tomorrow. He aslo     states if he does not get it filled by tomorrow     he will be in the hospital again because this     is serious.

## 2025-08-01 NOTE — TELEPHONE ENCOUNTER
Pt son Jacobo  called stating father in a lot of pain.  He was released from [facility] on Wednesday, he will be out of meds by Sunday.  Jacobo is requesting refills for his father a few meds are pending below. Jacobo is requesting Penny Er in replace of the Morphine due to the cost. If you have any question you can reach Jacobo at [phone]  Please advise

## 2025-08-01 NOTE — TELEPHONE ENCOUNTER
HYDROmorphone (DILAUDID) 4 MG tablet [[id number]]       Patient called to get a refill on the listed medication sent to the following pharmacy.    Last ov 06/10/25    Please advise.    [id number] [address] - P [phone] - F [phone]

## 2025-08-02 NOTE — TELEPHONE ENCOUNTER
Request for medication sent to patient pharmacy.  Change morphine extended release tablet to Penny as requested for chronic pain control.  PDMP reviewed and appropriate.    Adan Pulido, DO

## 2025-08-04 DIAGNOSIS — R19.09 PRESACRAL MASS: ICD-10-CM

## 2025-08-04 DIAGNOSIS — G89.4 CHRONIC PAIN SYNDROME: ICD-10-CM

## 2025-08-11 ENCOUNTER — TELEPHONE (OUTPATIENT)
Age: 75
End: 2025-08-11

## 2025-08-11 DIAGNOSIS — G89.4 CHRONIC PAIN SYNDROME: ICD-10-CM

## 2025-08-11 DIAGNOSIS — R19.09 PRESACRAL MASS: ICD-10-CM

## 2025-08-12 RX ORDER — MORPHINE SULFATE 60 MG/1
60 CAPSULE, EXTENDED RELEASE ORAL DAILY
Qty: 30 CAPSULE | Refills: 0 | Status: SHIPPED | OUTPATIENT
Start: 2025-08-12 | End: 2025-08-15 | Stop reason: SDUPTHER

## 2025-08-14 ENCOUNTER — TELEPHONE (OUTPATIENT)
Age: 75
End: 2025-08-14

## 2025-08-15 ENCOUNTER — TELEPHONE (OUTPATIENT)
Age: 75
End: 2025-08-15

## 2025-08-15 DIAGNOSIS — R19.09 PRESACRAL MASS: ICD-10-CM

## 2025-08-15 DIAGNOSIS — G89.4 CHRONIC PAIN SYNDROME: ICD-10-CM

## 2025-08-15 RX ORDER — MORPHINE SULFATE 30 MG/1
60 CAPSULE, EXTENDED RELEASE ORAL DAILY
Qty: 60 CAPSULE | Refills: 0 | Status: SHIPPED | OUTPATIENT
Start: 2025-08-15 | End: 2025-09-14

## 2025-08-19 ENCOUNTER — TELEPHONE (OUTPATIENT)
Dept: CARDIOLOGY CLINIC | Age: 75
End: 2025-08-19

## 2025-08-19 DIAGNOSIS — I25.10 CORONARY ARTERY DISEASE INVOLVING NATIVE CORONARY ARTERY OF NATIVE HEART WITHOUT ANGINA PECTORIS: Primary | ICD-10-CM

## 2025-08-20 ENCOUNTER — TELEPHONE (OUTPATIENT)
Age: 75
End: 2025-08-20

## 2025-08-20 DIAGNOSIS — R19.09 PRESACRAL MASS: ICD-10-CM

## 2025-08-20 DIAGNOSIS — G89.4 CHRONIC PAIN SYNDROME: ICD-10-CM

## 2025-08-20 DIAGNOSIS — Z12.12 SCREENING FOR COLORECTAL CANCER: ICD-10-CM

## 2025-08-20 DIAGNOSIS — Z12.11 SCREENING FOR COLORECTAL CANCER: ICD-10-CM

## 2025-08-20 RX ORDER — OXYCODONE HCL 20 MG/1
20 TABLET, FILM COATED, EXTENDED RELEASE ORAL 2 TIMES DAILY
Qty: 60 TABLET | Refills: 0 | Status: SHIPPED | OUTPATIENT
Start: 2025-08-20 | End: 2025-09-19

## 2025-08-20 RX ORDER — HYDROMORPHONE HYDROCHLORIDE 4 MG/1
4 TABLET ORAL EVERY 4 HOURS PRN
Qty: 120 TABLET | Refills: 0 | Status: SHIPPED | OUTPATIENT
Start: 2025-08-20 | End: 2025-08-20

## 2025-08-20 RX ORDER — HYDROMORPHONE HYDROCHLORIDE 4 MG/1
4 TABLET ORAL EVERY 6 HOURS PRN
Qty: 120 TABLET | Refills: 0 | Status: SHIPPED | OUTPATIENT
Start: 2025-08-20 | End: 2025-09-19

## 2025-08-20 RX ORDER — OXYCODONE HCL 20 MG/1
20 TABLET, FILM COATED, EXTENDED RELEASE ORAL 2 TIMES DAILY
Qty: 60 TABLET | Refills: 0 | Status: SHIPPED | OUTPATIENT
Start: 2025-08-20 | End: 2025-08-20 | Stop reason: SDUPTHER

## 2025-08-21 ENCOUNTER — TELEPHONE (OUTPATIENT)
Age: 75
End: 2025-08-21

## 2025-08-22 ENCOUNTER — TELEPHONE (OUTPATIENT)
Age: 75
End: 2025-08-22

## 2025-08-22 DIAGNOSIS — G89.4 CHRONIC PAIN SYNDROME: Primary | ICD-10-CM

## 2025-08-22 DIAGNOSIS — G89.4 CHRONIC PAIN SYNDROME: ICD-10-CM

## 2025-08-22 DIAGNOSIS — R19.09 PRESACRAL MASS: ICD-10-CM

## 2025-08-22 RX ORDER — MORPHINE SULFATE 30 MG/1
30 TABLET ORAL EVERY 4 HOURS PRN
Qty: 84 TABLET | Refills: 0 | Status: SHIPPED | OUTPATIENT
Start: 2025-08-22 | End: 2025-09-05

## 2025-08-22 RX ORDER — HYDROMORPHONE HYDROCHLORIDE 4 MG/1
4 TABLET ORAL EVERY 6 HOURS PRN
Qty: 120 TABLET | Refills: 0 | Status: CANCELLED | OUTPATIENT
Start: 2025-08-22 | End: 2025-09-21

## 2025-08-25 ENCOUNTER — TELEPHONE (OUTPATIENT)
Age: 75
End: 2025-08-25

## 2025-08-25 DIAGNOSIS — R19.09 PRESACRAL MASS: ICD-10-CM

## 2025-08-25 DIAGNOSIS — G89.4 CHRONIC PAIN SYNDROME: Primary | ICD-10-CM

## 2025-08-25 RX ORDER — MORPHINE SULFATE 60 MG/1
60 TABLET, FILM COATED, EXTENDED RELEASE ORAL 2 TIMES DAILY
Qty: 60 TABLET | Refills: 0 | Status: SHIPPED | OUTPATIENT
Start: 2025-08-25 | End: 2025-09-24

## 2025-08-29 DIAGNOSIS — F41.9 ANXIETY: ICD-10-CM

## 2025-08-29 DIAGNOSIS — R19.09 PRESACRAL MASS: ICD-10-CM

## 2025-08-29 DIAGNOSIS — G89.4 CHRONIC PAIN SYNDROME: ICD-10-CM

## 2025-08-29 RX ORDER — DIAZEPAM 10 MG/1
10 TABLET ORAL 2 TIMES DAILY
Qty: 60 TABLET | Refills: 0 | Status: SHIPPED | OUTPATIENT
Start: 2025-08-29 | End: 2025-11-27

## 2025-08-29 RX ORDER — HYDROMORPHONE HYDROCHLORIDE 4 MG/1
4 TABLET ORAL EVERY 6 HOURS PRN
Qty: 120 TABLET | Refills: 0 | Status: SHIPPED | OUTPATIENT
Start: 2025-08-29 | End: 2025-09-28

## 2025-09-02 ENCOUNTER — TELEPHONE (OUTPATIENT)
Age: 75
End: 2025-09-02

## 2025-09-02 ENCOUNTER — APPOINTMENT (OUTPATIENT)
Dept: CT IMAGING | Age: 75
End: 2025-09-02
Payer: MEDICARE

## 2025-09-02 ENCOUNTER — HOSPITAL ENCOUNTER (EMERGENCY)
Age: 75
Discharge: HOME | End: 2025-09-02
Payer: MEDICARE

## 2025-09-02 VITALS
RESPIRATION RATE: 18 BRPM | TEMPERATURE: 98.2 F | DIASTOLIC BLOOD PRESSURE: 95 MMHG | OXYGEN SATURATION: 100 % | WEIGHT: 148 LBS | HEART RATE: 78 BPM | SYSTOLIC BLOOD PRESSURE: 181 MMHG | BODY MASS INDEX: 22.51 KG/M2

## 2025-09-02 DIAGNOSIS — R19.09 PRESACRAL MASS: ICD-10-CM

## 2025-09-02 DIAGNOSIS — I72.8 PSEUDOANEURYSM OF SUBCLAVIAN ARTERY: Primary | ICD-10-CM

## 2025-09-02 DIAGNOSIS — F41.9 ANXIETY: ICD-10-CM

## 2025-09-02 DIAGNOSIS — G89.4 CHRONIC PAIN SYNDROME: ICD-10-CM

## 2025-09-02 LAB
ANION GAP SERPL CALCULATED.3IONS-SCNC: 16 MMOL/L (ref 3–16)
BASOPHILS # BLD: 0.1 K/UL (ref 0–0.2)
BASOPHILS NFR BLD: 1.3 %
BUN SERPL-MCNC: 19 MG/DL (ref 7–20)
CALCIUM SERPL-MCNC: 9.9 MG/DL (ref 8.3–10.6)
CHLORIDE SERPL-SCNC: 103 MMOL/L (ref 99–110)
CO2 SERPL-SCNC: 23 MMOL/L (ref 21–32)
CREAT SERPL-MCNC: 1.2 MG/DL (ref 0.8–1.3)
DEPRECATED RDW RBC AUTO: 18.6 % (ref 12.4–15.4)
EOSINOPHIL # BLD: 0.2 K/UL (ref 0–0.6)
EOSINOPHIL NFR BLD: 3.2 %
GFR SERPLBLD CREATININE-BSD FMLA CKD-EPI: 63 ML/MIN/{1.73_M2}
GLUCOSE SERPL-MCNC: 116 MG/DL (ref 70–99)
HCT VFR BLD AUTO: 30.7 % (ref 40.5–52.5)
HGB BLD-MCNC: 10.3 G/DL (ref 13.5–17.5)
LYMPHOCYTES # BLD: 1.7 K/UL (ref 1–5.1)
LYMPHOCYTES NFR BLD: 25.1 %
MCH RBC QN AUTO: 27.8 PG (ref 26–34)
MCHC RBC AUTO-ENTMCNC: 33.6 G/DL (ref 31–36)
MCV RBC AUTO: 82.7 FL (ref 80–100)
MONOCYTES # BLD: 0.4 K/UL (ref 0–1.3)
MONOCYTES NFR BLD: 6.2 %
NEUTROPHILS # BLD: 4.3 K/UL (ref 1.7–7.7)
NEUTROPHILS NFR BLD: 64.2 %
PLATELET # BLD AUTO: 452 K/UL (ref 135–450)
PMV BLD AUTO: 6.9 FL (ref 5–10.5)
POTASSIUM SERPL-SCNC: 4.1 MMOL/L (ref 3.5–5.1)
RBC # BLD AUTO: 3.71 M/UL (ref 4.2–5.9)
SODIUM SERPL-SCNC: 142 MMOL/L (ref 136–145)
WBC # BLD AUTO: 6.7 K/UL (ref 4–11)

## 2025-09-02 PROCEDURE — 80048 BASIC METABOLIC PNL TOTAL CA: CPT

## 2025-09-02 PROCEDURE — 99285 EMERGENCY DEPT VISIT HI MDM: CPT

## 2025-09-02 PROCEDURE — 6360000004 HC RX CONTRAST MEDICATION: Performed by: NURSE PRACTITIONER

## 2025-09-02 PROCEDURE — 85025 COMPLETE CBC W/AUTO DIFF WBC: CPT

## 2025-09-02 PROCEDURE — 36415 COLL VENOUS BLD VENIPUNCTURE: CPT

## 2025-09-02 PROCEDURE — 71275 CT ANGIOGRAPHY CHEST: CPT

## 2025-09-02 RX ORDER — IOPAMIDOL 755 MG/ML
75 INJECTION, SOLUTION INTRAVASCULAR
Status: COMPLETED | OUTPATIENT
Start: 2025-09-02 | End: 2025-09-02

## 2025-09-02 RX ADMIN — IOPAMIDOL 75 ML: 755 INJECTION, SOLUTION INTRAVENOUS at 20:37

## 2025-09-02 ASSESSMENT — ENCOUNTER SYMPTOMS: RESPIRATORY NEGATIVE: 1

## 2025-09-02 ASSESSMENT — PAIN DESCRIPTION - LOCATION: LOCATION: GENERALIZED

## 2025-09-02 ASSESSMENT — PAIN - FUNCTIONAL ASSESSMENT: PAIN_FUNCTIONAL_ASSESSMENT: ACTIVITIES ARE NOT PREVENTED

## 2025-09-02 ASSESSMENT — PAIN SCALES - GENERAL: PAINLEVEL_OUTOF10: 5

## 2025-09-02 ASSESSMENT — PAIN DESCRIPTION - DESCRIPTORS: DESCRIPTORS: ACHING

## 2025-09-02 ASSESSMENT — PAIN DESCRIPTION - FREQUENCY: FREQUENCY: CONTINUOUS

## 2025-09-02 ASSESSMENT — PAIN DESCRIPTION - PAIN TYPE: TYPE: ACUTE PAIN

## 2025-09-03 RX ORDER — HYDROMORPHONE HYDROCHLORIDE 4 MG/1
4 TABLET ORAL EVERY 6 HOURS PRN
Qty: 120 TABLET | Refills: 0 | Status: SHIPPED | OUTPATIENT
Start: 2025-09-03 | End: 2025-10-03

## 2025-09-03 RX ORDER — DIAZEPAM 10 MG/1
10 TABLET ORAL 2 TIMES DAILY
Qty: 60 TABLET | Refills: 0 | Status: SHIPPED | OUTPATIENT
Start: 2025-09-03 | End: 2025-12-02

## (undated) DEVICE — SUTURE VICRYL + SZ 3-0 L18IN ABSRB UD SH 1/2 CIR TAPERCUT NDL VCP864D

## (undated) DEVICE — TOWEL,OR,DSP,ST,BLUE,STD,4/PK,20PK/CS: Brand: MEDLINE

## (undated) DEVICE — HYPODERMIC SAFETY NEEDLE: Brand: MONOJECT

## (undated) DEVICE — SUTURE NONABSORBABLE MONOFILAMENT 7-0 BV-1 1X24 IN PROLENE 8702H

## (undated) DEVICE — NEEDLE HYPO 25GA L1.5IN BVL ORIENTED ECLIPSE

## (undated) DEVICE — PUMP HEART IMPELLA 5.5 W/SMART ASSIST S2

## (undated) DEVICE — Device

## (undated) DEVICE — ANGIO-SEAL VIP VASCULAR CLOSURE DEVICE: Brand: ANGIO-SEAL

## (undated) DEVICE — CORONARY IMAGING CATHETER: Brand: OPTICROSS™ 6 HD

## (undated) DEVICE — RADIFOCUS GLIDEWIRE: Brand: GLIDEWIRE

## (undated) DEVICE — POSITIONER,HEAD,RING CUSHION,9IN,32CS: Brand: MEDLINE

## (undated) DEVICE — MAJOR VASCULAR: Brand: MEDLINE INDUSTRIES, INC.

## (undated) DEVICE — DRESSING,GAUZE,XEROFORM,CURAD,1"X8",ST: Brand: CURAD

## (undated) DEVICE — PINNACLE INTRODUCER SHEATH: Brand: PINNACLE

## (undated) DEVICE — APPLIER CLP L9.375IN APER 2.1MM CLS L3.8MM 20 SM TI CLP

## (undated) DEVICE — SUTURE VCRL + SZ 2-0 L27IN ABSRB CLR CT-1 1/2 CIR TAPERCUT VCP259H

## (undated) DEVICE — SUTURE PERMAHAND SZ 2-0 L18IN NONABSORBABLE BLK L26MM SH C012D

## (undated) DEVICE — TAPE MED W1/8XL30IN WHT POLY

## (undated) DEVICE — TRANSFER SET 3": Brand: MEDLINE INDUSTRIES, INC.

## (undated) DEVICE — CATH BLLN ANGIO 3X15MM SC EUPHORA RX

## (undated) DEVICE — GOWN SIRUS NONREIN XL W/TWL: Brand: MEDLINE INDUSTRIES, INC.

## (undated) DEVICE — LOOP VES MAXI RED SIL DISP

## (undated) DEVICE — SUTURE PERMAHAND SZ 2-0 L12X18IN NONABSORBABLE BLK SILK A185H

## (undated) DEVICE — SOLUTION IV IRRIG POUR BRL 0.9% SODIUM CHL 2F7124

## (undated) DEVICE — DEVICE VASC CLSR 5FR GRY W/ INTEGR SEAL 10ML LOK SYR

## (undated) DEVICE — LOOP VES W25MM THK1MM MAXI RED SIL FLD REPELLENT 100 PER

## (undated) DEVICE — SUTURE PROL SZ 6-0 L24IN NONABSORBABLE BLU L13MM C-1 3/8 8726H

## (undated) DEVICE — SOLUTION IRRIG 1000ML 0.9% SOD CHL USP POUR PLAS BTL

## (undated) DEVICE — C-ARM: Brand: UNBRANDED

## (undated) DEVICE — SUTURE PERMAHAND SZ 4-0 L18IN NONABSORBABLE BLK SILK BRAID A183H

## (undated) DEVICE — SUTURE VICRYL + SZ 2-0 L18IN ABSRB UD CT1 L36MM 1/2 CIR VCP839D

## (undated) DEVICE — ZIMMER® STERILE DISPOSABLE TOURNIQUET CUFF WITH PLC, DUAL PORT, SINGLE BLADDER, 30 IN. (76 CM)

## (undated) DEVICE — CATHETER THRMDIL 7FR L110CM STD PULM ART 4 INFUS LUMN SWN

## (undated) DEVICE — SUTURE PROL SZ 6-0 L30IN NONABSORBABLE BLU L11MM BV 3/8 CIR 8776H

## (undated) DEVICE — SUTURE VCRL + SZ 3-0 L27IN ABSRB WHT CT-1 1/2 CIR VCP258H

## (undated) DEVICE — COVER,MAYO STAND,XL,STERILE: Brand: MEDLINE

## (undated) DEVICE — SUTURE PERMAHAND SZ 3-0 L18IN NONABSORBABLE BLK SILK BRAID A184H

## (undated) DEVICE — DRAPE,REIN 53X77,STERILE: Brand: MEDLINE

## (undated) DEVICE — 3M™ IOBAN™ 2 ANTIMICROBIAL INCISE DRAPE 6650EZ: Brand: IOBAN™ 2

## (undated) DEVICE — PACK,ORTOHMAX/CVMAX,UNIVERSAL,5/CS: Brand: MEDLINE

## (undated) DEVICE — HI-TORQUE WHISPER MS GUIDE WIRE .014 STRAIGHT TIP 3.0 CM X 190 CM: Brand: HI-TORQUE WHISPER

## (undated) DEVICE — HYPODERMIC SAFETY NEEDLE: Brand: MAGELLAN

## (undated) DEVICE — GAUZE,SPONGE,4"X4",16PLY,XRAY,STRL,LF: Brand: MEDLINE

## (undated) DEVICE — LOOP VES W1.3MM THK0.9MM MINI YEL SIL FLD REPELLENT

## (undated) DEVICE — SPONGE GZ W4XL4IN COT 12 PLY TYP VII WVN C FLD DSGN

## (undated) DEVICE — DRESSING FOAM W3XL3IN GENTLE SIL FACE BORD ADH PD SUP ABSRB

## (undated) DEVICE — APPLIER CLP L9.38IN M LIG TI DISP STR RNG HNDL LIGACLP

## (undated) DEVICE — CATHETER URETH 14FR L16IN RED RUB INTMIT ROB MOD BARDX

## (undated) DEVICE — BANDAGE COMPR W4INXL15FT BGE E SGL LAYERED CLP CLSR

## (undated) DEVICE — LIQUIBAND RAPID ADHESIVE 36/CS 0.8ML: Brand: MEDLINE

## (undated) DEVICE — PERCLOSE™ PROSTYLE™ SUTURE-MEDIATED CLOSURE AND REPAIR SYSTEM: Brand: PERCLOSE™ PROSTYLE™

## (undated) DEVICE — SYRINGE TB 1ML TRNSLUC BRL WHT PLUNG BLK MRK CONVENTIONAL

## (undated) DEVICE — VALVULOTOME ANGIOSCOPIC W/ CATH INTRO CUT HD TRUINCISE TIVK2030] TRUE INCISE]

## (undated) DEVICE — CANNULATED DRILL

## (undated) DEVICE — SPONGE LAP W18XL18IN WHT COT 4 PLY FLD STRUNG RADPQ DISP ST

## (undated) DEVICE — SUTURE VCRL SZ 2-0 L18IN ABSRB UD CT-1 L36MM 1/2 CIR J839D

## (undated) DEVICE — SUTURE VCRL UD BR CT 3-0 18IN CT1 J838D

## (undated) DEVICE — SUTURE VCRL + SZ 0 L27IN ABSRB UD CT-1 L36MM 1/2 CIR TAPR VCP260H

## (undated) DEVICE — PACK DRP UNIV W BK TBL MAYO STD BTM TOP SIDE UTIL CVR ZONE

## (undated) DEVICE — BANDAGE,GAUZE,BULKEE II,4.5"X4.1YD,STRL: Brand: MEDLINE

## (undated) DEVICE — SUTURE PERMAHAND SZ 0 L18IN NONABSORBABLE BLK SILK BRAID A186H

## (undated) DEVICE — CATHETER GUID EXTRA BACKUP 3.5 0.070IN 6FR 100CM VISTA BRITE TIP

## (undated) DEVICE — GLOVE SURG SZ 7.5 L11.73IN FNGR THK9.8MIL STRW LTX POLYMER

## (undated) DEVICE — HIP PINNING: Brand: MEDLINE INDUSTRIES, INC.

## (undated) DEVICE — 3M™ COBAN™ NL STERILE NON-LATEX SELF-ADHERENT WRAP, 2084S, 4 IN X 5 YD (10 CM X 4,5 M), 18 ROLLS/CASE: Brand: 3M™ COBAN™

## (undated) DEVICE — Device: Brand: MEDEX

## (undated) DEVICE — RESERVOIR,SUCTION,100CC,SILICONE: Brand: MEDLINE

## (undated) DEVICE — DRESSING ALG W4XL8IN AG FOAM SUPERABSORBENT SIL ANTIMIC

## (undated) DEVICE — DRAIN SURG 19FR 100% SIL RADPQ RND CHN FULL FLUT

## (undated) DEVICE — INTENDED FOR TISSUE SEPARATION, AND OTHER PROCEDURES THAT REQUIRE A SHARP SURGICAL BLADE TO PUNCTURE OR CUT.: Brand: BARD-PARKER ® STAINLESS STEEL BLADES

## (undated) DEVICE — 3M™ STERI-DRAPE™ ISOLATION BAG, 10 PER CARTON / 4 CARTONS PER CASE, 1003: Brand: 3M™ STERI-DRAPE™

## (undated) DEVICE — LOOP VES W13MM THK09MM MINI RED SIL FLD REPELLENT

## (undated) DEVICE — CANISTER SUCTION VAC PORTABLE 1000 CC FOR INDIGO SYS ENGINE

## (undated) DEVICE — CATH BLLN ANGIO 4X15MM NC EUPHORIA RX

## (undated) DEVICE — AGENT HEMSTAT W4XL4IN OXIDIZED REGENERATED CELOS ABSRB SFT

## (undated) DEVICE — FOGARTY - HYDRAGRIP SURGICAL - CLAMP INSERTS: Brand: FOGARTY SOFTJAW

## (undated) DEVICE — GOWN,AURORA,NONREINF,RAGLAN,XXL,STERILE: Brand: MEDLINE

## (undated) DEVICE — DRAPE,INSTRUMENT,MAGNETIC,10X16: Brand: MEDLINE

## (undated) DEVICE — RADIFOCUS GLIDEWIRE ADVANTAGE GUIDEWIRE: Brand: GLIDEWIRE ADVANTAGE

## (undated) DEVICE — GOWN SIRUS NONREIN LG W/TWL: Brand: MEDLINE INDUSTRIES, INC.

## (undated) DEVICE — TOTAL TRAY, 16FR 10ML SIL FOLEY, URN: Brand: MEDLINE

## (undated) DEVICE — GLOVE ORANGE PI 7 1/2   MSG9075

## (undated) DEVICE — SHEET,DRAPE,53X77,STERILE: Brand: MEDLINE

## (undated) DEVICE — EXTENSION SET, MALE LUER LOCK ADAPTER

## (undated) DEVICE — SYRINGE MED 30ML STD CLR PLAS LUERLOCK TIP N CTRL DISP

## (undated) DEVICE — KIT CATH 5.2FR 100CM 145DEG ANGIO VASC DIAG JUDKINS R 4

## (undated) DEVICE — SUTURE NONABSORBABLE MONOFILAMENT 5-0 C-1 1X24 IN PROLENE 8725H

## (undated) DEVICE — MERCY HEALTH WEST TURNOVER: Brand: MEDLINE INDUSTRIES, INC.

## (undated) DEVICE — SYRINGE MED 10ML LUERLOCK TIP W/O SFTY DISP

## (undated) DEVICE — SOLUTION IV 1000ML 0.9% SOD CHL PH 5 INJ USP VIAFLX PLAS

## (undated) DEVICE — STRIP,CLOSURE,WOUND,MEDI-STRIP,1/2X4: Brand: MEDLINE

## (undated) DEVICE — STAPLER SKIN H3.9MM WIRE DIA0.58MM CRWN 6.9MM 35 STPL ROT

## (undated) DEVICE — CATHETER THROMCTMY INDIGO CAT PENUMBRA 140CM RX L LUMN ASPIR TBNG

## (undated) DEVICE — ELECTRODE PT RET AD L9FT HI MOIST COND ADH HYDRGEL CORDED

## (undated) DEVICE — THREADED GUIDE WIRE

## (undated) DEVICE — GUIDEWIRE VASC L150CM DIA0.025IN TIP L7CM J RAD 3MM PTFE

## (undated) DEVICE — SUTURE MONOCRYL + SZ 4-0 L18IN ABSRB UD L19MM PS-2 3/8 CIR MCP496G

## (undated) DEVICE — APPLIER LIG CLP M L11IN TI STR RNG HNDL FOR 20 CLP DISP

## (undated) DEVICE — GLOVE ORTHO 7 1/2   MSG9475

## (undated) DEVICE — CATHETER 5FR CORDIS PIG 145DEG 110CM

## (undated) DEVICE — GUIDEWIRE VASC L150CM DIA0.035IN FLX END L7CM J 3MM PTFE

## (undated) DEVICE — RUNTHROUGH NS EXTRA FLOPPY PTCA GUIDEWIRE: Brand: RUNTHROUGH